# Patient Record
Sex: MALE | Race: WHITE | NOT HISPANIC OR LATINO
[De-identification: names, ages, dates, MRNs, and addresses within clinical notes are randomized per-mention and may not be internally consistent; named-entity substitution may affect disease eponyms.]

---

## 2017-01-27 DIAGNOSIS — Z81.1 FAMILY HISTORY OF ALCOHOL ABUSE AND DEPENDENCE: ICD-10-CM

## 2017-01-27 DIAGNOSIS — Z85.71 PERSONAL HISTORY OF HODGKIN LYMPHOMA: ICD-10-CM

## 2017-01-27 DIAGNOSIS — Z82.3 FAMILY HISTORY OF STROKE: ICD-10-CM

## 2017-01-27 DIAGNOSIS — Z87.19 PERSONAL HISTORY OF OTHER DISEASES OF THE DIGESTIVE SYSTEM: ICD-10-CM

## 2017-01-30 ENCOUNTER — APPOINTMENT (OUTPATIENT)
Dept: ELECTROPHYSIOLOGY | Facility: CLINIC | Age: 72
End: 2017-01-30

## 2017-01-30 VITALS
HEIGHT: 68 IN | BODY MASS INDEX: 34.25 KG/M2 | OXYGEN SATURATION: 97 % | DIASTOLIC BLOOD PRESSURE: 66 MMHG | WEIGHT: 226 LBS | SYSTOLIC BLOOD PRESSURE: 147 MMHG | HEART RATE: 55 BPM

## 2017-01-30 RX ORDER — DULOXETINE HYDROCHLORIDE 30 MG/1
30 CAPSULE, DELAYED RELEASE ORAL
Refills: 0 | Status: DISCONTINUED | COMMUNITY
End: 2017-01-30

## 2017-10-03 ENCOUNTER — APPOINTMENT (OUTPATIENT)
Dept: SPINE | Facility: CLINIC | Age: 72
End: 2017-10-03
Payer: MEDICARE

## 2017-10-03 VITALS
HEIGHT: 68 IN | BODY MASS INDEX: 33.65 KG/M2 | DIASTOLIC BLOOD PRESSURE: 80 MMHG | WEIGHT: 222 LBS | SYSTOLIC BLOOD PRESSURE: 140 MMHG

## 2017-10-03 PROCEDURE — 99204 OFFICE O/P NEW MOD 45 MIN: CPT

## 2017-10-03 RX ORDER — WARFARIN SODIUM 2 MG/1
2 TABLET ORAL
Refills: 0 | Status: DISCONTINUED | COMMUNITY
End: 2017-10-03

## 2017-10-17 ENCOUNTER — APPOINTMENT (OUTPATIENT)
Dept: SPINE | Facility: CLINIC | Age: 72
End: 2017-10-17
Payer: MEDICARE

## 2017-10-17 VITALS
BODY MASS INDEX: 33.65 KG/M2 | HEIGHT: 68 IN | DIASTOLIC BLOOD PRESSURE: 70 MMHG | WEIGHT: 222 LBS | SYSTOLIC BLOOD PRESSURE: 130 MMHG

## 2017-10-17 PROCEDURE — 99214 OFFICE O/P EST MOD 30 MIN: CPT

## 2017-10-25 ENCOUNTER — OTHER (OUTPATIENT)
Age: 72
End: 2017-10-25

## 2017-12-14 ENCOUNTER — OUTPATIENT (OUTPATIENT)
Dept: OUTPATIENT SERVICES | Facility: HOSPITAL | Age: 72
LOS: 1 days | End: 2017-12-14
Payer: COMMERCIAL

## 2017-12-14 VITALS
TEMPERATURE: 98 F | DIASTOLIC BLOOD PRESSURE: 54 MMHG | WEIGHT: 223.99 LBS | OXYGEN SATURATION: 97 % | HEIGHT: 68 IN | SYSTOLIC BLOOD PRESSURE: 139 MMHG | HEART RATE: 59 BPM | RESPIRATION RATE: 18 BRPM

## 2017-12-14 DIAGNOSIS — I48.91 UNSPECIFIED ATRIAL FIBRILLATION: ICD-10-CM

## 2017-12-14 DIAGNOSIS — Z01.818 ENCOUNTER FOR OTHER PREPROCEDURAL EXAMINATION: ICD-10-CM

## 2017-12-14 DIAGNOSIS — M48.061 SPINAL STENOSIS, LUMBAR REGION WITHOUT NEUROGENIC CLAUDICATION: ICD-10-CM

## 2017-12-14 DIAGNOSIS — M48.00 SPINAL STENOSIS, SITE UNSPECIFIED: ICD-10-CM

## 2017-12-14 DIAGNOSIS — G47.33 OBSTRUCTIVE SLEEP APNEA (ADULT) (PEDIATRIC): ICD-10-CM

## 2017-12-14 DIAGNOSIS — Z96.641 PRESENCE OF RIGHT ARTIFICIAL HIP JOINT: Chronic | ICD-10-CM

## 2017-12-14 DIAGNOSIS — Z90.81 ACQUIRED ABSENCE OF SPLEEN: Chronic | ICD-10-CM

## 2017-12-14 LAB
ANION GAP SERPL CALC-SCNC: 13 MMOL/L — SIGNIFICANT CHANGE UP (ref 5–17)
BLD GP AB SCN SERPL QL: NEGATIVE — SIGNIFICANT CHANGE UP
BUN SERPL-MCNC: 19 MG/DL — SIGNIFICANT CHANGE UP (ref 7–23)
CALCIUM SERPL-MCNC: 9.9 MG/DL — SIGNIFICANT CHANGE UP (ref 8.4–10.5)
CHLORIDE SERPL-SCNC: 97 MMOL/L — SIGNIFICANT CHANGE UP (ref 96–108)
CO2 SERPL-SCNC: 33 MMOL/L — HIGH (ref 22–31)
CREAT SERPL-MCNC: 0.82 MG/DL — SIGNIFICANT CHANGE UP (ref 0.5–1.3)
GLUCOSE SERPL-MCNC: 78 MG/DL — SIGNIFICANT CHANGE UP (ref 70–99)
HCT VFR BLD CALC: 37.5 % — LOW (ref 39–50)
HGB BLD-MCNC: 12.1 G/DL — LOW (ref 13–17)
MCHC RBC-ENTMCNC: 31.9 PG — SIGNIFICANT CHANGE UP (ref 27–34)
MCHC RBC-ENTMCNC: 32.3 GM/DL — SIGNIFICANT CHANGE UP (ref 32–36)
MCV RBC AUTO: 98.9 FL — SIGNIFICANT CHANGE UP (ref 80–100)
PLATELET # BLD AUTO: 387 K/UL — SIGNIFICANT CHANGE UP (ref 150–400)
POTASSIUM SERPL-MCNC: 4.1 MMOL/L — SIGNIFICANT CHANGE UP (ref 3.5–5.3)
POTASSIUM SERPL-SCNC: 4.1 MMOL/L — SIGNIFICANT CHANGE UP (ref 3.5–5.3)
RBC # BLD: 3.79 M/UL — LOW (ref 4.2–5.8)
RBC # FLD: 16.2 % — HIGH (ref 10.3–14.5)
RH IG SCN BLD-IMP: POSITIVE — SIGNIFICANT CHANGE UP
SODIUM SERPL-SCNC: 143 MMOL/L — SIGNIFICANT CHANGE UP (ref 135–145)
WBC # BLD: 11.49 K/UL — HIGH (ref 3.8–10.5)
WBC # FLD AUTO: 11.49 K/UL — HIGH (ref 3.8–10.5)

## 2017-12-14 PROCEDURE — 85027 COMPLETE CBC AUTOMATED: CPT

## 2017-12-14 PROCEDURE — 86850 RBC ANTIBODY SCREEN: CPT

## 2017-12-14 PROCEDURE — G0463: CPT

## 2017-12-14 PROCEDURE — 86900 BLOOD TYPING SEROLOGIC ABO: CPT

## 2017-12-14 PROCEDURE — 86901 BLOOD TYPING SEROLOGIC RH(D): CPT

## 2017-12-14 PROCEDURE — 80048 BASIC METABOLIC PNL TOTAL CA: CPT

## 2017-12-14 RX ORDER — LIDOCAINE HCL 20 MG/ML
0.2 VIAL (ML) INJECTION ONCE
Qty: 0 | Refills: 0 | Status: DISCONTINUED | OUTPATIENT
Start: 2018-01-05 | End: 2018-01-05

## 2017-12-14 RX ORDER — CEFAZOLIN SODIUM 1 G
2000 VIAL (EA) INJECTION ONCE
Qty: 0 | Refills: 0 | Status: DISCONTINUED | OUTPATIENT
Start: 2018-01-05 | End: 2018-01-06

## 2017-12-14 RX ORDER — SODIUM CHLORIDE 9 MG/ML
3 INJECTION INTRAMUSCULAR; INTRAVENOUS; SUBCUTANEOUS EVERY 8 HOURS
Qty: 0 | Refills: 0 | Status: DISCONTINUED | OUTPATIENT
Start: 2018-01-05 | End: 2018-01-05

## 2017-12-14 NOTE — H&P PST ADULT - PMH
Atrial fibrillation    BPH (benign prostatic hyperplasia)    GERD (gastroesophageal reflux disease)    Hodgkin lymphoma    HTN (hypertension)    MOE (obstructive sleep apnea)  positive sleep study many years ago, was recommended to use CPAP, patient non-compliant Atrial fibrillation    BPH (benign prostatic hyperplasia)    GERD (gastroesophageal reflux disease)    Hodgkin lymphoma    HTN (hypertension)    MOE (obstructive sleep apnea)  positive sleep study many years ago, was recommended to use CPAP, patient non-compliant  Osteoarthritis

## 2017-12-22 ENCOUNTER — OTHER (OUTPATIENT)
Age: 72
End: 2017-12-22

## 2018-01-05 ENCOUNTER — APPOINTMENT (OUTPATIENT)
Dept: SPINE | Facility: HOSPITAL | Age: 73
End: 2018-01-05

## 2018-01-05 ENCOUNTER — INPATIENT (INPATIENT)
Facility: HOSPITAL | Age: 73
LOS: 2 days | Discharge: HOME | DRG: 460 | End: 2018-01-08
Attending: NEUROLOGICAL SURGERY | Admitting: NEUROLOGICAL SURGERY
Payer: COMMERCIAL

## 2018-01-05 VITALS
TEMPERATURE: 98 F | HEIGHT: 68 IN | DIASTOLIC BLOOD PRESSURE: 71 MMHG | HEART RATE: 53 BPM | OXYGEN SATURATION: 98 % | WEIGHT: 223.99 LBS | SYSTOLIC BLOOD PRESSURE: 133 MMHG | RESPIRATION RATE: 18 BRPM

## 2018-01-05 DIAGNOSIS — M48.061 SPINAL STENOSIS, LUMBAR REGION WITHOUT NEUROGENIC CLAUDICATION: ICD-10-CM

## 2018-01-05 DIAGNOSIS — M48.06 SPINAL STENOSIS, LUMBAR REGION: ICD-10-CM

## 2018-01-05 DIAGNOSIS — Z96.641 PRESENCE OF RIGHT ARTIFICIAL HIP JOINT: Chronic | ICD-10-CM

## 2018-01-05 DIAGNOSIS — Z90.81 ACQUIRED ABSENCE OF SPLEEN: Chronic | ICD-10-CM

## 2018-01-05 LAB
BLD GP AB SCN SERPL QL: NEGATIVE — SIGNIFICANT CHANGE UP
INR BLD: 1.02 RATIO — SIGNIFICANT CHANGE UP (ref 0.88–1.16)
PROTHROM AB SERPL-ACNC: 11.1 SEC — SIGNIFICANT CHANGE UP (ref 9.8–12.7)
RH IG SCN BLD-IMP: POSITIVE — SIGNIFICANT CHANGE UP

## 2018-01-05 PROCEDURE — 22840 INSERT SPINE FIXATION DEVICE: CPT | Mod: AS

## 2018-01-05 PROCEDURE — 61783 SCAN PROC SPINAL: CPT | Mod: AS,59

## 2018-01-05 PROCEDURE — 63047 LAM FACETEC & FORAMOT LUMBAR: CPT

## 2018-01-05 PROCEDURE — 22840 INSERT SPINE FIXATION DEVICE: CPT

## 2018-01-05 PROCEDURE — 22612 ARTHRD PST TQ 1NTRSPC LUMBAR: CPT | Mod: AS

## 2018-01-05 PROCEDURE — 63047 LAM FACETEC & FORAMOT LUMBAR: CPT | Mod: AS

## 2018-01-05 PROCEDURE — 70450 CT HEAD/BRAIN W/O DYE: CPT | Mod: 26

## 2018-01-05 PROCEDURE — 22612 ARTHRD PST TQ 1NTRSPC LUMBAR: CPT

## 2018-01-05 PROCEDURE — 61783 SCAN PROC SPINAL: CPT | Mod: 59

## 2018-01-05 RX ORDER — LISINOPRIL 2.5 MG/1
40 TABLET ORAL
Qty: 0 | Refills: 0 | Status: DISCONTINUED | OUTPATIENT
Start: 2018-01-05 | End: 2018-01-08

## 2018-01-05 RX ORDER — DOXAZOSIN MESYLATE 4 MG
8 TABLET ORAL AT BEDTIME
Qty: 0 | Refills: 0 | Status: DISCONTINUED | OUTPATIENT
Start: 2018-01-05 | End: 2018-01-08

## 2018-01-05 RX ORDER — CEFAZOLIN SODIUM 1 G
2000 VIAL (EA) INJECTION EVERY 8 HOURS
Qty: 0 | Refills: 0 | Status: COMPLETED | OUTPATIENT
Start: 2018-01-05 | End: 2018-01-06

## 2018-01-05 RX ORDER — CYCLOBENZAPRINE HYDROCHLORIDE 10 MG/1
10 TABLET, FILM COATED ORAL EVERY 8 HOURS
Qty: 0 | Refills: 0 | Status: DISCONTINUED | OUTPATIENT
Start: 2018-01-05 | End: 2018-01-08

## 2018-01-05 RX ORDER — ATENOLOL 25 MG/1
25 TABLET ORAL DAILY
Qty: 0 | Refills: 0 | Status: DISCONTINUED | OUTPATIENT
Start: 2018-01-05 | End: 2018-01-08

## 2018-01-05 RX ORDER — SENNA PLUS 8.6 MG/1
2 TABLET ORAL AT BEDTIME
Qty: 0 | Refills: 0 | Status: DISCONTINUED | OUTPATIENT
Start: 2018-01-05 | End: 2018-01-08

## 2018-01-05 RX ORDER — ENOXAPARIN SODIUM 100 MG/ML
40 INJECTION SUBCUTANEOUS AT BEDTIME
Qty: 0 | Refills: 0 | Status: DISCONTINUED | OUTPATIENT
Start: 2018-01-06 | End: 2018-01-08

## 2018-01-05 RX ORDER — PANTOPRAZOLE SODIUM 20 MG/1
40 TABLET, DELAYED RELEASE ORAL
Qty: 0 | Refills: 0 | Status: DISCONTINUED | OUTPATIENT
Start: 2018-01-05 | End: 2018-01-08

## 2018-01-05 RX ORDER — FLUTICASONE PROPIONATE 50 MCG
1 SPRAY, SUSPENSION NASAL DAILY
Qty: 0 | Refills: 0 | Status: DISCONTINUED | OUTPATIENT
Start: 2018-01-05 | End: 2018-01-08

## 2018-01-05 RX ORDER — HYDROCHLOROTHIAZIDE 25 MG
25 TABLET ORAL DAILY
Qty: 0 | Refills: 0 | Status: DISCONTINUED | OUTPATIENT
Start: 2018-01-05 | End: 2018-01-08

## 2018-01-05 RX ORDER — DEXTROSE MONOHYDRATE, SODIUM CHLORIDE, AND POTASSIUM CHLORIDE 50; .745; 4.5 G/1000ML; G/1000ML; G/1000ML
1000 INJECTION, SOLUTION INTRAVENOUS
Qty: 0 | Refills: 0 | Status: DISCONTINUED | OUTPATIENT
Start: 2018-01-05 | End: 2018-01-07

## 2018-01-05 RX ORDER — SIMVASTATIN 20 MG/1
20 TABLET, FILM COATED ORAL AT BEDTIME
Qty: 0 | Refills: 0 | Status: DISCONTINUED | OUTPATIENT
Start: 2018-01-05 | End: 2018-01-08

## 2018-01-05 RX ORDER — HYDROMORPHONE HYDROCHLORIDE 2 MG/ML
0.25 INJECTION INTRAMUSCULAR; INTRAVENOUS; SUBCUTANEOUS
Qty: 0 | Refills: 0 | Status: DISCONTINUED | OUTPATIENT
Start: 2018-01-05 | End: 2018-01-05

## 2018-01-05 RX ORDER — DOCUSATE SODIUM 100 MG
100 CAPSULE ORAL THREE TIMES A DAY
Qty: 0 | Refills: 0 | Status: DISCONTINUED | OUTPATIENT
Start: 2018-01-05 | End: 2018-01-08

## 2018-01-05 RX ADMIN — HYDROMORPHONE HYDROCHLORIDE 0.25 MILLIGRAM(S): 2 INJECTION INTRAMUSCULAR; INTRAVENOUS; SUBCUTANEOUS at 16:30

## 2018-01-05 RX ADMIN — DEXTROSE MONOHYDRATE, SODIUM CHLORIDE, AND POTASSIUM CHLORIDE 75 MILLILITER(S): 50; .745; 4.5 INJECTION, SOLUTION INTRAVENOUS at 14:30

## 2018-01-05 RX ADMIN — Medication 8 MILLIGRAM(S): at 21:44

## 2018-01-05 RX ADMIN — SIMVASTATIN 20 MILLIGRAM(S): 20 TABLET, FILM COATED ORAL at 21:44

## 2018-01-05 RX ADMIN — SODIUM CHLORIDE 3 MILLILITER(S): 9 INJECTION INTRAMUSCULAR; INTRAVENOUS; SUBCUTANEOUS at 06:18

## 2018-01-05 RX ADMIN — HYDROMORPHONE HYDROCHLORIDE 0.25 MILLIGRAM(S): 2 INJECTION INTRAMUSCULAR; INTRAVENOUS; SUBCUTANEOUS at 16:10

## 2018-01-05 RX ADMIN — Medication 100 MILLIGRAM(S): at 17:45

## 2018-01-05 NOTE — PATIENT PROFILE ADULT. - PMH
Atrial fibrillation    BPH (benign prostatic hyperplasia)    GERD (gastroesophageal reflux disease)    Hodgkin lymphoma    HTN (hypertension)    MOE (obstructive sleep apnea)  positive sleep study many years ago, was recommended to use CPAP, patient non-compliant  Osteoarthritis

## 2018-01-05 NOTE — PHYSICAL THERAPY INITIAL EVALUATION ADULT - ADDITIONAL COMMENTS
Pt lives in a private house with 4 steps to enter and 4 steps to bedroom. Pt was Ind with all ADLs and amb without AD. Although, pt reports he has only been able to amb 70-80ft 2/2 pain.

## 2018-01-05 NOTE — PHYSICAL THERAPY INITIAL EVALUATION ADULT - PERTINENT HX OF CURRENT PROBLEM, REHAB EVAL
Pt is 72M admitted 1/5/17 PMHx A-fib, S/P cardioversion "many years ago," on coumadin, Hodgkins lymphoma, S/P splenectomy, chemotherapy & radiation 1975, now with c/o chronic low back pain, denies pain or numbness of the lower extremities. Presents today for L3-4 lumbar laminectomy with pedicle screws. Pt is 72M admitted 1/5/17 PMHx A-fib, S/P cardioversion "many years ago," on coumadin, Hodgkins lymphoma, S/P splenectomy, chemotherapy & radiation 1975, now with c/o chronic low back pain, denies pain or numbness of the lower extremities. Presents today for L3-4 lumbar laminectomy with pedicle screws. Post op: Pt slow to wake up from anesthesia earlier. Initially was not moving rue/rle so CT head was obtained.

## 2018-01-05 NOTE — CONSULT NOTE ADULT - SUBJECTIVE AND OBJECTIVE BOX
CHIEF COMPLAINT:Patient is a 72y old  Male who presents with a chief complaint of I'm having screws and plates put into my lower back. (05 Jan 2018 06:14)      HISTORY OF PRESENT ILLNESS:  This is a pleasant gentleman with history as below presented with low back pain s/p lumbar lami  cardilogy is called for htn and afib management  No chest pain, or sob.     PAST MEDICAL & SURGICAL HISTORY:  Osteoarthritis  BPH (benign prostatic hyperplasia)  MOE (obstructive sleep apnea): positive sleep study many years ago, was recommended to use CPAP, patient non-compliant  GERD (gastroesophageal reflux disease)  HTN (hypertension)  Atrial fibrillation  Hodgkin lymphoma  S/P hip replacement, right: 2014  S/P splenectomy: 1975          MEDICATIONS:  ATENolol  Tablet 25 milliGRAM(s) Oral daily  doxazosin 8 milliGRAM(s) Oral at bedtime  lisinopril 40 milliGRAM(s) Oral <User Schedule>    ceFAZolin   IVPB 2000 milliGRAM(s) IV Intermittent once  ceFAZolin   IVPB 2000 milliGRAM(s) IV Intermittent every 8 hours      cyclobenzaprine 10 milliGRAM(s) Oral every 8 hours PRN  HYDROmorphone  Injectable 0.25 milliGRAM(s) IV Push every 10 minutes PRN    docusate sodium 100 milliGRAM(s) Oral three times a day  pantoprazole    Tablet 40 milliGRAM(s) Oral <User Schedule>  senna 2 Tablet(s) Oral at bedtime    simvastatin 20 milliGRAM(s) Oral at bedtime    fluticasone propionate 50 MICROgram(s)/spray Nasal Spray 1 Spray(s) Both Nostrils daily  sodium chloride 0.9% with potassium chloride 20 mEq/L 1000 milliLiter(s) IV Continuous <Continuous>      FAMILY HISTORY:      Non-contributory    SOCIAL HISTORY:    No tobacco, drugs or etoh    Allergies    No Known Allergies    Intolerances    	    REVIEW OF SYSTEMS:  as above  The rest of the 14 points ROS reviewed and except above they are unremarkable.        PHYSICAL EXAM:  T(C): 36.7 (01-05-18 @ 13:40), Max: 36.7 (01-05-18 @ 06:10)  HR: 75 (01-05-18 @ 16:30) (53 - 87)  BP: 161/79 (01-05-18 @ 16:30) (133/71 - 175/79)  RR: 16 (01-05-18 @ 16:30) (16 - 18)  SpO2: 97% (01-05-18 @ 16:30) (94% - 100%)  Wt(kg): --  I&O's Summary    05 Jan 2018 07:01  -  05 Jan 2018 17:53  --------------------------------------------------------  IN: 150 mL / OUT: 525 mL / NET: -375 mL        Appearance: Normal	  HEENT:   Normal oral mucosa, PERRL, EOMI	  Cardiovascular: Normal S1 S2,    Murmur:   Neck: JVP normal  Respiratory: Lungs clear to auscultation  Gastrointestinal:  Soft, Non-tender, + BS	  Skin: normal   Neuro: No gross deficits.   Psychiatry:  Mood & affect appropriate  Ext: No edema    LABS/DATA:    TELEMETRY: 	    ECG:  	   	  CARDIAC MARKERS:                    proBNP:   Lipid Profile:   HgA1c:   TSH:

## 2018-01-05 NOTE — CONSULT NOTE ADULT - SUBJECTIVE AND OBJECTIVE BOX
Patient is a 72y old  Male who presents with a chief complaint of I'm having screws and plates put into my lower back. (05 Jan 2018 06:14)      HPI:  71 y/o M PMH A fib, S/P cardioversion "many years ago," on coumadin, Hodgkins lymphoma, S/P splenectomy, chemotherapy and radiation 1975, now with c/o chronic low back pain, denies pain or numbness of the lower extremities.  Presents today for L3-4 lumbar laminectomy with pedicle screws. (14 Dec 2017 11:58)    Patient states that #6-7-X-X-10 bridge was cemented into his mouth yesterday by his dentist.       PAST MEDICAL & SURGICAL HISTORY:  Osteoarthritis  BPH (benign prostatic hyperplasia)  MOE (obstructive sleep apnea): positive sleep study many years ago, was recommended to use CPAP, patient non-compliant  GERD (gastroesophageal reflux disease)  HTN (hypertension)  Atrial fibrillation  Hodgkin lymphoma  S/P hip replacement, right: 2014  S/P splenectomy: 1975      MEDICATIONS  (STANDING):  ATENolol  Tablet 25 milliGRAM(s) Oral daily  ceFAZolin   IVPB 2000 milliGRAM(s) IV Intermittent once  ceFAZolin   IVPB 2000 milliGRAM(s) IV Intermittent every 8 hours  docusate sodium 100 milliGRAM(s) Oral three times a day  doxazosin 8 milliGRAM(s) Oral at bedtime  fluticasone propionate 50 MICROgram(s)/spray Nasal Spray 1 Spray(s) Both Nostrils daily  lisinopril 40 milliGRAM(s) Oral <User Schedule>  pantoprazole    Tablet 40 milliGRAM(s) Oral <User Schedule>  senna 2 Tablet(s) Oral at bedtime  simvastatin 20 milliGRAM(s) Oral at bedtime  sodium chloride 0.9% with potassium chloride 20 mEq/L 1000 milliLiter(s) (75 mL/Hr) IV Continuous <Continuous>    MEDICATIONS  (PRN):  cyclobenzaprine 10 milliGRAM(s) Oral every 8 hours PRN Muscle Spasm  HYDROmorphone  Injectable 0.25 milliGRAM(s) IV Push every 10 minutes PRN Moderate Pain (4 - 6)      Allergies    No Known Allergies    Intolerances      *Last Dental Visit: Patient states he saw his dentist yesterday and had #6-10 bridge cemented in.     Vital Signs Last 24 Hrs  T(C): 36.7 (05 Jan 2018 13:40), Max: 36.7 (05 Jan 2018 06:10)  T(F): 98.1 (05 Jan 2018 13:40), Max: 98.1 (05 Jan 2018 06:10)  HR: 75 (05 Jan 2018 16:30) (53 - 87)  BP: 161/79 (05 Jan 2018 16:30) (133/71 - 175/79)  BP(mean): 110 (05 Jan 2018 16:30) (90 - 119)  RR: 16 (05 Jan 2018 16:30) (16 - 18)  SpO2: 97% (05 Jan 2018 16:30) (94% - 100%)    EOE:  TMJ ( -  ) clicks                    ( -   ) pops                    (  -  ) crepitus             Mandible FROM             Facial bones and MOM grossly intact             ( -  ) trismus             ( -  ) LAD             ( -  ) swelling             ( -  ) asymmetry             ( -  ) palpation             ( -  ) SOB             ( -  ) dysphagia                IOE:  permanent dentition: multiple missing teeth            hard/soft palate:  (  - ) palatal torus           tongue/FOM WNL           labial/buccal mucosa WNL           ( -  ) percussion           (  - ) palpation           (  - ) swelling   Tooth #6 has decay and coronal fracture of tooth structure.   RT #9 seen intraorally.     Radiographs: No radiographs taken       LABS:      INR: 1.02 ratio [0.88 - 1.16] (01-05 @ 06:13)      ASSESSMENT: 72 year old male presents with dislodged #6-7-X-X-10 bridge (posts seen on bridge from #7,10 abutments) after intubation. Patient states that bridge was cemented in yesterday by his dentist. 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 Bridge #6-10 is not retentive enough to be recemented into mouth and tooth #6 has decay and coronal fracture of tooth structure.     RECOMMENDATIONS:   1) Dental F/U with outpatient dentist for comprehensive dental care.   2) If any difficulty swallowing/breathing, fever occur, page dental.     Leticia Edwards DDS  Pager #[phone]

## 2018-01-05 NOTE — BRIEF OPERATIVE NOTE - PROCEDURE
<<-----Click on this checkbox to enter Procedure Lumbar fusion  01/05/2018    Active  [facility]  Lumbar laminectomy  01/05/2018    Active  [facility]

## 2018-01-05 NOTE — CONSULT NOTE ADULT - ASSESSMENT
HTN  elevated BP  pt on chlorthalidone at home  for now we will start hctz  monitor lytes    Afib  HR stable  cont bb  a/c once deemed safe from nsx

## 2018-01-05 NOTE — PHYSICAL THERAPY INITIAL EVALUATION ADULT - PRECAUTIONS/LIMITATIONS, REHAB EVAL
spinal precautions spinal precautions/CT head: Age-appropriate involutional and ischemic gliotic changes. No hemorrhage. 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

## 2018-01-05 NOTE — PHYSICAL THERAPY INITIAL EVALUATION ADULT - GAIT DEVIATIONS NOTED, PT EVAL
decreased khanh/decreased velocity of limb motion/decreased weight-shifting ability/decreased step length

## 2018-01-05 NOTE — PHYSICAL THERAPY INITIAL EVALUATION ADULT - PLANNED THERAPY INTERVENTIONS, PT EVAL
balance training/bed mobility training/strengthening/stair negotiation/transfer training/gait training

## 2018-01-05 NOTE — PROGRESS NOTE ADULT - ASSESSMENT
HPI:  Patient is a 72 year old male with chronic low back pain.  Now presents for surgery.    PROCEDURE: s/p L3-L4 laminectomy and left sided pedicle screws with w/ instrumentation and bony fusion on 1/5/2018  POD#0    PLAN:  -ct scan reviewed w/ attending, no acute changes  -periop ancef  -dilaudid for pain control  -flexeril for muscle spasms  -home medications  -dash/tlc diet  -home medications  -hold coumadin due to recent surgery, will follow up with attending when to resume  -senna and colace for bowel regimen  -holguin for strict I+O  -continue hemovac, monitor outputs  -out of bed with assistnace  -incentive spirometer for lung expansion  -pt eval pending  -am labs HPI:  Patient is a 72 year old male with chronic low back pain.  Now presents for surgery.    PROCEDURE: s/p L3-L4 laminectomy and left sided pedicle screws with w/ instrumentation and bony fusion on 1/5/2018  POD#0    PLAN:  -ct scan reviewed w/ attending, no acute changes  -periop ancef  -dilaudid for pain control  -flexeril for muscle spasms  -home medications  -dash/tlc diet  -home medications  -SCDs for DVT prophylaxis, start prophylactic lovenox tomorrow  -hold coumadin due to recent surgery, will follow up with attending when to resume  -senna and colace for bowel regimen  -holguin for strict I+O  -continue hemovac, monitor outputs  -out of bed with assistnace  -incentive spirometer for lung expansion  -pt eval pending  -am labs

## 2018-01-06 ENCOUNTER — TRANSCRIPTION ENCOUNTER (OUTPATIENT)
Age: 73
End: 2018-01-06

## 2018-01-06 LAB
ANION GAP SERPL CALC-SCNC: 8 MMOL/L — SIGNIFICANT CHANGE UP (ref 5–17)
BUN SERPL-MCNC: 15 MG/DL — SIGNIFICANT CHANGE UP (ref 7–23)
CALCIUM SERPL-MCNC: 8.7 MG/DL — SIGNIFICANT CHANGE UP (ref 8.4–10.5)
CHLORIDE SERPL-SCNC: 101 MMOL/L — SIGNIFICANT CHANGE UP (ref 96–108)
CO2 SERPL-SCNC: 32 MMOL/L — HIGH (ref 22–31)
CREAT SERPL-MCNC: 0.74 MG/DL — SIGNIFICANT CHANGE UP (ref 0.5–1.3)
GLUCOSE SERPL-MCNC: 141 MG/DL — HIGH (ref 70–99)
HCT VFR BLD CALC: 35.9 % — LOW (ref 39–50)
HGB BLD-MCNC: 11.5 G/DL — LOW (ref 13–17)
MCHC RBC-ENTMCNC: 32.1 GM/DL — SIGNIFICANT CHANGE UP (ref 32–36)
MCHC RBC-ENTMCNC: 33.5 PG — SIGNIFICANT CHANGE UP (ref 27–34)
MCV RBC AUTO: 104 FL — HIGH (ref 80–100)
PLATELET # BLD AUTO: 297 K/UL — SIGNIFICANT CHANGE UP (ref 150–400)
POTASSIUM SERPL-MCNC: 3.8 MMOL/L — SIGNIFICANT CHANGE UP (ref 3.5–5.3)
POTASSIUM SERPL-SCNC: 3.8 MMOL/L — SIGNIFICANT CHANGE UP (ref 3.5–5.3)
RBC # BLD: 3.45 M/UL — LOW (ref 4.2–5.8)
RBC # FLD: 13.3 % — SIGNIFICANT CHANGE UP (ref 10.3–14.5)
SODIUM SERPL-SCNC: 141 MMOL/L — SIGNIFICANT CHANGE UP (ref 135–145)
WBC # BLD: 21.1 K/UL — HIGH (ref 3.8–10.5)
WBC # FLD AUTO: 21.1 K/UL — HIGH (ref 3.8–10.5)

## 2018-01-06 RX ORDER — OXYCODONE HYDROCHLORIDE 5 MG/1
5 TABLET ORAL EVERY 4 HOURS
Qty: 0 | Refills: 0 | Status: DISCONTINUED | OUTPATIENT
Start: 2018-01-06 | End: 2018-01-08

## 2018-01-06 RX ORDER — OXYCODONE HYDROCHLORIDE 5 MG/1
10 TABLET ORAL EVERY 4 HOURS
Qty: 0 | Refills: 0 | Status: DISCONTINUED | OUTPATIENT
Start: 2018-01-06 | End: 2018-01-08

## 2018-01-06 RX ADMIN — Medication 1 SPRAY(S): at 11:21

## 2018-01-06 RX ADMIN — Medication 100 MILLIGRAM(S): at 00:51

## 2018-01-06 RX ADMIN — Medication 25 MILLIGRAM(S): at 06:48

## 2018-01-06 RX ADMIN — ENOXAPARIN SODIUM 40 MILLIGRAM(S): 100 INJECTION SUBCUTANEOUS at 21:03

## 2018-01-06 RX ADMIN — LISINOPRIL 40 MILLIGRAM(S): 2.5 TABLET ORAL at 10:17

## 2018-01-06 RX ADMIN — SIMVASTATIN 20 MILLIGRAM(S): 20 TABLET, FILM COATED ORAL at 21:03

## 2018-01-06 RX ADMIN — Medication 8 MILLIGRAM(S): at 21:03

## 2018-01-06 RX ADMIN — ATENOLOL 25 MILLIGRAM(S): 25 TABLET ORAL at 06:48

## 2018-01-06 RX ADMIN — PANTOPRAZOLE SODIUM 40 MILLIGRAM(S): 20 TABLET, DELAYED RELEASE ORAL at 06:48

## 2018-01-06 NOTE — PROGRESS NOTE ADULT - ASSESSMENT
HTN  elevated B  cont current meds  will consider addition of ccb    Afib  HR stable  cont bb  a/c once deemed safe from nsx

## 2018-01-06 NOTE — PROGRESS NOTE ADULT - ASSESSMENT
73 y/o M PMH A fib, S/P cardioversion "many years ago," on coumadin, Hodgkins lymphoma, S/P splenectomy, chemotherapy and radiation 1975, now with c/o chronic low back pain, denies pain or numbness of the lower extremities.  Presents today for L3-4 lumbar laminectomy with pedicle screws. (14 Dec 2017 11:58)    PROCEDURE: Adm 1/5 Lt L3-4 Lami and fusion   POD#1    PLAN:  Neuro: Cont HMV drain.  DC Mandeep now - Ck Void, WBC=11.49-FU CBC, BMP-P today. Resume Coumadin for A. Fib when safe.     Cardio/Dr Farrell-HTN-elevated BP. pt on chlorthalidone at home-for now we will start hctz. monitor lytes. Afib-HR stable, cont bb. a/c once deemed safe from nsx    Dental-dislodged #6-7-X-X-10 bridge (posts seen on bridge from #7,10 abutments) after intubation. Patient states that bridge was cemented in yesterday by his dentist. 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 Bridge #6-10 is not retentive enough to be recemented into mouth and tooth #6 has decay and coronal fracture of tooth structure.  Dental F/U with outpatient dentist for comprehensive dental care.  If any difficulty swallowing/breathing, fever occur, page dental. Leticia Edwards DDS Pager #[phone]    Respiratory: Patient instructed to use incentive spirometer [X ] YES [ ] NO                 DVT ppx: [X ] SQL [ ] SQH and Venodynes [ ] Left [ ] Right [X ] Bilateral    Discharge planning: PT-outpt PT, has RW at home

## 2018-01-07 ENCOUNTER — TRANSCRIPTION ENCOUNTER (OUTPATIENT)
Age: 73
End: 2018-01-07

## 2018-01-07 LAB
HCT VFR BLD CALC: 36.6 % — LOW (ref 39–50)
HGB BLD-MCNC: 11.8 G/DL — LOW (ref 13–17)
MCHC RBC-ENTMCNC: 32.3 GM/DL — SIGNIFICANT CHANGE UP (ref 32–36)
MCHC RBC-ENTMCNC: 33.8 PG — SIGNIFICANT CHANGE UP (ref 27–34)
MCV RBC AUTO: 104 FL — HIGH (ref 80–100)
PLATELET # BLD AUTO: 288 K/UL — SIGNIFICANT CHANGE UP (ref 150–400)
RBC # BLD: 3.51 M/UL — LOW (ref 4.2–5.8)
RBC # FLD: 13.4 % — SIGNIFICANT CHANGE UP (ref 10.3–14.5)
WBC # BLD: 20.6 K/UL — HIGH (ref 3.8–10.5)
WBC # FLD AUTO: 20.6 K/UL — HIGH (ref 3.8–10.5)

## 2018-01-07 RX ORDER — HYDRALAZINE HCL 50 MG
5 TABLET ORAL
Qty: 0 | Refills: 0 | Status: DISCONTINUED | OUTPATIENT
Start: 2018-01-07 | End: 2018-01-08

## 2018-01-07 RX ADMIN — Medication 25 MILLIGRAM(S): at 06:27

## 2018-01-07 RX ADMIN — Medication 5 MILLIGRAM(S): at 13:50

## 2018-01-07 RX ADMIN — ATENOLOL 25 MILLIGRAM(S): 25 TABLET ORAL at 06:27

## 2018-01-07 RX ADMIN — ENOXAPARIN SODIUM 40 MILLIGRAM(S): 100 INJECTION SUBCUTANEOUS at 21:09

## 2018-01-07 RX ADMIN — LISINOPRIL 40 MILLIGRAM(S): 2.5 TABLET ORAL at 11:26

## 2018-01-07 RX ADMIN — Medication 100 MILLIGRAM(S): at 13:15

## 2018-01-07 RX ADMIN — Medication 100 MILLIGRAM(S): at 06:27

## 2018-01-07 RX ADMIN — Medication 5 MILLIGRAM(S): at 21:09

## 2018-01-07 RX ADMIN — Medication 8 MILLIGRAM(S): at 21:09

## 2018-01-07 RX ADMIN — SIMVASTATIN 20 MILLIGRAM(S): 20 TABLET, FILM COATED ORAL at 21:09

## 2018-01-07 RX ADMIN — Medication 5 MILLIGRAM(S): at 01:09

## 2018-01-07 RX ADMIN — Medication 1 SPRAY(S): at 11:26

## 2018-01-07 NOTE — DISCHARGE NOTE ADULT - PLAN OF CARE
1/5 s/p L3-L4 laminectomy/fusion You may shower on 1/9/18. No rubbing or scrubbing of incision. Pat incision dry after shower.   Make appointment for follow up with Dr Inman for staple removal in 7-10 days.  No strenuous activity, no driving until cleared by Dr Inman. You mar restart your coumadin on 1/15/18, 10 days after surgery stable Make appointment for follow up with your Cardiologist in 1-2 weeks. You may restart your coumadin 10 days after surgery on 1/15/18.  Make appointment with your dentist for follow up for dislodged denture. Continue current medication Make appointment for follow up with your Cardiologist in 1 week. You may restart your coumadin 10 days after surgery on 1/15/18.  Make appointment with your dentist for follow up for dislodged denture. Make appointment for follow up with your Primary Care Physician this week for elevated WBC. 20.1 on discharge likely due to history of spleenectomy Make appointment for follow up with your Primary Care Physician this week for elevated WBC. 20.1 on discharge likely due to history of splenectomy. No sign of fever or infection. You have an appointment on Thursday 1/11/18 with Dr Adam to check your blood work.  You have an elevated WBC. 20.1  likely due to your history of splenectomy.   No sign of fever or infection on discharge.

## 2018-01-07 NOTE — DISCHARGE NOTE ADULT - CARE PLAN
Principal Discharge DX:	Spinal stenosis  Goal:	1/5 s/p L3-L4 laminectomy/fusion  Instructions for follow-up, activity and diet:	You may shower on 1/9/18. No rubbing or scrubbing of incision. Pat incision dry after shower.   Make appointment for follow up with Dr Inman for staple removal in 7-10 days.  No strenuous activity, no driving until cleared by Dr Inman. You mar restart your coumadin on 1/15/18, 10 days after surgery  Secondary Diagnosis:	S/P lumbar fusion  Goal:	stable  Secondary Diagnosis:	Atrial fibrillation  Goal:	stable  Instructions for follow-up, activity and diet:	Make appointment for follow up with your Cardiologist in 1-2 weeks. You may restart your coumadin 10 days after surgery on 1/15/18.  Make appointment with your dentist for follow up for dislodged denture.  Secondary Diagnosis:	HTN (hypertension)  Goal:	stable  Instructions for follow-up, activity and diet:	Continue current medication Principal Discharge DX:	Spinal stenosis  Goal:	1/5 s/p L3-L4 laminectomy/fusion  Instructions for follow-up, activity and diet:	You may shower on 1/9/18. No rubbing or scrubbing of incision. Pat incision dry after shower.   Make appointment for follow up with Dr Inman for staple removal in 7-10 days.  No strenuous activity, no driving until cleared by Dr Inman. You mar restart your coumadin on 1/15/18, 10 days after surgery  Secondary Diagnosis:	S/P lumbar fusion  Goal:	stable  Secondary Diagnosis:	Atrial fibrillation  Goal:	stable  Instructions for follow-up, activity and diet:	Make appointment for follow up with your Cardiologist in 1 week. You may restart your coumadin 10 days after surgery on 1/15/18.  Make appointment with your dentist for follow up for dislodged denture.  Secondary Diagnosis:	HTN (hypertension)  Goal:	stable  Instructions for follow-up, activity and diet:	Continue current medication Principal Discharge DX:	Spinal stenosis  Goal:	1/5 s/p L3-L4 laminectomy/fusion  Instructions for follow-up, activity and diet:	You may shower on 1/9/18. No rubbing or scrubbing of incision. Pat incision dry after shower.   Make appointment for follow up with Dr Inman for staple removal in 7-10 days.  No strenuous activity, no driving until cleared by Dr Inman. You mar restart your coumadin on 1/15/18, 10 days after surgery  Secondary Diagnosis:	S/P lumbar fusion  Goal:	stable  Secondary Diagnosis:	Atrial fibrillation  Goal:	stable  Instructions for follow-up, activity and diet:	Make appointment for follow up with your Cardiologist in 1 week. You may restart your coumadin 10 days after surgery on 1/15/18.  Make appointment with your dentist for follow up for dislodged denture.  Secondary Diagnosis:	HTN (hypertension)  Goal:	stable  Instructions for follow-up, activity and diet:	Continue current medication  Secondary Diagnosis:	Leukocytosis  Goal:	stable  Instructions for follow-up, activity and diet:	Make appointment for follow up with your Primary Care Physician this week for elevated WBC. 20.1 on discharge likely due to history of spleenectomy Principal Discharge DX:	Spinal stenosis  Goal:	1/5 s/p L3-L4 laminectomy/fusion  Instructions for follow-up, activity and diet:	You may shower on 1/9/18. No rubbing or scrubbing of incision. Pat incision dry after shower.   Make appointment for follow up with Dr Inman for staple removal in 7-10 days.  No strenuous activity, no driving until cleared by Dr Inman. You mar restart your coumadin on 1/15/18, 10 days after surgery  Secondary Diagnosis:	S/P lumbar fusion  Goal:	stable  Secondary Diagnosis:	Atrial fibrillation  Goal:	stable  Instructions for follow-up, activity and diet:	Make appointment for follow up with your Cardiologist in 1 week. You may restart your coumadin 10 days after surgery on 1/15/18.  Make appointment with your dentist for follow up for dislodged denture.  Secondary Diagnosis:	HTN (hypertension)  Goal:	stable  Instructions for follow-up, activity and diet:	Continue current medication  Secondary Diagnosis:	Leukocytosis  Goal:	stable  Instructions for follow-up, activity and diet:	Make appointment for follow up with your Primary Care Physician this week for elevated WBC. 20.1 on discharge likely due to history of splenectomy. No sign of fever or infection. Principal Discharge DX:	Spinal stenosis  Goal:	1/5 s/p L3-L4 laminectomy/fusion  Instructions for follow-up, activity and diet:	You may shower on 1/9/18. No rubbing or scrubbing of incision. Pat incision dry after shower.   Make appointment for follow up with Dr Inman for staple removal in 7-10 days.  No strenuous activity, no driving until cleared by Dr Inman. You mar restart your coumadin on 1/15/18, 10 days after surgery  Secondary Diagnosis:	S/P lumbar fusion  Goal:	stable  Secondary Diagnosis:	Atrial fibrillation  Goal:	stable  Instructions for follow-up, activity and diet:	Make appointment for follow up with your Cardiologist in 1 week. You may restart your coumadin 10 days after surgery on 1/15/18.  Make appointment with your dentist for follow up for dislodged denture.  Secondary Diagnosis:	HTN (hypertension)  Goal:	stable  Instructions for follow-up, activity and diet:	Continue current medication  Secondary Diagnosis:	Leukocytosis  Goal:	stable  Instructions for follow-up, activity and diet:	You have an appointment on Thursday 1/11/18 with Dr Adam to check your blood work.  You have an elevated WBC. 20.1  likely due to your history of splenectomy.   No sign of fever or infection on discharge.

## 2018-01-07 NOTE — PROGRESS NOTE ADULT - ASSESSMENT
71 y/o M PMH A fib, S/P cardioversion "many years ago," on coumadin, Hodgkins lymphoma, S/P splenectomy, chemotherapy and radiation 1975, now with c/o chronic low back pain, denies pain or numbness of the lower extremities.  Presents today for L3-4 lumbar laminectomy with pedicle screws.     PROCEDURE:  1/5 s/p L3-L4 laminectomy fusion  POD#2    PLAN:  Neuro:   -remove HMV today  -pain control- continue oxycodone prn, flexeril prn spasm  Respiratory:   - encouraged incentive spirometry  CV:  -HTN- continue HCTZ, lisinopril, atenolol, hydralazine prn   -BPH continue cardura  Endocrine:   -no issues  DVT ppx: venodynes while in bed, chemoprophylaxis  -bowel regimen  PT/OT: outpatient PT, has YANIQUE Mcmahan # [id number] 71 y/o M PMH A fib, S/P cardioversion "many years ago," on coumadin, Hodgkins lymphoma, S/P splenectomy, chemotherapy and radiation 1975, now with c/o chronic low back pain, denies pain or numbness of the lower extremities.  Presents today for L3-4 lumbar laminectomy with pedicle screws.     PROCEDURE:  1/5 s/p L3-L4 laminectomy fusion  POD#2    PLAN:  Neuro:   -remove HMV today  -pain control- continue oxycodone prn, flexeril prn spasm  Respiratory:   - encouraged incentive spirometry  CV:  -HTN- continue HCTZ, lisinopril, atenolol, hydralazine prn   -Afib- resume coumadin when deemed safe by neurosurgery  -BPH continue cardura  Dental  -f/u as outpt with dentist for dislodged denture, seen by dental in house  Endocrine:   -no issues  DVT ppx: venodynes while in bed, chemoprophylaxis  -bowel regimen  PT/OT: outpatient PT, has YANIQUE Mcmahan # [id number]

## 2018-01-07 NOTE — DISCHARGE NOTE ADULT - PATIENT PORTAL LINK FT
“You can access the Follow[facility] Patient Portal, offered by [facility], by registering with the following website: [email or web address]”

## 2018-01-07 NOTE — DISCHARGE NOTE ADULT - CARE PROVIDER_API CALL
Kapil Inman (MD), Neurological Surgery  [address] [place] [address] [address]  [place] [address]  Phone: [phone]  Fax: [phone]

## 2018-01-07 NOTE — DISCHARGE NOTE ADULT - MEDICATION SUMMARY - MEDICATIONS TO TAKE
I will START or STAY ON the medications listed below when I get home from the hospital:    calcium and CoQ 10  -- 1 tab(s) by mouth once a day  -- Indication: For vitamin    oxyCODONE 5 mg oral tablet  -- 1-2  tab(s) by mouth every 4 hours, As needed, Moderate Pain (4 - 6) MDD:8  -- Indication: For pain    lisinopril 40 mg oral tablet  -- 1 tab(s) by mouth once a day  -- Indication: For HTN (hypertension)    terazosin 10 mg oral tablet  -- orally once a day  -- Indication: For BPH (benign prostatic hyperplasia)    simvastatin 20 mg oral tablet  -- 1 tab(s) by mouth once a day (at bedtime)  -- Indication: For cholesterol    atenolol 25 mg oral tablet  -- 1 tab(s) by mouth once a day  -- Indication: For HTN (hypertension)    chlorthalidone 25 mg oral tablet  -- 1 tab(s) by mouth once a day  -- Indication: For HTN (hypertension)    fluticasone 50 mcg/inh nasal spray  -- 1 spray(s) into nose once a day  -- Indication: For nasal allergies    omeprazole 20 mg oral delayed release capsule  -- 1 cap(s) by mouth 3 times a week  -- Indication: For GERD (gastroesophageal reflux disease)

## 2018-01-07 NOTE — DISCHARGE NOTE ADULT - ADDITIONAL INSTRUCTIONS
Any sign of fever, chills, nausea or vomiting, severe pain not relieved with pain medication or any drainage from incision contact Dr Inman or go to ER.

## 2018-01-07 NOTE — PROGRESS NOTE ADULT - ASSESSMENT
HTN  elevated B  cont current meds  will consider addition of ccb , pt can follow up as outpt     Afib  HR stable  cont bb  a/c once deemed safe from nsx

## 2018-01-07 NOTE — DISCHARGE NOTE ADULT - MEDICATION SUMMARY - MEDICATIONS TO STOP TAKING
I will STOP taking the medications listed below when I get home from the hospital:    warfarin 2 mg oral tablet  -- 1 tab(s) by mouth once a day Sunday, Tuesday, Thursday, Saturday    warfarin 2.5 mg oral tablet  -- 1 tab(s) by mouth once a day Monday, Wednesday, Friday

## 2018-01-07 NOTE — DISCHARGE NOTE ADULT - NS AS ACTIVITY OBS
Do not make important decisions/Walking-Indoors allowed/No Heavy lifting/straining/Showering allowed/Do not drive or operate machinery/Walking-Outdoors allowed/Stairs allowed

## 2018-01-08 VITALS
OXYGEN SATURATION: 95 % | TEMPERATURE: 98 F | HEART RATE: 66 BPM | RESPIRATION RATE: 18 BRPM | SYSTOLIC BLOOD PRESSURE: 118 MMHG | DIASTOLIC BLOOD PRESSURE: 69 MMHG

## 2018-01-08 LAB
ANISOCYTOSIS BLD QL: SLIGHT — SIGNIFICANT CHANGE UP
BASOPHILS # BLD AUTO: 0.1 K/UL — SIGNIFICANT CHANGE UP (ref 0–0.2)
BASOPHILS NFR BLD AUTO: 0.4 % — SIGNIFICANT CHANGE UP (ref 0–2)
EOSINOPHIL # BLD AUTO: 0 K/UL — SIGNIFICANT CHANGE UP (ref 0–0.5)
EOSINOPHIL NFR BLD AUTO: 0.2 % — SIGNIFICANT CHANGE UP (ref 0–6)
HCT VFR BLD CALC: 34.7 % — LOW (ref 39–50)
HCT VFR BLD CALC: 35 % — LOW (ref 39–50)
HGB BLD-MCNC: 11.4 G/DL — LOW (ref 13–17)
HGB BLD-MCNC: 11.7 G/DL — LOW (ref 13–17)
LYMPHOCYTES # BLD AUTO: 11.5 % — LOW (ref 13–44)
LYMPHOCYTES # BLD AUTO: 2.5 K/UL — SIGNIFICANT CHANGE UP (ref 1–3.3)
MACROCYTES BLD QL: SLIGHT — SIGNIFICANT CHANGE UP
MCHC RBC-ENTMCNC: 32.2 PG — SIGNIFICANT CHANGE UP (ref 27–34)
MCHC RBC-ENTMCNC: 32.9 GM/DL — SIGNIFICANT CHANGE UP (ref 32–36)
MCHC RBC-ENTMCNC: 33.4 GM/DL — SIGNIFICANT CHANGE UP (ref 32–36)
MCHC RBC-ENTMCNC: 34.5 PG — HIGH (ref 27–34)
MCV RBC AUTO: 103 FL — HIGH (ref 80–100)
MCV RBC AUTO: 98 FL — SIGNIFICANT CHANGE UP (ref 80–100)
MONOCYTES # BLD AUTO: 2.4 K/UL — HIGH (ref 0–0.9)
MONOCYTES NFR BLD AUTO: 11.1 % — SIGNIFICANT CHANGE UP (ref 2–14)
NEUTROPHILS # BLD AUTO: 17 K/UL — HIGH (ref 1.8–7.4)
NEUTROPHILS NFR BLD AUTO: 76.9 % — SIGNIFICANT CHANGE UP (ref 43–77)
PLAT MORPH BLD: NORMAL — SIGNIFICANT CHANGE UP
PLATELET # BLD AUTO: 286 K/UL — SIGNIFICANT CHANGE UP (ref 150–400)
PLATELET # BLD AUTO: 318 K/UL — SIGNIFICANT CHANGE UP (ref 150–400)
POIKILOCYTOSIS BLD QL AUTO: SLIGHT — SIGNIFICANT CHANGE UP
POLYCHROMASIA BLD QL SMEAR: SLIGHT — SIGNIFICANT CHANGE UP
RBC # BLD: 3.39 M/UL — LOW (ref 4.2–5.8)
RBC # BLD: 3.54 M/UL — LOW (ref 4.2–5.8)
RBC # FLD: 13.2 % — SIGNIFICANT CHANGE UP (ref 10.3–14.5)
RBC # FLD: 16.1 % — HIGH (ref 10.3–14.5)
RBC BLD AUTO: ABNORMAL
SCHISTOCYTES BLD QL AUTO: SLIGHT — SIGNIFICANT CHANGE UP
WBC # BLD: 21.1 K/UL — HIGH (ref 3.8–10.5)
WBC # BLD: 22.1 K/UL — HIGH (ref 3.8–10.5)
WBC # FLD AUTO: 21.1 K/UL — HIGH (ref 3.8–10.5)
WBC # FLD AUTO: 22.1 K/UL — HIGH (ref 3.8–10.5)

## 2018-01-08 PROCEDURE — 70450 CT HEAD/BRAIN W/O DYE: CPT

## 2018-01-08 PROCEDURE — 86900 BLOOD TYPING SEROLOGIC ABO: CPT

## 2018-01-08 PROCEDURE — C1889: CPT

## 2018-01-08 PROCEDURE — 86850 RBC ANTIBODY SCREEN: CPT

## 2018-01-08 PROCEDURE — 76000 FLUOROSCOPY <1 HR PHYS/QHP: CPT

## 2018-01-08 PROCEDURE — 80048 BASIC METABOLIC PNL TOTAL CA: CPT

## 2018-01-08 PROCEDURE — 94640 AIRWAY INHALATION TREATMENT: CPT

## 2018-01-08 PROCEDURE — 97110 THERAPEUTIC EXERCISES: CPT

## 2018-01-08 PROCEDURE — 85610 PROTHROMBIN TIME: CPT

## 2018-01-08 PROCEDURE — 97161 PT EVAL LOW COMPLEX 20 MIN: CPT

## 2018-01-08 PROCEDURE — 85027 COMPLETE CBC AUTOMATED: CPT

## 2018-01-08 PROCEDURE — 86901 BLOOD TYPING SEROLOGIC RH(D): CPT

## 2018-01-08 PROCEDURE — 97530 THERAPEUTIC ACTIVITIES: CPT

## 2018-01-08 PROCEDURE — C1713: CPT

## 2018-01-08 PROCEDURE — 97116 GAIT TRAINING THERAPY: CPT

## 2018-01-08 RX ORDER — FLUTICASONE PROPIONATE 50 MCG
1 SPRAY, SUSPENSION NASAL
Qty: 0 | Refills: 0 | COMMUNITY
Start: 2018-01-08

## 2018-01-08 RX ORDER — WARFARIN SODIUM 2.5 MG/1
1 TABLET ORAL
Qty: 0 | Refills: 0 | COMMUNITY

## 2018-01-08 RX ORDER — ACETAMINOPHEN 500 MG
650 TABLET ORAL ONCE
Qty: 0 | Refills: 0 | Status: COMPLETED | OUTPATIENT
Start: 2018-01-08 | End: 2018-01-08

## 2018-01-08 RX ORDER — OXYCODONE HYDROCHLORIDE 5 MG/1
1 TABLET ORAL
Qty: 40 | Refills: 0 | OUTPATIENT
Start: 2018-01-08

## 2018-01-08 RX ORDER — FLUTICASONE PROPIONATE 50 MCG
1 SPRAY, SUSPENSION NASAL
Qty: 0 | Refills: 0 | COMMUNITY

## 2018-01-08 RX ORDER — HYDRALAZINE HCL 50 MG
5 TABLET ORAL ONCE
Qty: 0 | Refills: 0 | Status: COMPLETED | OUTPATIENT
Start: 2018-01-07 | End: 2018-01-08

## 2018-01-08 RX ADMIN — Medication 25 MILLIGRAM(S): at 06:04

## 2018-01-08 RX ADMIN — Medication 1 SPRAY(S): at 08:58

## 2018-01-08 RX ADMIN — OXYCODONE HYDROCHLORIDE 10 MILLIGRAM(S): 5 TABLET ORAL at 02:16

## 2018-01-08 RX ADMIN — OXYCODONE HYDROCHLORIDE 10 MILLIGRAM(S): 5 TABLET ORAL at 10:22

## 2018-01-08 RX ADMIN — LISINOPRIL 40 MILLIGRAM(S): 2.5 TABLET ORAL at 08:58

## 2018-01-08 RX ADMIN — PANTOPRAZOLE SODIUM 40 MILLIGRAM(S): 20 TABLET, DELAYED RELEASE ORAL at 06:04

## 2018-01-08 RX ADMIN — OXYCODONE HYDROCHLORIDE 10 MILLIGRAM(S): 5 TABLET ORAL at 09:52

## 2018-01-08 RX ADMIN — Medication 650 MILLIGRAM(S): at 20:00

## 2018-01-08 RX ADMIN — Medication 5 MILLIGRAM(S): at 00:05

## 2018-01-08 RX ADMIN — ATENOLOL 25 MILLIGRAM(S): 25 TABLET ORAL at 06:04

## 2018-01-08 RX ADMIN — OXYCODONE HYDROCHLORIDE 10 MILLIGRAM(S): 5 TABLET ORAL at 02:45

## 2018-01-08 NOTE — PROGRESS NOTE ADULT - SUBJECTIVE AND OBJECTIVE BOX
Subjective: Patient seen and examined. No new events except as noted.     SUBJECTIVE/ROS:  No chest pain, dyspnea, palpitation, or dizziness.       MEDICATIONS:  MEDICATIONS  (STANDING):  ATENolol  Tablet 25 milliGRAM(s) Oral daily  docusate sodium 100 milliGRAM(s) Oral three times a day  doxazosin 8 milliGRAM(s) Oral at bedtime  enoxaparin Injectable 40 milliGRAM(s) SubCutaneous at bedtime  fluticasone propionate 50 MICROgram(s)/spray Nasal Spray 1 Spray(s) Both Nostrils daily  hydrochlorothiazide 25 milliGRAM(s) Oral daily  lisinopril 40 milliGRAM(s) Oral <User Schedule>  pantoprazole    Tablet 40 milliGRAM(s) Oral <User Schedule>  senna 2 Tablet(s) Oral at bedtime  simvastatin 20 milliGRAM(s) Oral at bedtime      PHYSICAL EXAM:  T(C): 37.2 (01-08-18 @ 08:51), Max: 37.6 (01-08-18 @ 06:01)  HR: 84 (01-08-18 @ 08:51) (70 - 98)  BP: 144/70 (01-08-18 @ 08:51) (119/70 - 193/77)  RR: 18 (01-08-18 @ 08:51) (18 - 18)  SpO2: 95% (01-08-18 @ 08:51) (92% - 96%)  Wt(kg): --  I&O's Summary    07 Jan 2018 07:01  -  08 Jan 2018 07:00  --------------------------------------------------------  IN: 1260 mL / OUT: 1930 mL / NET: -670 mL    08 Jan 2018 07:01  -  08 Jan 2018 09:10  --------------------------------------------------------  IN: 240 mL / OUT: 0 mL / NET: 240 mL          Appearance: Normal	  HEENT:   Normal oral mucosa, PERRL, EOMI	  Cardiovascular: Normal S1 S2,    Murmur:   Neck: JVP normal  Respiratory: Lungs clear to auscultation  Gastrointestinal:  Soft, Non-tender, + BS	  Skin: normal   Neuro: No gross deficits.   Psychiatry:  Mood & affect appropriate  Ext: No edema      LABS/DATA:    CARDIAC MARKERS:                                11.8   20.6  )-----------( 288      ( 07 Jan 2018 10:15 )             36.6     01-06    141  |  101  |  15  ----------------------------<  141<H>  3.8   |  32<H>  |  0.74    Ca    8.7      06 Jan 2018 10:40      proBNP:   Lipid Profile:   HgA1c:   TSH:     TELE:  EKG:
HPI:  Patient is a 72 year old male with chronic low back pain.  Now presents for surgery.    POST-OP CHECK:  Patient slow to wake up from anesthesia earlier.  Initially was not moving rue/rle so ct head was obtained.  Now doing well.  He is having some incisional pain which is well controlled.  Has not been out of bed yet.  Denies chest pain, shortness of breath, nausea, vomiting, or any other new symptoms.    Vital Signs Last 24 Hrs  T(C): 36.7 (05 Jan 2018 13:40), Max: 36.7 (05 Jan 2018 06:10)  T(F): 98.1 (05 Jan 2018 13:40), Max: 98.1 (05 Jan 2018 06:10)  HR: 75 (05 Jan 2018 16:30) (53 - 87)  BP: 161/79 (05 Jan 2018 16:30) (133/71 - 175/79)  BP(mean): 110 (05 Jan 2018 16:30) (90 - 119)  RR: 16 (05 Jan 2018 16:30) (16 - 18)  SpO2: 97% (05 Jan 2018 16:30) (94% - 100%)      PHYSICAL EXAM:  Neurological: awake, alert, oriented x3, follows commands, speech clear and fluent, moves all extremities x4 w/ 5/5 strength throughout, sensation present, intact, equal throughout    Cardiovascular: +s1, s2  Respiratory: clear to auscultation b/l  Gastrointestinal: soft, non-distended, non-tender  Genitourinary: +holguin  Incision/Wound: posterior midline vertical dressing w/ mild sanguinous drainage but dry and intact, hemovac x1    TUBES/LINES:  [x] holguin  [x] left radial a-line  [x] hemovac x1    DIET:  [x] DASH/TLC    LABS:    PT/INR - ( 05 Jan 2018 06:13 )   PT: 11.1 sec;   INR: 1.02 ratio        Allergies    No Known Allergies        MEDICATIONS:  Antibiotics:  ceFAZolin   IVPB 2000 milliGRAM(s) IV Intermittent once  ceFAZolin   IVPB 2000 milliGRAM(s) IV Intermittent every 8 hours    Neuro:  cyclobenzaprine 10 milliGRAM(s) Oral every 8 hours PRN  HYDROmorphone  Injectable 0.25 milliGRAM(s) IV Push every 10 minutes PRN    Anticoagulation:    OTHER:  ATENolol  Tablet 25 milliGRAM(s) Oral daily  docusate sodium 100 milliGRAM(s) Oral three times a day  doxazosin 8 milliGRAM(s) Oral at bedtime  fluticasone propionate 50 MICROgram(s)/spray Nasal Spray 1 Spray(s) Both Nostrils daily  lisinopril 40 milliGRAM(s) Oral <User Schedule>  pantoprazole    Tablet 40 milliGRAM(s) Oral <User Schedule>  senna 2 Tablet(s) Oral at bedtime  simvastatin 20 milliGRAM(s) Oral at bedtime    IVF:  sodium chloride 0.9% with potassium chloride 20 mEq/L 1000 milliLiter(s) IV Continuous <Continuous>    CULTURES:    RADIOLOGY & ADDITIONAL TESTS:  CT head (1/5/2018): no acute infarct changes, 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
SUBJECTIVE: Doing well, incision pain only.    OVERNIGHT EVENTS:  none    Vital Signs Last 24 Hrs  T(C): 36.8 (06 Jan 2018 08:38), Max: 37.1 (05 Jan 2018 19:00)  T(F): 98.3 (06 Jan 2018 08:38), Max: 98.8 (05 Jan 2018 19:00)  HR: 71 (06 Jan 2018 08:38) (66 - 90)  BP: 161/76 (06 Jan 2018 08:38) (134/65 - 181/100)  BP(mean): 116 (05 Jan 2018 17:30) (90 - 133)  RR: 16 (06 Jan 2018 08:38) (16 - 18)  SpO2: 96% (06 Jan 2018 08:38) (93% - 100%)  IVF: [ ] IVL [X ] NS+K@ 75  DIET: [ X] Regular-DASH   PCA: [ ] YES [ X] NO   CARR: [X ] YES-DC today  BM: [ ] YES [X ] NO     DRAINS: [ ] SANGITA (cc/24h) [ X] HMV (240cc/24h)    PHYSICAL EXAM:    Constitutional: No Acute Distress     Neurological: AOx3, Following Commands, Moving all Extremities     Motor exam:          Upper extremity                         Delt     Bicep     Tricep    HG                                                 R         5/5        5/5        5/5       5/5                                               L          5/5        5/5        5/5       5/5          Lower extremity                        HF         KF        KE       DF         PF                                                  R        5/5        5/5        5/5       5/5         5/5                                               L         5/5        5/5       5/5       5/5          5/5                                                 Sensation: [X] intact to light touch  [] decreased:     Pulmonary: Clear to Auscultation, No rales, No rhonchi, No wheezes     Cardiovascular: S1, S2, Regular rate and rhythm     Gastrointestinal: Soft, Non-tender, Non-distended     Extremities: No calf tenderness     Incision: HMV active.  CDI. Flat.    LABS:     PT/INR - ( 05 Jan 2018 06:13 )   PT: 11.1 sec;   INR: 1.02 ratio      IMAGING: < from: CT Head No Cont (01.05.18 @ 13:30) >  IMPRESSION: Age-appropriate involutional and ischemic gliotic changes. No   hemorrhage. 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    MEDICATIONS  (STANDING):  ATENolol  Tablet 25 milliGRAM(s) Oral daily  ceFAZolin   IVPB 2000 milliGRAM(s) IV Intermittent once  docusate sodium 100 milliGRAM(s) Oral three times a day  doxazosin 8 milliGRAM(s) Oral at bedtime  enoxaparin Injectable 40 milliGRAM(s) SubCutaneous at bedtime  fluticasone propionate 50 MICROgram(s)/spray Nasal Spray 1 Spray(s) Both Nostrils daily  hydrochlorothiazide 25 milliGRAM(s) Oral daily  lisinopril 40 milliGRAM(s) Oral <User Schedule>  pantoprazole    Tablet 40 milliGRAM(s) Oral <User Schedule>  senna 2 Tablet(s) Oral at bedtime  simvastatin 20 milliGRAM(s) Oral at bedtime  sodium chloride 0.9% with potassium chloride 20 mEq/L 1000 milliLiter(s) (75 mL/Hr) IV Continuous <Continuous>    MEDICATIONS  (PRN):  cyclobenzaprine 10 milliGRAM(s) Oral every 8 hours PRN Muscle Spasm
SUBJECTIVE: Pt seen and examined, doing well, reports some incisional pain today    OVERNIGHT EVENTS: none    Vital Signs Last 24 Hrs  T(C): 37.2 (08 Jan 2018 08:51), Max: 37.6 (08 Jan 2018 06:01)  T(F): 99 (08 Jan 2018 08:51), Max: 99.6 (08 Jan 2018 06:01)  HR: 84 (08 Jan 2018 08:51) (70 - 98)  BP: 144/70 (08 Jan 2018 08:51) (119/70 - 193/77)  BP(mean): --  RR: 18 (08 Jan 2018 08:51) (18 - 18)  SpO2: 95% (08 Jan 2018 08:51) (92% - 96%)    PHYSICAL EXAM:    General: No Acute Distress     Neurological: Awake, alert oriented to person, place and time, Following Commands, Speech Fluent, Moving all extremities, Muscle Strength normal in all four extremities, No Drift, Sensation to Light Touch Intact    Pulmonary: Clear to Auscultation, No Rales, No Rhonchi, No Wheezes     Cardiovascular: S1, S2, Regular Rate and Rhythm     Gastrointestinal: Soft, Nontender, Nondistended     Extremities: No calf tenderness     Incision: lumbar staples c/di    LABS:                        11.4   21.10 )-----------( 318      ( 08 Jan 2018 08:59 )             34.7    01-06    141  |  101  |  15  ----------------------------<  141<H>  3.8   |  32<H>  |  0.74    Ca    8.7      06 Jan 2018 10:40          01-07 @ 07:01 - 01-08 @ 07:00  --------------------------------------------------------  IN: 1260 mL / OUT: 1930 mL / NET: -670 mL    01-08 @ 07:01  - 01-08 @ 09:50  --------------------------------------------------------  IN: 240 mL / OUT: 0 mL / NET: 240 mL      DRAINS: none    MEDICATIONS:  Antibiotics:    Neuro:  cyclobenzaprine 10 milliGRAM(s) Oral every 8 hours PRN Muscle Spasm  oxyCODONE    IR 5 milliGRAM(s) Oral every 4 hours PRN Moderate Pain (4 - 6)  oxyCODONE    IR 10 milliGRAM(s) Oral every 4 hours PRN Severe Pain (7 - 10)    Cardiac:  ATENolol  Tablet 25 milliGRAM(s) Oral daily  doxazosin 8 milliGRAM(s) Oral at bedtime  hydrALAZINE Injectable 5 milliGRAM(s) IV Push every 3 hours PRN HTN  hydrochlorothiazide 25 milliGRAM(s) Oral daily  lisinopril 40 milliGRAM(s) Oral <User Schedule>    Pulm:    GI/:  docusate sodium 100 milliGRAM(s) Oral three times a day  pantoprazole    Tablet 40 milliGRAM(s) Oral <User Schedule>  senna 2 Tablet(s) Oral at bedtime    Other:   enoxaparin Injectable 40 milliGRAM(s) SubCutaneous at bedtime  fluticasone propionate 50 MICROgram(s)/spray Nasal Spray 1 Spray(s) Both Nostrils daily  simvastatin 20 milliGRAM(s) Oral at bedtime    DIET: [x] Regular [] CCD [] Renal [] Puree [] Dysphagia [] Tube Feeds:     IMAGING:
SUBJECTIVE: Pt seen and examined, doing well, seen ambulating silva with PT/RW, HMV drain  to be removed today    OVERNIGHT EVENTS: none    Vital Signs Last 24 Hrs  T(C): 36.9 (07 Jan 2018 08:32), Max: 37.1 (07 Jan 2018 05:33)  T(F): 98.5 (07 Jan 2018 08:32), Max: 98.7 (07 Jan 2018 05:33)  HR: 69 (07 Jan 2018 08:32) (69 - 94)  BP: 154/67 (07 Jan 2018 08:32) (150/74 - 187/74)  BP(mean): --  RR: 18 (07 Jan 2018 08:32) (18 - 18)  SpO2: 93% (07 Jan 2018 08:32) (93% - 94%)    PHYSICAL EXAM:    General: No Acute Distress     Neurological: Awake, alert oriented to person, place and time, Following Commands, Speech Fluent, Moving all extremities, Muscle Strength normal in all four extremities, No Drift, Sensation to Light Touch Intact    Pulmonary: Clear to Auscultation, No Rales, No Rhonchi, No Wheezes     Cardiovascular: S1, S2, Regular Rate and Rhythm     Gastrointestinal: Soft, Nontender, Nondistended     Extremities: No calf tenderness     Incision: dressing c/d/i    LABS:                        11.5   21.1  )-----------( 297      ( 06 Jan 2018 10:40 )             35.9    01-06    141  |  101  |  15  ----------------------------<  141<H>  3.8   |  32<H>  |  0.74    Ca    8.7      06 Jan 2018 10:40          01-06 @ 07:01 - 01-07 @ 07:00  --------------------------------------------------------  IN: 2090 mL / OUT: 1390 mL / NET: 700 mL    01-07 @ 07:01 - 01-07 @ 09:42  --------------------------------------------------------  IN: 0 mL / OUT: 20 mL / NET: -20 mL      DRAINS: HMV 75 ml/24 hrs    MEDICATIONS:  Antibiotics:    Neuro:  cyclobenzaprine 10 milliGRAM(s) Oral every 8 hours PRN Muscle Spasm  oxyCODONE    IR 5 milliGRAM(s) Oral every 4 hours PRN Moderate Pain (4 - 6)  oxyCODONE    IR 10 milliGRAM(s) Oral every 4 hours PRN Severe Pain (7 - 10)    Cardiac:  ATENolol  Tablet 25 milliGRAM(s) Oral daily  doxazosin 8 milliGRAM(s) Oral at bedtime  hydrALAZINE Injectable 5 milliGRAM(s) IV Push every 3 hours PRN HTN  hydrochlorothiazide 25 milliGRAM(s) Oral daily  lisinopril 40 milliGRAM(s) Oral <User Schedule>    Pulm:    GI/:  docusate sodium 100 milliGRAM(s) Oral three times a day  pantoprazole    Tablet 40 milliGRAM(s) Oral <User Schedule>  senna 2 Tablet(s) Oral at bedtime    Other:   enoxaparin Injectable 40 milliGRAM(s) SubCutaneous at bedtime  fluticasone propionate 50 MICROgram(s)/spray Nasal Spray 1 Spray(s) Both Nostrils daily  simvastatin 20 milliGRAM(s) Oral at bedtime  sodium chloride 0.9% with potassium chloride 20 mEq/L 1000 milliLiter(s) IV Continuous <Continuous>    DIET: [x] Regular [] CCD [] Renal [] Puree [] Dysphagia [] Tube Feeds:     IMAGING:
Subjective: Patient seen and examined. No new events except as noted.     SUBJECTIVE/ROS:  No chest pain, dyspnea, palpitation, or dizziness.       MEDICATIONS:  MEDICATIONS  (STANDING):  ATENolol  Tablet 25 milliGRAM(s) Oral daily  docusate sodium 100 milliGRAM(s) Oral three times a day  doxazosin 8 milliGRAM(s) Oral at bedtime  enoxaparin Injectable 40 milliGRAM(s) SubCutaneous at bedtime  fluticasone propionate 50 MICROgram(s)/spray Nasal Spray 1 Spray(s) Both Nostrils daily  hydrochlorothiazide 25 milliGRAM(s) Oral daily  lisinopril 40 milliGRAM(s) Oral <User Schedule>  pantoprazole    Tablet 40 milliGRAM(s) Oral <User Schedule>  senna 2 Tablet(s) Oral at bedtime  simvastatin 20 milliGRAM(s) Oral at bedtime      PHYSICAL EXAM:  T(C): 36.9 (01-07-18 @ 08:32), Max: 37.1 (01-07-18 @ 05:33)  HR: 69 (01-07-18 @ 08:32) (69 - 94)  BP: 154/67 (01-07-18 @ 08:32) (150/74 - 187/74)  RR: 18 (01-07-18 @ 08:32) (18 - 18)  SpO2: 93% (01-07-18 @ 08:32) (93% - 94%)  Wt(kg): --  I&O's Summary    06 Jan 2018 07:01  -  07 Jan 2018 07:00  --------------------------------------------------------  IN: 2090 mL / OUT: 1390 mL / NET: 700 mL    07 Jan 2018 07:01  -  07 Jan 2018 12:05  --------------------------------------------------------  IN: 320 mL / OUT: 220 mL / NET: 100 mL          Appearance: Normal	  HEENT:   Normal oral mucosa, PERRL, EOMI	  Cardiovascular: Normal S1 S2,    Murmur:   Neck: JVP normal  Respiratory: Lungs clear to auscultation  Gastrointestinal:  Soft, Non-tender, + BS	  Skin: normal   Neuro: No gross deficits.   Psychiatry:  Mood & affect appropriate  Ext: No edema      LABS/DATA:    CARDIAC MARKERS:                                11.8   20.6  )-----------( 288      ( 07 Jan 2018 10:15 )             36.6     01-06    141  |  101  |  15  ----------------------------<  141<H>  3.8   |  32<H>  |  0.74    Ca    8.7      06 Jan 2018 10:40      proBNP:   Lipid Profile:   HgA1c:   TSH:     TELE:  EKG:
Subjective: Patient seen and examined. No new events except as noted.     SUBJECTIVE/ROS:  No chest pain, or sob.       MEDICATIONS:  MEDICATIONS  (STANDING):  ATENolol  Tablet 25 milliGRAM(s) Oral daily  docusate sodium 100 milliGRAM(s) Oral three times a day  doxazosin 8 milliGRAM(s) Oral at bedtime  enoxaparin Injectable 40 milliGRAM(s) SubCutaneous at bedtime  fluticasone propionate 50 MICROgram(s)/spray Nasal Spray 1 Spray(s) Both Nostrils daily  hydrochlorothiazide 25 milliGRAM(s) Oral daily  lisinopril 40 milliGRAM(s) Oral <User Schedule>  pantoprazole    Tablet 40 milliGRAM(s) Oral <User Schedule>  senna 2 Tablet(s) Oral at bedtime  simvastatin 20 milliGRAM(s) Oral at bedtime  sodium chloride 0.9% with potassium chloride 20 mEq/L 1000 milliLiter(s) (75 mL/Hr) IV Continuous <Continuous>      PHYSICAL EXAM:  T(C): 36.8 (01-06-18 @ 08:38), Max: 37.1 (01-05-18 @ 19:00)  HR: 71 (01-06-18 @ 08:38) (66 - 90)  BP: 161/76 (01-06-18 @ 08:38) (134/65 - 181/100)  RR: 16 (01-06-18 @ 08:38) (16 - 18)  SpO2: 96% (01-06-18 @ 08:38) (93% - 100%)  Wt(kg): --  I&O's Summary    05 Jan 2018 07:01  -  06 Jan 2018 07:00  --------------------------------------------------------  IN: 1495 mL / OUT: 2665 mL / NET: -1170 mL    06 Jan 2018 07:01  -  06 Jan 2018 13:16  --------------------------------------------------------  IN: 480 mL / OUT: 405 mL / NET: 75 mL          Appearance: Normal	  HEENT:   Normal oral mucosa, PERRL, EOMI	  Cardiovascular: Normal S1 S2,    Murmur:   Neck: JVP normal  Respiratory: Lungs clear to auscultation  Gastrointestinal:  Soft, Non-tender, + BS	  Skin: normal   Neuro: No gross deficits.   Psychiatry:  Mood & affect appropriate  Ext: No edema      LABS/DATA:    CARDIAC MARKERS:                                11.5   21.1  )-----------( 297      ( 06 Jan 2018 10:40 )             35.9     01-06    141  |  101  |  15  ----------------------------<  141<H>  3.8   |  32<H>  |  0.74    Ca    8.7      06 Jan 2018 10:40      proBNP:   Lipid Profile:   HgA1c:   TSH:     TELE:  EKG:

## 2018-01-08 NOTE — PROGRESS NOTE ADULT - ASSESSMENT
HTN  stable   cont current meds  outpt follow up     Afib  HR stable  cont bb  a/c once deemed safe from nsx

## 2018-01-08 NOTE — PROGRESS NOTE ADULT - ASSESSMENT
71 y/o M PMH A fib, S/P cardioversion "many years ago," on coumadin, Hodgkins lymphoma, S/P splenectomy, chemotherapy and radiation 1975, now with c/o chronic low back pain, denies pain or numbness of the lower extremities.  Presents today for L3-4 lumbar laminectomy with pedicle screws.     PROCEDURE:  1/5 s/p L3-L4 laminectomy fusion  POD#3    PLAN:  Neuro:   -leukocytosis  likely due to hx of spleenectomy, WBC 21, follow up CBC with PCP as outpatient  -pain control- continue oxycodone prn, flexeril prn spasm  Respiratory:   - encouraged incentive spirometry  CV:  -HTN- continue HCTZ, lisinopril, atenolol, hydralazine prn   -Afib- resume coumadin on post op day 10 per Dr Inman  -BPH continue cardura  Dental  -f/u as outpt with dentist for dislodged denture, seen by dental in house  Endocrine:   -no issues  DVT ppx: venodynes while in bed, chemoprophylaxis  -bowel regimen  PT/OT: outpatient PT, has YANIQUE Mcmahan # [id number] 73 y/o M PMH A fib, S/P cardioversion "many years ago," on coumadin, Hodgkins lymphoma, S/P splenectomy, chemotherapy and radiation 1975, now with c/o chronic low back pain, denies pain or numbness of the lower extremities.  Presents today for L3-4 lumbar laminectomy with pedicle screws.     PROCEDURE:  1/5 s/p L3-L4 laminectomy fusion  POD#3    PLAN:  Neuro:   -leukocytosis  likely due to hx of spleenectomy, WBC 21, follow up CBC with PCP as outpatient  -afebrile, no cough, no complaints  -pain control- continue oxycodone prn, flexeril prn spasm  Respiratory:   - encouraged incentive spirometry  CV:  -HTN- continue HCTZ, lisinopril, atenolol, hydralazine prn   -Afib- resume coumadin on post op day 10 per Dr Inman  -BPH continue cardura  Dental  -f/u as outpt with dentist for dislodged denture, seen by dental in house  Endocrine:   -no issues  DVT ppx: venodynes while in bed, chemoprophylaxis  -bowel regimen  PT/OT: outpatient PT, has YANIQUE      [facility] # [id number] 73 y/o M PMH A fib, S/P cardioversion "many years ago," on coumadin, Hodgkins lymphoma, S/P splenectomy, chemotherapy and radiation 1975, now with c/o chronic low back pain, denies pain or numbness of the lower extremities.  Presents today for L3-4 lumbar laminectomy with pedicle screws.     PROCEDURE:  1/5 s/p L3-L4 laminectomy fusion  POD#3    PLAN:  Neuro:   -leukocytosis  likely due to hx of splenectomy, WBC 21, follow up CBC with PCP as outpatient  -afebrile, no cough, no complaints  -called patients PCP Dr Esau Adam [phone]  and left message to call me back   -pain control- continue oxycodone prn, flexeril prn spasm  Respiratory:   - encouraged incentive spirometry  CV:  -HTN- continue HCTZ, lisinopril, atenolol, hydralazine prn   -Afib- resume coumadin on post op day 10 per Dr Inman  -BPH continue cardura  Dental  -f/u as outpt with dentist for dislodged denture, seen by dental in house  Endocrine:   -no issues  DVT ppx: venodynes while in bed, chemoprophylaxis  -bowel regimen  PT/OT: outpatient PT, has YANIQUE Mcmahan # [id number] 73 y/o M PMH A fib, S/P cardioversion "many years ago," on coumadin, Hodgkins lymphoma, S/P splenectomy, chemotherapy and radiation 1975, now with c/o chronic low back pain, denies pain or numbness of the lower extremities.  Presents today for L3-4 lumbar laminectomy with pedicle screws.     PROCEDURE:  1/5 s/p L3-L4 laminectomy fusion  POD#3    PLAN:  Neuro:   -leukocytosis  likely due to hx of splenectomy, WBC 21, follow up CBC with PCP as outpatient  - pt states his baseline WBC is 12-13  -afebrile, no cough, no complaints, nontoxic  -called patients PCP Dr Esau Adam [phone]  and left message to call me back   -I called Dr Adam's office and made follow up appt for Thursday 1/11/18 @ 3:15 to follow up leukocytosis.  -pain control- continue oxycodone prn, flexeril prn spasm  Respiratory:   - encouraged incentive spirometry  CV:  -HTN- continue HCTZ, lisinopril, atenolol, hydralazine prn   -Afib- resume coumadin on post op day 10 per Dr Inman  -BPH continue cardura  Dental  -f/u as outpt with dentist for dislodged denture, seen by dental in house  Endocrine:   -no issues  DVT ppx: venodynes while in bed, chemoprophylaxis  -bowel regimen  PT/OT: outpatient PT, has YANIQUE Mcmahan # [id number]

## 2018-01-09 LAB
ANISOCYTOSIS BLD QL: SIGNIFICANT CHANGE UP
BASOPHILS # BLD AUTO: 0 K/UL — SIGNIFICANT CHANGE UP (ref 0–0.2)
BASOPHILS NFR BLD AUTO: 0 % — SIGNIFICANT CHANGE UP (ref 0–2)
BURR CELLS BLD QL SMEAR: PRESENT — SIGNIFICANT CHANGE UP
EOSINOPHIL # BLD AUTO: 0 K/UL — SIGNIFICANT CHANGE UP (ref 0–0.5)
EOSINOPHIL NFR BLD AUTO: 0 — SIGNIFICANT CHANGE UP
LYMPHOCYTES # BLD AUTO: 21.4 % — SIGNIFICANT CHANGE UP (ref 13–44)
LYMPHOCYTES # BLD AUTO: 4.52 K/UL — HIGH (ref 1–3.3)
MACROCYTES BLD QL: SIGNIFICANT CHANGE UP
MANUAL SMEAR VERIFICATION: SIGNIFICANT CHANGE UP
MONOCYTES # BLD AUTO: 2.07 K/UL — HIGH (ref 0–0.9)
MONOCYTES NFR BLD AUTO: 9.8 % — SIGNIFICANT CHANGE UP (ref 2–14)
MYELOCYTES NFR BLD: 1 % — HIGH (ref 0–0)
NEUTROPHILS # BLD AUTO: 14.33 K/UL — HIGH (ref 1.8–7.4)
NEUTROPHILS NFR BLD AUTO: 67.9 % — SIGNIFICANT CHANGE UP (ref 43–77)
PLAT MORPH BLD: NORMAL — SIGNIFICANT CHANGE UP
POIKILOCYTOSIS BLD QL AUTO: SLIGHT — SIGNIFICANT CHANGE UP
RBC BLD AUTO: ABNORMAL

## 2018-01-10 ENCOUNTER — INPATIENT (INPATIENT)
Facility: HOSPITAL | Age: 73
LOS: 0 days | Discharge: HOME | DRG: 66 | End: 2018-01-11
Attending: SPECIALIST | Admitting: SPECIALIST
Payer: COMMERCIAL

## 2018-01-10 VITALS
OXYGEN SATURATION: 96 % | RESPIRATION RATE: 19 BRPM | SYSTOLIC BLOOD PRESSURE: 179 MMHG | HEART RATE: 83 BPM | DIASTOLIC BLOOD PRESSURE: 80 MMHG | TEMPERATURE: 98 F

## 2018-01-10 DIAGNOSIS — Z98.890 OTHER SPECIFIED POSTPROCEDURAL STATES: Chronic | ICD-10-CM

## 2018-01-10 DIAGNOSIS — I63.9 CEREBRAL INFARCTION, UNSPECIFIED: ICD-10-CM

## 2018-01-10 DIAGNOSIS — Z96.641 PRESENCE OF RIGHT ARTIFICIAL HIP JOINT: Chronic | ICD-10-CM

## 2018-01-10 DIAGNOSIS — Z90.81 ACQUIRED ABSENCE OF SPLEEN: Chronic | ICD-10-CM

## 2018-01-10 LAB
ALBUMIN SERPL ELPH-MCNC: 3.5 G/DL — SIGNIFICANT CHANGE UP (ref 3.3–5)
ALP SERPL-CCNC: 118 U/L — SIGNIFICANT CHANGE UP (ref 40–120)
ALT FLD-CCNC: 53 U/L RC — HIGH (ref 10–45)
ANION GAP SERPL CALC-SCNC: 11 MMOL/L — SIGNIFICANT CHANGE UP (ref 5–17)
APTT BLD: 29 SEC — SIGNIFICANT CHANGE UP (ref 27.5–37.4)
AST SERPL-CCNC: 48 U/L — HIGH (ref 10–40)
BASOPHILS # BLD AUTO: 0.1 K/UL — SIGNIFICANT CHANGE UP (ref 0–0.2)
BASOPHILS NFR BLD AUTO: 0.3 % — SIGNIFICANT CHANGE UP (ref 0–2)
BILIRUB SERPL-MCNC: 0.4 MG/DL — SIGNIFICANT CHANGE UP (ref 0.2–1.2)
BUN SERPL-MCNC: 14 MG/DL — SIGNIFICANT CHANGE UP (ref 7–23)
CALCIUM SERPL-MCNC: 9.4 MG/DL — SIGNIFICANT CHANGE UP (ref 8.4–10.5)
CHLORIDE SERPL-SCNC: 96 MMOL/L — SIGNIFICANT CHANGE UP (ref 96–108)
CO2 SERPL-SCNC: 32 MMOL/L — HIGH (ref 22–31)
CREAT SERPL-MCNC: 0.75 MG/DL — SIGNIFICANT CHANGE UP (ref 0.5–1.3)
EOSINOPHIL # BLD AUTO: 0.3 K/UL — SIGNIFICANT CHANGE UP (ref 0–0.5)
EOSINOPHIL NFR BLD AUTO: 1.8 % — SIGNIFICANT CHANGE UP (ref 0–6)
GLUCOSE SERPL-MCNC: 104 MG/DL — HIGH (ref 70–99)
HCT VFR BLD CALC: 35.4 % — LOW (ref 39–50)
HGB BLD-MCNC: 11.5 G/DL — LOW (ref 13–17)
INR BLD: 1.05 RATIO — SIGNIFICANT CHANGE UP (ref 0.88–1.16)
LYMPHOCYTES # BLD AUTO: 17 % — SIGNIFICANT CHANGE UP (ref 13–44)
LYMPHOCYTES # BLD AUTO: 2.8 K/UL — SIGNIFICANT CHANGE UP (ref 1–3.3)
MCHC RBC-ENTMCNC: 32.5 GM/DL — SIGNIFICANT CHANGE UP (ref 32–36)
MCHC RBC-ENTMCNC: 33.5 PG — SIGNIFICANT CHANGE UP (ref 27–34)
MCV RBC AUTO: 103 FL — HIGH (ref 80–100)
MONOCYTES # BLD AUTO: 1.4 K/UL — HIGH (ref 0–0.9)
MONOCYTES NFR BLD AUTO: 8 % — SIGNIFICANT CHANGE UP (ref 2–14)
NEUTROPHILS # BLD AUTO: 12.3 K/UL — HIGH (ref 1.8–7.4)
NEUTROPHILS NFR BLD AUTO: 72.9 % — SIGNIFICANT CHANGE UP (ref 43–77)
PLATELET # BLD AUTO: 336 K/UL — SIGNIFICANT CHANGE UP (ref 150–400)
POTASSIUM SERPL-MCNC: 4 MMOL/L — SIGNIFICANT CHANGE UP (ref 3.5–5.3)
POTASSIUM SERPL-SCNC: 4 MMOL/L — SIGNIFICANT CHANGE UP (ref 3.5–5.3)
PROT SERPL-MCNC: 7 G/DL — SIGNIFICANT CHANGE UP (ref 6–8.3)
PROTHROM AB SERPL-ACNC: 11.5 SEC — SIGNIFICANT CHANGE UP (ref 9.8–12.7)
RBC # BLD: 3.43 M/UL — LOW (ref 4.2–5.8)
RBC # FLD: 13.2 % — SIGNIFICANT CHANGE UP (ref 10.3–14.5)
SODIUM SERPL-SCNC: 139 MMOL/L — SIGNIFICANT CHANGE UP (ref 135–145)
TROPONIN T SERPL-MCNC: 0.02 NG/ML — SIGNIFICANT CHANGE UP (ref 0–0.06)
WBC # BLD: 16.8 K/UL — HIGH (ref 3.8–10.5)
WBC # FLD AUTO: 16.8 K/UL — HIGH (ref 3.8–10.5)

## 2018-01-10 PROCEDURE — 70450 CT HEAD/BRAIN W/O DYE: CPT | Mod: 26

## 2018-01-10 PROCEDURE — 99285 EMERGENCY DEPT VISIT HI MDM: CPT

## 2018-01-10 RX ORDER — ENOXAPARIN SODIUM 100 MG/ML
40 INJECTION SUBCUTANEOUS DAILY
Qty: 0 | Refills: 0 | Status: DISCONTINUED | OUTPATIENT
Start: 2018-01-10 | End: 2018-01-11

## 2018-01-10 RX ORDER — ASPIRIN/CALCIUM CARB/MAGNESIUM 324 MG
81 TABLET ORAL DAILY
Qty: 0 | Refills: 0 | Status: DISCONTINUED | OUTPATIENT
Start: 2018-01-10 | End: 2018-01-10

## 2018-01-10 RX ORDER — ASPIRIN/CALCIUM CARB/MAGNESIUM 324 MG
81 TABLET ORAL DAILY
Qty: 0 | Refills: 0 | Status: DISCONTINUED | OUTPATIENT
Start: 2018-01-10 | End: 2018-01-11

## 2018-01-10 RX ORDER — ASPIRIN/CALCIUM CARB/MAGNESIUM 324 MG
325 TABLET ORAL ONCE
Qty: 0 | Refills: 0 | Status: COMPLETED | OUTPATIENT
Start: 2018-01-10 | End: 2018-01-10

## 2018-01-10 RX ORDER — ACETAMINOPHEN 500 MG
650 TABLET ORAL ONCE
Qty: 0 | Refills: 0 | Status: COMPLETED | OUTPATIENT
Start: 2018-01-10 | End: 2018-01-10

## 2018-01-10 RX ORDER — ATORVASTATIN CALCIUM 80 MG/1
80 TABLET, FILM COATED ORAL AT BEDTIME
Qty: 0 | Refills: 0 | Status: DISCONTINUED | OUTPATIENT
Start: 2018-01-10 | End: 2018-01-11

## 2018-01-10 RX ADMIN — Medication 325 MILLIGRAM(S): at 21:31

## 2018-01-10 RX ADMIN — Medication 650 MILLIGRAM(S): at 22:45

## 2018-01-10 NOTE — ED PROVIDER NOTE - PROGRESS NOTE DETAILS
neuro consulted received call from radiology. pt with small left frontal acute stroke. Discussed with neuro will admit. - Linus Acevedo, Resident no tpa as pt outside 4.5 window.

## 2018-01-10 NOTE — ED PROVIDER NOTE - MEDICAL DECISION MAKING DETAILS
Pt with transient expressive aphasia concerning for TIA. pt has risk factors (afib) and has been off coumadin for 6 days. Low suspicion for seizure, metabolic encephalopathy, ICH. Pt now at baseline. NIHSS 0. Will obtain labs, ekg, cth, neuro consult. Disposition likely CDU for MRI vs inpatient workup vs outpatient neuro follow up.

## 2018-01-10 NOTE — ED PROVIDER NOTE - OBJECTIVE STATEMENT
71 yo M hx of hld, afib on coumadin, spinal stenosis sp lumbar surg 6 days ago presenting with acute onset aphasia today at 130pm  that resolved prior to 530pm. Brought to ED by family for evaluation. Pt has been off coumadin since Friday for the surgery. Scheduled to restart tomorrow. No antiplatelet meds. Pt was talking on phone, new what he wanted to stay, but could not get words out. Was able to phonate. No focal numbness or weakness. At baseline appearance and mental status per pt and family. Ambualtes with walker postop.

## 2018-01-10 NOTE — ED PROVIDER NOTE - ATTENDING CONTRIBUTION TO CARE
aphasia on telephone 130 or 1 pm (pt told resident 130, me 1). resolved now. Recent spine surg, now off ac will restart soon. on exam no aphasia. daughter states at baseline. TIA eval, neuro consult, ct brain, labs, admit/cdu at neuro request.

## 2018-01-10 NOTE — ED ADULT NURSE NOTE - OBJECTIVE STATEMENT
72y male with hx of a-fib presents to the ER c/o slurred speech at 130pm. pt is alert and oriented x 4 and speaking coherently. Pt states he was on the phone speaking to a friend today when he had a sudden onset of slurred speech. pt states that the speech cleared quickly, but his family wanted him to come in to be evaluated. pt had recent sx to back for spinal stenosis. pt normally on coumadin but taken off for sx. pt to restart coumadin monday. pt has hx of htn, hld, hodgkins lymphoma. pt neuro and sensory exam intact. MD mayen completed. will reassess.

## 2018-01-10 NOTE — ED PROVIDER NOTE - NS ED ROS FT
No fever/chills, no change in vision, no throat pain, no chest pain, no abdominal pain, no nausea/vomiting,  no dysuria, +postop back pain, decreasing. no new joint pain, no rashes, no focal numbness or weakness +aphasia, no known mental health issues

## 2018-01-11 ENCOUNTER — TRANSCRIPTION ENCOUNTER (OUTPATIENT)
Age: 73
End: 2018-01-11

## 2018-01-11 VITALS
SYSTOLIC BLOOD PRESSURE: 193 MMHG | HEART RATE: 78 BPM | DIASTOLIC BLOOD PRESSURE: 71 MMHG | RESPIRATION RATE: 18 BRPM | OXYGEN SATURATION: 95 % | TEMPERATURE: 98 F

## 2018-01-11 LAB
ANION GAP SERPL CALC-SCNC: 13 MMOL/L — SIGNIFICANT CHANGE UP (ref 5–17)
BUN SERPL-MCNC: 10 MG/DL — SIGNIFICANT CHANGE UP (ref 7–23)
CALCIUM SERPL-MCNC: 9.3 MG/DL — SIGNIFICANT CHANGE UP (ref 8.4–10.5)
CHLORIDE SERPL-SCNC: 96 MMOL/L — SIGNIFICANT CHANGE UP (ref 96–108)
CHOLEST SERPL-MCNC: 119 MG/DL — SIGNIFICANT CHANGE UP (ref 10–199)
CO2 SERPL-SCNC: 31 MMOL/L — SIGNIFICANT CHANGE UP (ref 22–31)
CREAT SERPL-MCNC: 0.68 MG/DL — SIGNIFICANT CHANGE UP (ref 0.5–1.3)
GLUCOSE SERPL-MCNC: 92 MG/DL — SIGNIFICANT CHANGE UP (ref 70–99)
HBA1C BLD-MCNC: 6.3 % — HIGH (ref 4–5.6)
HCT VFR BLD CALC: 33.9 % — LOW (ref 39–50)
HDLC SERPL-MCNC: 44 MG/DL — SIGNIFICANT CHANGE UP (ref 40–125)
HGB BLD-MCNC: 11 G/DL — LOW (ref 13–17)
LIPID PNL WITH DIRECT LDL SERPL: 62 MG/DL — SIGNIFICANT CHANGE UP
MCHC RBC-ENTMCNC: 32.4 GM/DL — SIGNIFICANT CHANGE UP (ref 32–36)
MCHC RBC-ENTMCNC: 32.6 PG — SIGNIFICANT CHANGE UP (ref 27–34)
MCV RBC AUTO: 100.6 FL — HIGH (ref 80–100)
PLATELET # BLD AUTO: 396 K/UL — SIGNIFICANT CHANGE UP (ref 150–400)
POTASSIUM SERPL-MCNC: 3.9 MMOL/L — SIGNIFICANT CHANGE UP (ref 3.5–5.3)
POTASSIUM SERPL-SCNC: 3.9 MMOL/L — SIGNIFICANT CHANGE UP (ref 3.5–5.3)
RBC # BLD: 3.37 M/UL — LOW (ref 4.2–5.8)
RBC # FLD: 15.6 % — HIGH (ref 10.3–14.5)
SODIUM SERPL-SCNC: 140 MMOL/L — SIGNIFICANT CHANGE UP (ref 135–145)
TOTAL CHOLESTEROL/HDL RATIO MEASUREMENT: 2.7 RATIO — LOW (ref 3.4–9.6)
TRIGL SERPL-MCNC: 67 MG/DL — SIGNIFICANT CHANGE UP (ref 10–149)
WBC # BLD: 14.93 K/UL — HIGH (ref 3.8–10.5)
WBC # FLD AUTO: 14.93 K/UL — HIGH (ref 3.8–10.5)

## 2018-01-11 PROCEDURE — 70450 CT HEAD/BRAIN W/O DYE: CPT

## 2018-01-11 PROCEDURE — 82962 GLUCOSE BLOOD TEST: CPT

## 2018-01-11 PROCEDURE — 80053 COMPREHEN METABOLIC PANEL: CPT

## 2018-01-11 PROCEDURE — 93005 ELECTROCARDIOGRAM TRACING: CPT

## 2018-01-11 PROCEDURE — 80061 LIPID PANEL: CPT

## 2018-01-11 PROCEDURE — 85610 PROTHROMBIN TIME: CPT

## 2018-01-11 PROCEDURE — 83036 HEMOGLOBIN GLYCOSYLATED A1C: CPT

## 2018-01-11 PROCEDURE — 85730 THROMBOPLASTIN TIME PARTIAL: CPT

## 2018-01-11 PROCEDURE — 80048 BASIC METABOLIC PNL TOTAL CA: CPT

## 2018-01-11 PROCEDURE — 85027 COMPLETE CBC AUTOMATED: CPT

## 2018-01-11 PROCEDURE — 99285 EMERGENCY DEPT VISIT HI MDM: CPT | Mod: 25

## 2018-01-11 PROCEDURE — 84484 ASSAY OF TROPONIN QUANT: CPT

## 2018-01-11 RX ORDER — WARFARIN SODIUM 2.5 MG/1
1 TABLET ORAL
Qty: 13 | Refills: 0 | OUTPATIENT
Start: 2018-01-11 | End: 2018-02-09

## 2018-01-11 RX ORDER — DOXAZOSIN MESYLATE 4 MG
8 TABLET ORAL ONCE
Qty: 0 | Refills: 0 | Status: COMPLETED | OUTPATIENT
Start: 2018-01-11 | End: 2018-01-11

## 2018-01-11 RX ORDER — ASPIRIN/CALCIUM CARB/MAGNESIUM 324 MG
81 TABLET ORAL ONCE
Qty: 0 | Refills: 0 | Status: COMPLETED | OUTPATIENT
Start: 2018-01-11 | End: 2018-01-11

## 2018-01-11 RX ORDER — ACETAMINOPHEN 500 MG
975 TABLET ORAL ONCE
Qty: 0 | Refills: 0 | Status: COMPLETED | OUTPATIENT
Start: 2018-01-11 | End: 2018-01-11

## 2018-01-11 RX ORDER — ASPIRIN/CALCIUM CARB/MAGNESIUM 324 MG
1 TABLET ORAL
Qty: 30 | Refills: 0 | OUTPATIENT
Start: 2018-01-11 | End: 2018-02-09

## 2018-01-11 RX ADMIN — Medication 650 MILLIGRAM(S): at 00:53

## 2018-01-11 RX ADMIN — Medication 975 MILLIGRAM(S): at 09:11

## 2018-01-11 RX ADMIN — Medication 8 MILLIGRAM(S): at 15:33

## 2018-01-11 RX ADMIN — Medication 81 MILLIGRAM(S): at 15:34

## 2018-01-11 NOTE — DISCHARGE NOTE ADULT - OTHER SIGNIFICANT FINDINGS
CT Head 1/11/18:  There is a new small focal region of hypodensity at the gray-white matter   junction of the left centrum semiovale region (series 2 image 20), not   present on prior study dated 1/5/2018.    Age-related involution changes of the brain evidenced by prominence of   the ventricles and sulci. There is mild patchy white matter   hypoattenuation which is nonspecific in nature and likely related to   chronic microvascular ischemic disease.    The visualized paranasal sinuses are clear. The mastoid air cells and   middle ear cavities are grossly clear.    No scalp hematoma or displaced calvarial fracure.    IMPRESSION:   Small focus of low density in the left parietal region concerning for   acute infarct. MRI correlation can be performed.

## 2018-01-11 NOTE — CONSULT NOTE ADULT - SUBJECTIVE AND OBJECTIVE BOX
CHIEF COMPLAINT:Patient is a 72y old  Male who presents with a chief complaint of episode of slurred speech (11 Jan 2018 12:04)      HISTORY OF PRESENT ILLNESS:  73 yo M hx of hld, afib on coumadin, spinal stenosis sp lumbar surg 6 days ago presenting with acute onset aphasia today at 130pm  that resolved in half an hour found to have small left parietal region acute infarct   denies any chest pain, sob, palpitation, dizziness or syncope.   PAST MEDICAL & SURGICAL HISTORY:  Osteoarthritis  BPH (benign prostatic hyperplasia)  MOE (obstructive sleep apnea): positive sleep study many years ago, was recommended to use CPAP, patient non-compliant  GERD (gastroesophageal reflux disease)  HTN (hypertension)  Atrial fibrillation  Hodgkin lymphoma  H/O Spinal surgery  S/P hip replacement, right: 2014  S/P splenectomy: 1975          MEDICATIONS:  aspirin  chewable 81 milliGRAM(s) Oral daily  enoxaparin Injectable 40 milliGRAM(s) SubCutaneous daily            atorvastatin 80 milliGRAM(s) Oral at bedtime        FAMILY HISTORY:      Non-contributory    SOCIAL HISTORY:    No tobacco, drugs or etoh    Allergies    No Known Allergies    Intolerances    	    REVIEW OF SYSTEMS:  as above  The rest of the 14 points ROS reviewed and except above they are unremarkable.        PHYSICAL EXAM:  T(C): 36.7 (01-11-18 @ 09:39), Max: 37.3 (01-11-18 @ 07:38)  HR: 78 (01-11-18 @ 09:39) (68 - 92)  BP: 193/71 (01-11-18 @ 09:39) (166/90 - 193/71)  RR: 18 (01-11-18 @ 09:39) (16 - 19)  SpO2: 95% (01-11-18 @ 09:39) (95% - 98%)  Wt(kg): --  I&O's Summary      Appearance: Normal	  HEENT:   Normal oral mucosa, PERRL, EOMI	  Cardiovascular: Normal S1 S2,    Murmur:   Neck: JVP normal  Respiratory: Lungs clear to auscultation  Gastrointestinal:  Soft, Non-tender, + BS	  Skin: normal   Neuro: No gross deficits.   Psychiatry:  Mood & affect appropriate  Ext: No edema    LABS/DATA:    TELEMETRY: 	    ECG:  	   	  CARDIAC MARKERS:  Troponin T, Serum: 0.02 ng/mL (01-10 @ 20:22)                                  11.0   14.93 )-----------( 396      ( 11 Jan 2018 07:39 )             33.9     01-11    140  |  96  |  10  ----------------------------<  92  3.9   |  31  |  0.68    Ca    9.3      11 Jan 2018 08:28    TPro  7.0  /  Alb  3.5  /  TBili  0.4  /  DBili  x   /  AST  48<H>  /  ALT  53<H>  /  AlkPhos  118  01-10    proBNP:   Lipid Profile:   HgA1c: Hemoglobin A1C, Whole Blood: 6.3 % (01-11 @ 07:39)    TSH:

## 2018-01-11 NOTE — ED ADULT NURSE REASSESSMENT NOTE - NS ED NURSE REASSESS COMMENT FT1
hospitalist paged to find out who is covering for orders  for pt htn meds no orders at this time
pt c/o back pain from lying on stretcher all night s/p back surgery Dr notified with tylenol orally ramses per md order pt pending neuro bed assignment
pt received in red area alert oriented no neuro deficit speaking clearly pt pending neuro bed assignment
pt sleeping comfortably. pt in NAD. will reassess.
pt has strong equal . pupils round and reactive.

## 2018-01-11 NOTE — DISCHARGE NOTE ADULT - CARE PROVIDER_API CALL
Boni Farrell), Cardiovascular Disease; Internal Medicine  [address] [facility]  [address]  [place] [address]  Phone: [phone]  Fax: [phone]    Taz Greene (DO), Neurology; Vascular Neurology  [address] [facility] [address]  [place] [address]  Phone: [phone]  Fax: [phone]

## 2018-01-11 NOTE — DISCHARGE NOTE ADULT - CARE PLAN
Principal Discharge DX:	CVA (cerebral vascular accident)  Goal:	prevent recurrence  Instructions for follow-up, activity and diet:	- resume regular diet   - continue medications as directed   - follow up with your PMD in 1-2 weeks  - follow up with vascular neurology

## 2018-01-11 NOTE — DISCHARGE NOTE ADULT - PLAN OF CARE
prevent recurrence - resume regular diet   - continue medications as directed   - follow up with your PMD in 1-2 weeks  - follow up with vascular neurology

## 2018-01-11 NOTE — DISCHARGE NOTE ADULT - PATIENT PORTAL LINK FT
“You can access the [facility] Patient Portal, offered by [facility], by registering with the following website: [email or web address]”

## 2018-01-11 NOTE — DISCHARGE NOTE ADULT - NS AS DC STROKE ED MATERIALS
Stroke Education Booklet/Need for Followup After Discharge/Call 911 for Stroke/Prescribed Medications/Stroke Warning Signs and Symptoms/Risk Factors for Stroke

## 2018-01-11 NOTE — H&P ADULT - ASSESSMENT
73 yo M hx of hld, afib on coumadin, spinal stenosis sp lumbar surg 6 days ago presenting with acute onset aphasia today at 130pm  that resolved in half an hour. Brought to ED by family for evaluation. Pt has been off coumadin since Friday for the surgery. Neurological examination was remarkable for left lower extremity weakness 4/5 compared to rest of 3 extremities.     Impression     small left parietal region acute infarct     Plan     Patient had surgery before 5 days, discussed with neurosurgery regarding starting anticoagulation plan in accordance with lumbar surgery, neurosurgery team recommended that AC can be started ( initially planned for starting after post op day 10, but now agreed for starting early if required), recommended avoiding bridging     Permissive HTN x 24hrs 220/120  MRI brain w/o cont  MRA brain w/o cont   MRA neck w/ cont  HgA1c  Lipid profile  Aspirin 81 mg   Atorvastatin 80 mg   TTE  Telemetry monitoring  PT/OT   DASH diet (passed dysphagia screen)

## 2018-01-11 NOTE — CONSULT NOTE ADULT - ASSESSMENT
HTN  elevated  permissive HTN as per neurology     acute cva  likely embolic  pt has afib  resume a/c if ok with neurology and neurosurgery     afib  hr controlled  a/c is advised

## 2018-01-11 NOTE — DISCHARGE NOTE ADULT - MEDICATION SUMMARY - MEDICATIONS TO TAKE
I will START or STAY ON the medications listed below when I get home from the hospital:    calcium and CoQ 10  -- 1 tab(s) by mouth once a day  -- Indication: For vitamins    aspirin 81 mg oral tablet, chewable  -- 1 tab(s) by mouth once a day . Stop when INR is therapeutic   -- Indication: For stroke    oxyCODONE 5 mg oral tablet  -- 1-2  tab(s) by mouth every 4 hours, As needed, Moderate Pain (4 - 6) MDD:8  -- Indication: For pain    lisinopril 40 mg oral tablet  -- 1 tab(s) by mouth once a day  -- Indication: For Hypertension    terazosin 10 mg oral tablet  -- orally once a day  -- Indication: For urinary retention    warfarin 2 mg oral tablet  -- 1 tab(s) by mouth Tuesday, Thursday, Saturday, Sunday    -- Do not take this drug if you are pregnant.  It is very important that you take or use this exactly as directed.  Do not skip doses or discontinue unless directed by your doctor.  Obtain medical advice before taking any non-prescription drugs as some may affect the action of this medication.    -- Indication: For stroke     warfarin 2.5 mg oral tablet  -- 1 tab(s) by mouth Monday, Wednesday, and Friday   -- Do not take this drug if you are pregnant.  It is very important that you take or use this exactly as directed.  Do not skip doses or discontinue unless directed by your doctor.  Obtain medical advice before taking any non-prescription drugs as some may affect the action of this medication.    -- Indication: For stroke    simvastatin 20 mg oral tablet  -- 1 tab(s) by mouth once a day (at bedtime)  -- Indication: For Hyperlipidemia    atenolol 25 mg oral tablet  -- 1 tab(s) by mouth once a day  -- Indication: For Hypertension    chlorthalidone 25 mg oral tablet  -- 1 tab(s) by mouth once a day  -- Indication: For Hypertension    fluticasone 50 mcg/inh nasal spray  -- 1 spray(s) into nose once a day  -- Indication: For allergies    omeprazole 20 mg oral delayed release capsule  -- 1 cap(s) by mouth 3 times a week  -- Indication: For GERD

## 2018-01-11 NOTE — H&P ADULT - HISTORY OF PRESENT ILLNESS
73 yo M hx of hld, afib on coumadin, spinal stenosis sp lumbar surg 6 days ago presenting with acute onset aphasia today at 130pm  that resolved in half an hour. Brought to ED by family for evaluation. Pt has been off coumadin since Friday for the surgery. Scheduled to restart tomorrow. No antiplatelet meds.     During episode of aphasia, Pt was talking on phone, knew what he wanted to stay, but could not get words out. Was able to phonate. No focal numbness or weakness. At baseline appearance and mental status per pt and family. Ambulates with walker postop.    His NIHSS was 0 and MRS was 1.

## 2018-01-11 NOTE — H&P ADULT - NSHPLABSRESULTS_GEN_ALL_CORE
11.5   16.8  )-----------( 336      ( 10 Arjun 2018 20:22 )             35.4   01-10    139  |  96  |  14  ----------------------------<  104<H>  4.0   |  32<H>  |  0.75    Ca    9.4      10 Arjun 2018 20:22    TPro  7.0  /  Alb  3.5  /  TBili  0.4  /  DBili  x   /  AST  48<H>  /  ALT  53<H>  /  AlkPhos  118  01-10    PT/INR - ( 10 Arjun 2018 20:22 )   PT: 11.5 sec;   INR: 1.05 ratio         PTT - ( 10 Arjun 2018 20:22 )  PTT:29.0 sec    < from: CT Head No Cont (01.10.18 @ 21:04) >    IMPRESSION:   Small focus of low density in the left parietal region concerning for   acute infarct. MRI correlation can be performed.    No acute intracranial hemorrhage.    < end of copied text >

## 2018-01-11 NOTE — H&P ADULT - ATTENDING COMMENTS
VASCULAR NEUROLOGY ATTENDING  The patient is seen and examined the history and imaging are reviewed. I agree with the resident note unless otherwise noted. Patient currently back to neurologic baseline. Known PAF recently off coumadin for lumbar laminectomy. CT with L small hypodensity. On exam this AM essentially normal. Will restart coumadin with ASA to coumadin bridge when INR therapeutic stop ASA. Discussed with patient and neurosurgery attending.

## 2018-01-11 NOTE — DISCHARGE NOTE ADULT - HOSPITAL COURSE
71 yo M hx of hld, afib on coumadin, spinal stenosis sp lumbar surg 6 days ago presenting with acute onset aphasia today at 130pm  that resolved in half an hour. Brought to ED by family for evaluation. Pt has been off coumadin since Friday for the surgery. Scheduled to restart tomorrow. No antiplatelet meds.   During episode of aphasia, Pt was talking on phone, knew what he wanted to stay, but could not get words out. Was able to phonate. No focal numbness or weakness. At baseline appearance and mental status per pt and family. Ambulates with walker postop.  His NIHSS was 0 and MRS was 1.     CT Head on presentation showed new small focal region of hypodensity at the gray-white matter junction of the left centrum semiovale region   Dysphagia screen passed and tolerated regular diet without difficulties.   Pt was admitted to the Stroke Service for further evaluation.   Pts coumadin was held for C spine laminectomy last week. Discussed with neurosurgery Dr. Inamn who agrees to restart anticoagulation for stroke prevention in Afib. Pt     MRI Brain inpatient would not , elective MRI Brain as outpatient.   Cardiology, Dr. Farrell was consulted and recommended __________    Etiology of pt’s CVA is likely cardioembolic in setting of stopping anticoagulation for 5 days. Restart AC with ASA bridge. Discontinue ASA when INR is therapeutic. Pt will need close f/u w/ Dr. Farrell and PMD for INR checks. Follow up for INR in 3 days.     Pt was back to baseline, no difficulties with ambulation. No PT/OT needed.   Pt is currently medically stable for discharge. Plan discussed in detail with pt and pts family at bedside who express understanding.     Discharge Plan:  Please continue all meds as listed above.    Follow-up:   PMD in 1-2 weeks  Vascular Neurology   Cardiology 71 yo M hx of hld, afib on coumadin, spinal stenosis sp lumbar surg 6 days ago presenting with acute onset aphasia today at 130pm  that resolved in half an hour. Brought to ED by family for evaluation. Pt has been off coumadin since Friday for the surgery. Scheduled to restart tomorrow. No antiplatelet meds.   During episode of aphasia, Pt was talking on phone, knew what he wanted to stay, but could not get words out. Was able to phonate. No focal numbness or weakness. At baseline appearance and mental status per pt and family. Ambulates with walker postop.  His NIHSS was 0 and MRS was 1.     CT Head on presentation showed new small focal region of hypodensity at the gray-white matter junction of the left centrum semiovale region   Dysphagia screen passed and tolerated regular diet without difficulties.   Pt was admitted to the Stroke Service for further evaluation.   Pts coumadin was held for C spine laminectomy last week. Discussed with neurosurgery Dr. Inman who agrees to restart anticoagulation for stroke prevention in Afib. Pt     MRI Brain inpatient would not , elective MRI Brain as outpatient.   Cardiology, Dr. Farrell was consulted and agrees with anticoagulation.   Etiology of pt’s CVA is likely cardioembolic in setting of stopping anticoagulation for 5 days. Restart AC with ASA bridge. Discontinue ASA when INR is therapeutic. Pt will need close f/u w/ Dr. Farrell and PMD for INR checks. Follow up for INR in 3 days.     Pt was back to baseline, no difficulties with ambulation. No PT/OT needed.   Pt is currently medically stable for discharge. Plan discussed in detail with pt and pts family at bedside who express understanding.     Discharge Plan:  Please continue all meds as listed above.    Follow-up:   PMD in 1-2 weeks  Vascular Neurology   Cardiology

## 2018-01-16 ENCOUNTER — APPOINTMENT (OUTPATIENT)
Dept: SPINE | Facility: CLINIC | Age: 73
End: 2018-01-16
Payer: MEDICARE

## 2018-01-16 VITALS
SYSTOLIC BLOOD PRESSURE: 124 MMHG | HEIGHT: 68.5 IN | HEART RATE: 54 BPM | DIASTOLIC BLOOD PRESSURE: 65 MMHG | WEIGHT: 222 LBS | BODY MASS INDEX: 33.26 KG/M2

## 2018-01-16 PROCEDURE — 99024 POSTOP FOLLOW-UP VISIT: CPT

## 2018-01-30 ENCOUNTER — TRANSCRIPTION ENCOUNTER (OUTPATIENT)
Age: 73
End: 2018-01-30

## 2018-02-13 ENCOUNTER — APPOINTMENT (OUTPATIENT)
Dept: SPINE | Facility: CLINIC | Age: 73
End: 2018-02-13
Payer: MEDICARE

## 2018-02-13 VITALS
HEIGHT: 68.5 IN | DIASTOLIC BLOOD PRESSURE: 68 MMHG | SYSTOLIC BLOOD PRESSURE: 130 MMHG | WEIGHT: 219 LBS | BODY MASS INDEX: 32.81 KG/M2

## 2018-02-13 PROCEDURE — 99024 POSTOP FOLLOW-UP VISIT: CPT

## 2018-02-13 RX ORDER — ASPIRIN 81 MG
81 TABLET, DELAYED RELEASE (ENTERIC COATED) ORAL
Refills: 0 | Status: DISCONTINUED | COMMUNITY
End: 2018-02-13

## 2018-02-13 RX ORDER — POTASSIUM CHLORIDE 750 MG/1
10 TABLET, EXTENDED RELEASE ORAL EVERY OTHER DAY
Refills: 0 | Status: DISCONTINUED | COMMUNITY
End: 2018-02-13

## 2018-03-12 ENCOUNTER — NON-APPOINTMENT (OUTPATIENT)
Age: 73
End: 2018-03-12

## 2018-03-12 ENCOUNTER — APPOINTMENT (OUTPATIENT)
Dept: ELECTROPHYSIOLOGY | Facility: CLINIC | Age: 73
End: 2018-03-12
Payer: MEDICARE

## 2018-03-12 VITALS
HEIGHT: 68.5 IN | OXYGEN SATURATION: 95 % | DIASTOLIC BLOOD PRESSURE: 74 MMHG | BODY MASS INDEX: 32.96 KG/M2 | WEIGHT: 220 LBS | HEART RATE: 65 BPM | SYSTOLIC BLOOD PRESSURE: 156 MMHG

## 2018-03-12 DIAGNOSIS — Z82.49 FAMILY HISTORY OF ISCHEMIC HEART DISEASE AND OTHER DISEASES OF THE CIRCULATORY SYSTEM: ICD-10-CM

## 2018-03-12 PROCEDURE — 93000 ELECTROCARDIOGRAM COMPLETE: CPT

## 2018-03-12 PROCEDURE — 99215 OFFICE O/P EST HI 40 MIN: CPT

## 2018-04-03 ENCOUNTER — APPOINTMENT (OUTPATIENT)
Dept: SPINE | Facility: CLINIC | Age: 73
End: 2018-04-03
Payer: MEDICARE

## 2018-04-03 VITALS
WEIGHT: 219 LBS | SYSTOLIC BLOOD PRESSURE: 140 MMHG | BODY MASS INDEX: 32.81 KG/M2 | DIASTOLIC BLOOD PRESSURE: 80 MMHG | HEIGHT: 68.5 IN

## 2018-04-03 PROCEDURE — 99024 POSTOP FOLLOW-UP VISIT: CPT

## 2018-04-09 ENCOUNTER — OTHER (OUTPATIENT)
Age: 73
End: 2018-04-09

## 2018-05-14 ENCOUNTER — OTHER (OUTPATIENT)
Age: 73
End: 2018-05-14

## 2018-06-05 ENCOUNTER — APPOINTMENT (OUTPATIENT)
Dept: SPINE | Facility: CLINIC | Age: 73
End: 2018-06-05
Payer: MEDICARE

## 2018-06-05 VITALS
DIASTOLIC BLOOD PRESSURE: 80 MMHG | SYSTOLIC BLOOD PRESSURE: 130 MMHG | WEIGHT: 216 LBS | BODY MASS INDEX: 32.36 KG/M2 | HEIGHT: 68.5 IN

## 2018-06-05 PROCEDURE — 99214 OFFICE O/P EST MOD 30 MIN: CPT

## 2018-08-10 ENCOUNTER — OTHER (OUTPATIENT)
Age: 73
End: 2018-08-10

## 2018-08-21 ENCOUNTER — APPOINTMENT (OUTPATIENT)
Dept: SPINE | Facility: CLINIC | Age: 73
End: 2018-08-21
Payer: MEDICARE

## 2018-08-21 VITALS
HEIGHT: 68 IN | WEIGHT: 216 LBS | SYSTOLIC BLOOD PRESSURE: 132 MMHG | BODY MASS INDEX: 32.74 KG/M2 | DIASTOLIC BLOOD PRESSURE: 76 MMHG

## 2018-08-21 PROCEDURE — 99214 OFFICE O/P EST MOD 30 MIN: CPT

## 2018-09-24 PROBLEM — N40.0 BENIGN PROSTATIC HYPERPLASIA WITHOUT LOWER URINARY TRACT SYMPTOMS: Chronic | Status: ACTIVE | Noted: 2017-12-14

## 2018-09-24 PROBLEM — G47.33 OBSTRUCTIVE SLEEP APNEA (ADULT) (PEDIATRIC): Chronic | Status: ACTIVE | Noted: 2017-12-14

## 2018-09-24 PROBLEM — I10 ESSENTIAL (PRIMARY) HYPERTENSION: Chronic | Status: ACTIVE | Noted: 2017-12-14

## 2018-09-24 PROBLEM — M19.90 UNSPECIFIED OSTEOARTHRITIS, UNSPECIFIED SITE: Chronic | Status: ACTIVE | Noted: 2017-12-14

## 2018-09-24 PROBLEM — K21.9 GASTRO-ESOPHAGEAL REFLUX DISEASE WITHOUT ESOPHAGITIS: Chronic | Status: ACTIVE | Noted: 2017-12-14

## 2018-10-09 ENCOUNTER — APPOINTMENT (OUTPATIENT)
Dept: SPINE | Facility: CLINIC | Age: 73
End: 2018-10-09
Payer: MEDICARE

## 2018-10-09 VITALS
WEIGHT: 213 LBS | SYSTOLIC BLOOD PRESSURE: 124 MMHG | BODY MASS INDEX: 32.28 KG/M2 | HEIGHT: 68 IN | DIASTOLIC BLOOD PRESSURE: 80 MMHG

## 2018-10-09 PROCEDURE — 99212 OFFICE O/P EST SF 10 MIN: CPT

## 2018-10-10 ENCOUNTER — OUTPATIENT (OUTPATIENT)
Dept: OUTPATIENT SERVICES | Facility: HOSPITAL | Age: 73
LOS: 1 days | End: 2018-10-10
Payer: COMMERCIAL

## 2018-10-10 VITALS
SYSTOLIC BLOOD PRESSURE: 154 MMHG | WEIGHT: 214.95 LBS | DIASTOLIC BLOOD PRESSURE: 78 MMHG | HEIGHT: 68 IN | OXYGEN SATURATION: 97 % | HEART RATE: 68 BPM | TEMPERATURE: 98 F | RESPIRATION RATE: 16 BRPM

## 2018-10-10 DIAGNOSIS — I48.91 UNSPECIFIED ATRIAL FIBRILLATION: ICD-10-CM

## 2018-10-10 DIAGNOSIS — M48.00 SPINAL STENOSIS, SITE UNSPECIFIED: ICD-10-CM

## 2018-10-10 DIAGNOSIS — Z01.818 ENCOUNTER FOR OTHER PREPROCEDURAL EXAMINATION: ICD-10-CM

## 2018-10-10 DIAGNOSIS — Z98.890 OTHER SPECIFIED POSTPROCEDURAL STATES: Chronic | ICD-10-CM

## 2018-10-10 DIAGNOSIS — Z90.81 ACQUIRED ABSENCE OF SPLEEN: Chronic | ICD-10-CM

## 2018-10-10 DIAGNOSIS — Z29.9 ENCOUNTER FOR PROPHYLACTIC MEASURES, UNSPECIFIED: ICD-10-CM

## 2018-10-10 DIAGNOSIS — M48.061 SPINAL STENOSIS, LUMBAR REGION WITHOUT NEUROGENIC CLAUDICATION: ICD-10-CM

## 2018-10-10 DIAGNOSIS — Z98.1 ARTHRODESIS STATUS: Chronic | ICD-10-CM

## 2018-10-10 DIAGNOSIS — Z96.641 PRESENCE OF RIGHT ARTIFICIAL HIP JOINT: Chronic | ICD-10-CM

## 2018-10-10 LAB
ANION GAP SERPL CALC-SCNC: 7 MMOL/L — SIGNIFICANT CHANGE UP (ref 5–17)
BLD GP AB SCN SERPL QL: NEGATIVE — SIGNIFICANT CHANGE UP
BUN SERPL-MCNC: 17 MG/DL — SIGNIFICANT CHANGE UP (ref 7–23)
CALCIUM SERPL-MCNC: 9.3 MG/DL — SIGNIFICANT CHANGE UP (ref 8.4–10.5)
CHLORIDE SERPL-SCNC: 100 MMOL/L — SIGNIFICANT CHANGE UP (ref 96–108)
CO2 SERPL-SCNC: 33 MMOL/L — HIGH (ref 22–31)
CREAT SERPL-MCNC: 0.81 MG/DL — SIGNIFICANT CHANGE UP (ref 0.5–1.3)
GLUCOSE SERPL-MCNC: 106 MG/DL — HIGH (ref 70–99)
HCT VFR BLD CALC: 38.6 % — LOW (ref 39–50)
HGB BLD-MCNC: 12.4 G/DL — LOW (ref 13–17)
MCHC RBC-ENTMCNC: 32.1 GM/DL — SIGNIFICANT CHANGE UP (ref 32–36)
MCHC RBC-ENTMCNC: 32.1 PG — SIGNIFICANT CHANGE UP (ref 27–34)
MCV RBC AUTO: 100 FL — SIGNIFICANT CHANGE UP (ref 80–100)
MRSA PCR RESULT.: SIGNIFICANT CHANGE UP
PLATELET # BLD AUTO: 486 K/UL — HIGH (ref 150–400)
POTASSIUM SERPL-MCNC: 3.6 MMOL/L — SIGNIFICANT CHANGE UP (ref 3.5–5.3)
POTASSIUM SERPL-SCNC: 3.6 MMOL/L — SIGNIFICANT CHANGE UP (ref 3.5–5.3)
RBC # BLD: 3.86 M/UL — LOW (ref 4.2–5.8)
RBC # FLD: 15.9 % — HIGH (ref 10.3–14.5)
RH IG SCN BLD-IMP: POSITIVE — SIGNIFICANT CHANGE UP
S AUREUS DNA NOSE QL NAA+PROBE: SIGNIFICANT CHANGE UP
SODIUM SERPL-SCNC: 140 MMOL/L — SIGNIFICANT CHANGE UP (ref 135–145)
WBC # BLD: 11.03 K/UL — HIGH (ref 3.8–10.5)
WBC # FLD AUTO: 11.03 K/UL — HIGH (ref 3.8–10.5)

## 2018-10-10 PROCEDURE — 86900 BLOOD TYPING SEROLOGIC ABO: CPT

## 2018-10-10 PROCEDURE — 86901 BLOOD TYPING SEROLOGIC RH(D): CPT

## 2018-10-10 PROCEDURE — 86850 RBC ANTIBODY SCREEN: CPT

## 2018-10-10 PROCEDURE — 80048 BASIC METABOLIC PNL TOTAL CA: CPT

## 2018-10-10 PROCEDURE — G0463: CPT

## 2018-10-10 PROCEDURE — 87640 STAPH A DNA AMP PROBE: CPT

## 2018-10-10 PROCEDURE — 87641 MR-STAPH DNA AMP PROBE: CPT

## 2018-10-10 PROCEDURE — 85027 COMPLETE CBC AUTOMATED: CPT

## 2018-10-10 RX ORDER — SODIUM CHLORIDE 9 MG/ML
3 INJECTION INTRAMUSCULAR; INTRAVENOUS; SUBCUTANEOUS EVERY 8 HOURS
Qty: 0 | Refills: 0 | Status: DISCONTINUED | OUTPATIENT
Start: 2018-10-19 | End: 2018-10-23

## 2018-10-10 RX ORDER — CEFAZOLIN SODIUM 1 G
2000 VIAL (EA) INJECTION ONCE
Qty: 0 | Refills: 0 | Status: DISCONTINUED | OUTPATIENT
Start: 2018-10-19 | End: 2018-10-20

## 2018-10-10 RX ORDER — LIDOCAINE HCL 20 MG/ML
0.2 VIAL (ML) INJECTION ONCE
Qty: 0 | Refills: 0 | Status: DISCONTINUED | OUTPATIENT
Start: 2018-10-19 | End: 2018-10-23

## 2018-10-10 NOTE — H&P PST ADULT - HISTORY OF PRESENT ILLNESS
74 yo male with h/o Hodgkin's Lymphoma (1970's), HTN, atrial fibrillation, CVA (1/10/18 after holding AC 11 days), L3-L4 spinal stenosis with spondylolisthesis s/p L3-L4 laminectomy with bilateral facetectomy and foraminotomy, lateral arthrodesis L3 to L4 with autologous bone graft and posterior segmental instrumentation L3-L4 with pedicle screws (1/5/18). Pt is scheduled for Re-exploration of L3-L4 Fusion and Revision of Hardware on 10/19/2018.

## 2018-10-10 NOTE — H&P PST ADULT - PHONE #
[phone] (Rosana), [phone] (Keesha) [phone] (H) or [phone] (C) (Trice); [phone] (Rosana), [phone] (Keesha)

## 2018-10-10 NOTE — H&P PST ADULT - ANESTHESIA, PREVIOUS REACTION, PROFILE
1/2018 after spine surgery, required reintubation as a precaution for suspected stroke - CT scan revealed no stroke, extubated several hours later/delayed awakening

## 2018-10-10 NOTE — H&P PST ADULT - PROBLEM SELECTOR PLAN 2
Pt states he was told to hold coumadin on 10/14/18 and start weight-based lovenox bridging while off coumadin on 10/15/18. Per pt, he was told to hold lovenox 10/18/18 . He will receive lovenox prescription from PCP at preop eval on 10/11/2018.  Stat PT/INR on admission.  Per RedCap analysis, in a patient with moderate risk for thromboembolic event having a surgery with a high risk for bleeding, it is recommended that warfarin be held 5-6 days preop and bridging with LMWH begin 3 days preop based on clinician judgement. LMWH should be held 24hours preop,

## 2018-10-10 NOTE — H&P PST ADULT - NS PRO REGISTERED ORGAN DONOR
No/pt expresses interest pt expresses interest in organ donation "I filled out the form once but I never saw it on my license"/No

## 2018-10-10 NOTE — H&P PST ADULT - ASSESSMENT
1.	Spinal Stenosis  2.	Atrial Fibrillation  CAPRINI SCORE    AGE RELATED RISK FACTORS                                                       MOBILITY RELATED FACTORS  [ ] Age 41-60 years                                            (1 Point)                  [ ] Bed rest                                                        (1 Point)  [x ] Age: 61-74 years                                           (2 Points)                [ ] Plaster cast                                                   (2 Points)  [ ] Age= 75 years                                              (3 Points)                 [ ] Bed bound for more than 72 hours                   (2 Points)    DISEASE RELATED RISK FACTORS                                               GENDER SPECIFIC FACTORS  [ ] Edema in the lower extremities                       (1 Point)                  [ ] Pregnancy                                                     (1 Point)  [ ] Varicose veins                                               (1 Point)                  [ ] Post-partum < 6 weeks                                   (1 Point)             [x ] BMI > 25 Kg/m2                                            (1 Point)                  [ ] Hormonal therapy  or oral contraception            (1 Point)                 [ ] Sepsis (in the previous month)                        (1 Point)                  [ ] History of pregnancy complications  [ ] Pneumonia or serious lung disease                                               [ ] Unexplained or recurrent                       (1 Point)           (in the previous month)                               (1 Point)  [ ] Abnormal pulmonary function test                     (1 Point)                 SURGERY RELATED RISK FACTORS  [ ] Acute myocardial infarction                              (1 Point)                 [ ]  Section                                            (1 Point)  [ ] Congestive heart failure (in the previous month)  (1 Point)                 [ ] Minor surgery                                                 (1 Point)   [ ] Inflammatory bowel disease                             (1 Point)                 [ ] Arthroscopic surgery                                        (2 Points)  [ ] Central venous access                                    (2 Points)                [x ] General surgery lasting more than 45 minutes   (2 Points)       [ ] Stroke (in the previous month)                          (5 Points)               [ ] Elective arthroplasty                                        (5 Points)                                                                                                                                               HEMATOLOGY RELATED FACTORS                                                 TRAUMA RELATED RISK FACTORS  [ ] Prior episodes of VTE                                     (3 Points)                 [ ] Fracture of the hip, pelvis, or leg                       (5 Points)  [ ] Positive family history for VTE                         (3 Points)                 [ ] Acute spinal cord injury (in the previous month)  (5 Points)  [ ] Prothrombin 29962 A                                      (3 Points)                 [ ] Paralysis  (less than 1 month)                          (5 Points)  [ ] Factor V Leiden                                             (3 Points)                 [ ] Multiple Trauma within 1 month                         (5 Points)  [ ] Lupus anticoagulants                                     (3 Points)                                                           [ ] Anticardiolipin antibodies                                (3 Points)                                                       [ ] High homocysteine in the blood                      (3 Points)                                             [ ] Other congenital or acquired thrombophilia       (3 Points)                                                [ ] Heparin induced thrombocytopenia                  (3 Points)                                          Total Score [     5     ]

## 2018-10-10 NOTE — H&P PST ADULT - PMH
Atrial fibrillation    BPH (benign prostatic hyperplasia)    CVA (cerebral vascular accident)  1/2018 - after holding warfarin for 11 days for spine surgery - presented with slurred speech, residual ST memory loss  GERD (gastroesophageal reflux disease)    Hodgkin lymphoma  1975  HTN (hypertension)    MOE (obstructive sleep apnea)  positive sleep study many years ago, was recommended to use CPAP, patient non-compliant  Osteoarthritis Atrial fibrillation  over 20 years  BPH (benign prostatic hyperplasia)    CVA (cerebral vascular accident)  1/2018 - attributed to no AC for 11 days preop/postop spine surgery - presented to ED with slurred speech, residual ST memory loss, per pt  Exposure to toxin  9/11   GERD (gastroesophageal reflux disease)    Hodgkin lymphoma  1975  HTN (hypertension)    MOE (obstructive sleep apnea)  positive sleep study many years ago, was recommended to use CPAP, patient non-compliant  Osteoarthritis    Spinal stenosis  L3-L4  Spondylolisthesis

## 2018-10-10 NOTE — H&P PST ADULT - PSH
H/O Spinal surgery  1/5/2018  S/P hip replacement, right  2014  S/P splenectomy  1975 History of cardioversion  for AF - "many years ago" - unsuccessful  History of lumbar spinal fusion  1/5/2018  S/P hip replacement, right  2014  S/P splenectomy  1975

## 2018-10-11 PROBLEM — C81.90 HODGKIN LYMPHOMA, UNSPECIFIED, UNSPECIFIED SITE: Chronic | Status: ACTIVE | Noted: 2017-12-14

## 2018-10-11 PROBLEM — I63.9 CEREBRAL INFARCTION, UNSPECIFIED: Chronic | Status: ACTIVE | Noted: 2018-10-10

## 2018-10-11 PROBLEM — I48.91 UNSPECIFIED ATRIAL FIBRILLATION: Chronic | Status: ACTIVE | Noted: 2017-12-14

## 2018-10-18 ENCOUNTER — TRANSCRIPTION ENCOUNTER (OUTPATIENT)
Age: 73
End: 2018-10-18

## 2018-10-19 ENCOUNTER — INPATIENT (INPATIENT)
Facility: HOSPITAL | Age: 73
LOS: 3 days | Discharge: HOME | DRG: 460 | End: 2018-10-23
Attending: NEUROLOGICAL SURGERY | Admitting: NEUROLOGICAL SURGERY
Payer: COMMERCIAL

## 2018-10-19 ENCOUNTER — APPOINTMENT (OUTPATIENT)
Dept: SPINE | Facility: HOSPITAL | Age: 73
End: 2018-10-19

## 2018-10-19 VITALS
HEIGHT: 68 IN | TEMPERATURE: 98 F | DIASTOLIC BLOOD PRESSURE: 66 MMHG | WEIGHT: 214.95 LBS | HEART RATE: 53 BPM | OXYGEN SATURATION: 95 % | SYSTOLIC BLOOD PRESSURE: 133 MMHG | RESPIRATION RATE: 18 BRPM

## 2018-10-19 DIAGNOSIS — M48.061 SPINAL STENOSIS, LUMBAR REGION WITHOUT NEUROGENIC CLAUDICATION: ICD-10-CM

## 2018-10-19 DIAGNOSIS — Z90.81 ACQUIRED ABSENCE OF SPLEEN: Chronic | ICD-10-CM

## 2018-10-19 DIAGNOSIS — Z98.890 OTHER SPECIFIED POSTPROCEDURAL STATES: Chronic | ICD-10-CM

## 2018-10-19 DIAGNOSIS — Z96.641 PRESENCE OF RIGHT ARTIFICIAL HIP JOINT: Chronic | ICD-10-CM

## 2018-10-19 DIAGNOSIS — M48.06 SPINAL STENOSIS, LUMBAR REGION: ICD-10-CM

## 2018-10-19 DIAGNOSIS — Z98.1 ARTHRODESIS STATUS: Chronic | ICD-10-CM

## 2018-10-19 LAB
INR BLD: 1.16 RATIO — SIGNIFICANT CHANGE UP (ref 0.88–1.16)
PROTHROM AB SERPL-ACNC: 12.6 SEC — SIGNIFICANT CHANGE UP (ref 9.8–12.7)

## 2018-10-19 PROCEDURE — 22849 REINSERT SPINAL FIXATION: CPT

## 2018-10-19 PROCEDURE — 22830 EXPLORATION OF SPINAL FUSION: CPT | Mod: 59

## 2018-10-19 PROCEDURE — 22612 ARTHRD PST TQ 1NTRSPC LUMBAR: CPT

## 2018-10-19 PROCEDURE — 71045 X-RAY EXAM CHEST 1 VIEW: CPT | Mod: 26

## 2018-10-19 RX ORDER — HYDROMORPHONE HYDROCHLORIDE 2 MG/ML
0.5 INJECTION INTRAMUSCULAR; INTRAVENOUS; SUBCUTANEOUS
Qty: 0 | Refills: 0 | Status: DISCONTINUED | OUTPATIENT
Start: 2018-10-19 | End: 2018-10-19

## 2018-10-19 RX ORDER — ENOXAPARIN SODIUM 100 MG/ML
40 INJECTION SUBCUTANEOUS EVERY 24 HOURS
Qty: 0 | Refills: 0 | Status: DISCONTINUED | OUTPATIENT
Start: 2018-10-20 | End: 2018-10-20

## 2018-10-19 RX ORDER — SODIUM CHLORIDE 9 MG/ML
1000 INJECTION INTRAMUSCULAR; INTRAVENOUS; SUBCUTANEOUS
Qty: 0 | Refills: 0 | Status: DISCONTINUED | OUTPATIENT
Start: 2018-10-19 | End: 2018-10-22

## 2018-10-19 RX ORDER — FLUTICASONE PROPIONATE 50 MCG
1 SPRAY, SUSPENSION NASAL DAILY
Qty: 0 | Refills: 0 | Status: DISCONTINUED | OUTPATIENT
Start: 2018-10-19 | End: 2018-10-23

## 2018-10-19 RX ORDER — SIMVASTATIN 20 MG/1
20 TABLET, FILM COATED ORAL AT BEDTIME
Qty: 0 | Refills: 0 | Status: DISCONTINUED | OUTPATIENT
Start: 2018-10-19 | End: 2018-10-23

## 2018-10-19 RX ORDER — DOCUSATE SODIUM 100 MG
100 CAPSULE ORAL THREE TIMES A DAY
Qty: 0 | Refills: 0 | Status: DISCONTINUED | OUTPATIENT
Start: 2018-10-19 | End: 2018-10-23

## 2018-10-19 RX ORDER — PANTOPRAZOLE SODIUM 20 MG/1
40 TABLET, DELAYED RELEASE ORAL
Qty: 0 | Refills: 0 | Status: DISCONTINUED | OUTPATIENT
Start: 2018-10-19 | End: 2018-10-23

## 2018-10-19 RX ORDER — LISINOPRIL 2.5 MG/1
40 TABLET ORAL DAILY
Qty: 0 | Refills: 0 | Status: DISCONTINUED | OUTPATIENT
Start: 2018-10-19 | End: 2018-10-23

## 2018-10-19 RX ORDER — OXYCODONE AND ACETAMINOPHEN 5; 325 MG/1; MG/1
2 TABLET ORAL EVERY 6 HOURS
Qty: 0 | Refills: 0 | Status: DISCONTINUED | OUTPATIENT
Start: 2018-10-19 | End: 2018-10-23

## 2018-10-19 RX ORDER — ONDANSETRON 8 MG/1
4 TABLET, FILM COATED ORAL EVERY 6 HOURS
Qty: 0 | Refills: 0 | Status: DISCONTINUED | OUTPATIENT
Start: 2018-10-19 | End: 2018-10-23

## 2018-10-19 RX ORDER — METHOCARBAMOL 500 MG/1
500 TABLET, FILM COATED ORAL EVERY 6 HOURS
Qty: 0 | Refills: 0 | Status: DISCONTINUED | OUTPATIENT
Start: 2018-10-19 | End: 2018-10-23

## 2018-10-19 RX ORDER — ACETAMINOPHEN 500 MG
650 TABLET ORAL EVERY 6 HOURS
Qty: 0 | Refills: 0 | Status: DISCONTINUED | OUTPATIENT
Start: 2018-10-19 | End: 2018-10-23

## 2018-10-19 RX ORDER — ATENOLOL 25 MG/1
25 TABLET ORAL DAILY
Qty: 0 | Refills: 0 | Status: DISCONTINUED | OUTPATIENT
Start: 2018-10-19 | End: 2018-10-23

## 2018-10-19 RX ORDER — OXYCODONE AND ACETAMINOPHEN 5; 325 MG/1; MG/1
1 TABLET ORAL EVERY 4 HOURS
Qty: 0 | Refills: 0 | Status: DISCONTINUED | OUTPATIENT
Start: 2018-10-19 | End: 2018-10-23

## 2018-10-19 RX ORDER — ONDANSETRON 8 MG/1
4 TABLET, FILM COATED ORAL ONCE
Qty: 0 | Refills: 0 | Status: DISCONTINUED | OUTPATIENT
Start: 2018-10-19 | End: 2018-10-19

## 2018-10-19 RX ORDER — HYDRALAZINE HCL 50 MG
10 TABLET ORAL ONCE
Qty: 0 | Refills: 0 | Status: COMPLETED | OUTPATIENT
Start: 2018-10-19 | End: 2018-10-19

## 2018-10-19 RX ORDER — CEFAZOLIN SODIUM 1 G
2000 VIAL (EA) INJECTION EVERY 8 HOURS
Qty: 0 | Refills: 0 | Status: COMPLETED | OUTPATIENT
Start: 2018-10-19 | End: 2018-10-20

## 2018-10-19 RX ADMIN — OXYCODONE AND ACETAMINOPHEN 1 TABLET(S): 5; 325 TABLET ORAL at 19:00

## 2018-10-19 RX ADMIN — SODIUM CHLORIDE 3 MILLILITER(S): 9 INJECTION INTRAMUSCULAR; INTRAVENOUS; SUBCUTANEOUS at 10:50

## 2018-10-19 RX ADMIN — HYDROMORPHONE HYDROCHLORIDE 0.5 MILLIGRAM(S): 2 INJECTION INTRAMUSCULAR; INTRAVENOUS; SUBCUTANEOUS at 17:30

## 2018-10-19 RX ADMIN — SODIUM CHLORIDE 3 MILLILITER(S): 9 INJECTION INTRAMUSCULAR; INTRAVENOUS; SUBCUTANEOUS at 21:00

## 2018-10-19 RX ADMIN — Medication 100 MILLIGRAM(S): at 22:05

## 2018-10-19 RX ADMIN — Medication 100 MILLIGRAM(S): at 22:04

## 2018-10-19 RX ADMIN — SIMVASTATIN 20 MILLIGRAM(S): 20 TABLET, FILM COATED ORAL at 22:05

## 2018-10-19 RX ADMIN — HYDROMORPHONE HYDROCHLORIDE 0.5 MILLIGRAM(S): 2 INJECTION INTRAMUSCULAR; INTRAVENOUS; SUBCUTANEOUS at 17:50

## 2018-10-19 RX ADMIN — OXYCODONE AND ACETAMINOPHEN 1 TABLET(S): 5; 325 TABLET ORAL at 19:53

## 2018-10-19 RX ADMIN — Medication 10 MILLIGRAM(S): at 20:25

## 2018-10-19 NOTE — PHYSICAL THERAPY INITIAL EVALUATION ADULT - PERTINENT HX OF CURRENT PROBLEM, REHAB EVAL
74 y/o male with h/o Hodgkin's Lymphoma (1970's), afib, CVA (1/10/18 after holding AC 11 days), L3-L4 spinal stenosis with spondylolisthesis s/p L3-L4 laminecomty with bilateral facetectomy and foraminotomy, lateral arthrodesis L3 to L4 with autologous bone graft and posterior segmental instrumentation L3-L4 with pedicle screws (1/5/18). Pt is now s/p revision of L3/4 hardware.

## 2018-10-19 NOTE — PHYSICAL THERAPY INITIAL EVALUATION ADULT - GENERAL OBSERVATIONS, REHAB EVAL
Pt migel 45 min eval well. Pt s/p L3/4 revision. Pt rec'd in PACU, premedicated by BRAYAN Oleary, gurmeet-felipe, +ezio, and NAD. PT reviewed spinal precautions.

## 2018-10-19 NOTE — BRIEF OPERATIVE NOTE - PROCEDURE
<<-----Click on this checkbox to enter Procedure Spine surgery  10/19/2018  L3/4 revision of hardware  Active  ELVIS

## 2018-10-19 NOTE — PHYSICAL THERAPY INITIAL EVALUATION ADULT - ADDITIONAL COMMENTS
Pt is a retired . Pt states he lives in a private home with spouse with 4 steps to enter and 4 steps to bedroom/bathroom (+handrail). Pt states prior to admission being independent with all functional mobility and ADLs. Pt states he drove prior to admission. Pt states he owns a RW and straight cane, but does not use them.

## 2018-10-19 NOTE — PHYSICAL THERAPY INITIAL EVALUATION ADULT - TRANSFER TRAINING, PT EVAL
GOAL: Pt will transfer sit to/from stand least restrictive device as appropriate independently in 2 weeks

## 2018-10-20 LAB
ANION GAP SERPL CALC-SCNC: 11 MMOL/L — SIGNIFICANT CHANGE UP (ref 5–17)
BASOPHILS # BLD AUTO: 0.01 K/UL — SIGNIFICANT CHANGE UP (ref 0–0.2)
BASOPHILS NFR BLD AUTO: 0.1 % — SIGNIFICANT CHANGE UP (ref 0–2)
BUN SERPL-MCNC: 16 MG/DL — SIGNIFICANT CHANGE UP (ref 7–23)
CALCIUM SERPL-MCNC: 9.1 MG/DL — SIGNIFICANT CHANGE UP (ref 8.4–10.5)
CHLORIDE SERPL-SCNC: 97 MMOL/L — SIGNIFICANT CHANGE UP (ref 96–108)
CO2 SERPL-SCNC: 29 MMOL/L — SIGNIFICANT CHANGE UP (ref 22–31)
CREAT SERPL-MCNC: 0.7 MG/DL — SIGNIFICANT CHANGE UP (ref 0.5–1.3)
EOSINOPHIL # BLD AUTO: 0 K/UL — SIGNIFICANT CHANGE UP (ref 0–0.5)
EOSINOPHIL NFR BLD AUTO: 0 % — SIGNIFICANT CHANGE UP (ref 0–6)
GLUCOSE SERPL-MCNC: 154 MG/DL — HIGH (ref 70–99)
HCT VFR BLD CALC: 36 % — LOW (ref 39–50)
HGB BLD-MCNC: 12.2 G/DL — LOW (ref 13–17)
IMM GRANULOCYTES NFR BLD AUTO: 0.3 % — SIGNIFICANT CHANGE UP (ref 0–1.5)
LYMPHOCYTES # BLD AUTO: 1.15 K/UL — SIGNIFICANT CHANGE UP (ref 1–3.3)
LYMPHOCYTES # BLD AUTO: 7.6 % — LOW (ref 13–44)
MCHC RBC-ENTMCNC: 32.4 PG — SIGNIFICANT CHANGE UP (ref 27–34)
MCHC RBC-ENTMCNC: 33.9 GM/DL — SIGNIFICANT CHANGE UP (ref 32–36)
MCV RBC AUTO: 95.7 FL — SIGNIFICANT CHANGE UP (ref 80–100)
MONOCYTES # BLD AUTO: 0.76 K/UL — SIGNIFICANT CHANGE UP (ref 0–0.9)
MONOCYTES NFR BLD AUTO: 5 % — SIGNIFICANT CHANGE UP (ref 2–14)
NEUTROPHILS # BLD AUTO: 13.11 K/UL — HIGH (ref 1.8–7.4)
NEUTROPHILS NFR BLD AUTO: 87 % — HIGH (ref 43–77)
PLATELET # BLD AUTO: 385 K/UL — SIGNIFICANT CHANGE UP (ref 150–400)
POTASSIUM SERPL-MCNC: 3.7 MMOL/L — SIGNIFICANT CHANGE UP (ref 3.5–5.3)
POTASSIUM SERPL-SCNC: 3.7 MMOL/L — SIGNIFICANT CHANGE UP (ref 3.5–5.3)
RBC # BLD: 3.76 M/UL — LOW (ref 4.2–5.8)
RBC # FLD: 15.8 % — HIGH (ref 10.3–14.5)
SODIUM SERPL-SCNC: 137 MMOL/L — SIGNIFICANT CHANGE UP (ref 135–145)
WBC # BLD: 15.08 K/UL — HIGH (ref 3.8–10.5)
WBC # FLD AUTO: 15.08 K/UL — HIGH (ref 3.8–10.5)

## 2018-10-20 RX ORDER — CHLORTHALIDONE 50 MG
25 TABLET ORAL DAILY
Qty: 0 | Refills: 0 | Status: DISCONTINUED | OUTPATIENT
Start: 2018-10-20 | End: 2018-10-23

## 2018-10-20 RX ORDER — TERAZOSIN HYDROCHLORIDE 10 MG/1
10 CAPSULE ORAL DAILY
Qty: 0 | Refills: 0 | Status: DISCONTINUED | OUTPATIENT
Start: 2018-10-20 | End: 2018-10-23

## 2018-10-20 RX ADMIN — Medication 100 MILLIGRAM(S): at 21:59

## 2018-10-20 RX ADMIN — Medication 100 MILLIGRAM(S): at 05:10

## 2018-10-20 RX ADMIN — LISINOPRIL 40 MILLIGRAM(S): 2.5 TABLET ORAL at 05:17

## 2018-10-20 RX ADMIN — SODIUM CHLORIDE 3 MILLILITER(S): 9 INJECTION INTRAMUSCULAR; INTRAVENOUS; SUBCUTANEOUS at 21:59

## 2018-10-20 RX ADMIN — SODIUM CHLORIDE 3 MILLILITER(S): 9 INJECTION INTRAMUSCULAR; INTRAVENOUS; SUBCUTANEOUS at 05:09

## 2018-10-20 RX ADMIN — Medication 100 MILLIGRAM(S): at 13:30

## 2018-10-20 RX ADMIN — PANTOPRAZOLE SODIUM 40 MILLIGRAM(S): 20 TABLET, DELAYED RELEASE ORAL at 05:17

## 2018-10-20 RX ADMIN — OXYCODONE AND ACETAMINOPHEN 1 TABLET(S): 5; 325 TABLET ORAL at 18:04

## 2018-10-20 RX ADMIN — ENOXAPARIN SODIUM 40 MILLIGRAM(S): 100 INJECTION SUBCUTANEOUS at 17:23

## 2018-10-20 RX ADMIN — ATENOLOL 25 MILLIGRAM(S): 25 TABLET ORAL at 05:10

## 2018-10-20 RX ADMIN — OXYCODONE AND ACETAMINOPHEN 1 TABLET(S): 5; 325 TABLET ORAL at 18:34

## 2018-10-20 RX ADMIN — SIMVASTATIN 20 MILLIGRAM(S): 20 TABLET, FILM COATED ORAL at 21:59

## 2018-10-20 RX ADMIN — Medication 25 MILLIGRAM(S): at 13:30

## 2018-10-20 RX ADMIN — SODIUM CHLORIDE 3 MILLILITER(S): 9 INJECTION INTRAMUSCULAR; INTRAVENOUS; SUBCUTANEOUS at 13:30

## 2018-10-20 NOTE — PROGRESS NOTE ADULT - ASSESSMENT
72 yo male with h/o Hodgkin's Lymphoma (1970's), HTN, atrial fibrillation, CVA (1/10/18 after holding AC 11 days), L3-L4 spinal stenosis with spondylolisthesis s/p L3-L4 laminectomy with bilateral facetectomy and foraminotomy, lateral arthrodesis L3 to L4 with autologous bone graft and posterior segmental instrumentation L3-L4 with pedicle screws (1/5/18). Pt is scheduled for Re-exploration of L3-L4 Fusion and Revision of Hardware on 10/19/2018. (10 Oct 2018 11:49)    PROCEDURE: Adm 10/19 [id number] fusion and revision of hardware     POD#1    PLAN:  Neuro: Cont HMV drain.  DC Kaufman to TOV. Resume Chlorthalidone and Terazosin today.  Inc activity/OOB. DC planning for 10/21.    Dental-IOE/EOE. Dental F/U with outpatient dentist for comprehensive dental care.  If any difficulty swallowing/breathing, fever occur, return to ER.  Nelida Badillo DDS, Pager #[phone] Oral Surgery Consult: Dr. 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      Assessment:  Please Check When Present   []  GCS  E   V  M     Heart Failure: []Acute, [] acute on chronic , []chronic  Heart Failure:  [] Diastolic (HFpEF), [] Systolic (HFrEF), []Combined (HFpEF and HFrEF), [] RHF, [] Pulm HTN, [] Other    [] MAGALI, [] ATN, [] AIN, [] other  [] CKD1, [] CKD2, [] CKD 3, [] CKD 4, [] CKD 5, []ESRD    Encephalopathy: [] Metabolic, [] Hepatic, [] toxic, [] Neurological, [] Other    Abnormal Nurtitional Status: [] malnurtition (see nutrition note), [ ]underweight: BMI < 19, [] morbid obesity: BMI >40, [] Cachexia    [] Sepsis  [] hypovolemic shock,[] cardiogenic shock, [] hemorrhagic shock, [] neuogenic shock  [] Acute Respiratory Failure  []Cerebral edema, [] Brain compression/ herniation,   [] Functional quadriplegia  [] Acute blood loss anemia 72 yo male with h/o Hodgkin's Lymphoma (1970's), HTN, atrial fibrillation, CVA (1/10/18 after holding AC 11 days), L3-L4 spinal stenosis with spondylolisthesis s/p L3-L4 laminectomy with bilateral facetectomy and foraminotomy, lateral arthrodesis L3 to L4 with autologous bone graft and posterior segmental instrumentation L3-L4 with pedicle screws (1/5/18). Pt is scheduled for Re-exploration of L3-L4 Fusion and Revision of Hardware on 10/19/2018. (10 Oct 2018 11:49)    PROCEDURE: Adm 10/19 [id number] fusion and revision of hardware     POD#1    PLAN:  Neuro: Cont HMV drain.  DC Kaufman to TOV. Resume Chlorthalidone and Terazosin today.  Inc activity/OOB. DC planning for 10/21.    Addendum at 1045:  Called by RN at this time that gown saturated with blood.  On exam dsg with minimal bloody drainage.  Dsg removed and wound CDI, Flat and no drainage could be expressed.  Clean dsg applied-cont to monitor wound.     Dental-IOE/EOE. Dental F/U with outpatient dentist for comprehensive dental care.  If any difficulty swallowing/breathing, fever occur, return to ER.  Nelida Badillo DDS, Pager #[phone] Oral Surgery Consult: Dr. 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      Assessment:  Please Check When Present   []  GCS  E   V  M     Heart Failure: []Acute, [] acute on chronic , []chronic  Heart Failure:  [] Diastolic (HFpEF), [] Systolic (HFrEF), []Combined (HFpEF and HFrEF), [] RHF, [] Pulm HTN, [] Other    [] MAGALI, [] ATN, [] AIN, [] other  [] CKD1, [] CKD2, [] CKD 3, [] CKD 4, [] CKD 5, []ESRD    Encephalopathy: [] Metabolic, [] Hepatic, [] toxic, [] Neurological, [] Other    Abnormal Nurtitional Status: [] malnurtition (see nutrition note), [ ]underweight: BMI < 19, [] morbid obesity: BMI >40, [] Cachexia    [] Sepsis  [] hypovolemic shock,[] cardiogenic shock, [] hemorrhagic shock, [] neuogenic shock  [] Acute Respiratory Failure  []Cerebral edema, [] Brain compression/ herniation,   [] Functional quadriplegia  [] Acute blood loss anemia 72 yo male with h/o Hodgkin's Lymphoma (1970's), HTN, atrial fibrillation, CVA (1/10/18 after holding AC 11 days), L3-L4 spinal stenosis with spondylolisthesis s/p L3-L4 laminectomy with bilateral facetectomy and foraminotomy, lateral arthrodesis L3 to L4 with autologous bone graft and posterior segmental instrumentation L3-L4 with pedicle screws (1/5/18). Pt is scheduled for Re-exploration of L3-L4 Fusion and Revision of Hardware on 10/19/2018. (10 Oct 2018 11:49)    PROCEDURE: Adm 10/19 It-[id number] fusion and revision of hardware     POD#1    PLAN:  Neuro: Cont HMV drain.  DC Kaufman to TOV. Resume Chlorthalidone and Terazosin today.  Inc activity/OOB. DC planning for 10/21.    Addendum at 1045:  Called by RN at this time that gown saturated with blood.  On exam dsg with minimal bloody drainage.  Dsg removed and wound CDI, Flat and no drainage could be expressed.  Clean dsg applied-cont to monitor wound. Ck CBC AM.    Dental-IOE/EOE. Dental F/U with outpatient dentist for comprehensive dental care.  If any difficulty swallowing/breathing, fever occur, return to ER.  Nelida Badillo DDS, Pager #[phone] Oral Surgery Consult: Dr. 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      Assessment:  Please Check When Present   []  GCS  E   V  M     Heart Failure: []Acute, [] acute on chronic , []chronic  Heart Failure:  [] Diastolic (HFpEF), [] Systolic (HFrEF), []Combined (HFpEF and HFrEF), [] RHF, [] Pulm HTN, [] Other    [] MAGALI, [] ATN, [] AIN, [] other  [] CKD1, [] CKD2, [] CKD 3, [] CKD 4, [] CKD 5, []ESRD    Encephalopathy: [] Metabolic, [] Hepatic, [] toxic, [] Neurological, [] Other    Abnormal Nurtitional Status: [] malnurtition (see nutrition note), [ ]underweight: BMI < 19, [] morbid obesity: BMI >40, [] Cachexia    [] Sepsis  [] hypovolemic shock,[] cardiogenic shock, [] hemorrhagic shock, [] neuogenic shock  [] Acute Respiratory Failure  []Cerebral edema, [] Brain compression/ herniation,   [] Functional quadriplegia  [] Acute blood loss anemia

## 2018-10-21 LAB
ANION GAP SERPL CALC-SCNC: 13 MMOL/L — SIGNIFICANT CHANGE UP (ref 5–17)
BUN SERPL-MCNC: 18 MG/DL — SIGNIFICANT CHANGE UP (ref 7–23)
CALCIUM SERPL-MCNC: 9.2 MG/DL — SIGNIFICANT CHANGE UP (ref 8.4–10.5)
CHLORIDE SERPL-SCNC: 95 MMOL/L — LOW (ref 96–108)
CO2 SERPL-SCNC: 31 MMOL/L — SIGNIFICANT CHANGE UP (ref 22–31)
CREAT SERPL-MCNC: 0.83 MG/DL — SIGNIFICANT CHANGE UP (ref 0.5–1.3)
GLUCOSE SERPL-MCNC: 97 MG/DL — SIGNIFICANT CHANGE UP (ref 70–99)
HCT VFR BLD CALC: 38.8 % — LOW (ref 39–50)
HGB BLD-MCNC: 12.3 G/DL — LOW (ref 13–17)
MCHC RBC-ENTMCNC: 31.7 GM/DL — LOW (ref 32–36)
MCHC RBC-ENTMCNC: 31.7 PG — SIGNIFICANT CHANGE UP (ref 27–34)
MCV RBC AUTO: 99.9 FL — SIGNIFICANT CHANGE UP (ref 80–100)
PLATELET # BLD AUTO: 391 K/UL — SIGNIFICANT CHANGE UP (ref 150–400)
POTASSIUM SERPL-MCNC: 3.6 MMOL/L — SIGNIFICANT CHANGE UP (ref 3.5–5.3)
POTASSIUM SERPL-SCNC: 3.6 MMOL/L — SIGNIFICANT CHANGE UP (ref 3.5–5.3)
RBC # BLD: 3.89 M/UL — LOW (ref 4.2–5.8)
RBC # FLD: 13.7 % — SIGNIFICANT CHANGE UP (ref 10.3–14.5)
SODIUM SERPL-SCNC: 139 MMOL/L — SIGNIFICANT CHANGE UP (ref 135–145)
WBC # BLD: 19.5 K/UL — HIGH (ref 3.8–10.5)
WBC # FLD AUTO: 19.5 K/UL — HIGH (ref 3.8–10.5)

## 2018-10-21 PROCEDURE — 72082 X-RAY EXAM ENTIRE SPI 2/3 VW: CPT | Mod: 26

## 2018-10-21 RX ADMIN — SODIUM CHLORIDE 3 MILLILITER(S): 9 INJECTION INTRAMUSCULAR; INTRAVENOUS; SUBCUTANEOUS at 13:21

## 2018-10-21 RX ADMIN — TERAZOSIN HYDROCHLORIDE 10 MILLIGRAM(S): 10 CAPSULE ORAL at 04:53

## 2018-10-21 RX ADMIN — SODIUM CHLORIDE 3 MILLILITER(S): 9 INJECTION INTRAMUSCULAR; INTRAVENOUS; SUBCUTANEOUS at 21:54

## 2018-10-21 RX ADMIN — Medication 100 MILLIGRAM(S): at 13:20

## 2018-10-21 RX ADMIN — OXYCODONE AND ACETAMINOPHEN 1 TABLET(S): 5; 325 TABLET ORAL at 23:45

## 2018-10-21 RX ADMIN — Medication 100 MILLIGRAM(S): at 04:53

## 2018-10-21 RX ADMIN — SIMVASTATIN 20 MILLIGRAM(S): 20 TABLET, FILM COATED ORAL at 21:55

## 2018-10-21 RX ADMIN — ATENOLOL 25 MILLIGRAM(S): 25 TABLET ORAL at 04:46

## 2018-10-21 RX ADMIN — LISINOPRIL 40 MILLIGRAM(S): 2.5 TABLET ORAL at 04:53

## 2018-10-21 RX ADMIN — Medication 25 MILLIGRAM(S): at 06:24

## 2018-10-21 RX ADMIN — PANTOPRAZOLE SODIUM 40 MILLIGRAM(S): 20 TABLET, DELAYED RELEASE ORAL at 06:24

## 2018-10-21 RX ADMIN — SODIUM CHLORIDE 3 MILLILITER(S): 9 INJECTION INTRAMUSCULAR; INTRAVENOUS; SUBCUTANEOUS at 06:24

## 2018-10-21 RX ADMIN — Medication 100 MILLIGRAM(S): at 21:55

## 2018-10-21 RX ADMIN — OXYCODONE AND ACETAMINOPHEN 1 TABLET(S): 5; 325 TABLET ORAL at 06:24

## 2018-10-21 RX ADMIN — OXYCODONE AND ACETAMINOPHEN 1 TABLET(S): 5; 325 TABLET ORAL at 17:12

## 2018-10-21 RX ADMIN — OXYCODONE AND ACETAMINOPHEN 1 TABLET(S): 5; 325 TABLET ORAL at 06:54

## 2018-10-21 NOTE — PROGRESS NOTE ADULT - ASSESSMENT
72 yo male with h/o Hodgkin's Lymphoma (1970's), HTN, atrial fibrillation, CVA (1/10/18 after holding AC 11 days), L3-L4 spinal stenosis with spondylolisthesis s/p L3-L4 laminectomy with bilateral facetectomy and foraminotomy, lateral arthrodesis L3 to L4 with autologous bone graft and posterior segmental instrumentation L3-L4 with pedicle screws (1/5/18). Pt is scheduled for Re-exploration of L3-L4 Fusion and Revision of Hardware on 10/19/2018. Patient wishes to stay one more day for pain control and plan for discharge on 10/22/2018    PROCEDURE: Adm 10/19 [id number] fusion and revision of hardware     POD#2    PLAN:  Neuro: HMV drain dc'ed.  DC Kaufman to TOV. Resume Chlorthalidone and Terazosin today.  Inc activity/OOB. DC planning for 10/22.    Dental-IOE/EOE. Dental F/U with outpatient dentist for comprehensive dental care.  If any difficulty swallowing/breathing, fever occur, return to ER.  Nelida Badillo DDS, Pager #[phone] Oral Surgery Consult: Dr. 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      plan discussed with attending Dr. Inman

## 2018-10-22 LAB
ANION GAP SERPL CALC-SCNC: 12 MMOL/L — SIGNIFICANT CHANGE UP (ref 5–17)
BASOPHILS # BLD AUTO: 0.15 K/UL — SIGNIFICANT CHANGE UP (ref 0–0.2)
BASOPHILS NFR BLD AUTO: 0.9 % — SIGNIFICANT CHANGE UP (ref 0–2)
BUN SERPL-MCNC: 19 MG/DL — SIGNIFICANT CHANGE UP (ref 7–23)
CALCIUM SERPL-MCNC: 8.8 MG/DL — SIGNIFICANT CHANGE UP (ref 8.4–10.5)
CHLORIDE SERPL-SCNC: 95 MMOL/L — LOW (ref 96–108)
CO2 SERPL-SCNC: 33 MMOL/L — HIGH (ref 22–31)
CREAT SERPL-MCNC: 0.83 MG/DL — SIGNIFICANT CHANGE UP (ref 0.5–1.3)
EOSINOPHIL # BLD AUTO: 0.28 K/UL — SIGNIFICANT CHANGE UP (ref 0–0.5)
EOSINOPHIL NFR BLD AUTO: 1.7 % — SIGNIFICANT CHANGE UP (ref 0–6)
GLUCOSE SERPL-MCNC: 97 MG/DL — SIGNIFICANT CHANGE UP (ref 70–99)
HCT VFR BLD CALC: 36 % — LOW (ref 39–50)
HGB BLD-MCNC: 11.7 G/DL — LOW (ref 13–17)
LYMPHOCYTES # BLD AUTO: 24.3 % — SIGNIFICANT CHANGE UP (ref 13–44)
LYMPHOCYTES # BLD AUTO: 3.95 K/UL — HIGH (ref 1–3.3)
MCHC RBC-ENTMCNC: 32.5 GM/DL — SIGNIFICANT CHANGE UP (ref 32–36)
MCHC RBC-ENTMCNC: 32.5 PG — SIGNIFICANT CHANGE UP (ref 27–34)
MCV RBC AUTO: 100 FL — SIGNIFICANT CHANGE UP (ref 80–100)
MONOCYTES # BLD AUTO: 1.27 K/UL — HIGH (ref 0–0.9)
MONOCYTES NFR BLD AUTO: 7.8 % — SIGNIFICANT CHANGE UP (ref 2–14)
NEUTROPHILS # BLD AUTO: 10.46 K/UL — HIGH (ref 1.8–7.4)
NEUTROPHILS NFR BLD AUTO: 64.4 % — SIGNIFICANT CHANGE UP (ref 43–77)
PLATELET # BLD AUTO: 386 K/UL — SIGNIFICANT CHANGE UP (ref 150–400)
POTASSIUM SERPL-MCNC: 3.4 MMOL/L — LOW (ref 3.5–5.3)
POTASSIUM SERPL-SCNC: 3.4 MMOL/L — LOW (ref 3.5–5.3)
RBC # BLD: 3.6 M/UL — LOW (ref 4.2–5.8)
RBC # FLD: 15.8 % — HIGH (ref 10.3–14.5)
SODIUM SERPL-SCNC: 140 MMOL/L — SIGNIFICANT CHANGE UP (ref 135–145)
WBC # BLD: 16.24 K/UL — HIGH (ref 3.8–10.5)
WBC # FLD AUTO: 16.24 K/UL — HIGH (ref 3.8–10.5)

## 2018-10-22 RX ORDER — DOCUSATE SODIUM 100 MG
1 CAPSULE ORAL
Qty: 0 | Refills: 0 | COMMUNITY
Start: 2018-10-22

## 2018-10-22 RX ORDER — ENOXAPARIN SODIUM 100 MG/ML
40 INJECTION SUBCUTANEOUS
Qty: 0 | Refills: 0 | Status: DISCONTINUED | OUTPATIENT
Start: 2018-10-22 | End: 2018-10-23

## 2018-10-22 RX ORDER — NIFEDIPINE 30 MG
60 TABLET, EXTENDED RELEASE 24 HR ORAL DAILY
Qty: 0 | Refills: 0 | Status: DISCONTINUED | OUTPATIENT
Start: 2018-10-23 | End: 2018-10-23

## 2018-10-22 RX ORDER — NIFEDIPINE 30 MG
60 TABLET, EXTENDED RELEASE 24 HR ORAL ONCE
Qty: 0 | Refills: 0 | Status: COMPLETED | OUTPATIENT
Start: 2018-10-22 | End: 2018-10-22

## 2018-10-22 RX ORDER — NIFEDIPINE 30 MG
1 TABLET, EXTENDED RELEASE 24 HR ORAL
Qty: 30 | Refills: 0 | OUTPATIENT
Start: 2018-10-22

## 2018-10-22 RX ORDER — POTASSIUM CHLORIDE 20 MEQ
20 PACKET (EA) ORAL
Qty: 0 | Refills: 0 | Status: COMPLETED | OUTPATIENT
Start: 2018-10-22 | End: 2018-10-22

## 2018-10-22 RX ADMIN — LISINOPRIL 40 MILLIGRAM(S): 2.5 TABLET ORAL at 05:39

## 2018-10-22 RX ADMIN — Medication 60 MILLIGRAM(S): at 09:43

## 2018-10-22 RX ADMIN — OXYCODONE AND ACETAMINOPHEN 1 TABLET(S): 5; 325 TABLET ORAL at 18:45

## 2018-10-22 RX ADMIN — SODIUM CHLORIDE 3 MILLILITER(S): 9 INJECTION INTRAMUSCULAR; INTRAVENOUS; SUBCUTANEOUS at 05:42

## 2018-10-22 RX ADMIN — SODIUM CHLORIDE 3 MILLILITER(S): 9 INJECTION INTRAMUSCULAR; INTRAVENOUS; SUBCUTANEOUS at 21:42

## 2018-10-22 RX ADMIN — Medication 20 MILLIEQUIVALENT(S): at 15:59

## 2018-10-22 RX ADMIN — SIMVASTATIN 20 MILLIGRAM(S): 20 TABLET, FILM COATED ORAL at 21:43

## 2018-10-22 RX ADMIN — ENOXAPARIN SODIUM 40 MILLIGRAM(S): 100 INJECTION SUBCUTANEOUS at 17:28

## 2018-10-22 RX ADMIN — OXYCODONE AND ACETAMINOPHEN 1 TABLET(S): 5; 325 TABLET ORAL at 10:15

## 2018-10-22 RX ADMIN — TERAZOSIN HYDROCHLORIDE 10 MILLIGRAM(S): 10 CAPSULE ORAL at 05:39

## 2018-10-22 RX ADMIN — PANTOPRAZOLE SODIUM 40 MILLIGRAM(S): 20 TABLET, DELAYED RELEASE ORAL at 05:39

## 2018-10-22 RX ADMIN — Medication 100 MILLIGRAM(S): at 05:39

## 2018-10-22 RX ADMIN — Medication 25 MILLIGRAM(S): at 05:40

## 2018-10-22 RX ADMIN — Medication 20 MILLIEQUIVALENT(S): at 17:27

## 2018-10-22 RX ADMIN — ATENOLOL 25 MILLIGRAM(S): 25 TABLET ORAL at 05:39

## 2018-10-22 RX ADMIN — OXYCODONE AND ACETAMINOPHEN 1 TABLET(S): 5; 325 TABLET ORAL at 00:15

## 2018-10-22 RX ADMIN — OXYCODONE AND ACETAMINOPHEN 1 TABLET(S): 5; 325 TABLET ORAL at 09:46

## 2018-10-22 RX ADMIN — SODIUM CHLORIDE 3 MILLILITER(S): 9 INJECTION INTRAMUSCULAR; INTRAVENOUS; SUBCUTANEOUS at 13:25

## 2018-10-22 NOTE — CONSULT NOTE ADULT - ASSESSMENT
HTN  cont current meds  add nifedipine    Hypokalemia  due to chlorthalidone  replete  will consider adding aldactone     afib  HR stable  resume a/c once deemed safe as per primary team

## 2018-10-22 NOTE — PROGRESS NOTE ADULT - ASSESSMENT
ASSESSMENT AND PLAN: 74 yo male with h/o Hodgkin's Lymphoma (1970's), HTN, atrial fibrillation, CVA (1/10/18 after holding AC 11 days), L3-L4 spinal stenosis with spondylolisthesis s/p L3-L4 laminectomy with bilateral facetectomy and foraminotomy, lateral arthrodesis L3 to L4 with autologous bone graft and posterior segmental instrumentation L3-L4 with pedicle screws (1/5/18). Pt underwent Re-exploration of L3-L4 Fusion and Revision of Hardware on 10/19/2018. Pt has been hypertensive to SBP of 200.      NEURO: Pain Management: Continue Percocet 1-2 tabs for moderate to severe pain.   Continue Robaxin for back spasms     PULM: Encouraged incentive spirometer     CV: HTN: Continue atenolol, chlorthalidone. lisinopril, terazosin. Nefidipine added by cardiology. Cardiology consulted   Continue zocor   Potassium 40mg given for hypokalemia   Will clarify with NSx when to restart coumadin     ENDO:     HEME/ONC:                      DVT ppx: SQL started.     RENAL: IVL     ID: Afebrile     GI:  Continue colace and protonix     DISCHARGE PLANNING: PT: Outpatient PT, pt has RW   Will d/w Neurosurgeon

## 2018-10-22 NOTE — CONSULT NOTE ADULT - SUBJECTIVE AND OBJECTIVE BOX
CHIEF COMPLAINT:Patient is a 73y old  Male who presents with a chief complaint of "They are revising the surgery I had on my lumbar spine in January." (10 Oct 2018 11:49)      HISTORY OF PRESENT ILLNESS:    this is a pleasant 74 yo male with h/o Hodgkin's Lymphoma (1970's), HTN, atrial fibrillation, CVA (1/10/18 after holding AC 11 days), L3-L4 spinal stenosis with spondylolisthesis s/p L3-L4 laminectomy with bilateral facetectomy and foraminotomy, lateral arthrodesis L3 to L4 with autologous bone graft and posterior segmental instrumentation L3-L4 with pedicle screws (1/5/18). Pt is now s/p Re-exploration of L3-L4 Fusion and Revision of Hardware on 10/19/2018.  cardiology is called for uncontrolled HTN   denies any chest pain, sob, palpitation, dizziness or syncope.     PAST MEDICAL & SURGICAL HISTORY:  Exposure to toxin: 9/11   Spondylolisthesis  Spinal stenosis: L3-L4  CVA (cerebral vascular accident): 1/2018 - attributed to no AC for 11 days preop/postop spine surgery - presented to ED with slurred speech, residual ST memory loss, per pt  Osteoarthritis  BPH (benign prostatic hyperplasia)  MOE (obstructive sleep apnea): positive sleep study many years ago, was recommended to use CPAP, patient non-compliant  GERD (gastroesophageal reflux disease)  HTN (hypertension)  Atrial fibrillation: over 20 years  Hodgkin lymphoma: 1975  History of lumbar spinal fusion: 1/5/2018  History of cardioversion: for AF - &quot;many years ago&quot; - unsuccessful  S/P hip replacement, right: 2014  S/P splenectomy: 1975          MEDICATIONS:  ATENolol  Tablet 25 milliGRAM(s) Oral daily  chlorthalidone 25 milliGRAM(s) Oral daily  lisinopril 40 milliGRAM(s) Oral daily  terazosin 10 milliGRAM(s) Oral daily        acetaminophen   Tablet .. 650 milliGRAM(s) Oral every 6 hours PRN  methocarbamol 500 milliGRAM(s) Oral every 6 hours PRN  ondansetron Injectable 4 milliGRAM(s) IV Push every 6 hours PRN  oxyCODONE    5 mG/acetaminophen 325 mG 1 Tablet(s) Oral every 4 hours PRN  oxyCODONE    5 mG/acetaminophen 325 mG 2 Tablet(s) Oral every 6 hours PRN    docusate sodium 100 milliGRAM(s) Oral three times a day  pantoprazole    Tablet 40 milliGRAM(s) Oral before breakfast    simvastatin 20 milliGRAM(s) Oral at bedtime    fluticasone propionate 50 MICROgram(s)/spray Nasal Spray 1 Spray(s) Both Nostrils daily  sodium chloride 0.9% lock flush 3 milliLiter(s) IV Push every 8 hours      FAMILY HISTORY:      Non-contributory    SOCIAL HISTORY:    No tobacco, drugs or etoh    Allergies    No Known Allergies    Intolerances    	    REVIEW OF SYSTEMS:  as above  The rest of the 14 points ROS reviewed and except above they are unremarkable.        PHYSICAL EXAM:  T(C): 37.1 (10-22-18 @ 04:34), Max: 37.4 (10-21-18 @ 23:18)  HR: 81 (10-22-18 @ 04:34) (52 - 81)  BP: 180/73 (10-21-18 @ 23:18) (143/79 - 184/79)  RR: 18 (10-22-18 @ 04:34) (16 - 18)  SpO2: 94% (10-22-18 @ 04:34) (92% - 96%)  Wt(kg): --  I&O's Summary    21 Oct 2018 07:01  -  22 Oct 2018 07:00  --------------------------------------------------------  IN: 1340 mL / OUT: 0 mL / NET: 1340 mL        Appearance: Normal	  HEENT:   no gross abnormality   Cardiovascular: Normal S1 S2,    Murmur:   Neck: JVP normal  Respiratory: Lungs clear   Gastrointestinal:  Soft, Non-tender  Skin: normal   Neuro: No gross deficits.   Psychiatry:  Mood & affect flat  Ext: No edema    LABS/DATA:    TELEMETRY: 	    ECG:  	< from: 12 Lead ECG (01.10.18 @ 21:31) >  Diagnosis Line ATRIAL FIBRILLATION  ABNORMAL ECG    < end of copied text >     	  CARDIAC MARKERS:                                      12.3   19.5  )-----------( 391      ( 21 Oct 2018 07:24 )             38.8     10-22    140  |  95<L>  |  19  ----------------------------<  97  3.4<L>   |  33<H>  |  0.83    Ca    8.8      22 Oct 2018 07:07      proBNP:   Lipid Profile:   HgA1c:   TSH:
Patient is a 73y old  Male who presents with a chief complaint of "They are revising the surgery I had on my lumbar spine in January." (10 Oct 2018 11:49)    Dental Consult requested due to anterior maxillary bridge falling off after surgery.      HPI:  74 yo male with h/o Hodgkin's Lymphoma (1970's), HTN, atrial fibrillation, CVA (1/10/18 after holding AC 11 days), L3-L4 spinal stenosis with spondylolisthesis s/p L3-L4 laminectomy with bilateral facetectomy and foraminotomy, lateral arthrodesis L3 to L4 with autologous bone graft and posterior segmental instrumentation L3-L4 with pedicle screws (1/5/18). Pt is scheduled for Re-exploration of L3-L4 Fusion and Revision of Hardware on 10/19/2018. (10 Oct 2018 11:49)      PAST MEDICAL & SURGICAL HISTORY:  Exposure to toxin: 9/11   Spondylolisthesis  Spinal stenosis: L3-L4  CVA (cerebral vascular accident): 1/2018 - attributed to no AC for 11 days preop/postop spine surgery - presented to ED with slurred speech, residual ST memory loss, per pt  Osteoarthritis  BPH (benign prostatic hyperplasia)  MOE (obstructive sleep apnea): positive sleep study many years ago, was recommended to use CPAP, patient non-compliant  GERD (gastroesophageal reflux disease)  HTN (hypertension)  Atrial fibrillation: over 20 years  Hodgkin lymphoma: 1975  History of lumbar spinal fusion: 1/5/2018  History of cardioversion: for AF - &quot;many years ago&quot; - unsuccessful  S/P hip replacement, right: 2014  S/P splenectomy: 1975    MEDICATIONS  (STANDING):  ATENolol  Tablet 25 milliGRAM(s) Oral daily  ceFAZolin   IVPB 2000 milliGRAM(s) IV Intermittent once  ceFAZolin   IVPB 2000 milliGRAM(s) IV Intermittent every 8 hours  docusate sodium 100 milliGRAM(s) Oral three times a day  fluticasone propionate 50 MICROgram(s)/spray Nasal Spray 1 Spray(s) Both Nostrils daily  lisinopril 40 milliGRAM(s) Oral daily  pantoprazole    Tablet 40 milliGRAM(s) Oral before breakfast  simvastatin 20 milliGRAM(s) Oral at bedtime  sodium chloride 0.9% lock flush 3 milliLiter(s) IV Push every 8 hours  sodium chloride 0.9%. 1000 milliLiter(s) (75 mL/Hr) IV Continuous <Continuous>    MEDICATIONS  (PRN):  acetaminophen   Tablet .. 650 milliGRAM(s) Oral every 6 hours PRN Temp greater or equal to 38C (100.4F), Mild Pain (1 - 3)  HYDROmorphone  Injectable 0.5 milliGRAM(s) IV Push every 10 minutes PRN Moderate Pain (4 - 6)  lidocaine 1% Injectable 0.2 milliLiter(s) Local Injection once PRN subdermally next to vein being used prior to IV insertion prn pain  methocarbamol 500 milliGRAM(s) Oral every 6 hours PRN Back Spasms  ondansetron Injectable 4 milliGRAM(s) IV Push every 6 hours PRN Nausea  ondansetron Injectable 4 milliGRAM(s) IV Push once PRN Nausea and/or Vomiting  oxyCODONE    5 mG/acetaminophen 325 mG 1 Tablet(s) Oral every 4 hours PRN Moderate Pain (4 - 6)  oxyCODONE    5 mG/acetaminophen 325 mG 2 Tablet(s) Oral every 6 hours PRN Severe Pain (7 - 10)      Allergies    No Known Allergies    *SOCIAL HISTORY: family was with the patient    *Last Dental Visit: a few months ago    Vital Signs Last 24 Hrs  T(C): 36.2 (19 Oct 2018 14:45), Max: 36.7 (19 Oct 2018 09:43)  T(F): 97.2 (19 Oct 2018 14:45), Max: 98.1 (19 Oct 2018 09:43)  HR: 64 (19 Oct 2018 17:30) (53 - 73)  BP: 185/83 (19 Oct 2018 17:30) (133/66 - 189/85)  BP(mean): 119 (19 Oct 2018 17:30) (100 - 122)  RR: 15 (19 Oct 2018 17:30) (14 - 18)  SpO2: 96% (19 Oct 2018 17:30) (92% - 99%)    EOE:              Mandible FROM             Facial bones and MOM grossly intact             ( -  ) trismus             ( -  ) LAD             (  - ) swelling             (  - ) asymmetry             (  - ) palpation             (  - ) SOB             (  - ) dysphagia             (  - ) LOC    IOE:  permanent dentition: multiple missing teeth           tongue/FOM WNL           labial/buccal mucosa WNL           ( -  ) swelling            ( -  ) abscess  Anterior bridge from tooth #6-#10 had fallen off after surgery. Only teeth #s 6,7, and 10 remained and all were root canal teeth with prefabricated posts and no clinical crown, ferrule or core buildup.  No damage to nearby teeth.    LABS:  INR: 1.16 ratio [0.88 - 1.16] (10-19 @ 10:19)        *DENTAL RADIOGRAPHS: none taken      *ASSESSMENT:  Anterior bridge from tooth #6-#10 had fallen off after surgery. Only teeth #s 6,7, and 10 remained and all were root canal teeth with prefabricated posts and no clinical crown, ferrule or core buildup.  Prefabricated posts came out attached to the bridge. There were no clinical crowns to cement the bridge onto.     Explained to pt that he will need to see his outpatient dentist for treatment, and of that I cement that bridge, it will likely dislodge quickly and become an aspiration risk.  Pt understood and agreed.  Pt said that his bridge was knocked out a few months ago after surgery and his dentist recemented it, and so he will go back to his dentist to come up with a new treatment plan.  No damage to nearby teeth noted.      PROCEDURE:   IOE/EOE    RECOMMENDATIONS:  2) Dental F/U with outpatient dentist for comprehensive dental care.   3) If any difficulty swallowing/breathing, fever occur, return to ER.     Nelida Badillo DDS, Pager #[phone]  Oral Surgery Consult: Dr. Cavazos

## 2018-10-22 NOTE — PROVIDER CONTACT NOTE (OTHER) - ACTION/TREATMENT ORDERED:
no treatment order for now
per dr fox and will come up to take a look at it
Vern Frank MD made aware. No new interventions at this time. Will cont to monitor.

## 2018-10-23 ENCOUNTER — TRANSCRIPTION ENCOUNTER (OUTPATIENT)
Age: 73
End: 2018-10-23

## 2018-10-23 VITALS
HEART RATE: 72 BPM | SYSTOLIC BLOOD PRESSURE: 132 MMHG | OXYGEN SATURATION: 96 % | DIASTOLIC BLOOD PRESSURE: 66 MMHG | RESPIRATION RATE: 18 BRPM | TEMPERATURE: 99 F

## 2018-10-23 RX ORDER — ENOXAPARIN SODIUM 100 MG/ML
0 INJECTION SUBCUTANEOUS
Qty: 0 | Refills: 0 | COMMUNITY

## 2018-10-23 RX ORDER — ASPIRIN/CALCIUM CARB/MAGNESIUM 324 MG
1 TABLET ORAL
Qty: 30 | Refills: 0 | OUTPATIENT
Start: 2018-10-23 | End: 2018-11-21

## 2018-10-23 RX ADMIN — Medication 60 MILLIGRAM(S): at 06:03

## 2018-10-23 RX ADMIN — OXYCODONE AND ACETAMINOPHEN 1 TABLET(S): 5; 325 TABLET ORAL at 12:26

## 2018-10-23 RX ADMIN — ATENOLOL 25 MILLIGRAM(S): 25 TABLET ORAL at 06:03

## 2018-10-23 RX ADMIN — LISINOPRIL 40 MILLIGRAM(S): 2.5 TABLET ORAL at 06:03

## 2018-10-23 RX ADMIN — Medication 1 SPRAY(S): at 12:26

## 2018-10-23 RX ADMIN — TERAZOSIN HYDROCHLORIDE 10 MILLIGRAM(S): 10 CAPSULE ORAL at 06:03

## 2018-10-23 RX ADMIN — Medication 25 MILLIGRAM(S): at 06:03

## 2018-10-23 RX ADMIN — PANTOPRAZOLE SODIUM 40 MILLIGRAM(S): 20 TABLET, DELAYED RELEASE ORAL at 06:03

## 2018-10-23 RX ADMIN — SODIUM CHLORIDE 3 MILLILITER(S): 9 INJECTION INTRAMUSCULAR; INTRAVENOUS; SUBCUTANEOUS at 06:00

## 2018-10-23 RX ADMIN — OXYCODONE AND ACETAMINOPHEN 1 TABLET(S): 5; 325 TABLET ORAL at 00:06

## 2018-10-23 RX ADMIN — OXYCODONE AND ACETAMINOPHEN 1 TABLET(S): 5; 325 TABLET ORAL at 00:36

## 2018-10-23 RX ADMIN — OXYCODONE AND ACETAMINOPHEN 1 TABLET(S): 5; 325 TABLET ORAL at 12:56

## 2018-10-23 NOTE — DISCHARGE NOTE ADULT - NS AS DC STROKE ED MATERIALS
Call 911 for Stroke/Need for Followup After Discharge/Risk Factors for Stroke/Stroke Warning Signs and Symptoms/Stroke Education Booklet/Prescribed Medications

## 2018-10-23 NOTE — DISCHARGE NOTE ADULT - CARE PROVIDERS DIRECT ADDRESSES
[email or web address]

## 2018-10-23 NOTE — PROGRESS NOTE ADULT - ASSESSMENT
ASSESSMENT AND PLAN: 74 yo male with h/o Hodgkin's Lymphoma (1970's), HTN, atrial fibrillation, CVA (1/10/18 after holding AC 11 days), L3-L4 spinal stenosis with spondylolisthesis s/p L3-L4 laminectomy with bilateral facetectomy and foraminotomy, lateral arthrodesis L3 to L4 with autologous bone graft and posterior segmental instrumentation L3-L4 with pedicle screws (1/5/18). Pt underwent Re-exploration of L3-L4 Fusion and Revision of Hardware on 10/19/2018. Pt was hypertensive yesterday, followed by cardiology who started on nifedipine and SBP better controlled. Pt is on coumadin for Hx of CVA and afib.  After speaking with neurosurgeon, Aspirin 81mg can be started today and coumadin can be restarted postoperative day #7. Per cardiology aspirin to continue until INR is therapeutic on coumadin.      NEURO: Pain Management: Continue Percocet 1-2 tabs for moderate to severe pain.   Robaxin not used so discontinued     PULM: Encouraged incentive spirometer     CV: HTN: Continue atenolol, chlorthalidone. lisinopril, terazosin. Nefidipine added by cardiology. Cardiology following  Continue zocor     ENDO:     HEME/ONC:                      DVT ppx: SQL started    RENAL: IVL     ID: Afebrile     GI:  Continue colace and protonix     DISCHARGE PLANNING: PT: Outpatient PT, pt has RW   D/w Neurosurgeon

## 2018-10-23 NOTE — DISCHARGE NOTE ADULT - PATIENT PORTAL LINK FT
You can access the [facility] Patient Portal, offered by [facility], by registering with the following website: [email or web address]

## 2018-10-23 NOTE — DISCHARGE NOTE ADULT - ADDITIONAL INSTRUCTIONS
Please follow up Neurosurgeon Dr. Inman in one week (10/30/18). Please call office to make appointment. Staples to be removed on postoperative day 14 (11/2) by neurosurgeon. Follow up when to restart coumadin   Please follow up with Primary Care Physician in one week. Please call office to make appointment.  Please follow up Cardiologist Dr. Farrell in one week. Please call office to make appointment.     May shower, please keep incision clean and dry, do not submerge wound in water for prolonged periods of time, pat dry after showering, and do not use any creams or ointments to incision.     Please do not engage in strenuous activity, heavy lifting, drive, or return to work or school until cleared by Neurosurgeon. Do not restart your Aspirin or take any Motrin/ NSAIDS until checking with your Neurosurgeon.     Please return immediately to the ED for any of the following: fevers, bleeding, swelling, pain not relieved by medication, increased sluggishness or irritability, increased nausea or vomiting, inability to tolerate foods or liquids, increased numbness/tingling/ or inability to move extremities, seizures. Please follow up Neurosurgeon Dr. Inman in one week (10/30/18). Please call office to make appointment. Staples to be removed on postoperative day 14 (11/2) by neurosurgeon.   Please follow up with Primary Care Physician in one week (10/29). Please call office to make appointment.  Please follow up Cardiologist Dr. Farrell in one week (10/29). Please call office to make appointment.     May shower, please keep incision clean and dry, do not submerge wound in water for prolonged periods of time, pat dry after showering, and do not use any creams or ointments to incision.     Please do not engage in strenuous activity, heavy lifting, drive, or return to work or school until cleared by Neurosurgeon. Do not take any Motrin/ NSAIDS until checking with your Neurosurgeon.     Please return immediately to the ED for any of the following: fevers, bleeding, swelling, pain not relieved by medication, increased sluggishness or irritability, increased nausea or vomiting, inability to tolerate foods or liquids, increased numbness/tingling/ or inability to move extremities, seizures.

## 2018-10-23 NOTE — DISCHARGE NOTE ADULT - HOSPITAL COURSE
72 yo male with h/o Hodgkin's Lymphoma (1970's), HTN, atrial fibrillation, CVA (1/10/18 after holding AC 11 days), L3-L4 spinal stenosis with spondylolisthesis s/p L3-L4 laminectomy with bilateral facetectomy and foraminotomy, lateral arthrodesis L3 to L4 with autologous bone graft and posterior segmental instrumentation L3-L4 with pedicle screws (1/5/18). Pt underwent Re-exploration of L3-L4 Fusion and Revision of Hardware on 10/19/2018. Postoperatively patient had lumbar spine xrays which were reviewed. Postoperatively patient was hypertensive and was followed by cardiology and started on nifedipine. 72 yo male with h/o Hodgkin's Lymphoma (1970's), HTN, atrial fibrillation, CVA (1/10/18 after holding AC 11 days), L3-L4 spinal stenosis with spondylolisthesis s/p L3-L4 laminectomy with bilateral facetectomy and foraminotomy, lateral arthrodesis L3 to L4 with autologous bone graft and posterior segmental instrumentation L3-L4 with pedicle screws (1/5/18). Pt underwent Re-exploration of L3-L4 Fusion and Revision of Hardware on 10/19/2018. Postoperatively patient had lumbar spine xrays which were reviewed. Postoperatively patient was hypertensive and was followed by cardiology and started on nifedipine. Per Neurosurgeon we will start ASA 81mg now and coumadin can be restarted postoperative day #7 (10/26/28). Per cardiology continue ASA 81 and coumadin until INR is therapeutic on coumadin, at which point ASA can be discontinued.  Pt must follow up with PCP and cardiologist to check INR and transition to coumadin.  Patient was seen by physical therapy who recommended that patient go home with outpatient physical therapy. Pt is medically and neurologically stable for discharge.

## 2018-10-23 NOTE — PROGRESS NOTE ADULT - ASSESSMENT
HTN  better today  cont current mds     Hypokalemia  due to chlorthalidone  fu labs     afib  HR stable  resume a/c once deemed safe as per primary team

## 2018-10-23 NOTE — DISCHARGE NOTE ADULT - MEDICATION SUMMARY - MEDICATIONS TO STOP TAKING
I will STOP taking the medications listed below when I get home from the hospital:    warfarin 2 mg oral tablet  -- 1 tab(s) by mouth Tuesday, Thursday, Saturday, Sunday  - last dose preop 10/13/18 then hold to OR per PCP  -- Do not take this drug if you are pregnant.  It is very important that you take or use this exactly as directed.  Do not skip doses or discontinue unless directed by your doctor.  Obtain medical advice before taking any non-prescription drugs as some may affect the action of this medication.    warfarin 2.5 mg oral tablet  -- 1 tab(s) by mouth Monday, Wednesday, and Friday   -- Do not take this drug if you are pregnant.  It is very important that you take or use this exactly as directed.  Do not skip doses or discontinue unless directed by your doctor.  Obtain medical advice before taking any non-prescription drugs as some may affect the action of this medication.    Lovenox  -- injectable 2 times a day while off Coumadin, starting 10/15/2018 - last preop dose lovenox will be 10/17/18 pm per PCP

## 2018-10-23 NOTE — DISCHARGE NOTE ADULT - PLAN OF CARE
S/p Re-exploration L3-4 fusion and revision of hardware Please follow up Neurosurgeon Dr. Inman in one week (10/30/18). Please call office to make appointment. Staples to be removed on postoperative day 14 (11/2) by neurosurgeon. Follow up when to restart coumadin Please follow up with Primary Care Physician in one week (10/30). Please call office to make appointment.  Please follow up Cardiologist Dr. Farrell in one week (10/30). Please call office to make appointment. Start Aspirin 81mg now and   Per Neurosurgeon Restart Coumadin on postoperative day #7 (10/26/18)   Per Cardiology continue ASA 81mg and coumadin until INR is therapeutic at which point ASA can be discontinued   Please follow up with PCP and Cardiologist in one week (10/29) to check INR and transition to coumadin

## 2018-10-23 NOTE — DISCHARGE NOTE ADULT - CARE PROVIDER_API CALL
Kapil Inman), Neurological Surgery  [address]  [place] [address]  Phone: [phone]  Fax: [phone]    Boni Farrell), Cardiovascular Disease; Internal Medicine  [address]  [place] [address]  Phone: [phone]  Fax: [phone]

## 2018-10-23 NOTE — DISCHARGE NOTE ADULT - MEDICATION SUMMARY - MEDICATIONS TO TAKE
I will START or STAY ON the medications listed below when I get home from the hospital:    aspirin 81 mg oral tablet  -- 1 tab(s) by mouth once a day   -- Take with food or milk.    -- Indication: For afib/ stoke (continue until INR therapuetic on coumadin)    oxyCODONE-acetaminophen 5 mg-325 mg oral tablet  -- 1-2 tab(s) by mouth every 4 hours, As needed, Moderate to Severe Pain MDD:6 tabs  -- Indication: For pain     lisinopril 40 mg oral tablet  -- 1 tab(s) by mouth once a day  -- Indication: For HTN (hypertension)    terazosin 10 mg oral tablet  -- orally once a day  -- Indication: For HTN (hypertension)    simvastatin 20 mg oral tablet  -- 1 tab(s) by mouth once a day (at bedtime)  -- Indication: For HLD    atenolol 25 mg oral tablet  -- 1 tab(s) by mouth once a day  -- Indication: For HTN (hypertension)    NIFEdipine 60 mg oral tablet, extended release  -- 1 tab(s) by mouth once a day  -- Indication: For HTN (hypertension)    chlorthalidone 25 mg oral tablet  -- 1 tab(s) by mouth once a day  -- Indication: For HTN (hypertension)    docusate sodium 100 mg oral capsule  -- 1 cap(s) by mouth 3 times a day  -- Indication: For Constipation     fluticasone 50 mcg/inh nasal spray  -- 1 spray(s) into nose once a day  -- Indication: For nasal congestion    omeprazole 20 mg oral delayed release capsule  -- 1 cap(s) by mouth 3 times a week  -- Indication: For Gerd

## 2018-10-23 NOTE — DISCHARGE NOTE ADULT - CARE PLAN
Principal Discharge DX:	Spinal stenosis  Goal:	S/p Re-exploration L3-4 fusion and revision of hardware  Assessment and plan of treatment:	Please follow up Neurosurgeon Dr. Inman in one week (10/30/18). Please call office to make appointment. Staples to be removed on postoperative day 14 (11/2) by neurosurgeon. Follow up when to restart coumadin  Secondary Diagnosis:	HTN (hypertension)  Assessment and plan of treatment:	Please follow up with Primary Care Physician in one week (10/30). Please call office to make appointment.  Please follow up Cardiologist Dr. Farrell in one week (10/30). Please call office to make appointment. Principal Discharge DX:	Spinal stenosis  Goal:	S/p Re-exploration L3-4 fusion and revision of hardware  Assessment and plan of treatment:	Please follow up Neurosurgeon Dr. Inman in one week (10/30/18). Please call office to make appointment. Staples to be removed on postoperative day 14 (11/2) by neurosurgeon. Follow up when to restart coumadin  Secondary Diagnosis:	HTN (hypertension)  Assessment and plan of treatment:	Please follow up with Primary Care Physician in one week (10/30). Please call office to make appointment.  Please follow up Cardiologist Dr. Farrell in one week (10/30). Please call office to make appointment.  Secondary Diagnosis:	Atrial fibrillation  Assessment and plan of treatment:	Start Aspirin 81mg now and   Per Neurosurgeon Restart Coumadin on postoperative day #7 (10/26/18)   Per Cardiology continue ASA 81mg and coumadin until INR is therapeutic at which point ASA can be discontinued   Please follow up with PCP and Cardiologist in one week (10/29) to check INR and transition to coumadin

## 2018-10-23 NOTE — DISCHARGE NOTE ADULT - NS AS ACTIVITY OBS
Walking-Indoors allowed/No Heavy lifting/straining/Do not drive or operate machinery/Do not make important decisions

## 2018-10-23 NOTE — PROGRESS NOTE ADULT - SUBJECTIVE AND OBJECTIVE BOX
SUBJECTIVE: Doing well, no complaints    OVERNIGHT EVENTS: None    Vital Signs Last 24 Hrs  T(C): 37 (20 Oct 2018 08:31), Max: 37 (19 Oct 2018 20:00)  T(F): 98.6 (20 Oct 2018 08:31), Max: 98.6 (19 Oct 2018 20:00)  HR: 66 (20 Oct 2018 08:31) (56 - 88)  BP: 150/69 (20 Oct 2018 08:31) (142/57 - 192/79)  BP(mean): 101 (19 Oct 2018 20:55) (100 - 122)  RR: 18 (20 Oct 2018 08:31) (14 - 18)  SpO2: 92% (20 Oct 2018 08:31) (91% - 100%)  IVF: [ ] IVL [ X] NS+K@ 75  DIET: [ X] Regular [ ] CCD [ ] Renal [ ] Puree [ ] Dysphagia [ ] Tube Feeds:   PCA: [ ] YES [ X] NO   CARR: [X ] YES-DC'd 10/20   BM: [ ] YES [ X] NO     DRAINS: [ ] SANGITA (cc/24h) [X ] HMV=72 (cc/24h)    PHYSICAL EXAM:    Constitutional: No Acute Distress     Neurological: AOx3, Following Commands, Moving all Extremities     Motor exam:          Upper extremity                         Delt     Bicep     Tricep    HG                                                 R         5/5        5/5        5/5       5/5                                               L          5/5        5/5        5/5       5/5          Lower extremity                        HF         KF        KE       DF         PF                                                  R        5/5        5/5        5/5       5/5      5/5                                               L         5/5        5/5       5/5       5/5       5/5                                                 Sensation: [X] intact to light touch  [] decreased:     Pulmonary: Clear to Auscultation, No rales, No rhonchi, No wheezes     Cardiovascular: S1, S2, Regular rate and rhythm     Gastrointestinal: Soft, Non-tender, Non-distended     Extremities: No calf tenderness     Incision: +staples. CDI. Flat    LABS:                        12.2   15.08 )-----------( 385      ( 20 Oct 2018 08:18 )             36.0    10-20    137  |  97  |  16  ----------------------------<  154<H>  3.7   |  29  |  0.70    Ca    9.1      20 Oct 2018 06:37    PT/INR - ( 19 Oct 2018 10:19 )   PT: 12.6 sec;   INR: 1.16 ratio           IMAGING:      MEDICATIONS  (STANDING):  ATENolol  Tablet 25 milliGRAM(s) Oral daily  chlorthalidone 25 milliGRAM(s) Oral daily  docusate sodium 100 milliGRAM(s) Oral three times a day  enoxaparin Injectable 40 milliGRAM(s) SubCutaneous every 24 hours  fluticasone propionate 50 MICROgram(s)/spray Nasal Spray 1 Spray(s) Both Nostrils daily  lisinopril 40 milliGRAM(s) Oral daily  pantoprazole    Tablet 40 milliGRAM(s) Oral before breakfast  simvastatin 20 milliGRAM(s) Oral at bedtime  sodium chloride 0.9% lock flush 3 milliLiter(s) IV Push every 8 hours  sodium chloride 0.9%. 1000 milliLiter(s) (75 mL/Hr) IV Continuous <Continuous>  terazosin 10 milliGRAM(s) Oral daily    MEDICATIONS  (PRN):  acetaminophen   Tablet .. 650 milliGRAM(s) Oral every 6 hours PRN Temp greater or equal to 38C (100.4F), Mild Pain (1 - 3)  lidocaine 1% Injectable 0.2 milliLiter(s) Local Injection once PRN subdermally next to vein being used prior to IV insertion prn pain  methocarbamol 500 milliGRAM(s) Oral every 6 hours PRN Back Spasms  ondansetron Injectable 4 milliGRAM(s) IV Push every 6 hours PRN Nausea  oxyCODONE    5 mG/acetaminophen 325 mG 1 Tablet(s) Oral every 4 hours PRN Moderate Pain (4 - 6)  oxyCODONE    5 mG/acetaminophen 325 mG 2 Tablet(s) Oral every 6 hours PRN Severe Pain (7 - 10)
SUBJECTIVE: Doing well, no complaints    OVERNIGHT EVENTS: None    Vital Signs Last 24 Hrs  T(C): 36.6 (21 Oct 2018 08:54), Max: 37.3 (20 Oct 2018 13:28)  T(F): 97.8 (21 Oct 2018 08:54), Max: 99.1 (20 Oct 2018 13:28)  HR: 51 (21 Oct 2018 08:54) (51 - 74)  BP: 163/77 (21 Oct 2018 08:54) (149/68 - 200/80)  BP(mean): --  RR: 19 (21 Oct 2018 08:54) (18 - 20)  SpO2: 97% (21 Oct 2018 08:54) (94% - 97%)    DRAINS: [ ] SANGITA (cc/24h) [X ] HMV=20 (cc/24h)    PHYSICAL EXAM:    Constitutional: No Acute Distress     Neurological: AOx3, Following Commands, Moving all Extremities     Motor exam:          Upper extremity                         Delt     Bicep     Tricep    HG                                                 R         5/5        5/5        5/5       5/5                                               L          5/5        5/5        5/5       5/5          Lower extremity                        HF         KF        KE       DF         PF                                                  R        5/5        5/5        5/5       5/5      5/5                                               L         5/5        5/5       5/5       5/5       5/5                                                 Sensation: [X] intact to light touch  [] decreased:     Pulmonary: Clear to Auscultation, No rales, No rhonchi, No wheezes     Cardiovascular: S1, S2, Regular rate and rhythm     Gastrointestinal: Soft, Non-tender, Non-distended     Extremities: No calf tenderness     Incision: +staples. CDI. Flat    LABS:                        12.3   19.5  )-----------( 391      ( 21 Oct 2018 07:24 )             38.8   10-21    139  |  95<L>  |  18  ----------------------------<  97  3.6   |  31  |  0.83    Ca    9.2      21 Oct 2018 07:24           IMAGING:      MEDICATIONS  (STANDING):  ATENolol  Tablet 25 milliGRAM(s) Oral daily  chlorthalidone 25 milliGRAM(s) Oral daily  docusate sodium 100 milliGRAM(s) Oral three times a day  enoxaparin Injectable 40 milliGRAM(s) SubCutaneous every 24 hours  fluticasone propionate 50 MICROgram(s)/spray Nasal Spray 1 Spray(s) Both Nostrils daily  lisinopril 40 milliGRAM(s) Oral daily  pantoprazole    Tablet 40 milliGRAM(s) Oral before breakfast  simvastatin 20 milliGRAM(s) Oral at bedtime  sodium chloride 0.9% lock flush 3 milliLiter(s) IV Push every 8 hours  sodium chloride 0.9%. 1000 milliLiter(s) (75 mL/Hr) IV Continuous <Continuous>  terazosin 10 milliGRAM(s) Oral daily    MEDICATIONS  (PRN):  acetaminophen   Tablet .. 650 milliGRAM(s) Oral every 6 hours PRN Temp greater or equal to 38C (100.4F), Mild Pain (1 - 3)  lidocaine 1% Injectable 0.2 milliLiter(s) Local Injection once PRN subdermally next to vein being used prior to IV insertion prn pain  methocarbamol 500 milliGRAM(s) Oral every 6 hours PRN Back Spasms  ondansetron Injectable 4 milliGRAM(s) IV Push every 6 hours PRN Nausea  oxyCODONE    5 mG/acetaminophen 325 mG 1 Tablet(s) Oral every 4 hours PRN Moderate Pain (4 - 6)  oxyCODONE    5 mG/acetaminophen 325 mG 2 Tablet(s) Oral every 6 hours PRN Severe Pain (7 - 10)
SUBJECTIVE: Pt seen and examined. Pt denies any complaints     PAST MEDICAL & SURGICAL HISTORY:  Exposure to toxin: 9/11   Spondylolisthesis  Spinal stenosis: L3-L4  CVA (cerebral vascular accident): 1/2018 - attributed to no AC for 11 days preop/postop spine surgery - presented to ED with slurred speech, residual ST memory loss, per pt  Osteoarthritis  BPH (benign prostatic hyperplasia)  MOE (obstructive sleep apnea): positive sleep study many years ago, was recommended to use CPAP, patient non-compliant  GERD (gastroesophageal reflux disease)  HTN (hypertension)  Atrial fibrillation: over 20 years  Hodgkin lymphoma: 1975  History of lumbar spinal fusion: 1/5/2018  History of cardioversion: for AF - &quot;many years ago&quot; - unsuccessful  S/P hip replacement, right: 2014  S/P splenectomy: 1975    Vital Signs Last 24 Hrs  T(C): 36.5 (23 Oct 2018 08:15), Max: 37.3 (22 Oct 2018 20:45)  T(F): 97.7 (23 Oct 2018 08:15), Max: 99.2 (22 Oct 2018 20:45)  HR: 77 (23 Oct 2018 08:15) (68 - 77)  BP: 123/807 (23 Oct 2018 08:15) (112/64 - 149/71)  RR: 18 (23 Oct 2018 08:15) (16 - 18)  SpO2: 95% (23 Oct 2018 08:15) (92% - 96%)                       11.7   16.24 )-----------( 386      ( 22 Oct 2018 09:04 )             36.0    10-22    140  |  95<L>  |  19  ----------------------------<  97  3.4<L>   |  33<H>  |  0.83    Ca    8.8      22 Oct 2018 07:07    PHYSICAL EXAM:    Constitutional: No Acute Distress     Neurological: AOx3, Following Commands, Moving all Extremities     Motor exam:          Upper extremity                         Delt     Bicep     Tricep    HG                                                 R         5/5        5/5        5/5       5/5                                               L          5/5        5/5        5/5       5/5          Lower extremity                        HF         KF        KE       DF         PF                                                  R        5/5        5/5        5/5       5/5         5/5                                               L         5/5        5/5       5/5       5/5          5/5                                                 Sensation: [x] intact to light touch  [] decreased:     Pulmonary: Clear to Auscultation, No rales, No rhonchi, No wheezes     Cardiovascular: S1, S2, Regular rate and rhythm     Gastrointestinal: Soft, Non-tender, Non-distended     Extremities: No calf tenderness     Incision: Staples in place, clean dry and intact    MEDICATIONS:   Antibiotics:    Neuro:  acetaminophen   Tablet .. 650 milliGRAM(s) Oral every 6 hours PRN Temp greater or equal to 38C (100.4F), Mild Pain (1 - 3)  oxyCODONE    5 mG/acetaminophen 325 mG 1 Tablet(s) Oral every 4 hours PRN Moderate Pain (4 - 6)  oxyCODONE    5 mG/acetaminophen 325 mG 2 Tablet(s) Oral every 6 hours PRN Severe Pain (7 - 10)    Anticoagulation:  enoxaparin Injectable 40 milliGRAM(s) SubCutaneous <User Schedule>    Cardiology:  ATENolol  Tablet 25 milliGRAM(s) Oral daily  chlorthalidone 25 milliGRAM(s) Oral daily  lisinopril 40 milliGRAM(s) Oral daily  NIFEdipine XL 60 milliGRAM(s) Oral daily  terazosin 10 milliGRAM(s) Oral daily  simvastatin 20 milliGRAM(s) Oral at bedtime    Endo:     Pulm:    GI/:  docusate sodium 100 milliGRAM(s) Oral three times a day  pantoprazole    Tablet 40 milliGRAM(s) Oral before breakfast    Other:  fluticasone propionate 50 MICROgram(s)/spray Nasal Spray 1 Spray(s) Both Nostrils daily  lidocaine 1% Injectable 0.2 milliLiter(s) Local Injection once PRN subdermally next to vein being used prior to IV insertion prn pain  sodium chloride 0.9% lock flush 3 milliLiter(s) IV Push every 8 hours
SUBJECTIVE: Pt seen and examined. Pt denies any complaints.     PAST MEDICAL & SURGICAL HISTORY:  Exposure to toxin: 9/11   Spondylolisthesis  Spinal stenosis: L3-L4  CVA (cerebral vascular accident): 1/2018 - attributed to no AC for 11 days preop/postop spine surgery - presented to ED with slurred speech, residual ST memory loss, per pt  Osteoarthritis  BPH (benign prostatic hyperplasia)  MOE (obstructive sleep apnea): positive sleep study many years ago, was recommended to use CPAP, patient non-compliant  GERD (gastroesophageal reflux disease)  HTN (hypertension)  Atrial fibrillation: over 20 years  Hodgkin lymphoma: 1975  History of lumbar spinal fusion: 1/5/2018  History of cardioversion: for AF - &quot;many years ago&quot; - unsuccessful  S/P hip replacement, right: 2014  S/P splenectomy: 1975    Vital Signs Last 24 Hrs  T(C): 36.9 (22 Oct 2018 12:18), Max: 37.4 (21 Oct 2018 23:18)  T(F): 98.5 (22 Oct 2018 12:18), Max: 99.3 (21 Oct 2018 23:18)  HR: 71 (22 Oct 2018 12:18) (52 - 81)  BP: 143/57 (22 Oct 2018 12:18) (143/57 - 184/79)  RR: 18 (22 Oct 2018 12:18) (16 - 18)  SpO2: 96% (22 Oct 2018 12:18) (92% - 96%)                          11.7   16.24 )-----------( 386      ( 22 Oct 2018 09:04 )             36.0    10-22    140  |  95<L>  |  19  ----------------------------<  97  3.4<L>   |  33<H>  |  0.83    Ca    8.8      22 Oct 2018 07:07     NEUROIMAGING: Xray of thoracic spine: < from: Xray Spine Entire Thoracolumbar 2 or 3 Views (10.21.18 @ 10:37) >  Posterior changes of the lumbar spine as detailed above. Degenerative   changes of the thoracic and lumbar spine    PHYSICAL EXAM:    Constitutional: No Acute Distress     Neurological: AOx3, Following Commands, Moving all Extremities     Motor exam:          Upper extremity                         Delt     Bicep     Tricep    HG                                                 R         5/5        5/5        5/5       5/5                                               L          5/5        5/5        5/5       5/5          Lower extremity                        HF         KF        KE       DF         PF                                                  R        5/5        5/5        5/5       5/5      5/5                                               L         5/5        5/5       5/5       5/5      5/5                                                 Sensation: [x] intact to light touch  [] decreased:     Pulmonary: Clear to Auscultation, No rales, No rhonchi, No wheezes     Cardiovascular: S1, S2, Regular rate and rhythm     Gastrointestinal: Soft, Non-tender, Non-distended     Extremities: No calf tenderness     Incision: Staples in place, clean dry and intact     MEDICATIONS:   Antibiotics:    Neuro:  acetaminophen   Tablet .. 650 milliGRAM(s) Oral every 6 hours PRN Temp greater or equal to 38C (100.4F), Mild Pain (1 - 3)  methocarbamol 500 milliGRAM(s) Oral every 6 hours PRN Back Spasms  oxyCODONE    5 mG/acetaminophen 325 mG 1 Tablet(s) Oral every 4 hours PRN Moderate Pain (4 - 6)  oxyCODONE    5 mG/acetaminophen 325 mG 2 Tablet(s) Oral every 6 hours PRN Severe Pain (7 - 10)    Anticoagulation:     Cardiology:  ATENolol  Tablet 25 milliGRAM(s) Oral daily  chlorthalidone 25 milliGRAM(s) Oral daily  lisinopril 40 milliGRAM(s) Oral daily  terazosin 10 milliGRAM(s) Oral daily  simvastatin 20 milliGRAM(s) Oral at bedtime    Endo:     Pulm:    GI/:  docusate sodium 100 milliGRAM(s) Oral three times a day  pantoprazole    Tablet 40 milliGRAM(s) Oral before breakfast    Other:  fluticasone propionate 50 MICROgram(s)/spray Nasal Spray 1 Spray(s) Both Nostrils daily  potassium chloride    Tablet ER 20 milliEquivalent(s) Oral every 2 hours  sodium chloride 0.9% lock flush 3 milliLiter(s) IV Push every 8 hours
Subjective: Patient seen and examined. No new events except as noted.     SUBJECTIVE/ROS:  No chest pain, dyspnea, palpitation, or dizziness.       MEDICATIONS:  MEDICATIONS  (STANDING):  ATENolol  Tablet 25 milliGRAM(s) Oral daily  chlorthalidone 25 milliGRAM(s) Oral daily  docusate sodium 100 milliGRAM(s) Oral three times a day  enoxaparin Injectable 40 milliGRAM(s) SubCutaneous <User Schedule>  fluticasone propionate 50 MICROgram(s)/spray Nasal Spray 1 Spray(s) Both Nostrils daily  lisinopril 40 milliGRAM(s) Oral daily  NIFEdipine XL 60 milliGRAM(s) Oral daily  pantoprazole    Tablet 40 milliGRAM(s) Oral before breakfast  simvastatin 20 milliGRAM(s) Oral at bedtime  sodium chloride 0.9% lock flush 3 milliLiter(s) IV Push every 8 hours  terazosin 10 milliGRAM(s) Oral daily      PHYSICAL EXAM:  T(C): 36.9 (10-23-18 @ 04:40), Max: 37.3 (10-22-18 @ 20:45)  HR: 70 (10-23-18 @ 04:40) (68 - 80)  BP: 149/71 (10-23-18 @ 04:40) (112/64 - 149/71)  RR: 16 (10-23-18 @ 04:40) (16 - 18)  SpO2: 95% (10-23-18 @ 04:40) (92% - 96%)  Wt(kg): --  I&O's Summary    22 Oct 2018 07:01  -  23 Oct 2018 07:00  --------------------------------------------------------  IN: 1480 mL / OUT: 0 mL / NET: 1480 mL          Appearance: Normal	  HEENT:   no gross abnormality   Cardiovascular: Normal S1 S2,    Murmur:   Neck: JVP normal  Respiratory: Lungs clear   Gastrointestinal:  Soft, Non-tender  Skin: normal   Neuro: No gross deficits.   Psychiatry:  Mood & affect flat  Ext: No edema      LABS/DATA:    CARDIAC MARKERS:                                11.7   16.24 )-----------( 386      ( 22 Oct 2018 09:04 )             36.0     10-22    140  |  95<L>  |  19  ----------------------------<  97  3.4<L>   |  33<H>  |  0.83    Ca    8.8      22 Oct 2018 07:07      proBNP:   Lipid Profile:   HgA1c:   TSH:     TELE:  EKG:
Vital Signs Last 24 Hrs  T(C): 36.2 (19 Oct 2018 14:45), Max: 36.7 (19 Oct 2018 09:43)  T(F): 97.2 (19 Oct 2018 14:45), Max: 98.1 (19 Oct 2018 09:43)  HR: 71 (19 Oct 2018 15:15) (53 - 73)  BP: 165/70 (19 Oct 2018 15:15) (133/66 - 181/82)  BP(mean): 101 (19 Oct 2018 15:15) (100 - 118)  RR: 14 (19 Oct 2018 15:00) (14 - 18)  SpO2: 92% (19 Oct 2018 15:15) (92% - 99%)    EXAM:  AOx3, FC, PERRL, EOMI, no facial   5/5 throughout, no drift  SILT  no clonus

## 2018-10-24 ENCOUNTER — TRANSCRIPTION ENCOUNTER (OUTPATIENT)
Age: 73
End: 2018-10-24

## 2018-10-24 PROBLEM — M48.00 SPINAL STENOSIS, SITE UNSPECIFIED: Chronic | Status: ACTIVE | Noted: 2018-10-10

## 2018-10-24 PROBLEM — M43.10 SPONDYLOLISTHESIS, SITE UNSPECIFIED: Chronic | Status: ACTIVE | Noted: 2018-10-10

## 2018-10-24 PROBLEM — Z77.29 CONTACT WITH AND (SUSPECTED) EXPOSURE TO OTHER HAZARDOUS SUBSTANCES: Chronic | Status: ACTIVE | Noted: 2018-10-10

## 2018-10-25 ENCOUNTER — OTHER (OUTPATIENT)
Age: 73
End: 2018-10-25

## 2018-10-25 ENCOUNTER — INPATIENT (INPATIENT)
Facility: HOSPITAL | Age: 73
LOS: 21 days | Discharge: HOME | DRG: 252 | End: 2018-11-16
Attending: PSYCHIATRY & NEUROLOGY | Admitting: SURGERY
Payer: COMMERCIAL

## 2018-10-25 VITALS
OXYGEN SATURATION: 94 % | RESPIRATION RATE: 16 BRPM | HEART RATE: 60 BPM | DIASTOLIC BLOOD PRESSURE: 72 MMHG | WEIGHT: 240.08 LBS | SYSTOLIC BLOOD PRESSURE: 121 MMHG | HEIGHT: 68 IN

## 2018-10-25 DIAGNOSIS — I70.90 UNSPECIFIED ATHEROSCLEROSIS: ICD-10-CM

## 2018-10-25 DIAGNOSIS — Z98.890 OTHER SPECIFIED POSTPROCEDURAL STATES: Chronic | ICD-10-CM

## 2018-10-25 DIAGNOSIS — Z90.81 ACQUIRED ABSENCE OF SPLEEN: Chronic | ICD-10-CM

## 2018-10-25 DIAGNOSIS — Z98.1 ARTHRODESIS STATUS: Chronic | ICD-10-CM

## 2018-10-25 DIAGNOSIS — Z96.641 PRESENCE OF RIGHT ARTIFICIAL HIP JOINT: Chronic | ICD-10-CM

## 2018-10-25 LAB
ALBUMIN SERPL ELPH-MCNC: 3.6 G/DL — SIGNIFICANT CHANGE UP (ref 3.3–5)
ALP SERPL-CCNC: 167 U/L — HIGH (ref 40–120)
ALT FLD-CCNC: 26 U/L — SIGNIFICANT CHANGE UP (ref 10–45)
ANION GAP SERPL CALC-SCNC: 12 MMOL/L — SIGNIFICANT CHANGE UP (ref 5–17)
ANION GAP SERPL CALC-SCNC: 13 MMOL/L — SIGNIFICANT CHANGE UP (ref 5–17)
APTT BLD: 29 SEC — SIGNIFICANT CHANGE UP (ref 27.5–37.4)
APTT BLD: 53.7 SEC — HIGH (ref 27.5–37.4)
APTT BLD: > 200 SEC (ref 27.5–37.4)
AST SERPL-CCNC: 27 U/L — SIGNIFICANT CHANGE UP (ref 10–40)
BASOPHILS # BLD AUTO: 0 K/UL — SIGNIFICANT CHANGE UP (ref 0–0.2)
BASOPHILS NFR BLD AUTO: 0.3 % — SIGNIFICANT CHANGE UP (ref 0–2)
BILIRUB SERPL-MCNC: 0.5 MG/DL — SIGNIFICANT CHANGE UP (ref 0.2–1.2)
BLD GP AB SCN SERPL QL: NEGATIVE — SIGNIFICANT CHANGE UP
BUN SERPL-MCNC: 23 MG/DL — SIGNIFICANT CHANGE UP (ref 7–23)
BUN SERPL-MCNC: 25 MG/DL — HIGH (ref 7–23)
CALCIUM SERPL-MCNC: 9 MG/DL — SIGNIFICANT CHANGE UP (ref 8.4–10.5)
CALCIUM SERPL-MCNC: 9.7 MG/DL — SIGNIFICANT CHANGE UP (ref 8.4–10.5)
CHLORIDE SERPL-SCNC: 95 MMOL/L — LOW (ref 96–108)
CHLORIDE SERPL-SCNC: 95 MMOL/L — LOW (ref 96–108)
CO2 SERPL-SCNC: 30 MMOL/L — SIGNIFICANT CHANGE UP (ref 22–31)
CO2 SERPL-SCNC: 31 MMOL/L — SIGNIFICANT CHANGE UP (ref 22–31)
CREAT SERPL-MCNC: 0.86 MG/DL — SIGNIFICANT CHANGE UP (ref 0.5–1.3)
CREAT SERPL-MCNC: 0.9 MG/DL — SIGNIFICANT CHANGE UP (ref 0.5–1.3)
EOSINOPHIL # BLD AUTO: 0.2 K/UL — SIGNIFICANT CHANGE UP (ref 0–0.5)
EOSINOPHIL NFR BLD AUTO: 1.5 % — SIGNIFICANT CHANGE UP (ref 0–6)
GAS PNL BLDV: SIGNIFICANT CHANGE UP
GLUCOSE SERPL-MCNC: 131 MG/DL — HIGH (ref 70–99)
GLUCOSE SERPL-MCNC: 142 MG/DL — HIGH (ref 70–99)
HCT VFR BLD CALC: 31.4 % — LOW (ref 39–50)
HCT VFR BLD CALC: 32.3 % — LOW (ref 39–50)
HCT VFR BLD CALC: 35.7 % — LOW (ref 39–50)
HGB BLD-MCNC: 10.4 G/DL — LOW (ref 13–17)
HGB BLD-MCNC: 10.9 G/DL — LOW (ref 13–17)
HGB BLD-MCNC: 12 G/DL — LOW (ref 13–17)
INR BLD: 1.12 RATIO — SIGNIFICANT CHANGE UP (ref 0.88–1.16)
INR BLD: 1.17 RATIO — HIGH (ref 0.88–1.16)
INR BLD: 1.21 RATIO — HIGH (ref 0.88–1.16)
LYMPHOCYTES # BLD AUTO: 16.7 % — SIGNIFICANT CHANGE UP (ref 13–44)
LYMPHOCYTES # BLD AUTO: 2.4 K/UL — SIGNIFICANT CHANGE UP (ref 1–3.3)
MCHC RBC-ENTMCNC: 32.6 PG — SIGNIFICANT CHANGE UP (ref 27–34)
MCHC RBC-ENTMCNC: 33.1 GM/DL — SIGNIFICANT CHANGE UP (ref 32–36)
MCHC RBC-ENTMCNC: 33.2 PG — SIGNIFICANT CHANGE UP (ref 27–34)
MCHC RBC-ENTMCNC: 33.3 PG — SIGNIFICANT CHANGE UP (ref 27–34)
MCHC RBC-ENTMCNC: 33.6 GM/DL — SIGNIFICANT CHANGE UP (ref 32–36)
MCHC RBC-ENTMCNC: 33.6 GM/DL — SIGNIFICANT CHANGE UP (ref 32–36)
MCV RBC AUTO: 98.4 FL — SIGNIFICANT CHANGE UP (ref 80–100)
MCV RBC AUTO: 98.7 FL — SIGNIFICANT CHANGE UP (ref 80–100)
MCV RBC AUTO: 99 FL — SIGNIFICANT CHANGE UP (ref 80–100)
MONOCYTES # BLD AUTO: 1.2 K/UL — HIGH (ref 0–0.9)
MONOCYTES NFR BLD AUTO: 8.2 % — SIGNIFICANT CHANGE UP (ref 2–14)
NEUTROPHILS # BLD AUTO: 10.4 K/UL — HIGH (ref 1.8–7.4)
NEUTROPHILS NFR BLD AUTO: 73.2 % — SIGNIFICANT CHANGE UP (ref 43–77)
PLATELET # BLD AUTO: 375 K/UL — SIGNIFICANT CHANGE UP (ref 150–400)
PLATELET # BLD AUTO: 383 K/UL — SIGNIFICANT CHANGE UP (ref 150–400)
PLATELET # BLD AUTO: 385 K/UL — SIGNIFICANT CHANGE UP (ref 150–400)
POTASSIUM SERPL-MCNC: 3.9 MMOL/L — SIGNIFICANT CHANGE UP (ref 3.5–5.3)
POTASSIUM SERPL-MCNC: 4 MMOL/L — SIGNIFICANT CHANGE UP (ref 3.5–5.3)
POTASSIUM SERPL-SCNC: 3.9 MMOL/L — SIGNIFICANT CHANGE UP (ref 3.5–5.3)
POTASSIUM SERPL-SCNC: 4 MMOL/L — SIGNIFICANT CHANGE UP (ref 3.5–5.3)
PROT SERPL-MCNC: 7.1 G/DL — SIGNIFICANT CHANGE UP (ref 6–8.3)
PROTHROM AB SERPL-ACNC: 12.1 SEC — SIGNIFICANT CHANGE UP (ref 9.8–12.7)
PROTHROM AB SERPL-ACNC: 12.7 SEC — SIGNIFICANT CHANGE UP (ref 9.8–12.7)
PROTHROM AB SERPL-ACNC: 13.1 SEC — HIGH (ref 9.8–12.7)
RBC # BLD: 3.19 M/UL — LOW (ref 4.2–5.8)
RBC # BLD: 3.27 M/UL — LOW (ref 4.2–5.8)
RBC # BLD: 3.61 M/UL — LOW (ref 4.2–5.8)
RBC # FLD: 12.9 % — SIGNIFICANT CHANGE UP (ref 10.3–14.5)
RBC # FLD: 13 % — SIGNIFICANT CHANGE UP (ref 10.3–14.5)
RBC # FLD: 13 % — SIGNIFICANT CHANGE UP (ref 10.3–14.5)
RH IG SCN BLD-IMP: POSITIVE — SIGNIFICANT CHANGE UP
SODIUM SERPL-SCNC: 137 MMOL/L — SIGNIFICANT CHANGE UP (ref 135–145)
SODIUM SERPL-SCNC: 139 MMOL/L — SIGNIFICANT CHANGE UP (ref 135–145)
WBC # BLD: 14.2 K/UL — HIGH (ref 3.8–10.5)
WBC # BLD: 16.5 K/UL — HIGH (ref 3.8–10.5)
WBC # BLD: 16.8 K/UL — HIGH (ref 3.8–10.5)
WBC # FLD AUTO: 14.2 K/UL — HIGH (ref 3.8–10.5)
WBC # FLD AUTO: 16.5 K/UL — HIGH (ref 3.8–10.5)
WBC # FLD AUTO: 16.8 K/UL — HIGH (ref 3.8–10.5)

## 2018-10-25 PROCEDURE — 34203 REMOVAL OF LEG ARTERY CLOT: CPT | Mod: RT

## 2018-10-25 PROCEDURE — 99291 CRITICAL CARE FIRST HOUR: CPT

## 2018-10-25 PROCEDURE — 93010 ELECTROCARDIOGRAM REPORT: CPT

## 2018-10-25 RX ORDER — ONDANSETRON 8 MG/1
4 TABLET, FILM COATED ORAL ONCE
Qty: 0 | Refills: 0 | Status: DISCONTINUED | OUTPATIENT
Start: 2018-10-25 | End: 2018-10-26

## 2018-10-25 RX ORDER — ATENOLOL 25 MG/1
25 TABLET ORAL DAILY
Qty: 0 | Refills: 0 | Status: DISCONTINUED | OUTPATIENT
Start: 2018-10-25 | End: 2018-10-27

## 2018-10-25 RX ORDER — HEPARIN SODIUM 5000 [USP'U]/ML
7500 INJECTION INTRAVENOUS; SUBCUTANEOUS ONCE
Qty: 0 | Refills: 0 | Status: COMPLETED | OUTPATIENT
Start: 2018-10-25 | End: 2018-10-25

## 2018-10-25 RX ORDER — LISINOPRIL 2.5 MG/1
40 TABLET ORAL DAILY
Qty: 0 | Refills: 0 | Status: DISCONTINUED | OUTPATIENT
Start: 2018-10-25 | End: 2018-10-27

## 2018-10-25 RX ORDER — PANTOPRAZOLE SODIUM 20 MG/1
40 TABLET, DELAYED RELEASE ORAL
Qty: 0 | Refills: 0 | Status: DISCONTINUED | OUTPATIENT
Start: 2018-10-25 | End: 2018-10-27

## 2018-10-25 RX ORDER — NIFEDIPINE 30 MG
60 TABLET, EXTENDED RELEASE 24 HR ORAL DAILY
Qty: 0 | Refills: 0 | Status: DISCONTINUED | OUTPATIENT
Start: 2018-10-25 | End: 2018-10-27

## 2018-10-25 RX ORDER — HEPARIN SODIUM 5000 [USP'U]/ML
3500 INJECTION INTRAVENOUS; SUBCUTANEOUS EVERY 6 HOURS
Qty: 0 | Refills: 0 | Status: DISCONTINUED | OUTPATIENT
Start: 2018-10-25 | End: 2018-10-25

## 2018-10-25 RX ORDER — SIMVASTATIN 20 MG/1
20 TABLET, FILM COATED ORAL AT BEDTIME
Qty: 0 | Refills: 0 | Status: DISCONTINUED | OUTPATIENT
Start: 2018-10-25 | End: 2018-10-27

## 2018-10-25 RX ORDER — SODIUM CHLORIDE 9 MG/ML
1000 INJECTION, SOLUTION INTRAVENOUS
Qty: 0 | Refills: 0 | Status: DISCONTINUED | OUTPATIENT
Start: 2018-10-25 | End: 2018-10-25

## 2018-10-25 RX ORDER — HYDROMORPHONE HYDROCHLORIDE 2 MG/ML
0.5 INJECTION INTRAMUSCULAR; INTRAVENOUS; SUBCUTANEOUS
Qty: 0 | Refills: 0 | Status: DISCONTINUED | OUTPATIENT
Start: 2018-10-25 | End: 2018-10-26

## 2018-10-25 RX ORDER — HYDROMORPHONE HYDROCHLORIDE 2 MG/ML
0.25 INJECTION INTRAMUSCULAR; INTRAVENOUS; SUBCUTANEOUS
Qty: 0 | Refills: 0 | Status: DISCONTINUED | OUTPATIENT
Start: 2018-10-25 | End: 2018-10-26

## 2018-10-25 RX ORDER — HEPARIN SODIUM 5000 [USP'U]/ML
7500 INJECTION INTRAVENOUS; SUBCUTANEOUS EVERY 6 HOURS
Qty: 0 | Refills: 0 | Status: DISCONTINUED | OUTPATIENT
Start: 2018-10-25 | End: 2018-10-25

## 2018-10-25 RX ORDER — FLUTICASONE PROPIONATE 50 MCG
1 SPRAY, SUSPENSION NASAL DAILY
Qty: 0 | Refills: 0 | Status: DISCONTINUED | OUTPATIENT
Start: 2018-10-25 | End: 2018-10-27

## 2018-10-25 RX ORDER — HEPARIN SODIUM 5000 [USP'U]/ML
INJECTION INTRAVENOUS; SUBCUTANEOUS
Qty: 25000 | Refills: 0 | Status: DISCONTINUED | OUTPATIENT
Start: 2018-10-25 | End: 2018-10-25

## 2018-10-25 RX ORDER — SODIUM CHLORIDE 9 MG/ML
1000 INJECTION, SOLUTION INTRAVENOUS
Qty: 0 | Refills: 0 | Status: DISCONTINUED | OUTPATIENT
Start: 2018-10-25 | End: 2018-10-27

## 2018-10-25 RX ADMIN — HEPARIN SODIUM 1700 UNIT(S)/HR: 5000 INJECTION INTRAVENOUS; SUBCUTANEOUS at 15:42

## 2018-10-25 RX ADMIN — HEPARIN SODIUM 7500 UNIT(S): 5000 INJECTION INTRAVENOUS; SUBCUTANEOUS at 15:42

## 2018-10-25 RX ADMIN — SIMVASTATIN 20 MILLIGRAM(S): 20 TABLET, FILM COATED ORAL at 22:34

## 2018-10-25 NOTE — CHART NOTE - NSCHARTNOTEFT_GEN_A_CORE
SURGICAL POST-OP CHECK NOTE:    Procedure: RLE embolectomy    Subjective:  Patient seen and examined.  Pain controlled.   Says he is thirsty, tolerating diet, no N/V.     Vital Signs Last 24 Hrs  T(C): 36.5 (26 Oct 2018 00:30), Max: 37.1 (25 Oct 2018 14:27)  T(F): 97.7 (26 Oct 2018 00:30), Max: 98.7 (25 Oct 2018 14:27)  HR: 88 (26 Oct 2018 02:00) (55 - 99)  BP: 147/65 (26 Oct 2018 02:00) (121/72 - 166/84)  BP(mean): 93 (26 Oct 2018 02:00) (86 - 111)  RR: 16 (26 Oct 2018 02:00) (16 - 18)  SpO2: 99% (26 Oct 2018 02:00) (93% - 100%)  I&O's Summary    25 Oct 2018 07:01  -  26 Oct 2018 02:42  --------------------------------------------------------  IN: 434 mL / OUT: 555 mL / NET: -121 mL                            10.4   16.5  )-----------( 375      ( 25 Oct 2018 23:55 )             31.4     10-25    137  |  95<L>  |  25<H>  ----------------------------<  142<H>  3.9   |  30  |  0.86    Ca    9.0      25 Oct 2018 20:18    TPro  7.1  /  Alb  3.6  /  TBili  0.5  /  DBili  x   /  AST  27  /  ALT  26  /  AlkPhos  167<H>  10-25   PT/INR - ( 25 Oct 2018 23:31 )   PT: 12.7 sec;   INR: 1.17 ratio         PTT - ( 25 Oct 2018 23:31 )  PTT:53.7 sec    PHYSICAL EXAM:  Gen: NAD, resting comfortably, conversive.   Cardiopulm: Non-labored breathing.   Ab: Soft, appropriately tender, nondistended.   Ext: RLE with 2+ pitting edema. DP palpable, PT dopplerable. Nontender to palpation. Full sensation to light touch in dorsum, toes, heels, lower extremity compartments. Strength intact. Able to wiggle toes. Dressing with SS drainage. LLE intact.     ASSESSMENT:   73M with recent spine surgery now s/p RLE embolectomy for acute RLE arterial occlusion.     PLAN:  -Pain control.   -Heparin gtt.   -Continue neuro/vascular checks q4 given recent spinal surgery now on A/C.   -CLD as tolerated, ADAT.   -Continue home meds.   -F/u AM labs.     Vascular Surgery Pager #[phone]

## 2018-10-25 NOTE — H&P ADULT - ATTENDING COMMENTS
Pt. was seen and examined. Agree with above. Plan d/w the team.  Pt. with h/o A.fib coumadin was stopped after recent spine surgery. Pt. p/w acute onset of R foot pain and numbness that started about 6h ago.   On exam: R foot is cool, decreased sensation in the R foot, motor is intact  palpable R fem/pop pulses, no palpable pedal pulses on the R, palpable pedal pulses on the L.     A/P: acute R limb ischemia 2/2 embolic event from underlying Afib  pt. needs emergency revascularization for limb salvage  hep was given after discussion wit NS team  Risks and benefits of the procedure were discussed with pt. and family and they agree to proceed. All questions were answered.

## 2018-10-25 NOTE — H&P ADULT - NSHPLABSRESULTS_GEN_ALL_CORE
T(C): 37.1 (10-25-18 @ 14:27), Max: 37.1 (10-25-18 @ 14:27)  HR: 55 (10-25-18 @ 14:27) (55 - 60)  BP: 129/57 (10-25-18 @ 14:27) (121/72 - 129/57)  RR: 17 (10-25-18 @ 14:27) (16 - 17)  SpO2: 95% (10-25-18 @ 14:27) (94% - 95%)    LABS:                        12.0   14.2  )-----------( 385      ( 25 Oct 2018 13:39 )             35.7     25 Oct 2018 13:39    139    |  95     |  23     ----------------------------<  131    4.0     |  31     |  0.90     Ca    9.7        25 Oct 2018 13:39    TPro  7.1    /  Alb  3.6    /  TBili  0.5    /  DBili  x      /  AST  27     /  ALT  26     /  AlkPhos  167    25 Oct 2018 13:39    PT/INR - ( 25 Oct 2018 13:39 )   PT: 12.1 sec;   INR: 1.12 ratio         PTT - ( 25 Oct 2018 13:39 )  PTT:29.0 sec

## 2018-10-25 NOTE — ED PROVIDER NOTE - CRITICAL CARE PROVIDED
additional history taking/consultation with other physicians/documentation/consult w/ pt's family directly relating to pts condition/interpretation of diagnostic studies/direct patient care (not related to procedure)

## 2018-10-25 NOTE — ED PROVIDER NOTE - MEDICAL DECISION MAKING DETAILS
74yo M pmhx HTN HLD CVA afib not currently on AC - p/w CC cold/painful leg concerning for acute arterial occlusion. Vascular has been consulted, CTA w/ runoff for RLE ordered, will draw preop labs.

## 2018-10-25 NOTE — ED PROVIDER NOTE - PROGRESS NOTE DETAILS
unable to obtain doppler DP and PT pulses on multiple attempts by myself and dr cr. currently weighing patient in anticipation of possible heparin gtt. spoke to vascular surgery who requests that heparin not be started until they see pt, they are about to see patient right now. - Vern Gregg MD surgery team at bedside evaluating patient. - Vern Gregg MD pt fully evaluated by vascular surgery who is booking pt for OR at this time. they are speaking to nsgy re whether it is ok to anticoagulate pt at this time and will call back ed once they know. - Vern Gregg MD

## 2018-10-25 NOTE — ED ADULT NURSE NOTE - OBJECTIVE STATEMENT
74 yo male A&OX3 presents to the ED with the c/o r sided leg pain and numbness to r leg from knee to foot. Pt states that he had spinal surgery in January. Pt recently spinal revision done on Friday and was discharged from the hospital on tues. Pt states that this morning he started having severe pain in the r leg followed by numbness in r leg. R leg has diminished sensation to touch, cool to touch, pt able to move toes and feet. Non palpable pedal pulses to r foot, pedal pulses also unable to be heard over doppler. L foot has + pedal pulses via palpation and doppler.  Pt received 5mg morphine via EMS, arrived to ED with  20 G IV in the L ac. + diff ambulating. MAEX4

## 2018-10-25 NOTE — H&P ADULT - ASSESSMENT
73M with atrial fibrillation off anticoagulation due to recent spine surgery (10/19/18), presenting with right lower extremity arterial occlusion below the knee.    - Admit to vascular surgery under Dr. Cerrato.  - Will start heparin drip with bolus. Risks and benefits of anticoagulation in setting of recent spine surgery discussed with patient and with neurosurgery team.  - Patient booked and consented for emergent right lower extremity thrombectomy, possible angiogram, possible fasciotomy.  - Patient seen and examined with vascular surgery fellow, Dr. Mckeon. 73M with atrial fibrillation off anticoagulation due to recent spine surgery (10/19/18), presenting with acute right lower extremity arterial occlusion.    - Admit to vascular surgery under Dr. Cerrato.  - Will start heparin drip with bolus. Risks and benefits of anticoagulation in setting of recent spine surgery discussed with patient and with neurosurgery team.  - Patient booked and consented for emergent right lower extremity thrombectomy, possible angiogram, possible fasciotomy.  - Patient seen and examined with vascular surgery fellow, Dr. Mckeon.

## 2018-10-25 NOTE — ED PROVIDER NOTE - OBJECTIVE STATEMENT
72yo M pmhx 72yo M pmhx afib (holding coumadin since s/p recent spine surgery), CVA in past, HTN p/w sudden onset RLE pain and numbness. Pt. explains pain started a couple of hours ago, he states he now feels as though his leg is going numb. Denies fever chills cp palpitations sob n/v/d.

## 2018-10-25 NOTE — ED ADULT NURSE NOTE - CHPI ED NUR SYMPTOMS NEG
no blurred vision/no numbness/no confusion/no change in level of consciousness/no fever/no dizziness/no nausea

## 2018-10-25 NOTE — BRIEF OPERATIVE NOTE - PROCEDURE
<<-----Click on this checkbox to enter Procedure Embolectomy of lower extremity  10/25/2018    Active  [id number]

## 2018-10-25 NOTE — CHART NOTE - NSCHARTNOTEFT_GEN_A_CORE
Discussed AC in the setting of recent spinal surgery with acute arterial embolus while off AC.    While there is no absolute neurosurgical contraindication to systemic anti coagulation, there does exist an increased risk of hemorrhage which can result in significant morbidity including but not limited to headache, seizure, stroke, paralysis, and in severe cases even death.    The risks and benefits of starting systemic anticoagulation must be considered carefully in the setting of the patients medical history and current clinical condition.    - Recommend obtaining follow up CT L spine after patient is therapeutic and to notify neurosurgery immediately with any changes in the patients neurologic exam

## 2018-10-25 NOTE — ED PROVIDER NOTE - PSH
History of cardioversion  for AF - "many years ago" - unsuccessful  History of lumbar spinal fusion  1/5/2018  S/P hip replacement, right  2014  S/P splenectomy  1975

## 2018-10-25 NOTE — ED ADULT NURSE NOTE - NSIMPLEMENTINTERV_GEN_ALL_ED
Implemented All Fall with Harm Risk Interventions:  [place] to call system. Call bell, personal items and telephone within reach. Instruct patient to call for assistance. Room bathroom lighting operational. Non-slip footwear when patient is off stretcher. Physically safe environment: no spills, clutter or unnecessary equipment. Stretcher in lowest position, wheels locked, appropriate side rails in place. Provide visual cue, wrist band, yellow gown, etc. Monitor gait and stability. Monitor for mental status changes and reorient to person, place, and time. Review medications for side effects contributing to fall risk. Reinforce activity limits and safety measures with patient and family. Provide visual clues: red socks.

## 2018-10-25 NOTE — ED PROVIDER NOTE - PMH
Atrial fibrillation  over 20 years  BPH (benign prostatic hyperplasia)    CVA (cerebral vascular accident)  1/2018 - attributed to no AC for 11 days preop/postop spine surgery - presented to ED with slurred speech, residual ST memory loss, per pt  Exposure to toxin  9/11   GERD (gastroesophageal reflux disease)    Hodgkin lymphoma  1975  HTN (hypertension)    MOE (obstructive sleep apnea)  positive sleep study many years ago, was recommended to use CPAP, patient non-compliant  Osteoarthritis    Spinal stenosis  L3-L4  Spondylolisthesis

## 2018-10-25 NOTE — ED PROVIDER NOTE - ATTENDING CONTRIBUTION TO CARE
73M pmh afib, s/p L3/4 surgery 10/19, has been off coumadin since prior to surgery (only on asa 81), hx cva 2/2 holding a/c when had previous spine surgery, presents with RLE pain, numbness, coolness. started this morning, noted pain to R foot, now extending proximally up leg, with progressively worsening numbness and noted coolness to skin. No trauma. States that sx may be somewhat improving currently but continues to have pain. denies f/c, n/v/d, cp, sob, abd pain, or other complaints.    PE: NAD, NCAT, MMM, Trachea midline, Normal conjunctiva, lungs CTAB, S1/S2 RRR, Normal perfusion, 2+ radial pulses bilat, Abdomen Soft, NTND, No rebound/guarding.  RLE with coolness from R knee distally, +pallor, decreased sensation relative to LLE (although is some present), 5/5 strength dorsiflexion/plantarflexion, +delayed cap refill, unable to palpate or obtain pulses to R DP or PT via doppler.    Presentation very concerning for acute arterial occlusion. No traumatic injury. Has been off a/c. Stat surgery consult called and concern for occlusion with need for urgent intervention discussed. Discuss initiating heparin gtt with vascular surgery, must weigh risks/benefits given recent procedure. CTA RLE. Pre-op labs. Will require admission. - Vern Gregg MD

## 2018-10-25 NOTE — H&P ADULT - HISTORY OF PRESENT ILLNESS
Patient is a 73 year old man with PMH of atrial fibrillation, HTN, Hodgkin's lymphoma s/p splenectomy (1975), hip replacement, and MOE presented to the ED with right lower extremity pain and decreased sensation since 10am today. He recently underwent spine surgery on 10/19 for L3/L4 hardware revision and has been off anticoagulation since 10/18. He takes Coumadin and was bridged with Lovenox. He was discharged on 10/23 on aspirin and has not yet restarted anticoagulation. Of note, he had a CVA in Jan 2018 while off anticoagulation for spine surgery.     He states at 10am he began having pain from his right mid-thigh extending to his foot, as well as decreased sensation and weakness. He has never had symptoms like this in the past. After arrival to the ED his symptoms improved slightly.     He denies chest pain, shortness of breath, difficulty speaking, upper extremity deficits, fevers, or chills. In the ED his vital signs are within normal limits and he is hemodynamically normal.

## 2018-10-25 NOTE — H&P ADULT - NSHPPHYSICALEXAM_GEN_ALL_CORE
General: Alert, NAD, A+Ox3  HEENT: NC/AT, no asymmetry, no scleral icterus  Neck: Soft, supple  Cardio: Atrial fibrillation on EKG  Resp: Airway patent, unlabored breathing  GI/Abd: Soft, NT/ND, no masses  Vascular:   RLE cool below knee, pale, PT signal present, decreased sensation below ankle, popliteal and femoral pulses palpable  LLE DP/PT pulses palpable, foot warm General: Alert, NAD, A+Ox3  HEENT: NC/AT, no asymmetry, no scleral icterus  Neck: Soft, supple  Cardio: Atrial fibrillation on EKG  Resp: Airway patent, unlabored breathing  GI/Abd: Soft, NT/ND, no masses  Vascular:   RLE cool below knee, pale, PT signal present, DP signal not present, decreased sensation below ankle, popliteal and femoral pulses palpable  LLE DP pulse palpable, PT signal present, foot warm

## 2018-10-26 ENCOUNTER — TRANSCRIPTION ENCOUNTER (OUTPATIENT)
Age: 73
End: 2018-10-26

## 2018-10-26 LAB
ANION GAP SERPL CALC-SCNC: 12 MMOL/L — SIGNIFICANT CHANGE UP (ref 5–17)
APTT BLD: 110.3 SEC — HIGH (ref 27.5–37.4)
APTT BLD: 89 SEC — HIGH (ref 27.5–37.4)
BUN SERPL-MCNC: 22 MG/DL — SIGNIFICANT CHANGE UP (ref 7–23)
CALCIUM SERPL-MCNC: 8.9 MG/DL — SIGNIFICANT CHANGE UP (ref 8.4–10.5)
CHLORIDE SERPL-SCNC: 96 MMOL/L — SIGNIFICANT CHANGE UP (ref 96–108)
CK SERPL-CCNC: 52 U/L — SIGNIFICANT CHANGE UP (ref 30–200)
CO2 SERPL-SCNC: 30 MMOL/L — SIGNIFICANT CHANGE UP (ref 22–31)
CREAT SERPL-MCNC: 0.71 MG/DL — SIGNIFICANT CHANGE UP (ref 0.5–1.3)
GLUCOSE SERPL-MCNC: 156 MG/DL — HIGH (ref 70–99)
HCT VFR BLD CALC: 31.9 % — LOW (ref 39–50)
HGB BLD-MCNC: 10.3 G/DL — LOW (ref 13–17)
INR BLD: 1.15 RATIO — SIGNIFICANT CHANGE UP (ref 0.88–1.16)
MAGNESIUM SERPL-MCNC: 2.2 MG/DL — SIGNIFICANT CHANGE UP (ref 1.6–2.6)
MCHC RBC-ENTMCNC: 31.7 PG — SIGNIFICANT CHANGE UP (ref 27–34)
MCHC RBC-ENTMCNC: 32.3 GM/DL — SIGNIFICANT CHANGE UP (ref 32–36)
MCV RBC AUTO: 98.3 FL — SIGNIFICANT CHANGE UP (ref 80–100)
PLATELET # BLD AUTO: 385 K/UL — SIGNIFICANT CHANGE UP (ref 150–400)
POTASSIUM SERPL-MCNC: 4.2 MMOL/L — SIGNIFICANT CHANGE UP (ref 3.5–5.3)
POTASSIUM SERPL-SCNC: 4.2 MMOL/L — SIGNIFICANT CHANGE UP (ref 3.5–5.3)
PROTHROM AB SERPL-ACNC: 12.5 SEC — SIGNIFICANT CHANGE UP (ref 9.8–12.7)
RBC # BLD: 3.25 M/UL — LOW (ref 4.2–5.8)
RBC # FLD: 13.5 % — SIGNIFICANT CHANGE UP (ref 10.3–14.5)
SODIUM SERPL-SCNC: 138 MMOL/L — SIGNIFICANT CHANGE UP (ref 135–145)
WBC # BLD: 17 K/UL — HIGH (ref 3.8–10.5)
WBC # FLD AUTO: 17 K/UL — HIGH (ref 3.8–10.5)

## 2018-10-26 PROCEDURE — 72131 CT LUMBAR SPINE W/O DYE: CPT | Mod: 26

## 2018-10-26 RX ORDER — HEPARIN SODIUM 5000 [USP'U]/ML
INJECTION INTRAVENOUS; SUBCUTANEOUS
Qty: 25000 | Refills: 0 | Status: DISCONTINUED | OUTPATIENT
Start: 2018-10-26 | End: 2018-10-26

## 2018-10-26 RX ORDER — ACETAMINOPHEN 500 MG
1000 TABLET ORAL ONCE
Qty: 0 | Refills: 0 | Status: DISCONTINUED | OUTPATIENT
Start: 2018-10-26 | End: 2018-10-26

## 2018-10-26 RX ORDER — HEPARIN SODIUM 5000 [USP'U]/ML
7500 INJECTION INTRAVENOUS; SUBCUTANEOUS EVERY 6 HOURS
Qty: 0 | Refills: 0 | Status: DISCONTINUED | OUTPATIENT
Start: 2018-10-26 | End: 2018-10-27

## 2018-10-26 RX ORDER — OXYCODONE HYDROCHLORIDE 5 MG/1
5 TABLET ORAL EVERY 4 HOURS
Qty: 0 | Refills: 0 | Status: DISCONTINUED | OUTPATIENT
Start: 2018-10-26 | End: 2018-10-27

## 2018-10-26 RX ORDER — ACETAMINOPHEN 500 MG
1000 TABLET ORAL ONCE
Qty: 0 | Refills: 0 | Status: COMPLETED | OUTPATIENT
Start: 2018-10-26 | End: 2018-10-26

## 2018-10-26 RX ORDER — ASPIRIN/CALCIUM CARB/MAGNESIUM 324 MG
81 TABLET ORAL DAILY
Qty: 0 | Refills: 0 | Status: DISCONTINUED | OUTPATIENT
Start: 2018-10-26 | End: 2018-10-27

## 2018-10-26 RX ORDER — HEPARIN SODIUM 5000 [USP'U]/ML
1400 INJECTION INTRAVENOUS; SUBCUTANEOUS
Qty: 25000 | Refills: 0 | Status: DISCONTINUED | OUTPATIENT
Start: 2018-10-26 | End: 2018-10-27

## 2018-10-26 RX ORDER — HEPARIN SODIUM 5000 [USP'U]/ML
3500 INJECTION INTRAVENOUS; SUBCUTANEOUS EVERY 6 HOURS
Qty: 0 | Refills: 0 | Status: DISCONTINUED | OUTPATIENT
Start: 2018-10-26 | End: 2018-10-27

## 2018-10-26 RX ORDER — WARFARIN SODIUM 2.5 MG/1
2.5 TABLET ORAL ONCE
Qty: 0 | Refills: 0 | Status: COMPLETED | OUTPATIENT
Start: 2018-10-26 | End: 2018-10-26

## 2018-10-26 RX ORDER — ACETAMINOPHEN 500 MG
650 TABLET ORAL EVERY 6 HOURS
Qty: 0 | Refills: 0 | Status: DISCONTINUED | OUTPATIENT
Start: 2018-10-26 | End: 2018-10-27

## 2018-10-26 RX ORDER — HYDROMORPHONE HYDROCHLORIDE 2 MG/ML
0.5 INJECTION INTRAMUSCULAR; INTRAVENOUS; SUBCUTANEOUS ONCE
Qty: 0 | Refills: 0 | Status: DISCONTINUED | OUTPATIENT
Start: 2018-10-26 | End: 2018-10-26

## 2018-10-26 RX ADMIN — OXYCODONE HYDROCHLORIDE 5 MILLIGRAM(S): 5 TABLET ORAL at 17:30

## 2018-10-26 RX ADMIN — ATENOLOL 25 MILLIGRAM(S): 25 TABLET ORAL at 05:48

## 2018-10-26 RX ADMIN — OXYCODONE HYDROCHLORIDE 5 MILLIGRAM(S): 5 TABLET ORAL at 12:00

## 2018-10-26 RX ADMIN — OXYCODONE HYDROCHLORIDE 5 MILLIGRAM(S): 5 TABLET ORAL at 16:59

## 2018-10-26 RX ADMIN — SIMVASTATIN 20 MILLIGRAM(S): 20 TABLET, FILM COATED ORAL at 23:01

## 2018-10-26 RX ADMIN — LISINOPRIL 40 MILLIGRAM(S): 2.5 TABLET ORAL at 10:03

## 2018-10-26 RX ADMIN — WARFARIN SODIUM 2.5 MILLIGRAM(S): 2.5 TABLET ORAL at 23:00

## 2018-10-26 RX ADMIN — Medication 1 SPRAY(S): at 12:14

## 2018-10-26 RX ADMIN — SODIUM CHLORIDE 50 MILLILITER(S): 9 INJECTION, SOLUTION INTRAVENOUS at 00:30

## 2018-10-26 RX ADMIN — Medication 400 MILLIGRAM(S): at 03:23

## 2018-10-26 RX ADMIN — HEPARIN SODIUM 1700 UNIT(S)/HR: 5000 INJECTION INTRAVENOUS; SUBCUTANEOUS at 01:31

## 2018-10-26 RX ADMIN — PANTOPRAZOLE SODIUM 40 MILLIGRAM(S): 20 TABLET, DELAYED RELEASE ORAL at 07:22

## 2018-10-26 RX ADMIN — Medication 60 MILLIGRAM(S): at 05:49

## 2018-10-26 RX ADMIN — HYDROMORPHONE HYDROCHLORIDE 0.5 MILLIGRAM(S): 2 INJECTION INTRAMUSCULAR; INTRAVENOUS; SUBCUTANEOUS at 12:45

## 2018-10-26 RX ADMIN — Medication 1000 MILLIGRAM(S): at 04:00

## 2018-10-26 RX ADMIN — HYDROMORPHONE HYDROCHLORIDE 0.5 MILLIGRAM(S): 2 INJECTION INTRAMUSCULAR; INTRAVENOUS; SUBCUTANEOUS at 12:38

## 2018-10-26 RX ADMIN — HYDROMORPHONE HYDROCHLORIDE 0.5 MILLIGRAM(S): 2 INJECTION INTRAMUSCULAR; INTRAVENOUS; SUBCUTANEOUS at 05:43

## 2018-10-26 RX ADMIN — OXYCODONE HYDROCHLORIDE 5 MILLIGRAM(S): 5 TABLET ORAL at 04:58

## 2018-10-26 RX ADMIN — HYDROMORPHONE HYDROCHLORIDE 0.5 MILLIGRAM(S): 2 INJECTION INTRAMUSCULAR; INTRAVENOUS; SUBCUTANEOUS at 06:06

## 2018-10-26 RX ADMIN — HEPARIN SODIUM 1500 UNIT(S)/HR: 5000 INJECTION INTRAVENOUS; SUBCUTANEOUS at 19:47

## 2018-10-26 RX ADMIN — OXYCODONE HYDROCHLORIDE 5 MILLIGRAM(S): 5 TABLET ORAL at 04:14

## 2018-10-26 RX ADMIN — SODIUM CHLORIDE 50 MILLILITER(S): 9 INJECTION, SOLUTION INTRAVENOUS at 16:57

## 2018-10-26 RX ADMIN — OXYCODONE HYDROCHLORIDE 5 MILLIGRAM(S): 5 TABLET ORAL at 11:11

## 2018-10-26 NOTE — PROGRESS NOTE ADULT - ASSESSMENT
73M s/p recent spine surgery no s/p RLE embolectomy for acute RLE arterial occlusion.     -Pain control.   -Continue neurovascular checks.   --Appreciate neurosurgery following.   -Will continue heparin gtt.  -CLD, possible reg diet.   -Continue home meds.   -Monitor and replete electrolytes.     Vascular Surgery Pager #[phone] 73M s/p recent spine surgery no s/p RLE embolectomy for acute RLE arterial occlusion POD1    -Pain control.   -Continue neurovascular checks.   --Appreciate neurosurgery following.   -Will continue heparin gtt.  -Trend CPK  -NPO   -Continue home meds.   -Monitor and replete electrolytes.     Vascular Surgery Pager #[phone]

## 2018-10-26 NOTE — PROGRESS NOTE ADULT - SUBJECTIVE AND OBJECTIVE BOX
Surgery Progress Note     Subjective/24hour Events:   Patient seen and examined.   Yesterday for LLE embolectomy.   No acute events overnight.   Pain controlled.   Tolerating CLD, no N/V.     Vital Signs:  Vital Signs Last 24 Hrs  T(C): 36.5 (26 Oct 2018 00:30), Max: 37.1 (25 Oct 2018 14:27)  T(F): 97.7 (26 Oct 2018 00:30), Max: 98.7 (25 Oct 2018 14:27)  HR: 79 (26 Oct 2018 03:23) (55 - 99)  BP: 147/65 (26 Oct 2018 02:00) (121/72 - 166/84)  BP(mean): 93 (26 Oct 2018 02:00) (86 - 111)  RR: 14 (26 Oct 2018 03:23) (14 - 18)  SpO2: 100% (26 Oct 2018 03:23) (93% - 100%)    CAPILLARY BLOOD GLUCOSE          I&O's Detail    25 Oct 2018 07:01  -  26 Oct 2018 03:30  --------------------------------------------------------  IN:    heparin  Infusion.: 51 mL    IV PiggyBack: 100 mL    lactated ringers.: 450 mL  Total IN: 601 mL    OUT:    Indwelling Catheter - Urethral: 605 mL  Total OUT: 605 mL    Total NET: -4 mL          MEDICATIONS  (STANDING):  acetaminophen   Tablet .. 650 milliGRAM(s) Oral every 6 hours  ATENolol  Tablet 25 milliGRAM(s) Oral daily  fluticasone propionate 50 MICROgram(s)/spray Nasal Spray 1 Spray(s) Both Nostrils daily  heparin  Infusion.  Unit(s)/Hr (17 mL/Hr) IV Continuous <Continuous>  lactated ringers. 1000 milliLiter(s) (50 mL/Hr) IV Continuous <Continuous>  lisinopril 40 milliGRAM(s) Oral daily  NIFEdipine XL 60 milliGRAM(s) Oral daily  pantoprazole    Tablet 40 milliGRAM(s) Oral before breakfast  simvastatin 20 milliGRAM(s) Oral at bedtime    MEDICATIONS  (PRN):  heparin  Injectable 7500 Unit(s) IV Push every 6 hours PRN For aPTT less than 40  heparin  Injectable 3500 Unit(s) IV Push every 6 hours PRN For aPTT between 40 - 57  HYDROmorphone  Injectable 0.5 milliGRAM(s) IV Push every 10 minutes PRN Severe Pain (7 - 10)  HYDROmorphone  Injectable 0.25 milliGRAM(s) IV Push every 10 minutes PRN Moderate Pain (4 - 6)  ondansetron Injectable 4 milliGRAM(s) IV Push once PRN Nausea and/or Vomiting  oxyCODONE    IR 5 milliGRAM(s) Oral every 4 hours PRN Moderate Pain (4 - 6)      Physical Exam:  Gen: NAD, laying comfortably in bed, converses easily.   Lungs: Non labored breathing.   Ab: Soft, nontender, nondistended.   Ext: RLE with 2+ pitting edema. DP palpable, PT dopplerable. Nontender to palpation, SILT, FROM. Dressing with SS drainage. LLE intact.     Labs:    10-25    137  |  95<L>  |  25<H>  ----------------------------<  142<H>  3.9   |  30  |  0.86    Ca    9.0      25 Oct 2018 20:18    TPro  7.1  /  Alb  3.6  /  TBili  0.5  /  DBili  x   /  AST  27  /  ALT  26  /  AlkPhos  167<H>  10-25    LIVER FUNCTIONS - ( 25 Oct 2018 13:39 )  Alb: 3.6 g/dL / Pro: 7.1 g/dL / ALK PHOS: 167 U/L / ALT: 26 U/L / AST: 27 U/L / GGT: x                                 10.4   16.5  )-----------( 375      ( 25 Oct 2018 23:55 )             31.4     PT/INR - ( 25 Oct 2018 23:31 )   PT: 12.7 sec;   INR: 1.17 ratio         PTT - ( 25 Oct 2018 23:31 )  PTT:53.7 sec    Imaging: Surgery Progress Note     Subjective/24hour Events:   Patient seen and examined in PACU  OR Yesterday for LLE embolectomy.   No acute events overnight.   Pain controlled, but increased in RLE   Tolerating CLD, no N/V.     Vital Signs:  Vital Signs Last 24 Hrs  T(C): 36.5 (26 Oct 2018 00:30), Max: 37.1 (25 Oct 2018 14:27)  T(F): 97.7 (26 Oct 2018 00:30), Max: 98.7 (25 Oct 2018 14:27)  HR: 79 (26 Oct 2018 03:23) (55 - 99)  BP: 147/65 (26 Oct 2018 02:00) (121/72 - 166/84)  BP(mean): 93 (26 Oct 2018 02:00) (86 - 111)  RR: 14 (26 Oct 2018 03:23) (14 - 18)  SpO2: 100% (26 Oct 2018 03:23) (93% - 100%)    CAPILLARY BLOOD GLUCOSE          I&O's Detail    25 Oct 2018 07:01  -  26 Oct 2018 03:30  --------------------------------------------------------  IN:    heparin  Infusion.: 51 mL    IV PiggyBack: 100 mL    lactated ringers.: 450 mL  Total IN: 601 mL    OUT:    Indwelling Catheter - Urethral: 605 mL  Total OUT: 605 mL    Total NET: -4 mL          MEDICATIONS  (STANDING):  acetaminophen   Tablet .. 650 milliGRAM(s) Oral every 6 hours  ATENolol  Tablet 25 milliGRAM(s) Oral daily  fluticasone propionate 50 MICROgram(s)/spray Nasal Spray 1 Spray(s) Both Nostrils daily  heparin  Infusion.  Unit(s)/Hr (17 mL/Hr) IV Continuous <Continuous>  lactated ringers. 1000 milliLiter(s) (50 mL/Hr) IV Continuous <Continuous>  lisinopril 40 milliGRAM(s) Oral daily  NIFEdipine XL 60 milliGRAM(s) Oral daily  pantoprazole    Tablet 40 milliGRAM(s) Oral before breakfast  simvastatin 20 milliGRAM(s) Oral at bedtime    MEDICATIONS  (PRN):  heparin  Injectable 7500 Unit(s) IV Push every 6 hours PRN For aPTT less than 40  heparin  Injectable 3500 Unit(s) IV Push every 6 hours PRN For aPTT between 40 - 57  HYDROmorphone  Injectable 0.5 milliGRAM(s) IV Push every 10 minutes PRN Severe Pain (7 - 10)  HYDROmorphone  Injectable 0.25 milliGRAM(s) IV Push every 10 minutes PRN Moderate Pain (4 - 6)  ondansetron Injectable 4 milliGRAM(s) IV Push once PRN Nausea and/or Vomiting  oxyCODONE    IR 5 milliGRAM(s) Oral every 4 hours PRN Moderate Pain (4 - 6)      Physical Exam:  Gen: NAD, laying comfortably in bed, converses easily.   Lungs: Non labored breathing.   Ab: Soft, nontender, nondistended.   Ext: RLE with 2+ pitting edema. DP palpable, PT dopplerable. Tender to palpation in medial compartment, no pain with passive flexion. SILT, FROM. Dressing with SS drainage. Incisions c/d/i. LLE intact.     Labs:    10-25    137  |  95<L>  |  25<H>  ----------------------------<  142<H>  3.9   |  30  |  0.86    Ca    9.0      25 Oct 2018 20:18    TPro  7.1  /  Alb  3.6  /  TBili  0.5  /  DBili  x   /  AST  27  /  ALT  26  /  AlkPhos  167<H>  10-25    LIVER FUNCTIONS - ( 25 Oct 2018 13:39 )  Alb: 3.6 g/dL / Pro: 7.1 g/dL / ALK PHOS: 167 U/L / ALT: 26 U/L / AST: 27 U/L / GGT: x                                 10.4   16.5  )-----------( 375      ( 25 Oct 2018 23:55 )             31.4     PT/INR - ( 25 Oct 2018 23:31 )   PT: 12.7 sec;   INR: 1.17 ratio         PTT - ( 25 Oct 2018 23:31 )  PTT:53.7 sec    Imaging:

## 2018-10-27 LAB
ANION GAP SERPL CALC-SCNC: 12 MMOL/L — SIGNIFICANT CHANGE UP (ref 5–17)
APTT BLD: 68 SEC — HIGH (ref 27.5–37.4)
APTT BLD: 98.7 SEC — HIGH (ref 27.5–37.4)
BUN SERPL-MCNC: 19 MG/DL — SIGNIFICANT CHANGE UP (ref 7–23)
CALCIUM SERPL-MCNC: 9 MG/DL — SIGNIFICANT CHANGE UP (ref 8.4–10.5)
CHLORIDE SERPL-SCNC: 92 MMOL/L — LOW (ref 96–108)
CK SERPL-CCNC: 155 U/L — SIGNIFICANT CHANGE UP (ref 30–200)
CK SERPL-CCNC: 194 U/L — SIGNIFICANT CHANGE UP (ref 30–200)
CO2 SERPL-SCNC: 30 MMOL/L — SIGNIFICANT CHANGE UP (ref 22–31)
CREAT SERPL-MCNC: 0.66 MG/DL — SIGNIFICANT CHANGE UP (ref 0.5–1.3)
GLUCOSE SERPL-MCNC: 124 MG/DL — HIGH (ref 70–99)
HCT VFR BLD CALC: 28 % — LOW (ref 39–50)
HGB BLD-MCNC: 9.2 G/DL — LOW (ref 13–17)
INR BLD: 1.22 RATIO — HIGH (ref 0.88–1.16)
MAGNESIUM SERPL-MCNC: 1.8 MG/DL — SIGNIFICANT CHANGE UP (ref 1.6–2.6)
MCHC RBC-ENTMCNC: 32.2 PG — SIGNIFICANT CHANGE UP (ref 27–34)
MCHC RBC-ENTMCNC: 32.7 GM/DL — SIGNIFICANT CHANGE UP (ref 32–36)
MCV RBC AUTO: 98.3 FL — SIGNIFICANT CHANGE UP (ref 80–100)
PHOSPHATE SERPL-MCNC: 2.5 MG/DL — SIGNIFICANT CHANGE UP (ref 2.5–4.5)
PLATELET # BLD AUTO: 351 K/UL — SIGNIFICANT CHANGE UP (ref 150–400)
POTASSIUM SERPL-MCNC: 3.7 MMOL/L — SIGNIFICANT CHANGE UP (ref 3.5–5.3)
POTASSIUM SERPL-SCNC: 3.7 MMOL/L — SIGNIFICANT CHANGE UP (ref 3.5–5.3)
PROTHROM AB SERPL-ACNC: 13.2 SEC — HIGH (ref 9.8–12.7)
RBC # BLD: 2.85 M/UL — LOW (ref 4.2–5.8)
RBC # FLD: 13.4 % — SIGNIFICANT CHANGE UP (ref 10.3–14.5)
SODIUM SERPL-SCNC: 134 MMOL/L — LOW (ref 135–145)
WBC # BLD: 18.8 K/UL — HIGH (ref 3.8–10.5)
WBC # FLD AUTO: 18.8 K/UL — HIGH (ref 3.8–10.5)

## 2018-10-27 PROCEDURE — 27602 DECOMPRESSION OF LOWER LEG: CPT | Mod: RT

## 2018-10-27 RX ORDER — ASPIRIN/CALCIUM CARB/MAGNESIUM 324 MG
81 TABLET ORAL DAILY
Qty: 0 | Refills: 0 | Status: DISCONTINUED | OUTPATIENT
Start: 2018-10-27 | End: 2018-11-05

## 2018-10-27 RX ORDER — ONDANSETRON 8 MG/1
4 TABLET, FILM COATED ORAL ONCE
Qty: 0 | Refills: 0 | Status: DISCONTINUED | OUTPATIENT
Start: 2018-10-27 | End: 2018-10-27

## 2018-10-27 RX ORDER — PANTOPRAZOLE SODIUM 20 MG/1
40 TABLET, DELAYED RELEASE ORAL
Qty: 0 | Refills: 0 | Status: DISCONTINUED | OUTPATIENT
Start: 2018-10-27 | End: 2018-11-05

## 2018-10-27 RX ORDER — FLUTICASONE PROPIONATE 50 MCG
1 SPRAY, SUSPENSION NASAL DAILY
Qty: 0 | Refills: 0 | Status: DISCONTINUED | OUTPATIENT
Start: 2018-10-27 | End: 2018-11-05

## 2018-10-27 RX ORDER — WARFARIN SODIUM 2.5 MG/1
2.5 TABLET ORAL ONCE
Qty: 0 | Refills: 0 | Status: DISCONTINUED | OUTPATIENT
Start: 2018-10-27 | End: 2018-10-27

## 2018-10-27 RX ORDER — SIMVASTATIN 20 MG/1
20 TABLET, FILM COATED ORAL AT BEDTIME
Qty: 0 | Refills: 0 | Status: DISCONTINUED | OUTPATIENT
Start: 2018-10-27 | End: 2018-11-05

## 2018-10-27 RX ORDER — OXYCODONE HYDROCHLORIDE 5 MG/1
5 TABLET ORAL EVERY 4 HOURS
Qty: 0 | Refills: 0 | Status: DISCONTINUED | OUTPATIENT
Start: 2018-10-27 | End: 2018-11-02

## 2018-10-27 RX ORDER — HYDROMORPHONE HYDROCHLORIDE 2 MG/ML
0.5 INJECTION INTRAMUSCULAR; INTRAVENOUS; SUBCUTANEOUS
Qty: 0 | Refills: 0 | Status: DISCONTINUED | OUTPATIENT
Start: 2018-10-27 | End: 2018-10-27

## 2018-10-27 RX ORDER — SODIUM CHLORIDE 9 MG/ML
1000 INJECTION INTRAMUSCULAR; INTRAVENOUS; SUBCUTANEOUS
Qty: 0 | Refills: 0 | Status: DISCONTINUED | OUTPATIENT
Start: 2018-10-27 | End: 2018-10-28

## 2018-10-27 RX ORDER — HEPARIN SODIUM 5000 [USP'U]/ML
1400 INJECTION INTRAVENOUS; SUBCUTANEOUS
Qty: 25000 | Refills: 0 | Status: DISCONTINUED | OUTPATIENT
Start: 2018-10-28 | End: 2018-10-28

## 2018-10-27 RX ORDER — NIFEDIPINE 30 MG
60 TABLET, EXTENDED RELEASE 24 HR ORAL DAILY
Qty: 0 | Refills: 0 | Status: DISCONTINUED | OUTPATIENT
Start: 2018-10-27 | End: 2018-11-05

## 2018-10-27 RX ORDER — LISINOPRIL 2.5 MG/1
40 TABLET ORAL DAILY
Qty: 0 | Refills: 0 | Status: DISCONTINUED | OUTPATIENT
Start: 2018-10-27 | End: 2018-11-03

## 2018-10-27 RX ORDER — ATENOLOL 25 MG/1
25 TABLET ORAL DAILY
Qty: 0 | Refills: 0 | Status: DISCONTINUED | OUTPATIENT
Start: 2018-10-27 | End: 2018-11-05

## 2018-10-27 RX ADMIN — OXYCODONE HYDROCHLORIDE 5 MILLIGRAM(S): 5 TABLET ORAL at 01:11

## 2018-10-27 RX ADMIN — SODIUM CHLORIDE 50 MILLILITER(S): 9 INJECTION INTRAMUSCULAR; INTRAVENOUS; SUBCUTANEOUS at 21:03

## 2018-10-27 RX ADMIN — Medication 81 MILLIGRAM(S): at 12:07

## 2018-10-27 RX ADMIN — Medication 1 SPRAY(S): at 12:06

## 2018-10-27 RX ADMIN — PANTOPRAZOLE SODIUM 40 MILLIGRAM(S): 20 TABLET, DELAYED RELEASE ORAL at 06:44

## 2018-10-27 RX ADMIN — OXYCODONE HYDROCHLORIDE 5 MILLIGRAM(S): 5 TABLET ORAL at 12:12

## 2018-10-27 RX ADMIN — OXYCODONE HYDROCHLORIDE 5 MILLIGRAM(S): 5 TABLET ORAL at 02:11

## 2018-10-27 RX ADMIN — HEPARIN SODIUM 1400 UNIT(S)/HR: 5000 INJECTION INTRAVENOUS; SUBCUTANEOUS at 02:19

## 2018-10-27 RX ADMIN — OXYCODONE HYDROCHLORIDE 5 MILLIGRAM(S): 5 TABLET ORAL at 15:39

## 2018-10-27 RX ADMIN — Medication 60 MILLIGRAM(S): at 05:43

## 2018-10-27 RX ADMIN — SIMVASTATIN 20 MILLIGRAM(S): 20 TABLET, FILM COATED ORAL at 23:14

## 2018-10-27 RX ADMIN — OXYCODONE HYDROCHLORIDE 5 MILLIGRAM(S): 5 TABLET ORAL at 06:43

## 2018-10-27 RX ADMIN — ATENOLOL 25 MILLIGRAM(S): 25 TABLET ORAL at 05:43

## 2018-10-27 RX ADMIN — LISINOPRIL 40 MILLIGRAM(S): 2.5 TABLET ORAL at 05:44

## 2018-10-27 RX ADMIN — OXYCODONE HYDROCHLORIDE 5 MILLIGRAM(S): 5 TABLET ORAL at 16:30

## 2018-10-27 RX ADMIN — HEPARIN SODIUM 1400 UNIT(S)/HR: 5000 INJECTION INTRAVENOUS; SUBCUTANEOUS at 09:11

## 2018-10-27 RX ADMIN — OXYCODONE HYDROCHLORIDE 5 MILLIGRAM(S): 5 TABLET ORAL at 07:44

## 2018-10-27 RX ADMIN — OXYCODONE HYDROCHLORIDE 5 MILLIGRAM(S): 5 TABLET ORAL at 13:00

## 2018-10-27 NOTE — PROGRESS NOTE ADULT - ASSESSMENT
ASSESSMENT: 73M with recent spine surgery now s/p RLE embolectomy for acute RLE arterial occlusion    PLAN:  -- f/u CT L spine   -- Bridge to coumadin   -- Pain control  -- discontinue holguin  -- Reg diet  -- OOB with PT  -- Elevate RLE   -- ACE bandage RLE     Vascular Surgery  [phone] ASSESSMENT: 73M with recent spine surgery now s/p RLE embolectomy for acute RLE arterial occlusion    PLAN:  -- f/u CT L spine   -- Bridge to coumadin   -- Pain control  -- f/u afternoon BMP   -- f/u CPK  -- f/u PTT  -- Reg diet  -- OOB with PT  -- Elevate RLE   -- ACE bandage RLE     Vascular Surgery  [phone]

## 2018-10-27 NOTE — PROGRESS NOTE ADULT - SUBJECTIVE AND OBJECTIVE BOX
Patient seen and examined at bedside.    T(C): 36.8 (10-27-18 @ 09:28), Max: 37.3 (10-26-18 @ 13:00)  HR: 88 (10-27-18 @ 09:28) (64 - 96)  BP: 164/74 (10-27-18 @ 09:28) (131/62 - 166/76)  RR: 18 (10-27-18 @ 09:28) (16 - 22)  SpO2: 98% (10-27-18 @ 09:28) (93% - 98%)  Wt(kg): --    PHYSICAL EXAM:    Constitutional: No Acute Distress     Neurological: AOx3, Following Commands    Motor exam:          Upper extremity                         Delt     Bicep     Tricep    HG                                                 R         5/5        5/5        5/5       5/5                                               L          5/5        5/5        5/5       5/5          Lower extremity                        HF         KF        KE       DF         PF                                                  R        5/5        5/5        5/5       5/5         5/5                                               L         5/5        5/5       5/5       5/5          5/5                                                 Sensation: [X] intact to light touch  [] decreased:     No clonus    Incision: C/D/I

## 2018-10-27 NOTE — PROGRESS NOTE ADULT - ASSESSMENT
73 yr old M ith h/o Hodgkin's Lymphoma (1970's), HTN, atrial fibrillation, CVA (1/10/18 after holding AC 11 days), L3-L4 spinal stenosis with spondylolisthesis s/p surgery developed arterial embolous s/p thrombectomy.     Plan:  - Q4 neurochecks  Please notify neurosurgery with any exam change

## 2018-10-28 LAB
ANION GAP SERPL CALC-SCNC: 8 MMOL/L — SIGNIFICANT CHANGE UP (ref 5–17)
APTT BLD: 56.2 SEC — HIGH (ref 27.5–37.4)
APTT BLD: 78.4 SEC — HIGH (ref 27.5–37.4)
BASOPHILS # BLD AUTO: 0.2 K/UL — SIGNIFICANT CHANGE UP (ref 0–0.2)
BASOPHILS NFR BLD AUTO: 1.2 % — SIGNIFICANT CHANGE UP (ref 0–2)
BUN SERPL-MCNC: 14 MG/DL — SIGNIFICANT CHANGE UP (ref 7–23)
CALCIUM SERPL-MCNC: 9 MG/DL — SIGNIFICANT CHANGE UP (ref 8.4–10.5)
CHLORIDE SERPL-SCNC: 97 MMOL/L — SIGNIFICANT CHANGE UP (ref 96–108)
CK SERPL-CCNC: 440 U/L — HIGH (ref 30–200)
CO2 SERPL-SCNC: 32 MMOL/L — HIGH (ref 22–31)
CREAT SERPL-MCNC: 0.64 MG/DL — SIGNIFICANT CHANGE UP (ref 0.5–1.3)
EOSINOPHIL # BLD AUTO: 0 K/UL — SIGNIFICANT CHANGE UP (ref 0–0.5)
EOSINOPHIL NFR BLD AUTO: 0.2 % — SIGNIFICANT CHANGE UP (ref 0–6)
GLUCOSE SERPL-MCNC: 145 MG/DL — HIGH (ref 70–99)
HCT VFR BLD CALC: 25.3 % — LOW (ref 39–50)
HCT VFR BLD CALC: 26.1 % — LOW (ref 39–50)
HGB BLD-MCNC: 8.2 G/DL — LOW (ref 13–17)
HGB BLD-MCNC: 8.6 G/DL — LOW (ref 13–17)
LYMPHOCYTES # BLD AUTO: 1.7 K/UL — SIGNIFICANT CHANGE UP (ref 1–3.3)
LYMPHOCYTES # BLD AUTO: 10 % — LOW (ref 13–44)
MAGNESIUM SERPL-MCNC: 2.1 MG/DL — SIGNIFICANT CHANGE UP (ref 1.6–2.6)
MCHC RBC-ENTMCNC: 32.2 PG — SIGNIFICANT CHANGE UP (ref 27–34)
MCHC RBC-ENTMCNC: 32.3 PG — SIGNIFICANT CHANGE UP (ref 27–34)
MCHC RBC-ENTMCNC: 32.5 GM/DL — SIGNIFICANT CHANGE UP (ref 32–36)
MCHC RBC-ENTMCNC: 33 GM/DL — SIGNIFICANT CHANGE UP (ref 32–36)
MCV RBC AUTO: 97.9 FL — SIGNIFICANT CHANGE UP (ref 80–100)
MCV RBC AUTO: 99 FL — SIGNIFICANT CHANGE UP (ref 80–100)
MONOCYTES # BLD AUTO: 1.6 K/UL — HIGH (ref 0–0.9)
MONOCYTES NFR BLD AUTO: 10 % — SIGNIFICANT CHANGE UP (ref 2–14)
NEUTROPHILS # BLD AUTO: 13 K/UL — HIGH (ref 1.8–7.4)
NEUTROPHILS NFR BLD AUTO: 78.6 % — HIGH (ref 43–77)
PHOSPHATE SERPL-MCNC: 3.6 MG/DL — SIGNIFICANT CHANGE UP (ref 2.5–4.5)
PLATELET # BLD AUTO: 309 K/UL — SIGNIFICANT CHANGE UP (ref 150–400)
PLATELET # BLD AUTO: 334 K/UL — SIGNIFICANT CHANGE UP (ref 150–400)
POTASSIUM SERPL-MCNC: 4 MMOL/L — SIGNIFICANT CHANGE UP (ref 3.5–5.3)
POTASSIUM SERPL-SCNC: 4 MMOL/L — SIGNIFICANT CHANGE UP (ref 3.5–5.3)
RBC # BLD: 2.56 M/UL — LOW (ref 4.2–5.8)
RBC # BLD: 2.66 M/UL — LOW (ref 4.2–5.8)
RBC # FLD: 12.6 % — SIGNIFICANT CHANGE UP (ref 10.3–14.5)
RBC # FLD: 13.4 % — SIGNIFICANT CHANGE UP (ref 10.3–14.5)
SODIUM SERPL-SCNC: 137 MMOL/L — SIGNIFICANT CHANGE UP (ref 135–145)
WBC # BLD: 16.6 K/UL — HIGH (ref 3.8–10.5)
WBC # BLD: 18.7 K/UL — HIGH (ref 3.8–10.5)
WBC # FLD AUTO: 16.6 K/UL — HIGH (ref 3.8–10.5)
WBC # FLD AUTO: 18.7 K/UL — HIGH (ref 3.8–10.5)

## 2018-10-28 RX ORDER — HEPARIN SODIUM 5000 [USP'U]/ML
1600 INJECTION INTRAVENOUS; SUBCUTANEOUS
Qty: 25000 | Refills: 0 | Status: DISCONTINUED | OUTPATIENT
Start: 2018-10-28 | End: 2018-10-30

## 2018-10-28 RX ORDER — HEPARIN SODIUM 5000 [USP'U]/ML
1400 INJECTION INTRAVENOUS; SUBCUTANEOUS
Qty: 25000 | Refills: 0 | Status: DISCONTINUED | OUTPATIENT
Start: 2018-10-28 | End: 2018-10-28

## 2018-10-28 RX ADMIN — Medication 60 MILLIGRAM(S): at 05:38

## 2018-10-28 RX ADMIN — LISINOPRIL 40 MILLIGRAM(S): 2.5 TABLET ORAL at 05:38

## 2018-10-28 RX ADMIN — PANTOPRAZOLE SODIUM 40 MILLIGRAM(S): 20 TABLET, DELAYED RELEASE ORAL at 05:37

## 2018-10-28 RX ADMIN — Medication 81 MILLIGRAM(S): at 12:50

## 2018-10-28 RX ADMIN — OXYCODONE HYDROCHLORIDE 5 MILLIGRAM(S): 5 TABLET ORAL at 09:43

## 2018-10-28 RX ADMIN — SIMVASTATIN 20 MILLIGRAM(S): 20 TABLET, FILM COATED ORAL at 21:29

## 2018-10-28 RX ADMIN — ATENOLOL 25 MILLIGRAM(S): 25 TABLET ORAL at 05:38

## 2018-10-28 RX ADMIN — HEPARIN SODIUM 14 UNIT(S)/HR: 5000 INJECTION INTRAVENOUS; SUBCUTANEOUS at 00:35

## 2018-10-28 RX ADMIN — OXYCODONE HYDROCHLORIDE 5 MILLIGRAM(S): 5 TABLET ORAL at 23:46

## 2018-10-28 RX ADMIN — Medication 1 SPRAY(S): at 12:50

## 2018-10-28 RX ADMIN — OXYCODONE HYDROCHLORIDE 5 MILLIGRAM(S): 5 TABLET ORAL at 10:35

## 2018-10-28 RX ADMIN — HEPARIN SODIUM 16 UNIT(S)/HR: 5000 INJECTION INTRAVENOUS; SUBCUTANEOUS at 18:08

## 2018-10-28 RX ADMIN — HEPARIN SODIUM 16 UNIT(S)/HR: 5000 INJECTION INTRAVENOUS; SUBCUTANEOUS at 10:05

## 2018-10-28 NOTE — CHART NOTE - NSCHARTNOTEFT_GEN_A_CORE
TEAM: Vascular    TIME:10-28-18 @ 00:38    PROCEDURE: RLE fasciotomy    SUBJECTIVE:  Pt seen + examined at bedside. Pt is AOx3, pain well controlled.  SOB:  [] YES [x] NO  Dyspnea: []YES [x]NO  Chest Discomfort: [] YES [x] NO    Nausea: [] YES [x] NO           Vomiting: [] YES [x] NO  Pain (0-10):   3  Pain Control Adequate: [x] YES [] NO  Void: [x]YES []No    OBJECTIVE:  T(C): 37.2 (10-27-18 @ 23:53), Max: 37.6 (10-27-18 @ 17:05)  HR: 80 (10-27-18 @ 23:53) (74 - 107)  BP: 146/75 (10-27-18 @ 23:53) (125/60 - 172/72)  RR: 18 (10-27-18 @ 23:53) (14 - 18)  SpO2: 93% (10-27-18 @ 23:53) (92% - 98%)      IN/OUT:    10-26-18 @ 07:01  -  10-27-18 @ 07:00  --------------------------------------------------------  IN: 1044 mL / OUT: 1865 mL / NET: -821 mL    10-27-18 @ 07:01  -  10-28-18 @ 00:38  --------------------------------------------------------  IN: 794 mL / OUT: 965 mL / NET: -171 mL        Mandeep: [ x] Y  [ ] ANIBAL     10-26-18 @ 07:01  -  10-27-18 @ 07:00  --------------------------------------------------------  OUT: 1865 mL    10-27-18 @ 07:01  -  10-28-18 @ 00:38  --------------------------------------------------------  OUT: 965 mL      NGT: [ ] Y  [x] N      ---------------------------------------------------------------------------------------------   PHYSICAL EXAM:   General: NAD, Lying in bed comfortably  Neuro: alert, oriented x3  Resp: Good effort  GI/Abd: Soft, NT/ND, no rebound/guarding  Vascular: RLE in ACE wrap. Palpable DP, weakly palpable PT. Able to wiggle toes, dorsiflex and plantarflex  Musculoskeletal: RLE pitting edema    ---------------------------------------------------------------------------------------------   MEDICATIONS:  aspirin enteric coated 81 milliGRAM(s) Oral daily  ATENolol  Tablet 25 milliGRAM(s) Oral daily  fluticasone propionate 50 MICROgram(s)/spray Nasal Spray 1 Spray(s) Both Nostrils daily  heparin  Infusion 1400 Unit(s)/Hr IV Continuous <Continuous>  lisinopril 40 milliGRAM(s) Oral daily  NIFEdipine XL 60 milliGRAM(s) Oral daily  oxyCODONE    IR 5 milliGRAM(s) Oral every 4 hours PRN  pantoprazole    Tablet 40 milliGRAM(s) Oral before breakfast  simvastatin 20 milliGRAM(s) Oral at bedtime  sodium chloride 0.9%. 1000 milliLiter(s) IV Continuous <Continuous>      ---------------------------------------------------------------------------------------------   LABS:                          9.2    18.8  )-----------( 351      ( 27 Oct 2018 08:45 )             28.0   10-27    134<L>  |  92<L>  |  19  ----------------------------<  124<H>  3.7   |  30  |  0.66    Ca    9.0      27 Oct 2018 08:33  Phos  2.5     10-27  Mg     1.8     10-27      ---------------------------------------------------------------------------------------------       ASSESSMENT  73y male s/p RLE fasciotomy. Recovering well. Palpable pulses.    PLAN  - Diet: regular  - Pain control with PO/IV medications.    - Continue home medications  - Neurovascular exams  - Restart heparin drip  - OOB, ambulate as tolerated  - Will discuss with attending.

## 2018-10-28 NOTE — PROGRESS NOTE ADULT - SUBJECTIVE AND OBJECTIVE BOX
GENERAL SURGERY DAILY PROGRESS NOTE:       Subjective:  Patient seen and examined at bedside. Patient now POD3 RLE embolectomy and POD0/1 RLE fasciotomies. Increased pain and sensation loss in first webbed space prompted RTOR for fasciotomies. Patient returned to floor without complication- post op check unremarkable. Pain is currently well controlled. Dressings changed at bedside.       Objective:    PE:  Gen: NAD  Resp: airway patent, respirations unlabored, no increased WoB  CVS: RRR  Abd: soft, ND, NT, no rebound or guarding  Ext: fasciotomy incisions on RLE. Continued edema of RLE. Sensation intact.   Neuro: AAOx3, no focal deficits    Vital Signs Last 24 Hrs  T(C): 36.8 (28 Oct 2018 05:17), Max: 37.6 (27 Oct 2018 17:05)  T(F): 98.3 (28 Oct 2018 05:17), Max: 99.6 (27 Oct 2018 17:05)  HR: 79 (28 Oct 2018 05:17) (74 - 107)  BP: 150/70 (28 Oct 2018 05:17) (125/60 - 172/72)  BP(mean): 101 (27 Oct 2018 23:16) (86 - 107)  RR: 18 (28 Oct 2018 05:17) (14 - 18)  SpO2: 97% (28 Oct 2018 05:17) (92% - 98%)    I&O's Detail    26 Oct 2018 07:01  -  27 Oct 2018 07:00  --------------------------------------------------------  IN:    heparin  Infusion.: 204 mL    lactated ringers.: 600 mL    Oral Fluid: 240 mL  Total IN: 1044 mL    OUT:    Indwelling Catheter - Urethral: 1865 mL  Total OUT: 1865 mL    Total NET: -821 mL      27 Oct 2018 07:01  -  28 Oct 2018 06:46  --------------------------------------------------------  IN:    heparin  Infusion.: 154 mL    Oral Fluid: 540 mL    sodium chloride 0.9%.: 100 mL  Total IN: 794 mL    OUT:    Indwelling Catheter - Urethral: 2165 mL  Total OUT: 2165 mL    Total NET: -1371 mL          Daily     Daily     MEDICATIONS  (STANDING):  aspirin enteric coated 81 milliGRAM(s) Oral daily  ATENolol  Tablet 25 milliGRAM(s) Oral daily  fluticasone propionate 50 MICROgram(s)/spray Nasal Spray 1 Spray(s) Both Nostrils daily  heparin  Infusion 1400 Unit(s)/Hr (14 mL/Hr) IV Continuous <Continuous>  lisinopril 40 milliGRAM(s) Oral daily  NIFEdipine XL 60 milliGRAM(s) Oral daily  pantoprazole    Tablet 40 milliGRAM(s) Oral before breakfast  simvastatin 20 milliGRAM(s) Oral at bedtime  sodium chloride 0.9%. 1000 milliLiter(s) (50 mL/Hr) IV Continuous <Continuous>    MEDICATIONS  (PRN):  oxyCODONE    IR 5 milliGRAM(s) Oral every 4 hours PRN Moderate Pain (4 - 6)      LABS:                        8.6    16.6  )-----------( 334      ( 28 Oct 2018 05:14 )             26.1     10-28    137  |  97  |  14  ----------------------------<  145<H>  4.0   |  32<H>  |  0.64    Ca    9.0      28 Oct 2018 05:27  Phos  3.6     10-28  Mg     2.1     10-28      PT/INR - ( 27 Oct 2018 08:45 )   PT: 13.2 sec;   INR: 1.22 ratio         PTT - ( 27 Oct 2018 08:45 )  PTT:68.0 sec      RADIOLOGY & ADDITIONAL STUDIES: GENERAL SURGERY DAILY PROGRESS NOTE:       Subjective:  Patient seen and examined at bedside. Patient now POD3 RLE embolectomy and POD0/1 RLE fasciotomies. Increased pain and sensation loss in first webbed space prompted RTOR for fasciotomies. Patient returned to floor without complication- post op check unremarkable. Pain is currently well controlled. Dressings changed at bedside. Heparin gtt restarted.       Objective:    PE:  Gen: NAD  Resp: airway patent, respirations unlabored, no increased WoB  CVS: RRR  Abd: soft, ND, NT, no rebound or guarding  Ext: fasciotomy incisions on RLE. Continued edema of RLE. Sensation intact. Palpable DP/PT  Neuro: AAOx3, no focal deficits    Vital Signs Last 24 Hrs  T(C): 36.8 (28 Oct 2018 05:17), Max: 37.6 (27 Oct 2018 17:05)  T(F): 98.3 (28 Oct 2018 05:17), Max: 99.6 (27 Oct 2018 17:05)  HR: 79 (28 Oct 2018 05:17) (74 - 107)  BP: 150/70 (28 Oct 2018 05:17) (125/60 - 172/72)  BP(mean): 101 (27 Oct 2018 23:16) (86 - 107)  RR: 18 (28 Oct 2018 05:17) (14 - 18)  SpO2: 97% (28 Oct 2018 05:17) (92% - 98%)    I&O's Detail    26 Oct 2018 07:01  -  27 Oct 2018 07:00  --------------------------------------------------------  IN:    heparin  Infusion.: 204 mL    lactated ringers.: 600 mL    Oral Fluid: 240 mL  Total IN: 1044 mL    OUT:    Indwelling Catheter - Urethral: 1865 mL  Total OUT: 1865 mL    Total NET: -821 mL      27 Oct 2018 07:01  -  28 Oct 2018 06:46  --------------------------------------------------------  IN:    heparin  Infusion.: 154 mL    Oral Fluid: 540 mL    sodium chloride 0.9%.: 100 mL  Total IN: 794 mL    OUT:    Indwelling Catheter - Urethral: 2165 mL  Total OUT: 2165 mL    Total NET: -1371 mL          Daily     Daily     MEDICATIONS  (STANDING):  aspirin enteric coated 81 milliGRAM(s) Oral daily  ATENolol  Tablet 25 milliGRAM(s) Oral daily  fluticasone propionate 50 MICROgram(s)/spray Nasal Spray 1 Spray(s) Both Nostrils daily  heparin  Infusion 1400 Unit(s)/Hr (14 mL/Hr) IV Continuous <Continuous>  lisinopril 40 milliGRAM(s) Oral daily  NIFEdipine XL 60 milliGRAM(s) Oral daily  pantoprazole    Tablet 40 milliGRAM(s) Oral before breakfast  simvastatin 20 milliGRAM(s) Oral at bedtime  sodium chloride 0.9%. 1000 milliLiter(s) (50 mL/Hr) IV Continuous <Continuous>    MEDICATIONS  (PRN):  oxyCODONE    IR 5 milliGRAM(s) Oral every 4 hours PRN Moderate Pain (4 - 6)      LABS:                        8.6    16.6  )-----------( 334      ( 28 Oct 2018 05:14 )             26.1     10-28    137  |  97  |  14  ----------------------------<  145<H>  4.0   |  32<H>  |  0.64    Ca    9.0      28 Oct 2018 05:27  Phos  3.6     10-28  Mg     2.1     10-28      PT/INR - ( 27 Oct 2018 08:45 )   PT: 13.2 sec;   INR: 1.22 ratio         PTT - ( 27 Oct 2018 08:45 )  PTT:68.0 sec      RADIOLOGY & ADDITIONAL STUDIES:

## 2018-10-28 NOTE — CHART NOTE - NSCHARTNOTEFT_GEN_A_CORE
RN called to notify that patient is oozing from RLE fasciotomy site.  Patient seen and examined at bedside.  Patient states he is feeling much better since his surgery yesterday.  He denies any chest pain, light headedness, SOB.  AVSS.    Exam: RLE- Dressing in place mild-moderately saturated with light blood along the distal posterior leg.  Foot is warm, cap refill <2secs +motor +sensation +palpable DP pulse.      I reinforced the dressing at the site that was oozing with an ACE wrap. A CBC (crit 26.1-->25.3) was sent as well as a PTT (78.9) which patient is currently therapeutic on x1.    ASSESSMENT/PLAN: 73y male s/p RLE fasciotomy. Recovering well. Palpable pulses.    - Diet: regular  - Pain control with PO/IV medications.    - Continue home medications  - routine Neurovascular exams  - continue heparin drip, will check CBC with next PTT  - Monitor dressing for persistent oozing  - Trend CK    Staci Duffy PA-C

## 2018-10-28 NOTE — PROGRESS NOTE ADULT - ASSESSMENT
ASSESSMENT: 73M with recent spine surgery now s/p RLE embolectomy for acute RLE arterial occlusion    PLAN:  -- f/u CT L spine   -- Bridge to coumadin   -- Pain control  -- f/u afternoon BMP   -- f/u CPK  -- f/u PTT  -- Reg diet  -- OOB with PT  -- Elevate RLE   -- ACE bandage RLE     Vascular Surgery  [id number] ASSESSMENT: 73M with recent spine surgery now s/p RLE embolectomy for acute RLE arterial occlusion now s/p RLE fasciotomies     PLAN:  -- f/u CT L spine   -- Bridge to coumadin   -- Pain control  -- f/u CPK  -- Reg diet  -- OOB with PT  -- Elevate RLE   -- ACE bandage RLE w/ daily dressing changes     Vascular Surgery  [id number] ASSESSMENT: 73M with recent spine surgery now s/p RLE embolectomy for acute RLE arterial occlusion now s/p RLE fasciotomies     PLAN:  -- f/u CT L spine   -- Will hold coumadin for now   -- Pain control  -- holguin to remain in  -- f/u CPK  -- Reg diet  -- OOB with PT  -- Elevate RLE   -- ACE bandage RLE w/ daily dressing changes     Vascular Surgery  [id number]

## 2018-10-29 LAB
ANION GAP SERPL CALC-SCNC: 11 MMOL/L — SIGNIFICANT CHANGE UP (ref 5–17)
APTT BLD: 66 SEC — HIGH (ref 27.5–37.4)
APTT BLD: 77.5 SEC — HIGH (ref 27.5–37.4)
BLD GP AB SCN SERPL QL: NEGATIVE — SIGNIFICANT CHANGE UP
BUN SERPL-MCNC: 17 MG/DL — SIGNIFICANT CHANGE UP (ref 7–23)
CALCIUM SERPL-MCNC: 8.4 MG/DL — SIGNIFICANT CHANGE UP (ref 8.4–10.5)
CHLORIDE SERPL-SCNC: 97 MMOL/L — SIGNIFICANT CHANGE UP (ref 96–108)
CK SERPL-CCNC: 306 U/L — HIGH (ref 30–200)
CO2 SERPL-SCNC: 30 MMOL/L — SIGNIFICANT CHANGE UP (ref 22–31)
CREAT SERPL-MCNC: 0.67 MG/DL — SIGNIFICANT CHANGE UP (ref 0.5–1.3)
GLUCOSE SERPL-MCNC: 104 MG/DL — HIGH (ref 70–99)
HCT VFR BLD CALC: 23.4 % — LOW (ref 39–50)
HCT VFR BLD CALC: 23.5 % — LOW (ref 39–50)
HCT VFR BLD CALC: 27.6 % — LOW (ref 39–50)
HGB BLD-MCNC: 7.6 G/DL — LOW (ref 13–17)
HGB BLD-MCNC: 7.6 G/DL — LOW (ref 13–17)
HGB BLD-MCNC: 9.2 G/DL — LOW (ref 13–17)
MAGNESIUM SERPL-MCNC: 2.2 MG/DL — SIGNIFICANT CHANGE UP (ref 1.6–2.6)
MCHC RBC-ENTMCNC: 32.1 PG — SIGNIFICANT CHANGE UP (ref 27–34)
MCHC RBC-ENTMCNC: 32.2 PG — SIGNIFICANT CHANGE UP (ref 27–34)
MCHC RBC-ENTMCNC: 32.3 PG — SIGNIFICANT CHANGE UP (ref 27–34)
MCHC RBC-ENTMCNC: 32.4 GM/DL — SIGNIFICANT CHANGE UP (ref 32–36)
MCHC RBC-ENTMCNC: 32.4 GM/DL — SIGNIFICANT CHANGE UP (ref 32–36)
MCHC RBC-ENTMCNC: 33.2 GM/DL — SIGNIFICANT CHANGE UP (ref 32–36)
MCV RBC AUTO: 97.5 FL — SIGNIFICANT CHANGE UP (ref 80–100)
MCV RBC AUTO: 98.8 FL — SIGNIFICANT CHANGE UP (ref 80–100)
MCV RBC AUTO: 99.3 FL — SIGNIFICANT CHANGE UP (ref 80–100)
PHOSPHATE SERPL-MCNC: 2.7 MG/DL — SIGNIFICANT CHANGE UP (ref 2.5–4.5)
PLATELET # BLD AUTO: 313 K/UL — SIGNIFICANT CHANGE UP (ref 150–400)
PLATELET # BLD AUTO: 319 K/UL — SIGNIFICANT CHANGE UP (ref 150–400)
PLATELET # BLD AUTO: 330 K/UL — SIGNIFICANT CHANGE UP (ref 150–400)
POTASSIUM SERPL-MCNC: 3.7 MMOL/L — SIGNIFICANT CHANGE UP (ref 3.5–5.3)
POTASSIUM SERPL-SCNC: 3.7 MMOL/L — SIGNIFICANT CHANGE UP (ref 3.5–5.3)
RBC # BLD: 2.36 M/UL — LOW (ref 4.2–5.8)
RBC # BLD: 2.36 M/UL — LOW (ref 4.2–5.8)
RBC # BLD: 2.83 M/UL — LOW (ref 4.2–5.8)
RBC # FLD: 13.1 % — SIGNIFICANT CHANGE UP (ref 10.3–14.5)
RBC # FLD: 13.2 % — SIGNIFICANT CHANGE UP (ref 10.3–14.5)
RBC # FLD: 14.4 % — SIGNIFICANT CHANGE UP (ref 10.3–14.5)
RH IG SCN BLD-IMP: POSITIVE — SIGNIFICANT CHANGE UP
SODIUM SERPL-SCNC: 138 MMOL/L — SIGNIFICANT CHANGE UP (ref 135–145)
WBC # BLD: 19.1 K/UL — HIGH (ref 3.8–10.5)
WBC # BLD: 19.2 K/UL — HIGH (ref 3.8–10.5)
WBC # BLD: 21.4 K/UL — HIGH (ref 3.8–10.5)
WBC # FLD AUTO: 19.1 K/UL — HIGH (ref 3.8–10.5)
WBC # FLD AUTO: 19.2 K/UL — HIGH (ref 3.8–10.5)
WBC # FLD AUTO: 21.4 K/UL — HIGH (ref 3.8–10.5)

## 2018-10-29 RX ORDER — POTASSIUM CHLORIDE 20 MEQ
40 PACKET (EA) ORAL ONCE
Qty: 0 | Refills: 0 | Status: COMPLETED | OUTPATIENT
Start: 2018-10-29 | End: 2018-10-29

## 2018-10-29 RX ORDER — HYDROMORPHONE HYDROCHLORIDE 2 MG/ML
0.5 INJECTION INTRAMUSCULAR; INTRAVENOUS; SUBCUTANEOUS ONCE
Qty: 0 | Refills: 0 | Status: DISCONTINUED | OUTPATIENT
Start: 2018-10-29 | End: 2018-10-29

## 2018-10-29 RX ADMIN — OXYCODONE HYDROCHLORIDE 5 MILLIGRAM(S): 5 TABLET ORAL at 00:15

## 2018-10-29 RX ADMIN — OXYCODONE HYDROCHLORIDE 5 MILLIGRAM(S): 5 TABLET ORAL at 22:07

## 2018-10-29 RX ADMIN — OXYCODONE HYDROCHLORIDE 5 MILLIGRAM(S): 5 TABLET ORAL at 11:50

## 2018-10-29 RX ADMIN — PANTOPRAZOLE SODIUM 40 MILLIGRAM(S): 20 TABLET, DELAYED RELEASE ORAL at 04:54

## 2018-10-29 RX ADMIN — Medication 60 MILLIGRAM(S): at 04:54

## 2018-10-29 RX ADMIN — LISINOPRIL 40 MILLIGRAM(S): 2.5 TABLET ORAL at 04:54

## 2018-10-29 RX ADMIN — OXYCODONE HYDROCHLORIDE 5 MILLIGRAM(S): 5 TABLET ORAL at 17:51

## 2018-10-29 RX ADMIN — SIMVASTATIN 20 MILLIGRAM(S): 20 TABLET, FILM COATED ORAL at 21:37

## 2018-10-29 RX ADMIN — OXYCODONE HYDROCHLORIDE 5 MILLIGRAM(S): 5 TABLET ORAL at 21:37

## 2018-10-29 RX ADMIN — Medication 81 MILLIGRAM(S): at 11:20

## 2018-10-29 RX ADMIN — Medication 1 SPRAY(S): at 11:20

## 2018-10-29 RX ADMIN — HEPARIN SODIUM 16 UNIT(S)/HR: 5000 INJECTION INTRAVENOUS; SUBCUTANEOUS at 07:44

## 2018-10-29 RX ADMIN — OXYCODONE HYDROCHLORIDE 5 MILLIGRAM(S): 5 TABLET ORAL at 11:20

## 2018-10-29 RX ADMIN — Medication 40 MILLIEQUIVALENT(S): at 11:25

## 2018-10-29 RX ADMIN — HYDROMORPHONE HYDROCHLORIDE 0.5 MILLIGRAM(S): 2 INJECTION INTRAMUSCULAR; INTRAVENOUS; SUBCUTANEOUS at 06:20

## 2018-10-29 RX ADMIN — ATENOLOL 25 MILLIGRAM(S): 25 TABLET ORAL at 04:54

## 2018-10-29 NOTE — PROGRESS NOTE ADULT - ASSESSMENT
ASSESSMENT: 73M with recent spine surgery now s/p RLE embolectomy for acute RLE arterial occlusion now s/p RLE fasciotomies     PLAN:  -- Heparin drip- holding coumadin at this time  -- f/u CBC  -- 1unit PRBCs  -- Pain control  -- c/w holguin  -- no longer trending CPK  -- Reg diet  -- OOB with PT  -- Elevate RLE   -- ACE bandage RLE w/ daily dressing changes     Vascular Surgery  [id number] ASSESSMENT: 73M with recent spine surgery now s/p RLE embolectomy for acute RLE arterial occlusion now s/p RLE fasciotomies ***    PLAN:  -- Heparin drip- holding coumadin at this time  -- f/u CBC  -- 1unit PRBCs  -- Pain control  -- c/w holguin  -- no longer trending CPK  -- Reg diet  -- OOB with PT  -- Elevate RLE   -- ACE bandage RLE w/ daily dressing changes     Vascular Surgery  [id number] ASSESSMENT: 73M with recent spine surgery now s/p RLE embolectomy for acute RLE arterial occlusion now s/p RLE fasciotomies ***    PLAN:  -- Heparin drip- holding coumadin at this time in setting of AF  -- f/u CBC  -- 2unit PRBCs  -- Pain control  -- D/C holguin this AM  -- no longer trending CPK  -- Reg diet  -- OOB with PT  -- Elevate RLE   -- ACE bandage RLE w/ daily dressing changes     Vascular Surgery  [id number]

## 2018-10-29 NOTE — CONSULT NOTE ADULT - SUBJECTIVE AND OBJECTIVE BOX
Plastic Surgery Consult Note  ([phone])    HPI:  Patient is a 73 year old man with PMH of atrial fibrillation, HTN, Hodgkin's lymphoma s/p splenectomy (1975), hip replacement, and MOE presented to the ED with right lower extremity pain and decreased sensation since 10am today. He recently underwent spine surgery on 10/19 for L3/L4 hardware revision and has been off anticoagulation since 10/18. He takes Coumadin and was bridged with Lovenox. He was discharged on 10/23 on aspirin and has not yet restarted anticoagulation. Of note, he had a CVA in Jan 2018 while off anticoagulation for spine surgery.     He states at 10am he began having pain from his right mid-thigh extending to his foot, as well as decreased sensation and weakness. He has never had symptoms like this in the past. After arrival to the ED his symptoms improved slightly.     He denies chest pain, shortness of breath, difficulty speaking, upper extremity deficits, fevers, or chills. In the ED his vital signs are within normal limits and he is hemodynamically normal. (25 Oct 2018 14:50)    Now s/p embolectomy and RLE fasciotomy asked to eval for management of wound    PAST MEDICAL & SURGICAL HISTORY:  Exposure to toxin: 9/11   Spondylolisthesis  Spinal stenosis: L3-L4  CVA (cerebral vascular accident): 1/2018 - attributed to no AC for 11 days preop/postop spine surgery - presented to ED with slurred speech, residual ST memory loss, per pt  Osteoarthritis  BPH (benign prostatic hyperplasia)  MOE (obstructive sleep apnea): positive sleep study many years ago, was recommended to use CPAP, patient non-compliant  GERD (gastroesophageal reflux disease)  HTN (hypertension)  Atrial fibrillation: over 20 years  Hodgkin lymphoma: 1975  History of lumbar spinal fusion: 1/5/2018  History of cardioversion: for AF - &quot;many years ago&quot; - unsuccessful  S/P hip replacement, right: 2014  S/P splenectomy: 1975      Allergies    No Known Allergies    Intolerances        Home Medications:  atenolol 25 mg oral tablet: 1 tab(s) orally once a day (25 Oct 2018 16:26)  chlorthalidone 25 mg oral tablet: 1 tab(s) orally once a day (25 Oct 2018 16:26)  docusate sodium 100 mg oral capsule: 1 cap(s) orally 3 times a day (25 Oct 2018 16:26)  fluticasone 50 mcg/inh nasal spray: 1 spray(s) nasal once a day (25 Oct 2018 16:26)  lisinopril 40 mg oral tablet: 1 tab(s) orally once a day (25 Oct 2018 16:26)  omeprazole 20 mg oral delayed release capsule: 1 cap(s) orally 3 times a week (25 Oct 2018 16:26)  simvastatin 20 mg oral tablet: 1 tab(s) orally once a day (at bedtime) (25 Oct 2018 16:26)  terazosin 10 mg oral tablet: orally once a day (25 Oct 2018 16:26)      MEDICATIONS  (STANDING):  aspirin enteric coated 81 milliGRAM(s) Oral daily  ATENolol  Tablet 25 milliGRAM(s) Oral daily  fluticasone propionate 50 MICROgram(s)/spray Nasal Spray 1 Spray(s) Both Nostrils daily  heparin  Infusion 1600 Unit(s)/Hr (16 mL/Hr) IV Continuous <Continuous>  lisinopril 40 milliGRAM(s) Oral daily  NIFEdipine XL 60 milliGRAM(s) Oral daily  pantoprazole    Tablet 40 milliGRAM(s) Oral before breakfast  simvastatin 20 milliGRAM(s) Oral at bedtime      SOCIAL HISTORY:  Retired  [employer]  ___________________________________________  REVIEW OF SYSTEMS:    Constitutional: No fevers, chills, no recent weight loss  ENMT: No changes in hearing, no changes in vision, no sore throat, no cough  Respiratory: No shortness of breath  Cardiovascular: No chest pain, palpitations  Gastrointestinal: No abdominal pain, no diarrhea/constipation  Extremities: Right leg pain and open wounds      ___________________________________________  OBJECTIVE:  Vital Signs Last 24 Hrs  T(C): 36.7 (29 Oct 2018 15:20), Max: 37.3 (29 Oct 2018 05:07)  T(F): 98.1 (29 Oct 2018 15:20), Max: 99.1 (29 Oct 2018 05:07)  HR: 60 (29 Oct 2018 15:20) (60 - 82)  BP: 128/60 (29 Oct 2018 15:20) (119/70 - 155/70)  BP(mean): --  RR: 17 (29 Oct 2018 15:20) (17 - 18)  SpO2: 97% (29 Oct 2018 15:20) (94% - 98%)CAPILLARY BLOOD GLUCOSE        I&O's Detail    28 Oct 2018 07:01  -  29 Oct 2018 07:00  --------------------------------------------------------  IN:    heparin Infusion: 192 mL    Oral Fluid: 840 mL    sodium chloride 0.9%: 500 mL  Total IN: 1532 mL    OUT:    Indwelling Catheter - Urethral: 1500 mL  Total OUT: 1500 mL    Total NET: 32 mL      29 Oct 2018 07:01  -  29 Oct 2018 16:58  --------------------------------------------------------  IN:    Oral Fluid: 240 mL  Total IN: 240 mL    OUT:    Indwelling Catheter - Urethral: 350 mL  Total OUT: 350 mL    Total NET: -110 mL          PHYSICAL EXAM:    General: Alert, NAD  Neuro: CN II-XII intact, motor and sensory grossly intact with no focal deficits. Weakness in dorsiflexion  HEENT: Normocephalic, atraumatic, EOMI  Respiratory: Breathing non-labored, airway patent  Extremities: Open wounds medial and lateral leg with fusiform swelling  Distal perfusion intact  ____________________________________________  LABS:  CBC Full  -  ( 29 Oct 2018 07:05 )  WBC Count : 19.1 K/uL  Hemoglobin : 7.6 g/dL  Hematocrit : 23.5 %  Platelet Count - Automated : 319 K/uL  Mean Cell Volume : 99.3 fl  Mean Cell Hemoglobin : 32.2 pg  Mean Cell Hemoglobin Concentration : 32.4 gm/dL  Auto Neutrophil # : x  Auto Lymphocyte # : x  Auto Monocyte # : x  Auto Eosinophil # : x  Auto Basophil # : x  Auto Neutrophil % : x  Auto Lymphocyte % : x  Auto Monocyte % : x  Auto Eosinophil % : x  Auto Basophil % : x    10-29    138  |  97  |  17  ----------------------------<  104<H>  3.7   |  30  |  0.67    Ca    8.4      29 Oct 2018 07:05  Phos  2.7     10-29  Mg     2.2     10-29        PTT - ( 29 Oct 2018 07:05 )  PTT:66.0 sec    CARDIAC MARKERS ( 28 Oct 2018 23:56 )  x     / x     / 306 U/L / x     / x      CARDIAC MARKERS ( 28 Oct 2018 15:30 )  x     / x     / 440 U/L / x     / x      CARDIAC MARKERS ( 28 Oct 2018 05:27 )  x     / x     / 567 U/L / x     / x          IMP Open wounds right leg  - Ace wrap from toes to above knees  - Daily Dressing changes  - Elevation on Pillows  - PT Eval if AFO needed/ Ambulation  - Dieresis if medically ok   Will plan for OR once swelling improved  Possible VAC in next few days  Will reeval Wed

## 2018-10-29 NOTE — PROGRESS NOTE ADULT - SUBJECTIVE AND OBJECTIVE BOX
GENERAL SURGERY DAILY PROGRESS NOTE:       Subjective:  Patient seen and examined at bedside this am. Yesterday patient dressings changed x2 and reinforced for oozing. Heparin drip therapeutic. H/H downtrending. CPK downtrending. Will transfuse this am. Pain controlled.         Objective:    PE:  Gen: NAD  Resp: airway patent, respirations unlabored, no increased WoB  CVS: RRR  Abd: soft, ND, NT, no rebound or guarding  Ext: RLE compartments soft, appropriately painful at incisions, fasciotomies with xeroform/gauze/abd/cling/ace, palpable DP/PT, continues to have AIN sensation loss  Neuro: AAOx3    Vital Signs Last 24 Hrs  T(C): 37.3 (29 Oct 2018 05:07), Max: 37.3 (29 Oct 2018 05:07)  T(F): 99.1 (29 Oct 2018 05:07), Max: 99.1 (29 Oct 2018 05:07)  HR: 78 (29 Oct 2018 05:07) (57 - 82)  BP: 155/70 (29 Oct 2018 05:07) (111/54 - 155/70)  BP(mean): --  RR: 18 (29 Oct 2018 05:07) (18 - 18)  SpO2: 96% (29 Oct 2018 05:07) (94% - 98%)    I&O's Detail    27 Oct 2018 07:01  -  28 Oct 2018 07:00  --------------------------------------------------------  IN:    heparin  Infusion.: 154 mL    Oral Fluid: 540 mL    sodium chloride 0.9%: 100 mL  Total IN: 794 mL    OUT:    Indwelling Catheter - Urethral: 2165 mL  Total OUT: 2165 mL    Total NET: -1371 mL      28 Oct 2018 07:01  -  29 Oct 2018 05:29  --------------------------------------------------------  IN:    heparin Infusion: 192 mL    Oral Fluid: 840 mL    sodium chloride 0.9%: 500 mL  Total IN: 1532 mL    OUT:    Indwelling Catheter - Urethral: 1500 mL  Total OUT: 1500 mL    Total NET: 32 mL          Daily     Daily     MEDICATIONS  (STANDING):  aspirin enteric coated 81 milliGRAM(s) Oral daily  ATENolol  Tablet 25 milliGRAM(s) Oral daily  fluticasone propionate 50 MICROgram(s)/spray Nasal Spray 1 Spray(s) Both Nostrils daily  heparin  Infusion 1600 Unit(s)/Hr (16 mL/Hr) IV Continuous <Continuous>  lisinopril 40 milliGRAM(s) Oral daily  NIFEdipine XL 60 milliGRAM(s) Oral daily  pantoprazole    Tablet 40 milliGRAM(s) Oral before breakfast  simvastatin 20 milliGRAM(s) Oral at bedtime    MEDICATIONS  (PRN):  oxyCODONE    IR 5 milliGRAM(s) Oral every 4 hours PRN Moderate Pain (4 - 6)      LABS:                        7.6    19.2  )-----------( 330      ( 28 Oct 2018 23:56 )             23.4     10-28    137  |  97  |  14  ----------------------------<  145<H>  4.0   |  32<H>  |  0.64    Ca    9.0      28 Oct 2018 05:27  Phos  3.6     10-28  Mg     2.1     10-28      PT/INR - ( 27 Oct 2018 08:45 )   PT: 13.2 sec;   INR: 1.22 ratio         PTT - ( 28 Oct 2018 23:56 )  PTT:77.5 sec      RADIOLOGY & ADDITIONAL STUDIES: GENERAL SURGERY DAILY PROGRESS NOTE:       Subjective:  Patient seen and examined at bedside this am. Yesterday patient dressings changed x2 and reinforced for oozing. Heparin drip therapeutic. H/H downtrending. WBC increasing. CPK downtrending. Will transfuse this am. Pain controlled. ***        Objective:    PE:  Gen: NAD  Resp: airway patent, respirations unlabored, no increased WoB  CVS: RRR  Abd: soft, ND, NT, no rebound or guarding  Ext: RLE compartments soft, appropriately painful at incisions, fasciotomies with xeroform/gauze/abd/cling/ace, palpable DP/PT, continues to have AIN sensation loss ***  Neuro: AAOx3    Vital Signs Last 24 Hrs  T(C): 37.3 (29 Oct 2018 05:07), Max: 37.3 (29 Oct 2018 05:07)  T(F): 99.1 (29 Oct 2018 05:07), Max: 99.1 (29 Oct 2018 05:07)  HR: 78 (29 Oct 2018 05:07) (57 - 82)  BP: 155/70 (29 Oct 2018 05:07) (111/54 - 155/70)  BP(mean): --  RR: 18 (29 Oct 2018 05:07) (18 - 18)  SpO2: 96% (29 Oct 2018 05:07) (94% - 98%)    I&O's Detail    27 Oct 2018 07:01  -  28 Oct 2018 07:00  --------------------------------------------------------  IN:    heparin  Infusion.: 154 mL    Oral Fluid: 540 mL    sodium chloride 0.9%: 100 mL  Total IN: 794 mL    OUT:    Indwelling Catheter - Urethral: 2165 mL  Total OUT: 2165 mL    Total NET: -1371 mL      28 Oct 2018 07:01  -  29 Oct 2018 05:29  --------------------------------------------------------  IN:    heparin Infusion: 192 mL    Oral Fluid: 840 mL    sodium chloride 0.9%: 500 mL  Total IN: 1532 mL    OUT:    Indwelling Catheter - Urethral: 1500 mL  Total OUT: 1500 mL    Total NET: 32 mL          Daily     Daily     MEDICATIONS  (STANDING):  aspirin enteric coated 81 milliGRAM(s) Oral daily  ATENolol  Tablet 25 milliGRAM(s) Oral daily  fluticasone propionate 50 MICROgram(s)/spray Nasal Spray 1 Spray(s) Both Nostrils daily  heparin  Infusion 1600 Unit(s)/Hr (16 mL/Hr) IV Continuous <Continuous>  lisinopril 40 milliGRAM(s) Oral daily  NIFEdipine XL 60 milliGRAM(s) Oral daily  pantoprazole    Tablet 40 milliGRAM(s) Oral before breakfast  simvastatin 20 milliGRAM(s) Oral at bedtime    MEDICATIONS  (PRN):  oxyCODONE    IR 5 milliGRAM(s) Oral every 4 hours PRN Moderate Pain (4 - 6)      LABS:                        7.6    19.2  )-----------( 330      ( 28 Oct 2018 23:56 )             23.4     10-28    137  |  97  |  14  ----------------------------<  145<H>  4.0   |  32<H>  |  0.64    Ca    9.0      28 Oct 2018 05:27  Phos  3.6     10-28  Mg     2.1     10-28      PT/INR - ( 27 Oct 2018 08:45 )   PT: 13.2 sec;   INR: 1.22 ratio         PTT - ( 28 Oct 2018 23:56 )  PTT:77.5 sec      RADIOLOGY & ADDITIONAL STUDIES: GENERAL SURGERY DAILY PROGRESS NOTE:       Subjective:  Patient seen and examined at bedside this am. Yesterday patient dressings changed x2 and reinforced for oozing. Heparin drip therapeutic. H/H downtrending. WBC increasing. CPK downtrending. Will transfuse this am. Pain controlled.         Objective:    PE:  Gen: NAD  Resp: airway patent, respirations unlabored, no increased WoB  CVS: RRR  Abd: soft, ND, NT, no rebound or guarding  Ext: RLE compartments soft, appropriately painful at incisions, fasciotomies with xeroform/gauze/abd/cling/ace, palpable DP/PT, continues to have AIN sensation loss. Dressing saturated with sanguinous staining. Muscle with evidence of adequate oxygenation.   Neuro: AAOx3    Vital Signs Last 24 Hrs  T(C): 37.3 (29 Oct 2018 05:07), Max: 37.3 (29 Oct 2018 05:07)  T(F): 99.1 (29 Oct 2018 05:07), Max: 99.1 (29 Oct 2018 05:07)  HR: 78 (29 Oct 2018 05:07) (57 - 82)  BP: 155/70 (29 Oct 2018 05:07) (111/54 - 155/70)  BP(mean): --  RR: 18 (29 Oct 2018 05:07) (18 - 18)  SpO2: 96% (29 Oct 2018 05:07) (94% - 98%)    I&O's Detail    27 Oct 2018 07:01  -  28 Oct 2018 07:00  --------------------------------------------------------  IN:    heparin  Infusion.: 154 mL    Oral Fluid: 540 mL    sodium chloride 0.9%: 100 mL  Total IN: 794 mL    OUT:    Indwelling Catheter - Urethral: 2165 mL  Total OUT: 2165 mL    Total NET: -1371 mL      28 Oct 2018 07:01  -  29 Oct 2018 05:29  --------------------------------------------------------  IN:    heparin Infusion: 192 mL    Oral Fluid: 840 mL    sodium chloride 0.9%: 500 mL  Total IN: 1532 mL    OUT:    Indwelling Catheter - Urethral: 1500 mL  Total OUT: 1500 mL    Total NET: 32 mL          Daily     Daily     MEDICATIONS  (STANDING):  aspirin enteric coated 81 milliGRAM(s) Oral daily  ATENolol  Tablet 25 milliGRAM(s) Oral daily  fluticasone propionate 50 MICROgram(s)/spray Nasal Spray 1 Spray(s) Both Nostrils daily  heparin  Infusion 1600 Unit(s)/Hr (16 mL/Hr) IV Continuous <Continuous>  lisinopril 40 milliGRAM(s) Oral daily  NIFEdipine XL 60 milliGRAM(s) Oral daily  pantoprazole    Tablet 40 milliGRAM(s) Oral before breakfast  simvastatin 20 milliGRAM(s) Oral at bedtime    MEDICATIONS  (PRN):  oxyCODONE    IR 5 milliGRAM(s) Oral every 4 hours PRN Moderate Pain (4 - 6)      LABS:                        7.6    19.2  )-----------( 330      ( 28 Oct 2018 23:56 )             23.4     10-28    137  |  97  |  14  ----------------------------<  145<H>  4.0   |  32<H>  |  0.64    Ca    9.0      28 Oct 2018 05:27  Phos  3.6     10-28  Mg     2.1     10-28      PT/INR - ( 27 Oct 2018 08:45 )   PT: 13.2 sec;   INR: 1.22 ratio         PTT - ( 28 Oct 2018 23:56 )  PTT:77.5 sec      RADIOLOGY & ADDITIONAL STUDIES:

## 2018-10-30 ENCOUNTER — APPOINTMENT (OUTPATIENT)
Dept: SPINE | Facility: CLINIC | Age: 73
End: 2018-10-30

## 2018-10-30 LAB
ANION GAP SERPL CALC-SCNC: 9 MMOL/L — SIGNIFICANT CHANGE UP (ref 5–17)
APPEARANCE UR: CLEAR — SIGNIFICANT CHANGE UP
APTT BLD: 67.7 SEC — HIGH (ref 27.5–37.4)
APTT BLD: 93.4 SEC — HIGH (ref 27.5–36.3)
BILIRUB UR-MCNC: NEGATIVE — SIGNIFICANT CHANGE UP
BUN SERPL-MCNC: 17 MG/DL — SIGNIFICANT CHANGE UP (ref 7–23)
CALCIUM SERPL-MCNC: 8.9 MG/DL — SIGNIFICANT CHANGE UP (ref 8.4–10.5)
CHLORIDE SERPL-SCNC: 95 MMOL/L — LOW (ref 96–108)
CO2 SERPL-SCNC: 31 MMOL/L — SIGNIFICANT CHANGE UP (ref 22–31)
COLOR SPEC: SIGNIFICANT CHANGE UP
CREAT SERPL-MCNC: 0.75 MG/DL — SIGNIFICANT CHANGE UP (ref 0.5–1.3)
DIFF PNL FLD: NEGATIVE — SIGNIFICANT CHANGE UP
GLUCOSE SERPL-MCNC: 108 MG/DL — HIGH (ref 70–99)
GLUCOSE UR QL: NEGATIVE — SIGNIFICANT CHANGE UP
HCT VFR BLD CALC: 26.9 % — LOW (ref 39–50)
HGB BLD-MCNC: 9 G/DL — LOW (ref 13–17)
KETONES UR-MCNC: NEGATIVE — SIGNIFICANT CHANGE UP
LEUKOCYTE ESTERASE UR-ACNC: NEGATIVE — SIGNIFICANT CHANGE UP
MCHC RBC-ENTMCNC: 32.6 PG — SIGNIFICANT CHANGE UP (ref 27–34)
MCHC RBC-ENTMCNC: 33.5 GM/DL — SIGNIFICANT CHANGE UP (ref 32–36)
MCV RBC AUTO: 97.3 FL — SIGNIFICANT CHANGE UP (ref 80–100)
NITRITE UR-MCNC: NEGATIVE — SIGNIFICANT CHANGE UP
PH UR: 6 — SIGNIFICANT CHANGE UP (ref 5–8)
PLATELET # BLD AUTO: 351 K/UL — SIGNIFICANT CHANGE UP (ref 150–400)
POTASSIUM SERPL-MCNC: 4.3 MMOL/L — SIGNIFICANT CHANGE UP (ref 3.5–5.3)
POTASSIUM SERPL-SCNC: 4.3 MMOL/L — SIGNIFICANT CHANGE UP (ref 3.5–5.3)
PROT UR-MCNC: NEGATIVE — SIGNIFICANT CHANGE UP
RBC # BLD: 2.77 M/UL — LOW (ref 4.2–5.8)
RBC # FLD: 13.9 % — SIGNIFICANT CHANGE UP (ref 10.3–14.5)
SODIUM SERPL-SCNC: 135 MMOL/L — SIGNIFICANT CHANGE UP (ref 135–145)
SP GR SPEC: 1.01 — SIGNIFICANT CHANGE UP (ref 1.01–1.02)
UROBILINOGEN FLD QL: NEGATIVE — SIGNIFICANT CHANGE UP
WBC # BLD: 24.6 K/UL — HIGH (ref 3.8–10.5)
WBC # FLD AUTO: 24.6 K/UL — HIGH (ref 3.8–10.5)

## 2018-10-30 RX ORDER — SENNA PLUS 8.6 MG/1
1 TABLET ORAL ONCE
Qty: 0 | Refills: 0 | Status: DISCONTINUED | OUTPATIENT
Start: 2018-10-30 | End: 2018-11-02

## 2018-10-30 RX ORDER — SENNA PLUS 8.6 MG/1
2 TABLET ORAL AT BEDTIME
Qty: 0 | Refills: 0 | Status: DISCONTINUED | OUTPATIENT
Start: 2018-10-30 | End: 2018-11-05

## 2018-10-30 RX ORDER — DOCUSATE SODIUM 100 MG
100 CAPSULE ORAL DAILY
Qty: 0 | Refills: 0 | Status: DISCONTINUED | OUTPATIENT
Start: 2018-10-30 | End: 2018-11-05

## 2018-10-30 RX ORDER — HYDROMORPHONE HYDROCHLORIDE 2 MG/ML
0.5 INJECTION INTRAMUSCULAR; INTRAVENOUS; SUBCUTANEOUS ONCE
Qty: 0 | Refills: 0 | Status: DISCONTINUED | OUTPATIENT
Start: 2018-10-30 | End: 2018-10-30

## 2018-10-30 RX ORDER — HEPARIN SODIUM 5000 [USP'U]/ML
1500 INJECTION INTRAVENOUS; SUBCUTANEOUS
Qty: 25000 | Refills: 0 | Status: DISCONTINUED | OUTPATIENT
Start: 2018-10-30 | End: 2018-10-31

## 2018-10-30 RX ORDER — TAMSULOSIN HYDROCHLORIDE 0.4 MG/1
0.4 CAPSULE ORAL AT BEDTIME
Qty: 0 | Refills: 0 | Status: DISCONTINUED | OUTPATIENT
Start: 2018-10-30 | End: 2018-11-05

## 2018-10-30 RX ADMIN — PANTOPRAZOLE SODIUM 40 MILLIGRAM(S): 20 TABLET, DELAYED RELEASE ORAL at 05:55

## 2018-10-30 RX ADMIN — Medication 60 MILLIGRAM(S): at 05:55

## 2018-10-30 RX ADMIN — HYDROMORPHONE HYDROCHLORIDE 0.5 MILLIGRAM(S): 2 INJECTION INTRAMUSCULAR; INTRAVENOUS; SUBCUTANEOUS at 06:51

## 2018-10-30 RX ADMIN — OXYCODONE HYDROCHLORIDE 5 MILLIGRAM(S): 5 TABLET ORAL at 12:00

## 2018-10-30 RX ADMIN — OXYCODONE HYDROCHLORIDE 5 MILLIGRAM(S): 5 TABLET ORAL at 05:59

## 2018-10-30 RX ADMIN — Medication 81 MILLIGRAM(S): at 11:30

## 2018-10-30 RX ADMIN — Medication 100 MILLIGRAM(S): at 21:28

## 2018-10-30 RX ADMIN — TAMSULOSIN HYDROCHLORIDE 0.4 MILLIGRAM(S): 0.4 CAPSULE ORAL at 21:28

## 2018-10-30 RX ADMIN — HEPARIN SODIUM 13 UNIT(S)/HR: 5000 INJECTION INTRAVENOUS; SUBCUTANEOUS at 21:27

## 2018-10-30 RX ADMIN — HEPARIN SODIUM 15 UNIT(S)/HR: 5000 INJECTION INTRAVENOUS; SUBCUTANEOUS at 12:19

## 2018-10-30 RX ADMIN — OXYCODONE HYDROCHLORIDE 5 MILLIGRAM(S): 5 TABLET ORAL at 21:28

## 2018-10-30 RX ADMIN — ATENOLOL 25 MILLIGRAM(S): 25 TABLET ORAL at 05:54

## 2018-10-30 RX ADMIN — OXYCODONE HYDROCHLORIDE 5 MILLIGRAM(S): 5 TABLET ORAL at 21:58

## 2018-10-30 RX ADMIN — HEPARIN SODIUM 15 UNIT(S)/HR: 5000 INJECTION INTRAVENOUS; SUBCUTANEOUS at 16:11

## 2018-10-30 RX ADMIN — HYDROMORPHONE HYDROCHLORIDE 0.5 MILLIGRAM(S): 2 INJECTION INTRAMUSCULAR; INTRAVENOUS; SUBCUTANEOUS at 06:21

## 2018-10-30 RX ADMIN — HEPARIN SODIUM 16 UNIT(S)/HR: 5000 INJECTION INTRAVENOUS; SUBCUTANEOUS at 07:03

## 2018-10-30 RX ADMIN — OXYCODONE HYDROCHLORIDE 5 MILLIGRAM(S): 5 TABLET ORAL at 11:30

## 2018-10-30 RX ADMIN — OXYCODONE HYDROCHLORIDE 5 MILLIGRAM(S): 5 TABLET ORAL at 06:29

## 2018-10-30 RX ADMIN — SENNA PLUS 2 TABLET(S): 8.6 TABLET ORAL at 21:28

## 2018-10-30 RX ADMIN — SIMVASTATIN 20 MILLIGRAM(S): 20 TABLET, FILM COATED ORAL at 21:28

## 2018-10-30 RX ADMIN — LISINOPRIL 40 MILLIGRAM(S): 2.5 TABLET ORAL at 05:55

## 2018-10-30 NOTE — PROGRESS NOTE ADULT - SUBJECTIVE AND OBJECTIVE BOX
GENERAL SURGERY DAILY PROGRESS NOTE:       Subjective:  Patient seen and examined at bedside this am. Yesterday patient was transfused 2units PRBCs- responded appropriately. Heparin drip therapeutic. H/H stable. Patient also failed TOV after holguin removal- straight cath'd. No acute events overnight. Fasciotomy dressings changed at bedside. Silver nitrate and surgicel applied to sites 2/2 oozing.       Objective:    PE:  Gen: NAD  Resp: nonlabored respirations  CVS: RRR  Abd: soft, ND, NT, no rebound or guarding  Ext: right leg fasciotomy incisions oozing, healthy muscle, compartments soft, palpable DP  Neuro: AAOx3, no focal deficits    Vital Signs Last 24 Hrs  T(C): 37.2 (30 Oct 2018 09:15), Max: 37.2 (30 Oct 2018 01:46)  T(F): 98.9 (30 Oct 2018 09:15), Max: 98.9 (30 Oct 2018 01:46)  HR: 67 (30 Oct 2018 09:15) (60 - 96)  BP: 113/61 (30 Oct 2018 09:15) (113/61 - 172/71)  BP(mean): --  RR: 17 (30 Oct 2018 09:15) (17 - 20)  SpO2: 93% (30 Oct 2018 09:15) (93% - 98%)    I&O's Detail    29 Oct 2018 07:01  -  30 Oct 2018 07:00  --------------------------------------------------------  IN:    heparin Infusion: 400 mL    Oral Fluid: 600 mL  Total IN: 1000 mL    OUT:    Indwelling Catheter - Urethral: 350 mL    Intermittent Catheterization - Urethral: 450 mL    Voided: 430 mL  Total OUT: 1230 mL    Total NET: -230 mL      30 Oct 2018 07:01  -  30 Oct 2018 12:17  --------------------------------------------------------  IN:    heparin Infusion: 64 mL    Oral Fluid: 240 mL  Total IN: 304 mL    OUT:    Voided: 200 mL  Total OUT: 200 mL    Total NET: 104 mL          Daily     Daily     MEDICATIONS  (STANDING):  aspirin enteric coated 81 milliGRAM(s) Oral daily  ATENolol  Tablet 25 milliGRAM(s) Oral daily  fluticasone propionate 50 MICROgram(s)/spray Nasal Spray 1 Spray(s) Both Nostrils daily  heparin  Infusion 1600 Unit(s)/Hr (15 mL/Hr) IV Continuous <Continuous>  lisinopril 40 milliGRAM(s) Oral daily  NIFEdipine XL 60 milliGRAM(s) Oral daily  pantoprazole    Tablet 40 milliGRAM(s) Oral before breakfast  simvastatin 20 milliGRAM(s) Oral at bedtime  tamsulosin 0.4 milliGRAM(s) Oral at bedtime    MEDICATIONS  (PRN):  oxyCODONE    IR 5 milliGRAM(s) Oral every 4 hours PRN Moderate Pain (4 - 6)      LABS:                        9.0    24.6  )-----------( 351      ( 30 Oct 2018 06:08 )             26.9     10-30    135  |  95<L>  |  17  ----------------------------<  108<H>  4.3   |  31  |  0.75    Ca    8.9      30 Oct 2018 06:08  Phos  2.7     10-29  Mg     2.2     10-29      PTT - ( 30 Oct 2018 06:08 )  PTT:67.7 sec      RADIOLOGY & ADDITIONAL STUDIES: GENERAL SURGERY DAILY PROGRESS NOTE:       Subjective:  Patient seen and examined at bedside this am. Yesterday patient was transfused 2units PRBCs- responded appropriately. Heparin drip therapeutic. H/H stable. WBC rising. Patient also failed TOV after holguin removal- straight cath'd. No acute events overnight. Fasciotomy dressings changed at bedside. Silver nitrate and surgicel applied to sites 2/2 oozing.       Objective:    PE:  Gen: NAD  Resp: nonlabored respirations  CVS: RRR  Abd: soft, ND, NT, no rebound or guarding  Ext: right leg fasciotomy incisions oozing, healthy muscle, compartments soft, palpable DP  Neuro: AAOx3, no focal deficits    Vital Signs Last 24 Hrs  T(C): 37.2 (30 Oct 2018 09:15), Max: 37.2 (30 Oct 2018 01:46)  T(F): 98.9 (30 Oct 2018 09:15), Max: 98.9 (30 Oct 2018 01:46)  HR: 67 (30 Oct 2018 09:15) (60 - 96)  BP: 113/61 (30 Oct 2018 09:15) (113/61 - 172/71)  BP(mean): --  RR: 17 (30 Oct 2018 09:15) (17 - 20)  SpO2: 93% (30 Oct 2018 09:15) (93% - 98%)    I&O's Detail    29 Oct 2018 07:01  -  30 Oct 2018 07:00  --------------------------------------------------------  IN:    heparin Infusion: 400 mL    Oral Fluid: 600 mL  Total IN: 1000 mL    OUT:    Indwelling Catheter - Urethral: 350 mL    Intermittent Catheterization - Urethral: 450 mL    Voided: 430 mL  Total OUT: 1230 mL    Total NET: -230 mL      30 Oct 2018 07:01  -  30 Oct 2018 12:17  --------------------------------------------------------  IN:    heparin Infusion: 64 mL    Oral Fluid: 240 mL  Total IN: 304 mL    OUT:    Voided: 200 mL  Total OUT: 200 mL    Total NET: 104 mL          Daily     Daily     MEDICATIONS  (STANDING):  aspirin enteric coated 81 milliGRAM(s) Oral daily  ATENolol  Tablet 25 milliGRAM(s) Oral daily  fluticasone propionate 50 MICROgram(s)/spray Nasal Spray 1 Spray(s) Both Nostrils daily  heparin  Infusion 1600 Unit(s)/Hr (15 mL/Hr) IV Continuous <Continuous>  lisinopril 40 milliGRAM(s) Oral daily  NIFEdipine XL 60 milliGRAM(s) Oral daily  pantoprazole    Tablet 40 milliGRAM(s) Oral before breakfast  simvastatin 20 milliGRAM(s) Oral at bedtime  tamsulosin 0.4 milliGRAM(s) Oral at bedtime    MEDICATIONS  (PRN):  oxyCODONE    IR 5 milliGRAM(s) Oral every 4 hours PRN Moderate Pain (4 - 6)      LABS:                        9.0    24.6  )-----------( 351      ( 30 Oct 2018 06:08 )             26.9     10-30    135  |  95<L>  |  17  ----------------------------<  108<H>  4.3   |  31  |  0.75    Ca    8.9      30 Oct 2018 06:08  Phos  2.7     10-29  Mg     2.2     10-29      PTT - ( 30 Oct 2018 06:08 )  PTT:67.7 sec      RADIOLOGY & ADDITIONAL STUDIES:

## 2018-10-30 NOTE — PROGRESS NOTE ADULT - ASSESSMENT
ASSESSMENT: 73M with recent spine surgery now s/p RLE embolectomy for acute RLE arterial occlusion now s/p RLE fasciotomies     PLAN:  -- Heparin drip decreased  -- Pain control  -- f/u [facility]OP  -- Start flomax daily at bedtime  -- no longer trending CPK  -- Reg diet  -- OOB with PT  -- Elevate RLE   -- ACE bandage RLE w/ daily dressing changes   -- Return to OR Monday with PRS for fasciotomy closures  -- PT recommending [facility]     Vascular Surgery  [phone]

## 2018-10-31 LAB
ANION GAP SERPL CALC-SCNC: 10 MMOL/L — SIGNIFICANT CHANGE UP (ref 5–17)
APTT BLD: 47.6 SEC — HIGH (ref 27.5–36.3)
APTT BLD: 50.7 SEC — HIGH (ref 27.5–36.3)
APTT BLD: 76.4 SEC — HIGH (ref 27.5–36.3)
BUN SERPL-MCNC: 15 MG/DL — SIGNIFICANT CHANGE UP (ref 7–23)
CALCIUM SERPL-MCNC: 8.9 MG/DL — SIGNIFICANT CHANGE UP (ref 8.4–10.5)
CHLORIDE SERPL-SCNC: 96 MMOL/L — SIGNIFICANT CHANGE UP (ref 96–108)
CO2 SERPL-SCNC: 31 MMOL/L — SIGNIFICANT CHANGE UP (ref 22–31)
CREAT SERPL-MCNC: 0.74 MG/DL — SIGNIFICANT CHANGE UP (ref 0.5–1.3)
GLUCOSE SERPL-MCNC: 115 MG/DL — HIGH (ref 70–99)
HCT VFR BLD CALC: 26.6 % — LOW (ref 39–50)
HGB BLD-MCNC: 8.7 G/DL — LOW (ref 13–17)
MAGNESIUM SERPL-MCNC: 1.9 MG/DL — SIGNIFICANT CHANGE UP (ref 1.6–2.6)
MCHC RBC-ENTMCNC: 31.8 PG — SIGNIFICANT CHANGE UP (ref 27–34)
MCHC RBC-ENTMCNC: 32.5 GM/DL — SIGNIFICANT CHANGE UP (ref 32–36)
MCV RBC AUTO: 97.9 FL — SIGNIFICANT CHANGE UP (ref 80–100)
PHOSPHATE SERPL-MCNC: 3.3 MG/DL — SIGNIFICANT CHANGE UP (ref 2.5–4.5)
PLATELET # BLD AUTO: 405 K/UL — HIGH (ref 150–400)
POTASSIUM SERPL-MCNC: 4.3 MMOL/L — SIGNIFICANT CHANGE UP (ref 3.5–5.3)
POTASSIUM SERPL-SCNC: 4.3 MMOL/L — SIGNIFICANT CHANGE UP (ref 3.5–5.3)
RBC # BLD: 2.72 M/UL — LOW (ref 4.2–5.8)
RBC # FLD: 14.3 % — SIGNIFICANT CHANGE UP (ref 10.3–14.5)
SODIUM SERPL-SCNC: 137 MMOL/L — SIGNIFICANT CHANGE UP (ref 135–145)
WBC # BLD: 24.7 K/UL — HIGH (ref 3.8–10.5)
WBC # FLD AUTO: 24.7 K/UL — HIGH (ref 3.8–10.5)

## 2018-10-31 PROCEDURE — 71045 X-RAY EXAM CHEST 1 VIEW: CPT | Mod: 26

## 2018-10-31 RX ORDER — HYDROMORPHONE HYDROCHLORIDE 2 MG/ML
0.5 INJECTION INTRAMUSCULAR; INTRAVENOUS; SUBCUTANEOUS DAILY
Qty: 0 | Refills: 0 | Status: DISCONTINUED | OUTPATIENT
Start: 2018-11-01 | End: 2018-11-05

## 2018-10-31 RX ORDER — HEPARIN SODIUM 5000 [USP'U]/ML
1200 INJECTION INTRAVENOUS; SUBCUTANEOUS
Qty: 25000 | Refills: 0 | Status: DISCONTINUED | OUTPATIENT
Start: 2018-10-31 | End: 2018-11-02

## 2018-10-31 RX ORDER — MAGNESIUM SULFATE 500 MG/ML
2 VIAL (ML) INJECTION ONCE
Qty: 0 | Refills: 0 | Status: COMPLETED | OUTPATIENT
Start: 2018-10-31 | End: 2018-10-31

## 2018-10-31 RX ORDER — HYDROMORPHONE HYDROCHLORIDE 2 MG/ML
0.5 INJECTION INTRAMUSCULAR; INTRAVENOUS; SUBCUTANEOUS ONCE
Qty: 0 | Refills: 0 | Status: DISCONTINUED | OUTPATIENT
Start: 2018-10-31 | End: 2018-10-31

## 2018-10-31 RX ADMIN — SIMVASTATIN 20 MILLIGRAM(S): 20 TABLET, FILM COATED ORAL at 21:22

## 2018-10-31 RX ADMIN — HYDROMORPHONE HYDROCHLORIDE 0.5 MILLIGRAM(S): 2 INJECTION INTRAMUSCULAR; INTRAVENOUS; SUBCUTANEOUS at 07:14

## 2018-10-31 RX ADMIN — OXYCODONE HYDROCHLORIDE 5 MILLIGRAM(S): 5 TABLET ORAL at 19:59

## 2018-10-31 RX ADMIN — OXYCODONE HYDROCHLORIDE 5 MILLIGRAM(S): 5 TABLET ORAL at 15:50

## 2018-10-31 RX ADMIN — OXYCODONE HYDROCHLORIDE 5 MILLIGRAM(S): 5 TABLET ORAL at 16:20

## 2018-10-31 RX ADMIN — OXYCODONE HYDROCHLORIDE 5 MILLIGRAM(S): 5 TABLET ORAL at 20:30

## 2018-10-31 RX ADMIN — HEPARIN SODIUM 11 UNIT(S)/HR: 5000 INJECTION INTRAVENOUS; SUBCUTANEOUS at 04:46

## 2018-10-31 RX ADMIN — TAMSULOSIN HYDROCHLORIDE 0.4 MILLIGRAM(S): 0.4 CAPSULE ORAL at 21:22

## 2018-10-31 RX ADMIN — PANTOPRAZOLE SODIUM 40 MILLIGRAM(S): 20 TABLET, DELAYED RELEASE ORAL at 06:24

## 2018-10-31 RX ADMIN — HEPARIN SODIUM 11 UNIT(S)/HR: 5000 INJECTION INTRAVENOUS; SUBCUTANEOUS at 12:45

## 2018-10-31 RX ADMIN — ATENOLOL 25 MILLIGRAM(S): 25 TABLET ORAL at 06:24

## 2018-10-31 RX ADMIN — SENNA PLUS 2 TABLET(S): 8.6 TABLET ORAL at 21:22

## 2018-10-31 RX ADMIN — OXYCODONE HYDROCHLORIDE 5 MILLIGRAM(S): 5 TABLET ORAL at 10:58

## 2018-10-31 RX ADMIN — LISINOPRIL 40 MILLIGRAM(S): 2.5 TABLET ORAL at 06:24

## 2018-10-31 RX ADMIN — Medication 60 MILLIGRAM(S): at 06:24

## 2018-10-31 RX ADMIN — Medication 100 MILLIGRAM(S): at 11:01

## 2018-10-31 RX ADMIN — HEPARIN SODIUM 12 UNIT(S)/HR: 5000 INJECTION INTRAVENOUS; SUBCUTANEOUS at 21:22

## 2018-10-31 RX ADMIN — HYDROMORPHONE HYDROCHLORIDE 0.5 MILLIGRAM(S): 2 INJECTION INTRAMUSCULAR; INTRAVENOUS; SUBCUTANEOUS at 06:59

## 2018-10-31 RX ADMIN — Medication 81 MILLIGRAM(S): at 11:01

## 2018-10-31 RX ADMIN — Medication 50 GRAM(S): at 10:58

## 2018-10-31 RX ADMIN — OXYCODONE HYDROCHLORIDE 5 MILLIGRAM(S): 5 TABLET ORAL at 11:28

## 2018-10-31 NOTE — PROGRESS NOTE ADULT - ASSESSMENT
ASSESSMENT: 73M with recent spine surgery now s/p RLE embolectomy for acute RLE arterial occlusion now s/p RLE fasciotomies     PLAN:  -- Heparin drip to goal 50-60.   -- Pain control  -- Reg diet  -- OOB with PT  -- Elevate RLE   -- ACE bandage RLE w/ daily dressing changes   -- Possible return to OR Monday with PRS for fasciotomy closures  -- PT recommending [facility]    Vascular Surgery  [phone]

## 2018-10-31 NOTE — PROGRESS NOTE ADULT - SUBJECTIVE AND OBJECTIVE BOX
Surgery Progress Note     Subjective/24hour Events:   Patient seen and examined.   No acute events overnight.   Pain well controlled.   Tolerating diet without N/V.   Has large bowel movement yesterday.   Heparin drip decreased to goal PTT 50-60.     Vital Signs:  Vital Signs Last 24 Hrs  T(C): 37.4 (31 Oct 2018 06:09), Max: 37.7 (31 Oct 2018 01:18)  T(F): 99.4 (31 Oct 2018 06:09), Max: 99.8 (31 Oct 2018 01:18)  HR: 82 (31 Oct 2018 06:09) (67 - 82)  BP: 131/65 (31 Oct 2018 06:09) (113/61 - 146/66)  BP(mean): --  RR: 16 (31 Oct 2018 06:09) (16 - 20)  SpO2: 94% (31 Oct 2018 06:09) (92% - 97%)    CAPILLARY BLOOD GLUCOSE          I&O's Detail    30 Oct 2018 07:01  -  31 Oct 2018 07:00  --------------------------------------------------------  IN:    heparin Infusion: 139 mL    heparin Infusion: 197 mL    Oral Fluid: 1200 mL  Total IN: 1536 mL    OUT:    Voided: 1645 mL  Total OUT: 1645 mL    Total NET: -109 mL          MEDICATIONS  (STANDING):  aspirin enteric coated 81 milliGRAM(s) Oral daily  ATENolol  Tablet 25 milliGRAM(s) Oral daily  docusate sodium 100 milliGRAM(s) Oral daily  fluticasone propionate 50 MICROgram(s)/spray Nasal Spray 1 Spray(s) Both Nostrils daily  heparin  Infusion 1500 Unit(s)/Hr (11 mL/Hr) IV Continuous <Continuous>  lisinopril 40 milliGRAM(s) Oral daily  NIFEdipine XL 60 milliGRAM(s) Oral daily  pantoprazole    Tablet 40 milliGRAM(s) Oral before breakfast  senna 1 Tablet(s) Oral once  senna 2 Tablet(s) Oral at bedtime  simvastatin 20 milliGRAM(s) Oral at bedtime  tamsulosin 0.4 milliGRAM(s) Oral at bedtime    MEDICATIONS  (PRN):  oxyCODONE    IR 5 milliGRAM(s) Oral every 4 hours PRN Moderate Pain (4 - 6)      Physical Exam:  Gen: NAD, laying comfortably in bed, converses easily.   Lungs: Non labored breathing.   Ab: Soft, nontender, nondistended.   Ext: Dressing changed, fasciotomies without active bleeding. Compartments soft. Motor and sensation intact. Moves all 4 spontaneously.     Labs:    10-31    137  |  96  |  15  ----------------------------<  115<H>  4.3   |  31  |  0.74    Ca    8.9      31 Oct 2018 04:09  Phos  3.3     10-31  Mg     1.9     10-31                              8.7    24.7  )-----------( 405      ( 31 Oct 2018 04:09 )             26.6     PTT - ( 31 Oct 2018 04:08 )  PTT:76.4 sec    Imaging:

## 2018-11-01 DIAGNOSIS — F05 DELIRIUM DUE TO KNOWN PHYSIOLOGICAL CONDITION: ICD-10-CM

## 2018-11-01 LAB
ANION GAP SERPL CALC-SCNC: 10 MMOL/L — SIGNIFICANT CHANGE UP (ref 5–17)
APTT BLD: 51.3 SEC — HIGH (ref 27.5–36.3)
APTT BLD: 55.4 SEC — HIGH (ref 27.5–36.3)
BUN SERPL-MCNC: 16 MG/DL — SIGNIFICANT CHANGE UP (ref 7–23)
CALCIUM SERPL-MCNC: 8.5 MG/DL — SIGNIFICANT CHANGE UP (ref 8.4–10.5)
CHLORIDE SERPL-SCNC: 96 MMOL/L — SIGNIFICANT CHANGE UP (ref 96–108)
CO2 SERPL-SCNC: 29 MMOL/L — SIGNIFICANT CHANGE UP (ref 22–31)
CREAT SERPL-MCNC: 0.76 MG/DL — SIGNIFICANT CHANGE UP (ref 0.5–1.3)
GLUCOSE SERPL-MCNC: 122 MG/DL — HIGH (ref 70–99)
HCT VFR BLD CALC: 23.4 % — LOW (ref 39–50)
HCT VFR BLD CALC: 23.8 % — LOW (ref 39–50)
HCT VFR BLD CALC: 24 % — LOW (ref 39–50)
HGB BLD-MCNC: 7.6 G/DL — LOW (ref 13–17)
HGB BLD-MCNC: 7.6 G/DL — LOW (ref 13–17)
HGB BLD-MCNC: 7.8 G/DL — LOW (ref 13–17)
MAGNESIUM SERPL-MCNC: 2.1 MG/DL — SIGNIFICANT CHANGE UP (ref 1.6–2.6)
MCHC RBC-ENTMCNC: 31.2 PG — SIGNIFICANT CHANGE UP (ref 27–34)
MCHC RBC-ENTMCNC: 32 GM/DL — SIGNIFICANT CHANGE UP (ref 32–36)
MCHC RBC-ENTMCNC: 32.1 PG — SIGNIFICANT CHANGE UP (ref 27–34)
MCHC RBC-ENTMCNC: 32.3 PG — SIGNIFICANT CHANGE UP (ref 27–34)
MCHC RBC-ENTMCNC: 32.7 GM/DL — SIGNIFICANT CHANGE UP (ref 32–36)
MCHC RBC-ENTMCNC: 32.8 GM/DL — SIGNIFICANT CHANGE UP (ref 32–36)
MCV RBC AUTO: 97.7 FL — SIGNIFICANT CHANGE UP (ref 80–100)
MCV RBC AUTO: 97.9 FL — SIGNIFICANT CHANGE UP (ref 80–100)
MCV RBC AUTO: 98.5 FL — SIGNIFICANT CHANGE UP (ref 80–100)
PHOSPHATE SERPL-MCNC: 3.7 MG/DL — SIGNIFICANT CHANGE UP (ref 2.5–4.5)
PLATELET # BLD AUTO: 409 K/UL — HIGH (ref 150–400)
PLATELET # BLD AUTO: 430 K/UL — HIGH (ref 150–400)
PLATELET # BLD AUTO: 454 K/UL — HIGH (ref 150–400)
POTASSIUM SERPL-MCNC: 4.5 MMOL/L — SIGNIFICANT CHANGE UP (ref 3.5–5.3)
POTASSIUM SERPL-SCNC: 4.5 MMOL/L — SIGNIFICANT CHANGE UP (ref 3.5–5.3)
RBC # BLD: 2.39 M/UL — LOW (ref 4.2–5.8)
RBC # BLD: 2.43 M/UL — LOW (ref 4.2–5.8)
RBC # BLD: 2.44 M/UL — LOW (ref 4.2–5.8)
RBC # FLD: 14 % — SIGNIFICANT CHANGE UP (ref 10.3–14.5)
RBC # FLD: 14.2 % — SIGNIFICANT CHANGE UP (ref 10.3–14.5)
RBC # FLD: 14.3 % — SIGNIFICANT CHANGE UP (ref 10.3–14.5)
SODIUM SERPL-SCNC: 135 MMOL/L — SIGNIFICANT CHANGE UP (ref 135–145)
WBC # BLD: 24.6 K/UL — HIGH (ref 3.8–10.5)
WBC # BLD: 24.6 K/UL — HIGH (ref 3.8–10.5)
WBC # BLD: 25 K/UL — HIGH (ref 3.8–10.5)
WBC # FLD AUTO: 24.6 K/UL — HIGH (ref 3.8–10.5)
WBC # FLD AUTO: 24.6 K/UL — HIGH (ref 3.8–10.5)
WBC # FLD AUTO: 25 K/UL — HIGH (ref 3.8–10.5)

## 2018-11-01 PROCEDURE — 99222 1ST HOSP IP/OBS MODERATE 55: CPT

## 2018-11-01 PROCEDURE — 70450 CT HEAD/BRAIN W/O DYE: CPT | Mod: 26

## 2018-11-01 RX ORDER — HYDROMORPHONE HYDROCHLORIDE 2 MG/ML
2 INJECTION INTRAMUSCULAR; INTRAVENOUS; SUBCUTANEOUS ONCE
Qty: 0 | Refills: 0 | Status: DISCONTINUED | OUTPATIENT
Start: 2018-11-01 | End: 2018-11-01

## 2018-11-01 RX ORDER — HYDROMORPHONE HYDROCHLORIDE 2 MG/ML
0.5 INJECTION INTRAMUSCULAR; INTRAVENOUS; SUBCUTANEOUS ONCE
Qty: 0 | Refills: 0 | Status: DISCONTINUED | OUTPATIENT
Start: 2018-11-01 | End: 2018-11-01

## 2018-11-01 RX ADMIN — SIMVASTATIN 20 MILLIGRAM(S): 20 TABLET, FILM COATED ORAL at 22:42

## 2018-11-01 RX ADMIN — HEPARIN SODIUM 12 UNIT(S)/HR: 5000 INJECTION INTRAVENOUS; SUBCUTANEOUS at 11:16

## 2018-11-01 RX ADMIN — OXYCODONE HYDROCHLORIDE 5 MILLIGRAM(S): 5 TABLET ORAL at 22:41

## 2018-11-01 RX ADMIN — Medication 81 MILLIGRAM(S): at 11:13

## 2018-11-01 RX ADMIN — HYDROMORPHONE HYDROCHLORIDE 0.5 MILLIGRAM(S): 2 INJECTION INTRAMUSCULAR; INTRAVENOUS; SUBCUTANEOUS at 00:17

## 2018-11-01 RX ADMIN — HYDROMORPHONE HYDROCHLORIDE 0.5 MILLIGRAM(S): 2 INJECTION INTRAMUSCULAR; INTRAVENOUS; SUBCUTANEOUS at 06:20

## 2018-11-01 RX ADMIN — TAMSULOSIN HYDROCHLORIDE 0.4 MILLIGRAM(S): 0.4 CAPSULE ORAL at 22:42

## 2018-11-01 RX ADMIN — SENNA PLUS 2 TABLET(S): 8.6 TABLET ORAL at 22:42

## 2018-11-01 RX ADMIN — HYDROMORPHONE HYDROCHLORIDE 2 MILLIGRAM(S): 2 INJECTION INTRAMUSCULAR; INTRAVENOUS; SUBCUTANEOUS at 14:11

## 2018-11-01 RX ADMIN — PANTOPRAZOLE SODIUM 40 MILLIGRAM(S): 20 TABLET, DELAYED RELEASE ORAL at 06:21

## 2018-11-01 RX ADMIN — HYDROMORPHONE HYDROCHLORIDE 2 MILLIGRAM(S): 2 INJECTION INTRAMUSCULAR; INTRAVENOUS; SUBCUTANEOUS at 15:01

## 2018-11-01 RX ADMIN — LISINOPRIL 40 MILLIGRAM(S): 2.5 TABLET ORAL at 06:21

## 2018-11-01 RX ADMIN — HEPARIN SODIUM 12 UNIT(S)/HR: 5000 INJECTION INTRAVENOUS; SUBCUTANEOUS at 04:23

## 2018-11-01 RX ADMIN — OXYCODONE HYDROCHLORIDE 5 MILLIGRAM(S): 5 TABLET ORAL at 11:46

## 2018-11-01 RX ADMIN — Medication 60 MILLIGRAM(S): at 06:20

## 2018-11-01 RX ADMIN — Medication 100 MILLIGRAM(S): at 11:13

## 2018-11-01 RX ADMIN — OXYCODONE HYDROCHLORIDE 5 MILLIGRAM(S): 5 TABLET ORAL at 23:30

## 2018-11-01 RX ADMIN — ATENOLOL 25 MILLIGRAM(S): 25 TABLET ORAL at 06:20

## 2018-11-01 RX ADMIN — OXYCODONE HYDROCHLORIDE 5 MILLIGRAM(S): 5 TABLET ORAL at 11:15

## 2018-11-01 NOTE — BEHAVIORAL HEALTH ASSESSMENT NOTE - DETAILS
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

## 2018-11-01 NOTE — PROGRESS NOTE ADULT - SUBJECTIVE AND OBJECTIVE BOX
S: Pt seen and examined. Denies N/V.  Report    Vital Signs Last 24 Hrs  T(C): 37.6 (2018 05:59), Max: 37.8 (2018 01:43)  T(F): 99.7 (2018 05:59), Max: 100 (2018 01:43)  HR: 83 (2018 05:59) (70 - 94)  BP: 130/56 (2018 05:59) (128/72 - 159/71)  BP(mean): --  RR: 18 (2018 05:59) (18 - 18)  SpO2: 98% (2018 05:59) (93% - 98%)      Physical Exam:  Gen: NAD, laying comfortably in bed, converses easily.   Lungs: Non labored breathing.   Ab: Soft, nontender, nondistended.   Ext: Dressing changed, fasciotomies without active bleeding silver nitrate applied on upper distal edges on medial fastiotmy sites Compartments soft. Motor and sensation intact. Moves all 4 spontaneously.     I&O's Summary    31 Oct 2018 07:  -  2018 07:00  --------------------------------------------------------  IN: 1158 mL / OUT: 900 mL / NET: 258 mL      I&O's Detail    31 Oct 2018 07:  -  2018 07:00  --------------------------------------------------------  IN:    heparin Infusion: 66 mL    heparin Infusion: 132 mL    Oral Fluid: 960 mL  Total IN: 1158 mL    OUT:    Voided: 900 mL  Total OUT: 900 mL    Total NET: 258 mL          MEDICATIONS  (STANDING):  aspirin enteric coated 81 milliGRAM(s) Oral daily  ATENolol  Tablet 25 milliGRAM(s) Oral daily  docusate sodium 100 milliGRAM(s) Oral daily  fluticasone propionate 50 MICROgram(s)/spray Nasal Spray 1 Spray(s) Both Nostrils daily  heparin  Infusion 1200 Unit(s)/Hr (12 mL/Hr) IV Continuous <Continuous>  HYDROmorphone  Injectable 0.5 milliGRAM(s) IV Push daily  lisinopril 40 milliGRAM(s) Oral daily  NIFEdipine XL 60 milliGRAM(s) Oral daily  pantoprazole    Tablet 40 milliGRAM(s) Oral before breakfast  senna 1 Tablet(s) Oral once  senna 2 Tablet(s) Oral at bedtime  silver nitrate Applicator 1 Application(s) Topical once  simvastatin 20 milliGRAM(s) Oral at bedtime  tamsulosin 0.4 milliGRAM(s) Oral at bedtime    MEDICATIONS  (PRN):  oxyCODONE    IR 5 milliGRAM(s) Oral every 4 hours PRN Moderate Pain (4 - 6)      LABS:                        7.6    25.0  )-----------( 430      ( 2018 06:29 )             23.8     11    135  |  96  |  16  ----------------------------<  122<H>  4.5   |  29  |  0.76    Ca    8.5      2018 03:31  Phos  3.7       Mg     2.1           PTT - ( 2018 03:31 )  PTT:51.3 sec  Urinalysis Basic - ( 30 Oct 2018 18:23 )    Color: Light Yellow / Appearance: Clear / S.010 / pH: x  Gluc: x / Ketone: Negative  / Bili: Negative / Urobili: Negative   Blood: x / Protein: Negative / Nitrite: Negative   Leuk Esterase: Negative / RBC: x / WBC x   Sq Epi: x / Non Sq Epi: x / Bacteria: x

## 2018-11-01 NOTE — BEHAVIORAL HEALTH ASSESSMENT NOTE - ADDITIONAL DETAILS / COMMENTS
intermittent twitching of right hand/arm w/ eyes deviating to side seen when patient becomes inattentive. w/ "starring spells"

## 2018-11-01 NOTE — DIETITIAN INITIAL EVALUATION ADULT. - PERTINENT MEDS FT
MEDICATIONS  (STANDING):  aspirin enteric coated 81 milliGRAM(s) Oral daily  ATENolol  Tablet 25 milliGRAM(s) Oral daily  docusate sodium 100 milliGRAM(s) Oral daily  fluticasone propionate 50 MICROgram(s)/spray Nasal Spray 1 Spray(s) Both Nostrils daily  heparin  Infusion 1200 Unit(s)/Hr (12 mL/Hr) IV Continuous <Continuous>  HYDROmorphone  Injectable 0.5 milliGRAM(s) IV Push daily  lisinopril 40 milliGRAM(s) Oral daily  NIFEdipine XL 60 milliGRAM(s) Oral daily  pantoprazole    Tablet 40 milliGRAM(s) Oral before breakfast  senna 1 Tablet(s) Oral once  senna 2 Tablet(s) Oral at bedtime  silver nitrate Applicator 1 Application(s) Topical once  simvastatin 20 milliGRAM(s) Oral at bedtime  tamsulosin 0.4 milliGRAM(s) Oral at bedtime    MEDICATIONS  (PRN):  oxyCODONE    IR 5 milliGRAM(s) Oral every 4 hours PRN Moderate Pain (4 - 6)

## 2018-11-01 NOTE — DIETITIAN INITIAL EVALUATION ADULT. - ENERGY NEEDS
HT 68 inches, , -215 pounds, BMI 31.8,  pounds.  Dxd withRight left extremity occulusion s/p Right leg embolectomy, s/p RLE fasciotomy   Skin right foot wound,  fluids per team.

## 2018-11-01 NOTE — PROGRESS NOTE ADULT - ASSESSMENT
ASSESSMENT: 73M with recent spine surgery now s/p RLE embolectomy for acute RLE arterial occlusion now s/p RLE fasciotomies     PLAN:  -- Heparin drip to goal 50-60,check H/H for bleeding from fascitomy sites overnight, may need to hold hep drip.  -- Pain control  -- Reg diet  -- OOB with PT  -- Elevate RLE   -- ACE bandage RLE w/ daily dressing changes   -- Possible return to OR Monday with PRS for fasciotomy closures, f/u Dr. Suarez [facility]  -- PT recommending [facility]    [phone]

## 2018-11-01 NOTE — BEHAVIORAL HEALTH ASSESSMENT NOTE - NSBHCHARTREVIEWVS_PSY_A_CORE FT
Vital Signs Last 24 Hrs  T(C): 37.2 (01 Nov 2018 13:44), Max: 37.8 (01 Nov 2018 01:43)  T(F): 98.9 (01 Nov 2018 13:44), Max: 100 (01 Nov 2018 01:43)  HR: 67 (01 Nov 2018 15:09) (67 - 94)  BP: 113/58 (01 Nov 2018 15:09) (113/58 - 159/71)  BP(mean): --  RR: 12 (01 Nov 2018 15:09) (12 - 18)  SpO2: 95% (01 Nov 2018 15:09) (93% - 98%)

## 2018-11-01 NOTE — DIETITIAN INITIAL EVALUATION ADULT. - NUTRITION INTERVENTIONS
RDN to provide vanilla health shakes at lunch and dinner.  Family aware to order daily./nutrient dense snacks

## 2018-11-01 NOTE — BEHAVIORAL HEALTH ASSESSMENT NOTE - NSBHCHARTREVIEWLAB_PSY_A_CORE FT
Labs:    11-01    135  |  96  |  16  ----------------------------<  122<H>  4.5   |  29  |  0.76    Ca    8.5      01 Nov 2018 03:31  Phos  3.7     11-01  Mg     2.1     11-01        CBC Full  -  ( 01 Nov 2018 13:41 )  WBC Count : 24.6 K/uL  Hemoglobin : 7.8 g/dL  Hematocrit : 24.0 %  Platelet Count - Automated : 454 K/uL  Mean Cell Volume : 98.5 fl  Mean Cell Hemoglobin : 32.3 pg  Mean Cell Hemoglobin Concentration : 32.7 gm/dL  Auto Neutrophil # : x  Auto Lymphocyte # : x  Auto Monocyte # : x  Auto Eosinophil # : x  Auto Basophil # : x  Auto Neutrophil % : x  Auto Lymphocyte % : x  Auto Monocyte % : x  Auto Eosinophil % : x  Auto Basophil % : x

## 2018-11-01 NOTE — BEHAVIORAL HEALTH ASSESSMENT NOTE - NSBHCONSULTRECOMMENDOTHER_PSY_A_CORE FT
Please consult neurology for further evaluation. Concern for organic etiology regarding patient's inattentiveness.

## 2018-11-01 NOTE — BEHAVIORAL HEALTH ASSESSMENT NOTE - NSBHCONSULTWRKUPYES_PSY_A_CORE
brain imaging/eeg (please consult neuro)/imaging brain imaging/eeg (please consult neuro)/imaging/labs

## 2018-11-01 NOTE — BEHAVIORAL HEALTH ASSESSMENT NOTE - DIFFERENTIAL
Acute delirium possibly 2/2 other medical condition (r/o primary neurologic i.e. seizure, ischemia, vs. metabolic/infectious)

## 2018-11-01 NOTE — DIETITIAN INITIAL EVALUATION ADULT. - OTHER INFO
Patient seen for routine LOS assessment.  Patient found resting in bed, somewhat drowsy and not verbal due to sedation s/p pain meds. As per wife and daughter at bedside, patient has been in considerable pain and has had a decreased oral intake and wife noted eating less and smaller portions at home.  -212 as per wife.   Overall appetite and intake in hospital is poor.  Denies GI issues.  Receptive to trying Health shakes for increased calories and protein. Patient had been on coumadin in past and may be bridging back soon.  Discussed with wife regarding Vit K food and coumadin.  Wife reported that in 20 years, they had never been informed of the need to be consistent with Vit K intake.  Wife very appreciative of information.  Supplemented with Calcium and CoQ10 PTA>

## 2018-11-01 NOTE — BEHAVIORAL HEALTH ASSESSMENT NOTE - NSBHREFERDETAILS_PSY_A_CORE_FT
mood changes (withdrawn, low social interaction), family requesting psych consult   s/p RLE embolectomy in setting of new arterial occlusion

## 2018-11-01 NOTE — BEHAVIORAL HEALTH ASSESSMENT NOTE - OTHER
multiple hospitalizations w/ surgeries in the past 2 weeks continuous w/ abrupt pauses when patient become inattentive

## 2018-11-01 NOTE — BEHAVIORAL HEALTH ASSESSMENT NOTE - CASE SUMMARY
72 yo  man, no pphx who presented to [facility] w/ RLE pain and sensation loss, admitted for new arterial occlusion s/p embolectomy & fasciotomies. 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 Psychiatry consulted for mood changes, withdrawn. pt minimally verbal, confused, disoriented, poor memory, collateral obtained from family see above. rec neuro workup for possible new stroke, seizures, eeg, ct head. would not start psych meds at this time. no 1:1 needed.

## 2018-11-01 NOTE — BEHAVIORAL HEALTH ASSESSMENT NOTE - NSBHVIOLPROTECT_PSY_A_CORE
Insight into violence risk and need for management/treatment/Residential stability/Relationship stability/Good treatment reponse/ compliance

## 2018-11-01 NOTE — BEHAVIORAL HEALTH ASSESSMENT NOTE - NSBHCHARTREVIEWINVESTIGATE_PSY_A_CORE FT
< from: 12 Lead ECG (10.25.18 @ 14:08) >    Ventricular Rate 60 BPM    Atrial Rate 170 BPM    QRS Duration 94 ms    Q-T Interval 482 ms    QTC Calculation(Bezet) 482 ms    R Axis 41 degrees    T Axis 29 degrees    Diagnosis Line ATRIAL FIBRILLATION  PROLONGED QT  ABNORMAL ECG    < end of copied text >

## 2018-11-01 NOTE — BEHAVIORAL HEALTH ASSESSMENT NOTE - HPI (INCLUDE ILLNESS QUALITY, SEVERITY, DURATION, TIMING, CONTEXT, MODIFYING FACTORS, ASSOCIATED SIGNS AND SYMPTOMS)
74 yo......man who presented to [facility] w/ RLE pain and sensation loss, admitted for new arterial occlusion s/p embolectomy & fasciotomies. 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 Psychiatry consulted for.......    mood change (pre/post surgery): baseline usually active/ upbeat ........ now withdrawn (not interacting) AS PER FAMILY (REQUEST FOR PSYCH MADE BY FAMILY) 74 yo right-handed  man who presented to [facility] w/ RLE pain and sensation loss, admitted for new arterial occlusion s/p embolectomy & fasciotomies. 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 Further investigation with family revealed 2 day onset of inattentiveness, abruptly stops conversing in the middle of a conversation, then stares off, lasting less than a minute. Psychiatry consulted for any further recommendations. Neurology also to be consulted for further evaluation. Collateral information obtained from daughter and wife, who were at bedside. Patient lives at home w/ wife, retired , still volunteers on occasion, high functioning ADLs at baseline w/ some cognitive impairment (problems w/ short term recall) since his stroke in January 2018. Otherwise no psychiatric history including previous medications or hospitalizations. No family history of psychiatric problems, Mother passed from end-stage Parkinson's (80s) and Father from MI (50s). No hx of substance abuse or use of tobacco or recreational drugs, occasional wine drinker. No legal history, no history of suicide ideation or attempts, as per wife only has one time during lifetime of depressed mood (when newly diagnosed w/ Hodgkin's Lymphoma in 1970s, has been in remission ever since). Has 2 adult daughters.

## 2018-11-01 NOTE — BEHAVIORAL HEALTH ASSESSMENT NOTE - NSBHSUICPROTECTFACT_PSY_A_CORE
Identifies reasons for living/Future oriented/Supportive social network or family/Engaged in work or school/Ability to cope with stress/Positive therapeutic relationships/Responsibility to family and others

## 2018-11-01 NOTE — BEHAVIORAL HEALTH ASSESSMENT NOTE - SUMMARY
72 yo right-handed  man who presented to [facility] w/ RLE pain and sensation loss, admitted for new arterial occlusion s/p embolectomy & fasciotomies. 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 Further investigation with family revealed 2 day onset of inattentiveness, abruptly stops conversing in the middle of a conversation, then stares off, lasting less than a minute. Psychiatry consulted for any further recommendations. Neurology also to be consulted for further evaluation. Collateral information obtained from daughter and wife, who were at bedside. Patient lives at home w/ wife, retired , still volunteers on occasion, high functioning ADLs at baseline w/ some cognitive impairment (problems w/ short term recall) since his stroke in January 2018. Otherwise no psychiatric history including previous medications or hospitalizations. No family history of psychiatric problems, Mother passed from end-stage Parkinson's (80s) and Father from MI (50s). No hx of substance abuse or use of tobacco or recreational drugs, occasional wine drinker. No legal history, no history of suicide ideation or attempts, as per wife only has one time during lifetime of depressed mood (when newly diagnosed w/ Hodgkin's Lymphoma in 1970s, has been in remission ever since). Has 2 adult daughters.    Exam limited due to patient's altered level of consciousness during questioning.

## 2018-11-01 NOTE — PROGRESS NOTE ADULT - SUBJECTIVE AND OBJECTIVE BOX
Afeb VSS  Leg swelling improved    Plan:  Elevation and compression  OR monday for closure/vac  PT for AFO  OOB ambulate with assist  Upper body conditioning

## 2018-11-02 LAB
ANION GAP SERPL CALC-SCNC: 7 MMOL/L — SIGNIFICANT CHANGE UP (ref 5–17)
APTT BLD: 42.7 SEC — HIGH (ref 27.5–36.3)
APTT BLD: 60.3 SEC — HIGH (ref 27.5–36.3)
APTT BLD: 60.7 SEC — HIGH (ref 27.5–36.3)
BLD GP AB SCN SERPL QL: NEGATIVE — SIGNIFICANT CHANGE UP
BUN SERPL-MCNC: 24 MG/DL — HIGH (ref 7–23)
CALCIUM SERPL-MCNC: 8.8 MG/DL — SIGNIFICANT CHANGE UP (ref 8.4–10.5)
CHLORIDE SERPL-SCNC: 93 MMOL/L — LOW (ref 96–108)
CO2 SERPL-SCNC: 32 MMOL/L — HIGH (ref 22–31)
CREAT SERPL-MCNC: 0.97 MG/DL — SIGNIFICANT CHANGE UP (ref 0.5–1.3)
GLUCOSE SERPL-MCNC: 113 MG/DL — HIGH (ref 70–99)
HCT VFR BLD CALC: 22.4 % — LOW (ref 39–50)
HCT VFR BLD CALC: 24.3 % — LOW (ref 39–50)
HGB BLD-MCNC: 7.3 G/DL — LOW (ref 13–17)
HGB BLD-MCNC: 8.3 G/DL — LOW (ref 13–17)
MAGNESIUM SERPL-MCNC: 2.3 MG/DL — SIGNIFICANT CHANGE UP (ref 1.6–2.6)
MCHC RBC-ENTMCNC: 32 PG — SIGNIFICANT CHANGE UP (ref 27–34)
MCHC RBC-ENTMCNC: 32.5 GM/DL — SIGNIFICANT CHANGE UP (ref 32–36)
MCHC RBC-ENTMCNC: 33.4 PG — SIGNIFICANT CHANGE UP (ref 27–34)
MCHC RBC-ENTMCNC: 34.4 GM/DL — SIGNIFICANT CHANGE UP (ref 32–36)
MCV RBC AUTO: 97.1 FL — SIGNIFICANT CHANGE UP (ref 80–100)
MCV RBC AUTO: 98.2 FL — SIGNIFICANT CHANGE UP (ref 80–100)
PHOSPHATE SERPL-MCNC: 3.7 MG/DL — SIGNIFICANT CHANGE UP (ref 2.5–4.5)
PLATELET # BLD AUTO: 481 K/UL — HIGH (ref 150–400)
PLATELET # BLD AUTO: 500 K/UL — HIGH (ref 150–400)
POTASSIUM SERPL-MCNC: 4.6 MMOL/L — SIGNIFICANT CHANGE UP (ref 3.5–5.3)
POTASSIUM SERPL-SCNC: 4.6 MMOL/L — SIGNIFICANT CHANGE UP (ref 3.5–5.3)
RBC # BLD: 2.28 M/UL — LOW (ref 4.2–5.8)
RBC # BLD: 2.5 M/UL — LOW (ref 4.2–5.8)
RBC # FLD: 13.2 % — SIGNIFICANT CHANGE UP (ref 10.3–14.5)
RBC # FLD: 14.3 % — SIGNIFICANT CHANGE UP (ref 10.3–14.5)
RH IG SCN BLD-IMP: POSITIVE — SIGNIFICANT CHANGE UP
SODIUM SERPL-SCNC: 132 MMOL/L — LOW (ref 135–145)
WBC # BLD: 22.8 K/UL — HIGH (ref 3.8–10.5)
WBC # BLD: 23.8 K/UL — HIGH (ref 3.8–10.5)
WBC # FLD AUTO: 22.8 K/UL — HIGH (ref 3.8–10.5)
WBC # FLD AUTO: 23.8 K/UL — HIGH (ref 3.8–10.5)

## 2018-11-02 PROCEDURE — 99223 1ST HOSP IP/OBS HIGH 75: CPT

## 2018-11-02 RX ORDER — HEPARIN SODIUM 5000 [USP'U]/ML
1300 INJECTION INTRAVENOUS; SUBCUTANEOUS
Qty: 25000 | Refills: 0 | Status: DISCONTINUED | OUTPATIENT
Start: 2018-11-02 | End: 2018-11-05

## 2018-11-02 RX ORDER — POLYETHYLENE GLYCOL 3350 17 G/17G
17 POWDER, FOR SOLUTION ORAL DAILY
Qty: 0 | Refills: 0 | Status: DISCONTINUED | OUTPATIENT
Start: 2018-11-02 | End: 2018-11-05

## 2018-11-02 RX ADMIN — LISINOPRIL 40 MILLIGRAM(S): 2.5 TABLET ORAL at 05:26

## 2018-11-02 RX ADMIN — HEPARIN SODIUM 13 UNIT(S)/HR: 5000 INJECTION INTRAVENOUS; SUBCUTANEOUS at 14:56

## 2018-11-02 RX ADMIN — POLYETHYLENE GLYCOL 3350 17 GRAM(S): 17 POWDER, FOR SOLUTION ORAL at 12:33

## 2018-11-02 RX ADMIN — SENNA PLUS 2 TABLET(S): 8.6 TABLET ORAL at 20:41

## 2018-11-02 RX ADMIN — HYDROMORPHONE HYDROCHLORIDE 0.5 MILLIGRAM(S): 2 INJECTION INTRAMUSCULAR; INTRAVENOUS; SUBCUTANEOUS at 05:53

## 2018-11-02 RX ADMIN — SIMVASTATIN 20 MILLIGRAM(S): 20 TABLET, FILM COATED ORAL at 20:41

## 2018-11-02 RX ADMIN — TAMSULOSIN HYDROCHLORIDE 0.4 MILLIGRAM(S): 0.4 CAPSULE ORAL at 20:41

## 2018-11-02 RX ADMIN — Medication 81 MILLIGRAM(S): at 12:09

## 2018-11-02 RX ADMIN — OXYCODONE HYDROCHLORIDE 5 MILLIGRAM(S): 5 TABLET ORAL at 20:41

## 2018-11-02 RX ADMIN — HYDROMORPHONE HYDROCHLORIDE 0.5 MILLIGRAM(S): 2 INJECTION INTRAMUSCULAR; INTRAVENOUS; SUBCUTANEOUS at 06:05

## 2018-11-02 RX ADMIN — Medication 100 MILLIGRAM(S): at 12:09

## 2018-11-02 RX ADMIN — PANTOPRAZOLE SODIUM 40 MILLIGRAM(S): 20 TABLET, DELAYED RELEASE ORAL at 05:26

## 2018-11-02 RX ADMIN — ATENOLOL 25 MILLIGRAM(S): 25 TABLET ORAL at 05:26

## 2018-11-02 RX ADMIN — HEPARIN SODIUM 13 UNIT(S)/HR: 5000 INJECTION INTRAVENOUS; SUBCUTANEOUS at 21:25

## 2018-11-02 RX ADMIN — HEPARIN SODIUM 13 UNIT(S)/HR: 5000 INJECTION INTRAVENOUS; SUBCUTANEOUS at 08:33

## 2018-11-02 RX ADMIN — OXYCODONE HYDROCHLORIDE 5 MILLIGRAM(S): 5 TABLET ORAL at 21:20

## 2018-11-02 RX ADMIN — Medication 60 MILLIGRAM(S): at 05:26

## 2018-11-02 NOTE — CONSULT NOTE ADULT - ASSESSMENT
Patient is a 73 year old man with PMH of atrial fibrillation (AC held for recent surgery), HTN, Hodgkin's lymphoma s/p splenectomy (1975), hip replacement, and MOE s/p recent RLE fasciotomies w/ AMS yesterday described as "fogginess" and difficulty concentrating, with some staring episodes after receiving dilaudid. Patient's mental status is now improving. Neurologic exam reveals impaired recall and attention with no focal deficits. CTH shows no acute pathology.    Impression: Likely encephalopathy 2/2 medication effect- lower suspicion for TIA given description of event, also lower suspicion for seizure     Recommendations:  - Routine EEG to r/o epileptiform abnormalities  - Pending normal EEG, no further neurologic workup. Would avoid sedating medications if possible.

## 2018-11-02 NOTE — CONSULT NOTE ADULT - ATTENDING COMMENTS
Patient seen and examined. He still has some word finding problems, could not name any presidents, or spell words backwords. Because of this, would get MRI brain w/o contrast. Patient seen and examined. He still has some word finding problems, could not name any presidents, or spell words backwords. Because of this, would get MRI brain w/o contrast and routine EEG

## 2018-11-02 NOTE — CONSULT NOTE ADULT - SUBJECTIVE AND OBJECTIVE BOX
Neurology Consult Note    Name  RACHEL ANNE    HPI:  Patient is a 73 year old man with PMH of atrial fibrillation (AC held for recent surgery), HTN, Hodgkin's lymphoma s/p splenectomy (1975), hip replacement, and MOE presented to the ED with right lower extremity pain and decreased sensation. He recently underwent spine surgery on 10/19 for L3/L4 hardware revision and has been off anticoagulation since 10/18. He takes Coumadin and was bridged with Lovenox. He was discharged on 10/23 on aspirin and has not yet restarted anticoagulation. Of note, he had a CVA in Jan 2018 while off anticoagulation for spine surgery. Patient is now s/p RLE embolectomy for acute RLE arterial occlusion now s/p RLE fasciotomies. Neurology consulted for AMS yesterday. Pt received Dilaudid and then several hours later was noted that have staring episodes. Patient recalls this, says he was having trouble concentrating when having conversations, would start paying attention to something else. Today, he feels like his concentration is improved but he is still not entirely back to baseline. Denies vision changes, focal weakness, numbness, or trouble with language. No prior history of seizure.     Review of Systems:  CONSTITUTIONAL:  No weight loss, fever, chills, weakness or fatigue.  HEENT:  Eyes:  No visual loss, blurred vision, double vision or yellow sclerae. Ears, Nose, Throat:  No hearing loss, sneezing, congestion, runny nose or sore throat.  SKIN:  No rash or itching.  CARDIOVASCULAR:  No chest pain, chest pressure or chest discomfort. No palpitations or edema.  RESPIRATORY:  No shortness of breath, cough or sputum.  GASTROINTESTINAL:  No anorexia, nausea, vomiting or diarrhea. No abdominal pain or blood.  GENITOURINARY: No Burning on urination.   NEUROLOGICAL:  see HPI  MUSCULOSKELETAL:  See HPI    MEDICATIONS  (STANDING):  aspirin enteric coated 81 milliGRAM(s) Oral daily  ATENolol  Tablet 25 milliGRAM(s) Oral daily  docusate sodium 100 milliGRAM(s) Oral daily  fluticasone propionate 50 MICROgram(s)/spray Nasal Spray 1 Spray(s) Both Nostrils daily  heparin  Infusion 1300 Unit(s)/Hr (13 mL/Hr) IV Continuous <Continuous>  HYDROmorphone  Injectable 0.5 milliGRAM(s) IV Push daily  lisinopril 40 milliGRAM(s) Oral daily  NIFEdipine XL 60 milliGRAM(s) Oral daily  pantoprazole    Tablet 40 milliGRAM(s) Oral before breakfast  polyethylene glycol 3350 17 Gram(s) Oral daily  senna 2 Tablet(s) Oral at bedtime  silver nitrate Applicator 1 Application(s) Topical once  simvastatin 20 milliGRAM(s) Oral at bedtime  tamsulosin 0.4 milliGRAM(s) Oral at bedtime    MEDICATIONS  (PRN):  oxyCODONE    IR 5 milliGRAM(s) Oral every 4 hours PRN Moderate Pain (4 - 6)      Allergies  No Known Allergies    PAST MEDICAL & SURGICAL HISTORY:  Exposure to toxin: 9/11   Spondylolisthesis  Spinal stenosis: L3-L4  CVA (cerebral vascular accident): 1/2018 - attributed to no AC for 11 days preop/postop spine surgery - presented to ED with slurred speech, residual ST memory loss, per pt  Osteoarthritis  BPH (benign prostatic hyperplasia)  MOE (obstructive sleep apnea): positive sleep study many years ago, was recommended to use CPAP, patient non-compliant  GERD (gastroesophageal reflux disease)  HTN (hypertension)  Atrial fibrillation: over 20 years  Hodgkin lymphoma: 1975  History of lumbar spinal fusion: 1/5/2018  History of cardioversion: for AF - &quot;many years ago&quot; - unsuccessful  H/O Spinal surgery: 1/5/2018  S/P hip replacement, right: 2014  S/P splenectomy: 1975    FH: NC    SH: Social EtOH drinker, denies drug use.    Objective:   Vital Signs Last 24 Hrs  T(C): 36.6 (02 Nov 2018 12:45), Max: 37.4 (02 Nov 2018 01:09)  T(F): 97.9 (02 Nov 2018 12:45), Max: 99.4 (02 Nov 2018 01:09)  HR: 75 (02 Nov 2018 12:45) (65 - 86)  BP: 101/58 (02 Nov 2018 12:45) (101/58 - 143/73)  BP(mean): --  RR: 17 (02 Nov 2018 12:45) (12 - 18)  SpO2: 96% (02 Nov 2018 12:45) (93% - 97%)    General Exam:   General appearance: No acute distress                   Neurological Exam:  Mental Status: AAOx3, speech fluent, follows commands across midline, repetition and naming intact, registration 3/3, recall 1/3 with prompting, spells WORLD backwards, difficulty with serial 7's      Cranial Nerves: PERRL, EOMI, VFF, V1-3 intact, face symmetric, tongue and uvula midline, no dysarthria    Motor:   Tone: normal.                  Strength:     [] Upper extremity                      Delt       Bicep    Tricep                                                  R         5/5 5/5 5/5 5/5                                               L          5/5 5/5 5/5 5/5  [] Lower extremity                       HF          KE          KF        DF         PF                                               R        5/5 5/5 5/5 5/5 5/5                                               L         3/5        3/5       3/5       3/5        3/5 (limited by pain and recent surgeries)  Pronator drift: none                 Tremor: No resting, postural or action tremor.  No myoclonus.    Sensation: intact to light touch x 4 extremities    Deep Tendon Reflexes: 1+ bilateral biceps, triceps, brachioradialis, patella and ankle    Coord: No ataxia on FTN b/l      Other:                        7.3    22.8  )-----------( 500      ( 02 Nov 2018 08:22 )             22.4     11-02    132<L>  |  93<L>  |  24<H>  ----------------------------<  113<H>  4.6   |  32<H>  |  0.97    Ca    8.8      02 Nov 2018 08:22  Phos  3.7     11-02  Mg     2.3     11-02    PTT - ( 02 Nov 2018 07:08 )  PTT:42.7 sec    Radiology    < from: CT Head No Cont (11.01.18 @ 18:43) >  IMPRESSION:    No significant change from the prior exam.    No mass effect, hemorrhage or evidence of acute intracranial pathology.    < end of copied text >

## 2018-11-02 NOTE — PROGRESS NOTE ADULT - ASSESSMENT
ASSESSMENT: 73M with recent spine surgery now s/p RLE embolectomy for acute RLE arterial occlusion now s/p RLE fasciotomies     PLAN:  - Heparin drip to goal 50-60  - Pain control-- Reg diet  - OOB with PT  - Elevate RLE   - ACE bandage RLE w/ daily dressing changes   - Possible return to OR Monday with PRS for fasciotomy closures, f/u Dr. Suarez [facility]  - PT recommending DANIEL  - f/u neurology recommendations  - Miralax added  - f/u C. diff    [id number]

## 2018-11-02 NOTE — CONSULT NOTE ADULT - SUBJECTIVE AND OBJECTIVE BOX
Requesting Physician : Dr. Bynum     Reason for Consultation: Afib    HISTORY OF PRESENT ILLNESS:  72 yo M with history of Afib (was temporarily off ac for recent spine surgery), history of CVA, HTN, Hodgkin's lymphoma, MOE who is being seen for management of afib.  The patient was admitted with right leg pain and was diagnosed with arterial occlusion below the knee.  He subsequently went to the OR for embolectomy of right AT and peroneal artery.  The patient has no cardiac complaints.  No chest pain, sob, palpitations, or syncope.  He has been Rx ac for afib but it was temporarily held for spine srugery that was performed on 10/19/18.        PAST MEDICAL & SURGICAL HISTORY:  Exposure to toxin: 9/11   Spondylolisthesis  Spinal stenosis: L3-L4  CVA (cerebral vascular accident): 1/2018 - attributed to no AC for 11 days preop/postop spine surgery - presented to ED with slurred speech, residual ST memory loss, per pt  Osteoarthritis  BPH (benign prostatic hyperplasia)  MOE (obstructive sleep apnea): positive sleep study many years ago, was recommended to use CPAP, patient non-compliant  GERD (gastroesophageal reflux disease)  HTN (hypertension)  Atrial fibrillation: over 20 years  Hodgkin lymphoma: 1975  History of lumbar spinal fusion: 1/5/2018  History of cardioversion: for AF - &quot;many years ago&quot; - unsuccessful  S/P hip replacement, right: 2014  S/P splenectomy: 1975          MEDICATIONS:  MEDICATIONS  (STANDING):  aspirin enteric coated 81 milliGRAM(s) Oral daily  ATENolol  Tablet 25 milliGRAM(s) Oral daily  docusate sodium 100 milliGRAM(s) Oral daily  fluticasone propionate 50 MICROgram(s)/spray Nasal Spray 1 Spray(s) Both Nostrils daily  heparin  Infusion 1300 Unit(s)/Hr (13 mL/Hr) IV Continuous <Continuous>  HYDROmorphone  Injectable 0.5 milliGRAM(s) IV Push daily  lisinopril 40 milliGRAM(s) Oral daily  NIFEdipine XL 60 milliGRAM(s) Oral daily  pantoprazole    Tablet 40 milliGRAM(s) Oral before breakfast  polyethylene glycol 3350 17 Gram(s) Oral daily  senna 2 Tablet(s) Oral at bedtime  silver nitrate Applicator 1 Application(s) Topical once  simvastatin 20 milliGRAM(s) Oral at bedtime  tamsulosin 0.4 milliGRAM(s) Oral at bedtime      Allergies    No Known Allergies    Intolerances        FAMILY HISTORY:    Non-contributary for premature coronary disease or sudden cardiac death    SOCIAL HISTORY:    [x ] Non-smoker  [ ] Smoker  [ ] Alcohol      REVIEW OF SYSTEMS:  [ ]chest pain  [  ]shortness of breath  [  ]palpitations  [  ]syncope  [ ]near syncope [ ]upper extremity weakness   [ ] lower extremity weakness  [  ]diplopia  [  ]altered mental status   [  ]fevers  [ ]chills [ ]nausea  [ ]vomitting  [  ]dysphagia    [ ]abdominal pain  [ ]melena  [ ]BRBPR    [  ]epistaxis  [  ]rash    [ ]lower extremity edema        [x ] All others negative	  [ ] Unable to obtain    PHYSICAL EXAM:  T(C): 37 (11-02-18 @ 13:10), Max: 37.4 (11-02-18 @ 01:09)  HR: 83 (11-02-18 @ 13:10) (65 - 86)  BP: 103/58 (11-02-18 @ 13:10) (101/58 - 143/73)  RR: 17 (11-02-18 @ 13:10) (16 - 18)  SpO2: 95% (11-02-18 @ 13:10) (95% - 97%)  Wt(kg): --  I&O's Summary    01 Nov 2018 07:01  -  02 Nov 2018 07:00  --------------------------------------------------------  IN: 1136 mL / OUT: 900 mL / NET: 236 mL    02 Nov 2018 07:01  -  02 Nov 2018 16:19  --------------------------------------------------------  IN: 480 mL / OUT: 450 mL / NET: 30 mL          	  Lymphatic: No lymphadenopathy , no edema  Cardiovascular: Normal S1 S2,IRRR No JVD, No murmurs ,   Respiratory: Lungs clear to auscultation, normal effort 	  Gastrointestinal:  Soft, Non-tender, + BS	  Skin: No rashes, No ecchymoses, No cyanosis, warm to touch  Psychiatry:  Mood & affect appropriate      TELEMETRY: 	    ECG: Afib 	  RADIOLOGY:  OTHER:     DIAGNOSTIC TESTING:  [ ] Echocardiogram:   [ ]  Catheterization:  [ ] Stress Test:    	  	  LABS:	 	    CARDIAC MARKERS:                              7.3    22.8  )-----------( 500      ( 02 Nov 2018 08:22 )             22.4     11-02    132<L>  |  93<L>  |  24<H>  ----------------------------<  113<H>  4.6   |  32<H>  |  0.97    Ca    8.8      02 Nov 2018 08:22  Phos  3.7     11-02  Mg     2.3     11-02      proBNP:   Lipid Profile:   HgA1c:   TSH:     ASSESSMENT/PLAN: 	73yMale with history of Afib, HTN, CVA, MOE admitted with RLE arterial occlusion now s/p embolectomy of right AT and peroneal artery.   -Recommend lifelong ac for afib  -currently on hep gtt - continue if no contraindications  -rate control strategy for afib recommended - HR well controlled; continue with atenolol  -follow up vascular    Jaz Varghese MD

## 2018-11-02 NOTE — PROGRESS NOTE ADULT - SUBJECTIVE AND OBJECTIVE BOX
GENERAL SURGERY DAILY PROGRESS NOTE:       Subjective:  Patient seen and examined at bedside this am. Yesterday patient's b/l fasciotomies extended at bedside. Heparin drip restarted after. Patient with AMS s/p bedside procedure. Neurology consulted. Stat head CT ordered. Psych was also consulted to see patient for depression. OT consulted to splint RLE for foot drop. No acute events overnight. Fasciotomy dressings changed at beside this AM. Heparin drip adjusted. H/H stable. Continues to have elevated WBC.        Objective:    PE:  Gen: NAD  Resp: nonlabored  CVS: RRR  Abd: soft, ND, NT, no rebound or guarding  Ext: RLE fasciotomy incisions with viable muscle, oozing, dressed with xeroform/gauze/cling/ACE, DP/PT palpable. Unable to dorsiflex toes.   Neuro: AAOx3, no focal deficits    Vital Signs Last 24 Hrs  T(C): 37 (2018 05:10), Max: 37.6 (2018 09:33)  T(F): 98.6 (2018 05:10), Max: 99.7 (2018 09:33)  HR: 81 (2018 05:10) (65 - 86)  BP: 131/67 (2018 05:10) (113/58 - 143/73)  BP(mean): --  RR: 18 (2018 05:10) (12 - 18)  SpO2: 95% (2018 05:10) (93% - 97%)    I&O's Detail    2018 07:01  -  2018 07:00  --------------------------------------------------------  IN:    heparin Infusion: 276 mL    Oral Fluid: 860 mL  Total IN: 1136 mL    OUT:    Voided: 900 mL  Total OUT: 900 mL    Total NET: 236 mL          Daily     Daily Weight in k.8 (2018 15:00)    MEDICATIONS  (STANDING):  aspirin enteric coated 81 milliGRAM(s) Oral daily  ATENolol  Tablet 25 milliGRAM(s) Oral daily  docusate sodium 100 milliGRAM(s) Oral daily  fluticasone propionate 50 MICROgram(s)/spray Nasal Spray 1 Spray(s) Both Nostrils daily  heparin  Infusion 1300 Unit(s)/Hr (13 mL/Hr) IV Continuous <Continuous>  HYDROmorphone  Injectable 0.5 milliGRAM(s) IV Push daily  lisinopril 40 milliGRAM(s) Oral daily  NIFEdipine XL 60 milliGRAM(s) Oral daily  pantoprazole    Tablet 40 milliGRAM(s) Oral before breakfast  senna 1 Tablet(s) Oral once  senna 2 Tablet(s) Oral at bedtime  silver nitrate Applicator 1 Application(s) Topical once  simvastatin 20 milliGRAM(s) Oral at bedtime  tamsulosin 0.4 milliGRAM(s) Oral at bedtime    MEDICATIONS  (PRN):  oxyCODONE    IR 5 milliGRAM(s) Oral every 4 hours PRN Moderate Pain (4 - 6)      LABS:                        7.8    24.6  )-----------( 454      ( 2018 13:41 )             24.0         135  |  96  |  16  ----------------------------<  122<H>  4.5   |  29  |  0.76    Ca    8.5      2018 03:31  Phos  3.7       Mg     2.1           PTT - ( 2018 07:08 )  PTT:42.7 sec      RADIOLOGY & ADDITIONAL STUDIES:

## 2018-11-03 DIAGNOSIS — E87.1 HYPO-OSMOLALITY AND HYPONATREMIA: ICD-10-CM

## 2018-11-03 DIAGNOSIS — N17.9 ACUTE KIDNEY FAILURE, UNSPECIFIED: ICD-10-CM

## 2018-11-03 LAB
ANION GAP SERPL CALC-SCNC: 13 MMOL/L — SIGNIFICANT CHANGE UP (ref 5–17)
APPEARANCE UR: CLEAR — SIGNIFICANT CHANGE UP
APTT BLD: 52.3 SEC — HIGH (ref 27.5–36.3)
BASE EXCESS BLDA CALC-SCNC: 6.8 MMOL/L — HIGH (ref -2–2)
BASE EXCESS BLDA CALC-SCNC: 6.8 MMOL/L — HIGH (ref -2–2)
BILIRUB UR-MCNC: NEGATIVE — SIGNIFICANT CHANGE UP
BUN SERPL-MCNC: 33 MG/DL — HIGH (ref 7–23)
CALCIUM SERPL-MCNC: 8.3 MG/DL — LOW (ref 8.4–10.5)
CHLORIDE SERPL-SCNC: 89 MMOL/L — LOW (ref 96–108)
CHLORIDE UR-SCNC: <35 MMOL/L — SIGNIFICANT CHANGE UP
CO2 BLDA-SCNC: 30 MMOL/L — SIGNIFICANT CHANGE UP (ref 22–30)
CO2 BLDA-SCNC: 30 MMOL/L — SIGNIFICANT CHANGE UP (ref 22–30)
CO2 SERPL-SCNC: 27 MMOL/L — SIGNIFICANT CHANGE UP (ref 22–31)
COHGB MFR BLDA: 2.2 % — HIGH (ref 0–1.5)
COLOR SPEC: YELLOW — SIGNIFICANT CHANGE UP
CREAT ?TM UR-MCNC: 115 MG/DL — SIGNIFICANT CHANGE UP
CREAT SERPL-MCNC: 1.31 MG/DL — HIGH (ref 0.5–1.3)
DIFF PNL FLD: NEGATIVE — SIGNIFICANT CHANGE UP
GAS PNL BLDA: SIGNIFICANT CHANGE UP
GLUCOSE SERPL-MCNC: 123 MG/DL — HIGH (ref 70–99)
GLUCOSE UR QL: NEGATIVE — SIGNIFICANT CHANGE UP
HCO3 BLDA-SCNC: 29 MMOL/L — SIGNIFICANT CHANGE UP (ref 21–29)
HCO3 BLDA-SCNC: 29 MMOL/L — SIGNIFICANT CHANGE UP (ref 21–29)
HCT VFR BLD CALC: 22.7 % — LOW (ref 39–50)
HCT VFR BLD CALC: 22.8 % — LOW (ref 39–50)
HGB BLD-MCNC: 7.6 G/DL — LOW (ref 13–17)
HGB BLD-MCNC: 7.8 G/DL — LOW (ref 13–17)
HGB BLDA-MCNC: 8 G/DL — LOW (ref 13.1–17.1)
KETONES UR-MCNC: NEGATIVE — SIGNIFICANT CHANGE UP
LEUKOCYTE ESTERASE UR-ACNC: NEGATIVE — SIGNIFICANT CHANGE UP
MAGNESIUM SERPL-MCNC: 2.4 MG/DL — SIGNIFICANT CHANGE UP (ref 1.6–2.6)
MCHC RBC-ENTMCNC: 32.5 PG — SIGNIFICANT CHANGE UP (ref 27–34)
MCHC RBC-ENTMCNC: 32.8 PG — SIGNIFICANT CHANGE UP (ref 27–34)
MCHC RBC-ENTMCNC: 33.5 GM/DL — SIGNIFICANT CHANGE UP (ref 32–36)
MCHC RBC-ENTMCNC: 34.2 GM/DL — SIGNIFICANT CHANGE UP (ref 32–36)
MCV RBC AUTO: 96 FL — SIGNIFICANT CHANGE UP (ref 80–100)
MCV RBC AUTO: 96.9 FL — SIGNIFICANT CHANGE UP (ref 80–100)
METHGB MFR BLDA: 1.4 % — SIGNIFICANT CHANGE UP (ref 0–15)
NITRITE UR-MCNC: NEGATIVE — SIGNIFICANT CHANGE UP
O2 CT VFR BLDA CALC: 11 ML/DL — LOW (ref 18–22)
OSMOLALITY UR: 424 MOS/KG — SIGNIFICANT CHANGE UP (ref 300–900)
OXYHGB MFR BLDA: 96 % — SIGNIFICANT CHANGE UP (ref 94–98)
PCO2 BLDA: 32 MMHG — SIGNIFICANT CHANGE UP (ref 32–46)
PCO2 BLDA: 32 MMHG — SIGNIFICANT CHANGE UP (ref 32–46)
PH BLDA: 7.56 — HIGH (ref 7.35–7.45)
PH BLDA: 7.56 — HIGH (ref 7.35–7.45)
PH UR: 5.5 — SIGNIFICANT CHANGE UP (ref 5–8)
PHOSPHATE SERPL-MCNC: 3.3 MG/DL — SIGNIFICANT CHANGE UP (ref 2.5–4.5)
PLATELET # BLD AUTO: 490 K/UL — HIGH (ref 150–400)
PLATELET # BLD AUTO: 547 K/UL — HIGH (ref 150–400)
PO2 BLDA: 147 MMHG — HIGH (ref 74–108)
PO2 BLDA: 147 MMHG — HIGH (ref 74–108)
POTASSIUM SERPL-MCNC: 4 MMOL/L — SIGNIFICANT CHANGE UP (ref 3.5–5.3)
POTASSIUM SERPL-SCNC: 4 MMOL/L — SIGNIFICANT CHANGE UP (ref 3.5–5.3)
PROT UR-MCNC: NEGATIVE — SIGNIFICANT CHANGE UP
RBC # BLD: 2.34 M/UL — LOW (ref 4.2–5.8)
RBC # BLD: 2.38 M/UL — LOW (ref 4.2–5.8)
RBC # FLD: 13.7 % — SIGNIFICANT CHANGE UP (ref 10.3–14.5)
RBC # FLD: 13.8 % — SIGNIFICANT CHANGE UP (ref 10.3–14.5)
SAO2 % BLDA: 100 % — HIGH (ref 92–96)
SAO2 % BLDA: 100 % — HIGH (ref 92–96)
SODIUM SERPL-SCNC: 129 MMOL/L — LOW (ref 135–145)
SODIUM UR-SCNC: <20 MMOL/L — SIGNIFICANT CHANGE UP
SP GR SPEC: 1.02 — SIGNIFICANT CHANGE UP (ref 1.01–1.02)
UROBILINOGEN FLD QL: NEGATIVE — SIGNIFICANT CHANGE UP
WBC # BLD: 22.4 K/UL — HIGH (ref 3.8–10.5)
WBC # BLD: 23.2 K/UL — HIGH (ref 3.8–10.5)
WBC # FLD AUTO: 22.4 K/UL — HIGH (ref 3.8–10.5)
WBC # FLD AUTO: 23.2 K/UL — HIGH (ref 3.8–10.5)

## 2018-11-03 PROCEDURE — 99222 1ST HOSP IP/OBS MODERATE 55: CPT | Mod: GC

## 2018-11-03 RX ORDER — SODIUM CHLORIDE 9 MG/ML
2 INJECTION INTRAMUSCULAR; INTRAVENOUS; SUBCUTANEOUS ONCE
Qty: 0 | Refills: 0 | Status: COMPLETED | OUTPATIENT
Start: 2018-11-03 | End: 2018-11-03

## 2018-11-03 RX ADMIN — ATENOLOL 25 MILLIGRAM(S): 25 TABLET ORAL at 14:13

## 2018-11-03 RX ADMIN — Medication 60 MILLIGRAM(S): at 14:13

## 2018-11-03 RX ADMIN — HYDROMORPHONE HYDROCHLORIDE 0.5 MILLIGRAM(S): 2 INJECTION INTRAMUSCULAR; INTRAVENOUS; SUBCUTANEOUS at 07:34

## 2018-11-03 RX ADMIN — SENNA PLUS 2 TABLET(S): 8.6 TABLET ORAL at 22:41

## 2018-11-03 RX ADMIN — TAMSULOSIN HYDROCHLORIDE 0.4 MILLIGRAM(S): 0.4 CAPSULE ORAL at 22:40

## 2018-11-03 RX ADMIN — Medication 81 MILLIGRAM(S): at 14:12

## 2018-11-03 RX ADMIN — Medication 100 MILLIGRAM(S): at 14:12

## 2018-11-03 RX ADMIN — SIMVASTATIN 20 MILLIGRAM(S): 20 TABLET, FILM COATED ORAL at 22:40

## 2018-11-03 RX ADMIN — SODIUM CHLORIDE 2 GRAM(S): 9 INJECTION INTRAMUSCULAR; INTRAVENOUS; SUBCUTANEOUS at 14:12

## 2018-11-03 RX ADMIN — POLYETHYLENE GLYCOL 3350 17 GRAM(S): 17 POWDER, FOR SOLUTION ORAL at 14:12

## 2018-11-03 RX ADMIN — PANTOPRAZOLE SODIUM 40 MILLIGRAM(S): 20 TABLET, DELAYED RELEASE ORAL at 07:04

## 2018-11-03 RX ADMIN — HYDROMORPHONE HYDROCHLORIDE 0.5 MILLIGRAM(S): 2 INJECTION INTRAMUSCULAR; INTRAVENOUS; SUBCUTANEOUS at 07:04

## 2018-11-03 RX ADMIN — HEPARIN SODIUM 13 UNIT(S)/HR: 5000 INJECTION INTRAVENOUS; SUBCUTANEOUS at 08:06

## 2018-11-03 NOTE — CONSULT NOTE ADULT - ASSESSMENT
73yoM s/p RLE embolectomy for acute RLE arterial occlusion and RLE fasciotomies, A.fib, HTN, Hodgkins lymphoma s/p splenectomy (1975), MOE, now with MAGALI and hyponatremia.

## 2018-11-03 NOTE — CONSULT NOTE ADULT - PROBLEM SELECTOR RECOMMENDATION 2
in setting of urinary obstruction. Na wnl to ~140 range initially. Has been trending down over the past 2 days to 129 today. Once obstruction relieved, Na should improve.   - Usom to 400s- discontinue salt tabs.  - Monitor Na daily.  - Can c/w 1L fluid restriction today, then discontinue once Na improves.

## 2018-11-03 NOTE — PROGRESS NOTE ADULT - ASSESSMENT
ASSESSMENT: 73M with recent spine surgery now s/p RLE embolectomy for acute RLE arterial occlusion now s/p RLE fasciotomies     PLAN:  - Heparin drip to goal 50-60  - Pain control-- Reg diet  - OOB with PT  - Elevate RLE   - ACE bandage RLE w/ daily dressing changes   - Possible return to OR Monday with PRS for fasciotomy closures, f/u Dr. Suarez [facility]  - PT recommending DANIEL  - f/u neurology recommendations- EEG pending  - f/u C. diff    [id number]

## 2018-11-03 NOTE — PROGRESS NOTE ADULT - SUBJECTIVE AND OBJECTIVE BOX
Subjective:  pt seen and examined, no complaints, ROS - .     aspirin enteric coated 81 milliGRAM(s) Oral daily  ATENolol  Tablet 25 milliGRAM(s) Oral daily  docusate sodium 100 milliGRAM(s) Oral daily  fluticasone propionate 50 MICROgram(s)/spray Nasal Spray 1 Spray(s) Both Nostrils daily  heparin  Infusion 1300 Unit(s)/Hr IV Continuous <Continuous>  HYDROmorphone  Injectable 0.5 milliGRAM(s) IV Push daily  lisinopril 40 milliGRAM(s) Oral daily  NIFEdipine XL 60 milliGRAM(s) Oral daily  oxyCODONE    IR 5 milliGRAM(s) Oral every 4 hours PRN  pantoprazole    Tablet 40 milliGRAM(s) Oral before breakfast  polyethylene glycol 3350 17 Gram(s) Oral daily  senna 2 Tablet(s) Oral at bedtime  silver nitrate Applicator 1 Application(s) Topical once  simvastatin 20 milliGRAM(s) Oral at bedtime  tamsulosin 0.4 milliGRAM(s) Oral at bedtime                            8.3    23.8  )-----------( 481      ( 02 Nov 2018 17:44 )             24.3       Hemoglobin: 8.3 g/dL (11-02 @ 17:44)  Hemoglobin: 7.3 g/dL (11-02 @ 08:22)  Hemoglobin: 7.8 g/dL (11-01 @ 13:41)  Hemoglobin: 7.6 g/dL (11-01 @ 06:29)  Hemoglobin: 7.6 g/dL (11-01 @ 03:31)      11-02    132<L>  |  93<L>  |  24<H>  ----------------------------<  113<H>  4.6   |  32<H>  |  0.97    Ca    8.8      02 Nov 2018 08:22  Phos  3.7     11-02  Mg     2.3     11-02      Creatinine Trend: 0.97<--, 0.76<--, 0.74<--, 0.75<--, 0.67<--, 0.64<--    COAGS: PTT - ( 02 Nov 2018 21:03 )  PTT:60.7 sec          T(C): 36.9 (11-03-18 @ 04:52), Max: 37.3 (11-02-18 @ 20:50)  HR: 75 (11-03-18 @ 04:52) (74 - 86)  BP: 108/62 (11-03-18 @ 04:52) (101/58 - 116/64)  RR: 18 (11-03-18 @ 04:52) (17 - 18)  SpO2: 96% (11-03-18 @ 04:52) (95% - 98%)  Wt(kg): --    I&O's Summary    01 Nov 2018 07:01  -  02 Nov 2018 07:00  --------------------------------------------------------  IN: 1136 mL / OUT: 900 mL / NET: 236 mL    02 Nov 2018 07:01  -  03 Nov 2018 05:43  --------------------------------------------------------  IN: 1351 mL / OUT: 1500 mL / NET: -149 mL        Appearance: Normal	  HEENT:   Normal oral mucosa, PERRL, EOMI	  Lymphatic: No lymphadenopathy , no edema  Cardiovascular: irregular irregular  S1 S2, No JVD, No murmurs , Peripheral pulses palpable 2+ bilaterally  Respiratory: Lungs clear to auscultation, normal effort 	  Gastrointestinal:  Soft, Non-tender, + BS	  Skin: No rashes, No ecchymoses, No cyanosis, warm to touch  Musculoskeletal: Normal range of motion, normal strength  Psychiatry:  Mood & affect appropriate    TELEMETRY: 	  none  ECG:  	< from: 12 Lead ECG (10.25.18 @ 14:08) >  Diagnosis Line ATRIAL FIBRILLATION  PROLONGED QT  ABNORMAL ECG    < end of copied text >    RADIOLOGY:   DIAGNOSTIC TESTING:  [ ] Echocardiogram:   [ ]  Catheterization:  [ ] Stress Test:    OTHER: 	        ASSESSMENT/PLAN: 	73y Male  history of Afib, HTN, CVA, MOE admitted with RLE arterial occlusion now s/p embolectomy of right AT and peroneal artery.     A/C with heparin gtt at present for Afib, will need life long tx   GI / DVT prophylaxis.   keep K>4, mag >2.0   rate control with BB   bp stable on Procardia / Ace   pain management   wound care per primary team   vascular follow up   D/W Dr Gillis

## 2018-11-03 NOTE — CONSULT NOTE ADULT - PROBLEM SELECTOR RECOMMENDATION 9
in setting of urinary obstruction. Pt w/ Hx of BPH, and was receiving opioid pain meds. Bladder scan today showing 600cc+ urinary retention.   - Would place Kaufman catheter and monitor UO.  - Hold lisinopril 40mg for now, until MAGALI resolved.  - Monitor BMP daily.  - Avoid NSAIDs, contrast agents.

## 2018-11-03 NOTE — CONSULT NOTE ADULT - ATTENDING COMMENTS
Patient with MAGALI, high post void bladder volume, electrolyte and fluid abnormalities. Multiple MAGALI and CKD risks. Prior h/o NHL; peripheral arterial disease. Current management will include :  Relieve bladder outlet obstruction, Foleys catheter. Patient is agreeable  Monitor chemistry including K,. Mg and replete to normal  Monitoring of I/O and maintaining euvolemic state  Avoiding nephrotoxic agents-NSAIDs, contrast, nephrotoxic antibiotics  Adjusting dosage of medications for abnormal creatinine clearance (<10ml/min when creatinine is rising)  Reviewed workup available. Other work up as suggested in consult.  Please call with any additional questions or on availability of any new information or reports or change in clinical condition. Will follow.  I was present during and reviewed clinical and lab data as well as assessment and plan as documented by the housestaff as noted. Please contact if any additional questions with any change in clinical condition or on availability of any additional information or reports.

## 2018-11-03 NOTE — CONSULT NOTE ADULT - SUBJECTIVE AND OBJECTIVE BOX
[facility] DIVISION OF KIDNEY DISEASES AND HYPERTENSION -- INITIAL CONSULT NOTE  --------------------------------------------------------------------------------  HPI:  Pt is a 73yoM with recent spine surgery now s/p RLE embolectomy for acute RLE arterial occlusion and RLE fasciotomies, now with MAGALI and hyponatremia.    Pt says since yesterday he felt it has been difficult for him to urinate, thought he is making urine.  Denies any dysuria or hematuria.   Has issues with his prostate (BPH) for which he takes Flomax.  Pt denies drinking a lof of fluid.  Says his appetite has been low since the surgery.       PAST HISTORY  --------------------------------------------------------------------------------  PAST MEDICAL & SURGICAL HISTORY:  Exposure to toxin: 9/11   Spondylolisthesis  Spinal stenosis: L3-L4  CVA (cerebral vascular accident): 1/2018 - attributed to no AC for 11 days preop/postop spine surgery - presented to ED with slurred speech, residual ST memory loss, per pt  Osteoarthritis  BPH (benign prostatic hyperplasia)  MOE (obstructive sleep apnea): positive sleep study many years ago, was recommended to use CPAP, patient non-compliant  GERD (gastroesophageal reflux disease)  HTN (hypertension)  Atrial fibrillation: over 20 years  Hodgkin lymphoma: 1975  History of lumbar spinal fusion: 1/5/2018  History of cardioversion: for AF - &quot;many years ago&quot; - unsuccessful  S/P hip replacement, right: 2014  S/P splenectomy: 1975    FAMILY HISTORY:    PAST SOCIAL HISTORY:    ALLERGIES & MEDICATIONS  --------------------------------------------------------------------------------  Allergies    No Known Allergies    Intolerances      Standing Inpatient Medications  aspirin enteric coated 81 milliGRAM(s) Oral daily  ATENolol  Tablet 25 milliGRAM(s) Oral daily  docusate sodium 100 milliGRAM(s) Oral daily  fluticasone propionate 50 MICROgram(s)/spray Nasal Spray 1 Spray(s) Both Nostrils daily  heparin  Infusion 1300 Unit(s)/Hr IV Continuous <Continuous>  HYDROmorphone  Injectable 0.5 milliGRAM(s) IV Push daily  NIFEdipine XL 60 milliGRAM(s) Oral daily  pantoprazole    Tablet 40 milliGRAM(s) Oral before breakfast  polyethylene glycol 3350 17 Gram(s) Oral daily  senna 2 Tablet(s) Oral at bedtime  silver nitrate Applicator 1 Application(s) Topical once  simvastatin 20 milliGRAM(s) Oral at bedtime  tamsulosin 0.4 milliGRAM(s) Oral at bedtime    PRN Inpatient Medications  oxyCODONE    IR 5 milliGRAM(s) Oral every 4 hours PRN      REVIEW OF SYSTEMS  --------------------------------------------------------------------------------  Gen: No weight changes  Skin: No rashes  Head/Eyes/Ears/Mouth: No headache  Respiratory: No dyspnea, cough  CV: No chest pain  GI: No abdominal pain, diarrhea, constipation, nausea, vomiting  : +decreased frequency  MSK: +right leg pain  Neuro: No dizziness/lightheadedness  Heme: No easy bruising or bleeding  Endo: No heat/cold intolerance  Psych: No significant nervousness    All other systems were reviewed and are negative, except as noted.    VITALS/PHYSICAL EXAM  --------------------------------------------------------------------------------  T(C): 36.8 (11-03-18 @ 14:04), Max: 37.3 (11-02-18 @ 20:50)  HR: 77 (11-03-18 @ 14:04) (74 - 80)  BP: 114/59 (11-03-18 @ 14:04) (105/64 - 114/66)  RR: 16 (11-03-18 @ 14:04) (16 - 18)  SpO2: 96% (11-03-18 @ 14:04) (96% - 98%)  Wt(kg): --        11-02-18 @ 07:01  -  11-03-18 @ 07:00  --------------------------------------------------------  IN: 1351 mL / OUT: 1775 mL / NET: -424 mL    11-03-18 @ 08:01  -  11-03-18 @ 16:03  --------------------------------------------------------  IN: 571 mL / OUT: 550 mL / NET: 21 mL      Physical Exam:  	Gen: NAD, well-appearing  	HEENT: anicteric  	Pulm: CTA B/L  	CV: RRR, S1S2; no rub  	Back: No spinal or CVA tenderness  	Abd: +BS, soft, nontender/nondistended  	: No suprapubic tenderness  	UE: Warm, no edema  	LE: Warm, RLE in ACE bandage  	Neuro: follows commands  	Psych: Normal affect and mood  	Skin: Warm    LABS/STUDIES  --------------------------------------------------------------------------------              7.8    22.4  >-----------<  547      [11-03-18 @ 14:34]              22.8     129  |  89  |  33  ----------------------------<  123      [11-03-18 @ 07:15]  4.0   |  27  |  1.31        Ca     8.3     [11-03-18 @ 07:15]      Mg     2.4     [11-03-18 @ 07:15]      Phos  3.3     [11-03-18 @ 07:15]        PTT: 52.3       [11-03-18 @ 07:15]      Creatinine Trend:  SCr 1.31 [11-03 @ 07:15]  SCr 0.97 [11-02 @ 08:22]  SCr 0.76 [11-01 @ 03:31]  SCr 0.74 [10-31 @ 04:09]  SCr 0.75 [10-30 @ 06:08]    Urinalysis - [10-30-18 @ 18:23]      Color Light Yellow / Appearance Clear / SG 1.010 / pH 6.0      Gluc Negative / Ketone Negative  / Bili Negative / Urobili Negative       Blood Negative / Protein Negative / Leuk Est Negative / Nitrite Negative      RBC  / WBC  / Hyaline  / Gran  / Sq Epi  / Non Sq Epi  / Bacteria     Urine Creatinine 115      [11-03-18 @ 15:06]  Urine Sodium <20      [11-03-18 @ 15:06]  Urine Chloride <35      [11-03-18 @ 15:06]  Urine Osmolality 424      [11-03-18 @ 15:06]    HbA1c 6.3      [01-11-18 @ 07:39]  Lipid: chol 119, TG 67, HDL 44, LDL 62      [01-11-18 @ 08:28]

## 2018-11-03 NOTE — PROGRESS NOTE ADULT - SUBJECTIVE AND OBJECTIVE BOX
GENERAL SURGERY DAILY PROGRESS NOTE:       Subjective:  Patient seen and examined at bedside this am. Spoke extensively with patient and family yesterday about OR plan for Monday. 1 unite PRBCs given with appropriately H/H response. No acute events overnight. RLE fasciotomy dressings changed at bedside this AM. Pain well controlled. Patient ambulating, tolerating diet.         Objective:    Gen: NAD  Resp: nonlabored  CVS: RRR  Abd: soft, ND, NT, no rebound or guarding  Ext: RLE fasciotomy incisions with viable muscle, oozing, dressed with xeroform/gauze/cling/ACE, DP/PT palpable. Unable to dorsiflex toes.   Neuro: AAOx3, no focal deficits    Vital Signs Last 24 Hrs  T(C): 36.9 (03 Nov 2018 04:52), Max: 37.3 (02 Nov 2018 20:50)  T(F): 98.5 (03 Nov 2018 04:52), Max: 99.2 (02 Nov 2018 20:50)  HR: 75 (03 Nov 2018 04:52) (74 - 86)  BP: 108/62 (03 Nov 2018 04:52) (101/58 - 116/64)  BP(mean): --  RR: 18 (03 Nov 2018 04:52) (17 - 18)  SpO2: 96% (03 Nov 2018 04:52) (95% - 98%)    I&O's Detail    01 Nov 2018 07:01  -  02 Nov 2018 07:00  --------------------------------------------------------  IN:    heparin Infusion: 276 mL    Oral Fluid: 860 mL  Total IN: 1136 mL    OUT:    Voided: 900 mL  Total OUT: 900 mL    Total NET: 236 mL      02 Nov 2018 07:01  -  03 Nov 2018 06:36  --------------------------------------------------------  IN:    heparin Infusion: 12 mL    heparin Infusion: 299 mL    Oral Fluid: 1040 mL  Total IN: 1351 mL    OUT:    Voided: 1775 mL  Total OUT: 1775 mL    Total NET: -424 mL          Daily     Daily     MEDICATIONS  (STANDING):  aspirin enteric coated 81 milliGRAM(s) Oral daily  ATENolol  Tablet 25 milliGRAM(s) Oral daily  docusate sodium 100 milliGRAM(s) Oral daily  fluticasone propionate 50 MICROgram(s)/spray Nasal Spray 1 Spray(s) Both Nostrils daily  heparin  Infusion 1300 Unit(s)/Hr (13 mL/Hr) IV Continuous <Continuous>  HYDROmorphone  Injectable 0.5 milliGRAM(s) IV Push daily  lisinopril 40 milliGRAM(s) Oral daily  NIFEdipine XL 60 milliGRAM(s) Oral daily  pantoprazole    Tablet 40 milliGRAM(s) Oral before breakfast  polyethylene glycol 3350 17 Gram(s) Oral daily  senna 2 Tablet(s) Oral at bedtime  silver nitrate Applicator 1 Application(s) Topical once  simvastatin 20 milliGRAM(s) Oral at bedtime  tamsulosin 0.4 milliGRAM(s) Oral at bedtime    MEDICATIONS  (PRN):  oxyCODONE    IR 5 milliGRAM(s) Oral every 4 hours PRN Moderate Pain (4 - 6)      LABS:                        8.3    23.8  )-----------( 481      ( 02 Nov 2018 17:44 )             24.3     11-02    132<L>  |  93<L>  |  24<H>  ----------------------------<  113<H>  4.6   |  32<H>  |  0.97    Ca    8.8      02 Nov 2018 08:22  Phos  3.7     11-02  Mg     2.3     11-02      PTT - ( 02 Nov 2018 21:03 )  PTT:60.7 sec      RADIOLOGY & ADDITIONAL STUDIES:

## 2018-11-04 ENCOUNTER — TRANSCRIPTION ENCOUNTER (OUTPATIENT)
Age: 73
End: 2018-11-04

## 2018-11-04 LAB
ANION GAP SERPL CALC-SCNC: 10 MMOL/L — SIGNIFICANT CHANGE UP (ref 5–17)
APTT BLD: 31.3 SEC — SIGNIFICANT CHANGE UP (ref 27.5–36.3)
BUN SERPL-MCNC: 24 MG/DL — HIGH (ref 7–23)
CALCIUM SERPL-MCNC: 8.5 MG/DL — SIGNIFICANT CHANGE UP (ref 8.4–10.5)
CHLORIDE SERPL-SCNC: 93 MMOL/L — LOW (ref 96–108)
CO2 SERPL-SCNC: 30 MMOL/L — SIGNIFICANT CHANGE UP (ref 22–31)
CREAT SERPL-MCNC: 0.95 MG/DL — SIGNIFICANT CHANGE UP (ref 0.5–1.3)
GLUCOSE SERPL-MCNC: 105 MG/DL — HIGH (ref 70–99)
HCT VFR BLD CALC: 24.1 % — LOW (ref 39–50)
HGB BLD-MCNC: 7.8 G/DL — LOW (ref 13–17)
MAGNESIUM SERPL-MCNC: 2.3 MG/DL — SIGNIFICANT CHANGE UP (ref 1.6–2.6)
MCHC RBC-ENTMCNC: 31.2 PG — SIGNIFICANT CHANGE UP (ref 27–34)
MCHC RBC-ENTMCNC: 32.1 GM/DL — SIGNIFICANT CHANGE UP (ref 32–36)
MCV RBC AUTO: 97.3 FL — SIGNIFICANT CHANGE UP (ref 80–100)
PHOSPHATE SERPL-MCNC: 3.1 MG/DL — SIGNIFICANT CHANGE UP (ref 2.5–4.5)
PLATELET # BLD AUTO: 568 K/UL — HIGH (ref 150–400)
POTASSIUM SERPL-MCNC: 4.3 MMOL/L — SIGNIFICANT CHANGE UP (ref 3.5–5.3)
POTASSIUM SERPL-SCNC: 4.3 MMOL/L — SIGNIFICANT CHANGE UP (ref 3.5–5.3)
RBC # BLD: 2.48 M/UL — LOW (ref 4.2–5.8)
RBC # FLD: 14 % — SIGNIFICANT CHANGE UP (ref 10.3–14.5)
SODIUM SERPL-SCNC: 133 MMOL/L — LOW (ref 135–145)
WBC # BLD: 20.1 K/UL — HIGH (ref 3.8–10.5)
WBC # FLD AUTO: 20.1 K/UL — HIGH (ref 3.8–10.5)

## 2018-11-04 PROCEDURE — 71045 X-RAY EXAM CHEST 1 VIEW: CPT | Mod: 26

## 2018-11-04 RX ADMIN — POLYETHYLENE GLYCOL 3350 17 GRAM(S): 17 POWDER, FOR SOLUTION ORAL at 11:15

## 2018-11-04 RX ADMIN — TAMSULOSIN HYDROCHLORIDE 0.4 MILLIGRAM(S): 0.4 CAPSULE ORAL at 21:05

## 2018-11-04 RX ADMIN — Medication 81 MILLIGRAM(S): at 11:15

## 2018-11-04 RX ADMIN — PANTOPRAZOLE SODIUM 40 MILLIGRAM(S): 20 TABLET, DELAYED RELEASE ORAL at 05:31

## 2018-11-04 RX ADMIN — Medication 100 MILLIGRAM(S): at 11:15

## 2018-11-04 RX ADMIN — HYDROMORPHONE HYDROCHLORIDE 0.5 MILLIGRAM(S): 2 INJECTION INTRAMUSCULAR; INTRAVENOUS; SUBCUTANEOUS at 07:02

## 2018-11-04 RX ADMIN — SIMVASTATIN 20 MILLIGRAM(S): 20 TABLET, FILM COATED ORAL at 21:05

## 2018-11-04 RX ADMIN — SENNA PLUS 2 TABLET(S): 8.6 TABLET ORAL at 21:05

## 2018-11-04 RX ADMIN — ATENOLOL 25 MILLIGRAM(S): 25 TABLET ORAL at 05:31

## 2018-11-04 RX ADMIN — Medication 60 MILLIGRAM(S): at 11:15

## 2018-11-04 RX ADMIN — Medication 1 SPRAY(S): at 11:15

## 2018-11-04 RX ADMIN — HEPARIN SODIUM 13 UNIT(S)/HR: 5000 INJECTION INTRAVENOUS; SUBCUTANEOUS at 07:03

## 2018-11-04 NOTE — CHART NOTE - NSCHARTNOTEFT_GEN_A_CORE
Pre-operative Note    - Pre-operative Diagnosis: RLE fasciotomies s/p RLE embolectomy    - Procedure: Closure of RLE fasciotomies possible wound vac    - Labs:                        7.8    22.4  )-----------( 547      ( 03 Nov 2018 14:34 )             22.8     11-03    129<L>  |  89<L>  |  33<H>  ----------------------------<  123<H>  4.0   |  27  |  1.31<H>    Ca    8.3<L>      03 Nov 2018 07:15  Phos  3.3     11-03  Mg     2.4     11-03      PTT - ( 03 Nov 2018 07:15 )  PTT:52.3 sec  Type & Screen #1: Type + Screen (11.02.18 @ 10:36)    ABO Interpretation: O    Rh Interpretation: Positive    Antibody Screen: Negative    Type & Screen #2: Type + Screen (10.29.18 @ 06:39)    ABO Interpretation: O    Rh Interpretation: Positive    Antibody Screen: Negative      - CXR: < from: Xray Chest 1 View- PORTABLE-Urgent (10.31.18 @ 08:32) >      INTERPRETATION:  CLINICAL INDICATION: leukocytosis: Right lower extremity   pain    TECHNIQUE: Single portable view of the chest was obtained.    COMPARISON: Chest radiograph 10/19/2018    FINDINGS:     Clear lungs. No pleural effusion or pneumothorax.   Cardiac size cannot be accurately assessed in this projection, but   appears enlarged. Visualized osseous structures are unremarkable.    IMPRESSION:   Clear lungs. No focal consolidation.    < end of copied text >        - EKG: < from: 12 Lead ECG (10.25.18 @ 14:08) >    Ventricular Rate 60 BPM    Atrial Rate 170 BPM    QRS Duration 94 ms    Q-T Interval 482 ms    QTC Calculation(Bezet) 482 ms    R Axis 41 degrees    T Axis 29 degrees    Diagnosis Line ATRIAL FIBRILLATION  PROLONGED QT  ABNORMAL ECG    Confirmed by ATTENDING, ED ([id number]),  KASIA SIGALA ([id number]) on 10/26/2018 7:17:43 AM    < end of copied text >    - Blood: Not needed.     - Orders:  > NPO at midnight  > IVF at midnight  > Morning Labs: CBC, BMP, coags, type & screen.  > Diabetic orders adjusted for NPO period.    - Permits:  > Consent to be placed in chart.  > Case scheduled with OR.

## 2018-11-04 NOTE — PROGRESS NOTE ADULT - SUBJECTIVE AND OBJECTIVE BOX
Surgery Progress Note     Subjective/24hour Events:   Patient seen and examined.   Yesterday with hyponatremia, new MAGALI. Found to have urinary retention for which holguin was placed.   No acute events overnight.   Pain well controlled.   OOB ambulating minimally.   Tolerating diet without N/V.   Passing flatus, having bowel movements.     Vital Signs:  Vital Signs Last 24 Hrs  T(C): 37.3 (04 Nov 2018 01:06), Max: 37.3 (03 Nov 2018 17:49)  T(F): 99.1 (04 Nov 2018 01:06), Max: 99.2 (03 Nov 2018 17:49)  HR: 85 (04 Nov 2018 01:06) (66 - 85)  BP: 127/56 (04 Nov 2018 01:06) (105/50 - 127/56)  BP(mean): --  RR: 16 (04 Nov 2018 01:06) (16 - 18)  SpO2: 97% (04 Nov 2018 01:06) (96% - 97%)    CAPILLARY BLOOD GLUCOSE          I&O's Detail    02 Nov 2018 07:01  -  03 Nov 2018 07:00  --------------------------------------------------------  IN:    heparin Infusion: 12 mL    heparin Infusion: 299 mL    Oral Fluid: 1040 mL  Total IN: 1351 mL    OUT:    Voided: 1775 mL  Total OUT: 1775 mL    Total NET: -424 mL      03 Nov 2018 08:01  -  04 Nov 2018 01:33  --------------------------------------------------------  IN:    heparin Infusion: 221 mL    Oral Fluid: 840 mL  Total IN: 1061 mL    OUT:    Indwelling Catheter - Urethral: 300 mL    Voided: 900 mL  Total OUT: 1200 mL    Total NET: -139 mL          MEDICATIONS  (STANDING):  aspirin enteric coated 81 milliGRAM(s) Oral daily  ATENolol  Tablet 25 milliGRAM(s) Oral daily  docusate sodium 100 milliGRAM(s) Oral daily  fluticasone propionate 50 MICROgram(s)/spray Nasal Spray 1 Spray(s) Both Nostrils daily  heparin  Infusion 1300 Unit(s)/Hr (13 mL/Hr) IV Continuous <Continuous>  HYDROmorphone  Injectable 0.5 milliGRAM(s) IV Push daily  NIFEdipine XL 60 milliGRAM(s) Oral daily  pantoprazole    Tablet 40 milliGRAM(s) Oral before breakfast  polyethylene glycol 3350 17 Gram(s) Oral daily  senna 2 Tablet(s) Oral at bedtime  silver nitrate Applicator 1 Application(s) Topical once  simvastatin 20 milliGRAM(s) Oral at bedtime  tamsulosin 0.4 milliGRAM(s) Oral at bedtime    MEDICATIONS  (PRN):  oxyCODONE    IR 5 milliGRAM(s) Oral every 4 hours PRN Moderate Pain (4 - 6)      Physical Exam:  Gen: NAD  Resp: nonlabored  Abd: soft, ND, NT, no rebound or guarding  Ext: RLE fasciotomy incisions with viable muscle, oozing, dressed with xeroform/gauze/cling/ACE, DP/PT palpable. Unable to dorsiflex toes.   Neuro: AAOx3, no focal deficits  .     Labs:    11-03    129<L>  |  89<L>  |  33<H>  ----------------------------<  123<H>  4.0   |  27  |  1.31<H>    Ca    8.3<L>      03 Nov 2018 07:15  Phos  3.3     11-03  Mg     2.4     11-03                              7.8    22.4  )-----------( 547      ( 03 Nov 2018 14:34 )             22.8     PTT - ( 03 Nov 2018 07:15 )  PTT:52.3 sec    Imaging: Surgery Progress Note     Subjective/24hour Events:   Patient seen and examined.   Yesterday with hyponatremia, new MAGALI. Found to have urinary retention for which holguin was placed. Nephrology consulted.  No acute events overnight.   Pain well controlled.   OOB ambulating minimally.   Tolerating diet without N/V.   Passing flatus, having bowel movements.   Dressings changed at bedside this AM      Vital Signs:  Vital Signs Last 24 Hrs  T(C): 37.3 (04 Nov 2018 01:06), Max: 37.3 (03 Nov 2018 17:49)  T(F): 99.1 (04 Nov 2018 01:06), Max: 99.2 (03 Nov 2018 17:49)  HR: 85 (04 Nov 2018 01:06) (66 - 85)  BP: 127/56 (04 Nov 2018 01:06) (105/50 - 127/56)  BP(mean): --  RR: 16 (04 Nov 2018 01:06) (16 - 18)  SpO2: 97% (04 Nov 2018 01:06) (96% - 97%)    CAPILLARY BLOOD GLUCOSE          I&O's Detail    02 Nov 2018 07:01  -  03 Nov 2018 07:00  --------------------------------------------------------  IN:    heparin Infusion: 12 mL    heparin Infusion: 299 mL    Oral Fluid: 1040 mL  Total IN: 1351 mL    OUT:    Voided: 1775 mL  Total OUT: 1775 mL    Total NET: -424 mL      03 Nov 2018 08:01  -  04 Nov 2018 01:33  --------------------------------------------------------  IN:    heparin Infusion: 221 mL    Oral Fluid: 840 mL  Total IN: 1061 mL    OUT:    Indwelling Catheter - Urethral: 300 mL    Voided: 900 mL  Total OUT: 1200 mL    Total NET: -139 mL          MEDICATIONS  (STANDING):  aspirin enteric coated 81 milliGRAM(s) Oral daily  ATENolol  Tablet 25 milliGRAM(s) Oral daily  docusate sodium 100 milliGRAM(s) Oral daily  fluticasone propionate 50 MICROgram(s)/spray Nasal Spray 1 Spray(s) Both Nostrils daily  heparin  Infusion 1300 Unit(s)/Hr (13 mL/Hr) IV Continuous <Continuous>  HYDROmorphone  Injectable 0.5 milliGRAM(s) IV Push daily  NIFEdipine XL 60 milliGRAM(s) Oral daily  pantoprazole    Tablet 40 milliGRAM(s) Oral before breakfast  polyethylene glycol 3350 17 Gram(s) Oral daily  senna 2 Tablet(s) Oral at bedtime  silver nitrate Applicator 1 Application(s) Topical once  simvastatin 20 milliGRAM(s) Oral at bedtime  tamsulosin 0.4 milliGRAM(s) Oral at bedtime    MEDICATIONS  (PRN):  oxyCODONE    IR 5 milliGRAM(s) Oral every 4 hours PRN Moderate Pain (4 - 6)      Physical Exam:  Gen: NAD  Resp: nonlabored  Abd: soft, ND, NT, no rebound or guarding  Ext: RLE fasciotomy incisions with viable muscle, oozing, dressed with xeroform/gauze/cling/ACE, DP/PT palpable. Unable to dorsiflex toes.   Neuro: AAOx3, no focal deficits  .     Labs:    11-03    129<L>  |  89<L>  |  33<H>  ----------------------------<  123<H>  4.0   |  27  |  1.31<H>    Ca    8.3<L>      03 Nov 2018 07:15  Phos  3.3     11-03  Mg     2.4     11-03                              7.8    22.4  )-----------( 547      ( 03 Nov 2018 14:34 )             22.8     PTT - ( 03 Nov 2018 07:15 )  PTT:52.3 sec    Imaging:

## 2018-11-04 NOTE — PROGRESS NOTE ADULT - SUBJECTIVE AND OBJECTIVE BOX
Subjective:  pt seen and examined, no complaints, ROS - .     aspirin enteric coated 81 milliGRAM(s) Oral daily  ATENolol  Tablet 25 milliGRAM(s) Oral daily  docusate sodium 100 milliGRAM(s) Oral daily  fluticasone propionate 50 MICROgram(s)/spray Nasal Spray 1 Spray(s) Both Nostrils daily  heparin  Infusion 1300 Unit(s)/Hr IV Continuous <Continuous>  HYDROmorphone  Injectable 0.5 milliGRAM(s) IV Push daily  NIFEdipine XL 60 milliGRAM(s) Oral daily  pantoprazole    Tablet 40 milliGRAM(s) Oral before breakfast  polyethylene glycol 3350 17 Gram(s) Oral daily  senna 2 Tablet(s) Oral at bedtime  silver nitrate Applicator 1 Application(s) Topical once  simvastatin 20 milliGRAM(s) Oral at bedtime  tamsulosin 0.4 milliGRAM(s) Oral at bedtime                            7.8    22.4  )-----------( 547      ( 03 Nov 2018 14:34 )             22.8       Hemoglobin: 7.8 g/dL (11-03 @ 14:34)  Hemoglobin: 7.6 g/dL (11-03 @ 07:15)  Hemoglobin: 8.3 g/dL (11-02 @ 17:44)  Hemoglobin: 7.3 g/dL (11-02 @ 08:22)  Hemoglobin: 7.8 g/dL (11-01 @ 13:41)      11-03    129<L>  |  89<L>  |  33<H>  ----------------------------<  123<H>  4.0   |  27  |  1.31<H>    Ca    8.3<L>      03 Nov 2018 07:15  Phos  3.3     11-03  Mg     2.4     11-03      Creatinine Trend: 1.31<--, 0.97<--, 0.76<--, 0.74<--, 0.75<--, 0.67<--    COAGS:           T(C): 37.3 (11-04-18 @ 01:06), Max: 37.3 (11-03-18 @ 17:49)  HR: 85 (11-04-18 @ 01:06) (66 - 85)  BP: 127/56 (11-04-18 @ 01:06) (105/50 - 127/56)  RR: 16 (11-04-18 @ 01:06) (16 - 16)  SpO2: 97% (11-04-18 @ 01:06) (96% - 97%)  Wt(kg): --    I&O's Summary    02 Nov 2018 07:01  -  03 Nov 2018 07:00  --------------------------------------------------------  IN: 1351 mL / OUT: 1775 mL / NET: -424 mL    03 Nov 2018 08:01  -  04 Nov 2018 04:54  --------------------------------------------------------  IN: 1139 mL / OUT: 1200 mL / NET: -61 mL        Appearance: Normal	  HEENT:   Normal oral mucosa, PERRL, EOMI	  Lymphatic: No lymphadenopathy , no edema  Cardiovascular: irregular irregular  S1 S2, No JVD, No murmurs , Peripheral pulses palpable 2+ bilaterally  Respiratory: Lungs clear to auscultation, normal effort 	  Gastrointestinal:  Soft, Non-tender, + BS	  Skin: No rashes, No ecchymoses, No cyanosis, warm to touch  Musculoskeletal: Normal range of motion, normal strength  Psychiatry:  Mood & affect appropriate    TELEMETRY: 	  none  ECG:  	< from: 12 Lead ECG (10.25.18 @ 14:08) >  Diagnosis Line ATRIAL FIBRILLATION  PROLONGED QT  ABNORMAL ECG    < end of copied text >    RADIOLOGY:   DIAGNOSTIC TESTING:  [ ] Echocardiogram:   [ ]  Catheterization:  [ ] Stress Test:    OTHER: 	        ASSESSMENT/PLAN: 	73y Male  history of Afib, HTN, CVA, MOE admitted with RLE arterial occlusion now s/p embolectomy of right AT and peroneal artery. RLE fasciotomies     A/C with heparin gtt at present for Afib, will need life long tx   GI / DVT prophylaxis,  keep K>4, mag >2.0   rate control with BB   bp stable on Procardia / Ace   pain management   wound care per primary team   vascular follow up   D/W Dr Gillis

## 2018-11-04 NOTE — PROGRESS NOTE ADULT - SUBJECTIVE AND OBJECTIVE BOX
VSS  OR tomorrow for closure of right leg fasciotomy wounds  NPO after MN  Hold heparin on call to OR as long as PTT within therapeutic range

## 2018-11-04 NOTE — PROGRESS NOTE ADULT - ASSESSMENT
ASSESSMENT: 73M with recent spine surgery now s/p RLE embolectomy for acute RLE arterial occlusion now s/p RLE fasciotomies.     PLAN:  - OR planning tomorrow with Dr. Suarez of Plastics for fasciotomy closure.  -- Pre-op, NPO at midnight.   - Heparin drip to goal 50-60  - Pain control-- Reg diet  - OOB with PT  - Elevate RLE, OT consult for heel splint.   - ACE bandage RLE w/ daily dressing changes   - PT recommending DANIEL  - f/u neurology recommendations- EEG pending  - f/u C. diff for persistent leukocytosis, other workup to date has been negative.     [id number] ASSESSMENT: 73M with recent spine surgery now s/p RLE embolectomy for acute RLE arterial occlusion now s/p RLE fasciotomies.     PLAN:  - OR planning tomorrow with Dr. Suarez of Plastics for fasciotomy closure.  -- Pre-op, NPO at midnight. Will discuss need to hold heparin gtt.   - Heparin drip to goal 50-60  - Pain control-- Reg diet  - OOB with PT  - Elevate RLE, OT consult for heel splint.   - ACE bandage RLE w/ daily dressing changes   - PT recommending DANIEL  - f/u neurology recommendations- EEG pending  - f/u C. diff for persistent leukocytosis, other workup to date has been negative.     [id number] ASSESSMENT: 73M with recent spine surgery now s/p RLE embolectomy for acute RLE arterial occlusion now s/p RLE fasciotomies.     PLAN:  - OR planning tomorrow with Dr. Suarez of Plastics for fasciotomy closure.  -- Pre-op, NPO at midnight. Will discuss need to hold heparin gtt.   - Heparin drip to goal 50-60  - Pain control-- Reg diet  - OOB with PT  - Elevate RLE, OT consult for heel splint.   - ACE bandage RLE w/ daily dressing changes   - PT recommending DANIEL  - f/u neurology recommendations- EEG pending  - f/u C. diff for persistent leukocytosis, other workup to date has been negative.   - Fluid restriction and nephrology recs for hyponatremia/MAGALI  - c/w ezio, monitor UOP    [id number]

## 2018-11-05 LAB
ANION GAP SERPL CALC-SCNC: 11 MMOL/L — SIGNIFICANT CHANGE UP (ref 5–17)
APTT BLD: 40.5 SEC — HIGH (ref 27.5–36.3)
APTT BLD: 40.6 SEC — HIGH (ref 27.5–36.3)
APTT BLD: 43.7 SEC — HIGH (ref 27.5–36.3)
BLD GP AB SCN SERPL QL: NEGATIVE — SIGNIFICANT CHANGE UP
BUN SERPL-MCNC: 15 MG/DL — SIGNIFICANT CHANGE UP (ref 7–23)
CALCIUM SERPL-MCNC: 8.9 MG/DL — SIGNIFICANT CHANGE UP (ref 8.4–10.5)
CHLORIDE SERPL-SCNC: 94 MMOL/L — LOW (ref 96–108)
CO2 SERPL-SCNC: 28 MMOL/L — SIGNIFICANT CHANGE UP (ref 22–31)
CREAT SERPL-MCNC: 0.81 MG/DL — SIGNIFICANT CHANGE UP (ref 0.5–1.3)
GLUCOSE SERPL-MCNC: 102 MG/DL — HIGH (ref 70–99)
HCT VFR BLD CALC: 23.6 % — LOW (ref 39–50)
HCT VFR BLD CALC: 24.6 % — LOW (ref 39–50)
HGB BLD-MCNC: 7.9 G/DL — LOW (ref 13–17)
HGB BLD-MCNC: 8.3 G/DL — LOW (ref 13–17)
INR BLD: 1.13 RATIO — SIGNIFICANT CHANGE UP (ref 0.88–1.16)
MAGNESIUM SERPL-MCNC: 2.3 MG/DL — SIGNIFICANT CHANGE UP (ref 1.6–2.6)
MCHC RBC-ENTMCNC: 31.6 PG — SIGNIFICANT CHANGE UP (ref 27–34)
MCHC RBC-ENTMCNC: 32.2 GM/DL — SIGNIFICANT CHANGE UP (ref 32–36)
MCHC RBC-ENTMCNC: 34 PG — SIGNIFICANT CHANGE UP (ref 27–34)
MCHC RBC-ENTMCNC: 34.9 GM/DL — SIGNIFICANT CHANGE UP (ref 32–36)
MCV RBC AUTO: 97.3 FL — SIGNIFICANT CHANGE UP (ref 80–100)
MCV RBC AUTO: 98.2 FL — SIGNIFICANT CHANGE UP (ref 80–100)
PHOSPHATE SERPL-MCNC: 3.3 MG/DL — SIGNIFICANT CHANGE UP (ref 2.5–4.5)
PLATELET # BLD AUTO: 600 K/UL — HIGH (ref 150–400)
PLATELET # BLD AUTO: 648 K/UL — HIGH (ref 150–400)
POTASSIUM SERPL-MCNC: 3.9 MMOL/L — SIGNIFICANT CHANGE UP (ref 3.5–5.3)
POTASSIUM SERPL-SCNC: 3.9 MMOL/L — SIGNIFICANT CHANGE UP (ref 3.5–5.3)
PROTHROM AB SERPL-ACNC: 13 SEC — HIGH (ref 10–12.9)
RBC # BLD: 2.43 M/UL — LOW (ref 4.2–5.8)
RBC # BLD: 2.5 M/UL — LOW (ref 4.2–5.8)
RBC # FLD: 13.5 % — SIGNIFICANT CHANGE UP (ref 10.3–14.5)
RBC # FLD: 14.2 % — SIGNIFICANT CHANGE UP (ref 10.3–14.5)
RH IG SCN BLD-IMP: POSITIVE — SIGNIFICANT CHANGE UP
SODIUM SERPL-SCNC: 133 MMOL/L — LOW (ref 135–145)
WBC # BLD: 16.9 K/UL — HIGH (ref 3.8–10.5)
WBC # BLD: 18.7 K/UL — HIGH (ref 3.8–10.5)
WBC # FLD AUTO: 16.9 K/UL — HIGH (ref 3.8–10.5)
WBC # FLD AUTO: 18.7 K/UL — HIGH (ref 3.8–10.5)

## 2018-11-05 PROCEDURE — 71275 CT ANGIOGRAPHY CHEST: CPT | Mod: 26

## 2018-11-05 PROCEDURE — 99223 1ST HOSP IP/OBS HIGH 75: CPT | Mod: GC

## 2018-11-05 RX ORDER — ASPIRIN/CALCIUM CARB/MAGNESIUM 324 MG
81 TABLET ORAL DAILY
Qty: 0 | Refills: 0 | Status: DISCONTINUED | OUTPATIENT
Start: 2018-11-05 | End: 2018-11-09

## 2018-11-05 RX ORDER — SIMVASTATIN 20 MG/1
20 TABLET, FILM COATED ORAL AT BEDTIME
Qty: 0 | Refills: 0 | Status: DISCONTINUED | OUTPATIENT
Start: 2018-11-05 | End: 2018-11-09

## 2018-11-05 RX ORDER — ONDANSETRON 8 MG/1
4 TABLET, FILM COATED ORAL ONCE
Qty: 0 | Refills: 0 | Status: DISCONTINUED | OUTPATIENT
Start: 2018-11-05 | End: 2018-11-05

## 2018-11-05 RX ORDER — TAMSULOSIN HYDROCHLORIDE 0.4 MG/1
0.4 CAPSULE ORAL AT BEDTIME
Qty: 0 | Refills: 0 | Status: DISCONTINUED | OUTPATIENT
Start: 2018-11-05 | End: 2018-11-09

## 2018-11-05 RX ORDER — METOCLOPRAMIDE HCL 10 MG
10 TABLET ORAL ONCE
Qty: 0 | Refills: 0 | Status: DISCONTINUED | OUTPATIENT
Start: 2018-11-05 | End: 2018-11-05

## 2018-11-05 RX ORDER — SENNA PLUS 8.6 MG/1
2 TABLET ORAL AT BEDTIME
Qty: 0 | Refills: 0 | Status: DISCONTINUED | OUTPATIENT
Start: 2018-11-05 | End: 2018-11-09

## 2018-11-05 RX ORDER — DOCUSATE SODIUM 100 MG
100 CAPSULE ORAL DAILY
Qty: 0 | Refills: 0 | Status: DISCONTINUED | OUTPATIENT
Start: 2018-11-05 | End: 2018-11-09

## 2018-11-05 RX ORDER — ATENOLOL 25 MG/1
25 TABLET ORAL DAILY
Qty: 0 | Refills: 0 | Status: DISCONTINUED | OUTPATIENT
Start: 2018-11-05 | End: 2018-11-09

## 2018-11-05 RX ORDER — PANTOPRAZOLE SODIUM 20 MG/1
40 TABLET, DELAYED RELEASE ORAL
Qty: 0 | Refills: 0 | Status: DISCONTINUED | OUTPATIENT
Start: 2018-11-05 | End: 2018-11-05

## 2018-11-05 RX ORDER — PANTOPRAZOLE SODIUM 20 MG/1
40 TABLET, DELAYED RELEASE ORAL
Qty: 0 | Refills: 0 | Status: DISCONTINUED | OUTPATIENT
Start: 2018-11-05 | End: 2018-11-09

## 2018-11-05 RX ORDER — ATENOLOL 25 MG/1
25 TABLET ORAL DAILY
Qty: 0 | Refills: 0 | Status: DISCONTINUED | OUTPATIENT
Start: 2018-11-05 | End: 2018-11-05

## 2018-11-05 RX ORDER — TAMSULOSIN HYDROCHLORIDE 0.4 MG/1
0.4 CAPSULE ORAL AT BEDTIME
Qty: 0 | Refills: 0 | Status: DISCONTINUED | OUTPATIENT
Start: 2018-11-05 | End: 2018-11-05

## 2018-11-05 RX ORDER — SIMVASTATIN 20 MG/1
20 TABLET, FILM COATED ORAL AT BEDTIME
Qty: 0 | Refills: 0 | Status: DISCONTINUED | OUTPATIENT
Start: 2018-11-05 | End: 2018-11-05

## 2018-11-05 RX ORDER — FLUTICASONE PROPIONATE 50 MCG
1 SPRAY, SUSPENSION NASAL DAILY
Qty: 0 | Refills: 0 | Status: DISCONTINUED | OUTPATIENT
Start: 2018-11-05 | End: 2018-11-05

## 2018-11-05 RX ORDER — HYDROMORPHONE HYDROCHLORIDE 2 MG/ML
0.5 INJECTION INTRAMUSCULAR; INTRAVENOUS; SUBCUTANEOUS DAILY
Qty: 0 | Refills: 0 | Status: DISCONTINUED | OUTPATIENT
Start: 2018-11-05 | End: 2018-11-09

## 2018-11-05 RX ORDER — HYDROMORPHONE HYDROCHLORIDE 2 MG/ML
0.5 INJECTION INTRAMUSCULAR; INTRAVENOUS; SUBCUTANEOUS
Qty: 0 | Refills: 0 | Status: DISCONTINUED | OUTPATIENT
Start: 2018-11-05 | End: 2018-11-05

## 2018-11-05 RX ORDER — HEPARIN SODIUM 5000 [USP'U]/ML
1400 INJECTION INTRAVENOUS; SUBCUTANEOUS
Qty: 25000 | Refills: 0 | Status: DISCONTINUED | OUTPATIENT
Start: 2018-11-05 | End: 2018-11-07

## 2018-11-05 RX ORDER — NIFEDIPINE 30 MG
60 TABLET, EXTENDED RELEASE 24 HR ORAL DAILY
Qty: 0 | Refills: 0 | Status: DISCONTINUED | OUTPATIENT
Start: 2018-11-05 | End: 2018-11-05

## 2018-11-05 RX ORDER — CHLORHEXIDINE GLUCONATE 213 G/1000ML
1 SOLUTION TOPICAL ONCE
Qty: 0 | Refills: 0 | Status: COMPLETED | OUTPATIENT
Start: 2018-11-05 | End: 2018-11-05

## 2018-11-05 RX ORDER — FLUTICASONE PROPIONATE 50 MCG
1 SPRAY, SUSPENSION NASAL DAILY
Qty: 0 | Refills: 0 | Status: DISCONTINUED | OUTPATIENT
Start: 2018-11-05 | End: 2018-11-09

## 2018-11-05 RX ORDER — NIFEDIPINE 30 MG
60 TABLET, EXTENDED RELEASE 24 HR ORAL DAILY
Qty: 0 | Refills: 0 | Status: DISCONTINUED | OUTPATIENT
Start: 2018-11-05 | End: 2018-11-09

## 2018-11-05 RX ORDER — HYDROMORPHONE HYDROCHLORIDE 2 MG/ML
0.5 INJECTION INTRAMUSCULAR; INTRAVENOUS; SUBCUTANEOUS DAILY
Qty: 0 | Refills: 0 | Status: DISCONTINUED | OUTPATIENT
Start: 2018-11-05 | End: 2018-11-05

## 2018-11-05 RX ORDER — POLYETHYLENE GLYCOL 3350 17 G/17G
17 POWDER, FOR SOLUTION ORAL DAILY
Qty: 0 | Refills: 0 | Status: DISCONTINUED | OUTPATIENT
Start: 2018-11-05 | End: 2018-11-09

## 2018-11-05 RX ORDER — FUROSEMIDE 40 MG
20 TABLET ORAL ONCE
Qty: 0 | Refills: 0 | Status: COMPLETED | OUTPATIENT
Start: 2018-11-05 | End: 2018-11-05

## 2018-11-05 RX ADMIN — SENNA PLUS 2 TABLET(S): 8.6 TABLET ORAL at 21:56

## 2018-11-05 RX ADMIN — HYDROMORPHONE HYDROCHLORIDE 0.5 MILLIGRAM(S): 2 INJECTION INTRAMUSCULAR; INTRAVENOUS; SUBCUTANEOUS at 05:16

## 2018-11-05 RX ADMIN — HYDROMORPHONE HYDROCHLORIDE 0.5 MILLIGRAM(S): 2 INJECTION INTRAMUSCULAR; INTRAVENOUS; SUBCUTANEOUS at 05:32

## 2018-11-05 RX ADMIN — CHLORHEXIDINE GLUCONATE 1 APPLICATION(S): 213 SOLUTION TOPICAL at 09:39

## 2018-11-05 RX ADMIN — TAMSULOSIN HYDROCHLORIDE 0.4 MILLIGRAM(S): 0.4 CAPSULE ORAL at 21:57

## 2018-11-05 RX ADMIN — SIMVASTATIN 20 MILLIGRAM(S): 20 TABLET, FILM COATED ORAL at 21:57

## 2018-11-05 RX ADMIN — HEPARIN SODIUM 14 UNIT(S)/HR: 5000 INJECTION INTRAVENOUS; SUBCUTANEOUS at 13:20

## 2018-11-05 RX ADMIN — HEPARIN SODIUM 16 UNIT(S)/HR: 5000 INJECTION INTRAVENOUS; SUBCUTANEOUS at 20:55

## 2018-11-05 RX ADMIN — Medication 60 MILLIGRAM(S): at 05:17

## 2018-11-05 RX ADMIN — ATENOLOL 25 MILLIGRAM(S): 25 TABLET ORAL at 05:17

## 2018-11-05 RX ADMIN — PANTOPRAZOLE SODIUM 40 MILLIGRAM(S): 20 TABLET, DELAYED RELEASE ORAL at 05:17

## 2018-11-05 RX ADMIN — Medication 20 MILLIGRAM(S): at 20:11

## 2018-11-05 NOTE — PROGRESS NOTE ADULT - SUBJECTIVE AND OBJECTIVE BOX
Subjective:  pt seen and examined, no complaints, ROS - .     aspirin enteric coated 81 milliGRAM(s) Oral daily  ATENolol  Tablet 25 milliGRAM(s) Oral daily  docusate sodium 100 milliGRAM(s) Oral daily  fluticasone propionate 50 MICROgram(s)/spray Nasal Spray 1 Spray(s) Both Nostrils daily  heparin  Infusion 1300 Unit(s)/Hr IV Continuous <Continuous>  HYDROmorphone  Injectable 0.5 milliGRAM(s) IV Push daily  NIFEdipine XL 60 milliGRAM(s) Oral daily  pantoprazole    Tablet 40 milliGRAM(s) Oral before breakfast  polyethylene glycol 3350 17 Gram(s) Oral daily  senna 2 Tablet(s) Oral at bedtime  silver nitrate Applicator 1 Application(s) Topical once  simvastatin 20 milliGRAM(s) Oral at bedtime  tamsulosin 0.4 milliGRAM(s) Oral at bedtime                            7.8    20.1  )-----------( 568      ( 04 Nov 2018 06:35 )             24.1       Hemoglobin: 7.8 g/dL (11-04 @ 06:35)  Hemoglobin: 7.8 g/dL (11-03 @ 14:34)  Hemoglobin: 7.6 g/dL (11-03 @ 07:15)  Hemoglobin: 8.3 g/dL (11-02 @ 17:44)  Hemoglobin: 7.3 g/dL (11-02 @ 08:22)      11-04    133<L>  |  93<L>  |  24<H>  ----------------------------<  105<H>  4.3   |  30  |  0.95    Ca    8.5      04 Nov 2018 06:27  Phos  3.1     11-04  Mg     2.3     11-04      Creatinine Trend: 0.95<--, 1.31<--, 0.97<--, 0.76<--, 0.74<--, 0.75<--    COAGS:           T(C): 37.1 (11-05-18 @ 01:44), Max: 37.1 (11-04-18 @ 20:45)  HR: 84 (11-04-18 @ 20:45) (71 - 87)  BP: 153/64 (11-05-18 @ 01:44) (104/52 - 153/64)  RR: 18 (11-05-18 @ 01:44) (16 - 18)  SpO2: 91% (11-05-18 @ 01:44) (91% - 97%)  Wt(kg): --    I&O's Summary    03 Nov 2018 08:01  -  04 Nov 2018 07:00  --------------------------------------------------------  IN: 1392 mL / OUT: 2125 mL / NET: -733 mL    04 Nov 2018 07:01  -  05 Nov 2018 04:52  --------------------------------------------------------  IN: 1280 mL / OUT: 1650 mL / NET: -370 mL        Appearance: Normal	  HEENT:   Normal oral mucosa, PERRL, EOMI	  Lymphatic: No lymphadenopathy , no edema  Cardiovascular: irregular irregular  S1 S2, No JVD, No murmurs , Peripheral pulses palpable 2+ bilaterally  Respiratory: Lungs clear to auscultation, normal effort 	  Gastrointestinal:  Soft, Non-tender, + BS	  Skin: No rashes, No ecchymoses, No cyanosis, warm to touch  Musculoskeletal: Normal range of motion, normal strength  Psychiatry:  Mood & affect appropriate    TELEMETRY: 	  none  ECG:  	< from: 12 Lead ECG (10.25.18 @ 14:08) >  Diagnosis Line ATRIAL FIBRILLATION  PROLONGED QT  ABNORMAL ECG    < end of copied text >    RADIOLOGY:   DIAGNOSTIC TESTING:  [ ] Echocardiogram:   [ ]  Catheterization:  [ ] Stress Test:    OTHER: 	        ASSESSMENT/PLAN: 	73y Male  history of Afib, HTN, CVA, MOE admitted with RLE arterial occlusion now s/p embolectomy of right AT and peroneal artery. RLE fasciotomies     A/C with heparin gtt at present for Afib, will need life long tx   GI / DVT prophylaxis,  keep K>4, mag >2.0   rate control with BB   OR today   bp stable on Procardia / Ace   pain management   wound care per primary team   vascular follow up   D/W Dr Gillis

## 2018-11-05 NOTE — CONSULT NOTE ADULT - ATTENDING COMMENTS
Pt seen and examined. Presented with RLE arterial thrombosis, s/p embolectomy, now with post op hypoxic resp failure. Multifactorial 2/2 combination of fluid overload, atelectasis, ? possible reactive airway disease.  Would suggest diuresis, aggressive pain control, mobilization and incentive spirometry/ acapella. Suggest a CTPA to r/o PE and evaluate the lung parenchyma as his PTT has intermittently remained sub therapeutic.

## 2018-11-05 NOTE — PROGRESS NOTE ADULT - ASSESSMENT
ASSESSMENT: 73M with recent spine surgery now s/p RLE embolectomy for acute RLE arterial occlusion now s/p RLE fasciotomies.     PLAN:  - OR today with PRS   -- Hold heparin gtt as patient leaves floor to OR  - Heparin drip to goal 50-60  - Pain control-- Reg diet  - OOB with PT  - Elevate RLE,  heel splint.   - ACE bandage RLE w/ daily dressing changes   - PT recommending DANIEL  - f/u neurology recommendations- EEG pending  - f/u C. diff for persistent leukocytosis, other workup to date has been negative.   - Fluid restriction and nephrology recs for hyponatremia/MAGALI  - c/w ezoi, monitor UOP    [id number]

## 2018-11-05 NOTE — PROGRESS NOTE ADULT - SUBJECTIVE AND OBJECTIVE BOX
GENERAL SURGERY DAILY PROGRESS NOTE:       Subjective:  Patient seen and examined at bedside this AM. Patient with interval forgetfulness over the weekend. No acute events overnight. Plan for OR with PRS for fasciotomy closure.         Objective:    Physical Exam:  Gen: NAD  Resp: nonlabored  Abd: soft, ND, NT, no rebound or guarding  Ext: RLE fasciotomy incisions with viable muscle, oozing, dressed with xeroform/gauze/cling/ACE, DP/PT palpable. Unable to dorsiflex toes. Brace.       Vital Signs Last 24 Hrs  T(C): 36.8 (2018 05:11), Max: 37.1 (2018 20:45)  T(F): 98.2 (2018 05:11), Max: 98.8 (2018 20:45)  HR: 93 (2018 05:11) (71 - 93)  BP: 152/63 (2018 05:11) (104/52 - 153/64)  BP(mean): --  RR: 16 (2018 05:11) (16 - 18)  SpO2: 95% (2018 05:11) (91% - 97%)    I&O's Detail    2018 08:01  -  2018 07:00  --------------------------------------------------------  IN:    heparin Infusion: 312 mL    Oral Fluid: 1080 mL  Total IN: 1392 mL    OUT:    Indwelling Catheter - Urethral: 1225 mL    Voided: 900 mL  Total OUT: 2125 mL    Total NET: -733 mL      2018 07:01  -  2018 05:44  --------------------------------------------------------  IN:    heparin Infusion: 260 mL    Oral Fluid: 1020 mL  Total IN: 1280 mL    OUT:    Indwelling Catheter - Urethral: 2100 mL  Total OUT: 2100 mL    Total NET: -820 mL          Daily     Daily     MEDICATIONS  (STANDING):  aspirin enteric coated 81 milliGRAM(s) Oral daily  ATENolol  Tablet 25 milliGRAM(s) Oral daily  docusate sodium 100 milliGRAM(s) Oral daily  fluticasone propionate 50 MICROgram(s)/spray Nasal Spray 1 Spray(s) Both Nostrils daily  heparin  Infusion 1300 Unit(s)/Hr (13 mL/Hr) IV Continuous <Continuous>  HYDROmorphone  Injectable 0.5 milliGRAM(s) IV Push daily  NIFEdipine XL 60 milliGRAM(s) Oral daily  pantoprazole    Tablet 40 milliGRAM(s) Oral before breakfast  polyethylene glycol 3350 17 Gram(s) Oral daily  senna 2 Tablet(s) Oral at bedtime  silver nitrate Applicator 1 Application(s) Topical once  simvastatin 20 milliGRAM(s) Oral at bedtime  tamsulosin 0.4 milliGRAM(s) Oral at bedtime    MEDICATIONS  (PRN):      LABS:                        7.8    20.1  )-----------( 568      ( 2018 06:35 )             24.1     11-04    133<L>  |  93<L>  |  24<H>  ----------------------------<  105<H>  4.3   |  30  |  0.95    Ca    8.5      2018 06:27  Phos  3.1     11-04  Mg     2.3     11-04      PTT - ( 2018 06:35 )  PTT:31.3 sec  Urinalysis Basic - ( 2018 20:05 )    Color: Yellow / Appearance: Clear / S.016 / pH: x  Gluc: x / Ketone: Negative  / Bili: Negative / Urobili: Negative   Blood: x / Protein: Negative / Nitrite: Negative   Leuk Esterase: Negative / RBC: x / WBC x   Sq Epi: x / Non Sq Epi: x / Bacteria: x        RADIOLOGY & ADDITIONAL STUDIES:

## 2018-11-05 NOTE — CONSULT NOTE ADULT - SUBJECTIVE AND OBJECTIVE BOX
CHIEF COMPLAINT: admitted for right leg pain and numbness     HPI: 73/M with PMH of atrial fibrillation(on coumadin), HTN, Hodgkin's lymphoma s/p splenectomy (), hip replacement, CVA (2018 while off anticoagulation / spine surgery).  and MOE presented to the ED with right lower extremity pain and decreased sensation since 10am today. S/p recent spine surgery on 10/19 for L3/L4 hardware revision for which coumadin was held form 10/18- was discharged on aspirin.     Patient started having pain from his right mid-thigh extending to his foot, as well as decreased sensation and weakness on day of admission. Noted to have acute RLE arterial occlusion - s/p RLE embolectomy. He was scheduled to have revision surgery for closure where he noted to hypoxic before surgery.       PAST MEDICAL & SURGICAL HISTORY:  Exposure to toxin:    Spondylolisthesis  Spinal stenosis: L3-L4  CVA (cerebral vascular accident): 2018 - attributed to no AC for 11 days preop/postop spine surgery - presented to ED with slurred speech, residual ST memory loss, per pt  Osteoarthritis  BPH (benign prostatic hyperplasia)  MOE (obstructive sleep apnea): positive sleep study many years ago, was recommended to use CPAP, patient non-compliant  GERD (gastroesophageal reflux disease)  HTN (hypertension)  Atrial fibrillation: over 20 years  Hodgkin lymphoma:   History of lumbar spinal fusion: 2018  History of cardioversion: for AF - &quot;many years ago&quot; - unsuccessful  S/P hip replacement, right:   S/P splenectomy:       FAMILY HISTORY:      SOCIAL HISTORY:  Smoking: [x ] Never Smoked [ ] Former Smoker (__ packs x ___ years) [ ] Current Smoker  (__ packs x ___ years)  Substance Use: [x ] Never Used [ ] Used ____  EtOH Use: denies   Marital Status: [ ] Single [x ]  [ ]  [ ]   Sexual History:   Occupation: retired  - was in   Recent Travel:  Country of Birth:  Advance Directives:    Allergies    No Known Allergies    Intolerances        HOME MEDICATIONS:    REVIEW OF SYSTEMS:  Constitutional: [ ] negative [-] fevers [-] chills [- ] weight loss [- ] weight gain  HEENT: [ ] negative [ ] dry eyes [ ] eye irritation [ ] postnasal drip [ ] nasal congestion  CV: [ ] negative  [-] chest pain [-] orthopnea [-] palpitations [- ] murmur  Resp: [ ] negative [-] cough [-] shortness of breath [-] wheezing [-] sputum [-] hemoptysis  GI: [ ] negative [-] nausea [-] vomiting [-] diarrhea [-] constipation [-] abd pain [ ] dysphagia   : [ ] negative [-] dysuria [ ] nocturia [ ] hematuria [ ] increased urinary frequency  Musculoskeletal: [ ] negative [ ] back pain [-] myalgias [-] arthralgias [ ] fracture  Skin: [ ] negative [-] rash [ ] itch  Neurological: [ ] negative [-] headache [-] dizziness [ ] syncope [ ] weakness [ ] numbness  Psychiatric: [- ] negative [ ] anxiety [ ] depression  Endocrine: [- ] negative [ ] diabetes [ ] thyroid problem  Hematologic/Lymphatic: [- ] negative [ ] anemia [ ] bleeding problem  Allergic/Immunologic: [- ] negative [ ] itchy eyes [ ] nasal discharge [ ] hives [ ] angioedema  [ ] All other systems negative  [ ] Unable to assess ROS because ________    OBJECTIVE:  ICU Vital Signs Last 24 Hrs  T(C): 36.8 (2018 15:00), Max: 37.1 (2018 20:45)  T(F): 98.3 (2018 15:00), Max: 98.8 (2018 20:45)  HR: 73 (2018 15:00) (63 - 93)  BP: 131/64 (2018 15:00) (120/77 - 165/72)  BP(mean): 98 (2018 14:00) (82 - 107)  RR: 16 (2018 15:00) (15 - 19)  SpO2: 93% (2018 15:00) (91% - 100%)         @ 07:01  -   @ 07:00  --------------------------------------------------------  IN: 1319 mL / OUT: 2100 mL / NET: -781 mL     @ 07:01  -   @ 16:05  --------------------------------------------------------  IN: 134 mL / OUT: 50 mL / NET: 84 mL      CAPILLARY BLOOD GLUCOSE          PHYSICAL EXAM:  General: No apparent distress  HEENT: NC/AT  Lymph Nodes: No supraclavicular or cervical lymphadenopathy  Neck: Supple  Respiratory: CTAB, no wheezing or crackles, good air entry  Cardiovascular: RRR, no murmur, no LE edema  Abdomen: Soft, nontender, nondistended  Extremities: right lower extremity wrapped in dressing - distal skin warm - distal pulse 2+,  Skin: Intact  Neurological: A&Ox3, minimal movement on right leg 2/2 recent procedure and pain, sensation intact.   Psychiatry: Normal mood and affect    HOSPITAL MEDICATIONS:  aspirin enteric coated 81 milliGRAM(s) Oral daily  heparin  Infusion 1400 Unit(s)/Hr IV Continuous <Continuous>      ATENolol  Tablet 25 milliGRAM(s) Oral daily  ATENolol  Tablet 25 milliGRAM(s) Oral daily  NIFEdipine XL 60 milliGRAM(s) Oral daily  NIFEdipine XL 60 milliGRAM(s) Oral daily  tamsulosin 0.4 milliGRAM(s) Oral at bedtime  tamsulosin 0.4 milliGRAM(s) Oral at bedtime    simvastatin 20 milliGRAM(s) Oral at bedtime  simvastatin 20 milliGRAM(s) Oral at bedtime      HYDROmorphone  Injectable 0.5 milliGRAM(s) IV Push daily  HYDROmorphone  Injectable 0.5 milliGRAM(s) IV Push daily    docusate sodium 100 milliGRAM(s) Oral daily  pantoprazole    Tablet 40 milliGRAM(s) Oral before breakfast  pantoprazole    Tablet 40 milliGRAM(s) Oral before breakfast  polyethylene glycol 3350 17 Gram(s) Oral daily  senna 2 Tablet(s) Oral at bedtime            fluticasone propionate 50 MICROgram(s)/spray Nasal Spray 1 Spray(s) Both Nostrils daily  fluticasone propionate 50 MICROgram(s)/spray Nasal Spray 1 Spray(s) Both Nostrils daily  silver nitrate Applicator 1 Application(s) Topical once        LABS:                        8.3    18.7  )-----------( 600      ( 2018 07:07 )             23.6     Hgb Trend: 8.3<--, 7.8<--, 7.8<--, 7.6<--, 8.3<--  11-05    133<L>  |  94<L>  |  15  ----------------------------<  102<H>  3.9   |  28  |  0.81    Ca    8.9      2018 07:08  Phos  3.3     11-  Mg     2.3     1105      Creatinine Trend: 0.81<--, 0.95<--, 1.31<--, 0.97<--, 0.76<--, 0.74<--  PT/INR - ( 2018 07:08 )   PT: 13.0 sec;   INR: 1.13 ratio         PTT - ( 2018 07:08 )  PTT:40.5 sec  Urinalysis Basic - ( 2018 20:05 )    Color: Yellow / Appearance: Clear / S.016 / pH: x  Gluc: x / Ketone: Negative  / Bili: Negative / Urobili: Negative   Blood: x / Protein: Negative / Nitrite: Negative   Leuk Esterase: Negative / RBC: x / WBC x   Sq Epi: x / Non Sq Epi: x / Bacteria: x            MICROBIOLOGY:     RADIOLOGY:  [ ] Reviewed and interpreted by me    ASSESSMENT AND RECOMMENDATION: CHIEF COMPLAINT: admitted for right leg pain and numbness     HPI: 73/M retired  (was in ) with PMH of atrial fibrillation(on coumadin), HTN, Hodgkin's lymphoma s/p splenectomy (), hip replacement, CVA (2018 while off anticoagulation 2/2 spine surgery).  and MOE presented to the ED with right lower extremity pain and decreased sensation. S/p recent spine surgery on 10/19 for L3/L4 hardware revision for which coumadin was held form 10/18- was discharged on aspirin.     Patient started having pain from his right mid-thigh extending to his foot, as well as decreased sensation and weakness on day of admission. Noted to have acute RLE arterial occlusion - s/p RLE embolectomy. He was scheduled to have revision surgery for closure where he noted to hypoxic before surgery.       PAST MEDICAL & SURGICAL HISTORY:  Exposure to toxin:    Spondylolisthesis  Spinal stenosis: L3-L4  CVA (cerebral vascular accident): 2018 - attributed to no AC for 11 days preop/postop spine surgery - presented to ED with slurred speech, residual ST memory loss, per pt  Osteoarthritis  BPH (benign prostatic hyperplasia)  MOE (obstructive sleep apnea): positive sleep study many years ago, was recommended to use CPAP, patient non-compliant  GERD (gastroesophageal reflux disease)  HTN (hypertension)  Atrial fibrillation: over 20 years  Hodgkin lymphoma:   History of lumbar spinal fusion: 2018  History of cardioversion: for AF - &quot;many years ago&quot; - unsuccessful  S/P hip replacement, right:   S/P splenectomy:       FAMILY HISTORY:      SOCIAL HISTORY:  Smoking: [x ] Never Smoked [ ] Former Smoker (__ packs x ___ years) [ ] Current Smoker  (__ packs x ___ years)  Substance Use: [x ] Never Used [ ] Used ____  EtOH Use: denies   Marital Status: [ ] Single [x ]  [ ]  [ ]   Sexual History:   Occupation: retired  - was in   Recent Travel:  Country of Birth:  Advance Directives:    Allergies    No Known Allergies    Intolerances        HOME MEDICATIONS:    REVIEW OF SYSTEMS:  Constitutional: [ ] negative [-] fevers [-] chills [- ] weight loss [- ] weight gain  HEENT: [ ] negative [ ] dry eyes [ ] eye irritation [ ] postnasal drip [ ] nasal congestion  CV: [ ] negative  [-] chest pain [-] orthopnea [-] palpitations [- ] murmur  Resp: [ ] negative [-] cough [-] shortness of breath [-] wheezing [-] sputum [-] hemoptysis  GI: [ ] negative [-] nausea [-] vomiting [-] diarrhea [-] constipation [-] abd pain [ ] dysphagia   : [ ] negative [-] dysuria [ ] nocturia [ ] hematuria [ ] increased urinary frequency  Musculoskeletal: [ ] negative [ ] back pain [-] myalgias [-] arthralgias [ ] fracture  Skin: [ ] negative [-] rash [ ] itch  Neurological: [ ] negative [-] headache [-] dizziness [ ] syncope [ ] weakness [ ] numbness  Psychiatric: [- ] negative [ ] anxiety [ ] depression  Endocrine: [- ] negative [ ] diabetes [ ] thyroid problem  Hematologic/Lymphatic: [- ] negative [ ] anemia [ ] bleeding problem  Allergic/Immunologic: [- ] negative [ ] itchy eyes [ ] nasal discharge [ ] hives [ ] angioedema  [ ] All other systems negative  [ ] Unable to assess ROS because ________    OBJECTIVE:  ICU Vital Signs Last 24 Hrs  T(C): 36.8 (2018 15:00), Max: 37.1 (2018 20:45)  T(F): 98.3 (2018 15:00), Max: 98.8 (2018 20:45)  HR: 73 (2018 15:00) (63 - 93)  BP: 131/64 (2018 15:00) (120/77 - 165/72)  BP(mean): 98 (2018 14:00) (82 - 107)  RR: 16 (2018 15:00) (15 - 19)  SpO2: 93% (2018 15:00) (91% - 100%)         @ 07:01  -   @ 07:00  --------------------------------------------------------  IN: 1319 mL / OUT: 2100 mL / NET: -781 mL     @ 07:01  -   @ 16:05  --------------------------------------------------------  IN: 134 mL / OUT: 50 mL / NET: 84 mL      CAPILLARY BLOOD GLUCOSE          PHYSICAL EXAM:  General: No apparent distress  HEENT: NC/AT  Lymph Nodes: No supraclavicular or cervical lymphadenopathy  Neck: Supple  Respiratory: CTAB, no wheezing or crackles, good air entry  Cardiovascular: RRR, no murmur, no LE edema  Abdomen: Soft, nontender, nondistended  Extremities: right lower extremity wrapped in dressing - distal skin warm - distal pulse 2+,  Skin: Intact  Neurological: A&Ox3, minimal movement on right leg 2/2 recent procedure and pain, sensation intact.   Psychiatry: Normal mood and affect    HOSPITAL MEDICATIONS:  aspirin enteric coated 81 milliGRAM(s) Oral daily  heparin  Infusion 1400 Unit(s)/Hr IV Continuous <Continuous>      ATENolol  Tablet 25 milliGRAM(s) Oral daily  ATENolol  Tablet 25 milliGRAM(s) Oral daily  NIFEdipine XL 60 milliGRAM(s) Oral daily  NIFEdipine XL 60 milliGRAM(s) Oral daily  tamsulosin 0.4 milliGRAM(s) Oral at bedtime  tamsulosin 0.4 milliGRAM(s) Oral at bedtime    simvastatin 20 milliGRAM(s) Oral at bedtime  simvastatin 20 milliGRAM(s) Oral at bedtime      HYDROmorphone  Injectable 0.5 milliGRAM(s) IV Push daily  HYDROmorphone  Injectable 0.5 milliGRAM(s) IV Push daily    docusate sodium 100 milliGRAM(s) Oral daily  pantoprazole    Tablet 40 milliGRAM(s) Oral before breakfast  pantoprazole    Tablet 40 milliGRAM(s) Oral before breakfast  polyethylene glycol 3350 17 Gram(s) Oral daily  senna 2 Tablet(s) Oral at bedtime            fluticasone propionate 50 MICROgram(s)/spray Nasal Spray 1 Spray(s) Both Nostrils daily  fluticasone propionate 50 MICROgram(s)/spray Nasal Spray 1 Spray(s) Both Nostrils daily  silver nitrate Applicator 1 Application(s) Topical once        LABS:                        8.3    18.7  )-----------( 600      ( 2018 07:07 )             23.6     Hgb Trend: 8.3<--, 7.8<--, 7.8<--, 7.6<--, 8.3<--  11-05    133<L>  |  94<L>  |  15  ----------------------------<  102<H>  3.9   |  28  |  0.81    Ca    8.9      2018 07:08  Phos  3.3     11  Mg     2.3     1105      Creatinine Trend: 0.81<--, 0.95<--, 1.31<--, 0.97<--, 0.76<--, 0.74<--  PT/INR - ( 2018 07:08 )   PT: 13.0 sec;   INR: 1.13 ratio         PTT - ( 2018 07:08 )  PTT:40.5 sec  Urinalysis Basic - ( 2018 20:05 )    Color: Yellow / Appearance: Clear / S.016 / pH: x  Gluc: x / Ketone: Negative  / Bili: Negative / Urobili: Negative   Blood: x / Protein: Negative / Nitrite: Negative   Leuk Esterase: Negative / RBC: x / WBC x   Sq Epi: x / Non Sq Epi: x / Bacteria: x            MICROBIOLOGY:     RADIOLOGY:  [ ] Reviewed and interpreted by me    ASSESSMENT AND RECOMMENDATION:  73/M retired  (was in ) with PMH of atrial fibrillation(on coumadin), HTN, Hodgkin's lymphoma s/p splenectomy (), hip replacement, CVA (2018 while off anticoagulation /2 spine surgery).  and MOE presented to the ED with right lower extremity pain and decreased sensation- acute RLE arterial occlusion - s/p RLE embolectomy. Noted hypoxic before revision surgery today.  Bedside ECHO was done - suggestive of B line over bases and mild A line over upper lobe     - given history of CVA in 2018 and recent arterial thrombus and immobility - its advisable to get CTA chest to rule out Pulmonary embolism and to look for underlying lung parenchymal pathology given history of involvement in   - continue incentive spirometry CHIEF COMPLAINT: admitted for right leg pain and numbness     HPI: 73/M retired  (was in ) with PMH of atrial fibrillation(on coumadin), HTN, Hodgkin's lymphoma s/p splenectomy (), hip replacement, CVA (2018 while off anticoagulation 2/2 spine surgery).  and MOE presented to the ED with right lower extremity pain and decreased sensation. S/p recent spine surgery on 10/19 for L3/L4 hardware revision for which coumadin was held form 10/18- was discharged on aspirin.     Patient started having pain from his right mid-thigh extending to his foot, as well as decreased sensation and weakness on day of admission. Noted to have acute RLE arterial occlusion - s/p RLE embolectomy. He was scheduled to have revision surgery for closure where he noted to hypoxic before surgery.       PAST MEDICAL & SURGICAL HISTORY:  Exposure to toxin:    Spondylolisthesis  Spinal stenosis: L3-L4  CVA (cerebral vascular accident): 2018 - attributed to no AC for 11 days preop/postop spine surgery - presented to ED with slurred speech, residual ST memory loss, per pt  Osteoarthritis  BPH (benign prostatic hyperplasia)  MOE (obstructive sleep apnea): positive sleep study many years ago, was recommended to use CPAP, patient non-compliant  GERD (gastroesophageal reflux disease)  HTN (hypertension)  Atrial fibrillation: over 20 years  Hodgkin lymphoma:   History of lumbar spinal fusion: 2018  History of cardioversion: for AF - &quot;many years ago&quot; - unsuccessful  S/P hip replacement, right:   S/P splenectomy:       FAMILY HISTORY:      SOCIAL HISTORY:  Smoking: [x ] Never Smoked [ ] Former Smoker (__ packs x ___ years) [ ] Current Smoker  (__ packs x ___ years)  Substance Use: [x ] Never Used [ ] Used ____  EtOH Use: denies   Marital Status: [ ] Single [x ]  [ ]  [ ]   Sexual History:   Occupation: retired  - was in   Recent Travel:  Country of Birth:  Advance Directives:    Allergies    No Known Allergies    Intolerances        HOME MEDICATIONS:    REVIEW OF SYSTEMS:  Constitutional: [ ] negative [-] fevers [-] chills [- ] weight loss [- ] weight gain  HEENT: [ ] negative [ ] dry eyes [ ] eye irritation [ ] postnasal drip [ ] nasal congestion  CV: [ ] negative  [-] chest pain [-] orthopnea [-] palpitations [- ] murmur  Resp: [ ] negative [-] cough [-] shortness of breath [-] wheezing [-] sputum [-] hemoptysis  GI: [ ] negative [-] nausea [-] vomiting [-] diarrhea [-] constipation [-] abd pain [ ] dysphagia   : [ ] negative [-] dysuria [ ] nocturia [ ] hematuria [ ] increased urinary frequency  Musculoskeletal: [ ] negative [ ] back pain [-] myalgias [-] arthralgias [ ] fracture  Skin: [ ] negative [-] rash [ ] itch  Neurological: [ ] negative [-] headache [-] dizziness [ ] syncope [ ] weakness [ ] numbness  Psychiatric: [- ] negative [ ] anxiety [ ] depression  Endocrine: [- ] negative [ ] diabetes [ ] thyroid problem  Hematologic/Lymphatic: [- ] negative [ ] anemia [ ] bleeding problem  Allergic/Immunologic: [- ] negative [ ] itchy eyes [ ] nasal discharge [ ] hives [ ] angioedema  [ ] All other systems negative  [ ] Unable to assess ROS because ________    OBJECTIVE:  ICU Vital Signs Last 24 Hrs  T(C): 36.8 (2018 15:00), Max: 37.1 (2018 20:45)  T(F): 98.3 (2018 15:00), Max: 98.8 (2018 20:45)  HR: 73 (2018 15:00) (63 - 93)  BP: 131/64 (2018 15:00) (120/77 - 165/72)  BP(mean): 98 (2018 14:00) (82 - 107)  RR: 16 (2018 15:00) (15 - 19)  SpO2: 93% (2018 15:00) (91% - 100%)         @ 07:01  -   @ 07:00  --------------------------------------------------------  IN: 1319 mL / OUT: 2100 mL / NET: -781 mL     @ 07:01  -   @ 16:05  --------------------------------------------------------  IN: 134 mL / OUT: 50 mL / NET: 84 mL      CAPILLARY BLOOD GLUCOSE          PHYSICAL EXAM:  General: No apparent distress  HEENT: NC/AT  Lymph Nodes: No supraclavicular or cervical lymphadenopathy  Neck: Supple  Respiratory: CTAB, no wheezing or crackles, good air entry  Cardiovascular: RRR, no murmur, no LE edema  Abdomen: Soft, nontender, nondistended  Extremities: right lower extremity wrapped in dressing - distal skin warm - distal pulse 2+,  Skin: Intact  Neurological: A&Ox3, minimal movement on right leg 2/2 recent procedure and pain, sensation intact.   Psychiatry: Normal mood and affect    HOSPITAL MEDICATIONS:  aspirin enteric coated 81 milliGRAM(s) Oral daily  heparin  Infusion 1400 Unit(s)/Hr IV Continuous <Continuous>      ATENolol  Tablet 25 milliGRAM(s) Oral daily  ATENolol  Tablet 25 milliGRAM(s) Oral daily  NIFEdipine XL 60 milliGRAM(s) Oral daily  NIFEdipine XL 60 milliGRAM(s) Oral daily  tamsulosin 0.4 milliGRAM(s) Oral at bedtime  tamsulosin 0.4 milliGRAM(s) Oral at bedtime    simvastatin 20 milliGRAM(s) Oral at bedtime  simvastatin 20 milliGRAM(s) Oral at bedtime      HYDROmorphone  Injectable 0.5 milliGRAM(s) IV Push daily  HYDROmorphone  Injectable 0.5 milliGRAM(s) IV Push daily    docusate sodium 100 milliGRAM(s) Oral daily  pantoprazole    Tablet 40 milliGRAM(s) Oral before breakfast  pantoprazole    Tablet 40 milliGRAM(s) Oral before breakfast  polyethylene glycol 3350 17 Gram(s) Oral daily  senna 2 Tablet(s) Oral at bedtime            fluticasone propionate 50 MICROgram(s)/spray Nasal Spray 1 Spray(s) Both Nostrils daily  fluticasone propionate 50 MICROgram(s)/spray Nasal Spray 1 Spray(s) Both Nostrils daily  silver nitrate Applicator 1 Application(s) Topical once        LABS:                        8.3    18.7  )-----------( 600      ( 2018 07:07 )             23.6     Hgb Trend: 8.3<--, 7.8<--, 7.8<--, 7.6<--, 8.3<--  11-05    133<L>  |  94<L>  |  15  ----------------------------<  102<H>  3.9   |  28  |  0.81    Ca    8.9      2018 07:08  Phos  3.3     11  Mg     2.3     1105      Creatinine Trend: 0.81<--, 0.95<--, 1.31<--, 0.97<--, 0.76<--, 0.74<--  PT/INR - ( 2018 07:08 )   PT: 13.0 sec;   INR: 1.13 ratio         PTT - ( 2018 07:08 )  PTT:40.5 sec  Urinalysis Basic - ( 2018 20:05 )    Color: Yellow / Appearance: Clear / S.016 / pH: x  Gluc: x / Ketone: Negative  / Bili: Negative / Urobili: Negative   Blood: x / Protein: Negative / Nitrite: Negative   Leuk Esterase: Negative / RBC: x / WBC x   Sq Epi: x / Non Sq Epi: x / Bacteria: x            MICROBIOLOGY:     RADIOLOGY:  [ ] Reviewed and interpreted by me    ASSESSMENT AND RECOMMENDATION:  73/M retired  (was in ) with PMH of atrial fibrillation(on coumadin), HTN, Hodgkin's lymphoma s/p splenectomy (), hip replacement, CVA (2018 while off anticoagulation /2 spine surgery).  and MOE presented to the ED with right lower extremity pain and decreased sensation- acute RLE arterial occlusion - s/p RLE embolectomy. Noted hypoxic before revision surgery today.  Bedside ECHO was done - suggestive of B line over bases and mild A line over upper lobe     - given history of CVA in 2018 and recent arterial thrombus and immobility - its advisable to get CTA chest to rule out Pulmonary embolism and to look for underlying lung parenchymal pathology given history of involvement in   - will be benefited form small dose of lasix IV push (20 mg) given B lines on USG + patient is off home meds- chlorthalidone   - continue incentive spirometry

## 2018-11-05 NOTE — PACU DISCHARGE NOTE - PAIN:
Controlled with current regime

## 2018-11-05 NOTE — BRIEF OPERATIVE NOTE - PROCEDURE
<<-----Click on this checkbox to enter Procedure Negative pressure wound therapy, large area  11/05/2018    Active  PGOOTE

## 2018-11-05 NOTE — CHART NOTE - NSCHARTNOTEFT_GEN_A_CORE
TEAM:    TIME:11-05-18 @ 17:03    PROCEDURE: Arterial occlusion    Vac placement over fasciotomy sites    SUBJECTIVE:  Patient seen in PACU- transported patient to floor where patient was examined and family was spoken with. Pain well controlled.   SOB:  [] YES [x] NO  Dyspnea: []YES [x]NO  Chest Discomfort: [] YES [x] NO    Patient endorses feeling some 'congestion'       OBJECTIVE:  T(C): 36.8 (11-05-18 @ 16:00), Max: 37.1 (11-04-18 @ 20:45)  HR: 84 (11-05-18 @ 16:00) (63 - 93)  BP: 135/67 (11-05-18 @ 16:00) (120/77 - 165/72)  RR: 16 (11-05-18 @ 16:00) (15 - 19)  SpO2: 93% (11-05-18 @ 16:00) (91% - 100%)  ABP: --  CVP(mm Hg): --  CO: --  CI: --  PA: --  PCWP: --        IN/OUT:    11-04-18 @ 07:01  -  11-05-18 @ 07:00  --------------------------------------------------------  IN: 1319 mL / OUT: 2100 mL / NET: -781 mL    11-05-18 @ 07:01  -  11-05-18 @ 17:03  --------------------------------------------------------  IN: 134 mL / OUT: 50 mL / NET: 84 mL        Mandeep: [ ] ANG  [ ] ANIBAL   UOP:   11-04-18 @ 07:01  -  11-05-18 @ 07:00  --------------------------------------------------------  OUT: 2100 mL    11-05-18 @ 07:01  -  11-05-18 @ 17:03  --------------------------------------------------------  OUT: 50 mL      NGT: [ ] Y  [ ] N     NG output:       ---------------------------------------------------------------------------------------------   PHYSICAL EXAM: ***  General: NAD, Lying in bed comfortably  Neuro: alert, oriented x3  Resp: Good effort, nonlabored  GI/Abd: Soft, NT/ND, no rebound/guarding, no masses palpated  Vascular: All 4 extremities warm. DP 2+ in RLE. Vac in place with good seal. ACE wrap on RLE. Elevated.     ---------------------------------------------------------------------------------------------   MEDICATIONS:  aspirin enteric coated 81 milliGRAM(s) Oral daily  ATENolol  Tablet 25 milliGRAM(s) Oral daily  ATENolol  Tablet 25 milliGRAM(s) Oral daily  docusate sodium 100 milliGRAM(s) Oral daily  fluticasone propionate 50 MICROgram(s)/spray Nasal Spray 1 Spray(s) Both Nostrils daily  fluticasone propionate 50 MICROgram(s)/spray Nasal Spray 1 Spray(s) Both Nostrils daily  heparin  Infusion 1400 Unit(s)/Hr IV Continuous <Continuous>  HYDROmorphone  Injectable 0.5 milliGRAM(s) IV Push daily  HYDROmorphone  Injectable 0.5 milliGRAM(s) IV Push daily  NIFEdipine XL 60 milliGRAM(s) Oral daily  NIFEdipine XL 60 milliGRAM(s) Oral daily  pantoprazole    Tablet 40 milliGRAM(s) Oral before breakfast  pantoprazole    Tablet 40 milliGRAM(s) Oral before breakfast  polyethylene glycol 3350 17 Gram(s) Oral daily  senna 2 Tablet(s) Oral at bedtime  silver nitrate Applicator 1 Application(s) Topical once  simvastatin 20 milliGRAM(s) Oral at bedtime  simvastatin 20 milliGRAM(s) Oral at bedtime  tamsulosin 0.4 milliGRAM(s) Oral at bedtime  tamsulosin 0.4 milliGRAM(s) Oral at bedtime      ---------------------------------------------------------------------------------------------   LABS:    BMP / CBC / COAG /       IMAGING STUDIES      ---------------------------------------------------------------------------------------------       ASSESSMENT  73y male s/p vac placement to fasciotomies    PLAN  -   - Diet:   - Pain control with PO/IV medications.    - Continue home medications  - OOB, ambulate as tolerated  - restart heparin drip  - pulmonary consult

## 2018-11-05 NOTE — BRIEF OPERATIVE NOTE - PRE-OP DX
Lower extremity embolism  10/25/2018    Active  Philip Lala  Open wound of right lower extremity, initial encounter  11/05/2018    Active  Kelly Bai

## 2018-11-05 NOTE — BRIEF OPERATIVE NOTE - POST-OP DX
Leg wound, right  11/05/2018    Active  Kelly Bai  Lower extremity embolism  10/25/2018    Active  Philip Lala

## 2018-11-06 LAB
ANION GAP SERPL CALC-SCNC: 12 MMOL/L — SIGNIFICANT CHANGE UP (ref 5–17)
APTT BLD: 58.7 SEC — HIGH (ref 27.5–36.3)
APTT BLD: 70.4 SEC — HIGH (ref 27.5–36.3)
BUN SERPL-MCNC: 15 MG/DL — SIGNIFICANT CHANGE UP (ref 7–23)
CALCIUM SERPL-MCNC: 8.7 MG/DL — SIGNIFICANT CHANGE UP (ref 8.4–10.5)
CHLORIDE SERPL-SCNC: 95 MMOL/L — LOW (ref 96–108)
CO2 SERPL-SCNC: 27 MMOL/L — SIGNIFICANT CHANGE UP (ref 22–31)
CREAT SERPL-MCNC: 0.82 MG/DL — SIGNIFICANT CHANGE UP (ref 0.5–1.3)
GLUCOSE SERPL-MCNC: 140 MG/DL — HIGH (ref 70–99)
HCT VFR BLD CALC: 26.6 % — LOW (ref 39–50)
HGB BLD-MCNC: 8.5 G/DL — LOW (ref 13–17)
MAGNESIUM SERPL-MCNC: 2.1 MG/DL — SIGNIFICANT CHANGE UP (ref 1.6–2.6)
MCHC RBC-ENTMCNC: 31 PG — SIGNIFICANT CHANGE UP (ref 27–34)
MCHC RBC-ENTMCNC: 31.9 GM/DL — LOW (ref 32–36)
MCV RBC AUTO: 97.1 FL — SIGNIFICANT CHANGE UP (ref 80–100)
PHOSPHATE SERPL-MCNC: 3.3 MG/DL — SIGNIFICANT CHANGE UP (ref 2.5–4.5)
PLATELET # BLD AUTO: 673 K/UL — HIGH (ref 150–400)
POTASSIUM SERPL-MCNC: 4.2 MMOL/L — SIGNIFICANT CHANGE UP (ref 3.5–5.3)
POTASSIUM SERPL-SCNC: 4.2 MMOL/L — SIGNIFICANT CHANGE UP (ref 3.5–5.3)
RBC # BLD: 2.74 M/UL — LOW (ref 4.2–5.8)
RBC # FLD: 13.7 % — SIGNIFICANT CHANGE UP (ref 10.3–14.5)
SODIUM SERPL-SCNC: 134 MMOL/L — LOW (ref 135–145)
WBC # BLD: 18.1 K/UL — HIGH (ref 3.8–10.5)
WBC # FLD AUTO: 18.1 K/UL — HIGH (ref 3.8–10.5)

## 2018-11-06 PROCEDURE — 95819 EEG AWAKE AND ASLEEP: CPT | Mod: 26,59

## 2018-11-06 PROCEDURE — 95951: CPT | Mod: 26

## 2018-11-06 PROCEDURE — 99221 1ST HOSP IP/OBS SF/LOW 40: CPT

## 2018-11-06 PROCEDURE — 99233 SBSQ HOSP IP/OBS HIGH 50: CPT | Mod: GC

## 2018-11-06 RX ORDER — CHLORTHALIDONE 50 MG
25 TABLET ORAL DAILY
Qty: 0 | Refills: 0 | Status: DISCONTINUED | OUTPATIENT
Start: 2018-11-06 | End: 2018-11-09

## 2018-11-06 RX ORDER — ACETAMINOPHEN 500 MG
650 TABLET ORAL ONCE
Qty: 0 | Refills: 0 | Status: COMPLETED | OUTPATIENT
Start: 2018-11-06 | End: 2018-11-06

## 2018-11-06 RX ORDER — CIPROFLOXACIN LACTATE 400MG/40ML
500 VIAL (ML) INTRAVENOUS EVERY 12 HOURS
Qty: 0 | Refills: 0 | Status: DISCONTINUED | OUTPATIENT
Start: 2018-11-06 | End: 2018-11-06

## 2018-11-06 RX ADMIN — Medication 60 MILLIGRAM(S): at 05:16

## 2018-11-06 RX ADMIN — Medication 500 MILLIGRAM(S): at 11:09

## 2018-11-06 RX ADMIN — TAMSULOSIN HYDROCHLORIDE 0.4 MILLIGRAM(S): 0.4 CAPSULE ORAL at 21:14

## 2018-11-06 RX ADMIN — POLYETHYLENE GLYCOL 3350 17 GRAM(S): 17 POWDER, FOR SOLUTION ORAL at 11:09

## 2018-11-06 RX ADMIN — Medication 100 MILLIGRAM(S): at 11:09

## 2018-11-06 RX ADMIN — ATENOLOL 25 MILLIGRAM(S): 25 TABLET ORAL at 05:16

## 2018-11-06 RX ADMIN — Medication 25 MILLIGRAM(S): at 11:09

## 2018-11-06 RX ADMIN — PANTOPRAZOLE SODIUM 40 MILLIGRAM(S): 20 TABLET, DELAYED RELEASE ORAL at 05:16

## 2018-11-06 RX ADMIN — Medication 81 MILLIGRAM(S): at 11:09

## 2018-11-06 RX ADMIN — HEPARIN SODIUM 16 UNIT(S)/HR: 5000 INJECTION INTRAVENOUS; SUBCUTANEOUS at 03:44

## 2018-11-06 RX ADMIN — SENNA PLUS 2 TABLET(S): 8.6 TABLET ORAL at 21:14

## 2018-11-06 RX ADMIN — SIMVASTATIN 20 MILLIGRAM(S): 20 TABLET, FILM COATED ORAL at 21:14

## 2018-11-06 RX ADMIN — HEPARIN SODIUM 16 UNIT(S)/HR: 5000 INJECTION INTRAVENOUS; SUBCUTANEOUS at 09:41

## 2018-11-06 NOTE — PROGRESS NOTE ADULT - SUBJECTIVE AND OBJECTIVE BOX
Interval Events: no acute events noted overnight.  patient denies any SOB or chest pain.  lasix 20 IV push given last night - net 1L negative for last 24 hours   saturating well on 2L nasal canula   CTA chest - prelim report noted for no PE     REVIEW OF SYSTEMS:  [x] All other systems negative  [ ] Unable to assess ROS because ________    OBJECTIVE:  ICU Vital Signs Last 24 Hrs  T(C): 37.4 (06 Nov 2018 05:15), Max: 37.4 (06 Nov 2018 05:15)  T(F): 99.3 (06 Nov 2018 05:15), Max: 99.3 (06 Nov 2018 05:15)  HR: 97 (06 Nov 2018 05:15) (63 - 97)  BP: 161/72 (06 Nov 2018 05:15) (120/76 - 165/72)  BP(mean): 98 (05 Nov 2018 14:00) (82 - 107)  RR: 20 (06 Nov 2018 05:15) (15 - 20)  SpO2: 94% (06 Nov 2018 05:15) (91% - 100%)        11-05 @ 07:01  -  11-06 @ 07:00  --------------------------------------------------------  IN: 980 mL / OUT: 2000 mL / NET: -1020 mL      CAPILLARY BLOOD GLUCOSE          PHYSICAL EXAM:  General: No apparent distress  HEENT: NC/AT  Lymph Nodes: No supraclavicular or cervical lymphadenopathy  Neck: Supple  Respiratory: CTAB, no wheezing or crackles, good air entry  Cardiovascular: RRR, no murmur, no LE edema  Abdomen: Soft, nontender, nondistended  Extremities: right lower extremity wrapped in dressing - distal skin warm - distal pulse 2+,  Skin: Intact  Neurological: A&Ox2-3, minimal movement on right leg 2/2 recent procedure and pain, sensation intact. able to follow commands   Psychiatry: Normal mood and affect    HOSPITAL MEDICATIONS:  aspirin enteric coated 81 milliGRAM(s) Oral daily  heparin  Infusion 1400 Unit(s)/Hr IV Continuous <Continuous>      ATENolol  Tablet 25 milliGRAM(s) Oral daily  NIFEdipine XL 60 milliGRAM(s) Oral daily  tamsulosin 0.4 milliGRAM(s) Oral at bedtime    simvastatin 20 milliGRAM(s) Oral at bedtime      HYDROmorphone  Injectable 0.5 milliGRAM(s) IV Push daily    docusate sodium 100 milliGRAM(s) Oral daily  pantoprazole    Tablet 40 milliGRAM(s) Oral before breakfast  polyethylene glycol 3350 17 Gram(s) Oral daily  senna 2 Tablet(s) Oral at bedtime      fluticasone propionate 50 MICROgram(s)/spray Nasal Spray 1 Spray(s) Both Nostrils daily  silver nitrate Applicator 1 Application(s) Topical once        LABS:                        8.5    18.1  )-----------( 673      ( 06 Nov 2018 03:14 )             26.6     Hgb Trend: 8.5<--, 7.9<--, 8.3<--, 7.8<--, 7.8<--  11-06    134<L>  |  95<L>  |  15  ----------------------------<  140<H>  4.2   |  27  |  0.82    Ca    8.7      06 Nov 2018 03:14  Phos  3.3     11-06  Mg     2.1     11-06      Creatinine Trend: 0.82<--, 0.81<--, 0.95<--, 1.31<--, 0.97<--, 0.76<--  PT/INR - ( 05 Nov 2018 07:08 )   PT: 13.0 sec;   INR: 1.13 ratio         PTT - ( 06 Nov 2018 03:14 )  PTT:70.4 sec          MICROBIOLOGY:     RADIOLOGY:  [ ] Reviewed and interpreted by me    ASSESSMENT AND RECOMMENDATION: Interval Events: no acute events noted overnight.  patient denies any SOB or chest pain.  lasix 20 IV push given last night - net 1L negative for last 24 hours   saturating well on 2L nasal canula     REVIEW OF SYSTEMS:  [x] All other systems negative  [ ] Unable to assess ROS because ________    OBJECTIVE:  ICU Vital Signs Last 24 Hrs  T(C): 37.4 (06 Nov 2018 05:15), Max: 37.4 (06 Nov 2018 05:15)  T(F): 99.3 (06 Nov 2018 05:15), Max: 99.3 (06 Nov 2018 05:15)  HR: 97 (06 Nov 2018 05:15) (63 - 97)  BP: 161/72 (06 Nov 2018 05:15) (120/76 - 165/72)  BP(mean): 98 (05 Nov 2018 14:00) (82 - 107)  RR: 20 (06 Nov 2018 05:15) (15 - 20)  SpO2: 94% (06 Nov 2018 05:15) (91% - 100%)        11-05 @ 07:01  -  11-06 @ 07:00  --------------------------------------------------------  IN: 980 mL / OUT: 2000 mL / NET: -1020 mL      CAPILLARY BLOOD GLUCOSE          PHYSICAL EXAM:  General: No apparent distress  HEENT: NC/AT  Lymph Nodes: No supraclavicular or cervical lymphadenopathy  Neck: Supple  Respiratory: CTAB, no wheezing or crackles, good air entry  Cardiovascular: RRR, no murmur, no LE edema  Abdomen: Soft, nontender, nondistended  Extremities: right lower extremity wrapped in dressing - distal skin warm - distal pulse 2+,  Skin: Intact  Neurological: A&Ox2-3, minimal movement on right leg 2/2 recent procedure and pain, sensation intact. able to follow commands   Psychiatry: Normal mood and affect    HOSPITAL MEDICATIONS:  aspirin enteric coated 81 milliGRAM(s) Oral daily  heparin  Infusion 1400 Unit(s)/Hr IV Continuous <Continuous>      ATENolol  Tablet 25 milliGRAM(s) Oral daily  NIFEdipine XL 60 milliGRAM(s) Oral daily  tamsulosin 0.4 milliGRAM(s) Oral at bedtime    simvastatin 20 milliGRAM(s) Oral at bedtime      HYDROmorphone  Injectable 0.5 milliGRAM(s) IV Push daily    docusate sodium 100 milliGRAM(s) Oral daily  pantoprazole    Tablet 40 milliGRAM(s) Oral before breakfast  polyethylene glycol 3350 17 Gram(s) Oral daily  senna 2 Tablet(s) Oral at bedtime      fluticasone propionate 50 MICROgram(s)/spray Nasal Spray 1 Spray(s) Both Nostrils daily  silver nitrate Applicator 1 Application(s) Topical once        LABS:                        8.5    18.1  )-----------( 673      ( 06 Nov 2018 03:14 )             26.6     Hgb Trend: 8.5<--, 7.9<--, 8.3<--, 7.8<--, 7.8<--  11-06    134<L>  |  95<L>  |  15  ----------------------------<  140<H>  4.2   |  27  |  0.82    Ca    8.7      06 Nov 2018 03:14  Phos  3.3     11-06  Mg     2.1     11-06      Creatinine Trend: 0.82<--, 0.81<--, 0.95<--, 1.31<--, 0.97<--, 0.76<--  PT/INR - ( 05 Nov 2018 07:08 )   PT: 13.0 sec;   INR: 1.13 ratio         PTT - ( 06 Nov 2018 03:14 )  PTT:70.4 sec    RADIOLOGY:  [x] Reviewed and interpreted by me    ASSESSMENT AND RECOMMENDATION:  73/M retired  (was in 9/11) with PMH of atrial fibrillation(on coumadin), HTN, Hodgkin's lymphoma s/p splenectomy (1975), hip replacement, CVA (Jan 2018 while off anticoagulation 2/2 spine surgery).  and MOE presented to the ED with right lower extremity pain and decreased sensation- acute RLE arterial occlusion - s/p RLE embolectomy. Noted hypoxic before revision surgery on 11/5.     - lasix 20 IV push given last night - net 1L negative for last 24 hours   - saturating well on 2 L nasal canula   - CTA chest - prelim report noted negative for PE - will follow final report Interval Events: no acute events noted overnight.  patient denies any SOB or chest pain.  lasix 20 IV push given last night - net 1L negative for last 24 hours   saturating well on 2L nasal canula     REVIEW OF SYSTEMS:  [x] All other systems negative  [ ] Unable to assess ROS because ________    OBJECTIVE:  ICU Vital Signs Last 24 Hrs  T(C): 37.4 (06 Nov 2018 05:15), Max: 37.4 (06 Nov 2018 05:15)  T(F): 99.3 (06 Nov 2018 05:15), Max: 99.3 (06 Nov 2018 05:15)  HR: 97 (06 Nov 2018 05:15) (63 - 97)  BP: 161/72 (06 Nov 2018 05:15) (120/76 - 165/72)  BP(mean): 98 (05 Nov 2018 14:00) (82 - 107)  RR: 20 (06 Nov 2018 05:15) (15 - 20)  SpO2: 94% (06 Nov 2018 05:15) (91% - 100%)        11-05 @ 07:01  -  11-06 @ 07:00  --------------------------------------------------------  IN: 980 mL / OUT: 2000 mL / NET: -1020 mL      CAPILLARY BLOOD GLUCOSE          PHYSICAL EXAM:  General: No apparent distress  HEENT: NC/AT  Lymph Nodes: No supraclavicular or cervical lymphadenopathy  Neck: Supple  Respiratory: CTAB, no wheezing or crackles, good air entry  Cardiovascular: RRR, no murmur, no LE edema  Abdomen: Soft, nontender, nondistended  Extremities: right lower extremity wrapped in dressing - distal skin warm - distal pulse 2+,  Skin: Intact  Neurological: A&Ox2-3, minimal movement on right leg 2/2 recent procedure and pain, sensation intact. able to follow commands   Psychiatry: Normal mood and affect    HOSPITAL MEDICATIONS:  aspirin enteric coated 81 milliGRAM(s) Oral daily  heparin  Infusion 1400 Unit(s)/Hr IV Continuous <Continuous>      ATENolol  Tablet 25 milliGRAM(s) Oral daily  NIFEdipine XL 60 milliGRAM(s) Oral daily  tamsulosin 0.4 milliGRAM(s) Oral at bedtime    simvastatin 20 milliGRAM(s) Oral at bedtime      HYDROmorphone  Injectable 0.5 milliGRAM(s) IV Push daily    docusate sodium 100 milliGRAM(s) Oral daily  pantoprazole    Tablet 40 milliGRAM(s) Oral before breakfast  polyethylene glycol 3350 17 Gram(s) Oral daily  senna 2 Tablet(s) Oral at bedtime      fluticasone propionate 50 MICROgram(s)/spray Nasal Spray 1 Spray(s) Both Nostrils daily  silver nitrate Applicator 1 Application(s) Topical once        LABS:                        8.5    18.1  )-----------( 673      ( 06 Nov 2018 03:14 )             26.6     Hgb Trend: 8.5<--, 7.9<--, 8.3<--, 7.8<--, 7.8<--  11-06    134<L>  |  95<L>  |  15  ----------------------------<  140<H>  4.2   |  27  |  0.82    Ca    8.7      06 Nov 2018 03:14  Phos  3.3     11-06  Mg     2.1     11-06      Creatinine Trend: 0.82<--, 0.81<--, 0.95<--, 1.31<--, 0.97<--, 0.76<--  PT/INR - ( 05 Nov 2018 07:08 )   PT: 13.0 sec;   INR: 1.13 ratio         PTT - ( 06 Nov 2018 03:14 )  PTT:70.4 sec    RADIOLOGY:    < from: CT Angio Chest w/ IV Cont (11.05.18 @ 23:08) >  FINDINGS:     PULMONARY VESSELS:  No pulmonary emboli. The main pulmonary artery is   normal in diameter.    HEART AND VASCULATURE: The heart is enlarged without pericardial   effusion. No CT evidence of right heart strain. No aortic aneurysm or   dissection. Mild multivessel calcific coronary atherosclerosis. The   patency of the vessel cannot be determined on this exam.    LUNGS, AIRWAYS, PLEURA: Patent central airways. Mild bronchial wall   thickening. No bronchiectasis.    Mild bibasilar dependent changes. No consolidation or pulmonary edema. No   pleural effusion.    MEDIASTINUM, LYMPH NODES: No lymphadenopathy.    UPPER ABDOMEN: Unremarkable.    BONES AND SOFT TISSUES: Bilateral gynecomastia. No aggressive osseous   lesion.    LOWER NECK: Unremarkable.    IMPRESSION:     1.  No pulmonary embolus. No aortic aneurysm or dissection.    2.  Aortic and coronary atherosclerosis.    < end of copied text >    [x] Reviewed and interpreted by me    ASSESSMENT AND RECOMMENDATION:  73/M retired  (was in 9/11) with PMH of atrial fibrillation(on coumadin), HTN, Hodgkin's lymphoma s/p splenectomy (1975), hip replacement, CVA (Jan 2018 while off anticoagulation 2/2 spine surgery).  and MOE presented to the ED with right lower extremity pain and decreased sensation- acute RLE arterial occlusion - s/p RLE embolectomy. Noted hypoxic before revision surgery on 11/5.     - lasix 20 IV push given last night - net 1L negative for last 24 hours   - CTA chest - no PE and mild bronchial wall thickening noted.   - saturating well on 2 L nasal canula   - improvement in saturation noted after deep breathing and incentive spirometry - indicative of atelectasis   - continue incentive spirometry.

## 2018-11-06 NOTE — PROGRESS NOTE ADULT - SUBJECTIVE AND OBJECTIVE BOX
Subjective: Interval History - No events overnight. Patient states he feels fine, has some problems with memory, worse at the end of the day.    Objective:   Vital Signs Last 24 Hrs  T(C): 36.8 (06 Nov 2018 09:25), Max: 37.4 (06 Nov 2018 05:15)  T(F): 98.2 (06 Nov 2018 09:25), Max: 99.3 (06 Nov 2018 05:15)  HR: 97 (06 Nov 2018 05:15) (67 - 97)  BP: 161/72 (06 Nov 2018 05:15) (120/76 - 165/72)  BP(mean): 98 (05 Nov 2018 14:00) (82 - 107)  RR: 20 (06 Nov 2018 09:25) (15 - 20)  SpO2: 95% (06 Nov 2018 09:25) (91% - 100%)    General Exam:   General appearance: No acute distress                   Neurological Exam:  Mental Status: Orientated to self, date and place.  Attention intact. Knows the president with prompting. Can spell WORLD, backwards "DLOWR." Knows that there are 11 quarters in $2.75. No dysarthria, aphasia or neglect.  Knowledge intact.  Registration intact.  Short and long term memory grossly intact.      Cranial Nerves: PERRL, EOMI, VFF, no nystagmus or diplopia.  CN V1-3 intact to light touch.  No facial asymmetry.  Hearing intact.  Tongue midline.  Sternocleidomastoid and Trapezius intact bilaterally.    Motor:   Tone: normal.                  Strength: intact throughout, RLE not tested as it was wrapped  Pronator drift: none                 Dysmetria: None to finger-nose-finger  No truncal ataxia.    Tremor: No resting, postural or action tremor.  No myoclonus.    Sensation: intact to light touch    Other:    11-06    134<L>  |  95<L>  |  15  ----------------------------<  140<H>  4.2   |  27  |  0.82    Ca    8.7      06 Nov 2018 03:14  Phos  3.3     11-06  Mg     2.1     11-06                        8.5    18.1  )-----------( 673      ( 06 Nov 2018 03:14 )             26.6     MEDICATIONS  (STANDING):  aspirin enteric coated 81 milliGRAM(s) Oral daily  ATENolol  Tablet 25 milliGRAM(s) Oral daily  chlorthalidone 25 milliGRAM(s) Oral daily  ciprofloxacin     Tablet 500 milliGRAM(s) Oral every 12 hours  docusate sodium 100 milliGRAM(s) Oral daily  fluticasone propionate 50 MICROgram(s)/spray Nasal Spray 1 Spray(s) Both Nostrils daily  heparin  Infusion 1400 Unit(s)/Hr (16 mL/Hr) IV Continuous <Continuous>  HYDROmorphone  Injectable 0.5 milliGRAM(s) IV Push daily  NIFEdipine XL 60 milliGRAM(s) Oral daily  pantoprazole    Tablet 40 milliGRAM(s) Oral before breakfast  polyethylene glycol 3350 17 Gram(s) Oral daily  senna 2 Tablet(s) Oral at bedtime  silver nitrate Applicator 1 Application(s) Topical once  simvastatin 20 milliGRAM(s) Oral at bedtime  tamsulosin 0.4 milliGRAM(s) Oral at bedtime

## 2018-11-06 NOTE — PROGRESS NOTE ADULT - ASSESSMENT
ASSESSMENT: 73M with recent spine surgery now s/p RLE embolectomy for acute RLE arterial occlusion now s/p RLE fasciotomies.     PLAN:    - Heparin gtt  - Pain control-- Reg diet  - OOB with PT  - Elevate RLE,  heel splint.   - ACE bandage RLE   - PT recommending DANIEL  - f/u neurology recommendations- EEG pending  - f/u C. diff for persistent leukocytosis, other workup to date has been negative.   - Fluid restriction and nephrology recs for hyponatremia/MAGALI  - c/w ezio, monitor UOP    [id number]

## 2018-11-06 NOTE — PROGRESS NOTE ADULT - ASSESSMENT
73 year old man with PMH of atrial fibrillation (AC held for recent surgery), HTN, Hodgkin's lymphoma s/p splenectomy (1975), hip replacement, and MOE s/p recent RLE fasciotomies w/ AMS yesterday described as "fogginess" and difficulty concentrating, with some staring episodes after receiving Dilaudid Patient's mental status is now improving. Neurologic exam reveals impaired recall and attention with no focal deficits. CTH shows no acute pathology.    Impression: Likely encephalopathy 2/2 medication effect vs sundowning    Recommendations:  - Routine EEG and MRI not indicated at this time  - No further neurologic workup  - Would avoid sedating medications if possible  - Frequent reorienting

## 2018-11-06 NOTE — PROGRESS NOTE ADULT - SUBJECTIVE AND OBJECTIVE BOX
GENERAL SURGERY DAILY PROGRESS NOTE:       Subjective:  Patient seen and examined at bedside this AM. Patient went to OR yesterday for attempt at fasciotomy closure. Pulmonary consulted for desaturation in OR. CTA obtained, lasix given. Patient sating well on floors. No acute events overnight. Subjectively not short of breath, no chest pain, no increased work of breathing. Tolerating regular diet.         Objective:    PE:  Gen: NAD  Resp: nonlabored  CVS: RRR  Abd: soft, ND, NT, no rebound or guarding  Ext: RLE with DP palpable, woundv vac in place with good seal, leg wrapped with ACE and elevated       Vital Signs Last 24 Hrs  T(C): 37.4 (06 Nov 2018 05:15), Max: 37.4 (06 Nov 2018 05:15)  T(F): 99.3 (06 Nov 2018 05:15), Max: 99.3 (06 Nov 2018 05:15)  HR: 97 (06 Nov 2018 05:15) (63 - 97)  BP: 161/72 (06 Nov 2018 05:15) (120/76 - 165/72)  BP(mean): 98 (05 Nov 2018 14:00) (82 - 107)  RR: 20 (06 Nov 2018 05:15) (15 - 20)  SpO2: 94% (06 Nov 2018 05:15) (91% - 100%)    I&O's Detail    04 Nov 2018 07:01  -  05 Nov 2018 07:00  --------------------------------------------------------  IN:    heparin Infusion: 299 mL    Oral Fluid: 1020 mL  Total IN: 1319 mL    OUT:    Indwelling Catheter - Urethral: 2100 mL  Total OUT: 2100 mL    Total NET: -781 mL      05 Nov 2018 07:01  -  06 Nov 2018 05:24  --------------------------------------------------------  IN:    heparin Infusion: 14 mL    Oral Fluid: 400 mL  Total IN: 414 mL    OUT:    Indwelling Catheter - Urethral: 2000 mL  Total OUT: 2000 mL    Total NET: -1586 mL          Daily     Daily     MEDICATIONS  (STANDING):  aspirin enteric coated 81 milliGRAM(s) Oral daily  ATENolol  Tablet 25 milliGRAM(s) Oral daily  docusate sodium 100 milliGRAM(s) Oral daily  fluticasone propionate 50 MICROgram(s)/spray Nasal Spray 1 Spray(s) Both Nostrils daily  heparin  Infusion 1400 Unit(s)/Hr (16 mL/Hr) IV Continuous <Continuous>  HYDROmorphone  Injectable 0.5 milliGRAM(s) IV Push daily  NIFEdipine XL 60 milliGRAM(s) Oral daily  pantoprazole    Tablet 40 milliGRAM(s) Oral before breakfast  polyethylene glycol 3350 17 Gram(s) Oral daily  senna 2 Tablet(s) Oral at bedtime  silver nitrate Applicator 1 Application(s) Topical once  simvastatin 20 milliGRAM(s) Oral at bedtime  tamsulosin 0.4 milliGRAM(s) Oral at bedtime    MEDICATIONS  (PRN):      LABS:                        8.5    18.1  )-----------( 673      ( 06 Nov 2018 03:14 )             26.6     11-06    134<L>  |  95<L>  |  15  ----------------------------<  140<H>  4.2   |  27  |  0.82    Ca    8.7      06 Nov 2018 03:14  Phos  3.3     11-06  Mg     2.1     11-06      PT/INR - ( 05 Nov 2018 07:08 )   PT: 13.0 sec;   INR: 1.13 ratio         PTT - ( 06 Nov 2018 03:14 )  PTT:70.4 sec      RADIOLOGY & ADDITIONAL STUDIES:

## 2018-11-06 NOTE — PROGRESS NOTE ADULT - SUBJECTIVE AND OBJECTIVE BOX
Subjective:  pt seen and examined, no complaints, ROS - .     aspirin enteric coated 81 milliGRAM(s) Oral daily  ATENolol  Tablet 25 milliGRAM(s) Oral daily  docusate sodium 100 milliGRAM(s) Oral daily  fluticasone propionate 50 MICROgram(s)/spray Nasal Spray 1 Spray(s) Both Nostrils daily  heparin  Infusion 1400 Unit(s)/Hr IV Continuous <Continuous>  HYDROmorphone  Injectable 0.5 milliGRAM(s) IV Push daily  NIFEdipine XL 60 milliGRAM(s) Oral daily  pantoprazole    Tablet 40 milliGRAM(s) Oral before breakfast  polyethylene glycol 3350 17 Gram(s) Oral daily  senna 2 Tablet(s) Oral at bedtime  silver nitrate Applicator 1 Application(s) Topical once  simvastatin 20 milliGRAM(s) Oral at bedtime  tamsulosin 0.4 milliGRAM(s) Oral at bedtime                            8.5    18.1  )-----------( 673      ( 06 Nov 2018 03:14 )             26.6       Hemoglobin: 8.5 g/dL (11-06 @ 03:14)  Hemoglobin: 7.9 g/dL (11-05 @ 20:09)  Hemoglobin: 8.3 g/dL (11-05 @ 07:07)  Hemoglobin: 7.8 g/dL (11-04 @ 06:35)  Hemoglobin: 7.8 g/dL (11-03 @ 14:34)      11-06    134<L>  |  95<L>  |  15  ----------------------------<  140<H>  4.2   |  27  |  0.82    Ca    8.7      06 Nov 2018 03:14  Phos  3.3     11-06  Mg     2.1     11-06      Creatinine Trend: 0.82<--, 0.81<--, 0.95<--, 1.31<--, 0.97<--, 0.76<--    COAGS: PTT - ( 06 Nov 2018 03:14 )  PTT:70.4 sec          T(C): 37.4 (11-06-18 @ 05:15), Max: 37.4 (11-06-18 @ 05:15)  HR: 97 (11-06-18 @ 05:15) (63 - 97)  BP: 161/72 (11-06-18 @ 05:15) (120/76 - 165/72)  RR: 20 (11-06-18 @ 05:15) (15 - 20)  SpO2: 94% (11-06-18 @ 05:15) (91% - 100%)  Wt(kg): --    I&O's Summary    04 Nov 2018 07:01  -  05 Nov 2018 07:00  --------------------------------------------------------  IN: 1319 mL / OUT: 2100 mL / NET: -781 mL    05 Nov 2018 07:01  -  06 Nov 2018 05:40  --------------------------------------------------------  IN: 414 mL / OUT: 2000 mL / NET: -1586 mL      Appearance: Normal	  HEENT:   Normal oral mucosa, PERRL, EOMI	  Lymphatic: No lymphadenopathy , no edema  Cardiovascular: irregular irregular  S1 S2, No JVD, No murmurs , Peripheral pulses palpable 2+ bilaterally  Respiratory: Lungs clear to auscultation, normal effort 	  Gastrointestinal:  Soft, Non-tender, + BS	  Skin: No rashes, No ecchymoses, No cyanosis, warm to touch  Musculoskeletal: Normal range of motion, normal strength  Psychiatry:  Mood & affect appropriate    TELEMETRY: 	  none  ECG:  	< from: 12 Lead ECG (10.25.18 @ 14:08) >  Diagnosis Line ATRIAL FIBRILLATION  PROLONGED QT  ABNORMAL ECG    < end of copied text >    RADIOLOGY:   DIAGNOSTIC TESTING:  [ ] Echocardiogram:   [ ]  Catheterization:  [ ] Stress Test:    OTHER: 	        ASSESSMENT/PLAN: 	73y Male  history of Afib, HTN, CVA, MOE admitted with RLE arterial occlusion now s/p embolectomy of right AT and peroneal artery. RLE fasciotomies , unable to close , VAC placement     A/C with heparin gtt at present for Afib, will need life long tx   GI / DVT prophylaxis,  keep K>4, mag >2.0   rate control with BB   VAC and  wound care per sx.   bp stable on Procardia / Ace   pain management    vascular follow up   D/W Dr Varghese

## 2018-11-07 LAB
ANION GAP SERPL CALC-SCNC: 9 MMOL/L — SIGNIFICANT CHANGE UP (ref 5–17)
APTT BLD: 106.2 SEC — HIGH (ref 27.5–36.3)
APTT BLD: 125.5 SEC — CRITICAL HIGH (ref 27.5–36.3)
APTT BLD: 43.8 SEC — HIGH (ref 27.5–36.3)
BUN SERPL-MCNC: 9 MG/DL — SIGNIFICANT CHANGE UP (ref 7–23)
CALCIUM SERPL-MCNC: 9 MG/DL — SIGNIFICANT CHANGE UP (ref 8.4–10.5)
CHLORIDE SERPL-SCNC: 100 MMOL/L — SIGNIFICANT CHANGE UP (ref 96–108)
CO2 SERPL-SCNC: 31 MMOL/L — SIGNIFICANT CHANGE UP (ref 22–31)
CREAT SERPL-MCNC: 0.75 MG/DL — SIGNIFICANT CHANGE UP (ref 0.5–1.3)
GLUCOSE SERPL-MCNC: 107 MG/DL — HIGH (ref 70–99)
HCT VFR BLD CALC: 26.9 % — LOW (ref 39–50)
HCT VFR BLD CALC: 26.9 % — LOW (ref 39–50)
HGB BLD-MCNC: 8.6 G/DL — LOW (ref 13–17)
HGB BLD-MCNC: 8.7 G/DL — LOW (ref 13–17)
MAGNESIUM SERPL-MCNC: 2.1 MG/DL — SIGNIFICANT CHANGE UP (ref 1.6–2.6)
MCHC RBC-ENTMCNC: 31.8 PG — SIGNIFICANT CHANGE UP (ref 27–34)
MCHC RBC-ENTMCNC: 32.1 GM/DL — SIGNIFICANT CHANGE UP (ref 32–36)
MCHC RBC-ENTMCNC: 32.1 PG — SIGNIFICANT CHANGE UP (ref 27–34)
MCHC RBC-ENTMCNC: 32.4 GM/DL — SIGNIFICANT CHANGE UP (ref 32–36)
MCV RBC AUTO: 99.1 FL — SIGNIFICANT CHANGE UP (ref 80–100)
MCV RBC AUTO: 99.2 FL — SIGNIFICANT CHANGE UP (ref 80–100)
PHOSPHATE SERPL-MCNC: 3.7 MG/DL — SIGNIFICANT CHANGE UP (ref 2.5–4.5)
PLATELET # BLD AUTO: 689 K/UL — HIGH (ref 150–400)
PLATELET # BLD AUTO: 698 K/UL — HIGH (ref 150–400)
POTASSIUM SERPL-MCNC: 4.1 MMOL/L — SIGNIFICANT CHANGE UP (ref 3.5–5.3)
POTASSIUM SERPL-SCNC: 4.1 MMOL/L — SIGNIFICANT CHANGE UP (ref 3.5–5.3)
RBC # BLD: 2.71 M/UL — LOW (ref 4.2–5.8)
RBC # BLD: 2.72 M/UL — LOW (ref 4.2–5.8)
RBC # FLD: 14.2 % — SIGNIFICANT CHANGE UP (ref 10.3–14.5)
RBC # FLD: 14.4 % — SIGNIFICANT CHANGE UP (ref 10.3–14.5)
SODIUM SERPL-SCNC: 140 MMOL/L — SIGNIFICANT CHANGE UP (ref 135–145)
WBC # BLD: 15.6 K/UL — HIGH (ref 3.8–10.5)
WBC # BLD: 15.7 K/UL — HIGH (ref 3.8–10.5)
WBC # FLD AUTO: 15.6 K/UL — HIGH (ref 3.8–10.5)
WBC # FLD AUTO: 15.7 K/UL — HIGH (ref 3.8–10.5)

## 2018-11-07 RX ORDER — ACETAMINOPHEN 500 MG
650 TABLET ORAL EVERY 6 HOURS
Qty: 0 | Refills: 0 | Status: DISCONTINUED | OUTPATIENT
Start: 2018-11-07 | End: 2018-11-09

## 2018-11-07 RX ORDER — OXYCODONE HYDROCHLORIDE 5 MG/1
10 TABLET ORAL ONCE
Qty: 0 | Refills: 0 | Status: DISCONTINUED | OUTPATIENT
Start: 2018-11-07 | End: 2018-11-09

## 2018-11-07 RX ORDER — HEPARIN SODIUM 5000 [USP'U]/ML
1700 INJECTION INTRAVENOUS; SUBCUTANEOUS
Qty: 25000 | Refills: 0 | Status: DISCONTINUED | OUTPATIENT
Start: 2018-11-07 | End: 2018-11-08

## 2018-11-07 RX ORDER — HEPARIN SODIUM 5000 [USP'U]/ML
18 INJECTION INTRAVENOUS; SUBCUTANEOUS
Qty: 25000 | Refills: 0 | Status: DISCONTINUED | OUTPATIENT
Start: 2018-11-07 | End: 2018-11-07

## 2018-11-07 RX ORDER — HEPARIN SODIUM 5000 [USP'U]/ML
1800 INJECTION INTRAVENOUS; SUBCUTANEOUS
Qty: 25000 | Refills: 0 | Status: DISCONTINUED | OUTPATIENT
Start: 2018-11-07 | End: 2018-11-07

## 2018-11-07 RX ADMIN — Medication 650 MILLIGRAM(S): at 22:05

## 2018-11-07 RX ADMIN — Medication 81 MILLIGRAM(S): at 12:12

## 2018-11-07 RX ADMIN — HEPARIN SODIUM 18 UNIT(S)/HR: 5000 INJECTION INTRAVENOUS; SUBCUTANEOUS at 09:02

## 2018-11-07 RX ADMIN — Medication 650 MILLIGRAM(S): at 21:35

## 2018-11-07 RX ADMIN — SIMVASTATIN 20 MILLIGRAM(S): 20 TABLET, FILM COATED ORAL at 21:35

## 2018-11-07 RX ADMIN — HEPARIN SODIUM 16 UNIT(S)/HR: 5000 INJECTION INTRAVENOUS; SUBCUTANEOUS at 23:33

## 2018-11-07 RX ADMIN — Medication 25 MILLIGRAM(S): at 09:57

## 2018-11-07 RX ADMIN — TAMSULOSIN HYDROCHLORIDE 0.4 MILLIGRAM(S): 0.4 CAPSULE ORAL at 21:34

## 2018-11-07 RX ADMIN — ATENOLOL 25 MILLIGRAM(S): 25 TABLET ORAL at 05:36

## 2018-11-07 RX ADMIN — PANTOPRAZOLE SODIUM 40 MILLIGRAM(S): 20 TABLET, DELAYED RELEASE ORAL at 05:36

## 2018-11-07 RX ADMIN — Medication 60 MILLIGRAM(S): at 05:36

## 2018-11-07 RX ADMIN — HEPARIN SODIUM 17 UNIT(S)/HR: 5000 INJECTION INTRAVENOUS; SUBCUTANEOUS at 16:11

## 2018-11-07 NOTE — EEG REPORT - NS EEG TEXT BOX
[facility]  Report of Routine EEG with Video      [facility]: [address], [address] [place] [address], Phone: [phone]  [facility]: [address] [place] [address], Phone: [phone]  Office: [address] [place] [address], [place] [address], [place] [address], Phone: [phone]    Patient Name: Jean-Pierre Gillis    Age: 73 year  : 1945  Patient ID: -, MRN #: MR# [id number], Location:    Referring Physician: -  EEG #: [id number]    Study Date: 2018		    Technical Information:					  On Instrument: -  Placement and Labeling of Electrodes:  The EEG was performed utilizing 20 channels referential EEG connections (coronal over temporal over parasagittal montage) using all standard 10-20 electrode placements with EKG.  Recording was at a sampling rate of 256 samples per second per channel.  Time synchronized digital video recording was done simultaneously with EEG recording.  A low light infrared camera was used for low light recording.  Ramesh and seizure detection algorithms were utilized.    History:  h/o AFib, CVA, HTN    p/w right lower extremity arterial occlusion, PT w/h/o stroke    Medication	   aspirin enteric coated 81 milliGRAM(s) Oral daily ATENolol  Tablet 25 milliGRAM(s) Oral daily chlorthalidone 25 milliGRAM(s) Oral daily docusate sodium 100 milliGRAM(s) Oral daily fluticasone propionate 50 MICROgram(s)/spray Nasal Spray 1 Spray(s) Both Nostrils daily heparin  Infusion 1800 Unit(s)/Hr (18 mL/Hr) IV Continuous <Continuous> HYDROmorphone  Injectable 0.5 milliGRAM(s) IV Push daily NIFEdipine XL 60 milliGRAM(s) Oral daily oxyCODONE    IR 10 milliGRAM(s) Oral once pantoprazole    Tablet 40 milliGRAM(s) Oral before breakfast polyethylene glycol 3350 17 Gram(s) Oral daily senna 2 Tablet(s) Oral at bedtime silver nitrate Applicator 1 Application(s) Topical once simvastatin 20 milliGRAM(s) Oral at bedtime tamsulosin 0.4 milliGRAM(s) Oral at bedtime 	    Study Interpretation:    FINDINGS:  The background was continuous, spontaneously variable and reactive. During wakefulness, the posterior dominant rhythm consisted of symmetric, well-modulated 8 Hz activity, with amplitude to 30 uV, that attenuated to eye opening    Background Slowing:  Background predominantly consisted of theta and faster activities.    Focal Slowing:   None were present.    Sleep Background:  Drowsiness was characterized by fragmentation, attenuation, and slowing of the background activity.    Stage II sleep transients were not recorded.    Other Non-Epileptiform Findings:  None were present.    Interictal Epileptiform Activity:   None were present.    Events:  Clinical events: None recorded.  Seizures: None recorded.    Activation Procedures:   Hyperventilation was not performed.    Photic stimulation was not performed.    Artifacts:  Intermittent myogenic and movement artifacts were noted.    ECG:  The heart rate on single channel ECG was predominantly between 60-70 BPM.    EEG Summary/Classification:  Abnormal EEG in the awake / drowsy state(s).  -Mild geenralized slowing  EEG Impression/Clinical Correlate:  Abnormal EEG study.  Mild nonspecific diffuse or multifocal cerebral dysfunction.   No epileptiform pattern or seizure seen.      Carol Warren MD  Epilepsy Fellow, [facility] [place]      	      Cameron Hector M.D.  Attending Physician, [facility] [place] [facility]  Report of Routine EEG with Video      [facility]: [address], [address] [place] [address], Phone: [phone]  [facility]: [phone] [place] [address], Phone: [phone]  Office: [address] [place] [address], [place] [address], [place] [address], Phone: [phone]    Patient Name: Jean-Pierre Gillis    Age: 73 year  : 1945  Patient ID: -, MRN #: MR# [id number], Location:    Referring Physician: -  EEG #: [id number]    Study Date: 2018		    Technical Information:					  On Instrument: -  Placement and Labeling of Electrodes:  The EEG was performed utilizing 20 channels referential EEG connections (coronal over temporal over parasagittal montage) using all standard 10-20 electrode placements with EKG.  Recording was at a sampling rate of 256 samples per second per channel.  Time synchronized digital video recording was done simultaneously with EEG recording.  A low light infrared camera was used for low light recording.  Ramesh and seizure detection algorithms were utilized.    History:  h/o AFib, CVA, HTN    p/w right lower extremity arterial occlusion, PT w/h/o stroke    Medication	   aspirin enteric coated 81 milliGRAM(s) Oral daily ATENolol  Tablet 25 milliGRAM(s) Oral daily chlorthalidone 25 milliGRAM(s) Oral daily docusate sodium 100 milliGRAM(s) Oral daily fluticasone propionate 50 MICROgram(s)/spray Nasal Spray 1 Spray(s) Both Nostrils daily heparin  Infusion 1800 Unit(s)/Hr (18 mL/Hr) IV Continuous <Continuous> HYDROmorphone  Injectable 0.5 milliGRAM(s) IV Push daily NIFEdipine XL 60 milliGRAM(s) Oral daily oxyCODONE    IR 10 milliGRAM(s) Oral once pantoprazole    Tablet 40 milliGRAM(s) Oral before breakfast polyethylene glycol 3350 17 Gram(s) Oral daily senna 2 Tablet(s) Oral at bedtime silver nitrate Applicator 1 Application(s) Topical once simvastatin 20 milliGRAM(s) Oral at bedtime tamsulosin 0.4 milliGRAM(s) Oral at bedtime 	    Study Interpretation:    FINDINGS:  The background was continuous, spontaneously variable and reactive. During wakefulness, the posterior dominant rhythm consisted of symmetric, well-modulated 8 Hz activity, with amplitude to 30 uV, that attenuated to eye opening    Background Slowing:  Background predominantly consisted of theta and faster activities.    Focal Slowing:   None were present.    Sleep Background:  Drowsiness was characterized by fragmentation, attenuation, and slowing of the background activity.    Stage II sleep transients were not recorded.    Other Non-Epileptiform Findings:  None were present.    Interictal Epileptiform Activity:   None were present.    Events:  Clinical events: None recorded.  Seizures: None recorded.    Activation Procedures:   Hyperventilation was not performed.    Photic stimulation was not performed.    Artifacts:  Intermittent myogenic and movement artifacts were noted.    ECG:  The heart rate on single channel ECG was predominantly between 60-70 BPM.    EEG Summary/Classification:  Abnormal EEG in the awake / drowsy state(s).  -Mild geenralized slowing  EEG Impression/Clinical Correlate:  Abnormal EEG study.  Mild nonspecific diffuse or multifocal cerebral dysfunction.   No epileptiform pattern or seizure seen.      Carol Warren MD  Epilepsy Fellow, [facility] [place]      		Cameron Hector M.D.  Attending Physician, [facility] [place]

## 2018-11-07 NOTE — PROGRESS NOTE ADULT - SUBJECTIVE AND OBJECTIVE BOX
Started on hep gtt per vascular   no bleeding  Patient with no anginal chest pain or shortness of breath  ROS otherwise negative         MEDICATIONS  (STANDING):  aspirin enteric coated 81 milliGRAM(s) Oral daily  ATENolol  Tablet 25 milliGRAM(s) Oral daily  chlorthalidone 25 milliGRAM(s) Oral daily  docusate sodium 100 milliGRAM(s) Oral daily  fluticasone propionate 50 MICROgram(s)/spray Nasal Spray 1 Spray(s) Both Nostrils daily  heparin  Infusion 1800 Unit(s)/Hr (18 mL/Hr) IV Continuous <Continuous>  HYDROmorphone  Injectable 0.5 milliGRAM(s) IV Push daily  NIFEdipine XL 60 milliGRAM(s) Oral daily  oxyCODONE    IR 10 milliGRAM(s) Oral once  pantoprazole    Tablet 40 milliGRAM(s) Oral before breakfast  polyethylene glycol 3350 17 Gram(s) Oral daily  senna 2 Tablet(s) Oral at bedtime  silver nitrate Applicator 1 Application(s) Topical once  simvastatin 20 milliGRAM(s) Oral at bedtime  tamsulosin 0.4 milliGRAM(s) Oral at bedtime    MEDICATIONS  (PRN):      LABS:                        8.6    15.7  )-----------( 698      ( 07 Nov 2018 07:09 )             26.9     Hemoglobin: 8.6 g/dL (11-07 @ 07:09)  Hemoglobin: 8.7 g/dL (11-07 @ 07:09)  Hemoglobin: 8.5 g/dL (11-06 @ 03:14)  Hemoglobin: 7.9 g/dL (11-05 @ 20:09)  Hemoglobin: 8.3 g/dL (11-05 @ 07:07)    11-07    140  |  100  |  9   ----------------------------<  107<H>  4.1   |  31  |  0.75    Ca    9.0      07 Nov 2018 07:09  Phos  3.7     11-07  Mg     2.1     11-07      Creatinine Trend: 0.75<--, 0.82<--, 0.81<--, 0.95<--, 1.31<--, 0.97<--   PTT - ( 07 Nov 2018 07:09 )  PTT:43.8 sec        PHYSICAL EXAM  Vital Signs Last 24 Hrs  T(C): 37 (07 Nov 2018 05:34), Max: 37 (06 Nov 2018 14:05)  T(F): 98.6 (07 Nov 2018 05:34), Max: 98.6 (06 Nov 2018 14:05)  HR: 90 (07 Nov 2018 05:34) (68 - 90)  BP: 163/72 (07 Nov 2018 05:34) (137/80 - 163/72)  BP(mean): --  RR: 20 (07 Nov 2018 05:34) (20 - 20)  SpO2: 97% (07 Nov 2018 05:34) (95% - 97%)      Appearance: Normal	  HEENT:   Normal oral mucosa, PERRL, EOMI	  Lymphatic: No lymphadenopathy , no edema  Cardiovascular: irregular irregular  S1 S2, No JVD, No murmurs , Peripheral pulses palpable 2+ bilaterally  Respiratory: Lungs clear to auscultation, normal effort 	  Gastrointestinal:  Soft, Non-tender, + BS	  Skin: No rashes, No ecchymoses, No cyanosis, warm to touch  Musculoskeletal: Normal range of motion, normal strength, RLE with vac in place  Psychiatry:  Mood & affect appropriate    TELEMETRY: 	  none  ECG:  	< from: 12 Lead ECG (10.25.18 @ 14:08) >  Diagnosis Line ATRIAL FIBRILLATION  PROLONGED QT  ABNORMAL ECG    < end of copied text >    RADIOLOGY:   DIAGNOSTIC TESTING:  [ ] Echocardiogram:   [ ]  Catheterization:  [ ] Stress Test:    OTHER: 	        ASSESSMENT/PLAN: 	73y Male  history of Afib previously on Coumadin secondary to spine surgery 10/19/18, HTN, CVA, MOE admitted with RLE arterial occlusion now s/p embolectomy of right AT and peroneal artery. RLE fasciotomies , unable to close ,  s/p VAC placement pending OR on Friday for RLE fasciotomy closure:       - given his RCRI score, he is low risk for intermediate risk procedure for RLE fasciotomy closure on Friday 11/9  - c/w BB periop  - HTN - controlled with BB/procardia/chlorthalidone  - A/C with heparin gtt at present for h/o Afib with h/o CVA, will need life long tx - was on Coumadin previously  - GI / DVT prophylaxis,  keep K>4, mag >2.0   - VAC and  wound care per sx  - HD stable no CHF  - f/u with Dr Bynum upon dc for cardiology Started on hep gtt per vascular   no bleeding  Patient with no anginal chest pain or shortness of breath  ROS otherwise negative         MEDICATIONS  (STANDING):  aspirin enteric coated 81 milliGRAM(s) Oral daily  ATENolol  Tablet 25 milliGRAM(s) Oral daily  chlorthalidone 25 milliGRAM(s) Oral daily  docusate sodium 100 milliGRAM(s) Oral daily  fluticasone propionate 50 MICROgram(s)/spray Nasal Spray 1 Spray(s) Both Nostrils daily  heparin  Infusion 1800 Unit(s)/Hr (18 mL/Hr) IV Continuous <Continuous>  HYDROmorphone  Injectable 0.5 milliGRAM(s) IV Push daily  NIFEdipine XL 60 milliGRAM(s) Oral daily  oxyCODONE    IR 10 milliGRAM(s) Oral once  pantoprazole    Tablet 40 milliGRAM(s) Oral before breakfast  polyethylene glycol 3350 17 Gram(s) Oral daily  senna 2 Tablet(s) Oral at bedtime  silver nitrate Applicator 1 Application(s) Topical once  simvastatin 20 milliGRAM(s) Oral at bedtime  tamsulosin 0.4 milliGRAM(s) Oral at bedtime    MEDICATIONS  (PRN):      LABS:                        8.6    15.7  )-----------( 698      ( 07 Nov 2018 07:09 )             26.9     Hemoglobin: 8.6 g/dL (11-07 @ 07:09)  Hemoglobin: 8.7 g/dL (11-07 @ 07:09)  Hemoglobin: 8.5 g/dL (11-06 @ 03:14)  Hemoglobin: 7.9 g/dL (11-05 @ 20:09)  Hemoglobin: 8.3 g/dL (11-05 @ 07:07)    11-07    140  |  100  |  9   ----------------------------<  107<H>  4.1   |  31  |  0.75    Ca    9.0      07 Nov 2018 07:09  Phos  3.7     11-07  Mg     2.1     11-07      Creatinine Trend: 0.75<--, 0.82<--, 0.81<--, 0.95<--, 1.31<--, 0.97<--   PTT - ( 07 Nov 2018 07:09 )  PTT:43.8 sec        PHYSICAL EXAM  Vital Signs Last 24 Hrs  T(C): 37 (07 Nov 2018 05:34), Max: 37 (06 Nov 2018 14:05)  T(F): 98.6 (07 Nov 2018 05:34), Max: 98.6 (06 Nov 2018 14:05)  HR: 90 (07 Nov 2018 05:34) (68 - 90)  BP: 163/72 (07 Nov 2018 05:34) (137/80 - 163/72)  BP(mean): --  RR: 20 (07 Nov 2018 05:34) (20 - 20)  SpO2: 97% (07 Nov 2018 05:34) (95% - 97%)      Appearance: Normal	  HEENT:   Normal oral mucosa, PERRL, EOMI	  Lymphatic: No lymphadenopathy , no edema  Cardiovascular: irregular irregular  S1 S2, No JVD, No murmurs , Peripheral pulses palpable 2+ bilaterally  Respiratory: Lungs clear to auscultation, normal effort 	  Gastrointestinal:  Soft, Non-tender, + BS	  Skin: No rashes, No ecchymoses, No cyanosis, warm to touch  Musculoskeletal: Normal range of motion, normal strength, RLE with vac in place  Psychiatry:  Mood & affect appropriate    TELEMETRY: 	  none  ECG:  	< from: 12 Lead ECG (10.25.18 @ 14:08) >  Diagnosis Line ATRIAL FIBRILLATION  PROLONGED QT  ABNORMAL ECG    < end of copied text >    RADIOLOGY:   DIAGNOSTIC TESTING:  [ ] Echocardiogram:   [ ]  Catheterization:  [ ] Stress Test:    OTHER: 	        ASSESSMENT/PLAN: 	73y Male  history of Afib previously on Coumadin secondary to spine surgery 10/19/18, HTN, CVA, MOE admitted with RLE arterial occlusion now s/p embolectomy of right AT and peroneal artery. RLE fasciotomies , unable to close ,  s/p VAC placement pending OR on Friday for RLE fasciotomy closure:       - given his RCRI score, he is low risk for intermediate risk procedure for RLE fasciotomy closure on Friday 11/9  - c/w BB periop  - HTN - controlled with BB/procardia/chlorthalidone  - A/C with heparin gtt at present for h/o Afib with h/o CVA, will need life long tx - was on Coumadin previously  - GI / DVT prophylaxis,  keep K>4, mag >2.0   - VAC and  wound care per sx  - HD stable no CHF  -neuro noted - no plans for EEG or MRI at this time - "Likely encephalopathy 2/2 medication effect vs sundowning"  - f/u with Dr Bynum upon dc for cardiology

## 2018-11-07 NOTE — PROGRESS NOTE ADULT - SUBJECTIVE AND OBJECTIVE BOX
VAC changed with PT  Tolerated well  Wound bed less edematous  Leg swelling and SOB improved  remains on NC O2    OOB ambulate  Optimization  OR friday for STSG

## 2018-11-07 NOTE — PROGRESS NOTE ADULT - SUBJECTIVE AND OBJECTIVE BOX
S: Pt seen and examined. On the EEG monitor. Has no c/o/ No O/N events    Vital Signs Last 24 Hrs  T(C): 37 (07 Nov 2018 05:34), Max: 37 (06 Nov 2018 14:05)  T(F): 98.6 (07 Nov 2018 05:34), Max: 98.6 (06 Nov 2018 14:05)  HR: 90 (07 Nov 2018 05:34) (68 - 90)  BP: 163/72 (07 Nov 2018 05:34) (137/80 - 163/72)  BP(mean): --  RR: 20 (07 Nov 2018 05:34) (20 - 20)  SpO2: 97% (07 Nov 2018 05:34) (95% - 97%)      PE:  Gen: NAD WD WN NAD AOx3  Resp: nonlabored  CVS: RRR  Abd: soft, ND, NT, no rebound or guarding  Ext: RLE with DP palpable, wound vac in place with good seal, leg wrapped with ACE and elevated       I&O's Summary    06 Nov 2018 07:01  -  07 Nov 2018 07:00  --------------------------------------------------------  IN: 1376 mL / OUT: 3250 mL / NET: -1874 mL      I&O's Detail    06 Nov 2018 07:01  -  07 Nov 2018 07:00  --------------------------------------------------------  IN:    heparin Infusion: 176 mL    Oral Fluid: 1200 mL  Total IN: 1376 mL    OUT:    Indwelling Catheter - Urethral: 3250 mL  Total OUT: 3250 mL    Total NET: -1874 mL          MEDICATIONS  (STANDING):  aspirin enteric coated 81 milliGRAM(s) Oral daily  ATENolol  Tablet 25 milliGRAM(s) Oral daily  chlorthalidone 25 milliGRAM(s) Oral daily  docusate sodium 100 milliGRAM(s) Oral daily  fluticasone propionate 50 MICROgram(s)/spray Nasal Spray 1 Spray(s) Both Nostrils daily  heparin  Infusion 1400 Unit(s)/Hr (16 mL/Hr) IV Continuous <Continuous>  HYDROmorphone  Injectable 0.5 milliGRAM(s) IV Push daily  NIFEdipine XL 60 milliGRAM(s) Oral daily  pantoprazole    Tablet 40 milliGRAM(s) Oral before breakfast  polyethylene glycol 3350 17 Gram(s) Oral daily  senna 2 Tablet(s) Oral at bedtime  silver nitrate Applicator 1 Application(s) Topical once  simvastatin 20 milliGRAM(s) Oral at bedtime  tamsulosin 0.4 milliGRAM(s) Oral at bedtime    MEDICATIONS  (PRN):      LABS:                        8.6    15.7  )-----------( 698      ( 07 Nov 2018 07:09 )             26.9     11-07    140  |  100  |  9   ----------------------------<  107<H>  4.1   |  31  |  0.75    Ca    9.0      07 Nov 2018 07:09  Phos  3.7     11-07  Mg     2.1     11-07      PTT - ( 06 Nov 2018 09:12 )  PTT:58.7 sec

## 2018-11-07 NOTE — PHYSICAL THERAPY INITIAL EVALUATION ADULT - DID THE PATIENT HAVE SURGERY?
yes/RLE embolectomy to right AT and peroneal artery 10/25, c/b post-op compartment syndrome requiring RTOR 10/27 for RLE fasciotomies and RTOR 11/5 for closure of fasciotomy, however unable to close R medial/lateral fasciotomy sites primarily, and proceeded with vac placement.

## 2018-11-07 NOTE — PHYSICAL THERAPY INITIAL EVALUATION ADULT - MODALITIES TREATMENT COMMENTS
RLE medial and lateral fasciotomy sites, full thickness with exposed muscle. No purulence, no erythema, no malodor. Lateral wound measuring 21.5 cm x 4.5 cm x .5 cm,  medial wound measuring 23 cm x 4.5 cm x .5 cm.

## 2018-11-07 NOTE — CHART NOTE - NSCHARTNOTEFT_GEN_A_CORE
Nutrition Follow Up Note  Patient seen for: Follow Up    Reason for admission: Right lower extremity arterial occlusion  now s/p embolectomy of right AT and peroneal artery. s/p VAC placement pending OR on Friday for RLE fasciotomy closure.  Fluid restriction and nephrology recs for hyponatremia/MAGALI      Source: Pt and Chart    Diet : Regular 1000cc fluid restriction, Ensure TID    Patient reports: Still with no appetite, states he has been eating more at dinner meal. Pt hasn't been getting the Ensure at breakfast, informed pt he needs to order it when he orders his meals.      PO intake : 25-50%     Source for PO intake: Patient        Daily Weight in k.8 ()  No current weight     Pertinent Medications: MEDICATIONS  (STANDING):  aspirin enteric coated 81 milliGRAM(s) Oral daily  ATENolol  Tablet 25 milliGRAM(s) Oral daily  chlorthalidone 25 milliGRAM(s) Oral daily  docusate sodium 100 milliGRAM(s) Oral daily  fluticasone propionate 50 MICROgram(s)/spray Nasal Spray 1 Spray(s) Both Nostrils daily  heparin  Infusion 1800 Unit(s)/Hr (18 mL/Hr) IV Continuous <Continuous>  HYDROmorphone  Injectable 0.5 milliGRAM(s) IV Push daily  NIFEdipine XL 60 milliGRAM(s) Oral daily  oxyCODONE    IR 10 milliGRAM(s) Oral once  pantoprazole    Tablet 40 milliGRAM(s) Oral before breakfast  polyethylene glycol 3350 17 Gram(s) Oral daily  senna 2 Tablet(s) Oral at bedtime  silver nitrate Applicator 1 Application(s) Topical once  simvastatin 20 milliGRAM(s) Oral at bedtime  tamsulosin 0.4 milliGRAM(s) Oral at bedtime    MEDICATIONS  (PRN):    Pertinent Labs:  @ 07:09: Na 140, BUN 9, Cr 0.75, <H>, K+ 4.1, Phos 3.7, Mg 2.1, Alk Phos --, ALT/SGPT --, AST/SGOT --, HbA1c --      Skin per nursing documentation:   Edema: +2 edema in right leg and no pressure ulcers    Estimated Needs:   [X ] no change since previous assessment  [ ] recalculated:     Previous Nutrition Diagnosis:  Increased nutrient needs (specify); Protein, calories  Nutrition Diagnosis is: ongoing      Recommend  1) Continue with current regular diet with 1000cc fluid restriction for hyponatremia   2) Obtain current weight and monitor weekly  3) Continue with ensure TID for increased protein and calorie intake    Monitoring and Evaluation:     Continue to monitor Nutritional intake, Tolerance to diet prescription, weights, labs, skin integrity    RD remains available upon request and will follow up per protocol Nutrition Follow Up Note  Patient seen for: Follow Up    Reason for admission: Right lower extremity arterial occlusion  h/o CVA 2018, recent spine surgery  now s/p embolectomy of right AT and peroneal artery. s/p VAC placement pending OR on Friday for RLE fasciotomy closure.  Fluid restriction and nephrology recs for hyponatremia/MAGALI      Source: Pt and Chart    Diet : Regular 1000cc fluid restriction, Ensure TID    Patient reports: Still with no appetite, states he has been eating more at dinner meal, however he has been trying to eat what he can. Pt seen finishing breakfast, ~25% of tray consumed. Pt hasn't been getting the Ensure at breakfast, informed pt he needs to order it when he orders his meals.      PO intake : 25-50%     Source for PO intake: Patient        Daily Weight in k.8 ()  No current weight     Pertinent Medications: MEDICATIONS  (STANDING):  aspirin enteric coated 81 milliGRAM(s) Oral daily  ATENolol  Tablet 25 milliGRAM(s) Oral daily  chlorthalidone 25 milliGRAM(s) Oral daily  docusate sodium 100 milliGRAM(s) Oral daily  fluticasone propionate 50 MICROgram(s)/spray Nasal Spray 1 Spray(s) Both Nostrils daily  heparin  Infusion 1800 Unit(s)/Hr (18 mL/Hr) IV Continuous <Continuous>  HYDROmorphone  Injectable 0.5 milliGRAM(s) IV Push daily  NIFEdipine XL 60 milliGRAM(s) Oral daily  oxyCODONE    IR 10 milliGRAM(s) Oral once  pantoprazole    Tablet 40 milliGRAM(s) Oral before breakfast  polyethylene glycol 3350 17 Gram(s) Oral daily  senna 2 Tablet(s) Oral at bedtime  silver nitrate Applicator 1 Application(s) Topical once  simvastatin 20 milliGRAM(s) Oral at bedtime  tamsulosin 0.4 milliGRAM(s) Oral at bedtime    MEDICATIONS  (PRN):    Pertinent Labs:  @ 07:09: Na 140, BUN 9, Cr 0.75, <H>, K+ 4.1, Phos 3.7, Mg 2.1, Alk Phos --, ALT/SGPT --, AST/SGOT --, HbA1c --      Skin per nursing documentation:   Edema: +2 edema in right leg and no pressure ulcers    Estimated Needs:   [X ] no change since previous assessment  [ ] recalculated:     Previous Nutrition Diagnosis:  Increased nutrient needs (specify); Protein, calories  Nutrition Diagnosis is: ongoing      Recommend  1) Continue with current regular diet with 1000cc fluid restriction for hyponatremia   2) Obtain current weight and monitor weekly  3) Continue with ensure TID for increased protein and calorie intake    Monitoring and Evaluation:     Continue to monitor Nutritional intake, Tolerance to diet prescription, weights, labs, skin integrity    RD remains available upon request and will follow up per protocol Nutrition Follow Up Note  Patient seen for: Follow Up    Reason for admission: Right lower extremity arterial occlusion  h/o CVA 2018, recent spine surgery  now s/p embolectomy of right AT (anterior tibia) and peroneal artery. s/p VAC placement pending OR on Friday for RLE fasciotomy closure.  Fluid restriction and nephrology recs for hyponatremia/MAGALI improving, serum sodium now with in normal limits.      Source: Pt and Chart    Diet : Regular 1000cc fluid restriction, Ensure TID    Patient reports: Still with no appetite, states he has been eating more at dinner meal, however he has been trying to eat what he can. Most trays he's consuming 25-50%. Pt seen finishing breakfast, ~25% of tray consumed. Pt hasn't been getting the Ensure at breakfast, informed pt he needs to order it when he orders his meals.      PO intake : 25-50%     Source for PO intake: Patient        Daily Weight in k.8 ()  No current weight     Pertinent Medications: MEDICATIONS  (STANDING):  aspirin enteric coated 81 milliGRAM(s) Oral daily  ATENolol  Tablet 25 milliGRAM(s) Oral daily  chlorthalidone 25 milliGRAM(s) Oral daily  docusate sodium 100 milliGRAM(s) Oral daily  fluticasone propionate 50 MICROgram(s)/spray Nasal Spray 1 Spray(s) Both Nostrils daily  heparin  Infusion 1800 Unit(s)/Hr (18 mL/Hr) IV Continuous <Continuous>  HYDROmorphone  Injectable 0.5 milliGRAM(s) IV Push daily  NIFEdipine XL 60 milliGRAM(s) Oral daily  oxyCODONE    IR 10 milliGRAM(s) Oral once  pantoprazole    Tablet 40 milliGRAM(s) Oral before breakfast  polyethylene glycol 3350 17 Gram(s) Oral daily  senna 2 Tablet(s) Oral at bedtime  silver nitrate Applicator 1 Application(s) Topical once  simvastatin 20 milliGRAM(s) Oral at bedtime  tamsulosin 0.4 milliGRAM(s) Oral at bedtime    MEDICATIONS  (PRN):    Pertinent Labs:  @ 07:09: Na 140, BUN 9, Cr 0.75, <H>, K+ 4.1, Phos 3.7, Mg 2.1,      Skin per nursing documentation:   Edema: +2 edema in right leg and no pressure ulcers    Estimated Needs:   [X ] no change since previous assessment  [ ] recalculated:     Previous Nutrition Diagnosis:  Increased nutrient needs (specify); Protein, calories  Nutrition Diagnosis is: ongoing      Recommend  1) Change diet to low sodium due to high BPs; consider liberalizing fluid restriction due to serum sodium levels now with in normal limits   2) Obtain current weight and monitor weekly  3) Continue with ensure TID for increased protein and calorie intake  4) Recommend HgA1C, monitor result, consider changing ensure to glucerna    Monitoring and Evaluation:     Continue to monitor Nutritional intake, Tolerance to diet prescription, weights, labs, skin integrity    RD remains available upon request and will follow up per protocol

## 2018-11-07 NOTE — PROGRESS NOTE ADULT - ASSESSMENT
ASSESSMENT: 73M with recent spine surgery now s/p RLE embolectomy for acute RLE arterial occlusion now s/p RLE fasciotomies.     PLAN:    - Heparin gtt, Will hold on call to OR on Friday as per Dr. Suarez, Plastics.  - Pain control-- Reg diet  - OOB with PT  - Elevate RLE,  heel splint.   - ACE bandage RLE   - PT recommending DANIEL  - f/u neurology recommendations- EEG pending  - f/u C. diff for persistent leukocytosis, other workup to date has been negative.   - Fluid restriction and nephrology recs for hyponatremia/MAGALI  - BRIELLE holguin, f/u DTV  [id number] ASSESSMENT: 73M with recent spine surgery now s/p RLE embolectomy for acute RLE arterial occlusion now s/p RLE fasciotomies.     PLAN:    - Heparin gtt, Will hold Hep gtt, on call to OR on Friday as per Dr. Suarez, Plastics.  - Pain control-- Reg diet  - OOB with PT  - Elevate RLE, heel splint.   - ACE bandage RLE   - PT recommending DANIEL  - f/u neurology recommendations- EEG pending  - f/u C. diff for persistent leukocytosis, other workup to date has been negative.   - Fluid restriction and nephrology recs for hyponatremia/MAGALI  - DC ezio, f/u DTV  - OR Friday for RLE fasciotomy site closure    [id number]

## 2018-11-08 ENCOUNTER — TRANSCRIPTION ENCOUNTER (OUTPATIENT)
Age: 73
End: 2018-11-08

## 2018-11-08 LAB
ANION GAP SERPL CALC-SCNC: 8 MMOL/L — SIGNIFICANT CHANGE UP (ref 5–17)
APTT BLD: 87.5 SEC — HIGH (ref 27.5–36.3)
APTT BLD: 91.7 SEC — HIGH (ref 27.5–36.3)
BUN SERPL-MCNC: 8 MG/DL — SIGNIFICANT CHANGE UP (ref 7–23)
CALCIUM SERPL-MCNC: 9.4 MG/DL — SIGNIFICANT CHANGE UP (ref 8.4–10.5)
CHLORIDE SERPL-SCNC: 96 MMOL/L — SIGNIFICANT CHANGE UP (ref 96–108)
CO2 SERPL-SCNC: 29 MMOL/L — SIGNIFICANT CHANGE UP (ref 22–31)
CREAT SERPL-MCNC: 0.78 MG/DL — SIGNIFICANT CHANGE UP (ref 0.5–1.3)
GLUCOSE SERPL-MCNC: 113 MG/DL — HIGH (ref 70–99)
HCT VFR BLD CALC: 29.3 % — LOW (ref 39–50)
HGB BLD-MCNC: 9.7 G/DL — LOW (ref 13–17)
MAGNESIUM SERPL-MCNC: 2.1 MG/DL — SIGNIFICANT CHANGE UP (ref 1.6–2.6)
MCHC RBC-ENTMCNC: 32.8 PG — SIGNIFICANT CHANGE UP (ref 27–34)
MCHC RBC-ENTMCNC: 33.3 GM/DL — SIGNIFICANT CHANGE UP (ref 32–36)
MCV RBC AUTO: 98.6 FL — SIGNIFICANT CHANGE UP (ref 80–100)
PHOSPHATE SERPL-MCNC: 3.8 MG/DL — SIGNIFICANT CHANGE UP (ref 2.5–4.5)
PLATELET # BLD AUTO: 799 K/UL — HIGH (ref 150–400)
POTASSIUM SERPL-MCNC: 4 MMOL/L — SIGNIFICANT CHANGE UP (ref 3.5–5.3)
POTASSIUM SERPL-SCNC: 4 MMOL/L — SIGNIFICANT CHANGE UP (ref 3.5–5.3)
RBC # BLD: 2.97 M/UL — LOW (ref 4.2–5.8)
RBC # FLD: 14.3 % — SIGNIFICANT CHANGE UP (ref 10.3–14.5)
SODIUM SERPL-SCNC: 133 MMOL/L — LOW (ref 135–145)
WBC # BLD: 16.4 K/UL — HIGH (ref 3.8–10.5)
WBC # FLD AUTO: 16.4 K/UL — HIGH (ref 3.8–10.5)

## 2018-11-08 RX ORDER — OXYCODONE HYDROCHLORIDE 5 MG/1
5 TABLET ORAL ONCE
Qty: 0 | Refills: 0 | Status: DISCONTINUED | OUTPATIENT
Start: 2018-11-08 | End: 2018-11-08

## 2018-11-08 RX ORDER — HEPARIN SODIUM 5000 [USP'U]/ML
1400 INJECTION INTRAVENOUS; SUBCUTANEOUS
Qty: 25000 | Refills: 0 | Status: DISCONTINUED | OUTPATIENT
Start: 2018-11-08 | End: 2018-11-09

## 2018-11-08 RX ORDER — ACETAMINOPHEN 500 MG
1000 TABLET ORAL ONCE
Qty: 0 | Refills: 0 | Status: DISCONTINUED | OUTPATIENT
Start: 2018-11-08 | End: 2018-11-09

## 2018-11-08 RX ADMIN — OXYCODONE HYDROCHLORIDE 5 MILLIGRAM(S): 5 TABLET ORAL at 19:27

## 2018-11-08 RX ADMIN — SIMVASTATIN 20 MILLIGRAM(S): 20 TABLET, FILM COATED ORAL at 21:34

## 2018-11-08 RX ADMIN — HEPARIN SODIUM 14 UNIT(S)/HR: 5000 INJECTION INTRAVENOUS; SUBCUTANEOUS at 17:43

## 2018-11-08 RX ADMIN — Medication 100 MILLIGRAM(S): at 13:10

## 2018-11-08 RX ADMIN — Medication 650 MILLIGRAM(S): at 16:26

## 2018-11-08 RX ADMIN — Medication 25 MILLIGRAM(S): at 09:33

## 2018-11-08 RX ADMIN — OXYCODONE HYDROCHLORIDE 5 MILLIGRAM(S): 5 TABLET ORAL at 19:57

## 2018-11-08 RX ADMIN — Medication 650 MILLIGRAM(S): at 16:56

## 2018-11-08 RX ADMIN — PANTOPRAZOLE SODIUM 40 MILLIGRAM(S): 20 TABLET, DELAYED RELEASE ORAL at 06:07

## 2018-11-08 RX ADMIN — TAMSULOSIN HYDROCHLORIDE 0.4 MILLIGRAM(S): 0.4 CAPSULE ORAL at 21:34

## 2018-11-08 RX ADMIN — Medication 650 MILLIGRAM(S): at 06:37

## 2018-11-08 RX ADMIN — HEPARIN SODIUM 15 UNIT(S)/HR: 5000 INJECTION INTRAVENOUS; SUBCUTANEOUS at 06:51

## 2018-11-08 RX ADMIN — ATENOLOL 25 MILLIGRAM(S): 25 TABLET ORAL at 06:07

## 2018-11-08 RX ADMIN — Medication 650 MILLIGRAM(S): at 06:07

## 2018-11-08 RX ADMIN — Medication 81 MILLIGRAM(S): at 13:10

## 2018-11-08 RX ADMIN — Medication 60 MILLIGRAM(S): at 06:07

## 2018-11-08 NOTE — PROGRESS NOTE ADULT - SUBJECTIVE AND OBJECTIVE BOX
Surgery Progress Note     Subjective/24hour Events:   Patient seen and examined.  Vac changed yesterday.   Kaufman removed, passed TOV.   Overnight heparin gtt adjusted.    Otherwise no acute events overnight.   Pain well controlled.   Tolerating diet without N/V.     Vital Signs:  Vital Signs Last 24 Hrs  T(C): 37.1 (07 Nov 2018 21:09), Max: 37.2 (07 Nov 2018 13:28)  T(F): 98.8 (07 Nov 2018 21:09), Max: 99 (07 Nov 2018 13:28)  HR: 87 (07 Nov 2018 21:09) (73 - 90)  BP: 165/70 (07 Nov 2018 21:09) (139/62 - 165/70)  BP(mean): --  RR: 17 (07 Nov 2018 21:09) (17 - 20)  SpO2: 96% (07 Nov 2018 21:32) (92% - 97%)    CAPILLARY BLOOD GLUCOSE          I&O's Detail    06 Nov 2018 07:01  -  07 Nov 2018 07:00  --------------------------------------------------------  IN:    heparin Infusion: 176 mL    Oral Fluid: 1200 mL  Total IN: 1376 mL    OUT:    Indwelling Catheter - Urethral: 3250 mL  Total OUT: 3250 mL    Total NET: -1874 mL      07 Nov 2018 07:01  -  08 Nov 2018 01:22  --------------------------------------------------------  IN:    heparin Infusion: 68 mL    Oral Fluid: 1020 mL  Total IN: 1088 mL    OUT:    Voided: 1250 mL  Total OUT: 1250 mL    Total NET: -162 mL          MEDICATIONS  (STANDING):  aspirin enteric coated 81 milliGRAM(s) Oral daily  ATENolol  Tablet 25 milliGRAM(s) Oral daily  chlorthalidone 25 milliGRAM(s) Oral daily  docusate sodium 100 milliGRAM(s) Oral daily  fluticasone propionate 50 MICROgram(s)/spray Nasal Spray 1 Spray(s) Both Nostrils daily  heparin  Infusion 1700 Unit(s)/Hr (16 mL/Hr) IV Continuous <Continuous>  HYDROmorphone  Injectable 0.5 milliGRAM(s) IV Push daily  NIFEdipine XL 60 milliGRAM(s) Oral daily  oxyCODONE    IR 10 milliGRAM(s) Oral once  pantoprazole    Tablet 40 milliGRAM(s) Oral before breakfast  polyethylene glycol 3350 17 Gram(s) Oral daily  senna 2 Tablet(s) Oral at bedtime  silver nitrate Applicator 1 Application(s) Topical once  simvastatin 20 milliGRAM(s) Oral at bedtime  tamsulosin 0.4 milliGRAM(s) Oral at bedtime    MEDICATIONS  (PRN):  acetaminophen   Tablet .. 650 milliGRAM(s) Oral every 6 hours PRN Mild Pain (1 - 3)      Physical Exam:  Gen: NAD, laying comfortably in bed, converses easily.   Lungs: Non labored breathing.   Ab: Soft, nontender, nondistended.   Ext: RLE with DP palpable, wound vac in place holding suction.     Labs:    11-07    140  |  100  |  9   ----------------------------<  107<H>  4.1   |  31  |  0.75    Ca    9.0      07 Nov 2018 07:09  Phos  3.7     11-07  Mg     2.1     11-07                              8.6    15.7  )-----------( 698      ( 07 Nov 2018 07:09 )             26.9     PTT - ( 07 Nov 2018 22:35 )  PTT:125.5 sec    Imaging:

## 2018-11-08 NOTE — PROGRESS NOTE ADULT - SUBJECTIVE AND OBJECTIVE BOX
pt seen and examined, no complaints, ROS - .     acetaminophen   Tablet .. 650 milliGRAM(s) Oral every 6 hours PRN  aspirin enteric coated 81 milliGRAM(s) Oral daily  ATENolol  Tablet 25 milliGRAM(s) Oral daily  chlorthalidone 25 milliGRAM(s) Oral daily  docusate sodium 100 milliGRAM(s) Oral daily  fluticasone propionate 50 MICROgram(s)/spray Nasal Spray 1 Spray(s) Both Nostrils daily  heparin  Infusion 1700 Unit(s)/Hr IV Continuous <Continuous>  HYDROmorphone  Injectable 0.5 milliGRAM(s) IV Push daily  NIFEdipine XL 60 milliGRAM(s) Oral daily  oxyCODONE    IR 10 milliGRAM(s) Oral once  pantoprazole    Tablet 40 milliGRAM(s) Oral before breakfast  polyethylene glycol 3350 17 Gram(s) Oral daily  senna 2 Tablet(s) Oral at bedtime  silver nitrate Applicator 1 Application(s) Topical once  simvastatin 20 milliGRAM(s) Oral at bedtime  tamsulosin 0.4 milliGRAM(s) Oral at bedtime                            9.7    16.4  )-----------( 799      ( 08 Nov 2018 06:14 )             29.3       Hemoglobin: 9.7 g/dL (11-08 @ 06:14)  Hemoglobin: 8.6 g/dL (11-07 @ 07:09)  Hemoglobin: 8.7 g/dL (11-07 @ 07:09)  Hemoglobin: 8.5 g/dL (11-06 @ 03:14)  Hemoglobin: 7.9 g/dL (11-05 @ 20:09)      11-08    133<L>  |  96  |  8   ----------------------------<  113<H>  4.0   |  29  |  0.78    Ca    9.4      08 Nov 2018 06:14  Phos  3.8     11-08  Mg     2.1     11-08      Creatinine Trend: 0.78<--, 0.75<--, 0.82<--, 0.81<--, 0.95<--, 1.31<--    COAGS: PTT - ( 08 Nov 2018 06:14 )  PTT:91.7 sec          T(C): 36.8 (11-08-18 @ 05:39), Max: 37.2 (11-07-18 @ 13:28)  HR: 80 (11-08-18 @ 05:39) (73 - 87)  BP: 173/70 (11-08-18 @ 05:39) (139/62 - 173/70)  RR: 18 (11-08-18 @ 05:39) (16 - 18)  SpO2: 98% (11-08-18 @ 05:39) (92% - 98%)  Wt(kg): --    I&O's Summary    07 Nov 2018 07:01  -  08 Nov 2018 07:00  --------------------------------------------------------  IN: 1356 mL / OUT: 2650 mL / NET: -1294 mL      Appearance: Normal	  HEENT:   Normal oral mucosa, PERRL, EOMI	  Lymphatic: No lymphadenopathy , no edema  Cardiovascular: irregular irregular  S1 S2, No JVD, No murmurs , Peripheral pulses palpable 2+ bilaterally  Respiratory: Lungs clear to auscultation, normal effort 	  Gastrointestinal:  Soft, Non-tender, + BS	  Skin: No rashes, No ecchymoses, No cyanosis, warm to touch  Musculoskeletal: Normal range of motion, normal strength, RLE with vac in place  Psychiatry:  Mood & affect appropriate    TELEMETRY: 	  none  ECG:  	< from: 12 Lead ECG (10.25.18 @ 14:08) >  Diagnosis Line ATRIAL FIBRILLATION  PROLONGED QT  ABNORMAL ECG    < end of copied text >    RADIOLOGY:   DIAGNOSTIC TESTING:  [ ] Echocardiogram:   [ ]  Catheterization:  [ ] Stress Test:    OTHER: 	        ASSESSMENT/PLAN: 	73y Male  history of Afib previously on Coumadin secondary to spine surgery 10/19/18, HTN, CVA, MOE admitted with RLE arterial occlusion now s/p embolectomy of right AT and peroneal artery. RLE fasciotomies , unable to close ,  s/p VAC placement pending OR on Friday for RLE fasciotomy closure:       - given his RCRI score, he is low risk for intermediate risk procedure for RLE fasciotomy closure on Friday 11/9  - c/w BB periop  - HTN - controlled with BB/procardia/chlorthalidone  - A/C with heparin gtt at present   - GI / DVT prophylaxis,  keep K>4, mag >2.0   - VAC and  wound care per sx  - HD stable no CHF  - f/u with Dr Bynum upon dc for cardiology

## 2018-11-08 NOTE — PROGRESS NOTE ADULT - ASSESSMENT
ASSESSMENT: 73M with recent spine surgery now s/p RLE embolectomy for acute RLE arterial occlusion now s/p RLE fasciotomies.     PLAN:    - Heparin gtt, Will hold Hep gtt, on call to OR for fasciotomy closure on Friday as per Dr. Suarez, Plastics.  - Pain control-- Reg diet  - OOB with PT  - Elevate RLE, heel splint.   - ACE bandage RLE   - PT recommending DANIEL  - f/u neurology recommendations- EEG pending  - f/u C. diff for persistent leukocytosis, other workup to date has been negative.     Vascular Surgery Pager #[phone]

## 2018-11-09 ENCOUNTER — RESULT REVIEW (OUTPATIENT)
Age: 73
End: 2018-11-09

## 2018-11-09 LAB
ANION GAP SERPL CALC-SCNC: 8 MMOL/L — SIGNIFICANT CHANGE UP (ref 5–17)
APTT BLD: 69.5 SEC — HIGH (ref 27.5–36.3)
BASOPHILS # BLD AUTO: 0.1 K/UL — SIGNIFICANT CHANGE UP (ref 0–0.2)
BASOPHILS NFR BLD AUTO: 0.8 % — SIGNIFICANT CHANGE UP (ref 0–2)
BUN SERPL-MCNC: 8 MG/DL — SIGNIFICANT CHANGE UP (ref 7–23)
CALCIUM SERPL-MCNC: 9.6 MG/DL — SIGNIFICANT CHANGE UP (ref 8.4–10.5)
CHLORIDE SERPL-SCNC: 97 MMOL/L — SIGNIFICANT CHANGE UP (ref 96–108)
CO2 SERPL-SCNC: 31 MMOL/L — SIGNIFICANT CHANGE UP (ref 22–31)
CREAT SERPL-MCNC: 0.73 MG/DL — SIGNIFICANT CHANGE UP (ref 0.5–1.3)
EOSINOPHIL # BLD AUTO: 0.7 K/UL — HIGH (ref 0–0.5)
EOSINOPHIL NFR BLD AUTO: 4.4 % — SIGNIFICANT CHANGE UP (ref 0–6)
GLUCOSE SERPL-MCNC: 119 MG/DL — HIGH (ref 70–99)
HCT VFR BLD CALC: 32.4 % — LOW (ref 39–50)
HGB BLD-MCNC: 10.4 G/DL — LOW (ref 13–17)
INR BLD: 1.19 RATIO — HIGH (ref 0.88–1.16)
INR BLD: 1.25 RATIO — HIGH (ref 0.88–1.16)
LYMPHOCYTES # BLD AUTO: 27.9 % — SIGNIFICANT CHANGE UP (ref 13–44)
LYMPHOCYTES # BLD AUTO: 4.1 K/UL — HIGH (ref 1–3.3)
MAGNESIUM SERPL-MCNC: 1.8 MG/DL — SIGNIFICANT CHANGE UP (ref 1.6–2.6)
MCHC RBC-ENTMCNC: 31.3 PG — SIGNIFICANT CHANGE UP (ref 27–34)
MCHC RBC-ENTMCNC: 32.1 GM/DL — SIGNIFICANT CHANGE UP (ref 32–36)
MCV RBC AUTO: 97.6 FL — SIGNIFICANT CHANGE UP (ref 80–100)
MONOCYTES # BLD AUTO: 1.3 K/UL — HIGH (ref 0–0.9)
MONOCYTES NFR BLD AUTO: 8.5 % — SIGNIFICANT CHANGE UP (ref 2–14)
NEUTROPHILS # BLD AUTO: 8.7 K/UL — HIGH (ref 1.8–7.4)
NEUTROPHILS NFR BLD AUTO: 58.5 % — SIGNIFICANT CHANGE UP (ref 43–77)
PHOSPHATE SERPL-MCNC: 4.4 MG/DL — SIGNIFICANT CHANGE UP (ref 2.5–4.5)
PLATELET # BLD AUTO: 854 K/UL — HIGH (ref 150–400)
POTASSIUM SERPL-MCNC: 3.9 MMOL/L — SIGNIFICANT CHANGE UP (ref 3.5–5.3)
POTASSIUM SERPL-SCNC: 3.9 MMOL/L — SIGNIFICANT CHANGE UP (ref 3.5–5.3)
PROTHROM AB SERPL-ACNC: 13.8 SEC — HIGH (ref 10–12.9)
PROTHROM AB SERPL-ACNC: 14.3 SEC — HIGH (ref 10–12.9)
RBC # BLD: 3.32 M/UL — LOW (ref 4.2–5.8)
RBC # FLD: 13.8 % — SIGNIFICANT CHANGE UP (ref 10.3–14.5)
SODIUM SERPL-SCNC: 136 MMOL/L — SIGNIFICANT CHANGE UP (ref 135–145)
WBC # BLD: 14.9 K/UL — HIGH (ref 3.8–10.5)
WBC # FLD AUTO: 14.9 K/UL — HIGH (ref 3.8–10.5)

## 2018-11-09 PROCEDURE — 88304 TISSUE EXAM BY PATHOLOGIST: CPT | Mod: 26

## 2018-11-09 RX ORDER — ACETAMINOPHEN 500 MG
1000 TABLET ORAL ONCE
Qty: 0 | Refills: 0 | Status: DISCONTINUED | OUTPATIENT
Start: 2018-11-09 | End: 2018-11-13

## 2018-11-09 RX ORDER — OXYCODONE HYDROCHLORIDE 5 MG/1
10 TABLET ORAL EVERY 4 HOURS
Qty: 0 | Refills: 0 | Status: DISCONTINUED | OUTPATIENT
Start: 2018-11-09 | End: 2018-11-15

## 2018-11-09 RX ORDER — CEFAZOLIN SODIUM 1 G
500 VIAL (EA) INJECTION EVERY 8 HOURS
Qty: 0 | Refills: 0 | Status: COMPLETED | OUTPATIENT
Start: 2018-11-09 | End: 2018-11-11

## 2018-11-09 RX ORDER — ATENOLOL 25 MG/1
25 TABLET ORAL DAILY
Qty: 0 | Refills: 0 | Status: DISCONTINUED | OUTPATIENT
Start: 2018-11-09 | End: 2018-11-16

## 2018-11-09 RX ORDER — DOCUSATE SODIUM 100 MG
100 CAPSULE ORAL DAILY
Qty: 0 | Refills: 0 | Status: DISCONTINUED | OUTPATIENT
Start: 2018-11-09 | End: 2018-11-16

## 2018-11-09 RX ORDER — SENNA PLUS 8.6 MG/1
2 TABLET ORAL AT BEDTIME
Qty: 0 | Refills: 0 | Status: DISCONTINUED | OUTPATIENT
Start: 2018-11-09 | End: 2018-11-16

## 2018-11-09 RX ORDER — TAMSULOSIN HYDROCHLORIDE 0.4 MG/1
0.4 CAPSULE ORAL AT BEDTIME
Qty: 0 | Refills: 0 | Status: DISCONTINUED | OUTPATIENT
Start: 2018-11-09 | End: 2018-11-16

## 2018-11-09 RX ORDER — FLUTICASONE PROPIONATE 50 MCG
1 SPRAY, SUSPENSION NASAL DAILY
Qty: 0 | Refills: 0 | Status: DISCONTINUED | OUTPATIENT
Start: 2018-11-09 | End: 2018-11-16

## 2018-11-09 RX ORDER — HEPARIN SODIUM 5000 [USP'U]/ML
1500 INJECTION INTRAVENOUS; SUBCUTANEOUS
Qty: 25000 | Refills: 0 | Status: DISCONTINUED | OUTPATIENT
Start: 2018-11-09 | End: 2018-11-09

## 2018-11-09 RX ORDER — HYDROMORPHONE HYDROCHLORIDE 2 MG/ML
0.25 INJECTION INTRAMUSCULAR; INTRAVENOUS; SUBCUTANEOUS
Qty: 0 | Refills: 0 | Status: DISCONTINUED | OUTPATIENT
Start: 2018-11-09 | End: 2018-11-09

## 2018-11-09 RX ORDER — ONDANSETRON 8 MG/1
4 TABLET, FILM COATED ORAL ONCE
Qty: 0 | Refills: 0 | Status: DISCONTINUED | OUTPATIENT
Start: 2018-11-09 | End: 2018-11-09

## 2018-11-09 RX ORDER — ASPIRIN/CALCIUM CARB/MAGNESIUM 324 MG
81 TABLET ORAL DAILY
Qty: 0 | Refills: 0 | Status: DISCONTINUED | OUTPATIENT
Start: 2018-11-09 | End: 2018-11-13

## 2018-11-09 RX ORDER — ACETAMINOPHEN 500 MG
650 TABLET ORAL EVERY 6 HOURS
Qty: 0 | Refills: 0 | Status: DISCONTINUED | OUTPATIENT
Start: 2018-11-09 | End: 2018-11-16

## 2018-11-09 RX ORDER — NIFEDIPINE 30 MG
60 TABLET, EXTENDED RELEASE 24 HR ORAL DAILY
Qty: 0 | Refills: 0 | Status: DISCONTINUED | OUTPATIENT
Start: 2018-11-09 | End: 2018-11-13

## 2018-11-09 RX ORDER — POLYETHYLENE GLYCOL 3350 17 G/17G
17 POWDER, FOR SOLUTION ORAL DAILY
Qty: 0 | Refills: 0 | Status: DISCONTINUED | OUTPATIENT
Start: 2018-11-09 | End: 2018-11-16

## 2018-11-09 RX ORDER — HEPARIN SODIUM 5000 [USP'U]/ML
1500 INJECTION INTRAVENOUS; SUBCUTANEOUS
Qty: 25000 | Refills: 0 | Status: DISCONTINUED | OUTPATIENT
Start: 2018-11-09 | End: 2018-11-12

## 2018-11-09 RX ORDER — SIMVASTATIN 20 MG/1
20 TABLET, FILM COATED ORAL AT BEDTIME
Qty: 0 | Refills: 0 | Status: DISCONTINUED | OUTPATIENT
Start: 2018-11-09 | End: 2018-11-16

## 2018-11-09 RX ORDER — PANTOPRAZOLE SODIUM 20 MG/1
40 TABLET, DELAYED RELEASE ORAL
Qty: 0 | Refills: 0 | Status: DISCONTINUED | OUTPATIENT
Start: 2018-11-09 | End: 2018-11-16

## 2018-11-09 RX ORDER — OXYCODONE HYDROCHLORIDE 5 MG/1
5 TABLET ORAL EVERY 4 HOURS
Qty: 0 | Refills: 0 | Status: DISCONTINUED | OUTPATIENT
Start: 2018-11-09 | End: 2018-11-16

## 2018-11-09 RX ADMIN — ATENOLOL 25 MILLIGRAM(S): 25 TABLET ORAL at 06:12

## 2018-11-09 RX ADMIN — PANTOPRAZOLE SODIUM 40 MILLIGRAM(S): 20 TABLET, DELAYED RELEASE ORAL at 06:12

## 2018-11-09 RX ADMIN — Medication 60 MILLIGRAM(S): at 06:12

## 2018-11-09 RX ADMIN — Medication 60 MILLIGRAM(S): at 17:15

## 2018-11-09 RX ADMIN — OXYCODONE HYDROCHLORIDE 5 MILLIGRAM(S): 5 TABLET ORAL at 17:45

## 2018-11-09 RX ADMIN — TAMSULOSIN HYDROCHLORIDE 0.4 MILLIGRAM(S): 0.4 CAPSULE ORAL at 21:48

## 2018-11-09 RX ADMIN — HEPARIN SODIUM 15 UNIT(S)/HR: 5000 INJECTION INTRAVENOUS; SUBCUTANEOUS at 18:01

## 2018-11-09 RX ADMIN — PANTOPRAZOLE SODIUM 40 MILLIGRAM(S): 20 TABLET, DELAYED RELEASE ORAL at 17:15

## 2018-11-09 RX ADMIN — Medication 25 MILLIGRAM(S): at 09:38

## 2018-11-09 RX ADMIN — HYDROMORPHONE HYDROCHLORIDE 0.25 MILLIGRAM(S): 2 INJECTION INTRAMUSCULAR; INTRAVENOUS; SUBCUTANEOUS at 14:30

## 2018-11-09 RX ADMIN — SENNA PLUS 2 TABLET(S): 8.6 TABLET ORAL at 21:48

## 2018-11-09 RX ADMIN — OXYCODONE HYDROCHLORIDE 5 MILLIGRAM(S): 5 TABLET ORAL at 17:15

## 2018-11-09 RX ADMIN — HYDROMORPHONE HYDROCHLORIDE 0.25 MILLIGRAM(S): 2 INJECTION INTRAMUSCULAR; INTRAVENOUS; SUBCUTANEOUS at 14:15

## 2018-11-09 RX ADMIN — Medication 100 MILLIGRAM(S): at 21:48

## 2018-11-09 RX ADMIN — HEPARIN SODIUM 15 UNIT(S)/HR: 5000 INJECTION INTRAVENOUS; SUBCUTANEOUS at 03:02

## 2018-11-09 RX ADMIN — SIMVASTATIN 20 MILLIGRAM(S): 20 TABLET, FILM COATED ORAL at 21:48

## 2018-11-09 NOTE — PRE-OP CHECKLIST - 1.
emotional support preop teaching and unit orientation given to pt and family
emotional support provided to patient and family

## 2018-11-09 NOTE — BRIEF OPERATIVE NOTE - OPERATION/FINDINGS
Unable to close b/l fasciotomy sites primarily, proceeded with vac placement on b/l wounds
Arteriotomy made immediately proximal to right TP trunk - AT bifurcation. Clot removed from peroneal and anterior tibial with #3 Sam. DP and PT pulses palpable after embolectomy.
Bilateral fasciotomy sites debrided, nonviable skin on R lateral wound removed. Two 3x18 cm skin grafts taken from R thigh, meshed with 1:1 mesher. Graft placed over fasciotomy sites with staples, wound vac applied.

## 2018-11-09 NOTE — PROGRESS NOTE ADULT - ASSESSMENT
A/P: 73y Male Atherosclerosis s/p Bilateral fasciotomy sites debrided, nonviable skin on R lateral wound removed. Two 3x18 cm skin grafts taken from R thigh, meshed with 1:1 mesher. Graft placed over fasciotomy sites with staples, wound vac applied.  - Diet: REgular  - Activity: OOB as tolerated  - Labs: follow up in AM  - Pain medication  - DVT ppx  -IS    [id number]

## 2018-11-09 NOTE — BRIEF OPERATIVE NOTE - PROCEDURE
<<-----Click on this checkbox to enter Procedure Split thickness skingraft  11/09/2018  of R thigh  Active  [id number]  Negative pressure wound therapy, large area  11/05/2018    Active  Kelly Bai

## 2018-11-09 NOTE — PROGRESS NOTE ADULT - SUBJECTIVE AND OBJECTIVE BOX
GENERAL SURGERY DAILY PROGRESS NOTE:       Subjective:  Pain controlled. Denies N/V. Tolerating diet. Passing flatus and BM.        Objective:    PE:  Gen: NAD  Resp: airway patent, respirations unlabored, no increased WoB  CVS: RRR  Abd: soft, ND, NT, no rebound or guarding  Ext: no edema, WWP  Neuro: AAOx3, no focal deficits    Vital Signs Last 24 Hrs  T(C): 36.9 (08 Nov 2018 22:28), Max: 37.2 (08 Nov 2018 17:45)  T(F): 98.4 (08 Nov 2018 22:28), Max: 98.9 (08 Nov 2018 17:45)  HR: 72 (08 Nov 2018 22:28) (66 - 98)  BP: 160/73 (08 Nov 2018 22:28) (124/70 - 173/70)  BP(mean): --  RR: 18 (08 Nov 2018 22:28) (18 - 18)  SpO2: 92% (08 Nov 2018 22:28) (92% - 98%)    I&O's Detail    07 Nov 2018 07:01  -  08 Nov 2018 07:00  --------------------------------------------------------  IN:    heparin Infusion: 196 mL    Oral Fluid: 1160 mL  Total IN: 1356 mL    OUT:    Voided: 2650 mL  Total OUT: 2650 mL    Total NET: -1294 mL      08 Nov 2018 07:01  -  09 Nov 2018 05:06  --------------------------------------------------------  IN:    heparin Infusion: 112 mL    heparin Infusion: 30 mL    Oral Fluid: 1000 mL  Total IN: 1142 mL    OUT:    Voided: 2225 mL  Total OUT: 2225 mL    Total NET: -1083 mL          Daily Height in cm: 172.72 (09 Nov 2018 03:07)    Daily     MEDICATIONS  (STANDING):  acetaminophen  IVPB .. 1000 milliGRAM(s) IV Intermittent once  aspirin enteric coated 81 milliGRAM(s) Oral daily  ATENolol  Tablet 25 milliGRAM(s) Oral daily  chlorthalidone 25 milliGRAM(s) Oral daily  docusate sodium 100 milliGRAM(s) Oral daily  fluticasone propionate 50 MICROgram(s)/spray Nasal Spray 1 Spray(s) Both Nostrils daily  heparin  Infusion 1500 Unit(s)/Hr (15 mL/Hr) IV Continuous <Continuous>  HYDROmorphone  Injectable 0.5 milliGRAM(s) IV Push daily  NIFEdipine XL 60 milliGRAM(s) Oral daily  oxyCODONE    IR 10 milliGRAM(s) Oral once  pantoprazole    Tablet 40 milliGRAM(s) Oral before breakfast  polyethylene glycol 3350 17 Gram(s) Oral daily  senna 2 Tablet(s) Oral at bedtime  simvastatin 20 milliGRAM(s) Oral at bedtime  tamsulosin 0.4 milliGRAM(s) Oral at bedtime    MEDICATIONS  (PRN):  acetaminophen   Tablet .. 650 milliGRAM(s) Oral every 6 hours PRN Mild Pain (1 - 3)      LABS:                        9.7    16.4  )-----------( 799      ( 08 Nov 2018 06:14 )             29.3     11-08    133<L>  |  96  |  8   ----------------------------<  113<H>  4.0   |  29  |  0.78    Ca    9.4      08 Nov 2018 06:14  Phos  3.8     11-08  Mg     2.1     11-08      PTT - ( 09 Nov 2018 00:32 )  PTT:40.4 sec      RADIOLOGY & ADDITIONAL STUDIES: GENERAL SURGERY DAILY PROGRESS NOTE:       Subjective:  Patient seen and examined at bedside this AM. No acute events overnight. Patient to go to OR today at 1230, heparin drip to be held before leaving floor.         Objective:    PE:  Gen: NAD  Resp: nonlabored  Abd: soft, ND, NT, no rebound or guarding  Ext: wound vac in place on RLE  Neuro: AAOx3, no focal deficits    Vital Signs Last 24 Hrs  T(C): 36.9 (08 Nov 2018 22:28), Max: 37.2 (08 Nov 2018 17:45)  T(F): 98.4 (08 Nov 2018 22:28), Max: 98.9 (08 Nov 2018 17:45)  HR: 72 (08 Nov 2018 22:28) (66 - 98)  BP: 160/73 (08 Nov 2018 22:28) (124/70 - 173/70)  BP(mean): --  RR: 18 (08 Nov 2018 22:28) (18 - 18)  SpO2: 92% (08 Nov 2018 22:28) (92% - 98%)    I&O's Detail    07 Nov 2018 07:01  -  08 Nov 2018 07:00  --------------------------------------------------------  IN:    heparin Infusion: 196 mL    Oral Fluid: 1160 mL  Total IN: 1356 mL    OUT:    Voided: 2650 mL  Total OUT: 2650 mL    Total NET: -1294 mL      08 Nov 2018 07:01  -  09 Nov 2018 05:06  --------------------------------------------------------  IN:    heparin Infusion: 112 mL    heparin Infusion: 30 mL    Oral Fluid: 1000 mL  Total IN: 1142 mL    OUT:    Voided: 2225 mL  Total OUT: 2225 mL    Total NET: -1083 mL          Daily Height in cm: 172.72 (09 Nov 2018 03:07)    Daily     MEDICATIONS  (STANDING):  acetaminophen  IVPB .. 1000 milliGRAM(s) IV Intermittent once  aspirin enteric coated 81 milliGRAM(s) Oral daily  ATENolol  Tablet 25 milliGRAM(s) Oral daily  chlorthalidone 25 milliGRAM(s) Oral daily  docusate sodium 100 milliGRAM(s) Oral daily  fluticasone propionate 50 MICROgram(s)/spray Nasal Spray 1 Spray(s) Both Nostrils daily  heparin  Infusion 1500 Unit(s)/Hr (15 mL/Hr) IV Continuous <Continuous>  HYDROmorphone  Injectable 0.5 milliGRAM(s) IV Push daily  NIFEdipine XL 60 milliGRAM(s) Oral daily  oxyCODONE    IR 10 milliGRAM(s) Oral once  pantoprazole    Tablet 40 milliGRAM(s) Oral before breakfast  polyethylene glycol 3350 17 Gram(s) Oral daily  senna 2 Tablet(s) Oral at bedtime  simvastatin 20 milliGRAM(s) Oral at bedtime  tamsulosin 0.4 milliGRAM(s) Oral at bedtime    MEDICATIONS  (PRN):  acetaminophen   Tablet .. 650 milliGRAM(s) Oral every 6 hours PRN Mild Pain (1 - 3)      LABS:                        9.7    16.4  )-----------( 799      ( 08 Nov 2018 06:14 )             29.3     11-08    133<L>  |  96  |  8   ----------------------------<  113<H>  4.0   |  29  |  0.78    Ca    9.4      08 Nov 2018 06:14  Phos  3.8     11-08  Mg     2.1     11-08      PTT - ( 09 Nov 2018 00:32 )  PTT:40.4 sec      RADIOLOGY & ADDITIONAL STUDIES:

## 2018-11-09 NOTE — PROGRESS NOTE ADULT - SUBJECTIVE AND OBJECTIVE BOX
STATUS POST:  Bilateral fasciotomy sites     POST OPERATIVE DAY #: 0    SUBJECTIVE: Pt seen and examined. No c/os      Vital Signs Last 24 Hrs  T(C): 36.6 (09 Nov 2018 15:30), Max: 37.2 (08 Nov 2018 17:45)  T(F): 97.9 (09 Nov 2018 15:30), Max: 98.9 (08 Nov 2018 17:45)  HR: 68 (09 Nov 2018 15:30) (61 - 98)  BP: 141/68 (09 Nov 2018 15:30) (122/62 - 162/74)  BP(mean): 102 (09 Nov 2018 14:45) (98 - 116)  RR: 16 (09 Nov 2018 15:30) (16 - 18)  SpO2: 100% (09 Nov 2018 15:30) (92% - 100%)        PHYSICAL EXAM:  Gen: WD WN NAD male A)x3  HEENT: NCAT PERRLA  chest: non labored breathing  abd: soft ND NT  Extrem: RLE right thigh dressng CDI  fascitiomy sites of R calf, VAC in place, wrapped in ACE, CDI, palp dp WWP    I&O's Summary    08 Nov 2018 07:01  -  09 Nov 2018 07:00  --------------------------------------------------------  IN: 1172 mL / OUT: 2725 mL / NET: -1553 mL    09 Nov 2018 07:01  -  09 Nov 2018 17:09  --------------------------------------------------------  IN: 0 mL / OUT: 350 mL / NET: -350 mL      I&O's Detail    08 Nov 2018 07:01  -  09 Nov 2018 07:00  --------------------------------------------------------  IN:    heparin Infusion: 112 mL    heparin Infusion: 60 mL    Oral Fluid: 1000 mL  Total IN: 1172 mL    OUT:    Voided: 2725 mL  Total OUT: 2725 mL    Total NET: -1553 mL      09 Nov 2018 07:01  -  09 Nov 2018 17:09  --------------------------------------------------------  IN:  Total IN: 0 mL    OUT:    Voided: 350 mL  Total OUT: 350 mL    Total NET: -350 mL          MEDICATIONS  (STANDING):  acetaminophen  IVPB .. 1000 milliGRAM(s) IV Intermittent once  aspirin enteric coated 81 milliGRAM(s) Oral daily  ATENolol  Tablet 25 milliGRAM(s) Oral daily  ceFAZolin   IVPB 500 milliGRAM(s) IV Intermittent every 8 hours  docusate sodium 100 milliGRAM(s) Oral daily  fluticasone propionate 50 MICROgram(s)/spray Nasal Spray 1 Spray(s) Both Nostrils daily  heparin  Infusion 1500 Unit(s)/Hr (15 mL/Hr) IV Continuous <Continuous>  NIFEdipine XL 60 milliGRAM(s) Oral daily  pantoprazole    Tablet 40 milliGRAM(s) Oral before breakfast  polyethylene glycol 3350 17 Gram(s) Oral daily  senna 2 Tablet(s) Oral at bedtime  simvastatin 20 milliGRAM(s) Oral at bedtime  tamsulosin 0.4 milliGRAM(s) Oral at bedtime    MEDICATIONS  (PRN):  acetaminophen   Tablet .. 650 milliGRAM(s) Oral every 6 hours PRN Mild Pain (1 - 3)  oxyCODONE    IR 5 milliGRAM(s) Oral every 4 hours PRN Moderate Pain (4 - 6)  oxyCODONE    IR 10 milliGRAM(s) Oral every 4 hours PRN Severe Pain (7 - 10)      LABS:                        10.4   14.9  )-----------( 854      ( 09 Nov 2018 06:28 )             32.4     11-09    136  |  97  |  8   ----------------------------<  119<H>  3.9   |  31  |  0.73    Ca    9.6      09 Nov 2018 06:28  Phos  4.4     11-09  Mg     1.8     11-09      PT/INR - ( 09 Nov 2018 06:28 )   PT: 13.8 sec;   INR: 1.19 ratio         PTT - ( 09 Nov 2018 06:28 )  PTT:69.5 sec

## 2018-11-09 NOTE — PROGRESS NOTE ADULT - ASSESSMENT
ASSESSMENT: 73M with recent spine surgery now s/p RLE embolectomy for acute RLE arterial occlusion now s/p RLE fasciotomies.     PLAN:    - Heparin gtt, Will hold Hep gtt, on call to OR for fasciotomy closure on Friday as per Dr. Suarez, Plastics.  - Pain control-- Reg diet  - OOB with PT  - Elevate RLE, heel splint.   - ACE bandage RLE   - PT recommending [facility]  - f/u neurology recommendations- EEG pending  - f/u C. diff for persistent leukocytosis, other workup to date has been negative.     Vascular Surgery Pager #[phone] ASSESSMENT: 73M with recent spine surgery now s/p RLE embolectomy for acute RLE arterial occlusion now s/p RLE fasciotomies.     PLAN:    - Heparin gtt, Will hold Hep gtt, on call to OR for fasciotomy closure   - Pain control-- Reg diet  - OOB with PT  - Elevate RLE, heel splint.   - ACE bandage RLE   - PT recommending [facility]    Vascular Surgery Pager #[phone]

## 2018-11-09 NOTE — PROGRESS NOTE ADULT - SUBJECTIVE AND OBJECTIVE BOX
pt seen and examined, no complaints, ROS - .     acetaminophen   Tablet .. 650 milliGRAM(s) Oral every 6 hours PRN  acetaminophen  IVPB .. 1000 milliGRAM(s) IV Intermittent once  aspirin enteric coated 81 milliGRAM(s) Oral daily  ATENolol  Tablet 25 milliGRAM(s) Oral daily  chlorthalidone 25 milliGRAM(s) Oral daily  docusate sodium 100 milliGRAM(s) Oral daily  fluticasone propionate 50 MICROgram(s)/spray Nasal Spray 1 Spray(s) Both Nostrils daily  heparin  Infusion 1500 Unit(s)/Hr IV Continuous <Continuous>  HYDROmorphone  Injectable 0.5 milliGRAM(s) IV Push daily  NIFEdipine XL 60 milliGRAM(s) Oral daily  oxyCODONE    IR 10 milliGRAM(s) Oral once  pantoprazole    Tablet 40 milliGRAM(s) Oral before breakfast  polyethylene glycol 3350 17 Gram(s) Oral daily  senna 2 Tablet(s) Oral at bedtime  simvastatin 20 milliGRAM(s) Oral at bedtime  tamsulosin 0.4 milliGRAM(s) Oral at bedtime                            9.7    16.4  )-----------( 799      ( 08 Nov 2018 06:14 )             29.3       Hemoglobin: 9.7 g/dL (11-08 @ 06:14)  Hemoglobin: 8.6 g/dL (11-07 @ 07:09)  Hemoglobin: 8.7 g/dL (11-07 @ 07:09)  Hemoglobin: 8.5 g/dL (11-06 @ 03:14)  Hemoglobin: 7.9 g/dL (11-05 @ 20:09)      11-08    133<L>  |  96  |  8   ----------------------------<  113<H>  4.0   |  29  |  0.78    Ca    9.4      08 Nov 2018 06:14  Phos  3.8     11-08  Mg     2.1     11-08      Creatinine Trend: 0.78<--, 0.75<--, 0.82<--, 0.81<--, 0.95<--, 1.31<--    COAGS: PTT - ( 09 Nov 2018 00:32 )  PTT:40.4 sec          T(C): 36.9 (11-08-18 @ 22:28), Max: 37.2 (11-08-18 @ 17:45)  HR: 72 (11-08-18 @ 22:28) (66 - 98)  BP: 160/73 (11-08-18 @ 22:28) (124/70 - 173/70)  RR: 18 (11-08-18 @ 22:28) (18 - 18)  SpO2: 92% (11-08-18 @ 22:28) (92% - 98%)  Wt(kg): --    I&O's Summary    07 Nov 2018 07:01  -  08 Nov 2018 07:00  --------------------------------------------------------  IN: 1356 mL / OUT: 2650 mL / NET: -1294 mL    08 Nov 2018 07:01  -  09 Nov 2018 05:08  --------------------------------------------------------  IN: 1142 mL / OUT: 2225 mL / NET: -1083 mL      Appearance: Normal	  HEENT:   Normal oral mucosa, PERRL, EOMI	  Lymphatic: No lymphadenopathy , no edema  Cardiovascular: irregular irregular  S1 S2, No JVD, No murmurs , Peripheral pulses palpable 2+ bilaterally  Respiratory: Lungs clear to auscultation, normal effort 	  Gastrointestinal:  Soft, Non-tender, + BS	  Skin: No rashes, No ecchymoses, No cyanosis, warm to touch  Musculoskeletal: Normal range of motion, normal strength, RLE with vac in place  Psychiatry:  Mood & affect appropriate    TELEMETRY: 	  none  ECG:  	< from: 12 Lead ECG (10.25.18 @ 14:08) >  Diagnosis Line ATRIAL FIBRILLATION  PROLONGED QT  ABNORMAL ECG    < end of copied text >      ASSESSMENT/PLAN: 	73y Male  history of Afib previously on Coumadin secondary to spine surgery 10/19/18, HTN, CVA, MOE admitted with RLE arterial occlusion now s/p embolectomy of right AT and peroneal artery. RLE fasciotomies , unable to close ,  s/p VAC placement pending OR on Friday for RLE fasciotomy closure:       - given his RCRI score, he is low risk for intermediate risk procedure for RLE fasciotomy closure on Friday 11/9  - c/w BB periop  - HTN - controlled with BB/procardia/chlorthalidone  - A/C with heparin gtt at present   - GI / DVT prophylaxis,  keep K>4, mag >2.0   - VAC and  wound care per sx  - HD stable no CHF  - f/u with Dr Bynum upon dc for cardiology

## 2018-11-10 LAB
ANION GAP SERPL CALC-SCNC: 12 MMOL/L — SIGNIFICANT CHANGE UP (ref 5–17)
APTT BLD: 52.8 SEC — HIGH (ref 27.5–36.3)
APTT BLD: 67.8 SEC — HIGH (ref 27.5–36.3)
BUN SERPL-MCNC: 10 MG/DL — SIGNIFICANT CHANGE UP (ref 7–23)
CALCIUM SERPL-MCNC: 9.2 MG/DL — SIGNIFICANT CHANGE UP (ref 8.4–10.5)
CHLORIDE SERPL-SCNC: 95 MMOL/L — LOW (ref 96–108)
CO2 SERPL-SCNC: 28 MMOL/L — SIGNIFICANT CHANGE UP (ref 22–31)
CREAT SERPL-MCNC: 0.77 MG/DL — SIGNIFICANT CHANGE UP (ref 0.5–1.3)
GLUCOSE SERPL-MCNC: 113 MG/DL — HIGH (ref 70–99)
HCT VFR BLD CALC: 31.6 % — LOW (ref 39–50)
HCT VFR BLD CALC: 32.3 % — LOW (ref 39–50)
HGB BLD-MCNC: 10.1 G/DL — LOW (ref 13–17)
HGB BLD-MCNC: 11 G/DL — LOW (ref 13–17)
MAGNESIUM SERPL-MCNC: 1.9 MG/DL — SIGNIFICANT CHANGE UP (ref 1.6–2.6)
MCHC RBC-ENTMCNC: 31.4 PG — SIGNIFICANT CHANGE UP (ref 27–34)
MCHC RBC-ENTMCNC: 32.1 GM/DL — SIGNIFICANT CHANGE UP (ref 32–36)
MCHC RBC-ENTMCNC: 33.5 PG — SIGNIFICANT CHANGE UP (ref 27–34)
MCHC RBC-ENTMCNC: 34.1 GM/DL — SIGNIFICANT CHANGE UP (ref 32–36)
MCV RBC AUTO: 98 FL — SIGNIFICANT CHANGE UP (ref 80–100)
MCV RBC AUTO: 98.2 FL — SIGNIFICANT CHANGE UP (ref 80–100)
PHOSPHATE SERPL-MCNC: 3.2 MG/DL — SIGNIFICANT CHANGE UP (ref 2.5–4.5)
PLATELET # BLD AUTO: 817 K/UL — HIGH (ref 150–400)
PLATELET # BLD AUTO: 840 K/UL — HIGH (ref 150–400)
POTASSIUM SERPL-MCNC: 3.9 MMOL/L — SIGNIFICANT CHANGE UP (ref 3.5–5.3)
POTASSIUM SERPL-SCNC: 3.9 MMOL/L — SIGNIFICANT CHANGE UP (ref 3.5–5.3)
RBC # BLD: 3.22 M/UL — LOW (ref 4.2–5.8)
RBC # BLD: 3.29 M/UL — LOW (ref 4.2–5.8)
RBC # FLD: 14.6 % — HIGH (ref 10.3–14.5)
RBC # FLD: 14.7 % — HIGH (ref 10.3–14.5)
SODIUM SERPL-SCNC: 135 MMOL/L — SIGNIFICANT CHANGE UP (ref 135–145)
WBC # BLD: 13.2 K/UL — HIGH (ref 3.8–10.5)
WBC # BLD: 14.1 K/UL — HIGH (ref 3.8–10.5)
WBC # FLD AUTO: 13.2 K/UL — HIGH (ref 3.8–10.5)
WBC # FLD AUTO: 14.1 K/UL — HIGH (ref 3.8–10.5)

## 2018-11-10 RX ORDER — WARFARIN SODIUM 2.5 MG/1
2.5 TABLET ORAL ONCE
Qty: 0 | Refills: 0 | Status: DISCONTINUED | OUTPATIENT
Start: 2018-11-10 | End: 2018-11-10

## 2018-11-10 RX ORDER — WARFARIN SODIUM 2.5 MG/1
5 TABLET ORAL ONCE
Qty: 0 | Refills: 0 | Status: COMPLETED | OUTPATIENT
Start: 2018-11-10 | End: 2018-11-10

## 2018-11-10 RX ADMIN — Medication 60 MILLIGRAM(S): at 05:15

## 2018-11-10 RX ADMIN — WARFARIN SODIUM 5 MILLIGRAM(S): 2.5 TABLET ORAL at 22:05

## 2018-11-10 RX ADMIN — Medication 81 MILLIGRAM(S): at 11:18

## 2018-11-10 RX ADMIN — OXYCODONE HYDROCHLORIDE 5 MILLIGRAM(S): 5 TABLET ORAL at 11:28

## 2018-11-10 RX ADMIN — TAMSULOSIN HYDROCHLORIDE 0.4 MILLIGRAM(S): 0.4 CAPSULE ORAL at 22:04

## 2018-11-10 RX ADMIN — Medication 100 MILLIGRAM(S): at 14:09

## 2018-11-10 RX ADMIN — Medication 10 MILLIGRAM(S): at 11:29

## 2018-11-10 RX ADMIN — HEPARIN SODIUM 15 UNIT(S)/HR: 5000 INJECTION INTRAVENOUS; SUBCUTANEOUS at 01:06

## 2018-11-10 RX ADMIN — HEPARIN SODIUM 15 UNIT(S)/HR: 5000 INJECTION INTRAVENOUS; SUBCUTANEOUS at 07:54

## 2018-11-10 RX ADMIN — Medication 100 MILLIGRAM(S): at 11:18

## 2018-11-10 RX ADMIN — Medication 650 MILLIGRAM(S): at 08:02

## 2018-11-10 RX ADMIN — SIMVASTATIN 20 MILLIGRAM(S): 20 TABLET, FILM COATED ORAL at 22:05

## 2018-11-10 RX ADMIN — ATENOLOL 25 MILLIGRAM(S): 25 TABLET ORAL at 05:15

## 2018-11-10 RX ADMIN — POLYETHYLENE GLYCOL 3350 17 GRAM(S): 17 POWDER, FOR SOLUTION ORAL at 11:18

## 2018-11-10 RX ADMIN — Medication 1 SPRAY(S): at 11:17

## 2018-11-10 RX ADMIN — Medication 100 MILLIGRAM(S): at 05:14

## 2018-11-10 RX ADMIN — OXYCODONE HYDROCHLORIDE 5 MILLIGRAM(S): 5 TABLET ORAL at 12:20

## 2018-11-10 RX ADMIN — OXYCODONE HYDROCHLORIDE 5 MILLIGRAM(S): 5 TABLET ORAL at 20:01

## 2018-11-10 RX ADMIN — PANTOPRAZOLE SODIUM 40 MILLIGRAM(S): 20 TABLET, DELAYED RELEASE ORAL at 05:17

## 2018-11-10 RX ADMIN — Medication 650 MILLIGRAM(S): at 09:00

## 2018-11-10 RX ADMIN — Medication 100 MILLIGRAM(S): at 22:04

## 2018-11-10 NOTE — PROGRESS NOTE ADULT - SUBJECTIVE AND OBJECTIVE BOX
PLASTIC SURGERY DAILY PROGRESS NOTE:     Subjective:  Pt seen and examined at bedside. No acute events, s/p skin graft and vac placemetn yesterday.     Objective:  NAD  RLE wrapped, vac in place to suction, donor site dressing c/d/i    MEDICATIONS  (STANDING):  acetaminophen  IVPB .. 1000 milliGRAM(s) IV Intermittent once  aspirin enteric coated 81 milliGRAM(s) Oral daily  ATENolol  Tablet 25 milliGRAM(s) Oral daily  ceFAZolin   IVPB 500 milliGRAM(s) IV Intermittent every 8 hours  docusate sodium 100 milliGRAM(s) Oral daily  fluticasone propionate 50 MICROgram(s)/spray Nasal Spray 1 Spray(s) Both Nostrils daily  heparin  Infusion 1500 Unit(s)/Hr (15 mL/Hr) IV Continuous <Continuous>  NIFEdipine XL 60 milliGRAM(s) Oral daily  pantoprazole    Tablet 40 milliGRAM(s) Oral before breakfast  polyethylene glycol 3350 17 Gram(s) Oral daily  senna 2 Tablet(s) Oral at bedtime  simvastatin 20 milliGRAM(s) Oral at bedtime  tamsulosin 0.4 milliGRAM(s) Oral at bedtime    MEDICATIONS  (PRN):  acetaminophen   Tablet .. 650 milliGRAM(s) Oral every 6 hours PRN Mild Pain (1 - 3)  oxyCODONE    IR 5 milliGRAM(s) Oral every 4 hours PRN Moderate Pain (4 - 6)  oxyCODONE    IR 10 milliGRAM(s) Oral every 4 hours PRN Severe Pain (7 - 10)      Vital Signs Last 24 Hrs  T(C): 37.4 (10 Nov 2018 05:12), Max: 37.4 (10 Nov 2018 05:12)  T(F): 99.4 (10 Nov 2018 05:12), Max: 99.4 (10 Nov 2018 05:12)  HR: 97 (10 Nov 2018 05:12) (61 - 97)  BP: 124/73 (10 Nov 2018 05:12) (122/62 - 162/74)  BP(mean): 102 (09 Nov 2018 14:45) (98 - 116)  RR: 16 (10 Nov 2018 05:12) (16 - 18)  SpO2: 94% (10 Nov 2018 05:12) (92% - 100%)    I&O's Detail    09 Nov 2018 07:01  -  10 Nov 2018 07:00  --------------------------------------------------------  IN:    heparin Infusion: 180 mL    Oral Fluid: 420 mL  Total IN: 600 mL    OUT:    Voided: 1285 mL  Total OUT: 1285 mL    Total NET: -685 mL          Daily     Daily     LABS:                        11.0   14.1  )-----------( 817      ( 10 Nov 2018 07:09 )             32.3     11-10    135  |  95<L>  |  10  ----------------------------<  113<H>  3.9   |  28  |  0.77    Ca    9.2      10 Nov 2018 07:04  Phos  3.2     11-10  Mg     1.9     11-10      PT/INR - ( 09 Nov 2018 19:00 )   PT: 14.3 sec;   INR: 1.25 ratio         PTT - ( 10 Nov 2018 07:09 )  PTT:52.8 sec      RADIOLOGY & ADDITIONAL STUDIES:

## 2018-11-10 NOTE — PROGRESS NOTE ADULT - SUBJECTIVE AND OBJECTIVE BOX
pt seen and examined, no complaints, ROS - .     acetaminophen   Tablet .. 650 milliGRAM(s) Oral every 6 hours PRN  acetaminophen  IVPB .. 1000 milliGRAM(s) IV Intermittent once  aspirin enteric coated 81 milliGRAM(s) Oral daily  ATENolol  Tablet 25 milliGRAM(s) Oral daily  ceFAZolin   IVPB 500 milliGRAM(s) IV Intermittent every 8 hours  docusate sodium 100 milliGRAM(s) Oral daily  fluticasone propionate 50 MICROgram(s)/spray Nasal Spray 1 Spray(s) Both Nostrils daily  heparin  Infusion 1500 Unit(s)/Hr IV Continuous <Continuous>  NIFEdipine XL 60 milliGRAM(s) Oral daily  oxyCODONE    IR 5 milliGRAM(s) Oral every 4 hours PRN  oxyCODONE    IR 10 milliGRAM(s) Oral every 4 hours PRN  pantoprazole    Tablet 40 milliGRAM(s) Oral before breakfast  polyethylene glycol 3350 17 Gram(s) Oral daily  senna 2 Tablet(s) Oral at bedtime  simvastatin 20 milliGRAM(s) Oral at bedtime  tamsulosin 0.4 milliGRAM(s) Oral at bedtime                            10.1   13.2  )-----------( 840      ( 10 Nov 2018 00:34 )             31.6       Hemoglobin: 10.1 g/dL (11-10 @ 00:34)  Hemoglobin: 10.4 g/dL (11-09 @ 06:28)  Hemoglobin: 9.7 g/dL (11-08 @ 06:14)  Hemoglobin: 8.6 g/dL (11-07 @ 07:09)  Hemoglobin: 8.7 g/dL (11-07 @ 07:09)      11-09    136  |  97  |  8   ----------------------------<  119<H>  3.9   |  31  |  0.73    Ca    9.6      09 Nov 2018 06:28  Phos  4.4     11-09  Mg     1.8     11-09      Creatinine Trend: 0.73<--, 0.78<--, 0.75<--, 0.82<--, 0.81<--, 0.95<--    COAGS: PT/INR - ( 09 Nov 2018 19:00 )   PT: 14.3 sec;   INR: 1.25 ratio         PTT - ( 10 Nov 2018 00:34 )  PTT:67.8 sec          T(C): 37.4 (11-10-18 @ 05:12), Max: 37.4 (11-10-18 @ 05:12)  HR: 97 (11-10-18 @ 05:12) (61 - 97)  BP: 124/73 (11-10-18 @ 05:12) (122/62 - 162/74)  RR: 16 (11-10-18 @ 05:12) (16 - 18)  SpO2: 94% (11-10-18 @ 05:12) (92% - 100%)  Wt(kg): --    I&O's Summary    08 Nov 2018 07:01  -  09 Nov 2018 07:00  --------------------------------------------------------  IN: 1172 mL / OUT: 2725 mL / NET: -1553 mL    09 Nov 2018 07:01  -  10 Nov 2018 06:37  --------------------------------------------------------  IN: 585 mL / OUT: 1285 mL / NET: -700 mL        Appearance: Normal	  HEENT:   Normal oral mucosa, PERRL, EOMI	  Lymphatic: No lymphadenopathy , no edema  Cardiovascular: irregular irregular  S1 S2, No JVD, No murmurs , Peripheral pulses palpable 2+ bilaterally  Respiratory: Lungs clear to auscultation, normal effort 	  Gastrointestinal:  Soft, Non-tender, + BS	  Skin: No rashes, No ecchymoses, No cyanosis, warm to touch  Musculoskeletal: Normal range of motion, normal strength, RLE with vac in place  Psychiatry:  Mood & affect appropriate    TELEMETRY: 	  none  ECG:  	< from: 12 Lead ECG (10.25.18 @ 14:08) >  Diagnosis Line ATRIAL FIBRILLATION  PROLONGED QT  ABNORMAL ECG    < end of copied text >      ASSESSMENT/PLAN: 	73y Male  history of Afib previously on Coumadin secondary to spine surgery 10/19/18, HTN, CVA, MOE admitted with RLE arterial occlusion now s/p embolectomy of right AT and peroneal artery. RLE fasciotomies , unable to close ,  s/p VAC placement pending OR on Friday for RLE fasciotomy closure:   s/p pee fasciotomy debrided and wound cleaned, s/p skin grafts , vac applies     - cont BB  - ASA, statin   - plastics follow up   - HTN - controlled with BB/procardia/chlorthalidone  - A/C with heparin gtt at present   - GI / DVT prophylaxis,  keep K>4, mag >2.0   - VAC and  wound care per sx  - HD stable no CHF  - f/u with Dr Bynum upon dc for cardiology

## 2018-11-10 NOTE — PROGRESS NOTE ADULT - ASSESSMENT
73M with recent spine surgery s/p RLE embolectomy for acute RLE arterial occlusion and RLE fasciotomies now s/p split thickness skin graft and placement of wound vac over closure to RLE.     -continue vac - will take down 11/15 if in hospital  -bed rest for 48 hours after surgery  -AFO boot on at all times  -continue donor site dressing  -care per primary team    Plastic surgery  [phone]

## 2018-11-10 NOTE — PROGRESS NOTE ADULT - SUBJECTIVE AND OBJECTIVE BOX
Surgery Progress Note     Subjective/24hour Events:   Patient seen and examined.   Yesterday to OR with plastics for fasciotomy closure with skin graft wound vac.   No acute events overnight.   Pain well controlled.     Vital Signs:  Vital Signs Last 24 Hrs  T(C): 37.4 (10 Nov 2018 05:12), Max: 37.4 (10 Nov 2018 05:12)  T(F): 99.4 (10 Nov 2018 05:12), Max: 99.4 (10 Nov 2018 05:12)  HR: 97 (10 Nov 2018 05:12) (61 - 97)  BP: 124/73 (10 Nov 2018 05:12) (122/62 - 162/74)  BP(mean): 102 (09 Nov 2018 14:45) (98 - 116)  RR: 16 (10 Nov 2018 05:12) (16 - 18)  SpO2: 94% (10 Nov 2018 05:12) (92% - 100%)    CAPILLARY BLOOD GLUCOSE          I&O's Detail    09 Nov 2018 07:01  -  10 Nov 2018 07:00  --------------------------------------------------------  IN:    heparin Infusion: 180 mL    Oral Fluid: 420 mL  Total IN: 600 mL    OUT:    Voided: 1285 mL  Total OUT: 1285 mL    Total NET: -685 mL          MEDICATIONS  (STANDING):  acetaminophen  IVPB .. 1000 milliGRAM(s) IV Intermittent once  aspirin enteric coated 81 milliGRAM(s) Oral daily  ATENolol  Tablet 25 milliGRAM(s) Oral daily  ceFAZolin   IVPB 500 milliGRAM(s) IV Intermittent every 8 hours  docusate sodium 100 milliGRAM(s) Oral daily  fluticasone propionate 50 MICROgram(s)/spray Nasal Spray 1 Spray(s) Both Nostrils daily  heparin  Infusion 1500 Unit(s)/Hr (15 mL/Hr) IV Continuous <Continuous>  NIFEdipine XL 60 milliGRAM(s) Oral daily  pantoprazole    Tablet 40 milliGRAM(s) Oral before breakfast  polyethylene glycol 3350 17 Gram(s) Oral daily  senna 2 Tablet(s) Oral at bedtime  simvastatin 20 milliGRAM(s) Oral at bedtime  tamsulosin 0.4 milliGRAM(s) Oral at bedtime    MEDICATIONS  (PRN):  acetaminophen   Tablet .. 650 milliGRAM(s) Oral every 6 hours PRN Mild Pain (1 - 3)  oxyCODONE    IR 5 milliGRAM(s) Oral every 4 hours PRN Moderate Pain (4 - 6)  oxyCODONE    IR 10 milliGRAM(s) Oral every 4 hours PRN Severe Pain (7 - 10)      Physical Exam:  Gen: NAD, laying comfortably in bed, converses easily.   Lungs: Non labored breathing.   Ext: RLE with wound vac to suction, donor site dressing intact with some sanguinous saturation.    Labs:    11-10    135  |  95<L>  |  10  ----------------------------<  113<H>  3.9   |  28  |  0.77    Ca    9.2      10 Nov 2018 07:04  Phos  3.2     11-10  Mg     1.9     11-10                              11.0   14.1  )-----------( 817      ( 10 Nov 2018 07:09 )             32.3     PT/INR - ( 09 Nov 2018 19:00 )   PT: 14.3 sec;   INR: 1.25 ratio         PTT - ( 10 Nov 2018 07:09 )  PTT:52.8 sec    Imaging:

## 2018-11-10 NOTE — PROGRESS NOTE ADULT - SUBJECTIVE AND OBJECTIVE BOX
S/P STSG RLE  POD1  Doing well  Donor site as expected  VAC in place no leak      Ok to resume anticoagulation  Leg elevation  OK to be OOB to chair and stand to pivot  NO ambulation 2 days  Abx 48 hrs  VAC off on Thursday/Friday

## 2018-11-10 NOTE — PROGRESS NOTE ADULT - ASSESSMENT
ASSESSMENT: 73M with recent spine surgery now s/p RLE embolectomy for acute RLE arterial occlusion now s/p RLE fasciotomies, split thickness skin graft and placement of wound vac over closure to RLE.     PLAN:  -Appreciate plastics input: continue bed rest, abx, vac, donor dressing.   -Heparin gtt, will start bridge to coumadin.   -Pain control-- Reg diet  -OOB with PT  -Elevate RLE, heel splint on at all times.   -PT recommending [facility]    Vascular Surgery Pager #[phone]

## 2018-11-11 LAB
ANION GAP SERPL CALC-SCNC: 12 MMOL/L — SIGNIFICANT CHANGE UP (ref 5–17)
APTT BLD: 76.2 SEC — HIGH (ref 27.5–36.3)
BUN SERPL-MCNC: 13 MG/DL — SIGNIFICANT CHANGE UP (ref 7–23)
CALCIUM SERPL-MCNC: 9.1 MG/DL — SIGNIFICANT CHANGE UP (ref 8.4–10.5)
CHLORIDE SERPL-SCNC: 96 MMOL/L — SIGNIFICANT CHANGE UP (ref 96–108)
CO2 SERPL-SCNC: 27 MMOL/L — SIGNIFICANT CHANGE UP (ref 22–31)
CREAT SERPL-MCNC: 0.77 MG/DL — SIGNIFICANT CHANGE UP (ref 0.5–1.3)
GLUCOSE SERPL-MCNC: 97 MG/DL — SIGNIFICANT CHANGE UP (ref 70–99)
HCT VFR BLD CALC: 32.6 % — LOW (ref 39–50)
HGB BLD-MCNC: 10.4 G/DL — LOW (ref 13–17)
INR BLD: 1.24 RATIO — HIGH (ref 0.88–1.16)
MAGNESIUM SERPL-MCNC: 1.9 MG/DL — SIGNIFICANT CHANGE UP (ref 1.6–2.6)
MCHC RBC-ENTMCNC: 31.4 PG — SIGNIFICANT CHANGE UP (ref 27–34)
MCHC RBC-ENTMCNC: 31.9 GM/DL — LOW (ref 32–36)
MCV RBC AUTO: 98.4 FL — SIGNIFICANT CHANGE UP (ref 80–100)
PHOSPHATE SERPL-MCNC: 3.2 MG/DL — SIGNIFICANT CHANGE UP (ref 2.5–4.5)
PLATELET # BLD AUTO: 832 K/UL — HIGH (ref 150–400)
POTASSIUM SERPL-MCNC: 4 MMOL/L — SIGNIFICANT CHANGE UP (ref 3.5–5.3)
POTASSIUM SERPL-SCNC: 4 MMOL/L — SIGNIFICANT CHANGE UP (ref 3.5–5.3)
PROTHROM AB SERPL-ACNC: 14.3 SEC — HIGH (ref 10–12.9)
RBC # BLD: 3.31 M/UL — LOW (ref 4.2–5.8)
RBC # FLD: 14.5 % — SIGNIFICANT CHANGE UP (ref 10.3–14.5)
SODIUM SERPL-SCNC: 135 MMOL/L — SIGNIFICANT CHANGE UP (ref 135–145)
WBC # BLD: 13.3 K/UL — HIGH (ref 3.8–10.5)
WBC # FLD AUTO: 13.3 K/UL — HIGH (ref 3.8–10.5)

## 2018-11-11 PROCEDURE — C1713: CPT

## 2018-11-11 PROCEDURE — C1769: CPT

## 2018-11-11 PROCEDURE — C1889: CPT

## 2018-11-11 PROCEDURE — 72082 X-RAY EXAM ENTIRE SPI 2/3 VW: CPT

## 2018-11-11 PROCEDURE — 76000 FLUOROSCOPY <1 HR PHYS/QHP: CPT

## 2018-11-11 PROCEDURE — 85027 COMPLETE CBC AUTOMATED: CPT

## 2018-11-11 PROCEDURE — 97116 GAIT TRAINING THERAPY: CPT

## 2018-11-11 PROCEDURE — 80048 BASIC METABOLIC PNL TOTAL CA: CPT

## 2018-11-11 PROCEDURE — 97161 PT EVAL LOW COMPLEX 20 MIN: CPT

## 2018-11-11 PROCEDURE — 71045 X-RAY EXAM CHEST 1 VIEW: CPT

## 2018-11-11 PROCEDURE — 85610 PROTHROMBIN TIME: CPT

## 2018-11-11 PROCEDURE — 94640 AIRWAY INHALATION TREATMENT: CPT

## 2018-11-11 RX ORDER — WARFARIN SODIUM 2.5 MG/1
2.5 TABLET ORAL AT BEDTIME
Qty: 0 | Refills: 0 | Status: DISCONTINUED | OUTPATIENT
Start: 2018-11-11 | End: 2018-11-12

## 2018-11-11 RX ADMIN — Medication 60 MILLIGRAM(S): at 05:36

## 2018-11-11 RX ADMIN — TAMSULOSIN HYDROCHLORIDE 0.4 MILLIGRAM(S): 0.4 CAPSULE ORAL at 21:28

## 2018-11-11 RX ADMIN — Medication 100 MILLIGRAM(S): at 12:34

## 2018-11-11 RX ADMIN — SIMVASTATIN 20 MILLIGRAM(S): 20 TABLET, FILM COATED ORAL at 21:28

## 2018-11-11 RX ADMIN — PANTOPRAZOLE SODIUM 40 MILLIGRAM(S): 20 TABLET, DELAYED RELEASE ORAL at 05:36

## 2018-11-11 RX ADMIN — OXYCODONE HYDROCHLORIDE 5 MILLIGRAM(S): 5 TABLET ORAL at 18:46

## 2018-11-11 RX ADMIN — SENNA PLUS 2 TABLET(S): 8.6 TABLET ORAL at 21:28

## 2018-11-11 RX ADMIN — POLYETHYLENE GLYCOL 3350 17 GRAM(S): 17 POWDER, FOR SOLUTION ORAL at 12:25

## 2018-11-11 RX ADMIN — Medication 100 MILLIGRAM(S): at 05:36

## 2018-11-11 RX ADMIN — ATENOLOL 25 MILLIGRAM(S): 25 TABLET ORAL at 05:36

## 2018-11-11 RX ADMIN — OXYCODONE HYDROCHLORIDE 5 MILLIGRAM(S): 5 TABLET ORAL at 05:39

## 2018-11-11 RX ADMIN — WARFARIN SODIUM 2.5 MILLIGRAM(S): 2.5 TABLET ORAL at 21:28

## 2018-11-11 RX ADMIN — Medication 100 MILLIGRAM(S): at 12:25

## 2018-11-11 RX ADMIN — HEPARIN SODIUM 15 UNIT(S)/HR: 5000 INJECTION INTRAVENOUS; SUBCUTANEOUS at 08:06

## 2018-11-11 RX ADMIN — OXYCODONE HYDROCHLORIDE 5 MILLIGRAM(S): 5 TABLET ORAL at 14:24

## 2018-11-11 RX ADMIN — Medication 81 MILLIGRAM(S): at 12:25

## 2018-11-11 RX ADMIN — OXYCODONE HYDROCHLORIDE 5 MILLIGRAM(S): 5 TABLET ORAL at 15:15

## 2018-11-11 NOTE — PROGRESS NOTE ADULT - SUBJECTIVE AND OBJECTIVE BOX
PLASTIC SURGERY DAILY PROGRESS NOTE:     Subjective:  Pt seen and examined at bedside.     Objective:  NAD  RLE wrapped, vac in place to suction, donor site dressing c/d/i    MEDICATIONS  (STANDING):  acetaminophen  IVPB .. 1000 milliGRAM(s) IV Intermittent once  aspirin enteric coated 81 milliGRAM(s) Oral daily  ATENolol  Tablet 25 milliGRAM(s) Oral daily  ceFAZolin   IVPB 500 milliGRAM(s) IV Intermittent every 8 hours  docusate sodium 100 milliGRAM(s) Oral daily  fluticasone propionate 50 MICROgram(s)/spray Nasal Spray 1 Spray(s) Both Nostrils daily  heparin  Infusion 1500 Unit(s)/Hr (15 mL/Hr) IV Continuous <Continuous>  NIFEdipine XL 60 milliGRAM(s) Oral daily  pantoprazole    Tablet 40 milliGRAM(s) Oral before breakfast  polyethylene glycol 3350 17 Gram(s) Oral daily  senna 2 Tablet(s) Oral at bedtime  simvastatin 20 milliGRAM(s) Oral at bedtime  tamsulosin 0.4 milliGRAM(s) Oral at bedtime    MEDICATIONS  (PRN):  acetaminophen   Tablet .. 650 milliGRAM(s) Oral every 6 hours PRN Mild Pain (1 - 3)  oxyCODONE    IR 5 milliGRAM(s) Oral every 4 hours PRN Moderate Pain (4 - 6)  oxyCODONE    IR 10 milliGRAM(s) Oral every 4 hours PRN Severe Pain (7 - 10)      T(C): 37.1 (11-11-18 @ 05:31), Max: 37.3 (11-11-18 @ 01:18)  HR: 69 (11-11-18 @ 05:31) (69 - 93)  BP: 151/61 (11-11-18 @ 05:31) (120/79 - 151/61)  RR: 19 (11-11-18 @ 05:31) (18 - 19)  SpO2: 97% (11-11-18 @ 05:31) (93% - 98%)  Wt(kg): --  11-10 @ 07:01  -  11-11 @ 07:00  --------------------------------------------------------  IN:    Oral Fluid: 595 mL  Total IN: 595 mL    OUT:    Voided: 1500 mL  Total OUT: 1500 mL    Total NET: -905 mL          .  LABS:                         10.4   13.3  )-----------( 832      ( 11 Nov 2018 07:05 )             32.6     11-11    135  |  96  |  13  ----------------------------<  97  4.0   |  27  |  0.77    Ca    9.1      11 Nov 2018 07:05  Phos  3.2     11-11  Mg     1.9     11-11      PT/INR - ( 11 Nov 2018 07:15 )   PT: 14.3 sec;   INR: 1.24 ratio         PTT - ( 11 Nov 2018 07:15 )  PTT:76.2 sec          RADIOLOGY, EKG & ADDITIONAL TESTS: Reviewed.

## 2018-11-11 NOTE — PROGRESS NOTE ADULT - SUBJECTIVE AND OBJECTIVE BOX
Surgery Progress Note     Subjective/24hour Events:   Patient seen and examined.   No acute events overnight.   Pain well controlled.     Vital Signs:  Vital Signs Last 24 Hrs  T(C): 37.1 (11 Nov 2018 05:31), Max: 37.3 (11 Nov 2018 01:18)  T(F): 98.8 (11 Nov 2018 05:31), Max: 99.1 (11 Nov 2018 01:18)  HR: 69 (11 Nov 2018 05:31) (69 - 93)  BP: 151/61 (11 Nov 2018 05:31) (120/79 - 151/61)  BP(mean): --  RR: 19 (11 Nov 2018 05:31) (18 - 19)  SpO2: 97% (11 Nov 2018 05:31) (93% - 98%)    CAPILLARY BLOOD GLUCOSE          I&O's Detail    10 Nov 2018 07:01  -  11 Nov 2018 07:00  --------------------------------------------------------  IN:    Oral Fluid: 595 mL  Total IN: 595 mL    OUT:    Voided: 1500 mL  Total OUT: 1500 mL    Total NET: -905 mL          MEDICATIONS  (STANDING):  acetaminophen  IVPB .. 1000 milliGRAM(s) IV Intermittent once  aspirin enteric coated 81 milliGRAM(s) Oral daily  ATENolol  Tablet 25 milliGRAM(s) Oral daily  ceFAZolin   IVPB 500 milliGRAM(s) IV Intermittent every 8 hours  docusate sodium 100 milliGRAM(s) Oral daily  fluticasone propionate 50 MICROgram(s)/spray Nasal Spray 1 Spray(s) Both Nostrils daily  heparin  Infusion 1500 Unit(s)/Hr (15 mL/Hr) IV Continuous <Continuous>  NIFEdipine XL 60 milliGRAM(s) Oral daily  pantoprazole    Tablet 40 milliGRAM(s) Oral before breakfast  polyethylene glycol 3350 17 Gram(s) Oral daily  senna 2 Tablet(s) Oral at bedtime  simvastatin 20 milliGRAM(s) Oral at bedtime  tamsulosin 0.4 milliGRAM(s) Oral at bedtime  warfarin 2.5 milliGRAM(s) Oral at bedtime    MEDICATIONS  (PRN):  acetaminophen   Tablet .. 650 milliGRAM(s) Oral every 6 hours PRN Mild Pain (1 - 3)  oxyCODONE    IR 5 milliGRAM(s) Oral every 4 hours PRN Moderate Pain (4 - 6)  oxyCODONE    IR 10 milliGRAM(s) Oral every 4 hours PRN Severe Pain (7 - 10)  sodium biphosphate Rectal Enema 1 Enema Rectal once PRN if unable to go after suppository, thanks      Physical Exam:  Gen: NAD, laying comfortably in bed, converses easily.   Lungs: Non labored breathing.   Ext: RLE with wound vac to suction, donor site dressing intact.    Labs:    11-11    135  |  96  |  13  ----------------------------<  97  4.0   |  27  |  0.77    Ca    9.1      11 Nov 2018 07:05  Phos  3.2     11-11  Mg     1.9     11-11                              10.4   13.3  )-----------( 832      ( 11 Nov 2018 07:05 )             32.6     PT/INR - ( 11 Nov 2018 07:15 )   PT: 14.3 sec;   INR: 1.24 ratio         PTT - ( 11 Nov 2018 07:15 )  PTT:76.2 sec    Imaging:

## 2018-11-11 NOTE — PROGRESS NOTE ADULT - ASSESSMENT
73M with recent spine surgery s/p RLE embolectomy for acute RLE arterial occlusion and RLE fasciotomies now s/p split thickness skin graft and placement of wound vac over closure to RLE.     -continue vac - will take down 11/15 if in hospital  -bed rest for 48 hours after surgery  -AFO boot on at all times  -continue donor site dressing  -care per primary team    Plastic surgery  [phone]

## 2018-11-11 NOTE — PROGRESS NOTE ADULT - ASSESSMENT
ASSESSMENT: 73M with recent spine surgery now s/p RLE embolectomy for acute RLE arterial occlusion now s/p RLE fasciotomies, split thickness skin graft and placement of wound vac over closure to RLE.     PLAN:  -Appreciate plastics input: may get OOB to chair, off abx, continue donor dressing.   -Heparin gtt, on coumadin bridge: f/u INR.   -Pain control  -Reg diet  -OOB with PT  -Elevate RLE, heel splint on at all times.   -PT recommending [facility]    Vascular Surgery Pager #[phone]

## 2018-11-11 NOTE — PROGRESS NOTE ADULT - SUBJECTIVE AND OBJECTIVE BOX
pt seen and examined, no complaints, ROS - .     acetaminophen   Tablet .. 650 milliGRAM(s) Oral every 6 hours PRN  acetaminophen  IVPB .. 1000 milliGRAM(s) IV Intermittent once  aspirin enteric coated 81 milliGRAM(s) Oral daily  ATENolol  Tablet 25 milliGRAM(s) Oral daily  ceFAZolin   IVPB 500 milliGRAM(s) IV Intermittent every 8 hours  docusate sodium 100 milliGRAM(s) Oral daily  fluticasone propionate 50 MICROgram(s)/spray Nasal Spray 1 Spray(s) Both Nostrils daily  heparin  Infusion 1500 Unit(s)/Hr IV Continuous <Continuous>  NIFEdipine XL 60 milliGRAM(s) Oral daily  oxyCODONE    IR 5 milliGRAM(s) Oral every 4 hours PRN  oxyCODONE    IR 10 milliGRAM(s) Oral every 4 hours PRN  pantoprazole    Tablet 40 milliGRAM(s) Oral before breakfast  polyethylene glycol 3350 17 Gram(s) Oral daily  senna 2 Tablet(s) Oral at bedtime  simvastatin 20 milliGRAM(s) Oral at bedtime  sodium biphosphate Rectal Enema 1 Enema Rectal once PRN  tamsulosin 0.4 milliGRAM(s) Oral at bedtime                            11.0   14.1  )-----------( 817      ( 10 Nov 2018 07:09 )             32.3       Hemoglobin: 11.0 g/dL (11-10 @ 07:09)  Hemoglobin: 10.1 g/dL (11-10 @ 00:34)  Hemoglobin: 10.4 g/dL (11-09 @ 06:28)  Hemoglobin: 9.7 g/dL (11-08 @ 06:14)  Hemoglobin: 8.6 g/dL (11-07 @ 07:09)      11-10    135  |  95<L>  |  10  ----------------------------<  113<H>  3.9   |  28  |  0.77    Ca    9.2      10 Nov 2018 07:04  Phos  3.2     11-10  Mg     1.9     11-10      Creatinine Trend: 0.77<--, 0.73<--, 0.78<--, 0.75<--, 0.82<--, 0.81<--    COAGS:           T(C): 37.3 (11-11-18 @ 01:18), Max: 37.4 (11-10-18 @ 05:12)  HR: 91 (11-11-18 @ 01:18) (73 - 97)  BP: 149/76 (11-11-18 @ 01:18) (120/79 - 149/76)  RR: 19 (11-11-18 @ 01:18) (16 - 19)  SpO2: 98% (11-11-18 @ 01:18) (93% - 98%)  Wt(kg): --    I&O's Summary    09 Nov 2018 07:01  -  10 Nov 2018 07:00  --------------------------------------------------------  IN: 600 mL / OUT: 1285 mL / NET: -685 mL    10 Nov 2018 07:01  -  11 Nov 2018 04:56  --------------------------------------------------------  IN: 595 mL / OUT: 900 mL / NET: -305 mL      Appearance: Normal	  HEENT:   Normal oral mucosa, PERRL, EOMI	  Lymphatic: No lymphadenopathy , no edema  Cardiovascular: irregular irregular  S1 S2, No JVD, No murmurs , Peripheral pulses palpable 2+ bilaterally  Respiratory: Lungs clear to auscultation, normal effort 	  Gastrointestinal:  Soft, Non-tender, + BS	  Skin: No rashes, No ecchymoses, No cyanosis, warm to touch  Musculoskeletal: Normal range of motion, normal strength, RLE with vac in place  Psychiatry:  Mood & affect appropriate    TELEMETRY: 	  none  ECG:  	< from: 12 Lead ECG (10.25.18 @ 14:08) >  Diagnosis Line ATRIAL FIBRILLATION  PROLONGED QT  ABNORMAL ECG    < end of copied text >      ASSESSMENT/PLAN: 	73y Male  history of Afib previously on Coumadin secondary to spine surgery 10/19/18, HTN, CVA, MOE admitted with RLE arterial occlusion now s/p embolectomy of right AT and peroneal artery. RLE fasciotomies , unable to close ,  s/p VAC placement pending OR on Friday for RLE fasciotomy closure:   s/p pee fasciotomy debrided and wound cleaned, s/p skin grafts , vac applies     - cont BB, ASA, statin   - plastics follow up   - HTN - controlled with BB/procardia/chlorthalidone  - A/C with heparin gtt at present   - GI / DVT prophylaxis,  keep K>4, mag >2.0   - VAC and  wound care per sx  - HD stable no CHF  - f/u with Dr Bynum upon dc for cardiology   D/W Dr George

## 2018-11-12 ENCOUNTER — TRANSCRIPTION ENCOUNTER (OUTPATIENT)
Age: 73
End: 2018-11-12

## 2018-11-12 LAB
ANION GAP SERPL CALC-SCNC: 9 MMOL/L — SIGNIFICANT CHANGE UP (ref 5–17)
APTT BLD: 164.2 SEC — CRITICAL HIGH (ref 27.5–36.3)
APTT BLD: 37.4 SEC — HIGH (ref 27.5–36.3)
BUN SERPL-MCNC: 12 MG/DL — SIGNIFICANT CHANGE UP (ref 7–23)
CALCIUM SERPL-MCNC: 9.4 MG/DL — SIGNIFICANT CHANGE UP (ref 8.4–10.5)
CHLORIDE SERPL-SCNC: 98 MMOL/L — SIGNIFICANT CHANGE UP (ref 96–108)
CO2 SERPL-SCNC: 29 MMOL/L — SIGNIFICANT CHANGE UP (ref 22–31)
CREAT SERPL-MCNC: 0.73 MG/DL — SIGNIFICANT CHANGE UP (ref 0.5–1.3)
GLUCOSE SERPL-MCNC: 114 MG/DL — HIGH (ref 70–99)
HCT VFR BLD CALC: 33.1 % — LOW (ref 39–50)
HGB BLD-MCNC: 10.6 G/DL — LOW (ref 13–17)
INR BLD: 2.29 RATIO — HIGH (ref 0.88–1.16)
INR BLD: 2.62 RATIO — HIGH (ref 0.88–1.16)
MAGNESIUM SERPL-MCNC: 2 MG/DL — SIGNIFICANT CHANGE UP (ref 1.6–2.6)
MCHC RBC-ENTMCNC: 31.4 PG — SIGNIFICANT CHANGE UP (ref 27–34)
MCHC RBC-ENTMCNC: 31.9 GM/DL — LOW (ref 32–36)
MCV RBC AUTO: 98.5 FL — SIGNIFICANT CHANGE UP (ref 80–100)
PHOSPHATE SERPL-MCNC: 3.4 MG/DL — SIGNIFICANT CHANGE UP (ref 2.5–4.5)
PLATELET # BLD AUTO: 811 K/UL — HIGH (ref 150–400)
POTASSIUM SERPL-MCNC: 4.2 MMOL/L — SIGNIFICANT CHANGE UP (ref 3.5–5.3)
POTASSIUM SERPL-SCNC: 4.2 MMOL/L — SIGNIFICANT CHANGE UP (ref 3.5–5.3)
PROTHROM AB SERPL-ACNC: 26.8 SEC — HIGH (ref 10–12.9)
PROTHROM AB SERPL-ACNC: 31.1 SEC — HIGH (ref 10–12.9)
RBC # BLD: 3.36 M/UL — LOW (ref 4.2–5.8)
RBC # FLD: 14.8 % — HIGH (ref 10.3–14.5)
SODIUM SERPL-SCNC: 136 MMOL/L — SIGNIFICANT CHANGE UP (ref 135–145)
WBC # BLD: 12 K/UL — HIGH (ref 3.8–10.5)
WBC # FLD AUTO: 12 K/UL — HIGH (ref 3.8–10.5)

## 2018-11-12 RX ORDER — WARFARIN SODIUM 2.5 MG/1
2 TABLET ORAL ONCE
Qty: 0 | Refills: 0 | Status: DISCONTINUED | OUTPATIENT
Start: 2018-11-12 | End: 2018-11-12

## 2018-11-12 RX ORDER — WARFARIN SODIUM 2.5 MG/1
1 TABLET ORAL ONCE
Qty: 0 | Refills: 0 | Status: DISCONTINUED | OUTPATIENT
Start: 2018-11-12 | End: 2018-11-12

## 2018-11-12 RX ORDER — WARFARIN SODIUM 2.5 MG/1
1 TABLET ORAL ONCE
Qty: 0 | Refills: 0 | Status: COMPLETED | OUTPATIENT
Start: 2018-11-12 | End: 2018-11-12

## 2018-11-12 RX ADMIN — POLYETHYLENE GLYCOL 3350 17 GRAM(S): 17 POWDER, FOR SOLUTION ORAL at 11:58

## 2018-11-12 RX ADMIN — OXYCODONE HYDROCHLORIDE 5 MILLIGRAM(S): 5 TABLET ORAL at 11:30

## 2018-11-12 RX ADMIN — Medication 81 MILLIGRAM(S): at 11:58

## 2018-11-12 RX ADMIN — Medication 1 SPRAY(S): at 11:58

## 2018-11-12 RX ADMIN — ATENOLOL 25 MILLIGRAM(S): 25 TABLET ORAL at 05:28

## 2018-11-12 RX ADMIN — OXYCODONE HYDROCHLORIDE 5 MILLIGRAM(S): 5 TABLET ORAL at 16:45

## 2018-11-12 RX ADMIN — WARFARIN SODIUM 1 MILLIGRAM(S): 2.5 TABLET ORAL at 23:06

## 2018-11-12 RX ADMIN — OXYCODONE HYDROCHLORIDE 5 MILLIGRAM(S): 5 TABLET ORAL at 21:26

## 2018-11-12 RX ADMIN — PANTOPRAZOLE SODIUM 40 MILLIGRAM(S): 20 TABLET, DELAYED RELEASE ORAL at 05:30

## 2018-11-12 RX ADMIN — SIMVASTATIN 20 MILLIGRAM(S): 20 TABLET, FILM COATED ORAL at 20:57

## 2018-11-12 RX ADMIN — TAMSULOSIN HYDROCHLORIDE 0.4 MILLIGRAM(S): 0.4 CAPSULE ORAL at 20:55

## 2018-11-12 RX ADMIN — Medication 100 MILLIGRAM(S): at 11:58

## 2018-11-12 RX ADMIN — Medication 60 MILLIGRAM(S): at 05:28

## 2018-11-12 RX ADMIN — OXYCODONE HYDROCHLORIDE 5 MILLIGRAM(S): 5 TABLET ORAL at 20:56

## 2018-11-12 RX ADMIN — SENNA PLUS 2 TABLET(S): 8.6 TABLET ORAL at 20:55

## 2018-11-12 RX ADMIN — OXYCODONE HYDROCHLORIDE 5 MILLIGRAM(S): 5 TABLET ORAL at 15:32

## 2018-11-12 RX ADMIN — OXYCODONE HYDROCHLORIDE 5 MILLIGRAM(S): 5 TABLET ORAL at 10:22

## 2018-11-12 NOTE — DISCHARGE NOTE ADULT - CARE PROVIDER_API CALL
Denise Cerrato), Surgery; Vascular Surgery  [address] [facility]  [address]  [place] [address]  Phone: [phone]  Fax: [phone]    Lonny Suarez), Plastic Surgery; Surgery; Surgical Critical Care  [address] [place] [address]  Phone: [phone]  Fax: [phone] Denise Cerrato), Surgery; Vascular Surgery  [address] [facility]  [address]  [place] [address]  Phone: [phone]  Fax: [phone]    Lonny Suarez), Plastic Surgery; Surgery; Surgical Critical Care  [address] [place] [address]  Phone: [phone]  Fax: [phone]    Mando Bynum  [address] [place], [place]  [address]  Phone: [phone]  Fax: (   )    - Denise Cerrato), Surgery; Vascular Surgery  [address] [facility]  [address]  [place] [address]  Phone: [phone]  Fax: [phone]    Lonny Suarez), Plastic Surgery; Surgery; Surgical Critical Care  [address] [place] [address]  Phone: [phone]  Fax: [phone]    Mando Bynum  [address], [place]  [address]  Phone: [phone]  Fax: (   )    -    Taz Greene (DO), Neurology; Vascular Neurology  [address] [facility] [address]  [place] [address]  Phone: [phone]  Fax: [phone]

## 2018-11-12 NOTE — PROGRESS NOTE ADULT - ASSESSMENT
73M with recent spine surgery s/p RLE embolectomy for acute RLE arterial occlusion and RLE fasciotomies now s/p split thickness skin graft and placement of wound vac over closure to RLE.     -continue vac - will take down 11/15-11/16 if in hospital  -OK to be OOB to chair and stand to pivot  -AFO boot on at all times  -continue donor site dressing  -care per primary team    Plastic surgery  [phone]

## 2018-11-12 NOTE — DISCHARGE NOTE ADULT - ADDITIONAL INSTRUCTIONS
Please call [phone] to schedule a follow up appointment with your Vascular Surgeon, Dr. Cerrato, in 2 weeks.   Please call [phone] to schedule a follow up with your Plastic Surgeon, Dr. Suarez, on Thursday, November 15th, as discussed.   Please follow up with your cardiologist in 1-2 days, Dr. Bynum, in order to continue managing your levels of your blood thinner, coumadin. Please call [phone] to schedule a follow up appointment with your Vascular Neurologist, Dr. Greene, in 1-2 weeks.   Please call [phone] to schedule a follow up appointment with your Vascular Surgeon, Dr. Cerrato, in 2 weeks.   Please call [phone] to schedule a follow up with your Plastic Surgeon, Dr. Suarez, on Thursday, November 15th, as discussed.   Please follow up with your cardiologist in 1-2 days, Dr. Bynum, in order to continue managing your levels of your blood thinner, coumadin. Please call [phone] to schedule a follow up appointment with your Vascular Neurologist, Dr. Greene, in 1-2 weeks.   Please call [phone] to schedule a follow up appointment with your Vascular Surgeon, Dr. Cerrato, in 2 weeks.   Please call [phone] to schedule a follow up with your Plastic Surgeon, Dr. Suarez, in 7-10 days.   Please follow up with your cardiologist in 1-2 days, Dr. Bynum, in order to continue managing your levels of your blood thinner, coumadin.

## 2018-11-12 NOTE — DISCHARGE NOTE ADULT - MEDICATION SUMMARY - MEDICATIONS TO TAKE
I will START or STAY ON the medications listed below when I get home from the hospital:    oxyCODONE-acetaminophen 5 mg-325 mg oral tablet  -- 1-2 tab(s) by mouth every 4 hours, As needed, Moderate to Severe Pain MDD:6 tabs  -- Indication: For Pain    aspirin 81 mg oral delayed release tablet  -- 1 tab(s) by mouth once a day  -- Indication: For CAD    tamsulosin 0.4 mg oral capsule  -- 1 cap(s) by mouth once a day (at bedtime)  -- Indication: For BPH    warfarin 2 mg oral tablet  -- 1 tab(s) by mouth once a day.  INR Goal 2.5-3.5.  -- Indication: For Anticoagulant    simvastatin 20 mg oral tablet  -- 1 tab(s) by mouth once a day (at bedtime)  -- Indication: For Stroke Prevention    atenolol 25 mg oral tablet  -- 1 tab(s) by mouth once a day  -- Indication: For HTN    NIFEdipine 60 mg oral tablet, extended release  -- 1 tab(s) by mouth once a day  -- Indication: For HTN    docusate sodium 100 mg oral capsule  -- 1 cap(s) by mouth once a day  -- Indication: For Constipation    polyethylene glycol 3350 oral powder for reconstitution  -- 17 gram(s) by mouth once a day  -- Indication: For Constipation    senna oral tablet  -- 2 tab(s) by mouth once a day (at bedtime)  -- Indication: For Constipation    fluticasone 50 mcg/inh nasal spray  -- 1 spray(s) into nose once a day  -- Indication: For Allergies    pantoprazole 40 mg oral delayed release tablet  -- 1 tab(s) by mouth once a day (before a meal)  -- Indication: For GERD

## 2018-11-12 NOTE — PROGRESS NOTE ADULT - SUBJECTIVE AND OBJECTIVE BOX
pt seen and examined, no complaints, ROS - .     acetaminophen   Tablet .. 650 milliGRAM(s) Oral every 6 hours PRN  acetaminophen  IVPB .. 1000 milliGRAM(s) IV Intermittent once  aspirin enteric coated 81 milliGRAM(s) Oral daily  ATENolol  Tablet 25 milliGRAM(s) Oral daily  docusate sodium 100 milliGRAM(s) Oral daily  fluticasone propionate 50 MICROgram(s)/spray Nasal Spray 1 Spray(s) Both Nostrils daily  heparin  Infusion 1500 Unit(s)/Hr IV Continuous <Continuous>  NIFEdipine XL 60 milliGRAM(s) Oral daily  oxyCODONE    IR 5 milliGRAM(s) Oral every 4 hours PRN  oxyCODONE    IR 10 milliGRAM(s) Oral every 4 hours PRN  pantoprazole    Tablet 40 milliGRAM(s) Oral before breakfast  polyethylene glycol 3350 17 Gram(s) Oral daily  senna 2 Tablet(s) Oral at bedtime  simvastatin 20 milliGRAM(s) Oral at bedtime  sodium biphosphate Rectal Enema 1 Enema Rectal once PRN  tamsulosin 0.4 milliGRAM(s) Oral at bedtime  warfarin 2.5 milliGRAM(s) Oral at bedtime                            10.4   13.3  )-----------( 832      ( 11 Nov 2018 07:05 )             32.6       Hemoglobin: 10.4 g/dL (11-11 @ 07:05)  Hemoglobin: 11.0 g/dL (11-10 @ 07:09)  Hemoglobin: 10.1 g/dL (11-10 @ 00:34)  Hemoglobin: 10.4 g/dL (11-09 @ 06:28)  Hemoglobin: 9.7 g/dL (11-08 @ 06:14)      11-11    135  |  96  |  13  ----------------------------<  97  4.0   |  27  |  0.77    Ca    9.1      11 Nov 2018 07:05  Phos  3.2     11-11  Mg     1.9     11-11      Creatinine Trend: 0.77<--, 0.77<--, 0.73<--, 0.78<--, 0.75<--, 0.82<--    COAGS:           T(C): 37.2 (11-12-18 @ 01:10), Max: 37.2 (11-12-18 @ 01:10)  HR: 75 (11-12-18 @ 01:10) (65 - 80)  BP: 153/73 (11-12-18 @ 01:10) (129/65 - 154/68)  RR: 16 (11-12-18 @ 01:10) (16 - 18)  SpO2: 99% (11-12-18 @ 01:10) (94% - 99%)  Wt(kg): --    I&O's Summary    10 Nov 2018 07:01  -  11 Nov 2018 07:00  --------------------------------------------------------  IN: 608 mL / OUT: 1500 mL / NET: -892 mL    11 Nov 2018 07:01  -  12 Nov 2018 05:47  --------------------------------------------------------  IN: 1208 mL / OUT: 1350 mL / NET: -142 mL      Appearance: Normal	  HEENT:   Normal oral mucosa, PERRL, EOMI	  Lymphatic: No lymphadenopathy , no edema  Cardiovascular: irregular irregular  S1 S2, No JVD, No murmurs , Peripheral pulses palpable 2+ bilaterally  Respiratory: Lungs clear to auscultation, normal effort 	  Gastrointestinal:  Soft, Non-tender, + BS	  Skin: No rashes, No ecchymoses, No cyanosis, warm to touch  Musculoskeletal: Normal range of motion, normal strength, RLE with vac in place  Psychiatry:  Mood & affect appropriate    TELEMETRY: 	  none  ECG:  	< from: 12 Lead ECG (10.25.18 @ 14:08) >  Diagnosis Line ATRIAL FIBRILLATION  PROLONGED QT  ABNORMAL ECG    < end of copied text >      ASSESSMENT/PLAN: 	73y Male  history of Afib previously on Coumadin secondary to spine surgery 10/19/18, HTN, CVA, MOE admitted with RLE arterial occlusion now s/p embolectomy of right AT and peroneal artery. RLE fasciotomies , unable to close ,  s/p VAC placement pending OR on Friday for RLE fasciotomy closure:   s/p pee fasciotomy debrided and wound cleaned, s/p skin grafts , vac applies     - cont BB, ASA, statin   - plastics follow up   - HTN - controlled with BB/procardia/chlorthalidone  - A/C with heparin gtt, bridge to coumadin   - GI / DVT prophylaxis,  keep K>4, mag >2.0   - VAC and  wound care per sx  - HD stable no CHF  - f/u with Dr Bynum upon dc for cardiology   D/W Dr George

## 2018-11-12 NOTE — DISCHARGE NOTE ADULT - PATIENT PORTAL LINK FT
You can access the [facility] Patient Portal, offered by [facility], by registering with the following website: [email or web address]

## 2018-11-12 NOTE — PROGRESS NOTE ADULT - SUBJECTIVE AND OBJECTIVE BOX
Surgery Progress Note     Subjective/24hour Events:   Patient seen and examined.   No acute events overnight.   Pain well controlled.   Has been OOB to chair.     Vital Signs:  Vital Signs Last 24 Hrs  T(C): 37.2 (12 Nov 2018 06:39), Max: 37.2 (12 Nov 2018 01:10)  T(F): 98.9 (12 Nov 2018 06:39), Max: 99 (12 Nov 2018 01:10)  HR: 66 (12 Nov 2018 06:39) (65 - 80)  BP: 132/74 (12 Nov 2018 06:39) (129/65 - 154/68)  BP(mean): --  RR: 20 (12 Nov 2018 06:39) (16 - 20)  SpO2: 95% (12 Nov 2018 06:39) (94% - 99%)    CAPILLARY BLOOD GLUCOSE          I&O's Detail    11 Nov 2018 07:01  -  12 Nov 2018 07:00  --------------------------------------------------------  IN:    heparin Infusion: 78 mL    IV PiggyBack: 50 mL    Oral Fluid: 1320 mL  Total IN: 1448 mL    OUT:    Voided: 2125 mL  Total OUT: 2125 mL    Total NET: -677 mL          MEDICATIONS  (STANDING):  acetaminophen  IVPB .. 1000 milliGRAM(s) IV Intermittent once  aspirin enteric coated 81 milliGRAM(s) Oral daily  ATENolol  Tablet 25 milliGRAM(s) Oral daily  docusate sodium 100 milliGRAM(s) Oral daily  fluticasone propionate 50 MICROgram(s)/spray Nasal Spray 1 Spray(s) Both Nostrils daily  NIFEdipine XL 60 milliGRAM(s) Oral daily  pantoprazole    Tablet 40 milliGRAM(s) Oral before breakfast  polyethylene glycol 3350 17 Gram(s) Oral daily  senna 2 Tablet(s) Oral at bedtime  simvastatin 20 milliGRAM(s) Oral at bedtime  tamsulosin 0.4 milliGRAM(s) Oral at bedtime  warfarin 2.5 milliGRAM(s) Oral at bedtime    MEDICATIONS  (PRN):  acetaminophen   Tablet .. 650 milliGRAM(s) Oral every 6 hours PRN Mild Pain (1 - 3)  oxyCODONE    IR 5 milliGRAM(s) Oral every 4 hours PRN Moderate Pain (4 - 6)  oxyCODONE    IR 10 milliGRAM(s) Oral every 4 hours PRN Severe Pain (7 - 10)  sodium biphosphate Rectal Enema 1 Enema Rectal once PRN if unable to go after suppository, thanks      Physical Exam:  Gen: NAD, laying comfortably in bed, converses easily.   Lungs: Non labored breathing.   Ext: RLE with wound vac to suction, donor site dressing intact.      Labs:    11-12    136  |  98  |  12  ----------------------------<  114<H>  4.2   |  29  |  0.73    Ca    9.4      12 Nov 2018 07:13  Phos  3.4     11-12  Mg     2.0     11-12                              10.6   12.0  )-----------( 811      ( 12 Nov 2018 07:11 )             33.1     PT/INR - ( 12 Nov 2018 07:13 )   PT: 26.8 sec;   INR: 2.29 ratio         PTT - ( 12 Nov 2018 07:13 )  PTT:164.2 sec    Imaging:

## 2018-11-12 NOTE — DISCHARGE NOTE ADULT - PLAN OF CARE
DIET: You may resume your regular diet.  BATHING: Please do not submerge wound underwater or bathe until cleared by both your Vascular Surgeon Dr. Cerrato and your Plastic Surgeon Dr. Suarez.  You may shower and/or sponge bathe.  ACTIVITY: No walking on Right Leg until cleared by your Plastic Surgeon, Dr. Suarez. Otherwise, you may return to your usual level of physical activity. If you are taking narcotic pain medication (such as oxycodone) DO NOT make important decisions.  NOTIFY YOUR SURGEON IF: You have any bleeding that does not stop, any pus draining from your wound(s), any fever (over 100.4 F) or chills, persistent nausea/vomiting, persistent diarrhea, or if your pain is not controlled on your discharge pain medications.  WOUND CARE:  Please keep ACE wrap and dressings on as instructed by your surgeons. Please ask your plastic surgeon Dr. Suarez during your follow up visit on how to continue to dress wounds. Please keep incisions clean and dry. Please do not Scrub or rub incisions. Do not use lotion or powder on incisions. Postoperative recovery Recovery from skin graft surgery WOUND CARE:  Please keep incisions clean and dry. Please do not Scrub or rub incisions. Please do not change the dressing for the skin graft donor site - if leaking you can reinforce with abdominal pads/gauze and tape. This will be taken down at his follow up appointment. Please change his right lower extremity dressing every other day (last changed 11/15). Dress with xeroform, abdominal pads, and an ace bandage to the knee. Please continue wearing your boot.   BATHING: Please do not submerge wound underwater. Please sponge bath, do not get your bandages wet.   ACTIVITY: No heavy lifting or straining. Otherwise, you may return to your usual level of physical activity. If you are taking narcotic pain medication (such as Percocet) DO NOT drive a car, operate machinery or make important decisions.  DIET: Return to your usual diet.  NOTIFY YOUR SURGEON IF: You have any bleeding that does not stop, any pus draining from your wound(s), any fever (over 100.4 F) or chills, tingling, inability to move foot, persistent nausea/vomiting, persistent diarrhea, or if your pain is not controlled on your discharge pain medications.  FOLLOW-UP: Please follow up with your primary care physician in one week regarding your hospitalization. Please follow-up with Dr. Suarez within 7-10 days of discharge. Call the office number as listed below to schedule your appointment. Treatment, Prevention, Rehab. - Continue comadin   - Daily INR to goal of 2.5-3.5  - Rehab  - f/u outpatient neurology in 1-2 wks. Recovery from skin graft surgery, Postoperative recovery WOUND CARE:  Please keep incisions clean and dry. Please do not Scrub or rub incisions. Please do not change the dressing for the skin graft donor site - if leaking you can reinforce with abdominal pads/gauze and tape. This will be taken down at his follow up appointment. Please change his right lower extremity dressing every other day (last changed 11/15). Dress with xeroform, abdominal pads, and an ace bandage to the knee. Please continue wearing your boot.   BATHING: Please do not submerge wound underwater. Please sponge bath, do not get your bandages wet.   ACTIVITY: No heavy lifting or straining. Otherwise, you may return to your usual level of physical activity. If you are taking narcotic pain medication (such as Percocet) DO NOT drive a car, operate machinery or make important decisions.  DIET: Return to your usual diet.  NOTIFY YOUR SURGEON IF: You have any bleeding that does not stop, any pus draining from your wound(s), any fever (over 100.4 F) or chills, tingling, inability to move foot, persistent nausea/vomiting, persistent diarrhea, or if your pain is not controlled on your discharge pain medications.

## 2018-11-12 NOTE — DISCHARGE NOTE ADULT - NS AS DC FOLLOWUP STROKE INST
Stroke (includes: TIA/SAH/ICH/Ischemic Stroke)/Coumadin/Warfarin Smoking Cessation/Stroke (includes: TIA/SAH/ICH/Ischemic Stroke)/Coumadin/Warfarin

## 2018-11-12 NOTE — DISCHARGE NOTE ADULT - HOSPITAL COURSE
Patient is a 73 year old man with PMH of atrial fibrillation, HTN, Hodgkin's lymphoma s/p splenectomy (1975), hip replacement, and MOE presented to the ED on 10/25 with right lower extremity pain and decreased sensation. He recently underwent spine surgery on 10/19 for L3/L4 hardware revision and has been off anticoagulation since 10/18. He takes Coumadin and was bridged with Lovenox. He was discharged on 10/23 on aspirin and has not yet restarted anticoagulation. Of note, he had a CVA in Jan 2018 while off anticoagulation for spine surgery. He was found to have an acute right lower extremity arterial occlusion, for which he was emergently taken to the operating room for embolectomy of his RLE. On POD#2 his course was complicated by RLE compartment syndrome for which he was taken back to the OR for two-incision fasciotomies of his RLE. On POD#4 he was transfused 2u pRBC for downtrending H/h, with good response. His fasciotomies were extended at bedside, post-procedurally was noted to have episodic staring episodes for which an urgent head CT and Neurology consult was called for concern for stroke vs seizure. However, his workup was unremarkable, and his mental status improved. He continued to recover appropriately, and was planned for takeback with Plastic Surgery for closure of fasciotomies on POD#11, however while undergoing anesthesia he became hypoxic to the 70s and thus the procedure could not be completed. His subsequent pulmonary workup was unremarkable, and he was stable for take back on POD#15 with Plastics for split thickness skin graft from R thigh to R fasciotomy sites with application of wound vac. His was bridged from his heparin to coumadin. Physical Therapy evaluated him and deemed his appropriate for a rehab facility. On day of discharge, the patient was achieved therapeutic anticoagulation on warfarin, he was tolerating a normal diet, had pain controlled on PO pain meds, and voiding spontaneously, and with instructions to follow up with his Plastic Surgeon Dr. Suarez on November 15th, with Dr. Cerrato in 2 weeks, and his cardiologist Dr. Bynum for coumadin monitoring. Patient is a 73 year old man with PMH of atrial fibrillation, HTN, Hodgkin's lymphoma s/p splenectomy (1975), hip replacement, and MOE presented to the ED on 10/25 with right lower extremity pain and decreased sensation. He recently underwent spine surgery on 10/19 for L3/L4 hardware revision and has been off anticoagulation since 10/18. He takes Coumadin and was bridged with Lovenox. He was discharged on 10/23 on aspirin and has not yet restarted anticoagulation. Of note, he had a CVA in Jan 2018 while off anticoagulation for spine surgery. He was found to have an acute right lower extremity arterial occlusion, for which he was emergently taken to the operating room for embolectomy of his RLE. On POD#2 his course was complicated by RLE compartment syndrome for which he was taken back to the OR for two-incision fasciotomies of his RLE. On POD#4 he was transfused 2u pRBC for downtrending H/h, with good response. His fasciotomies were extended at bedside, post-procedurally was noted to have episodic staring episodes for which an urgent head CT and Neurology consult was called for concern for stroke vs seizure. However, his workup was unremarkable, and his mental status improved. He continued to recover appropriately, and was planned for takeback with Plastic Surgery for closure of fasciotomies on POD#11, however while undergoing anesthesia he became hypoxic to the 70s and thus the procedure could not be completed. His subsequent pulmonary workup was unremarkable, and he was stable for take back on POD#15 with Plastics for split thickness skin graft from R thigh to R fasciotomy sites with application of wound vac. His was bridged from his heparin to coumadin.     Patient then experienced  L MCA M2 proximal superior division. Patient was not a candidate for tPA as he is on coumadin. Patient taken to IR where TICI 3 recanalization was achieved.     Initial exam  General Exam:   General appearance: No acute distress                 Neurological Exam:  Mental Status: not following commands, not speaking  Cranial Nerves: PERRL, EOMI, R field cut,   CN V1-3 intact to light touch.  R facial droop.    Motor:   Tone: normal.                  Strength: R hemiplegia, LUE/LLE full motor strength  Sensation: intact to light touch    CT Head on presentation: NAD   CTA: CTA HEAD/NECK (11/13/18): Lack of contrast enhancement of the right proximal vertebral artery suspicious for possible occlusion. The mid and distal portions of the   right cervical vertebral arteries enhance, which may be retrograde from   the intracranial vertebrobasilar confluence.  Atherosclerosis of the bilateral carotid bifurcations with approximately   20-30% stenosis of the right internal carotid artery origin by NASCET   criteria. No flow-limiting stenosis on the left.    NIHSS 15  MRS 0   Dysphagia Passed     Pt was admitted to the Stroke Service for further evaluation.     Hospital Course:     Pt continuing Comadin to INR 2.5-3.5 and Atorvastatin 80mg.   A1c was 5.6; LDL 73  TTE: Conclusions:  1. Normal left ventricular internal dimensions and wall  thicknesses.  2. Normal left ventricular systolic function. No segmental  wall motion abnormalities.  3. EF 60%    Etiology of pt’s CVA is likely Cardioembolic    Pt was seen and evaluated by OT/PT/Speech and Swallow, who recommended Acute Rehab	  Speech and Swallow recommended Regular Diet     Pt is currently medically stable for discharge    Discharge Plan:    Please continue all meds as listed above.    Follow-up:   Physical Therapy evaluated him and deemed his appropriate for a rehab facility. On day of discharge, the patient was tolerating a normal diet, had pain controlled on PO pain meds, and voiding spontaneously, and with instructions to follow up with his Vascular Neurologist, Dr. Greene, in 1-2 weeks, Plastic Surgeon Dr. Suarez on November 15th, with Dr. Cerrato in 2 weeks, and his cardiologist Dr. Bynum for coumadin monitoring.

## 2018-11-12 NOTE — PROGRESS NOTE ADULT - ASSESSMENT
ASSESSMENT: 73M with recent spine surgery now s/p RLE embolectomy for acute RLE arterial occlusion now s/p RLE fasciotomies, split thickness skin graft and placement of wound vac over closure to RLE.     PLAN:  - Appreciate plastics input: may get OOB to chair, off abx, continue donor dressing. To follow up with plastics for wound vac take down on Thursday.   - Therapeutic on coumadin with INR of 2.29, will d/c with 2.5 of coumadin.   - Pain control  - Reg diet  - OOB with PT  - Elevate RLE, heel splint on at all times.   - PT recommending DANIEL.  - Dispo: rehab today pending placement.     Vascular Surgery Pager #[phone]

## 2018-11-12 NOTE — DISCHARGE NOTE ADULT - PROVIDER TOKENS
TOKDEANNA:'[id number]',MISAEL:'[id number]' TOKEN:'[id number]',TOKEN:'[id number]',FREE:[LAST:[Misa],FIRST:[Mando],PHONE:[phone]:[(   )    -],ADDRESS:[[address] [place], [place]  [address]]] TOKEN:'[id number]',TOKEN:'[id number]',FREE:[LAST:[Misa],FIRST:[Mando],PHONE:[phone]],FAX:[(   )    -],ADDRESS:[[address] [facility] [address]]],TOKEN:'[id number]'

## 2018-11-12 NOTE — DISCHARGE NOTE ADULT - CARE PLAN
Principal Discharge DX:	Arterial occlusion  Goal:	Postoperative recovery  Assessment and plan of treatment:	DIET: You may resume your regular diet.  BATHING: Please do not submerge wound underwater or bathe until cleared by both your Vascular Surgeon Dr. Cerrato and your Plastic Surgeon Dr. Suarez.  You may shower and/or sponge bathe.  ACTIVITY: No walking on Right Leg until cleared by your Plastic Surgeon, Dr. Suarez. Otherwise, you may return to your usual level of physical activity. If you are taking narcotic pain medication (such as oxycodone) DO NOT make important decisions.  NOTIFY YOUR SURGEON IF: You have any bleeding that does not stop, any pus draining from your wound(s), any fever (over 100.4 F) or chills, persistent nausea/vomiting, persistent diarrhea, or if your pain is not controlled on your discharge pain medications.  WOUND CARE:  Please keep ACE wrap and dressings on as instructed by your surgeons. Please ask your plastic surgeon Dr. Suarez during your follow up visit on how to continue to dress wounds. Please keep incisions clean and dry. Please do not Scrub or rub incisions. Do not use lotion or powder on incisions. Principal Discharge DX:	Arterial occlusion  Goal:	Postoperative recovery  Assessment and plan of treatment:	DIET: You may resume your regular diet.  BATHING: Please do not submerge wound underwater or bathe until cleared by both your Vascular Surgeon Dr. Cerrato and your Plastic Surgeon Dr. Suarez.  You may shower and/or sponge bathe.  ACTIVITY: No walking on Right Leg until cleared by your Plastic Surgeon, Dr. Suarez. Otherwise, you may return to your usual level of physical activity. If you are taking narcotic pain medication (such as oxycodone) DO NOT make important decisions.  NOTIFY YOUR SURGEON IF: You have any bleeding that does not stop, any pus draining from your wound(s), any fever (over 100.4 F) or chills, persistent nausea/vomiting, persistent diarrhea, or if your pain is not controlled on your discharge pain medications.  WOUND CARE:  Please keep ACE wrap and dressings on as instructed by your surgeons. Please ask your plastic surgeon Dr. Suarez during your follow up visit on how to continue to dress wounds. Please keep incisions clean and dry. Please do not Scrub or rub incisions. Do not use lotion or powder on incisions.  Goal:	Recovery from skin graft surgery  Assessment and plan of treatment:	WOUND CARE:  Please keep incisions clean and dry. Please do not Scrub or rub incisions. Please do not change the dressing for the skin graft donor site - if leaking you can reinforce with abdominal pads/gauze and tape. This will be taken down at his follow up appointment. Please change his right lower extremity dressing every other day (last changed 11/15). Dress with xeroform, abdominal pads, and an ace bandage to the knee. Please continue wearing your boot.   BATHING: Please do not submerge wound underwater. Please sponge bath, do not get your bandages wet.   ACTIVITY: No heavy lifting or straining. Otherwise, you may return to your usual level of physical activity. If you are taking narcotic pain medication (such as Percocet) DO NOT drive a car, operate machinery or make important decisions.  DIET: Return to your usual diet.  NOTIFY YOUR SURGEON IF: You have any bleeding that does not stop, any pus draining from your wound(s), any fever (over 100.4 F) or chills, tingling, inability to move foot, persistent nausea/vomiting, persistent diarrhea, or if your pain is not controlled on your discharge pain medications.  FOLLOW-UP: Please follow up with your primary care physician in one week regarding your hospitalization. Please follow-up with Dr. Suarez within 7-10 days of discharge. Call the office number as listed below to schedule your appointment. Principal Discharge DX:	Cerebrovascular accident (CVA) due to occlusion of left middle cerebral artery  Goal:	Treatment, Prevention, Rehab.  Assessment and plan of treatment:	- Continue comadin   - Daily INR to goal of 2.5-3.5  - Rehab  - f/u outpatient neurology in 1-2 wks.  Secondary Diagnosis:	Arterial occlusion  Goal:	Recovery from skin graft surgery, Postoperative recovery  Assessment and plan of treatment:	WOUND CARE:  Please keep incisions clean and dry. Please do not Scrub or rub incisions. Please do not change the dressing for the skin graft donor site - if leaking you can reinforce with abdominal pads/gauze and tape. This will be taken down at his follow up appointment. Please change his right lower extremity dressing every other day (last changed 11/15). Dress with xeroform, abdominal pads, and an ace bandage to the knee. Please continue wearing your boot.   BATHING: Please do not submerge wound underwater. Please sponge bath, do not get your bandages wet.   ACTIVITY: No heavy lifting or straining. Otherwise, you may return to your usual level of physical activity. If you are taking narcotic pain medication (such as Percocet) DO NOT drive a car, operate machinery or make important decisions.  DIET: Return to your usual diet.  NOTIFY YOUR SURGEON IF: You have any bleeding that does not stop, any pus draining from your wound(s), any fever (over 100.4 F) or chills, tingling, inability to move foot, persistent nausea/vomiting, persistent diarrhea, or if your pain is not controlled on your discharge pain medications.

## 2018-11-12 NOTE — PROGRESS NOTE ADULT - SUBJECTIVE AND OBJECTIVE BOX
PLASTIC SURGERY DAILY PROGRESS NOTE:     Subjective:  Pt seen and examined, no acute events overnight.     Objective:  NAD  leg wrapped, c/d/i, vac to suction, donor dressing site c/d/i    MEDICATIONS  (STANDING):  acetaminophen  IVPB .. 1000 milliGRAM(s) IV Intermittent once  aspirin enteric coated 81 milliGRAM(s) Oral daily  ATENolol  Tablet 25 milliGRAM(s) Oral daily  docusate sodium 100 milliGRAM(s) Oral daily  fluticasone propionate 50 MICROgram(s)/spray Nasal Spray 1 Spray(s) Both Nostrils daily  heparin  Infusion 1500 Unit(s)/Hr (15 mL/Hr) IV Continuous <Continuous>  NIFEdipine XL 60 milliGRAM(s) Oral daily  pantoprazole    Tablet 40 milliGRAM(s) Oral before breakfast  polyethylene glycol 3350 17 Gram(s) Oral daily  senna 2 Tablet(s) Oral at bedtime  simvastatin 20 milliGRAM(s) Oral at bedtime  tamsulosin 0.4 milliGRAM(s) Oral at bedtime  warfarin 2.5 milliGRAM(s) Oral at bedtime    MEDICATIONS  (PRN):  acetaminophen   Tablet .. 650 milliGRAM(s) Oral every 6 hours PRN Mild Pain (1 - 3)  oxyCODONE    IR 5 milliGRAM(s) Oral every 4 hours PRN Moderate Pain (4 - 6)  oxyCODONE    IR 10 milliGRAM(s) Oral every 4 hours PRN Severe Pain (7 - 10)  sodium biphosphate Rectal Enema 1 Enema Rectal once PRN if unable to go after suppository, thanks      Vital Signs Last 24 Hrs  T(C): 37.2 (12 Nov 2018 06:39), Max: 37.2 (12 Nov 2018 01:10)  T(F): 98.9 (12 Nov 2018 06:39), Max: 99 (12 Nov 2018 01:10)  HR: 66 (12 Nov 2018 06:39) (65 - 80)  BP: 132/74 (12 Nov 2018 06:39) (129/65 - 154/68)  BP(mean): --  RR: 20 (12 Nov 2018 06:39) (16 - 20)  SpO2: 95% (12 Nov 2018 06:39) (94% - 99%)    I&O's Detail    10 Nov 2018 07:01  -  11 Nov 2018 07:00  --------------------------------------------------------  IN:    heparin Infusion: 13 mL    Oral Fluid: 595 mL  Total IN: 608 mL    OUT:    Voided: 1500 mL  Total OUT: 1500 mL    Total NET: -892 mL      11 Nov 2018 07:01  -  12 Nov 2018 06:48  --------------------------------------------------------  IN:    heparin Infusion: 78 mL    IV PiggyBack: 50 mL    Oral Fluid: 1320 mL  Total IN: 1448 mL    OUT:    Voided: 2125 mL  Total OUT: 2125 mL    Total NET: -677 mL          Daily     Daily     LABS:                        10.4   13.3  )-----------( 832      ( 11 Nov 2018 07:05 )             32.6     11-11    135  |  96  |  13  ----------------------------<  97  4.0   |  27  |  0.77    Ca    9.1      11 Nov 2018 07:05  Phos  3.2     11-11  Mg     1.9     11-11      PT/INR - ( 11 Nov 2018 07:15 )   PT: 14.3 sec;   INR: 1.24 ratio         PTT - ( 11 Nov 2018 07:15 )  PTT:76.2 sec      RADIOLOGY & ADDITIONAL STUDIES:

## 2018-11-12 NOTE — DISCHARGE NOTE ADULT - CARE PROVIDERS DIRECT ADDRESSES
[email or web address]_Unknown [email or web address],DirectAddress_Unknown,DirectAddress_Unknown [email or web address],DirectAddress_Unknown,DirectAddress_Unknown,DirectAddress_Unknown

## 2018-11-12 NOTE — PHARMACOTHERAPY INTERVENTION NOTE - COMMENTS
Delta INR this morning was 1.05, INR on 11/11 was 1.24, this morning 11/12 was 2.29. Patient received dose of 2.5mg coumadin last night. Recommended to hold dose tonight to avoid overshoot above 3 in INR tomorrow, then resume 2 or 2.5mg daily.

## 2018-11-12 NOTE — DISCHARGE NOTE ADULT - MEDICATION SUMMARY - MEDICATIONS TO STOP TAKING
I will STOP taking the medications listed below when I get home from the hospital:    chlorthalidone 25 mg oral tablet  -- 1 tab(s) by mouth once a day    omeprazole 20 mg oral delayed release capsule  -- 1 cap(s) by mouth 3 times a week    terazosin 10 mg oral tablet  -- orally once a day

## 2018-11-13 ENCOUNTER — APPOINTMENT (OUTPATIENT)
Age: 73
End: 2018-11-13
Payer: MEDICARE

## 2018-11-13 LAB
ALBUMIN SERPL ELPH-MCNC: 3.1 G/DL — LOW (ref 3.3–5)
ALP SERPL-CCNC: 157 U/L — HIGH (ref 40–120)
ALT FLD-CCNC: 35 U/L — SIGNIFICANT CHANGE UP (ref 10–45)
ANION GAP SERPL CALC-SCNC: 10 MMOL/L — SIGNIFICANT CHANGE UP (ref 5–17)
ANION GAP SERPL CALC-SCNC: 13 MMOL/L — SIGNIFICANT CHANGE UP (ref 5–17)
ANION GAP SERPL CALC-SCNC: 13 MMOL/L — SIGNIFICANT CHANGE UP (ref 5–17)
APTT BLD: 35.8 SEC — SIGNIFICANT CHANGE UP (ref 27.5–36.3)
APTT BLD: 38.6 SEC — HIGH (ref 27.5–36.3)
APTT BLD: 38.8 SEC — HIGH (ref 27.5–36.3)
AST SERPL-CCNC: 44 U/L — HIGH (ref 10–40)
BASOPHILS # BLD AUTO: 0 K/UL — SIGNIFICANT CHANGE UP (ref 0–0.2)
BASOPHILS NFR BLD AUTO: 0.4 % — SIGNIFICANT CHANGE UP (ref 0–2)
BILIRUB SERPL-MCNC: 0.4 MG/DL — SIGNIFICANT CHANGE UP (ref 0.2–1.2)
BLD GP AB SCN SERPL QL: NEGATIVE — SIGNIFICANT CHANGE UP
BUN SERPL-MCNC: 13 MG/DL — SIGNIFICANT CHANGE UP (ref 7–23)
BUN SERPL-MCNC: 14 MG/DL — SIGNIFICANT CHANGE UP (ref 7–23)
BUN SERPL-MCNC: 16 MG/DL — SIGNIFICANT CHANGE UP (ref 7–23)
CALCIUM SERPL-MCNC: 8.9 MG/DL — SIGNIFICANT CHANGE UP (ref 8.4–10.5)
CALCIUM SERPL-MCNC: 9 MG/DL — SIGNIFICANT CHANGE UP (ref 8.4–10.5)
CALCIUM SERPL-MCNC: 9.2 MG/DL — SIGNIFICANT CHANGE UP (ref 8.4–10.5)
CHLORIDE SERPL-SCNC: 96 MMOL/L — SIGNIFICANT CHANGE UP (ref 96–108)
CHLORIDE SERPL-SCNC: 96 MMOL/L — SIGNIFICANT CHANGE UP (ref 96–108)
CHLORIDE SERPL-SCNC: 98 MMOL/L — SIGNIFICANT CHANGE UP (ref 96–108)
CO2 SERPL-SCNC: 24 MMOL/L — SIGNIFICANT CHANGE UP (ref 22–31)
CO2 SERPL-SCNC: 25 MMOL/L — SIGNIFICANT CHANGE UP (ref 22–31)
CO2 SERPL-SCNC: 26 MMOL/L — SIGNIFICANT CHANGE UP (ref 22–31)
CREAT SERPL-MCNC: 0.72 MG/DL — SIGNIFICANT CHANGE UP (ref 0.5–1.3)
CREAT SERPL-MCNC: 0.77 MG/DL — SIGNIFICANT CHANGE UP (ref 0.5–1.3)
CREAT SERPL-MCNC: 0.81 MG/DL — SIGNIFICANT CHANGE UP (ref 0.5–1.3)
EOSINOPHIL # BLD AUTO: 0.4 K/UL — SIGNIFICANT CHANGE UP (ref 0–0.5)
EOSINOPHIL NFR BLD AUTO: 3.5 % — SIGNIFICANT CHANGE UP (ref 0–6)
GLUCOSE SERPL-MCNC: 116 MG/DL — HIGH (ref 70–99)
GLUCOSE SERPL-MCNC: 141 MG/DL — HIGH (ref 70–99)
GLUCOSE SERPL-MCNC: 97 MG/DL — SIGNIFICANT CHANGE UP (ref 70–99)
HCT VFR BLD CALC: 29.9 % — LOW (ref 39–50)
HCT VFR BLD CALC: 32.1 % — LOW (ref 39–50)
HCT VFR BLD CALC: 32.8 % — LOW (ref 39–50)
HGB BLD-MCNC: 10.3 G/DL — LOW (ref 13–17)
HGB BLD-MCNC: 10.6 G/DL — LOW (ref 13–17)
HGB BLD-MCNC: 9.7 G/DL — LOW (ref 13–17)
INR BLD: 2.39 RATIO — HIGH (ref 0.88–1.16)
INR BLD: 2.46 RATIO — HIGH (ref 0.88–1.16)
INR BLD: 2.47 RATIO — HIGH (ref 0.88–1.16)
LYMPHOCYTES # BLD AUTO: 2.7 K/UL — SIGNIFICANT CHANGE UP (ref 1–3.3)
LYMPHOCYTES # BLD AUTO: 22.2 % — SIGNIFICANT CHANGE UP (ref 13–44)
MAGNESIUM SERPL-MCNC: 1.9 MG/DL — SIGNIFICANT CHANGE UP (ref 1.6–2.6)
MAGNESIUM SERPL-MCNC: 2 MG/DL — SIGNIFICANT CHANGE UP (ref 1.6–2.6)
MCHC RBC-ENTMCNC: 31.3 PG — SIGNIFICANT CHANGE UP (ref 27–34)
MCHC RBC-ENTMCNC: 31.4 PG — SIGNIFICANT CHANGE UP (ref 27–34)
MCHC RBC-ENTMCNC: 31.8 PG — SIGNIFICANT CHANGE UP (ref 27–34)
MCHC RBC-ENTMCNC: 32.2 GM/DL — SIGNIFICANT CHANGE UP (ref 32–36)
MCHC RBC-ENTMCNC: 32.2 GM/DL — SIGNIFICANT CHANGE UP (ref 32–36)
MCHC RBC-ENTMCNC: 32.3 GM/DL — SIGNIFICANT CHANGE UP (ref 32–36)
MCV RBC AUTO: 97.2 FL — SIGNIFICANT CHANGE UP (ref 80–100)
MCV RBC AUTO: 97.3 FL — SIGNIFICANT CHANGE UP (ref 80–100)
MCV RBC AUTO: 98.7 FL — SIGNIFICANT CHANGE UP (ref 80–100)
MONOCYTES # BLD AUTO: 1.2 K/UL — HIGH (ref 0–0.9)
MONOCYTES NFR BLD AUTO: 9.4 % — SIGNIFICANT CHANGE UP (ref 2–14)
NEUTROPHILS # BLD AUTO: 8 K/UL — HIGH (ref 1.8–7.4)
NEUTROPHILS NFR BLD AUTO: 64.5 % — SIGNIFICANT CHANGE UP (ref 43–77)
PHOSPHATE SERPL-MCNC: 3.2 MG/DL — SIGNIFICANT CHANGE UP (ref 2.5–4.5)
PHOSPHATE SERPL-MCNC: 3.7 MG/DL — SIGNIFICANT CHANGE UP (ref 2.5–4.5)
PLATELET # BLD AUTO: 809 K/UL — HIGH (ref 150–400)
PLATELET # BLD AUTO: 818 K/UL — HIGH (ref 150–400)
PLATELET # BLD AUTO: 833 K/UL — HIGH (ref 150–400)
POTASSIUM SERPL-MCNC: 4.2 MMOL/L — SIGNIFICANT CHANGE UP (ref 3.5–5.3)
POTASSIUM SERPL-MCNC: 4.2 MMOL/L — SIGNIFICANT CHANGE UP (ref 3.5–5.3)
POTASSIUM SERPL-MCNC: 4.5 MMOL/L — SIGNIFICANT CHANGE UP (ref 3.5–5.3)
POTASSIUM SERPL-SCNC: 4.2 MMOL/L — SIGNIFICANT CHANGE UP (ref 3.5–5.3)
POTASSIUM SERPL-SCNC: 4.2 MMOL/L — SIGNIFICANT CHANGE UP (ref 3.5–5.3)
POTASSIUM SERPL-SCNC: 4.5 MMOL/L — SIGNIFICANT CHANGE UP (ref 3.5–5.3)
PROT SERPL-MCNC: 6.8 G/DL — SIGNIFICANT CHANGE UP (ref 6–8.3)
PROTHROM AB SERPL-ACNC: 28.2 SEC — HIGH (ref 10–12.9)
PROTHROM AB SERPL-ACNC: 28.8 SEC — HIGH (ref 10–12.9)
PROTHROM AB SERPL-ACNC: 28.9 SEC — HIGH (ref 10–12.9)
RBC # BLD: 3.08 M/UL — LOW (ref 4.2–5.8)
RBC # BLD: 3.3 M/UL — LOW (ref 4.2–5.8)
RBC # BLD: 3.33 M/UL — LOW (ref 4.2–5.8)
RBC # FLD: 14.4 % — SIGNIFICANT CHANGE UP (ref 10.3–14.5)
RBC # FLD: 14.5 % — SIGNIFICANT CHANGE UP (ref 10.3–14.5)
RBC # FLD: 14.8 % — HIGH (ref 10.3–14.5)
RH IG SCN BLD-IMP: POSITIVE — SIGNIFICANT CHANGE UP
SODIUM SERPL-SCNC: 133 MMOL/L — LOW (ref 135–145)
SODIUM SERPL-SCNC: 133 MMOL/L — LOW (ref 135–145)
SODIUM SERPL-SCNC: 135 MMOL/L — SIGNIFICANT CHANGE UP (ref 135–145)
WBC # BLD: 11.4 K/UL — HIGH (ref 3.8–10.5)
WBC # BLD: 11.5 K/UL — HIGH (ref 3.8–10.5)
WBC # BLD: 12.3 K/UL — HIGH (ref 3.8–10.5)
WBC # FLD AUTO: 11.4 K/UL — HIGH (ref 3.8–10.5)
WBC # FLD AUTO: 11.5 K/UL — HIGH (ref 3.8–10.5)
WBC # FLD AUTO: 12.3 K/UL — HIGH (ref 3.8–10.5)

## 2018-11-13 PROCEDURE — 70498 CT ANGIOGRAPHY NECK: CPT | Mod: 26

## 2018-11-13 PROCEDURE — 70450 CT HEAD/BRAIN W/O DYE: CPT | Mod: 26,59

## 2018-11-13 PROCEDURE — 99291 CRITICAL CARE FIRST HOUR: CPT

## 2018-11-13 PROCEDURE — 61645 PERQ ART M-THROMBECT &/NFS: CPT | Mod: LT

## 2018-11-13 PROCEDURE — 70496 CT ANGIOGRAPHY HEAD: CPT | Mod: 26

## 2018-11-13 RX ORDER — WARFARIN SODIUM 2.5 MG/1
2 TABLET ORAL AT BEDTIME
Qty: 0 | Refills: 0 | Status: COMPLETED | OUTPATIENT
Start: 2018-11-13 | End: 2018-11-13

## 2018-11-13 RX ORDER — SODIUM CHLORIDE 9 MG/ML
1000 INJECTION INTRAMUSCULAR; INTRAVENOUS; SUBCUTANEOUS
Qty: 0 | Refills: 0 | Status: DISCONTINUED | OUTPATIENT
Start: 2018-11-13 | End: 2018-11-13

## 2018-11-13 RX ORDER — SODIUM CHLORIDE 5 G/100ML
1000 INJECTION, SOLUTION INTRAVENOUS
Qty: 0 | Refills: 0 | Status: DISCONTINUED | OUTPATIENT
Start: 2018-11-13 | End: 2018-11-15

## 2018-11-13 RX ORDER — CHLORHEXIDINE GLUCONATE 213 G/1000ML
1 SOLUTION TOPICAL
Qty: 0 | Refills: 0 | Status: DISCONTINUED | OUTPATIENT
Start: 2018-11-13 | End: 2018-11-14

## 2018-11-13 RX ORDER — NIFEDIPINE 30 MG
60 TABLET, EXTENDED RELEASE 24 HR ORAL DAILY
Qty: 0 | Refills: 0 | Status: DISCONTINUED | OUTPATIENT
Start: 2018-11-13 | End: 2018-11-16

## 2018-11-13 RX ORDER — WARFARIN SODIUM 2.5 MG/1
2 TABLET ORAL ONCE
Qty: 0 | Refills: 0 | Status: DISCONTINUED | OUTPATIENT
Start: 2018-11-13 | End: 2018-11-13

## 2018-11-13 RX ADMIN — OXYCODONE HYDROCHLORIDE 5 MILLIGRAM(S): 5 TABLET ORAL at 21:52

## 2018-11-13 RX ADMIN — TAMSULOSIN HYDROCHLORIDE 0.4 MILLIGRAM(S): 0.4 CAPSULE ORAL at 21:53

## 2018-11-13 RX ADMIN — OXYCODONE HYDROCHLORIDE 5 MILLIGRAM(S): 5 TABLET ORAL at 22:50

## 2018-11-13 RX ADMIN — Medication 60 MILLIGRAM(S): at 06:23

## 2018-11-13 RX ADMIN — PANTOPRAZOLE SODIUM 40 MILLIGRAM(S): 20 TABLET, DELAYED RELEASE ORAL at 06:23

## 2018-11-13 RX ADMIN — OXYCODONE HYDROCHLORIDE 5 MILLIGRAM(S): 5 TABLET ORAL at 07:00

## 2018-11-13 RX ADMIN — Medication 100 MILLIGRAM(S): at 11:27

## 2018-11-13 RX ADMIN — OXYCODONE HYDROCHLORIDE 5 MILLIGRAM(S): 5 TABLET ORAL at 14:15

## 2018-11-13 RX ADMIN — Medication 81 MILLIGRAM(S): at 11:26

## 2018-11-13 RX ADMIN — OXYCODONE HYDROCHLORIDE 5 MILLIGRAM(S): 5 TABLET ORAL at 13:30

## 2018-11-13 RX ADMIN — SIMVASTATIN 20 MILLIGRAM(S): 20 TABLET, FILM COATED ORAL at 21:53

## 2018-11-13 RX ADMIN — ATENOLOL 25 MILLIGRAM(S): 25 TABLET ORAL at 06:23

## 2018-11-13 RX ADMIN — WARFARIN SODIUM 2 MILLIGRAM(S): 2.5 TABLET ORAL at 21:52

## 2018-11-13 RX ADMIN — OXYCODONE HYDROCHLORIDE 5 MILLIGRAM(S): 5 TABLET ORAL at 06:30

## 2018-11-13 RX ADMIN — SENNA PLUS 2 TABLET(S): 8.6 TABLET ORAL at 21:52

## 2018-11-13 NOTE — PROGRESS NOTE ADULT - ASSESSMENT
L M2 proximal embolectomy TICI 3; hx afib, anticoagulation held curtis op    Neuro:  CTH in am  Na 135-145  stroke core measures    Cards:  -160  TTE +/- AYLA  rate control    Pulm:  IS    GI:  advance diet as tolerated    Renal:  f/u Uosm, Amina  start 2%    Heme/Onc:  cont coumadin INR 2-3    ID:  Monitor for fevers    Full code  family updated at bedside    At risk for neurological deterioration/death due to acute CVA, cerebral edema, hemorrhagic conversion  critical care time 45min

## 2018-11-13 NOTE — PROGRESS NOTE ADULT - SUBJECTIVE AND OBJECTIVE BOX
72yo man PMH afib on coumadin x20yrs, was off for back surgery in 01/2018 had CVA, required revision surgery in 10/2018, was discharged to rehab, had RLE embolus, re adm to vascular surgery for embolectomy and fasciotomy, while on vascular service restarted on heparin gtt and bridged to coumadin, had acute AMS around 16:30 on 10/13, slurred speech and R sided weakness, taken to IR now s/p L MCA M2 proximal superior division TICI 3 recanalization.     Vitals/labs/meds/imaging reviewed    alert, awake, pleasant, oriented to self/place/month but not year; BUE no drift and 5/5  RLE in splint/wound vac, antigravity  L groin safeguard, c/d/i, no hematoma, distally 5/5

## 2018-11-13 NOTE — PROGRESS NOTE ADULT - ASSESSMENT
73 year old man with PMH of atrial fibrillation (AC held for recent surgery), HTN, Hodgkin's lymphoma s/p splenectomy (1975), hip replacement, and MOE s/p recent RLE fasciotomies.  Neurological exam significant for R facial droop, R hemiplegia, and global aphasia   CTH showing dense LMCA sign, CTA showing distal M1 occlusion    Impression: LMCA infarct secondary to M1 occlusion    Recommendations:  - IR for angiography, possible embolectomy  - post-procedure protocol per NSCU

## 2018-11-13 NOTE — PROGRESS NOTE ADULT - SUBJECTIVE AND OBJECTIVE BOX
Subjective: Interval History - patient found not speaking, R facial droop, R hemiplegia, R field cut, s/p code stroke NIHSS 15    Objective:   Vital Signs Last 24 Hrs  T(C): 36.8 (06 Nov 2018 09:25), Max: 37.4 (06 Nov 2018 05:15)  T(F): 98.2 (06 Nov 2018 09:25), Max: 99.3 (06 Nov 2018 05:15)  HR: 97 (06 Nov 2018 05:15) (67 - 97)  BP: 161/72 (06 Nov 2018 05:15) (120/76 - 165/72)  BP(mean): 98 (05 Nov 2018 14:00) (82 - 107)  RR: 20 (06 Nov 2018 09:25) (15 - 20)  SpO2: 95% (06 Nov 2018 09:25) (91% - 100%)    General Exam:   General appearance: No acute distress                   Neurological Exam:  Mental Status: not following commands, not speaking    Cranial Nerves: PERRL, EOMI, R field cut,   CN V1-3 intact to light touch.  R facial droop.      Motor:   Tone: normal.                  Strength: R hemiplegia, LUE/LLE full motor strength           Sensation: intact to light touch    Other:    11-06    134<L>  |  95<L>  |  15  ----------------------------<  140<H>  4.2   |  27  |  0.82    Ca    8.7      06 Nov 2018 03:14  Phos  3.3     11-06  Mg     2.1     11-06                        8.5    18.1  )-----------( 673      ( 06 Nov 2018 03:14 )             26.6     MEDICATIONS  (STANDING):  aspirin enteric coated 81 milliGRAM(s) Oral daily  ATENolol  Tablet 25 milliGRAM(s) Oral daily  chlorthalidone 25 milliGRAM(s) Oral daily  ciprofloxacin     Tablet 500 milliGRAM(s) Oral every 12 hours  docusate sodium 100 milliGRAM(s) Oral daily  fluticasone propionate 50 MICROgram(s)/spray Nasal Spray 1 Spray(s) Both Nostrils daily  heparin  Infusion 1400 Unit(s)/Hr (16 mL/Hr) IV Continuous <Continuous>  HYDROmorphone  Injectable 0.5 milliGRAM(s) IV Push daily  NIFEdipine XL 60 milliGRAM(s) Oral daily  pantoprazole    Tablet 40 milliGRAM(s) Oral before breakfast  polyethylene glycol 3350 17 Gram(s) Oral daily  senna 2 Tablet(s) Oral at bedtime  silver nitrate Applicator 1 Application(s) Topical once  simvastatin 20 milliGRAM(s) Oral at bedtime  tamsulosin 0.4 milliGRAM(s) Oral at bedtime    < from: CT Angio Neck w/ IV Cont (11.13.18 @ 17:36) >  IMPRESSION: Confirmation of a left M1 occlusion.    Lack of contrast enhancement of the right proximal vertebral artery   suspicious for possible occlusion. The mid and distal portions of the   right cervical vertebral arteries enhance, which may be retrograde from   theintracranial vertebrobasilar confluence.    Atherosclerosis of the bilateral carotid bifurcations with approximately   20-30% stenosis of the right internal carotid artery origin by NASCET   criteria. No flow-limiting stenosis on the left.    Anatomic variants, as discussed in the body the report.    < end of copied text >

## 2018-11-13 NOTE — PROGRESS NOTE ADULT - SUBJECTIVE AND OBJECTIVE BOX
pt seen and examined, no complaints, ROS - .     acetaminophen   Tablet .. 650 milliGRAM(s) Oral every 6 hours PRN  acetaminophen  IVPB .. 1000 milliGRAM(s) IV Intermittent once  aspirin enteric coated 81 milliGRAM(s) Oral daily  ATENolol  Tablet 25 milliGRAM(s) Oral daily  docusate sodium 100 milliGRAM(s) Oral daily  fluticasone propionate 50 MICROgram(s)/spray Nasal Spray 1 Spray(s) Both Nostrils daily  NIFEdipine XL 60 milliGRAM(s) Oral daily  oxyCODONE    IR 5 milliGRAM(s) Oral every 4 hours PRN  oxyCODONE    IR 10 milliGRAM(s) Oral every 4 hours PRN  pantoprazole    Tablet 40 milliGRAM(s) Oral before breakfast  polyethylene glycol 3350 17 Gram(s) Oral daily  senna 2 Tablet(s) Oral at bedtime  simvastatin 20 milliGRAM(s) Oral at bedtime  sodium biphosphate Rectal Enema 1 Enema Rectal once PRN  tamsulosin 0.4 milliGRAM(s) Oral at bedtime                            10.6   12.0  )-----------( 811      ( 12 Nov 2018 07:11 )             33.1       Hemoglobin: 10.6 g/dL (11-12 @ 07:11)  Hemoglobin: 10.4 g/dL (11-11 @ 07:05)  Hemoglobin: 11.0 g/dL (11-10 @ 07:09)  Hemoglobin: 10.1 g/dL (11-10 @ 00:34)  Hemoglobin: 10.4 g/dL (11-09 @ 06:28)      11-12    136  |  98  |  12  ----------------------------<  114<H>  4.2   |  29  |  0.73    Ca    9.4      12 Nov 2018 07:13  Phos  3.4     11-12  Mg     2.0     11-12      Creatinine Trend: 0.73<--, 0.77<--, 0.77<--, 0.73<--, 0.78<--, 0.75<--    COAGS: PT/INR - ( 12 Nov 2018 20:45 )   PT: 31.1 sec;   INR: 2.62 ratio         PTT - ( 12 Nov 2018 20:45 )  PTT:37.4 sec          T(C): 37.1 (11-13-18 @ 01:36), Max: 37.2 (11-12-18 @ 06:39)  HR: 89 (11-13-18 @ 01:36) (61 - 89)  BP: 132/64 (11-13-18 @ 01:36) (112/71 - 143/65)  RR: 16 (11-13-18 @ 01:36) (16 - 20)  SpO2: 95% (11-13-18 @ 01:36) (94% - 98%)  Wt(kg): --    I&O's Summary    11 Nov 2018 07:01  -  12 Nov 2018 07:00  --------------------------------------------------------  IN: 1448 mL / OUT: 2125 mL / NET: -677 mL    12 Nov 2018 07:01  -  13 Nov 2018 05:45  --------------------------------------------------------  IN: 1095 mL / OUT: 850 mL / NET: 245 mL        Appearance: Normal	  HEENT:   Normal oral mucosa, PERRL, EOMI	  Lymphatic: No lymphadenopathy , no edema  Cardiovascular: irregular irregular  S1 S2, No JVD, No murmurs , Peripheral pulses palpable 2+ bilaterally  Respiratory: Lungs clear to auscultation, normal effort 	  Gastrointestinal:  Soft, Non-tender, + BS	  Skin: No rashes, No ecchymoses, No cyanosis, warm to touch  Musculoskeletal: Normal range of motion, normal strength, RLE with vac in place  Psychiatry:  Mood & affect appropriate    TELEMETRY: 	  none  ECG:  	< from: 12 Lead ECG (10.25.18 @ 14:08) >  Diagnosis Line ATRIAL FIBRILLATION  PROLONGED QT  ABNORMAL ECG    < end of copied text >      ASSESSMENT/PLAN: 	73y Male  history of Afib previously on Coumadin secondary to spine surgery 10/19/18, HTN, CVA, MOE admitted with RLE arterial occlusion now s/p embolectomy of right AT and peroneal artery. RLE fasciotomies , unable to close ,  s/p VAC placement pending OR on Friday for RLE fasciotomy closure:   s/p pee fasciotomy debrided and wound cleaned, s/p skin grafts , vac applied     - cont BB, ASA, statin   - plastics follow up   - HTN - controlled with BB/procardia/chlorthalidone  - A/C with heparin gtt, bridge to coumadin   - GI / DVT prophylaxis,  keep K>4, mag >2.0   - VAC and  wound care per sx  - HD stable no CHF  - f/u with Dr Bynum upon dc for cardiology   D/W Dr Varghese

## 2018-11-13 NOTE — PROVIDER CONTACT NOTE (CHANGE IN STATUS NOTIFICATION) - ASSESSMENT
wound vac placed. 11/6 EEG 11/9 RLE skin graft placement from R thigh. 11/12 Heparin gtt stopped and pt put on coumadin.  Pt currently aphasic with R sided drooping & right sided weakness

## 2018-11-13 NOTE — PROVIDER CONTACT NOTE (CHANGE IN STATUS NOTIFICATION) - BACKGROUND
Pt admitted 10/25 RLE ischemia went to OR for embolectomy developed compartment syndrome went for a RLE fasciotomy on 10/27. 11/1 Pt had episode of AMS. 11/5 failed attempt of fasciotomy closure,

## 2018-11-13 NOTE — PROGRESS NOTE ADULT - ASSESSMENT
ASSESSMENT: 73M with recent spine surgery now s/p RLE embolectomy for acute RLE arterial occlusion now s/p RLE fasciotomies, split thickness skin graft and placement of wound vac over closure to RLE.     PLAN:  - Appreciate plastics input: To follow up with plastics for wound vac take down on Thursday.   - Therapeutic on coumadin.  - Pain control.  - Reg diet  - Elevate RLE, AFO at all times.   - PT recommending [facility], needs ambulatory status as per rehab.   - Dispo: rehab pending placement.     Vascular Surgery Pager #[phone]

## 2018-11-13 NOTE — CHART NOTE - NSCHARTNOTEFT_GEN_A_CORE
Interventional Neuro- Radiology   Procedure Note FRANK    Procedure: Selective Cerebral Angiography   Pre- Procedure Diagnosis:  Post- Procedure Diagnosis:    : Dr Khris Trejo    Physician Assistant: Concepcion Buchanan PA-C    RN:    Anesthesia: (MAC)   (general anesthesia)    Sheath:    I/Os:  Estimated blood loss less than 10cc  IV fluids:     cc     Urine output     cc        Contrast Omnipaque 240      cc         Antibiotics:    Vitals: BP         HR 86      Spo2 100%    Preliminary Report:  Using a 4 Fr short/long sheath to the right groin under MAC sedation via   left vertebral artery,  left common carotid artery, left external carotid artey, right vertebral arter,  right common carotid artery, right external carotid artery  a selective cerebral angiography was performed and  demonstrates ________. ( Official note to follow).  Patient tolerated procedure well, hemodynamically stable, no change in neurological status compared to baseline.  Results discussed with neurosurgery, patient and patient's  family.  Groin sheath was removed,  manual compression held to hemostasis  for  21 minutes, no active bleeding, no hematoma, quick clot and safeguard balloon dressing applied at _____h.  STAT labs:  CBC BMP  ____h.  Patient transfered to Recovery Room Interventional Neuro- Radiology   Procedure Note PA-C    Procedure: Selective Cerebral Angiography and stroke intervention    Pre- Procedure Diagnosis:  left M1  Post- Procedure Diagnosis:    : Dr Khris Trejo  [place]:   Dr Christine Rios       Physician Assistant: Concepcion Buchanan PA-C    Nurse:                     Jyothi Perez   Radiologic Tech:     Kapil Roberto  Anesthesiologist:     Dr Catracho Sethi  Sheath:                    6 [place]an neuron max to left groin     I/Os: EBL less than 10cc  IV fluids:600cc Urine output     cc  Contrast Omnipaque 240 32cc           Antibiotics: Ancef 2 grams     solitaire 4mm by 40mm    Vitals: /72       HR 86      Spo2 100%    Preliminary Report:  Using a 6 [place]an neuron max sheath to the right groin under MAC sedation via left ICA and left MCA a selective cerebral angiography was performed and demonstrated  Patient tolerated procedure well, hemodynamically stable, no change in neurological status compared to baseline.  Results discussed with neurosurgery, patient and patient's wife.  Left groin sheath was removed, manual compression held to hemostasis for 21 minutes, no active bleeding, no hematoma, quick clot and safeguard balloon dressing applied at _____h.  STAT labs: CBC BMP PTT PT INR now and 1930 and 2330 hours Interventional Neuro- Radiology   Procedure Note PA-C    Procedure: Selective Cerebral Angiography and stroke intervention    Pre- Procedure Diagnosis:  left M1  Post- Procedure Diagnosis:    : Dr Khris Trejo  [place]:   Dr Christine Rios       Physician Assistant: Concepcion Buchanan PA-C    Nurse:                     Jyothi Perez   Radiologic Tech:     Kapil Roberto  Anesthesiologist:     Dr Catracho Sethi  Sheath:                    6 [place] neuron max to left groin     I/Os: EBL less than 10cc  IV fluids:600cc Urine output     cc  Contrast Omnipaque 240 32cc           Antibiotics: Ancef 2 grams     solitaire 4mm by 40mm    Vitals: /72       HR 86      Spo2 100%    Preliminary Report:  Using a 6 [place] neuron max sheath to the right groin under MAC sedation via left ICA and left MCA a selective cerebral angiography was performed and demonstrated  Patient tolerated procedure well, hemodynamically stable, no change in neurological status compared to baseline.  Results discussed with neurosurgery, patient and patient's wife.  Left groin sheath was removed, manual compression held to hemostasis for 21 minutes, no active bleeding, no hematoma, quick clot and safeguard balloon dressing applied at 1900 hours. STAT labs: CBC BMP PTT PT INR now and 1930 and 2330 hours. Interventional Neuro- Radiology   Procedure Note PA-C    Procedure: Selective Cerebral Angiography and stroke intervention    Pre- Procedure Diagnosis:  left M1  Post- Procedure Diagnosis: Tici 3    : Dr Khris Trejo  [place]:   Dr Christine Rios       Physician Assistant: Concepcion Buchanan PA-C    Nurse:                     Jyothi Perez   Radiologic Tech:     Kapil Roberto  Anesthesiologist:     Dr Catracho Kendrick   Sheath:                   6 [place] neuron max to left groin     I/Os: EBL less than 10cc  IV fluids:600cc Urine output due to void Contrast Omnipaque 240 32cc           Antibiotics: Ancef 2 grams     solitaire 4mm by 40mm    Vitals: /72       HR 82      Spo2 100%    Preliminary Report:  Using a 6 [place] neuron max sheath to the right groin under MAC sedation via left ICA and left MCA a selective cerebral angiography was performed and demonstrated a left MCA proximal M2 segment, upper division occlusion. Patient underwent a mechanical thrombectomy, with a Tici 3 result.  Patient tolerated procedure well, hemodynamically stable, no change in neurological status compared to baseline. Results discussed with neurosurgery, patient and patient's wife. Left groin sheath was removed, manual compression held to hemostasis for 21 minutes, no active bleeding, no hematoma, quick clot and safeguard balloon dressing applied at 1900 hours. STAT labs: CBC BMP PTT PT INR now and 1930 and 2330 hours. Interventional Neuro- Radiology   Procedure Note PA-C    Procedure: Selective Cerebral Angiography and stroke intervention    Pre- Procedure Diagnosis:  left M1 occlusion  Post- Procedure Diagnosis: Tici 3    : Dr Khris Trejo  [place]:   Dr Christine Rios       Physician Assistant: Concepcion Buchanan PA-C    Nurse:                     Jyothi Perez   Radiologic Tech:     Kapil Roberto  Anesthesiologist:     Dr Catracho Walkeroza   Sheath:                   6 [place]istan[place] neuron max to left groin     I/Os: EBL less than 10cc  IV fluids:600cc Urine output due to void Contrast Omnipaque 240 32cc           Antibiotics: Ancef 2 grams     solitaire 4mm by 40mm    Vitals: /72       HR 82      Spo2 100%    Preliminary Report:  Using a 6 [place] neuron max sheath to the left groin under MAC sedation via left ICA and left MCA a selective cerebral angiography was performed and demonstrated a left MCA proximal M2 segment, upper division occlusion. Patient underwent a successful mechanical thrombectomy, with a Tici 3 reperfusion.   Patient tolerated procedure well, hemodynamically stable, no change in neurological status compared to baseline. Results discussed with neurosurgery, patient and patient's wife. Left groin sheath was removed, manual compression held to hemostasis for 21 minutes, no active bleeding, no hematoma, quick clot and safeguard balloon dressing applied at 1900 hours. STAT labs: CBC BMP PTT PT INR now and 1930 and 2330 hours. Disposition neuro ICU. Interventional Neuro- Radiology   Procedure Note PA-C    Procedure: Selective Cerebral Angiography and stroke intervention    Pre- Procedure Diagnosis:  left M1 occlusion  Post- Procedure Diagnosis: Tici 3    : Dr Khris Trejo  [place]:   Dr Christine Rios       Physician Assistant: Concepcion Buchanan PA-C    Nurse:                     Jyothi Perez RN  Radiologic Tech:     Kapil Roberto LRT  Anesthesiologist:     Dr Catracho Francis Kendrick   Sheath:                   6 Citizen of [place] neuron max to left groin     I/Os: EBL less than 10cc  IV fluids:600cc Urine output due to void Contrast Omnipaque 240 32cc           Antibiotics: Ancef 2 grams     solitaire 4mm by 40mm    Vitals: /72       HR 82      Spo2 100%    Preliminary Report:  Using a 6 Citizen of [place] neuron max sheath to the left groin under MAC sedation via left ICA and left MCA a selective cerebral angiography was performed and demonstrated a left MCA proximal M2 segment, upper division occlusion. Patient underwent a successful mechanical thrombectomy, with a Tici 3 reperfusion.   Patient tolerated procedure well, hemodynamically stable, no change in neurological status compared to baseline. Results discussed with neurosurgery, patient and patient's wife. Left groin sheath was removed, manual compression held to hemostasis for 21 minutes, no active bleeding, no hematoma, quick clot and safeguard balloon dressing applied at 1900 hours. STAT labs: CBC BMP PTT PT INR now and 1930 and 2330 hours. Disposition neuro ICU.

## 2018-11-13 NOTE — CHART NOTE - NSCHARTNOTEFT_GEN_A_CORE
Interventional Neuro Radiology  Pre-Procedure Note FRANK    This is a 73y ____ hand dominant Male      HPI:  Patient is a 73 year old man with PMH of atrial fibrillation, HTN, Hodgkin's lymphoma s/p splenectomy (1975), hip replacement, and MOE presented to the ED with right lower extremity pain and decreased sensation since 10am today. He recently underwent spine surgery on 10/19 for L3/L4 hardware revision and has been off anticoagulation since 10/18. He takes Coumadin and was bridged with Lovenox. He was discharged on 10/23 on aspirin and has not yet restarted anticoagulation. Of note, he had a CVA in Jan 2018 while off anticoagulation for spine surgery.     He states at 10am he began having pain from his right mid-thigh extending to his foot, as well as decreased sensation and weakness. He has never had symptoms like this in the past. After arrival to the ED his symptoms improved slightly.     He denies chest pain, shortness of breath, difficulty speaking, upper extremity deficits, fevers, or chills. In the ED his vital signs are within normal limits and he is hemodynamically normal. (25 Oct 2018 14:50)      Allergies: No Known Allergies      PAST MEDICAL & SURGICAL HISTORY:  Exposure to toxin: 9/11   Spondylolisthesis  Spinal stenosis: L3-L4  CVA (cerebral vascular accident): 1/2018 - attributed to no AC for 11 days preop/postop spine surgery - presented to ED with slurred speech, residual ST memory loss, per pt  Osteoarthritis  BPH (benign prostatic hyperplasia)  MOE (obstructive sleep apnea): positive sleep study many years ago, was recommended to use CPAP, patient non-compliant  GERD (gastroesophageal reflux disease)  HTN (hypertension)  Atrial fibrillation: over 20 years  Hodgkin lymphoma: 1975  History of lumbar spinal fusion: 1/5/2018  History of cardioversion: for AF - &quot;many years ago&quot; - unsuccessful  S/P hip replacement, right: 2014  S/P splenectomy: 1975      Social History:     FAMILY HISTORY:      Current Medications: acetaminophen   Tablet .. 650 milliGRAM(s) Oral every 6 hours PRN  acetaminophen  IVPB .. 1000 milliGRAM(s) IV Intermittent once  aspirin enteric coated 81 milliGRAM(s) Oral daily  ATENolol  Tablet 25 milliGRAM(s) Oral daily  docusate sodium 100 milliGRAM(s) Oral daily  fluticasone propionate 50 MICROgram(s)/spray Nasal Spray 1 Spray(s) Both Nostrils daily  NIFEdipine XL 60 milliGRAM(s) Oral daily  oxyCODONE    IR 5 milliGRAM(s) Oral every 4 hours PRN  oxyCODONE    IR 10 milliGRAM(s) Oral every 4 hours PRN  pantoprazole    Tablet 40 milliGRAM(s) Oral before breakfast  polyethylene glycol 3350 17 Gram(s) Oral daily  senna 2 Tablet(s) Oral at bedtime  simvastatin 20 milliGRAM(s) Oral at bedtime  sodium biphosphate Rectal Enema 1 Enema Rectal once PRN  tamsulosin 0.4 milliGRAM(s) Oral at bedtime  warfarin 2 milliGRAM(s) Oral at bedtime      Labs:                         10.3   11.4  )-----------( 833      ( 13 Nov 2018 17:20 )             32.1       11-13    133<L>  |  96  |  16  ----------------------------<  141<H>  4.5   |  24  |  0.81    Ca    9.0      13 Nov 2018 17:20  Phos  3.2     11-13  Mg     2.0     11-13    TPro  6.8  /  Alb  3.1<L>  /  TBili  0.4  /  DBili  x   /  AST  44<H>  /  ALT  35  /  AlkPhos  157<H>  11-13          Blood Bank:       Assessment/Plan:     This is a 73y  year old right  hand dominant Male  presents with   Patient presents to neuro-IR for selective cerebral angiography.   Procedure, goals, risks, benefits and alternatives  were discussed with patient and patient's family.  All questions were answered.  Risks include but are not limited to stroke, vessel injury, hemorrhage, and or right  groin hematoma.  Patient demonstrates understanding  of all risks involved with this procedure and wishes to continue.   Appropriate  content was obtained from patient and consent is in the patient's chart. Interventional Neuro Radiology  Pre-Procedure Note FRANK    This is a 73 year old right hand dominant male with PMH of atrial fibrillation, HTN, Hodgkin's lymphoma s/p splenectomy (1975), hip replacement, and OME presented to the ED with right lower extremity pain and decreased sensation since 10am today. He recently underwent spine surgery on 10/19 for L3/L4 hardware revision and has been off anticoagulation since 10/18. He takes Coumadin and was bridged with Lovenox. He was discharged on 10/23 on aspirin and has not yet restarted anticoagulation. Of note, he had a CVA in Jan 2018 while off anticoagulation for spine surgery.     He states at 10am he began having pain from his right mid-thigh extending to his foot, as well as decreased sensation and weakness. He has never had symptoms like this in the past. After arrival to the ED his symptoms improved slightly. P          Allergies: No Known Allergies  PMHX: Spondylolisthesis spinal stenosis: L3-L4 CVA ,osteoarthritis, benign prostatic hyperplasia, obstructive sleep apnea, many years ago, GERD hypertension)  Atrial fibrillation: over 20 years  Hodgkin lymphoma: 1975  History of lumbar spinal fusion: 1/5/2018  History of cardioversion: for AF - &quot;many years ago&quot; - unsuccessful  S/P hip replacement, right: 2014  S/P splenectomy: 1975    Social History:   FAMILY HISTORY:  Current Medications:   acetaminophen   Tablet .. 650 milliGRAM(s) Oral every 6 hours PRN  acetaminophen  IVPB .. 1000 milliGRAM(s) IV Intermittent once  aspirin enteric coated 81 milliGRAM(s) Oral daily  ATENolol  Tablet 25 milliGRAM(s) Oral daily  docusate sodium 100 milliGRAM(s) Oral daily  fluticasone propionate 50 MICROgram(s)/spray Nasal Spray 1 Spray(s) Both Nostrils daily  NIFEdipine XL 60 milliGRAM(s) Oral daily  oxyCODONE    IR 5 milliGRAM(s) Oral every 4 hours PRN  oxyCODONE    IR 10 milliGRAM(s) Oral every 4 hours PRN  pantoprazole    Tablet 40 milliGRAM(s) Oral before breakfast  polyethylene glycol 3350 17 Gram(s) Oral daily  senna 2 Tablet(s) Oral at bedtime  simvastatin 20 milliGRAM(s) Oral at bedtime  sodium biphosphate Rectal Enema 1 Enema Rectal once PRN  tamsulosin 0.4 milliGRAM(s) Oral at bedtime  warfarin 2 milliGRAM(s) Oral at bedtime      Labs:                         10.3   11.4  )-----------( 833      ( 13 Nov 2018 17:20 )             32.1       11-13    133<L>  |  96  |  16  ----------------------------<  141<H>  4.5   |  24  |  0.81    Ca    9.0      13 Nov 2018 17:20  Phos  3.2     11-13  Mg     2.0     11-13    TPro  6.8  /  Alb  3.1<L>  /  TBili  0.4  /  DBili  x   /  AST  44<H>  /  ALT  35  /  AlkPhos  157<H>  11-13          Blood Bank:       Assessment/Plan:     This is a 73y  year old right  hand dominant Male  presents with   Patient presents to neuro-IR for selective cerebral angiography.   Procedure, goals, risks, benefits and alternatives  were discussed with patient and patient's family.  All questions were answered.  Risks include but are not limited to stroke, vessel injury, hemorrhage, and or right  groin hematoma.  Patient demonstrates understanding  of all risks involved with this procedure and wishes to continue.   Appropriate  content was obtained from patient and consent is in the patient's chart. Interventional Neuro Radiology  Pre-Procedure Note FRANK    This is a 73 year old right hand dominant male with PMH of atrial fibrillation, HTN, Hodgkin's lymphoma s/p splenectomy (1975), hip replacement, and MOE presented to the ED with right lower extremity pain and decreased sensation since 10am today. He recently underwent spine surgery on 10/19 for L3/L4 hardware revision and has been off anticoagulation since 10/18. He takes Coumadin and was bridged with Lovenox. He was discharged on 10/23 on aspirin and has not yet restarted anticoagulation. Of note, he had a CVA in Jan 2018 while off anticoagulation for spine surgery.     He states at 10am he began having pain from his right mid-thigh extending to his foot, as well as decreased sensation and weakness. He has never had symptoms like this in the past. After arrival to the ED his symptoms improved slightly. P    Allergies: No Known Allergies  PMHX: Spondylolisthesis spinal stenosis: L3-L4 CVA ,osteoarthritis, benign prostatic hyperplasia, obstructive sleep apnea, many years ago, GERD hypertension atrial fibrillation: over 20 years hodgkin lymphoma  PSHX: hip replacement, right: 2014, splenectomy: 1975    Social History:   FAMILY HISTORY:  Current Medications:   acetaminophen   Tablet .. 650 milliGRAM(s) Oral every 6 hours PRN  acetaminophen  IVPB .. 1000 milliGRAM(s) IV Intermittent once  aspirin enteric coated 81 milliGRAM(s) Oral daily  ATENolol  Tablet 25 milliGRAM(s) Oral daily  docusate sodium 100 milliGRAM(s) Oral daily  fluticasone propionate 50 MICROgram(s)/spray Nasal Spray 1 Spray(s) Both Nostrils daily  NIFEdipine XL 60 milliGRAM(s) Oral daily  oxyCODONE    IR 5 milliGRAM(s) Oral every 4 hours PRN  oxyCODONE    IR 10 milliGRAM(s) Oral every 4 hours PRN  pantoprazole    Tablet 40 milliGRAM(s) Oral before breakfast  polyethylene glycol 3350 17 Gram(s) Oral daily  senna 2 Tablet(s) Oral at bedtime  simvastatin 20 milliGRAM(s) Oral at bedtime  sodium biphosphate Rectal Enema 1 Enema Rectal once PRN  tamsulosin 0.4 milliGRAM(s) Oral at bedtime  warfarin 2 milliGRAM(s) Oral at bedtime      Labs:                         10.3   11.4  )-----------( 833      ( 13 Nov 2018 17:20 )             32.1       11-13    133<L>  |  96  |  16  ----------------------------<  141<H>  4.5   |  24  |  0.81    Ca    9.0      13 Nov 2018 17:20  Phos  3.2     11-13  Mg     2.0     11-13    TPro  6.8  /  Alb  3.1<L>  /  TBili  0.4  /  DBili  x   /  AST  44<H>  /  ALT  35  /  AlkPhos  157<H>  11-13          Blood Bank:       Assessment/Plan:     This is a 73y  year old right  hand dominant Male  presents with   Patient presents to neuro-IR for selective cerebral angiography.   Procedure, goals, risks, benefits and alternatives  were discussed with patient and patient's family.  All questions were answered.  Risks include but are not limited to stroke, vessel injury, hemorrhage, and or right  groin hematoma.  Patient demonstrates understanding  of all risks involved with this procedure and wishes to continue.   Appropriate  content was obtained from patient and consent is in the patient's chart. Interventional Neuro Radiology  Pre-Procedure Note FRANK    This is a 73 year old right hand dominant male with PMH of atrial fibrillation, HTN, Hodgkin's lymphoma s/p splenectomy (1975), hip replacement, and MOE presented to the ED with right lower extremity pain and decreased sensation since 10am today. He recently underwent spine surgery on 10/19 for L3/L4 hardware revision and has been off anticoagulation since 10/18. He takes Coumadin and was bridged with Lovenox. He was discharged on 10/23 on aspirin and has not yet restarted anticoagulation.   He states at 10am he began having pain from his right mid-thigh extending to his foot, as well as decreased sensation and weakness. Patient was transported to Neuro IR for a selective cerebral angiography and stroke intervention.     Allergies: No Known Allergies  PMHX: Spondylolisthesis spinal stenosis: L3-L4 CVA ,osteoarthritis, benign prostatic hyperplasia, obstructive sleep apnea, many years ago, GERD hypertension atrial fibrillation: over 20 years Hodgkin's lymphoma  PSHX: hip replacement, right: 2014, splenectomy: 1975  Social History:   FAMILY HISTORY: non-contributory    Current Medications:  acetaminophen  IVPB .. 1000 milliGRAM(s) IV Intermittent once  aspirin enteric coated 81 milliGRAM(s) Oral daily  Atenolol  Tablet 25 milliGRAM(s) Oral daily  docusate sodium 100 milliGRAM(s) Oral daily  fluticasone propionate 50 MICROgram(s)/spray Nasal Spray 1 Spray(s) Both Nostrils daily  NIFEdipine XL 60 milliGRAM(s) Oral daily  oxyCODONE    IR 5 milliGRAM(s) Oral every 4 hours PRN  oxyCODONE    IR 10 milliGRAM(s) Oral every 4 hours PRN  pantoprazole    Tablet 40 milliGRAM(s) Oral before breakfast  polyethylene glycol 3350 17 Gram(s) Oral daily  senna 2 Tablet(s) Oral at bedtime  simvastatin 20 milliGRAM(s) Oral at bedtime  sodium biphosphate Rectal Enema 1 Enema Rectal once   tamsulosin 0.4 milliGRAM(s) Oral at bedtime  warfarin 2 milliGRAM(s) Oral at bedtime    CBC                        10.3   11.4  )-----------( 833                 32.1       BMP    133<L>  |  96  |  16  ----------------------------<  141  4.5   |  24  |  0.81    Blood Bank: O positive available    Assessment/Plan:   This is a 73 year old right hand dominant male    Procedure, goals, risks, benefits and alternatives were discussed with patient and patient's daughter. All questions were answered. Risks include but are not limited to stroke, vessel injury, hemorrhage, and or right groin hematoma. Patient's daughter demonstrates understanding of all risks involved with this procedure and wishes to continue. Appropriate content was obtained from patient's daughter and consent is in the patient's chart. Interventional Neuro Radiology  Pre-Procedure Note PA-JOSE    This is a 73 year old right hand dominant male with PMH of atrial fibrillation, HTN, Hodgkin's lymphoma s/p splenectomy (1975), hip replacement, and MOE presented to the ED with right lower extremity pain and decreased sensation since 10am today. He recently underwent spine surgery on 10/19 for L3/L4 hardware revision and has been off anticoagulation since 10/18. He takes Coumadin and was bridged with Lovenox. He was discharged on 10/23 on aspirin and has not yet restarted anticoagulation.   He states at 10am he began having pain from his right mid-thigh extending to his foot, as well as decreased sensation and weakness. CTA of the head revealed a distal M1 occlusion. NIHSS was 15. Patient was transported to Neuro IR for a selective cerebral angiography and stroke intervention.     Allergies: No Known Allergies  PMHX: Spondylolisthesis spinal stenosis: L3-L4 CVA ,osteoarthritis, benign prostatic hyperplasia, obstructive sleep apnea, many years ago, GERD hypertension atrial fibrillation: over 20 years Hodgkin's lymphoma  PSHX:light lower extremity fasciotomy 10/27/2018 hip replacement, right: 2014, splenectomy: 1975  Social History:   FAMILY HISTORY: non-contributory    Current Medications:  acetaminophen  IVPB .. 1000 milliGRAM(s) IV Intermittent once  aspirin enteric coated 81 milliGRAM(s) Oral daily  Atenolol  Tablet 25 milliGRAM(s) Oral daily  docusate sodium 100 milliGRAM(s) Oral daily  fluticasone propionate 50 MICROgram(s)/spray Nasal Spray 1 Spray(s) Both Nostrils daily  NIFEdipine XL 60 milliGRAM(s) Oral daily  oxyCODONE    IR 5 milliGRAM(s) Oral every 4 hours PRN  oxyCODONE    IR 10 milliGRAM(s) Oral every 4 hours PRN  pantoprazole    Tablet 40 milliGRAM(s) Oral before breakfast  polyethylene glycol 3350 17 Gram(s) Oral daily  senna 2 Tablet(s) Oral at bedtime  simvastatin 20 milliGRAM(s) Oral at bedtime  sodium biphosphate Rectal Enema 1 Enema Rectal once   tamsulosin 0.4 milliGRAM(s) Oral at bedtime  warfarin 2 milliGRAM(s) Oral at bedtime    CBC                        10.3   11.4  )-----------( 833                 32.1       BMP    133<L>  |  96  |  16  ----------------------------<  141  4.5   |  24  |  0.81    Blood Bank: O positive available    Assessment/Plan:   This is a 73 year old right hand dominant male with a distal M1 occlusion. Patient was transported to Neuro IR for a selective cerebral angiography and stroke intervention. Procedure, goals, risks, benefits and alternatives were discussed with patient and patient's daughter. All questions were answered. Risks include but are not limited to stroke, vessel injury, hemorrhage, and or right groin hematoma. Patient's daughter demonstrates understanding of all risks involved with this procedure and wishes to continue. Appropriate content was obtained from patient's daughter and consent is in the patient's chart.

## 2018-11-13 NOTE — PROGRESS NOTE ADULT - SUBJECTIVE AND OBJECTIVE BOX
Surgery Progress Note     Subjective/24hour Events:   Patient seen and examined.   No acute events overnight.   Pain well controlled.   Has been OOB and pivot.     Vital Signs:  Vital Signs Last 24 Hrs  T(C): 37.3 (13 Nov 2018 06:22), Max: 37.3 (13 Nov 2018 06:22)  T(F): 99.2 (13 Nov 2018 06:22), Max: 99.2 (13 Nov 2018 06:22)  HR: 70 (13 Nov 2018 06:22) (61 - 89)  BP: 118/66 (13 Nov 2018 06:22) (112/71 - 141/67)  BP(mean): --  RR: 16 (13 Nov 2018 06:22) (16 - 18)  SpO2: 94% (13 Nov 2018 06:22) (94% - 98%)    CAPILLARY BLOOD GLUCOSE          I&O's Detail    12 Nov 2018 07:01  -  13 Nov 2018 07:00  --------------------------------------------------------  IN:    Oral Fluid: 1195 mL  Total IN: 1195 mL    OUT:    Voided: 1450 mL  Total OUT: 1450 mL    Total NET: -255 mL          MEDICATIONS  (STANDING):  acetaminophen  IVPB .. 1000 milliGRAM(s) IV Intermittent once  aspirin enteric coated 81 milliGRAM(s) Oral daily  ATENolol  Tablet 25 milliGRAM(s) Oral daily  docusate sodium 100 milliGRAM(s) Oral daily  fluticasone propionate 50 MICROgram(s)/spray Nasal Spray 1 Spray(s) Both Nostrils daily  NIFEdipine XL 60 milliGRAM(s) Oral daily  pantoprazole    Tablet 40 milliGRAM(s) Oral before breakfast  polyethylene glycol 3350 17 Gram(s) Oral daily  senna 2 Tablet(s) Oral at bedtime  simvastatin 20 milliGRAM(s) Oral at bedtime  tamsulosin 0.4 milliGRAM(s) Oral at bedtime    MEDICATIONS  (PRN):  acetaminophen   Tablet .. 650 milliGRAM(s) Oral every 6 hours PRN Mild Pain (1 - 3)  oxyCODONE    IR 5 milliGRAM(s) Oral every 4 hours PRN Moderate Pain (4 - 6)  oxyCODONE    IR 10 milliGRAM(s) Oral every 4 hours PRN Severe Pain (7 - 10)  sodium biphosphate Rectal Enema 1 Enema Rectal once PRN if unable to go after suppository, thanks      Physical Exam:  Gen: NAD, laying comfortably in bed, converses easily.   Lungs: Non labored breathing.   Ext: RLE with wound vac to suction, donor site dressing intact. Sensation to light touch intact, wiggles toes. DP palpable 2+.       Labs:    11-13    135  |  96  |  13  ----------------------------<  97  4.2   |  26  |  0.72    Ca    9.2      13 Nov 2018 07:13  Phos  3.2     11-13  Mg     2.0     11-13                              10.6   11.5  )-----------( 818      ( 13 Nov 2018 07:13 )             32.8     PT/INR - ( 13 Nov 2018 07:13 )   PT: 28.2 sec;   INR: 2.39 ratio         PTT - ( 13 Nov 2018 07:13 )  PTT:35.8 sec    Imaging:

## 2018-11-13 NOTE — CHART NOTE - NSCHARTNOTEFT_GEN_A_CORE
SURGERY  Post Procedure Check  Procedure: Selective Cerebral Angiography and stroke intervention     SUBJECTIVE/ROS:  CODE Stroke called at 17:01. Patient found to have left sided MCA thrombosis. Patient was taken by interventional neuro radiology for a cerebral angiography and mechanical thrombectomy. Patient seen and evaluated at the bedside in NSCU post procedure. Patient states that he feels well. Denies headache, dizziness, numbness and tingling. Endorses chronic pain in right lower extremity.        OBJECTIVE:    Vital Signs Last 24 Hrs  T(C): 36.8 (13 Nov 2018 19:45), Max: 37.3 (13 Nov 2018 06:22)  T(F): 98.2 (13 Nov 2018 19:45), Max: 99.2 (13 Nov 2018 06:22)  HR: 70 (13 Nov 2018 22:30) (55 - 89)  BP: 142/76 (13 Nov 2018 22:00) (113/62 - 144/74)  BP(mean): 95 (13 Nov 2018 22:00) (79 - 95)  RR: 18 (13 Nov 2018 22:30) (10 - 24)  SpO2: 94% (13 Nov 2018 22:30) (93% - 98%)  I&O's Detail    12 Nov 2018 07:01  -  13 Nov 2018 07:00  --------------------------------------------------------  IN:    Oral Fluid: 1195 mL  Total IN: 1195 mL    OUT:    Voided: 1450 mL  Total OUT: 1450 mL    Total NET: -255 mL      13 Nov 2018 07:01  -  13 Nov 2018 23:10  --------------------------------------------------------  IN:    Oral Fluid: 660 mL    sodium chloride 2% .: 150 mL  Total IN: 810 mL    OUT:    Voided: 500 mL  Total OUT: 500 mL    Total NET: 310 mL      Daily     Daily   MEDICATIONS  (STANDING):  acetaminophen  IVPB .. 1000 milliGRAM(s) IV Intermittent once  ATENolol  Tablet 25 milliGRAM(s) Oral daily  chlorhexidine 4% Liquid 1 Application(s) Topical <User Schedule>  docusate sodium 100 milliGRAM(s) Oral daily  fluticasone propionate 50 MICROgram(s)/spray Nasal Spray 1 Spray(s) Both Nostrils daily  NIFEdipine XL 60 milliGRAM(s) Oral daily  pantoprazole    Tablet 40 milliGRAM(s) Oral before breakfast  polyethylene glycol 3350 17 Gram(s) Oral daily  senna 2 Tablet(s) Oral at bedtime  simvastatin 20 milliGRAM(s) Oral at bedtime  sodium chloride 2% . 1000 milliLiter(s) (75 mL/Hr) IV Continuous <Continuous>  tamsulosin 0.4 milliGRAM(s) Oral at bedtime    MEDICATIONS  (PRN):  acetaminophen   Tablet .. 650 milliGRAM(s) Oral every 6 hours PRN Mild Pain (1 - 3)  oxyCODONE    IR 5 milliGRAM(s) Oral every 4 hours PRN Moderate Pain (4 - 6)  oxyCODONE    IR 10 milliGRAM(s) Oral every 4 hours PRN Severe Pain (7 - 10)  sodium biphosphate Rectal Enema 1 Enema Rectal once PRN if unable to go after suppository, thanks      LABS:                        9.7    12.3  )-----------( 809      ( 13 Nov 2018 20:12 )             29.9     11-13    133<L>  |  98  |  14  ----------------------------<  116<H>  4.2   |  25  |  0.77    Ca    8.9      13 Nov 2018 20:12  Phos  3.7     11-13  Mg     1.9     11-13    TPro  6.8  /  Alb  3.1<L>  /  TBili  0.4  /  DBili  x   /  AST  44<H>  /  ALT  35  /  AlkPhos  157<H>  11-13    PT/INR - ( 13 Nov 2018 20:12 )   PT: 28.9 sec;   INR: 2.47 ratio         PTT - ( 13 Nov 2018 20:12 )  PTT:38.8 sec        PHYSICAL EXAM:  Constitutional: well developed, well nourished, NAD, AAO x 3   Neuro: CN exam intact, non-focal exam, sensation and motor function intact, facial symmetry, appropriate speech  Respiratory: non-labored breathing   Gastrointestinal: abdomen soft, nontender, nondistended  Extremities: RLE wound vac to suction with ACE bandage in place, donor site dressing saturated with sanguineous drainage, intact   LLE groin, no hematoma, safeguard balloon dressing in place, FROM of all 4 extremities     Assessment  73M with recent spine surgery now s/p RLE embolectomy for acute RLE arterial occlusion now s/p RLE fasciotomies, split thickness skin graft and placement of wound vac over closure to RLE. Patient with acute left MCA infarct now s/p Selective Cerebral Angiography and stroke intervention 11/13/18.     Plan  - f/u neurology recommendations post stroke   - neuro checks   - f/u CT Head   - continue with Coumadin   - continue with wound vac to suction of RLE   - f/u with plastics recommendations on graft site dressing     Orquidea Mcguire PA-C   Vascular Surgery   p [id number]

## 2018-11-14 LAB
ANION GAP SERPL CALC-SCNC: 9 MMOL/L — SIGNIFICANT CHANGE UP (ref 5–17)
ANION GAP SERPL CALC-SCNC: 9 MMOL/L — SIGNIFICANT CHANGE UP (ref 5–17)
APTT BLD: 40.8 SEC — HIGH (ref 27.5–36.3)
BUN SERPL-MCNC: 11 MG/DL — SIGNIFICANT CHANGE UP (ref 7–23)
BUN SERPL-MCNC: 12 MG/DL — SIGNIFICANT CHANGE UP (ref 7–23)
CALCIUM SERPL-MCNC: 8.9 MG/DL — SIGNIFICANT CHANGE UP (ref 8.4–10.5)
CALCIUM SERPL-MCNC: 9 MG/DL — SIGNIFICANT CHANGE UP (ref 8.4–10.5)
CHLORIDE SERPL-SCNC: 100 MMOL/L — SIGNIFICANT CHANGE UP (ref 96–108)
CHLORIDE SERPL-SCNC: 102 MMOL/L — SIGNIFICANT CHANGE UP (ref 96–108)
CHOLEST SERPL-MCNC: 139 MG/DL — SIGNIFICANT CHANGE UP (ref 10–199)
CO2 SERPL-SCNC: 26 MMOL/L — SIGNIFICANT CHANGE UP (ref 22–31)
CO2 SERPL-SCNC: 26 MMOL/L — SIGNIFICANT CHANGE UP (ref 22–31)
CREAT SERPL-MCNC: 0.69 MG/DL — SIGNIFICANT CHANGE UP (ref 0.5–1.3)
CREAT SERPL-MCNC: 0.73 MG/DL — SIGNIFICANT CHANGE UP (ref 0.5–1.3)
GLUCOSE SERPL-MCNC: 108 MG/DL — HIGH (ref 70–99)
GLUCOSE SERPL-MCNC: 112 MG/DL — HIGH (ref 70–99)
HBA1C BLD-MCNC: 5.6 % — SIGNIFICANT CHANGE UP (ref 4–5.6)
HCT VFR BLD CALC: 30.5 % — LOW (ref 39–50)
HDLC SERPL-MCNC: 43 MG/DL — SIGNIFICANT CHANGE UP
HGB BLD-MCNC: 9.8 G/DL — LOW (ref 13–17)
INR BLD: 2.32 RATIO — HIGH (ref 0.88–1.16)
LIPID PNL WITH DIRECT LDL SERPL: 73 MG/DL — SIGNIFICANT CHANGE UP
MAGNESIUM SERPL-MCNC: 1.9 MG/DL — SIGNIFICANT CHANGE UP (ref 1.6–2.6)
MCHC RBC-ENTMCNC: 31.5 PG — SIGNIFICANT CHANGE UP (ref 27–34)
MCHC RBC-ENTMCNC: 32.3 GM/DL — SIGNIFICANT CHANGE UP (ref 32–36)
MCV RBC AUTO: 97.5 FL — SIGNIFICANT CHANGE UP (ref 80–100)
OSMOLALITY SERPL: 289 MOS/KG — SIGNIFICANT CHANGE UP (ref 275–300)
OSMOLALITY UR: 391 MOS/KG — SIGNIFICANT CHANGE UP (ref 300–900)
PHOSPHATE SERPL-MCNC: 3.6 MG/DL — SIGNIFICANT CHANGE UP (ref 2.5–4.5)
PLATELET # BLD AUTO: 821 K/UL — HIGH (ref 150–400)
POTASSIUM SERPL-MCNC: 4.3 MMOL/L — SIGNIFICANT CHANGE UP (ref 3.5–5.3)
POTASSIUM SERPL-MCNC: 4.3 MMOL/L — SIGNIFICANT CHANGE UP (ref 3.5–5.3)
POTASSIUM SERPL-SCNC: 4.3 MMOL/L — SIGNIFICANT CHANGE UP (ref 3.5–5.3)
POTASSIUM SERPL-SCNC: 4.3 MMOL/L — SIGNIFICANT CHANGE UP (ref 3.5–5.3)
PROTHROM AB SERPL-ACNC: 27.1 SEC — HIGH (ref 10–12.9)
RBC # BLD: 3.13 M/UL — LOW (ref 4.2–5.8)
RBC # FLD: 14.2 % — SIGNIFICANT CHANGE UP (ref 10.3–14.5)
SODIUM SERPL-SCNC: 135 MMOL/L — SIGNIFICANT CHANGE UP (ref 135–145)
SODIUM SERPL-SCNC: 137 MMOL/L — SIGNIFICANT CHANGE UP (ref 135–145)
T3 SERPL-MCNC: 114 NG/DL — SIGNIFICANT CHANGE UP (ref 80–200)
T4 AB SER-ACNC: 7.6 UG/DL — SIGNIFICANT CHANGE UP (ref 4.6–12)
TOTAL CHOLESTEROL/HDL RATIO MEASUREMENT: 3.2 RATIO — LOW (ref 3.4–9.6)
TRIGL SERPL-MCNC: 116 MG/DL — SIGNIFICANT CHANGE UP (ref 10–149)
TSH SERPL-MCNC: 1.67 UIU/ML — SIGNIFICANT CHANGE UP (ref 0.27–4.2)
WBC # BLD: 12 K/UL — HIGH (ref 3.8–10.5)
WBC # FLD AUTO: 12 K/UL — HIGH (ref 3.8–10.5)

## 2018-11-14 PROCEDURE — 71260 CT THORAX DX C+: CPT | Mod: 26

## 2018-11-14 PROCEDURE — 99233 SBSQ HOSP IP/OBS HIGH 50: CPT

## 2018-11-14 PROCEDURE — 70450 CT HEAD/BRAIN W/O DYE: CPT | Mod: 26

## 2018-11-14 PROCEDURE — 99222 1ST HOSP IP/OBS MODERATE 55: CPT | Mod: GC

## 2018-11-14 PROCEDURE — 74177 CT ABD & PELVIS W/CONTRAST: CPT | Mod: 26

## 2018-11-14 RX ORDER — ASPIRIN/CALCIUM CARB/MAGNESIUM 324 MG
81 TABLET ORAL DAILY
Qty: 0 | Refills: 0 | Status: DISCONTINUED | OUTPATIENT
Start: 2018-11-14 | End: 2018-11-16

## 2018-11-14 RX ORDER — MAGNESIUM SULFATE 500 MG/ML
1 VIAL (ML) INJECTION ONCE
Qty: 0 | Refills: 0 | Status: COMPLETED | OUTPATIENT
Start: 2018-11-14 | End: 2018-11-14

## 2018-11-14 RX ADMIN — TAMSULOSIN HYDROCHLORIDE 0.4 MILLIGRAM(S): 0.4 CAPSULE ORAL at 21:59

## 2018-11-14 RX ADMIN — SENNA PLUS 2 TABLET(S): 8.6 TABLET ORAL at 21:59

## 2018-11-14 RX ADMIN — OXYCODONE HYDROCHLORIDE 5 MILLIGRAM(S): 5 TABLET ORAL at 17:02

## 2018-11-14 RX ADMIN — Medication 81 MILLIGRAM(S): at 12:04

## 2018-11-14 RX ADMIN — OXYCODONE HYDROCHLORIDE 5 MILLIGRAM(S): 5 TABLET ORAL at 02:04

## 2018-11-14 RX ADMIN — OXYCODONE HYDROCHLORIDE 5 MILLIGRAM(S): 5 TABLET ORAL at 21:59

## 2018-11-14 RX ADMIN — OXYCODONE HYDROCHLORIDE 5 MILLIGRAM(S): 5 TABLET ORAL at 08:45

## 2018-11-14 RX ADMIN — OXYCODONE HYDROCHLORIDE 5 MILLIGRAM(S): 5 TABLET ORAL at 03:00

## 2018-11-14 RX ADMIN — POLYETHYLENE GLYCOL 3350 17 GRAM(S): 17 POWDER, FOR SOLUTION ORAL at 12:03

## 2018-11-14 RX ADMIN — PANTOPRAZOLE SODIUM 40 MILLIGRAM(S): 20 TABLET, DELAYED RELEASE ORAL at 08:51

## 2018-11-14 RX ADMIN — Medication 100 MILLIGRAM(S): at 12:04

## 2018-11-14 RX ADMIN — OXYCODONE HYDROCHLORIDE 5 MILLIGRAM(S): 5 TABLET ORAL at 13:00

## 2018-11-14 RX ADMIN — OXYCODONE HYDROCHLORIDE 5 MILLIGRAM(S): 5 TABLET ORAL at 12:30

## 2018-11-14 RX ADMIN — ATENOLOL 25 MILLIGRAM(S): 25 TABLET ORAL at 05:11

## 2018-11-14 RX ADMIN — OXYCODONE HYDROCHLORIDE 5 MILLIGRAM(S): 5 TABLET ORAL at 19:01

## 2018-11-14 RX ADMIN — Medication 100 GRAM(S): at 03:48

## 2018-11-14 RX ADMIN — SODIUM CHLORIDE 75 MILLILITER(S): 5 INJECTION, SOLUTION INTRAVENOUS at 15:10

## 2018-11-14 RX ADMIN — Medication 1 SPRAY(S): at 13:22

## 2018-11-14 RX ADMIN — SIMVASTATIN 20 MILLIGRAM(S): 20 TABLET, FILM COATED ORAL at 22:00

## 2018-11-14 RX ADMIN — CHLORHEXIDINE GLUCONATE 1 APPLICATION(S): 213 SOLUTION TOPICAL at 00:16

## 2018-11-14 RX ADMIN — Medication 60 MILLIGRAM(S): at 05:11

## 2018-11-14 RX ADMIN — OXYCODONE HYDROCHLORIDE 5 MILLIGRAM(S): 5 TABLET ORAL at 08:15

## 2018-11-14 NOTE — PHYSICAL THERAPY INITIAL EVALUATION ADULT - ADDITIONAL COMMENTS
Pt lives in private home with wife. + 4 steps to enter(handrail), another 4 steps to bed/bathroom+handrail Pt independent with ADL's and amb PTA. Pt has rolling walker at home

## 2018-11-14 NOTE — PHYSICAL THERAPY INITIAL EVALUATION ADULT - BED MOBILITY TRAINING, PT EVAL
GOAL: Pt to perform bed mobs req min assist x1 in 2 weeks
GOAL: Pt will perform all bedmobility independently within 3-4 wks.

## 2018-11-14 NOTE — PROGRESS NOTE ADULT - SUBJECTIVE AND OBJECTIVE BOX
HPI:  74yo man PMH afib on coumadin x20yrs, was off for back surgery in 01/2018 had CVA, required revision surgery in 10/2018, was discharged to rehab, had RLE embolus, re adm to vascular surgery for embolectomy and fasciotomy, while on vascular service restarted on heparin gtt and bridged to coumadin, had acute AMS around 16:30 on 10/13, slurred speech and R sided weakness, taken to IR now s/p L MCA M2 proximal superior division TICI 3 recanalization.     OVERNIGHT EVENTS:   In the ICU post-angio    VITALS:  T(C): , Max: 37 (11-14-18 @ 07:00)  HR:  (53 - 96)  BP:  (111/93 - 152/62)  ABP: --  RR:  (9 - 24)  SpO2:  (91% - 99%)  Wt(kg): --    11-13-18 @ 07:01  -  11-14-18 @ 07:00  --------------------------------------------------------  IN: 1864 mL / OUT: 1250 mL / NET: 614 mL    11-14-18 @ 07:01  -  11-14-18 @ 13:50  --------------------------------------------------------  IN: 790 mL / OUT: 350 mL / NET: 440 mL      LABS:  Na: 137 (11-14 @ 01:39), 135 (11-13 @ 23:49), 133 (11-13 @ 20:12), 133 (11-13 @ 17:20), 135 (11-13 @ 07:13), 136 (11-12 @ 07:13)  K: 4.3 (11-14 @ 01:39), 4.3 (11-13 @ 23:49), 4.2 (11-13 @ 20:12), 4.5 (11-13 @ 17:20), 4.2 (11-13 @ 07:13), 4.2 (11-12 @ 07:13)  Cl: 102 (11-14 @ 01:39), 100 (11-13 @ 23:49), 98 (11-13 @ 20:12), 96 (11-13 @ 17:20), 96 (11-13 @ 07:13), 98 (11-12 @ 07:13)  CO2: 26 (11-14 @ 01:39), 26 (11-13 @ 23:49), 25 (11-13 @ 20:12), 24 (11-13 @ 17:20), 26 (11-13 @ 07:13), 29 (11-12 @ 07:13)  BUN: 11 (11-14 @ 01:39), 12 (11-13 @ 23:49), 14 (11-13 @ 20:12), 16 (11-13 @ 17:20), 13 (11-13 @ 07:13), 12 (11-12 @ 07:13)  Cr: 0.69 (11-14 @ 01:39), 0.73 (11-13 @ 23:49), 0.77 (11-13 @ 20:12), 0.81 (11-13 @ 17:20), 0.72 (11-13 @ 07:13), 0.73 (11-12 @ 07:13)  Glu: 108(11-14 @ 01:39), 112(11-13 @ 23:49), 116(11-13 @ 20:12), 141(11-13 @ 17:20), 97(11-13 @ 07:13), 114(11-12 @ 07:13)    Hgb: 9.8 (11-13 @ 23:49), 9.7 (11-13 @ 20:12), 10.3 (11-13 @ 17:20), 10.6 (11-13 @ 07:13), 10.6 (11-12 @ 07:11)  Hct: 30.5 (11-13 @ 23:49), 29.9 (11-13 @ 20:12), 32.1 (11-13 @ 17:20), 32.8 (11-13 @ 07:13), 33.1 (11-12 @ 07:11)  WBC: 12.0 (11-13 @ 23:49), 12.3 (11-13 @ 20:12), 11.4 (11-13 @ 17:20), 11.5 (11-13 @ 07:13), 12.0 (11-12 @ 07:11)  Plt: 821 (11-13 @ 23:49), 809 (11-13 @ 20:12), 833 (11-13 @ 17:20), 818 (11-13 @ 07:13), 811 (11-12 @ 07:11)    INR: 2.32 11-13-18 @ 23:49, 2.47 11-13-18 @ 20:12, 2.46 11-13-18 @ 17:20, 2.39 11-13-18 @ 07:13, 2.62 11-12-18 @ 20:45, 2.29 11-12-18 @ 07:13  PTT: 40.8 11-13-18 @ 23:49, 38.8 11-13-18 @ 20:12, 38.6 11-13-18 @ 17:20, 35.8 11-13-18 @ 07:13, 37.4 11-12-18 @ 20:45, 164.2 11-12-18 @ 07:13    IMAGING:   Recent imaging studies were reviewed.    MEDICATIONS:  acetaminophen   Tablet .. 650 milliGRAM(s) Oral every 6 hours PRN  aspirin enteric coated 81 milliGRAM(s) Oral daily  ATENolol  Tablet 25 milliGRAM(s) Oral daily  chlorhexidine 4% Liquid 1 Application(s) Topical <User Schedule>  docusate sodium 100 milliGRAM(s) Oral daily  fluticasone propionate 50 MICROgram(s)/spray Nasal Spray 1 Spray(s) Both Nostrils daily  NIFEdipine XL 60 milliGRAM(s) Oral daily  oxyCODONE    IR 5 milliGRAM(s) Oral every 4 hours PRN  oxyCODONE    IR 10 milliGRAM(s) Oral every 4 hours PRN  pantoprazole    Tablet 40 milliGRAM(s) Oral before breakfast  polyethylene glycol 3350 17 Gram(s) Oral daily  senna 2 Tablet(s) Oral at bedtime  simvastatin 20 milliGRAM(s) Oral at bedtime  sodium chloride 2% . 1000 milliLiter(s) IV Continuous <Continuous>  tamsulosin 0.4 milliGRAM(s) Oral at bedtime    EXAMINATION:  General:  calm  HEENT:  MMM  Neuro:  awake, alert, oriented x 3, follows commands, moves all extremities  Cards:  RRR  Respiratory:  no respiratory distress  Adomen:  soft  Extremities:  no edema  Skin:  warm/[id number] man PMH afib on coumadin x20yrs, was off for back surgery in 01/2018 had CVA, required revision surgery in 10/2018, was discharged to rehab, had RLE embolus, re adm to vascular surgery for embolectomy and fasciotomy, while on vascular service restarted on heparin gtt and bridged to coumadin, had acute AMS around 16:30 on 10/13, slurred speech and R sided weakness, taken to IR now s/p L MCA M2 proximal superior division TICI 3 recanalization.     OVERNIGHT

## 2018-11-14 NOTE — PHYSICAL THERAPY INITIAL EVALUATION ADULT - IMPAIRMENTS FOUND, PT EVAL
integumentary integrity
gait, locomotion, and balance/aerobic capacity/endurance
gross motor/neuromotor development and sensory integration/muscle strength/ROM/joint integrity and mobility/gait, locomotion, and balance

## 2018-11-14 NOTE — PHYSICAL THERAPY INITIAL EVALUATION ADULT - GAIT TRAINING, PT EVAL
GOAL: Pt to be able to amb 4o feet with rolling walker req minAx1 in 2weeks
GOAL: Pt will ambulate 50' independently with or without AD as needed in 3-4wks.

## 2018-11-14 NOTE — CONSULT NOTE ADULT - SUBJECTIVE AND OBJECTIVE BOX
HPI:  73M w/ PMH of atrial fibrillation (on coumadin), h/o Hodgkin's lymphoma s/p splenectomy (1975), who presented on 10/25 with right lower extremity pain and decreased sensation. He recently underwent spine surgery on 10/19 for L3/L4 hardware revision and had been off anticoagulation since 10/18, was discharged on aspirin on 10/23 and had not yet restarted anticoagulation.  INR on 10/25 was 1.12.  He had a CVA in Jan 2018 while off anticoagulation for spine surgery.  On admission he underwent RLE embolectomy and fasciotomy.  He was restarted on anticoagulation with coumadin.   Hospital course was complicated by a Code Stroke on 11/13 and was found to have left MCA infarct.  INR on 11/13 was 2.32.     Hematology consulted for thrombosis while on anticoagulation.         PAST MEDICAL & SURGICAL HISTORY:  Exposure to toxin: 9/11   Spondylolisthesis  Spinal stenosis: L3-L4  CVA (cerebral vascular accident): 1/2018 - attributed to no AC for 11 days preop/postop spine surgery - presented to ED with slurred speech, residual ST memory loss, per pt  Osteoarthritis  BPH (benign prostatic hyperplasia)  MOE (obstructive sleep apnea): positive sleep study many years ago, was recommended to use CPAP, patient non-compliant  GERD (gastroesophageal reflux disease)  HTN (hypertension)  Atrial fibrillation: over 20 years  Hodgkin lymphoma: 1975  History of lumbar spinal fusion: 1/5/2018  History of cardioversion: for AF - &quot;many years ago&quot; - unsuccessful  S/P hip replacement, right: 2014  S/P splenectomy: 1975      Allergies  No Known Allergies        MEDICATIONS  (STANDING):  aspirin enteric coated 81 milliGRAM(s) Oral daily  ATENolol  Tablet 25 milliGRAM(s) Oral daily  chlorhexidine 4% Liquid 1 Application(s) Topical <User Schedule>  docusate sodium 100 milliGRAM(s) Oral daily  fluticasone propionate 50 MICROgram(s)/spray Nasal Spray 1 Spray(s) Both Nostrils daily  NIFEdipine XL 60 milliGRAM(s) Oral daily  pantoprazole    Tablet 40 milliGRAM(s) Oral before breakfast  polyethylene glycol 3350 17 Gram(s) Oral daily  senna 2 Tablet(s) Oral at bedtime  simvastatin 20 milliGRAM(s) Oral at bedtime  sodium chloride 2% . 1000 milliLiter(s) (75 mL/Hr) IV Continuous <Continuous>  tamsulosin 0.4 milliGRAM(s) Oral at bedtime    MEDICATIONS  (PRN):  acetaminophen   Tablet .. 650 milliGRAM(s) Oral every 6 hours PRN Mild Pain (1 - 3)  oxyCODONE    IR 5 milliGRAM(s) Oral every 4 hours PRN Moderate Pain (4 - 6)  oxyCODONE    IR 10 milliGRAM(s) Oral every 4 hours PRN Severe Pain (7 - 10)        SOCIAL HISTORY: No EtOH, no tobacco    REVIEW OF SYSTEMS:    CONSTITUTIONAL: No weakness, fevers or chills  EYES/ENT: No visual changes;  No vertigo or throat pain   NECK: No pain or stiffness  RESPIRATORY: No cough, wheezing, hemoptysis; No shortness of breath  CARDIOVASCULAR: No chest pain or palpitations  GASTROINTESTINAL: No abdominal or epigastric pain. No nausea, vomiting, or hematemesis; No diarrhea or constipation. No melena or hematochezia.  GENITOURINARY: No dysuria, frequency or hematuria  NEUROLOGICAL: No numbness or weakness  SKIN: No itching, burning, rashes, or lesions   All other review of systems is negative unless indicated above.        T(F): 98.4 (11-14-18 @ 11:00), Max: 98.6 (11-14-18 @ 07:00)  HR: 65 (11-14-18 @ 13:00)  BP: 112/53 (11-14-18 @ 13:00)  RR: 15 (11-14-18 @ 13:00)  SpO2: 94% (11-14-18 @ 13:00)        GENERAL: NAD, well-developed  HEAD:  Atraumatic, Normocephalic  EYES: EOMI, PERRLA, conjunctiva and sclera clear  NECK: Supple, No JVD  CHEST/LUNG: Clear to auscultation bilaterally; No wheeze  HEART: Regular rate and rhythm; No murmurs, rubs, or gallops  ABDOMEN: Soft, Nontender, Nondistended; Bowel sounds present  EXTREMITIES:  2+ Peripheral Pulses, No clubbing, cyanosis, or edema  NEUROLOGY: non-focal  SKIN: No rashes or lesions                          9.8    12.0  )-----------( 821      ( 13 Nov 2018 23:49 )             30.5       11-14    137  |  102  |  11  ----------------------------<  108<H>  4.3   |  26  |  0.69    Ca    8.9      14 Nov 2018 01:39  Phos  3.6     11-13  Mg     1.9     11-13    TPro  6.8  /  Alb  3.1<L>  /  TBili  0.4  /  DBili  x   /  AST  44<H>  /  ALT  35  /  AlkPhos  157<H>  11-13      Magnesium, Serum: 1.9 mg/dL (11-13 @ 23:49)  Phosphorus Level, Serum: 3.6 mg/dL (11-13 @ 23:49)  Magnesium, Serum: 1.9 mg/dL (11-13 @ 20:12)  Phosphorus Level, Serum: 3.7 mg/dL (11-13 @ 20:12)      PT/INR - ( 13 Nov 2018 23:49 )   PT: 27.1 sec;   INR: 2.32 ratio         PTT - ( 13 Nov 2018 23:49 )  PTT:40.8 sec HPI:  73M w/ PMH of atrial fibrillation (on coumadin), h/o Hodgkin's lymphoma s/p splenectomy (1975), who presented on 10/25 with right lower extremity pain and decreased sensation. He recently underwent spine surgery on 10/19 for L3/L4 hardware revision and had been off anticoagulation since 10/18, was discharged on aspirin on 10/23 and had not yet restarted anticoagulation.  INR on 10/25 was 1.12.  He had a CVA in Jan 2018 while off anticoagulation for spine surgery.  On admission he underwent RLE embolectomy and fasciotomy.  He was restarted on anticoagulation with coumadin.   Hospital course was complicated by a Code Stroke on 11/13 and was found to have left MCA infarct.  INR on 11/13 was 2.32.     Hematology consulted for thrombosis while on anticoagulation.     Last colonoscopy was 3-4 years ago.  No family or personal history of bleeding or clotting disorders.  Besides CVA in Jan 2018, no prior clots.         PAST MEDICAL & SURGICAL HISTORY:  Exposure to toxin: 9/11   Spondylolisthesis  Spinal stenosis: L3-L4  CVA (cerebral vascular accident): 1/2018 - attributed to no AC for 11 days preop/postop spine surgery - presented to ED with slurred speech, residual ST memory loss, per pt  Osteoarthritis  BPH (benign prostatic hyperplasia)  MOE (obstructive sleep apnea): positive sleep study many years ago, was recommended to use CPAP, patient non-compliant  GERD (gastroesophageal reflux disease)  HTN (hypertension)  Atrial fibrillation: over 20 years  Hodgkin lymphoma: 1975  History of lumbar spinal fusion: 1/5/2018  History of cardioversion: for AF - &quot;many years ago&quot; - unsuccessful  S/P hip replacement, right: 2014  S/P splenectomy: 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      Allergies  No Known Allergies        MEDICATIONS  (STANDING):  aspirin enteric coated 81 milliGRAM(s) Oral daily  ATENolol  Tablet 25 milliGRAM(s) Oral daily  chlorhexidine 4% Liquid 1 Application(s) Topical <User Schedule>  docusate sodium 100 milliGRAM(s) Oral daily  fluticasone propionate 50 MICROgram(s)/spray Nasal Spray 1 Spray(s) Both Nostrils daily  NIFEdipine XL 60 milliGRAM(s) Oral daily  pantoprazole    Tablet 40 milliGRAM(s) Oral before breakfast  polyethylene glycol 3350 17 Gram(s) Oral daily  senna 2 Tablet(s) Oral at bedtime  simvastatin 20 milliGRAM(s) Oral at bedtime  sodium chloride 2% . 1000 milliLiter(s) (75 mL/Hr) IV Continuous <Continuous>  tamsulosin 0.4 milliGRAM(s) Oral at bedtime    MEDICATIONS  (PRN):  acetaminophen   Tablet .. 650 milliGRAM(s) Oral every 6 hours PRN Mild Pain (1 - 3)  oxyCODONE    IR 5 milliGRAM(s) Oral every 4 hours PRN Moderate Pain (4 - 6)  oxyCODONE    IR 10 milliGRAM(s) Oral every 4 hours PRN Severe Pain (7 - 10)        SOCIAL HISTORY: No EtOH, no tobacco    REVIEW OF SYSTEMS:  CONSTITUTIONAL: No weakness, fevers or chills  EYES/ENT: No visual changes;  No vertigo or throat pain   NECK: No pain or stiffness  RESPIRATORY: No cough, wheezing, hemoptysis; No shortness of breath  CARDIOVASCULAR: No chest pain or palpitations  GASTROINTESTINAL: No abdominal or epigastric pain. No nausea, vomiting, or hematemesis; No diarrhea or constipation. No melena or hematochezia.  GENITOURINARY: No dysuria, frequency or hematuria  NEUROLOGICAL: No numbness or weakness  SKIN: No itching, burning, rashes, or lesions   All other review of systems is negative unless indicated above.        T(F): 98.4 (11-14-18 @ 11:00), Max: 98.6 (11-14-18 @ 07:00)  HR: 65 (11-14-18 @ 13:00)  BP: 112/53 (11-14-18 @ 13:00)  RR: 15 (11-14-18 @ 13:00)  SpO2: 94% (11-14-18 @ 13:00)        GENERAL: NAD, well-developed  HEAD:  Atraumatic, Normocephalic  EYES: EOMI, conjunctiva and sclera clear  NECK: Supple  CHEST/LUNG: Clear to auscultation bilaterally; No wheezes or crackles   HEART: Regular rate and rhythm; No murmurs, rubs, or gallops  ABDOMEN: Soft, Nontender, Nondistended  EXTREMITIES:  left: no clubbing, cyanosis, or edema; right: in bandage  NEUROLOGY: non-focal  SKIN: No rashes or lesions                          9.8    12.0  )-----------( 821      ( 13 Nov 2018 23:49 )             30.5       11-14    137  |  102  |  11  ----------------------------<  108<H>  4.3   |  26  |  0.69    Ca    8.9      14 Nov 2018 01:39  Phos  3.6     11-13  Mg     1.9     11-13    TPro  6.8  /  Alb  3.1<L>  /  TBili  0.4  /  DBili  x   /  AST  44<H>  /  ALT  35  /  AlkPhos  157<H>  11-13      Magnesium, Serum: 1.9 mg/dL (11-13 @ 23:49)  Phosphorus Level, Serum: 3.6 mg/dL (11-13 @ 23:49)  Magnesium, Serum: 1.9 mg/dL (11-13 @ 20:12)  Phosphorus Level, Serum: 3.7 mg/dL (11-13 @ 20:12)      PT/INR - ( 13 Nov 2018 23:49 )   PT: 27.1 sec;   INR: 2.32 ratio         PTT - ( 13 Nov 2018 23:49 )  PTT:40.8 sec HPI:  73M w/ PMH of atrial fibrillation (on coumadin), h/o Hodgkin's lymphoma s/p splenectomy (1975), who presented on 10/25 with right lower extremity pain and decreased sensation. He recently underwent spine surgery on 10/19 for L3/L4 hardware revision and had been off anticoagulation since 10/18, was discharged on aspirin on 10/23 and had not yet restarted anticoagulation.  INR on 10/25 was 1.12.  He had a CVA in Jan 2018 while off anticoagulation for spine surgery.  On admission he underwent RLE embolectomy and fasciotomy.  He was restarted on anticoagulation with coumadin.   Hospital course was complicated by a Code Stroke on 11/13 and was found to have left MCA infarct.  INR on 11/13 was 2.32.     Hematology consulted for thrombosis while on anticoagulation.     Patient was on a heparin gtt prior to restarting coumadin.  Coumadin dosing started 11/10 and has continued daily.  Heparin gtt was discontinued on 11/12 at INR 2.2.      Last colonoscopy was 3-4 years ago.  No family or personal history of bleeding or clotting disorders.  Besides CVA in Jan 2018, no prior clots.         PAST MEDICAL & SURGICAL HISTORY:  Exposure to toxin: 9/11   Spondylolisthesis  Spinal stenosis: L3-L4  CVA (cerebral vascular accident): 1/2018 - attributed to no AC for 11 days preop/postop spine surgery - presented to ED with slurred speech, residual ST memory loss, per pt  Osteoarthritis  BPH (benign prostatic hyperplasia)  MOE (obstructive sleep apnea): positive sleep study many years ago, was recommended to use CPAP, patient non-compliant  GERD (gastroesophageal reflux disease)  HTN (hypertension)  Atrial fibrillation: over 20 years  Hodgkin lymphoma: 1975  History of lumbar spinal fusion: 1/5/2018  History of cardioversion: for AF - &quot;many years ago&quot; - unsuccessful  S/P hip replacement, right: 2014  S/P splenectomy: 1975      Allergies  No Known Allergies        MEDICATIONS  (STANDING):  aspirin enteric coated 81 milliGRAM(s) Oral daily  ATENolol  Tablet 25 milliGRAM(s) Oral daily  chlorhexidine 4% Liquid 1 Application(s) Topical <User Schedule>  docusate sodium 100 milliGRAM(s) Oral daily  fluticasone propionate 50 MICROgram(s)/spray Nasal Spray 1 Spray(s) Both Nostrils daily  NIFEdipine XL 60 milliGRAM(s) Oral daily  pantoprazole    Tablet 40 milliGRAM(s) Oral before breakfast  polyethylene glycol 3350 17 Gram(s) Oral daily  senna 2 Tablet(s) Oral at bedtime  simvastatin 20 milliGRAM(s) Oral at bedtime  sodium chloride 2% . 1000 milliLiter(s) (75 mL/Hr) IV Continuous <Continuous>  tamsulosin 0.4 milliGRAM(s) Oral at bedtime    MEDICATIONS  (PRN):  acetaminophen   Tablet .. 650 milliGRAM(s) Oral every 6 hours PRN Mild Pain (1 - 3)  oxyCODONE    IR 5 milliGRAM(s) Oral every 4 hours PRN Moderate Pain (4 - 6)  oxyCODONE    IR 10 milliGRAM(s) Oral every 4 hours PRN Severe Pain (7 - 10)        SOCIAL HISTORY: No EtOH, no tobacco    REVIEW OF SYSTEMS:  CONSTITUTIONAL: No weakness, fevers or chills  EYES/ENT: No visual changes;  No vertigo or throat pain   NECK: No pain or stiffness  RESPIRATORY: No cough, wheezing, hemoptysis; No shortness of breath  CARDIOVASCULAR: No chest pain or palpitations  GASTROINTESTINAL: No abdominal or epigastric pain. No nausea, vomiting, or hematemesis; No diarrhea or constipation. No melena or hematochezia.  GENITOURINARY: No dysuria, frequency or hematuria  NEUROLOGICAL: No numbness or weakness  SKIN: No itching, burning, rashes, or lesions   All other review of systems is negative unless indicated above.        T(F): 98.4 (11-14-18 @ 11:00), Max: 98.6 (11-14-18 @ 07:00)  HR: 65 (11-14-18 @ 13:00)  BP: 112/53 (11-14-18 @ 13:00)  RR: 15 (11-14-18 @ 13:00)  SpO2: 94% (11-14-18 @ 13:00)        GENERAL: NAD, well-developed  HEAD:  Atraumatic, Normocephalic  EYES: EOMI, conjunctiva and sclera clear  NECK: Supple  CHEST/LUNG: Clear to auscultation bilaterally; No wheezes or crackles   HEART: Regular rate and rhythm; No murmurs, rubs, or gallops  ABDOMEN: Soft, Nontender, Nondistended  EXTREMITIES:  left: no clubbing, cyanosis, or edema; right: in bandage  NEUROLOGY: non-focal  SKIN: No rashes or lesions                          9.8    12.0  )-----------( 821      ( 13 Nov 2018 23:49 )             30.5       11-14    137  |  102  |  11  ----------------------------<  108<H>  4.3   |  26  |  0.69    Ca    8.9      14 Nov 2018 01:39  Phos  3.6     11-13  Mg     1.9     11-13    TPro  6.8  /  Alb  3.1<L>  /  TBili  0.4  /  DBili  x   /  AST  44<H>  /  ALT  35  /  AlkPhos  157<H>  11-13      Magnesium, Serum: 1.9 mg/dL (11-13 @ 23:49)  Phosphorus Level, Serum: 3.6 mg/dL (11-13 @ 23:49)  Magnesium, Serum: 1.9 mg/dL (11-13 @ 20:12)  Phosphorus Level, Serum: 3.7 mg/dL (11-13 @ 20:12)      PT/INR - ( 13 Nov 2018 23:49 )   PT: 27.1 sec;   INR: 2.32 ratio         PTT - ( 13 Nov 2018 23:49 )  PTT:40.8 sec

## 2018-11-14 NOTE — PHYSICAL THERAPY INITIAL EVALUATION ADULT - PLANNED THERAPY INTERVENTIONS, PT EVAL
Negative Pressure Wound Therapy
ROM/strengthening/bed mobility training/gait training/transfer training
transfer training/balance training/bed mobility training/gait training

## 2018-11-14 NOTE — PROGRESS NOTE ADULT - ASSESSMENT
L M2 proximal embolectomy TICI 3; hx afib, anticoagulation held curtis op    Neuro:  CTH in am  Na 135-145  stroke core measures    Cards:  -160  TTE +/- AYLA  rate control    Pulm:  IS    GI:  advance diet as tolerated    Renal:  f/u Uosm, Amina  start 2%    Heme/Onc:  heme/onc c/s for hypercoag work-up  further coumadin dosing and PT/INR target per heme-onc/neurology/surgery    ID:  Monitor for fevers    Full code  family updated at bedside    Patient was not critically ill, but was medically complex.  30 minutes spent. L M2 proximal embolectomy TICI 3; hx afib, anticoagulation held curtis op    Neuro:  CTH in am  Na 135-145  stroke core measures    Cards:  -160  TTE +/- AYLA  rate control    Pulm:  IS    GI:  advance diet as tolerated    Renal:  f/u Uosm, Amina  start 2%    Heme/Onc:  heme/onc c/s for hypercoag work-up  further coumadin dosing and PT/INR target per heme-onc/neurology/surgery; prior coumadin dosing has been on a 1-time basis per evening with varied doses    ID:  Monitor for fevers    Full code  family updated at bedside    Patient was not critically ill, but was medically complex.  30 minutes spent.

## 2018-11-14 NOTE — PHYSICAL THERAPY INITIAL EVALUATION ADULT - ACTIVE RANGE OF MOTION EXAMINATION, REHAB EVAL
RLE Knee ext -5/flex 70 degrees,/LLE Active ROM was WNL (within normal limits)/pee. upper extremity Active ROM was WNL (within normal limits)
no Active ROM deficits were identified

## 2018-11-14 NOTE — PROGRESS NOTE ADULT - SUBJECTIVE AND OBJECTIVE BOX
Events noted, patient developed left sided MCA thrombosis and underwent successful cerebral angio with neuro IR   no deficits today  Patient with no anginal chest pain or shortness of breath      MEDICATIONS  (STANDING):  aspirin enteric coated 81 milliGRAM(s) Oral daily  ATENolol  Tablet 25 milliGRAM(s) Oral daily  chlorhexidine 4% Liquid 1 Application(s) Topical <User Schedule>  docusate sodium 100 milliGRAM(s) Oral daily  fluticasone propionate 50 MICROgram(s)/spray Nasal Spray 1 Spray(s) Both Nostrils daily  NIFEdipine XL 60 milliGRAM(s) Oral daily  pantoprazole    Tablet 40 milliGRAM(s) Oral before breakfast  polyethylene glycol 3350 17 Gram(s) Oral daily  senna 2 Tablet(s) Oral at bedtime  simvastatin 20 milliGRAM(s) Oral at bedtime  sodium chloride 2% . 1000 milliLiter(s) (75 mL/Hr) IV Continuous <Continuous>  tamsulosin 0.4 milliGRAM(s) Oral at bedtime    MEDICATIONS  (PRN):  acetaminophen   Tablet .. 650 milliGRAM(s) Oral every 6 hours PRN Mild Pain (1 - 3)  oxyCODONE    IR 5 milliGRAM(s) Oral every 4 hours PRN Moderate Pain (4 - 6)  oxyCODONE    IR 10 milliGRAM(s) Oral every 4 hours PRN Severe Pain (7 - 10)      LABS:                        9.8    12.0  )-----------( 821      ( 13 Nov 2018 23:49 )             30.5     Hemoglobin: 9.8 g/dL (11-13 @ 23:49)  Hemoglobin: 9.7 g/dL (11-13 @ 20:12)  Hemoglobin: 10.3 g/dL (11-13 @ 17:20)  Hemoglobin: 10.6 g/dL (11-13 @ 07:13)  Hemoglobin: 10.6 g/dL (11-12 @ 07:11)    11-14    137  |  102  |  11  ----------------------------<  108<H>  4.3   |  26  |  0.69    Ca    8.9      14 Nov 2018 01:39  Phos  3.6     11-13  Mg     1.9     11-13    TPro  6.8  /  Alb  3.1<L>  /  TBili  0.4  /  DBili  x   /  AST  44<H>  /  ALT  35  /  AlkPhos  157<H>  11-13    Creatinine Trend: 0.69<--, 0.73<--, 0.77<--, 0.81<--, 0.72<--, 0.73<--   PT/INR - ( 13 Nov 2018 23:49 )   PT: 27.1 sec;   INR: 2.32 ratio                 PHYSICAL EXAM  Vital Signs Last 24 Hrs  T(C): 36.9 (14 Nov 2018 11:00), Max: 37 (14 Nov 2018 07:00)  T(F): 98.4 (14 Nov 2018 11:00), Max: 98.6 (14 Nov 2018 07:00)  HR: 65 (14 Nov 2018 13:00) (53 - 96)  BP: 112/53 (14 Nov 2018 13:00) (111/93 - 152/62)  BP(mean): 72 (14 Nov 2018 13:00) (72 - 99)  RR: 15 (14 Nov 2018 13:00) (9 - 24)  SpO2: 94% (14 Nov 2018 13:00) (91% - 99%)        Appearance: Normal	  HEENT:   Normal oral mucosa, PERRL, EOMI	  Lymphatic: No lymphadenopathy , no edema  Cardiovascular: irregular irregular  S1 S2, No JVD, No murmurs , Peripheral pulses palpable 2+ bilaterally  Respiratory: Lungs clear to auscultation, normal effort 	  Gastrointestinal:  Soft, Non-tender, + BS	  Skin: No rashes, No ecchymoses, No cyanosis, warm to touch  Musculoskeletal: Normal range of motion, normal strength, RLE with vac in place  Psychiatry:  Mood & affect appropriate    TELEMETRY: 	 afib  14 beats WCT (likely aberrant AF)  ECG:  	< from: 12 Lead ECG (10.25.18 @ 14:08) >  Diagnosis Line ATRIAL FIBRILLATION  PROLONGED QT  ABNORMAL ECG    < end of copied text >      ASSESSMENT/PLAN: 	73y Male  history of Afib previously on Coumadin , held secondary to spine surgery 10/19/18, HTN, CVA, MOE admitted with RLE arterial occlusion now s/p embolectomy of right AT and peroneal artery. RLE fasciotomies , unable to close ,  s/p VAC placement pending OR on Friday for RLE fasciotomy closure s/p pee fasciotomy debrided and wound cleaned, s/p skin grafts , vac applied  with acute left MCA thrombosis 11/13 s/p successful neuro IR cerebral angio:     - care per neurosurgery   - INR therapeutic 11/13 - f/u neurosx/neuro re: continuing with Coumadin given MCA thrombosis, h/o CVA, known AF  - cont BB, ASA, statin   - plastics follow up /VAC care   - HTN - controlled with BB/procardia  - GI / DVT prophylaxis,  keep K>4, mag >2.0   - HD stable no CHF  - f/u with Dr Bynum upon dc for cardiology

## 2018-11-14 NOTE — PHYSICAL THERAPY INITIAL EVALUATION ADULT - TRANSFER TRAINING, PT EVAL
GOAL: Pt to transfer req min assist x1 in 2 weeks
GOAL: Pt will perform sit to/from stand transfers independently with/without AD as needed within 3-4weeks.

## 2018-11-14 NOTE — PHYSICAL THERAPY INITIAL EVALUATION ADULT - PRECAUTIONS/LIMITATIONS, REHAB EVAL
fall precautions/CTA:  No pulmonary embolus. No aortic aneurysm or dissection; Aortic and coronary atherosclerosis. CTH: No significant change from the prior exam. No mass effect, hemorrhage or evidence of acute intracranial pathology./spinal precautions
fall precautions
spinal precautions/fall precautions

## 2018-11-14 NOTE — PROGRESS NOTE ADULT - ASSESSMENT
73M with recent spine surgery s/p RLE embolectomy for acute RLE arterial occlusion and RLE fasciotomies now s/p split thickness skin graft and placement of wound vac over closure to RLE.     -continue vac - will take down 11/15  -OK to be OOB to chair and stand to pivot  -AFO boot on at all times  -continue donor site dressing  -care per primary team    Plastic surgery  [phone]

## 2018-11-14 NOTE — CONSULT NOTE ADULT - ASSESSMENT
73M w/ PMH of atrial fibrillation (on coumadin), h/o Hodgkin's lymphoma s/p splenectomy (1975), now with RLE arterial occlusion in the setting of of being off anticoagulation, and new left MCA CVA while therapeutically anticoagulated.  Hematology consulted fro CVA while on anticoagulation.     # left MCA CVA:   - would check TTE to evaluate for any vegetation, thrombus  - check CT C/A/P 73M w/ PMH of atrial fibrillation (on coumadin), h/o Hodgkin's lymphoma s/p splenectomy (1975), now with RLE arterial occlusion in the setting of of being off anticoagulation, and new left MCA CVA while therapeutically anticoagulated.  Hematology consulted fro CVA while on anticoagulation.     # left MCA CVA:   - would check TTE to evaluate for any vegetation, thrombus  - check CT C/A/P  - check bilateral LE dopplers for DVT 73M w/ PMH of atrial fibrillation (on coumadin), h/o Hodgkin's lymphoma s/p splenectomy (1975), now with RLE arterial occlusion in the setting of of being off anticoagulation, and new left MCA CVA while therapeutically anticoagulated.  Hematology consulted for CVA while on anticoagulation.     # left MCA CVA:    Patient was transitioned from heparin gtt to coumadin, however was only bridged for 2 days.  Patient is very high risk of thrombosis off coumadin given CVA in Jan 2018 in a similar setting.   All future bridging must be for 5 days.  In the setting of coumadin, patient are hypercoagulable within the first 48 hours as protein C and S decrease initially.    - can check TTE to evaluate for any vegetation, thrombus  - check bilateral LE dopplers for DVT  - check antiphospholipid antibodies, beta-2-glycoprotein, anticardiolipin. 73M w/ PMH of atrial fibrillation (on coumadin), h/o Hodgkin's lymphoma s/p splenectomy (1975), now with RLE arterial occlusion in the setting of of being off anticoagulation, and new left MCA CVA while therapeutically anticoagulated.  Hematology consulted for CVA while on anticoagulation.     # left MCA CVA:    Patient was transitioned from heparin gtt to coumadin, however was only bridged for 2 days.  Patient is very high risk of thrombosis off coumadin given CVA in Jan 2018 in a similar setting.   All future bridging must be for 5 days.  In the setting of coumadin, patient are hypercoagulable within the first 48 hours as protein C and S decrease initially.    - can check TTE to evaluate for any vegetation, thrombus  - check bilateral LE dopplers for DVT  - order lupus profile (antiphospholipid antibodies, beta-2-glycoprotein, anticardiolipin) 73M w/ PMH of atrial fibrillation (on coumadin), h/o Hodgkin's lymphoma s/p splenectomy (1975), now with RLE arterial occlusion in the setting of of being off anticoagulation, and new left MCA CVA while therapeutically anticoagulated.  Hematology consulted for CVA while on anticoagulation.     # left MCA CVA:    Patient was transitioned from heparin gtt to coumadin, however was only bridged for 2 days.  Patient is very high risk of thrombosis off coumadin given CVA in Jan 2018 in a similar setting.   All future bridging must be for 5 days.  In the setting of coumadin, patient are hypercoagulable within the first 48 hours as protein C and S decrease initially.    - can check TTE to evaluate for any vegetation, thrombus  - check bilateral LE dopplers for DVT  - order lupus profile (antiphospholipid antibodies, beta-2-glycoprotein, anticardiolipin)    Patient is followed by an outpatient hematologist, Dr. Henrique Angeles.  He should be called to assume care while inpatient.  Please call for him to follow patient tomorrow.     Rochelle Bowling MD  Hematology/Oncology Fellow, PGY-4  pager: [phone] 73M w/ PMH of atrial fibrillation (on coumadin), h/o Hodgkin's lymphoma s/p splenectomy (1975), now with RLE arterial occlusion in the setting of of being off anticoagulation, and new left MCA CVA while therapeutically anticoagulated.  Hematology consulted for CVA while on anticoagulation.     # left MCA CVA:    Patient was transitioned from heparin gtt to coumadin, however was only bridged for 2 days.  Patient is very high risk of thrombosis off coumadin given CVA in Jan 2018 in a similar setting.   All future bridging must be for a minimum of 5 days.  In the setting of coumadin, patient are hypercoagulable within the first 48 hours as protein C and S decrease initially.    - can check TTE to evaluate for any vegetation, thrombus  - check bilateral LE dopplers for DVT  - order lupus profile (antiphospholipid antibodies, beta-2-glycoprotein, anticardiolipin)    Patient is followed by an outpatient hematologist, Dr. Henrique Angeles.  He should be called to assume care while inpatient.  Please call for him to follow patient tomorrow.     Rochelle Bowling MD  Hematology/Oncology Fellow, PGY-4  pager: [phone]

## 2018-11-14 NOTE — PHYSICAL THERAPY INITIAL EVALUATION ADULT - PERTINENT HX OF CURRENT PROBLEM, REHAB EVAL
72 yo M with h/o CVA Jan 2018, recent spine surgery Oct 2018, a/w acute RLE arterial occlusion. S/p RLE embolectomy to right AT and peroneal artery 10/25, c/b post-op compartment syndrome requiring RTOR 10/27 for RLE fasciotomies and RTOR 11/5 for closure of fasciotomy, however unable to close R medial/lateral fasciotomy sites primarily, and proceeded with vac placement. Course c/b MAGALI, hyponatremia, AMS, hypoxia. Cont'd...
Pt is a 73 yoM presents with sudden onset RLE pain and numbness. Pt recently underwent spine surgery on 10/19 for L3/L4 hardware revision and has been off anticoagulation since 10/18. He was discharged on 10/23 on aspirin not yet started on anticoagulation. Pt found to have +lower extremity embolism. 10/25 s/p arteriotomy clot removal from peroneal and ant tibial A. 10/28 s/p RLE fasciotomies
Pt is a 74yo man PMH afib on coumadin x20yrs, was off for back surgery in 01/2018 had CVA, required revision surgery in 10/2018, was discharged to rehab, had RLE embolus, re adm to vascular surgery for embolectomy and fasciotomy, while on vascular service restarted on heparin gtt and bridged to coumadin, had acute AMS around 16:30 on 10/13, slurred speech and R sided weakness, taken to IR now s/p L MCA M2 proximal superior division TICI 3 recanalization.

## 2018-11-14 NOTE — PHYSICAL THERAPY INITIAL EVALUATION ADULT - CRITERIA FOR SKILLED THERAPEUTIC INTERVENTIONS
impairments found
impairments found
functional limitations in following categories/impairments found

## 2018-11-14 NOTE — PHYSICAL THERAPY INITIAL EVALUATION ADULT - GENERAL OBSERVATIONS, REHAB EVAL
Pt rec'd semisupine in bed. NAD.+NC(2L), +iv, RLE ace/bandage +sanguinous drainage,+DP pulse, Lumbar incision+rei. RN (Maite) made aware of incisions and pt received pain meds prior to eval
pt received supine in bed w/ RLE wound vac

## 2018-11-14 NOTE — CONSULT NOTE ADULT - REASON FOR ADMISSION
Right lower extremity arterial occlusion

## 2018-11-14 NOTE — CONSULT NOTE ADULT - ATTENDING COMMENTS
agree with hx as per fellows note  pt followed by heme/onc - dr nancy junior as outpt  recc apl panel   agree with AYLA for eval of cardiac source of thrombus  cont heparin gtt for now  bridge to coumadin with goal INR 2.5-3.5  heme f/u post dc

## 2018-11-14 NOTE — PHYSICAL THERAPY INITIAL EVALUATION ADULT - DIAGNOSIS, PT EVAL
impaired integument
Pt presents with decreased ROM/strength RLE, decreased functional mobility and endurance
impaired integument

## 2018-11-14 NOTE — PHYSICAL THERAPY INITIAL EVALUATION ADULT - MANUAL MUSCLE TESTING RESULTS, REHAB EVAL
grossly 3=/ throughout except RLE, Hip/knee&ankle 3-/5,/grossly assessed due to
grossly assessed due to/Strength is grossly at least 3/5 throughout

## 2018-11-14 NOTE — PROGRESS NOTE ADULT - SUBJECTIVE AND OBJECTIVE BOX
PLASTIC SURGERY DAILY PROGRESS NOTE:     Subjective:  Pt seen and examined. Pt had a L MCA stroke yesterday evening, and is now s/p angiography and thrombectomy w/ IR. Transferred to neuro ICU. Pt reports he is feeling well this am.     Objective:  NAD  Vac in place, holding suction, leg wrapped, c/d/i  moving extremities spontaneously bilaterally, face w/o evidence of droop at this time      MEDICATIONS  (STANDING):  aspirin enteric coated 81 milliGRAM(s) Oral daily  ATENolol  Tablet 25 milliGRAM(s) Oral daily  chlorhexidine 4% Liquid 1 Application(s) Topical <User Schedule>  docusate sodium 100 milliGRAM(s) Oral daily  fluticasone propionate 50 MICROgram(s)/spray Nasal Spray 1 Spray(s) Both Nostrils daily  NIFEdipine XL 60 milliGRAM(s) Oral daily  pantoprazole    Tablet 40 milliGRAM(s) Oral before breakfast  polyethylene glycol 3350 17 Gram(s) Oral daily  senna 2 Tablet(s) Oral at bedtime  simvastatin 20 milliGRAM(s) Oral at bedtime  sodium chloride 2% . 1000 milliLiter(s) (75 mL/Hr) IV Continuous <Continuous>  tamsulosin 0.4 milliGRAM(s) Oral at bedtime    MEDICATIONS  (PRN):  acetaminophen   Tablet .. 650 milliGRAM(s) Oral every 6 hours PRN Mild Pain (1 - 3)  oxyCODONE    IR 5 milliGRAM(s) Oral every 4 hours PRN Moderate Pain (4 - 6)  oxyCODONE    IR 10 milliGRAM(s) Oral every 4 hours PRN Severe Pain (7 - 10)      Vital Signs Last 24 Hrs  T(C): 37 (14 Nov 2018 07:00), Max: 37.2 (13 Nov 2018 10:40)  T(F): 98.6 (14 Nov 2018 07:00), Max: 98.9 (13 Nov 2018 10:40)  HR: 62 (14 Nov 2018 07:00) (55 - 96)  BP: 136/50 (14 Nov 2018 07:00) (113/62 - 152/62)  BP(mean): 74 (14 Nov 2018 07:00) (74 - 97)  RR: 15 (14 Nov 2018 07:00) (9 - 24)  SpO2: 97% (14 Nov 2018 07:00) (91% - 99%)    I&O's Detail    13 Nov 2018 07:01  -  14 Nov 2018 07:00  --------------------------------------------------------  IN:    IV PiggyBack: 100 mL    Oral Fluid: 939 mL    sodium chloride 2% .: 750 mL  Total IN: 1789 mL    OUT:    Voided: 1250 mL  Total OUT: 1250 mL    Total NET: 539 mL          Daily     Daily     LABS:                        9.8    12.0  )-----------( 821      ( 13 Nov 2018 23:49 )             30.5     11-14    137  |  102  |  11  ----------------------------<  108<H>  4.3   |  26  |  0.69    Ca    8.9      14 Nov 2018 01:39  Phos  3.6     11-13  Mg     1.9     11-13    TPro  6.8  /  Alb  3.1<L>  /  TBili  0.4  /  DBili  x   /  AST  44<H>  /  ALT  35  /  AlkPhos  157<H>  11-13    PT/INR - ( 13 Nov 2018 23:49 )   PT: 27.1 sec;   INR: 2.32 ratio         PTT - ( 13 Nov 2018 23:49 )  PTT:40.8 sec      RADIOLOGY & ADDITIONAL STUDIES:

## 2018-11-15 LAB
ANION GAP SERPL CALC-SCNC: 11 MMOL/L — SIGNIFICANT CHANGE UP (ref 5–17)
ANION GAP SERPL CALC-SCNC: 9 MMOL/L — SIGNIFICANT CHANGE UP (ref 5–17)
APTT BLD: 38.8 SEC — HIGH (ref 27.5–36.3)
BUN SERPL-MCNC: 10 MG/DL — SIGNIFICANT CHANGE UP (ref 7–23)
BUN SERPL-MCNC: 11 MG/DL — SIGNIFICANT CHANGE UP (ref 7–23)
CALCIUM SERPL-MCNC: 8.7 MG/DL — SIGNIFICANT CHANGE UP (ref 8.4–10.5)
CALCIUM SERPL-MCNC: 8.7 MG/DL — SIGNIFICANT CHANGE UP (ref 8.4–10.5)
CHLORIDE SERPL-SCNC: 103 MMOL/L — SIGNIFICANT CHANGE UP (ref 96–108)
CHLORIDE SERPL-SCNC: 106 MMOL/L — SIGNIFICANT CHANGE UP (ref 96–108)
CO2 SERPL-SCNC: 24 MMOL/L — SIGNIFICANT CHANGE UP (ref 22–31)
CO2 SERPL-SCNC: 25 MMOL/L — SIGNIFICANT CHANGE UP (ref 22–31)
CREAT SERPL-MCNC: 0.7 MG/DL — SIGNIFICANT CHANGE UP (ref 0.5–1.3)
CREAT SERPL-MCNC: 0.75 MG/DL — SIGNIFICANT CHANGE UP (ref 0.5–1.3)
GLUCOSE SERPL-MCNC: 104 MG/DL — HIGH (ref 70–99)
GLUCOSE SERPL-MCNC: 116 MG/DL — HIGH (ref 70–99)
HCT VFR BLD CALC: 29.8 % — LOW (ref 39–50)
HGB BLD-MCNC: 9.1 G/DL — LOW (ref 13–17)
INR BLD: 2.31 RATIO — HIGH (ref 0.88–1.16)
MCHC RBC-ENTMCNC: 30.5 GM/DL — LOW (ref 32–36)
MCHC RBC-ENTMCNC: 30.8 PG — SIGNIFICANT CHANGE UP (ref 27–34)
MCV RBC AUTO: 101 FL — HIGH (ref 80–100)
PLATELET # BLD AUTO: 798 K/UL — HIGH (ref 150–400)
POTASSIUM SERPL-MCNC: 4.1 MMOL/L — SIGNIFICANT CHANGE UP (ref 3.5–5.3)
POTASSIUM SERPL-MCNC: 4.4 MMOL/L — SIGNIFICANT CHANGE UP (ref 3.5–5.3)
POTASSIUM SERPL-SCNC: 4.1 MMOL/L — SIGNIFICANT CHANGE UP (ref 3.5–5.3)
POTASSIUM SERPL-SCNC: 4.4 MMOL/L — SIGNIFICANT CHANGE UP (ref 3.5–5.3)
PROTHROM AB SERPL-ACNC: 27 SEC — HIGH (ref 10–12.9)
RBC # BLD: 2.95 M/UL — LOW (ref 4.2–5.8)
RBC # FLD: 16.2 % — HIGH (ref 10.3–14.5)
SODIUM SERPL-SCNC: 139 MMOL/L — SIGNIFICANT CHANGE UP (ref 135–145)
SODIUM SERPL-SCNC: 139 MMOL/L — SIGNIFICANT CHANGE UP (ref 135–145)
WBC # BLD: 10.8 K/UL — HIGH (ref 3.8–10.5)
WBC # FLD AUTO: 10.8 K/UL — HIGH (ref 3.8–10.5)

## 2018-11-15 PROCEDURE — 93306 TTE W/DOPPLER COMPLETE: CPT | Mod: 26

## 2018-11-15 PROCEDURE — 93970 EXTREMITY STUDY: CPT | Mod: 26

## 2018-11-15 RX ORDER — OMEPRAZOLE 10 MG/1
1 CAPSULE, DELAYED RELEASE ORAL
Qty: 0 | Refills: 0 | COMMUNITY

## 2018-11-15 RX ORDER — SIMVASTATIN 20 MG/1
1 TABLET, FILM COATED ORAL
Qty: 0 | Refills: 0 | DISCHARGE
Start: 2018-11-15

## 2018-11-15 RX ORDER — ATENOLOL 25 MG/1
1 TABLET ORAL
Qty: 0 | Refills: 0 | COMMUNITY

## 2018-11-15 RX ORDER — WARFARIN SODIUM 2.5 MG/1
1 TABLET ORAL
Qty: 0 | Refills: 0 | DISCHARGE
Start: 2018-11-15

## 2018-11-15 RX ORDER — SENNA PLUS 8.6 MG/1
2 TABLET ORAL
Qty: 0 | Refills: 0 | DISCHARGE
Start: 2018-11-15

## 2018-11-15 RX ORDER — SIMVASTATIN 20 MG/1
1 TABLET, FILM COATED ORAL
Qty: 0 | Refills: 0 | COMMUNITY

## 2018-11-15 RX ORDER — TERAZOSIN HYDROCHLORIDE 10 MG/1
0 CAPSULE ORAL
Qty: 0 | Refills: 0 | COMMUNITY

## 2018-11-15 RX ORDER — FLUTICASONE PROPIONATE 50 MCG
1 SPRAY, SUSPENSION NASAL
Qty: 0 | Refills: 0 | DISCHARGE
Start: 2018-11-15

## 2018-11-15 RX ORDER — DOCUSATE SODIUM 100 MG
1 CAPSULE ORAL
Qty: 0 | Refills: 0 | DISCHARGE
Start: 2018-11-15

## 2018-11-15 RX ORDER — WARFARIN SODIUM 2.5 MG/1
2 TABLET ORAL ONCE
Qty: 0 | Refills: 0 | Status: COMPLETED | OUTPATIENT
Start: 2018-11-15 | End: 2018-11-15

## 2018-11-15 RX ORDER — POLYETHYLENE GLYCOL 3350 17 G/17G
17 POWDER, FOR SOLUTION ORAL
Qty: 0 | Refills: 0 | DISCHARGE
Start: 2018-11-15

## 2018-11-15 RX ORDER — LISINOPRIL 2.5 MG/1
1 TABLET ORAL
Qty: 0 | Refills: 0 | COMMUNITY

## 2018-11-15 RX ORDER — NIFEDIPINE 30 MG
1 TABLET, EXTENDED RELEASE 24 HR ORAL
Qty: 0 | Refills: 0 | DISCHARGE
Start: 2018-11-15

## 2018-11-15 RX ORDER — TAMSULOSIN HYDROCHLORIDE 0.4 MG/1
2 CAPSULE ORAL
Qty: 0 | Refills: 0 | DISCHARGE
Start: 2018-11-15

## 2018-11-15 RX ORDER — PANTOPRAZOLE SODIUM 20 MG/1
1 TABLET, DELAYED RELEASE ORAL
Qty: 0 | Refills: 0 | DISCHARGE
Start: 2018-11-15

## 2018-11-15 RX ORDER — ASPIRIN/CALCIUM CARB/MAGNESIUM 324 MG
1 TABLET ORAL
Qty: 0 | Refills: 0 | DISCHARGE
Start: 2018-11-15

## 2018-11-15 RX ORDER — ATENOLOL 25 MG/1
1 TABLET ORAL
Qty: 0 | Refills: 0 | DISCHARGE
Start: 2018-11-15

## 2018-11-15 RX ORDER — CHLORTHALIDONE 50 MG
1 TABLET ORAL
Qty: 0 | Refills: 0 | COMMUNITY

## 2018-11-15 RX ORDER — TAMSULOSIN HYDROCHLORIDE 0.4 MG/1
1 CAPSULE ORAL
Qty: 0 | Refills: 0 | DISCHARGE
Start: 2018-11-15

## 2018-11-15 RX ADMIN — OXYCODONE HYDROCHLORIDE 5 MILLIGRAM(S): 5 TABLET ORAL at 22:20

## 2018-11-15 RX ADMIN — OXYCODONE HYDROCHLORIDE 5 MILLIGRAM(S): 5 TABLET ORAL at 04:53

## 2018-11-15 RX ADMIN — OXYCODONE HYDROCHLORIDE 5 MILLIGRAM(S): 5 TABLET ORAL at 13:04

## 2018-11-15 RX ADMIN — TAMSULOSIN HYDROCHLORIDE 0.4 MILLIGRAM(S): 0.4 CAPSULE ORAL at 21:48

## 2018-11-15 RX ADMIN — SENNA PLUS 2 TABLET(S): 8.6 TABLET ORAL at 21:48

## 2018-11-15 RX ADMIN — Medication 81 MILLIGRAM(S): at 11:37

## 2018-11-15 RX ADMIN — OXYCODONE HYDROCHLORIDE 5 MILLIGRAM(S): 5 TABLET ORAL at 21:50

## 2018-11-15 RX ADMIN — Medication 100 MILLIGRAM(S): at 11:37

## 2018-11-15 RX ADMIN — PANTOPRAZOLE SODIUM 40 MILLIGRAM(S): 20 TABLET, DELAYED RELEASE ORAL at 04:52

## 2018-11-15 RX ADMIN — Medication 1 SPRAY(S): at 11:36

## 2018-11-15 RX ADMIN — OXYCODONE HYDROCHLORIDE 10 MILLIGRAM(S): 5 TABLET ORAL at 17:00

## 2018-11-15 RX ADMIN — OXYCODONE HYDROCHLORIDE 10 MILLIGRAM(S): 5 TABLET ORAL at 16:46

## 2018-11-15 RX ADMIN — POLYETHYLENE GLYCOL 3350 17 GRAM(S): 17 POWDER, FOR SOLUTION ORAL at 11:37

## 2018-11-15 RX ADMIN — OXYCODONE HYDROCHLORIDE 5 MILLIGRAM(S): 5 TABLET ORAL at 13:30

## 2018-11-15 RX ADMIN — Medication 60 MILLIGRAM(S): at 04:52

## 2018-11-15 RX ADMIN — WARFARIN SODIUM 2 MILLIGRAM(S): 2.5 TABLET ORAL at 21:48

## 2018-11-15 RX ADMIN — SIMVASTATIN 20 MILLIGRAM(S): 20 TABLET, FILM COATED ORAL at 21:48

## 2018-11-15 RX ADMIN — ATENOLOL 25 MILLIGRAM(S): 25 TABLET ORAL at 04:52

## 2018-11-15 NOTE — PROGRESS NOTE ADULT - SUBJECTIVE AND OBJECTIVE BOX
THE PATIENT WAS SEEN AND EXAMINED BY ME WITH THE HOUSESTAFF AND STROKE TEAM DURING MORNING ROUNDS.   HPI:  Patient is a 73 year old man with PMH of atrial fibrillation, HTN, Hodgkin's lymphoma s/p splenectomy (1975), hip replacement, and MOE presented to the ED with right lower extremity pain and decreased sensation since 10am on 10/25.  He recently underwent spine surgery on 10/19 for L3/L4 hardware revision and has been off anticoagulation since 10/18. He takes Coumadin and was bridged with Lovenox. He was discharged on 10/23 on aspirin and has not yet restarted anticoagulation. Of note, he had a CVA in Jan 2018 while off anticoagulation for spine surgery. On 10/25 patient underwent RLE embolectomy for acute RLE arterial occlusion.  On 11/13 patient became aphasic, right facial droop, R hemiplegia and a Right field cute, a stroke code was called, NIHSS 15. Patient found to have L MCA thrombus. Patient was taken to IR for intervention: L MCA M2 proximal superior division TICI 3 recanalization    SUBJECTIVE: No events overnight.  No new neurologic complaints.      acetaminophen   Tablet .. 650 milliGRAM(s) Oral every 6 hours PRN  aspirin enteric coated 81 milliGRAM(s) Oral daily  ATENolol  Tablet 25 milliGRAM(s) Oral daily  docusate sodium 100 milliGRAM(s) Oral daily  fluticasone propionate 50 MICROgram(s)/spray Nasal Spray 1 Spray(s) Both Nostrils daily  NIFEdipine XL 60 milliGRAM(s) Oral daily  oxyCODONE    IR 5 milliGRAM(s) Oral every 4 hours PRN  oxyCODONE    IR 10 milliGRAM(s) Oral every 4 hours PRN  pantoprazole    Tablet 40 milliGRAM(s) Oral before breakfast  polyethylene glycol 3350 17 Gram(s) Oral daily  senna 2 Tablet(s) Oral at bedtime  simvastatin 20 milliGRAM(s) Oral at bedtime  tamsulosin 0.4 milliGRAM(s) Oral at bedtime  warfarin 2 milliGRAM(s) Oral once      PHYSICAL EXAM:   Vital Signs Last 24 Hrs  T(C): 36.9 (15 Nov 2018 11:52), Max: 37.6 (14 Nov 2018 15:30)  T(F): 98.5 (15 Nov 2018 11:52), Max: 99.7 (14 Nov 2018 15:30)  HR: 65 (15 Nov 2018 11:52) (64 - 86)  BP: 148/66 (15 Nov 2018 11:52) (126/69 - 158/68)  BP(mean): 87 (14 Nov 2018 14:00) (87 - 87)  RR: 18 (15 Nov 2018 11:52) (15 - 19)  SpO2: 96% (15 Nov 2018 11:52) (92% - 96%)    General: No acute distress  HEENT: EOM intact, visual fields full  Abdomen: Soft, nontender, nondistended   Extremities: No edema, right leg wrapped with wound vac in place    NEUROLOGICAL EXAM:  Mental status: Awake, alert, oriented x3, no aphasia, no neglect, normal memory   Cranial Nerves: No facial asymmetry, no nystagmus, no dysarthria,  tongue midline  Motor exam: Normal tone, no drift, 5/5 RUE, 5/5 RLE, 5/5 LUE, 5/5 LLE, normal fine finger movements.  Sensation: Intact to light touch   Coordination/ Gait: No dysmetria, ESSENCE intact and symmetric bilaterally    LABS:                        9.1    10.80 )-----------( 798      ( 15 Nov 2018 07:27 )             29.8    11-15    139  |  106  |  11  ----------------------------<  104<H>  4.1   |  24  |  0.70    Ca    8.7      15 Nov 2018 05:21  Phos  3.6     11-13  Mg     1.9     11-13    TPro  6.8  /  Alb  3.1<L>  /  TBili  0.4  /  DBili  x   /  AST  44<H>  /  ALT  35  /  AlkPhos  157<H>  11-13  PT/INR - ( 15 Nov 2018 12:54 )   PT: 27.0 sec;   INR: 2.31 ratio         PTT - ( 15 Nov 2018 12:54 )  PTT:38.8 sec  Hemoglobin A1C, Whole Blood: 5.6 % (11-13 @ 22:17)      IMAGING: Reviewed by me.     CT HEAD (11/13/18):  No acute intracranial hemorrhage,  Hyperdense appearing left-sided middle cerebral artery concerning for thrombosis. Age-indeterminate small infarct within the left cerebellar hemisphere. Additional chronic infarcts and similar-appearing microvascular   disease.    CTA HEAD/NECK (11/13/18): Lack of contrast enhancement of the right proximal vertebral artery suspicious for possible occlusion. The mid and distal portions of the   right cervical vertebral arteries enhance, which may be retrograde from   the intracranial vertebrobasilar confluence.  Atherosclerosis of the bilateral carotid bifurcations with approximately   20-30% stenosis of the right internal carotid artery origin by NASCET   criteria. No flow-limiting stenosis on the left.

## 2018-11-15 NOTE — PROGRESS NOTE ADULT - ASSESSMENT
73M with recent spine surgery s/p RLE embolectomy for acute RLE arterial occlusion and RLE fasciotomies now s/p split thickness skin graft and placement of wound vac over closure to RLE.     -will take down vac today  -OK to be OOB to chair and stand to pivot  -AFO boot on at all times  -continue donor site dressing  -care per primary team    Plastic surgery  [phone]

## 2018-11-15 NOTE — PROGRESS NOTE ADULT - SUBJECTIVE AND OBJECTIVE BOX
Skin graft with good take  Donor site as excepted      DC VAC  Xerofrom to graft site on leg every other day cover with xeroform, ABD and wrap with ace from toes to knee  Donor site leave aquacel and tegaderm in place  Reinforce over it with ABD and Tape as needed    Out patient FU week of 26th  [phone]

## 2018-11-15 NOTE — PROGRESS NOTE ADULT - SUBJECTIVE AND OBJECTIVE BOX
PLASTIC SURGERY DAILY PROGRESS NOTE:     Subjective:  Pt seen and examined during rounds. No acute events. Vac down today.    Objective:  NAD  leg wrapped, vac functioning  moving extremities spontaneously bilaterally, face w/o evidence of droop    MEDICATIONS  (STANDING):  aspirin enteric coated 81 milliGRAM(s) Oral daily  ATENolol  Tablet 25 milliGRAM(s) Oral daily  docusate sodium 100 milliGRAM(s) Oral daily  fluticasone propionate 50 MICROgram(s)/spray Nasal Spray 1 Spray(s) Both Nostrils daily  NIFEdipine XL 60 milliGRAM(s) Oral daily  pantoprazole    Tablet 40 milliGRAM(s) Oral before breakfast  polyethylene glycol 3350 17 Gram(s) Oral daily  senna 2 Tablet(s) Oral at bedtime  simvastatin 20 milliGRAM(s) Oral at bedtime  sodium chloride 2% . 1000 milliLiter(s) (75 mL/Hr) IV Continuous <Continuous>  tamsulosin 0.4 milliGRAM(s) Oral at bedtime    MEDICATIONS  (PRN):  acetaminophen   Tablet .. 650 milliGRAM(s) Oral every 6 hours PRN Mild Pain (1 - 3)  oxyCODONE    IR 5 milliGRAM(s) Oral every 4 hours PRN Moderate Pain (4 - 6)  oxyCODONE    IR 10 milliGRAM(s) Oral every 4 hours PRN Severe Pain (7 - 10)      Vital Signs Last 24 Hrs  T(C): 37.2 (15 Nov 2018 04:55), Max: 37.6 (2018 15:30)  T(F): 98.9 (15 Nov 2018 04:55), Max: 99.7 (2018 15:30)  HR: 80 (15 Nov 2018 04:55) (53 - 86)  BP: 154/71 (15 Nov 2018 04:55) (111/93 - 158/68)  BP(mean): 87 (2018 14:00) (72 - 99)  RR: 18 (15 Nov 2018 04:55) (14 - 19)  SpO2: 92% (15 Nov 2018 04:55) (92% - 98%)    I&O's Detail    2018 07:01  -  2018 07:00  --------------------------------------------------------  IN:    IV PiggyBack: 100 mL    Oral Fluid: 939 mL    sodium chloride 2% .: 825 mL  Total IN: 1864 mL    OUT:    Voided: 1250 mL  Total OUT: 1250 mL    Total NET: 614 mL      2018 07:01  -  15 Nov 2018 06:33  --------------------------------------------------------  IN:    Oral Fluid: 580 mL    sodium chloride 2% .: 645 mL  Total IN: 1225 mL    OUT:    Voided: 2025 mL  Total OUT: 2025 mL    Total NET: -800 mL          Daily     Daily Weight in k.2 (2018 08:00)    LABS:                        9.8    12.0  )-----------( 821      ( 2018 23:49 )             30.5     -15    139  |  106  |  11  ----------------------------<  104<H>  4.1   |  24  |  0.70    Ca    8.7      15 Nov 2018 05:21  Phos  3.6     11-  Mg     1.9         TPro  6.8  /  Alb  3.1<L>  /  TBili  0.4  /  DBili  x   /  AST  44<H>  /  ALT  35  /  AlkPhos  157<H>      PT/INR - ( 2018 23:49 )   PT: 27.1 sec;   INR: 2.32 ratio         PTT - ( 2018 23:49 )  PTT:40.8 sec      RADIOLOGY & ADDITIONAL STUDIES:

## 2018-11-15 NOTE — PROGRESS NOTE ADULT - SUBJECTIVE AND OBJECTIVE BOX
pt seen and examined, no complaints, ROS - .    pt on 2% NS , serum NA   139     acetaminophen   Tablet .. 650 milliGRAM(s) Oral every 6 hours PRN  aspirin enteric coated 81 milliGRAM(s) Oral daily  ATENolol  Tablet 25 milliGRAM(s) Oral daily  docusate sodium 100 milliGRAM(s) Oral daily  fluticasone propionate 50 MICROgram(s)/spray Nasal Spray 1 Spray(s) Both Nostrils daily  NIFEdipine XL 60 milliGRAM(s) Oral daily  oxyCODONE    IR 5 milliGRAM(s) Oral every 4 hours PRN  oxyCODONE    IR 10 milliGRAM(s) Oral every 4 hours PRN  pantoprazole    Tablet 40 milliGRAM(s) Oral before breakfast  polyethylene glycol 3350 17 Gram(s) Oral daily  senna 2 Tablet(s) Oral at bedtime  simvastatin 20 milliGRAM(s) Oral at bedtime  sodium chloride 2% . 1000 milliLiter(s) IV Continuous <Continuous>  tamsulosin 0.4 milliGRAM(s) Oral at bedtime                            9.8    12.0  )-----------( 821      ( 13 Nov 2018 23:49 )             30.5       Hemoglobin: 9.8 g/dL (11-13 @ 23:49)  Hemoglobin: 9.7 g/dL (11-13 @ 20:12)  Hemoglobin: 10.3 g/dL (11-13 @ 17:20)  Hemoglobin: 10.6 g/dL (11-13 @ 07:13)  Hemoglobin: 10.6 g/dL (11-12 @ 07:11)      11-14    139  |  103  |  10  ----------------------------<  116<H>  4.4   |  25  |  0.75    Ca    8.7      14 Nov 2018 23:35  Phos  3.6     11-13  Mg     1.9     11-13    TPro  6.8  /  Alb  3.1<L>  /  TBili  0.4  /  DBili  x   /  AST  44<H>  /  ALT  35  /  AlkPhos  157<H>  11-13    Creatinine Trend: 0.75<--, 0.69<--, 0.73<--, 0.77<--, 0.81<--, 0.72<--    COAGS:           T(C): 36.8 (11-14-18 @ 23:44), Max: 37.6 (11-14-18 @ 15:30)  HR: 80 (11-14-18 @ 23:44) (53 - 86)  BP: 136/70 (11-14-18 @ 23:44) (111/93 - 158/68)  RR: 19 (11-14-18 @ 23:44) (14 - 19)  SpO2: 95% (11-14-18 @ 23:44) (92% - 99%)  Wt(kg): --    I&O's Summary    13 Nov 2018 07:01  -  14 Nov 2018 07:00  --------------------------------------------------------  IN: 1864 mL / OUT: 1250 mL / NET: 614 mL    14 Nov 2018 07:01  -  15 Nov 2018 04:37  --------------------------------------------------------  IN: 1105 mL / OUT: 1625 mL / NET: -520 mL        Appearance: Normal	  HEENT:   Normal oral mucosa, PERRL, EOMI	  Lymphatic: No lymphadenopathy , no edema  Cardiovascular: irregular irregular  S1 S2, No JVD, No murmurs , Peripheral pulses palpable 2+ bilaterally  Respiratory: Lungs clear to auscultation, normal effort 	  Gastrointestinal:  Soft, Non-tender, + BS	  Skin: No rashes, No ecchymoses, No cyanosis, warm to touch  Musculoskeletal: Normal range of motion, normal strength, RLE with vac in place  Psychiatry:  Mood & affect appropriate    TELEMETRY: 	 afib  14 beats WCT (likely aberrant AF)  ECG:  	< from: 12 Lead ECG (10.25.18 @ 14:08) >  Diagnosis Line ATRIAL FIBRILLATION  PROLONGED QT  ABNORMAL ECG    < end of copied text >      ASSESSMENT/PLAN: 	73y Male  history of Afib previously on Coumadin , held secondary to spine surgery 10/19/18, HTN, CVA, MOE admitted with RLE arterial occlusion now s/p embolectomy of right AT and peroneal artery. RLE fasciotomies , unable to close ,  s/p VAC placement pending OR on Friday for RLE fasciotomy closure s/p pee fasciotomy debrided and wound cleaned, s/p skin grafts , vac applied  with acute left MCA thrombosis 11/13 s/p successful neuro IR cerebral angio.     - care per neurosurgery   - A/C with coumadin , per Neuro sx  - cont BB, ASA, statin   - plastics follow up /VAC care   - HTN - controlled with BB/procardia  - GI / DVT prophylaxis,  keep K>4, mag >2.0   - HD stable no CHF  - PT follow up   - f/u with Dr Bynum upon dc for cardiology   D/W Dr George

## 2018-11-15 NOTE — PROGRESS NOTE ADULT - ASSESSMENT
Patient is a 73 year old man with PMH of atrial fibrillation, HTN, Hodgkin's lymphoma s/p splenectomy (1975), hip replacement, and MOE presented to the ED with right lower extremity pain and decreased sensation since 10am on 10/25.  He recently underwent spine surgery on 10/19 for L3/L4 hardware revision and has been off anticoagulation since 10/18. He takes Coumadin and was bridged with Lovenox. He was discharged on 10/23 on aspirin and has not yet restarted anticoagulation. Of note, he had a CVA in Jan 2018 while off anticoagulation for spine surgery. On 10/25 patient underwent RLE embolectomy for acute RLE arterial occlusion.  On 11/13 patient became aphasic, right facial droop, R hemiplegia and a Right field cute, a stroke code was called, NIHSS 15. Patient found to have L MCA thrombus. Patient was taken to IR for intervention: L MCA M2 proximal superior division TICI 3 recanalization.  Impression: LMCA infarct secondary to M1 occlusion Patient is a 73 year old man with PMH of atrial fibrillation, HTN, Hodgkin's lymphoma s/p splenectomy (1975), hip replacement, and MOE presented to the ED with right lower extremity pain and decreased sensation since 10am on 10/25.  He recently underwent spine surgery on 10/19 for L3/L4 hardware revision and has been off anticoagulation since 10/18. He takes Coumadin and was bridged with Lovenox. He was discharged on 10/23 on aspirin and has not yet restarted anticoagulation. Of note, he had a CVA in Jan 2018 while off anticoagulation for spine surgery. On 10/25 patient underwent RLE embolectomy for acute RLE arterial occlusion.  On 11/13 patient became aphasic, right facial droop, R hemiplegia and a Right field cute, a stroke code was called, NIHSS 15. Patient found to have L MCA thrombus. Patient was taken to IR for intervention: L MCA M2 proximal superior division TICI 3 recanalization.  Impression: LMCA infarct secondary to M1 occlusion    NEURO: Continue close monitoring for neurologic deterioration, permissive HTN, titrate statin to LDL goal less than 70, Physical therapy/OT recommends DANIEL     ANTITHROMBOTIC THERAPY: ASA    PULMONARY: CXR clear, protecting airway, saturating well     CARDIOVASCULAR: check TTE, cardiac monitoring                              SBP goal:     GASTROINTESTINAL:  dysphagia screen- passed, tolerating diet     Diet: Regukar    RENAL: BUN/Cr without acute change, good urine output      Na Goal: Greater than 135     Kaufman: N    HEMATOLOGY: H/H without acute change. Heme consulted for thrombosis while on anticoagulation. Patient was bridged from heparin gtt to coumadin, goal INR 2-3. Will be discharged on coumadin. Doppler of LE ordered to r/o DVT and lupus profile as per heme.      DVT ppx: coumadin    ID: afebrile, no leukocytosis     OTHER: Surgery following for RLE embolectomy and RLE fasciotomies, s/p split thickness skin graft and placement of wound vac, plan for vac to be removed today.    Plan of care discussed with patient at bedside. All questions and concerns addressed. 	    DISPOSITION: [facility] once stable and workup is complete      CORE MEASURES:        Admission NIHSS: 15     TPA: [] YES [x] NO      LDL/HDL: 73/43     Depression Screen: 0     Statin Therapy: y     Dysphagia Screen: [x] PASS [] FAIL     Smoking [] YES [x] NO      Afib [x] YES [] NO     Stroke Education [x] YES [] NO Patient is a 73 year old man with PMH of atrial fibrillation, HTN, Hodgkin's lymphoma s/p splenectomy (1975), hip replacement, and MOE presented to the ED with right lower extremity pain and decreased sensation since 10am on 10/25.  He recently underwent spine surgery on 10/19 for L3/L4 hardware revision and has been off anticoagulation since 10/18. He takes Coumadin and was bridged with Lovenox. He was discharged on 10/23 on aspirin and has not yet restarted anticoagulation. Of note, he had a CVA in Jan 2018 while off anticoagulation for spine surgery. On 10/25 patient underwent RLE embolectomy for acute RLE arterial occlusion.  On 11/13 patient became aphasic, right facial droop, R hemiplegia and a Right field cute, a stroke code was called, NIHSS 15. Patient found to have L MCA thrombus. Patient was taken to IR for intervention: L MCA M2 proximal superior division TICI 3 recanalization.  Impression: LMCA infarct secondary to M1 occlusion s/p TICI 3 recanilization    NEURO: Continue close monitoring for neurologic deterioration, permissive HTN, titrate statin to LDL goal less than 70, Physical therapy/OT recommends DANIEL     ANTITHROMBOTIC THERAPY: Coumadin    PULMONARY: CXR clear, protecting airway, saturating well     CARDIOVASCULAR: check TTE, cardiac monitoring                              SBP goal:     GASTROINTESTINAL:  dysphagia screen- passed, tolerating diet     Diet: Regukar    RENAL: BUN/Cr without acute change, good urine output      Na Goal: Greater than 135     Kaufman: N    HEMATOLOGY: H/H without acute change. Heme consulted for thrombosis while on anticoagulation. Patient was bridged from heparin gtt to coumadin, goal INR 2-3. Will be discharged on coumadin. Doppler of LE ordered to r/o DVT and lupus profile as per heme.      DVT ppx: coumadin    ID: afebrile, no leukocytosis     OTHER: Surgery following for RLE embolectomy and RLE fasciotomies, s/p split thickness skin graft and placement of wound vac, plan for vac to be removed today.    Plan of care discussed with patient at bedside. All questions and concerns addressed. 	    DISPOSITION: [facility] once stable and workup is complete      CORE MEASURES:        Admission NIHSS: 15     TPA: [] YES [x] NO      LDL/HDL: 73/43     Depression Screen: 0     Statin Therapy: y     Dysphagia Screen: [x] PASS [] FAIL     Smoking [] YES [x] NO      Afib [x] YES [] NO     Stroke Education [x] YES [] NO

## 2018-11-15 NOTE — PROGRESS NOTE ADULT - ATTENDING COMMENTS
Agree with above  -heparin to be on hold for vacular surgery  -restart ac when ok with vascular for afib    Jaz Varghese MD
Agree with above assessment and plan as outlined above.    - Care per neurosurgery    Michael George MD, FACC
Agree with above assessment and plan as outlined above.    - wound care per surgery    Michael George MD, FACC
Agree with above.   -ac if no contraindications  -follow up vascular    Jaz Varghese MD
Agree with above.  continue with lifelong ac for cva prevention    Jaz Varghese MD
CARDIOLOGY ATTENDING    Agree with above. No further inpatient cardiac workup needed. Continue lifelong a/c for afib.
CARDIOLOGY ATTENDING    Agree with above. No further inpatient cardiac workup needed. Continue lifelong a/c for afib. f/u vascular and continue perioperative beta blockers.
Patient seen and examined, agree with above assessment and plan as transcribed above.    - Cont heparin gtt curtis procedure  - For fasciotomy closure tomorrow    Michael George MD, FACC
Patient seen and examined, agree with above assessment and plan as transcribed above.    - Progressing well  - Coumadin INR 2-3 if ok with vascular        Michael George MD, FACC
Patient seen and examined.  Agree with above.   -continue with ac if ok with neuro/NSx  -will need lifelong ac given elevated chads-vasc and history of cva    Jaz Varghese MD
Patient seen and examined.  Agree with above.   -has tolerated procedures well in terms of cv perspective  -continue with hep gtt for afib    Jaz Varghese MD
Patient seen and examined.  Agree with above.   No further inpatient cardiac workup needed at this time.   Continue with ac if ok with vascular    Jaz Varghese MD
Agree with above.   continue with ac for afib  follow up vascular    Jaz Varghese MD
Pt. was seen and examined. Agree with above. Plan d/w the team.  WBC is elevated but pt. is afebrile  UA is neg  CXR neg  Fasciotomy sites are clean, no signs of infection  swelling is improving  mild foot drop will get splint  pt. needs PT
Pt. was seen and examined. Agree with above. Plan d/w the team.  palpable pedal pulses on the R  therapeutic on coumadin  s/p skin graft to fasciotomies by plastics  dispo to rehab
VASCULAR NEUROLOGY ATTENDING  The patient is seen and examined the history and imaging are reviewed. I agree with the resident note unless otherwise noted. Patient with TICI 3 recnilazation. Exam essentially normal. Continue AC. Repeat CT head. TTE. Hypercoag work-up given thrombosis in the setting of theraputic INR. Plan for early hospital discharge with close outpatient follow-up
As above.  Patient with hypoxemia due to atelectasis.  With deep breathing RA saturation 86 --> 96%.  Continue incentive spirometry.  No pulmonary contraindication to proceed with planned surgery.
Pt. is doing well. R foot is warm, motor and sensation are intact  cont Hep  awaiting fasciotomy closure by plastics once swelling improves
Patient with significant right calf pain, numbness in plantar aspect of foot and 1st web space. Some difficulty moving toes.  -Will proceed to OR for RLE fasciotomy

## 2018-11-16 VITALS
HEART RATE: 61 BPM | SYSTOLIC BLOOD PRESSURE: 144 MMHG | OXYGEN SATURATION: 97 % | DIASTOLIC BLOOD PRESSURE: 64 MMHG | RESPIRATION RATE: 18 BRPM | TEMPERATURE: 98 F

## 2018-11-16 LAB
ANION GAP SERPL CALC-SCNC: 11 MMOL/L — SIGNIFICANT CHANGE UP (ref 5–17)
APTT BLD: 36.1 SEC — SIGNIFICANT CHANGE UP (ref 27.5–36.3)
BUN SERPL-MCNC: 9 MG/DL — SIGNIFICANT CHANGE UP (ref 7–23)
CALCIUM SERPL-MCNC: 8.9 MG/DL — SIGNIFICANT CHANGE UP (ref 8.4–10.5)
CHLORIDE SERPL-SCNC: 98 MMOL/L — SIGNIFICANT CHANGE UP (ref 96–108)
CO2 SERPL-SCNC: 28 MMOL/L — SIGNIFICANT CHANGE UP (ref 22–31)
CREAT SERPL-MCNC: 0.74 MG/DL — SIGNIFICANT CHANGE UP (ref 0.5–1.3)
DRVVT 50/50: 32.6 SEC — SIGNIFICANT CHANGE UP
DRVVT SCREEN TO CONFIRM RATIO: SIGNIFICANT CHANGE UP
GLUCOSE SERPL-MCNC: 106 MG/DL — HIGH (ref 70–99)
HCT VFR BLD CALC: 30.5 % — LOW (ref 39–50)
HGB BLD-MCNC: 9.8 G/DL — LOW (ref 13–17)
INR BLD: 1.78 RATIO — HIGH (ref 0.88–1.16)
LA NT DPL PPP QL: 40.4 SEC — SIGNIFICANT CHANGE UP
MCHC RBC-ENTMCNC: 31.9 PG — SIGNIFICANT CHANGE UP (ref 27–34)
MCHC RBC-ENTMCNC: 32.2 GM/DL — SIGNIFICANT CHANGE UP (ref 32–36)
MCV RBC AUTO: 99 FL — SIGNIFICANT CHANGE UP (ref 80–100)
NORMALIZED SCT PPP-RTO: 0.87 RATIO — SIGNIFICANT CHANGE UP (ref 0–1.16)
NORMALIZED SCT PPP-RTO: SIGNIFICANT CHANGE UP
PLATELET # BLD AUTO: 740 K/UL — HIGH (ref 150–400)
POTASSIUM SERPL-MCNC: 4 MMOL/L — SIGNIFICANT CHANGE UP (ref 3.5–5.3)
POTASSIUM SERPL-SCNC: 4 MMOL/L — SIGNIFICANT CHANGE UP (ref 3.5–5.3)
PROTHROM AB SERPL-ACNC: 20.8 SEC — HIGH (ref 10–12.9)
RBC # BLD: 3.08 M/UL — LOW (ref 4.2–5.8)
RBC # FLD: 14.5 % — SIGNIFICANT CHANGE UP (ref 10.3–14.5)
SODIUM SERPL-SCNC: 137 MMOL/L — SIGNIFICANT CHANGE UP (ref 135–145)
WBC # BLD: 11.4 K/UL — HIGH (ref 3.8–10.5)
WBC # FLD AUTO: 11.4 K/UL — HIGH (ref 3.8–10.5)

## 2018-11-16 PROCEDURE — 84480 ASSAY TRIIODOTHYRONINE (T3): CPT

## 2018-11-16 PROCEDURE — 93306 TTE W/DOPPLER COMPLETE: CPT

## 2018-11-16 PROCEDURE — 93005 ELECTROCARDIOGRAM TRACING: CPT

## 2018-11-16 PROCEDURE — 84295 ASSAY OF SERUM SODIUM: CPT

## 2018-11-16 PROCEDURE — 94640 AIRWAY INHALATION TREATMENT: CPT

## 2018-11-16 PROCEDURE — 84443 ASSAY THYROID STIM HORMONE: CPT

## 2018-11-16 PROCEDURE — P9016: CPT

## 2018-11-16 PROCEDURE — 97161 PT EVAL LOW COMPLEX 20 MIN: CPT

## 2018-11-16 PROCEDURE — 80048 BASIC METABOLIC PNL TOTAL CA: CPT

## 2018-11-16 PROCEDURE — 82570 ASSAY OF URINE CREATININE: CPT

## 2018-11-16 PROCEDURE — 85014 HEMATOCRIT: CPT

## 2018-11-16 PROCEDURE — 86923 COMPATIBILITY TEST ELECTRIC: CPT

## 2018-11-16 PROCEDURE — 97116 GAIT TRAINING THERAPY: CPT

## 2018-11-16 PROCEDURE — 36430 TRANSFUSION BLD/BLD COMPNT: CPT

## 2018-11-16 PROCEDURE — 83935 ASSAY OF URINE OSMOLALITY: CPT

## 2018-11-16 PROCEDURE — 85730 THROMBOPLASTIN TIME PARTIAL: CPT

## 2018-11-16 PROCEDURE — 93321 DOPPLER ECHO F-UP/LMTD STD: CPT | Mod: 26

## 2018-11-16 PROCEDURE — 85027 COMPLETE CBC AUTOMATED: CPT

## 2018-11-16 PROCEDURE — 74177 CT ABD & PELVIS W/CONTRAST: CPT

## 2018-11-16 PROCEDURE — 61645 PERQ ART M-THROMBECT &/NFS: CPT

## 2018-11-16 PROCEDURE — 83930 ASSAY OF BLOOD OSMOLALITY: CPT

## 2018-11-16 PROCEDURE — 84100 ASSAY OF PHOSPHORUS: CPT

## 2018-11-16 PROCEDURE — 80053 COMPREHEN METABOLIC PANEL: CPT

## 2018-11-16 PROCEDURE — 82550 ASSAY OF CK (CPK): CPT

## 2018-11-16 PROCEDURE — 82330 ASSAY OF CALCIUM: CPT

## 2018-11-16 PROCEDURE — 97760 ORTHOTIC MGMT&TRAING 1ST ENC: CPT

## 2018-11-16 PROCEDURE — 81003 URINALYSIS AUTO W/O SCOPE: CPT

## 2018-11-16 PROCEDURE — 70450 CT HEAD/BRAIN W/O DYE: CPT

## 2018-11-16 PROCEDURE — 82435 ASSAY OF BLOOD CHLORIDE: CPT

## 2018-11-16 PROCEDURE — 86850 RBC ANTIBODY SCREEN: CPT

## 2018-11-16 PROCEDURE — 70496 CT ANGIOGRAPHY HEAD: CPT

## 2018-11-16 PROCEDURE — 70498 CT ANGIOGRAPHY NECK: CPT

## 2018-11-16 PROCEDURE — 82436 ASSAY OF URINE CHLORIDE: CPT

## 2018-11-16 PROCEDURE — C1887: CPT

## 2018-11-16 PROCEDURE — 99285 EMERGENCY DEPT VISIT HI MDM: CPT

## 2018-11-16 PROCEDURE — 83036 HEMOGLOBIN GLYCOSYLATED A1C: CPT

## 2018-11-16 PROCEDURE — 97530 THERAPEUTIC ACTIVITIES: CPT

## 2018-11-16 PROCEDURE — 84436 ASSAY OF TOTAL THYROXINE: CPT

## 2018-11-16 PROCEDURE — 95951: CPT

## 2018-11-16 PROCEDURE — 93308 TTE F-UP OR LMTD: CPT | Mod: 26

## 2018-11-16 PROCEDURE — 82947 ASSAY GLUCOSE BLOOD QUANT: CPT

## 2018-11-16 PROCEDURE — 71045 X-RAY EXAM CHEST 1 VIEW: CPT

## 2018-11-16 PROCEDURE — 82805 BLOOD GASES W/O2 SATURATION: CPT

## 2018-11-16 PROCEDURE — C1757: CPT

## 2018-11-16 PROCEDURE — 86901 BLOOD TYPING SEROLOGIC RH(D): CPT

## 2018-11-16 PROCEDURE — 71260 CT THORAX DX C+: CPT

## 2018-11-16 PROCEDURE — 83735 ASSAY OF MAGNESIUM: CPT

## 2018-11-16 PROCEDURE — 82565 ASSAY OF CREATININE: CPT

## 2018-11-16 PROCEDURE — 97164 PT RE-EVAL EST PLAN CARE: CPT

## 2018-11-16 PROCEDURE — 86900 BLOOD TYPING SEROLOGIC ABO: CPT

## 2018-11-16 PROCEDURE — 72131 CT LUMBAR SPINE W/O DYE: CPT

## 2018-11-16 PROCEDURE — 84300 ASSAY OF URINE SODIUM: CPT

## 2018-11-16 PROCEDURE — 82803 BLOOD GASES ANY COMBINATION: CPT

## 2018-11-16 PROCEDURE — 82962 GLUCOSE BLOOD TEST: CPT

## 2018-11-16 PROCEDURE — C1760: CPT

## 2018-11-16 PROCEDURE — 84132 ASSAY OF SERUM POTASSIUM: CPT

## 2018-11-16 PROCEDURE — 83605 ASSAY OF LACTIC ACID: CPT

## 2018-11-16 PROCEDURE — C1889: CPT

## 2018-11-16 PROCEDURE — C1769: CPT

## 2018-11-16 PROCEDURE — 93970 EXTREMITY STUDY: CPT

## 2018-11-16 PROCEDURE — 85610 PROTHROMBIN TIME: CPT

## 2018-11-16 PROCEDURE — 71275 CT ANGIOGRAPHY CHEST: CPT

## 2018-11-16 PROCEDURE — 95819 EEG AWAKE AND ASLEEP: CPT

## 2018-11-16 PROCEDURE — 88304 TISSUE EXAM BY PATHOLOGIST: CPT

## 2018-11-16 PROCEDURE — 93321 DOPPLER ECHO F-UP/LMTD STD: CPT

## 2018-11-16 PROCEDURE — 97110 THERAPEUTIC EXERCISES: CPT

## 2018-11-16 PROCEDURE — 93308 TTE F-UP OR LMTD: CPT

## 2018-11-16 PROCEDURE — 80061 LIPID PANEL: CPT

## 2018-11-16 RX ORDER — WARFARIN SODIUM 2.5 MG/1
1 TABLET ORAL ONCE
Qty: 0 | Refills: 0 | Status: COMPLETED | OUTPATIENT
Start: 2018-11-16 | End: 2018-11-16

## 2018-11-16 RX ADMIN — OXYCODONE HYDROCHLORIDE 5 MILLIGRAM(S): 5 TABLET ORAL at 06:00

## 2018-11-16 RX ADMIN — PANTOPRAZOLE SODIUM 40 MILLIGRAM(S): 20 TABLET, DELAYED RELEASE ORAL at 05:06

## 2018-11-16 RX ADMIN — ATENOLOL 25 MILLIGRAM(S): 25 TABLET ORAL at 05:06

## 2018-11-16 RX ADMIN — OXYCODONE HYDROCHLORIDE 5 MILLIGRAM(S): 5 TABLET ORAL at 05:12

## 2018-11-16 RX ADMIN — WARFARIN SODIUM 1 MILLIGRAM(S): 2.5 TABLET ORAL at 11:20

## 2018-11-16 RX ADMIN — Medication 81 MILLIGRAM(S): at 11:06

## 2018-11-16 RX ADMIN — OXYCODONE HYDROCHLORIDE 5 MILLIGRAM(S): 5 TABLET ORAL at 11:05

## 2018-11-16 RX ADMIN — Medication 60 MILLIGRAM(S): at 05:06

## 2018-11-16 RX ADMIN — OXYCODONE HYDROCHLORIDE 5 MILLIGRAM(S): 5 TABLET ORAL at 12:25

## 2018-11-16 RX ADMIN — Medication 100 MILLIGRAM(S): at 11:06

## 2018-11-16 NOTE — PROGRESS NOTE ADULT - PROVIDER SPECIALTY LIST ADULT
Cardiology
NSICU
NSICU
Neurology
Neurosurgery
Plastic Surgery
Vascular Surgery
Pulmonology
Vascular Surgery

## 2018-11-16 NOTE — PROVIDER CONTACT NOTE (OTHER) - SITUATION
Pt temp 100 F oral, HR 94 and /71
SBP in the 170
pt complaining of numbness and tingling in B/l hand/fingers
pt with ptt 87.5 per order range should be 50-70. per Dr. Quiroz dosent want to change. Dr. Quiroz will verify with attending if needs to be change.
s/p spinal surgery  s/p emboelctomy and fasciotomy in RLE  s/p embolectomy for L MCA thrombosis
urinary retention
Pt unable to void independently, bladder scanned mult times, most recent bs 428

## 2018-11-16 NOTE — PROGRESS NOTE ADULT - SUBJECTIVE AND OBJECTIVE BOX
THE PATIENT WAS SEEN AND EXAMINED BY ME WITH THE HOUSESTAFF AND STROKE TEAM DURING MORNING ROUNDS.   HPI:  Patient is a 73 year old man with PMH of atrial fibrillation, HTN, Hodgkin's lymphoma s/p splenectomy (1975), hip replacement, and MOE presented to the ED with right lower extremity pain and decreased sensation since 10am on 10/25.  He recently underwent spine surgery on 10/19 for L3/L4 hardware revision and has been off anticoagulation since 10/18. He takes Coumadin and was bridged with Lovenox. He was discharged on 10/23 on aspirin and has not yet restarted anticoagulation. Of note, he had a CVA in Jan 2018 while off anticoagulation for spine surgery. On 10/25 patient underwent RLE embolectomy for acute RLE arterial occlusion.  On 11/13 patient became aphasic, right facial droop, R hemiplegia and a Right field cute, a stroke code was called, NIHSS 15. Patient found to have L MCA thrombus. Patient was taken to IR for intervention: L MCA M2 proximal superior division TICI 3 recanalization  THE PATIENT WAS SEEN AND EXAMINED BY ME WITH THE HOUSESTAFF AND STROKE TEAM DURING MORNING ROUNDS.   HPI:  Patient is a 73 year old man with PMH of atrial fibrillation, HTN, Hodgkin's lymphoma s/p splenectomy (1975), hip replacement, and MOE presented to the ED with right lower extremity pain and decreased sensation since 10am today. He recently underwent spine surgery on 10/19 for L3/L4 hardware revision and has been off anticoagulation since 10/18. He takes Coumadin and was bridged with Lovenox. He was discharged on 10/23 on aspirin and has not yet restarted anticoagulation. Of note, he had a CVA in Jan 2018 while off anticoagulation for spine surgery.     He states at 10am he began having pain from his right mid-thigh extending to his foot, as well as decreased sensation and weakness. He has never had symptoms like this in the past. After arrival to the ED his symptoms improved slightly.     He denies chest pain, shortness of breath, difficulty speaking, upper extremity deficits, fevers, or chills. In the ED his vital signs are within normal limits and he is hemodynamically normal. (25 Oct 2018 14:50)      SUBJECTIVE: No events overnight.  No new neurologic complaints.      acetaminophen   Tablet .. 650 milliGRAM(s) Oral every 6 hours PRN  aspirin enteric coated 81 milliGRAM(s) Oral daily  ATENolol  Tablet 25 milliGRAM(s) Oral daily  docusate sodium 100 milliGRAM(s) Oral daily  fluticasone propionate 50 MICROgram(s)/spray Nasal Spray 1 Spray(s) Both Nostrils daily  NIFEdipine XL 60 milliGRAM(s) Oral daily  oxyCODONE    IR 5 milliGRAM(s) Oral every 4 hours PRN  oxyCODONE    IR 10 milliGRAM(s) Oral every 4 hours PRN  pantoprazole    Tablet 40 milliGRAM(s) Oral before breakfast  polyethylene glycol 3350 17 Gram(s) Oral daily  senna 2 Tablet(s) Oral at bedtime  simvastatin 20 milliGRAM(s) Oral at bedtime  tamsulosin 0.4 milliGRAM(s) Oral at bedtime      PHYSICAL EXAM:   Vital Signs Last 24 Hrs  T(C): 36.7 (16 Nov 2018 04:46), Max: 37.1 (15 Nov 2018 07:55)  T(F): 98.1 (16 Nov 2018 04:46), Max: 98.8 (15 Nov 2018 07:55)  HR: 88 (16 Nov 2018 04:46) (65 - 88)  BP: 164/57 (16 Nov 2018 04:46) (148/66 - 174/64)  BP(mean): --  RR: 18 (16 Nov 2018 04:46) (18 - 18)  SpO2: 96% (16 Nov 2018 04:46) (93% - 96%)    General: No acute distress  HEENT: EOM intact, visual fields full  Abdomen: Soft, nontender, nondistended   Extremities: No edema    NEUROLOGICAL EXAM:  Mental status: Awake, alert, oriented x3, no aphasia, no neglect, normal memory   Cranial Nerves: No facial asymmetry, no nystagmus, no dysarthria,  tongue midline  Motor exam: Normal tone, no drift, 5/5 RUE, 5/5 RLE, 5/5 LUE, 5/5 LLE, normal fine finger movements.  Sensation: Intact to light touch   Coordination/ Gait: No dysmetria, ESSENCE intact and symmetric bilaterally    LABS:                        9.1    10.80 )-----------( 798      ( 15 Nov 2018 07:27 )             29.8    11-15    139  |  106  |  11  ----------------------------<  104<H>  4.1   |  24  |  0.70    Ca    8.7      15 Nov 2018 05:21    PT/INR - ( 15 Nov 2018 12:54 )   PT: 27.0 sec;   INR: 2.31 ratio         PTT - ( 15 Nov 2018 12:54 )  PTT:38.8 sec  Hemoglobin A1C, Whole Blood: 5.6 % (11-13 @ 22:17)      IMAGING: Reviewed by me.       CT HEAD (11/13/18):  No acute intracranial hemorrhage,  Hyperdense appearing left-sided middle cerebral artery concerning for thrombosis. Age-indeterminate small infarct within the left cerebellar hemisphere. Additional chronic infarcts and similar-appearing microvascular   disease.    CTA HEAD/NECK (11/13/18): Lack of contrast enhancement of the right proximal vertebral artery suspicious for possible occlusion. The mid and distal portions of the   right cervical vertebral arteries enhance, which may be retrograde from   the intracranial vertebrobasilar confluence.  Atherosclerosis of the bilateral carotid bifurcations with approximately   20-30% stenosis of the right internal carotid artery origin by NASCET   criteria. No flow-limiting stenosis on the left. THE PATIENT WAS SEEN AND EXAMINED BY ME WITH THE HOUSESTAFF AND STROKE TEAM DURING MORNING ROUNDS.   HPI:  Patient is a 73 year old man with PMH of atrial fibrillation, HTN, Hodgkin's lymphoma s/p splenectomy (1975), hip replacement, and MOE presented to the ED with right lower extremity pain and decreased sensation since 10am on 10/25.  He recently underwent spine surgery on 10/19 for L3/L4 hardware revision and has been off anticoagulation since 10/18. He takes Coumadin and was bridged with Lovenox. He was discharged on 10/23 on aspirin and has not yet restarted anticoagulation. Of note, he had a CVA in Jan 2018 while off anticoagulation for spine surgery. On 10/25 patient underwent RLE embolectomy for acute RLE arterial occlusion.  On 11/13 patient became aphasic, right facial droop, R hemiplegia and a Right field cute, a stroke code was called, NIHSS 15. Patient found to have L MCA thrombus. Patient was taken to IR for intervention: L MCA M2 proximal superior division TICI 3 recanalization    SUBJECTIVE: No events overnight.  No new neurologic complaints.      acetaminophen   Tablet .. 650 milliGRAM(s) Oral every 6 hours PRN  aspirin enteric coated 81 milliGRAM(s) Oral daily  ATENolol  Tablet 25 milliGRAM(s) Oral daily  docusate sodium 100 milliGRAM(s) Oral daily  fluticasone propionate 50 MICROgram(s)/spray Nasal Spray 1 Spray(s) Both Nostrils daily  NIFEdipine XL 60 milliGRAM(s) Oral daily  oxyCODONE    IR 5 milliGRAM(s) Oral every 4 hours PRN  oxyCODONE    IR 10 milliGRAM(s) Oral every 4 hours PRN  pantoprazole    Tablet 40 milliGRAM(s) Oral before breakfast  polyethylene glycol 3350 17 Gram(s) Oral daily  senna 2 Tablet(s) Oral at bedtime  simvastatin 20 milliGRAM(s) Oral at bedtime  tamsulosin 0.4 milliGRAM(s) Oral at bedtime      PHYSICAL EXAM:   Vital Signs Last 24 Hrs  T(C): 36.7 (16 Nov 2018 04:46), Max: 37.1 (15 Nov 2018 07:55)  T(F): 98.1 (16 Nov 2018 04:46), Max: 98.8 (15 Nov 2018 07:55)  HR: 88 (16 Nov 2018 04:46) (65 - 88)  BP: 164/57 (16 Nov 2018 04:46) (148/66 - 174/64)  BP(mean): --  RR: 18 (16 Nov 2018 04:46) (18 - 18)  SpO2: 96% (16 Nov 2018 04:46) (93% - 96%)    General: No acute distress  HEENT: EOM intact, visual fields full  Abdomen: Soft, nontender, nondistended   Extremities: No edema    NEUROLOGICAL EXAM:  Mental status: Awake, alert, oriented x3, no aphasia, no neglect, normal memory   Cranial Nerves: No facial asymmetry, no nystagmus, no dysarthria,  tongue midline  Motor exam: Normal tone, no drift, 5/5 RUE, 5/5 RLE, 5/5 LUE, 5/5 LLE, normal fine finger movements.  Sensation: Intact to light touch   Coordination/ Gait: No dysmetria, ESSENCE intact and symmetric bilaterally    LABS:                        9.1    10.80 )-----------( 798      ( 15 Nov 2018 07:27 )             29.8    11-15    139  |  106  |  11  ----------------------------<  104<H>  4.1   |  24  |  0.70    Ca    8.7      15 Nov 2018 05:21    PT/INR - ( 15 Nov 2018 12:54 )   PT: 27.0 sec;   INR: 2.31 ratio         PTT - ( 15 Nov 2018 12:54 )  PTT:38.8 sec  Hemoglobin A1C, Whole Blood: 5.6 % (11-13 @ 22:17)      IMAGING: Reviewed by me.       CT HEAD (11/13/18):  No acute intracranial hemorrhage,  Hyperdense appearing left-sided middle cerebral artery concerning for thrombosis. Age-indeterminate small infarct within the left cerebellar hemisphere. Additional chronic infarcts and similar-appearing microvascular   disease.    CTA HEAD/NECK (11/13/18): Lack of contrast enhancement of the right proximal vertebral artery suspicious for possible occlusion. The mid and distal portions of the   right cervical vertebral arteries enhance, which may be retrograde from   the intracranial vertebrobasilar confluence.  Atherosclerosis of the bilateral carotid bifurcations with approximately   20-30% stenosis of the right internal carotid artery origin by NASCET   criteria. No flow-limiting stenosis on the left.

## 2018-11-16 NOTE — PROVIDER CONTACT NOTE (OTHER) - BACKGROUND
Pt s/p RLE embolectomy and fasciotomy.
S/p right leg skin graft
pt with L3-L4 back sx, ot with RLE occulison with emblectomy and fascitomy
pt with ROXANNE rivera
s/p thrombectomy & embolectomy to RLE, s/p fasciotomy to RLE

## 2018-11-16 NOTE — PROGRESS NOTE ADULT - SUBJECTIVE AND OBJECTIVE BOX
PLASTIC SURGERY DAILY PROGRESS NOTE:     Subjective:  Pt seen and examined. No acute events. Vac taken down and wound dressed.     Objective:  NAD  dressing in place, c/d/i    MEDICATIONS  (STANDING):  aspirin enteric coated 81 milliGRAM(s) Oral daily  ATENolol  Tablet 25 milliGRAM(s) Oral daily  docusate sodium 100 milliGRAM(s) Oral daily  fluticasone propionate 50 MICROgram(s)/spray Nasal Spray 1 Spray(s) Both Nostrils daily  NIFEdipine XL 60 milliGRAM(s) Oral daily  pantoprazole    Tablet 40 milliGRAM(s) Oral before breakfast  polyethylene glycol 3350 17 Gram(s) Oral daily  senna 2 Tablet(s) Oral at bedtime  simvastatin 20 milliGRAM(s) Oral at bedtime  tamsulosin 0.4 milliGRAM(s) Oral at bedtime    MEDICATIONS  (PRN):  acetaminophen   Tablet .. 650 milliGRAM(s) Oral every 6 hours PRN Mild Pain (1 - 3)  oxyCODONE    IR 5 milliGRAM(s) Oral every 4 hours PRN Moderate Pain (4 - 6)  oxyCODONE    IR 10 milliGRAM(s) Oral every 4 hours PRN Severe Pain (7 - 10)      Vital Signs Last 24 Hrs  T(C): 36.7 (16 Nov 2018 04:46), Max: 36.9 (15 Nov 2018 11:52)  T(F): 98.1 (16 Nov 2018 04:46), Max: 98.5 (15 Nov 2018 11:52)  HR: 88 (16 Nov 2018 04:46) (65 - 88)  BP: 164/57 (16 Nov 2018 04:46) (148/66 - 174/64)  BP(mean): --  RR: 18 (16 Nov 2018 04:46) (18 - 18)  SpO2: 96% (16 Nov 2018 04:46) (93% - 96%)    I&O's Detail    15 Nov 2018 07:01  -  16 Nov 2018 07:00  --------------------------------------------------------  IN:    Oral Fluid: 1040 mL  Total IN: 1040 mL    OUT:    Voided: 1750 mL  Total OUT: 1750 mL    Total NET: -710 mL          Daily     Daily     LABS:                        9.1    10.80 )-----------( 798      ( 15 Nov 2018 07:27 )             29.8     11-15    139  |  106  |  11  ----------------------------<  104<H>  4.1   |  24  |  0.70    Ca    8.7      15 Nov 2018 05:21      PT/INR - ( 15 Nov 2018 12:54 )   PT: 27.0 sec;   INR: 2.31 ratio         PTT - ( 15 Nov 2018 12:54 )  PTT:38.8 sec      RADIOLOGY & ADDITIONAL STUDIES:

## 2018-11-16 NOTE — PROVIDER CONTACT NOTE (OTHER) - ASSESSMENT
Pt A&Ox4. vss. pt complains of no pain at this time. pt with difficulty voiding. pt voided small amounts during day. bladder scan 654.
A&Ox4; forgetful at times (d/t short term memory loss from hx of CVA), T 100 F oral, HR 94, /71 RR 18 O2 sat 93% r/a. Dsg c/d/i ; changed 2 hrs by MD Regalado. Hep gtt @ 12 ml/hr; no s/s of bleeding. Adequate urine output thus far on my shift. Pt denies chest pain, SOB, dizziness, nausea, headache or distress at this time; resting comfortably.
Pt denies pain, in no apparent distress
pt asymptomatic
pt hands are warm and capillary refill WNL
pt a&ox4, R leg with ace wrap c/d/i, holguin d/c'd today and pt was DTV by 1800, pt bladder scanned mult times with low amt of urine in bladder, pt denies discomfort, pain or pressure. current bladder scan 428

## 2018-11-16 NOTE — PROVIDER CONTACT NOTE (OTHER) - DATE AND TIME:
01-Nov-2018 01:55
03-Nov-2018 13:59
07-Nov-2018 17:52
08-Nov-2018 14:30
15-Nov-2018 01:20
16-Nov-2018 00:03
29-Oct-2018 23:42

## 2018-11-16 NOTE — PROGRESS NOTE ADULT - ASSESSMENT
Patient is a 73 year old man with PMH of atrial fibrillation, HTN, Hodgkin's lymphoma s/p splenectomy (1975), hip replacement, and MOE presented to the ED with right lower extremity pain and decreased sensation since 10am on 10/25.  He recently underwent spine surgery on 10/19 for L3/L4 hardware revision and has been off anticoagulation since 10/18. He takes Coumadin and was bridged with Lovenox. He was discharged on 10/23 on aspirin and has not yet restarted anticoagulation. Of note, he had a CVA in Jan 2018 while off anticoagulation for spine surgery. On 10/25 patient underwent RLE embolectomy for acute RLE arterial occlusion.  On 11/13 patient became aphasic, right facial droop, R hemiplegia and a Right field cute, a stroke code was called, NIHSS 15. Patient found to have L MCA thrombus. Patient was taken to IR for intervention: L MCA M2 proximal superior division TICI 3 recanalization.  Impression: LMCA infarct secondary to M1 occlusion s/p TICI 3 recanilization    NEURO: Continue close monitoring for neurologic deterioration, permissive HTN, titrate statin to LDL goal less than 70, Physical therapy/OT recommends DANIEL     ANTITHROMBOTIC THERAPY: Coumadin    PULMONARY: CXR clear, protecting airway, saturating well     CARDIOVASCULAR: check TTE, cardiac monitoring                              SBP goal:     GASTROINTESTINAL:  dysphagia screen- passed, tolerating diet     Diet: Regukar    RENAL: BUN/Cr without acute change, good urine output      Na Goal: Greater than 135     Kaufman: N    HEMATOLOGY: H/H without acute change. Heme consulted for thrombosis while on anticoagulation. Patient was bridged from heparin gtt to coumadin, goal INR 2-3. Will be discharged on coumadin. Doppler of LE ordered to r/o DVT and lupus profile as per heme.      DVT ppx: coumadin    ID: afebrile, no leukocytosis     OTHER: Surgery following for RLE embolectomy and RLE fasciotomies, s/p split thickness skin graft and placement of wound vac, wound vac with discontinued on 11/15, Xerofrom to graft site on leg every other day cover with xeroform, ABD and wrap with ace from toes to knee  Donor site leave aquacel and tegaderm in place  Plan of care discussed with patient at bedside. All questions and concerns addressed. 	    DISPOSITION: [facility] once stable and workup is complete      CORE MEASURES:        Admission NIHSS: 15     TPA: [] YES [x] NO      LDL/HDL: 73/43     Depression Screen: 0     Statin Therapy: y     Dysphagia Screen: [x] PASS [] FAIL     Smoking [] YES [x] NO      Afib [x] YES [] NO     Stroke Education [x] YES [] NO Patient is a 73 year old man with PMH of atrial fibrillation, HTN, Hodgkin's lymphoma s/p splenectomy (1975), hip replacement, and MOE presented to the ED with right lower extremity pain and decreased sensation since 10am on 10/25.  He recently underwent spine surgery on 10/19 for L3/L4 hardware revision and has been off anticoagulation since 10/18. He takes Coumadin and was bridged with Lovenox. He was discharged on 10/23 on aspirin and has not yet restarted anticoagulation. Of note, he had a CVA in Jan 2018 while off anticoagulation for spine surgery. On 10/25 patient underwent RLE embolectomy for acute RLE arterial occlusion.  On 11/13 patient became aphasic, right facial droop, R hemiplegia and a Right field cute, a stroke code was called, NIHSS 15. Patient found to have L MCA thrombus. Patient was taken to IR for intervention: L MCA M2 proximal superior division TICI 3 recanalization.  Impression: LMCA infarct secondary to M1 occlusion s/p TICI 3 recanilization    NEURO: neurologically without acute change, permissive HTN, titrate statin to LDL goal less than 70, Physical therapy/OT recommends DANIEL     ANTITHROMBOTIC THERAPY: Coumadin    PULMONARY: CXR clear, protecting airway, saturating well     CARDIOVASCULAR: check TTE, cardiac monitoring                              SBP goal:     GASTROINTESTINAL:  dysphagia screen- passed, tolerating diet     Diet: Regukar    RENAL: BUN/Cr without acute change, good urine output      Na Goal: Greater than 135     Kaufman: N    HEMATOLOGY: H/H without acute change. Heme consulted for thrombosis while on anticoagulation. Patient was bridged from heparin gtt to coumadin, goal INR 2-3. Will be discharged on coumadin. Doppler of LE negative for DVT  and lupus profile as per heme.      DVT ppx: coumadin    ID: afebrile, no leukocytosis     OTHER: Surgery following for RLE embolectomy and RLE fasciotomies, s/p split thickness skin graft and placement of wound vac, wound vac with discontinued on 11/15, Xerofrom to graft site on leg every other day cover with xeroform, ABD and wrap with ace from toes to knee  Donor site leave aquacel and tegaderm in place  Plan of care discussed with patient at bedside. All questions and concerns addressed. 	    DISPOSITION: [facility] today      CORE MEASURES:        Admission NIHSS: 15     TPA: [] YES [x] NO      LDL/HDL: 73/43     Depression Screen: 0     Statin Therapy: y     Dysphagia Screen: [x] PASS [] FAIL     Smoking [] YES [x] NO      Afib [x] YES [] NO     Stroke Education [x] YES [] NO Patient is a 73 year old man with PMH of atrial fibrillation, HTN, Hodgkin's lymphoma s/p splenectomy (1975), hip replacement, and MOE presented to the ED with right lower extremity pain and decreased sensation since 10am on 10/25.  He recently underwent spine surgery on 10/19 for L3/L4 hardware revision and has been off anticoagulation since 10/18. He takes Coumadin and was bridged with Lovenox. He was discharged on 10/23 on aspirin and has not yet restarted anticoagulation. Of note, he had a CVA in Jan 2018 while off anticoagulation for spine surgery. On 10/25 patient underwent RLE embolectomy for acute RLE arterial occlusion.  On 11/13 patient became aphasic, right facial droop, R hemiplegia and a Right field cute, a stroke code was called, NIHSS 15. Patient found to have L MCA thrombus. Patient was taken to IR for intervention: L MCA M2 proximal superior division TICI 3 recanalization.  Impression: LMCA infarct secondary to M1 occlusion s/p TICI 3 recanilization    NEURO: neurologically without acute change, permissive HTN, titrate statin to LDL goal less than 70, Physical therapy/OT recommends DANIEL     ANTITHROMBOTIC THERAPY: Coumadin ASA    PULMONARY: CXR clear, protecting airway, saturating well     CARDIOVASCULAR: check TTE, cardiac monitoring                              SBP goal:     GASTROINTESTINAL:  dysphagia screen- passed, tolerating diet     Diet: Regukar    RENAL: BUN/Cr without acute change, good urine output      Na Goal: Greater than 135     Kaufman: N    HEMATOLOGY: H/H without acute change. Heme consulted for thrombosis while on anticoagulation. Patient was bridged from heparin gtt to coumadin, goal INR 2-3. Will be discharged on coumadin. Doppler of LE negative for DVT  and lupus profile as per heme.      DVT ppx: coumadin    ID: afebrile, no leukocytosis     OTHER: Surgery following for RLE embolectomy and RLE fasciotomies, s/p split thickness skin graft and placement of wound vac, wound vac with discontinued on 11/15, Xerofrom to graft site on leg every other day cover with xeroform, ABD and wrap with ace from toes to knee  Donor site leave aquacel and tegaderm in place  Plan of care discussed with patient at bedside. All questions and concerns addressed. 	    DISPOSITION: [facility] today      CORE MEASURES:        Admission NIHSS: 15     TPA: [] YES [x] NO      LDL/HDL: 73/43     Depression Screen: 0     Statin Therapy: y     Dysphagia Screen: [x] PASS [] FAIL     Smoking [] YES [x] NO      Afib [x] YES [] NO     Stroke Education [x] YES [] NO

## 2018-11-16 NOTE — PROGRESS NOTE ADULT - ASSESSMENT
73M with recent spine surgery s/p RLE embolectomy for acute RLE arterial occlusion and RLE fasciotomies now s/p split thickness skin graft and placement of wound vac over closure to RLE.     -vac off - xeroform, abd, ace  -donor site - aquacel, do not change  -OK to be OOB to chair and stand to pivot  -AFO boot on at all times  -continue donor site dressing  -care per primary team    Plastic surgery  [phone] 73M with recent spine surgery s/p RLE embolectomy for acute RLE arterial occlusion and RLE fasciotomies now s/p split thickness skin graft and placement of wound vac over closure to RLE.     -vac off - xeroform, abd, ace, change every other day  -donor site - aquacel, do not change  -OK to be OOB to chair and stand to pivot  -AFO boot on at all times  -continue donor site dressing  -care per primary team    Plastic surgery  [phone]

## 2018-11-16 NOTE — PROVIDER CONTACT NOTE (OTHER) - RECOMMENDATIONS
straight cath per policy
IV BP meds
keep at current rate of 15. will recall and confirm if need to be change
make team aware, encourage ROM in hands

## 2018-11-16 NOTE — PROGRESS NOTE ADULT - REASON FOR ADMISSION
Right lower extremity arterial occlusion

## 2018-11-16 NOTE — PROVIDER CONTACT NOTE (OTHER) - ACTION/TREATMENT ORDERED:
SAW Duffy made aware, pending orders to straight cath
No action at this time as per [place]. Continue 2% NS at 75mL/hr and will recheck in am.
Dr. Davis at bedside at this time, & aware, no intervention at this time.
No intervention necessary at this time. Continue to monitor. Reassess VS as per routine.
made Dr. Arash Davis aware will continue to monitor.
recheck BP at 0100
will follow up with team and will continue to monitor.

## 2018-11-16 NOTE — PROVIDER CONTACT NOTE (OTHER) - REASON
Pt on 2% NS @75mL/hr , sodium level 137
Pt temp 100 F oral, HR 94 and /71
SBP in the 170
per Dr. Quiroz not changing heparin drip
pt complain of numbness and tingling in b/l hand/fingers
urinary retention
Pt unable to void independently, bladder scanned mult times, most recent bs 428

## 2018-11-16 NOTE — PROGRESS NOTE ADULT - SUBJECTIVE AND OBJECTIVE BOX
S: no chest pain or sob       acetaminophen   Tablet .. 650 milliGRAM(s) Oral every 6 hours PRN  aspirin enteric coated 81 milliGRAM(s) Oral daily  ATENolol  Tablet 25 milliGRAM(s) Oral daily  docusate sodium 100 milliGRAM(s) Oral daily  fluticasone propionate 50 MICROgram(s)/spray Nasal Spray 1 Spray(s) Both Nostrils daily  NIFEdipine XL 60 milliGRAM(s) Oral daily  oxyCODONE    IR 5 milliGRAM(s) Oral every 4 hours PRN  oxyCODONE    IR 10 milliGRAM(s) Oral every 4 hours PRN  pantoprazole    Tablet 40 milliGRAM(s) Oral before breakfast  polyethylene glycol 3350 17 Gram(s) Oral daily  senna 2 Tablet(s) Oral at bedtime  simvastatin 20 milliGRAM(s) Oral at bedtime  tamsulosin 0.4 milliGRAM(s) Oral at bedtime                            9.8    11.4  )-----------( 740      ( 16 Nov 2018 09:15 )             30.5       11-16    137  |  98  |  9   ----------------------------<  106<H>  4.0   |  28  |  0.74    Ca    8.9      16 Nov 2018 09:15              T(C): 36.7 (11-16-18 @ 12:06), Max: 36.9 (11-15-18 @ 16:13)  HR: 61 (11-16-18 @ 12:06) (61 - 88)  BP: 144/64 (11-16-18 @ 12:06) (144/64 - 174/64)  RR: 18 (11-16-18 @ 12:06) (18 - 18)  SpO2: 97% (11-16-18 @ 12:06) (93% - 97%)  Wt(kg): --    I&O's Summary    15 Nov 2018 07:01  -  16 Nov 2018 07:00  --------------------------------------------------------  IN: 1040 mL / OUT: 1750 mL / NET: -710 mL    16 Nov 2018 07:01  -  16 Nov 2018 13:52  --------------------------------------------------------  IN: 240 mL / OUT: 700 mL / NET: -460 mL          	  Lymphatic: No lymphadenopathy , no edema  Cardiovascular: irregular irregular  S1 S2, No JVD, No murmurs  Respiratory: Lungs clear to auscultation, normal effort 	  Gastrointestinal:  Soft, Non-tender, + BS	      TELEMETRY: 	 afib   ECG:  	< from: 12 Lead ECG (10.25.18 @ 14:08) >  Diagnosis Line ATRIAL FIBRILLATION  PROLONGED QT  ABNORMAL ECG    < end of copied text >      ASSESSMENT/PLAN: 	73y Male  history of Afib previously on Coumadin , held secondary to spine surgery 10/19/18, HTN, CVA, MOE admitted with RLE arterial occlusion now s/p embolectomy of right AT and peroneal artery. RLE fasciotomies , unable to close ,  s/p VAC placement pending OR on Friday for RLE fasciotomy closure s/p pee fasciotomy debrided and wound cleaned, s/p skin grafts , vac applied  with acute left MCA thrombosis 11/13 s/p successful neuro IR cerebral angio.     - care per neurosurgery   - recommend lifelong ac for cva prevention  - INR subtherapeutic today - ? bridging - will defer to NSx and neurology  - follow up neuro about possible bridging vs changing to NOAC  - discussed above with neurology resident   - further workup pending above    Jaz Varghese MD

## 2018-11-28 ENCOUNTER — INBOUND DOCUMENT (OUTPATIENT)
Age: 73
End: 2018-11-28

## 2018-12-02 ENCOUNTER — OUTPATIENT (OUTPATIENT)
Dept: OUTPATIENT SERVICES | Facility: HOSPITAL | Age: 73
LOS: 1 days | Discharge: HOME | End: 2018-12-02
Payer: COMMERCIAL

## 2018-12-02 DIAGNOSIS — Z90.81 ACQUIRED ABSENCE OF SPLEEN: Chronic | ICD-10-CM

## 2018-12-02 DIAGNOSIS — Z98.1 ARTHRODESIS STATUS: Chronic | ICD-10-CM

## 2018-12-02 DIAGNOSIS — G89.29 OTHER CHRONIC PAIN: ICD-10-CM

## 2018-12-02 DIAGNOSIS — Z96.641 PRESENCE OF RIGHT ARTIFICIAL HIP JOINT: Chronic | ICD-10-CM

## 2018-12-02 DIAGNOSIS — Z98.890 OTHER SPECIFIED POSTPROCEDURAL STATES: Chronic | ICD-10-CM

## 2018-12-02 PROCEDURE — 93971 EXTREMITY STUDY: CPT | Mod: 26,RT

## 2018-12-03 NOTE — H&P PST ADULT - HISTORY OF PRESENT ILLNESS
73 y/o M PMH A fib, S/P cardioversion "many years ago," on coumadin, Hodgkins lymphoma, S/P splenectomy, chemotherapy and radiation 1975, 73 y/o M PMH A fib, S/P cardioversion "many years ago," on coumadin, Hodgkins lymphoma, S/P splenectomy, chemotherapy and radiation 1975, now with c/o chronic low back pain, denies pain or numbness of the lower extremities.  Presents today for L3-4 lumbar laminectomy with pedicle screws. Graft Basting Suture (Optional): 6-0 Prolene

## 2018-12-06 NOTE — ED PROVIDER NOTE - CONSTITUTIONAL, MLM
Central Scheduling called and states that the order for the CT of the Chest that was put in back in July something is wrong with the order and they would like for that order to be put in again    normal... Well appearing, well nourished, awake, alert, oriented to person, place, time/situation and in no apparent distress.

## 2018-12-06 NOTE — ED ADULT NURSE NOTE - CHIEF COMPLAINT
Telephone Encounter by Oma Mane Oma Mane RN (Registered Nurse)            Dr. Suh please advise message below thanks[EC1.1M]      Revision History        User Key Date/Time User Provider Type Action    > EC1.1 09/11/17 03:27 PM Oma Mane, RN Registered Nurse Sign    M - Manual             The patient is a 73y Male complaining of

## 2018-12-12 ENCOUNTER — APPOINTMENT (OUTPATIENT)
Dept: VASCULAR SURGERY | Facility: CLINIC | Age: 73
End: 2018-12-12
Payer: MEDICARE

## 2018-12-12 VITALS
SYSTOLIC BLOOD PRESSURE: 132 MMHG | HEART RATE: 70 BPM | WEIGHT: 195 LBS | TEMPERATURE: 98.4 F | HEIGHT: 68 IN | BODY MASS INDEX: 29.55 KG/M2 | DIASTOLIC BLOOD PRESSURE: 80 MMHG

## 2018-12-12 PROCEDURE — 99024 POSTOP FOLLOW-UP VISIT: CPT

## 2018-12-31 ENCOUNTER — OTHER (OUTPATIENT)
Age: 73
End: 2018-12-31

## 2019-01-04 ENCOUNTER — TRANSCRIPTION ENCOUNTER (OUTPATIENT)
Age: 74
End: 2019-01-04

## 2019-01-08 ENCOUNTER — APPOINTMENT (OUTPATIENT)
Dept: SPINE | Facility: CLINIC | Age: 74
End: 2019-01-08
Payer: MEDICARE

## 2019-01-08 VITALS
HEIGHT: 68 IN | DIASTOLIC BLOOD PRESSURE: 84 MMHG | SYSTOLIC BLOOD PRESSURE: 134 MMHG | WEIGHT: 200 LBS | BODY MASS INDEX: 30.31 KG/M2

## 2019-01-08 PROCEDURE — 99024 POSTOP FOLLOW-UP VISIT: CPT

## 2019-01-28 ENCOUNTER — CLINICAL ADVICE (OUTPATIENT)
Age: 74
End: 2019-01-28

## 2019-01-30 NOTE — PROGRESS NOTE ADULT - SUBJECTIVE AND OBJECTIVE BOX
GENERAL SURGERY DAILY PROGRESS NOTE:       Subjective:  POD2 s/p RLE embolectomy. Patient transferred from PACU to floor without complication. RLE painful, but pain has not increased. ACE bandage for compression. Heparin gtt therapeutic. Coumadin started overnight. Tolerating diet. Will lance holguin this am        Objective:    PE:  Gen: NAD  Resp: nonlabored respirations  CVS: RRR  Abd: soft, ND, NT, no rebound or guarding  Ext: significant RLE edema, no pain with palpation or passive flexion but painful with movement, incision c/d/i, sensation intact, warm, appropriate cap refill.   Neuro: AAOx3, no focal deficits    Vital Signs Last 24 Hrs  T(C): 37.2 (27 Oct 2018 05:42), Max: 37.3 (26 Oct 2018 13:00)  T(F): 99 (27 Oct 2018 05:42), Max: 99.1 (26 Oct 2018 13:00)  HR: 96 (27 Oct 2018 05:42) (64 - 96)  BP: 166/76 (27 Oct 2018 05:42) (131/62 - 166/76)  BP(mean): 91 (26 Oct 2018 13:45) (89 - 111)  RR: 18 (27 Oct 2018 05:42) (14 - 22)  SpO2: 98% (27 Oct 2018 05:42) (93% - 98%)    I&O's Detail    25 Oct 2018 07:01  -  26 Oct 2018 07:00  --------------------------------------------------------  IN:    heparin  Infusion.: 102 mL    IV PiggyBack: 100 mL    lactated ringers.: 600 mL    Oral Fluid: 420 mL  Total IN: 1222 mL    OUT:    Indwelling Catheter - Urethral: 760 mL  Total OUT: 760 mL    Total NET: 462 mL      26 Oct 2018 07:01  -  27 Oct 2018 06:45  --------------------------------------------------------  IN:    heparin  Infusion.: 204 mL    lactated ringers.: 600 mL    Oral Fluid: 240 mL  Total IN: 1044 mL    OUT:    Indwelling Catheter - Urethral: 1865 mL  Total OUT: 1865 mL    Total NET: -821 mL          Daily     Daily     MEDICATIONS  (STANDING):  acetaminophen   Tablet .. 650 milliGRAM(s) Oral every 6 hours  aspirin enteric coated 81 milliGRAM(s) Oral daily  ATENolol  Tablet 25 milliGRAM(s) Oral daily  fluticasone propionate 50 MICROgram(s)/spray Nasal Spray 1 Spray(s) Both Nostrils daily  heparin  Infusion. 1400 Unit(s)/Hr (14 mL/Hr) IV Continuous <Continuous>  lisinopril 40 milliGRAM(s) Oral daily  NIFEdipine XL 60 milliGRAM(s) Oral daily  pantoprazole    Tablet 40 milliGRAM(s) Oral before breakfast  simvastatin 20 milliGRAM(s) Oral at bedtime    MEDICATIONS  (PRN):  oxyCODONE    IR 5 milliGRAM(s) Oral every 4 hours PRN Moderate Pain (4 - 6)      LABS:                        10.3   17.0  )-----------( 385      ( 26 Oct 2018 07:37 )             31.9     10-26    138  |  96  |  22  ----------------------------<  156<H>  4.2   |  30  |  0.71    Ca    8.9      26 Oct 2018 07:37  Mg     2.2     10-26    TPro  7.1  /  Alb  3.6  /  TBili  0.5  /  DBili  x   /  AST  27  /  ALT  26  /  AlkPhos  167<H>  10-25    PT/INR - ( 26 Oct 2018 07:38 )   PT: 12.5 sec;   INR: 1.15 ratio         PTT - ( 27 Oct 2018 01:31 )  PTT:98.7 sec      RADIOLOGY & ADDITIONAL STUDIES: GENERAL SURGERY DAILY PROGRESS NOTE:       Subjective:  POD2 s/p RLE embolectomy. Patient transferred from PACU to floor without complication. RLE painful, but pain has not increased. ACE bandage for compression. Heparin gtt therapeutic. Coumadin started overnight. Tolerating diet. Will lance holguin this am        Objective:    PE:  Gen: NAD  Resp: nonlabored respirations  CVS: RRR  Abd: soft, ND, NT, no rebound or guarding  Ext: significant RLE edema, no pain with palpation or passive flexion but painful with movement, incision c/d/i, sensation intact, warm, appropriate cap refill. Palpable DP  Neuro: AAOx3, no focal deficits    Vital Signs Last 24 Hrs  T(C): 37.2 (27 Oct 2018 05:42), Max: 37.3 (26 Oct 2018 13:00)  T(F): 99 (27 Oct 2018 05:42), Max: 99.1 (26 Oct 2018 13:00)  HR: 96 (27 Oct 2018 05:42) (64 - 96)  BP: 166/76 (27 Oct 2018 05:42) (131/62 - 166/76)  BP(mean): 91 (26 Oct 2018 13:45) (89 - 111)  RR: 18 (27 Oct 2018 05:42) (14 - 22)  SpO2: 98% (27 Oct 2018 05:42) (93% - 98%)    I&O's Detail    25 Oct 2018 07:01  -  26 Oct 2018 07:00  --------------------------------------------------------  IN:    heparin  Infusion.: 102 mL    IV PiggyBack: 100 mL    lactated ringers.: 600 mL    Oral Fluid: 420 mL  Total IN: 1222 mL    OUT:    Indwelling Catheter - Urethral: 760 mL  Total OUT: 760 mL    Total NET: 462 mL      26 Oct 2018 07:01  -  27 Oct 2018 06:45  --------------------------------------------------------  IN:    heparin  Infusion.: 204 mL    lactated ringers.: 600 mL    Oral Fluid: 240 mL  Total IN: 1044 mL    OUT:    Indwelling Catheter - Urethral: 1865 mL  Total OUT: 1865 mL    Total NET: -821 mL          Daily     Daily     MEDICATIONS  (STANDING):  acetaminophen   Tablet .. 650 milliGRAM(s) Oral every 6 hours  aspirin enteric coated 81 milliGRAM(s) Oral daily  ATENolol  Tablet 25 milliGRAM(s) Oral daily  fluticasone propionate 50 MICROgram(s)/spray Nasal Spray 1 Spray(s) Both Nostrils daily  heparin  Infusion. 1400 Unit(s)/Hr (14 mL/Hr) IV Continuous <Continuous>  lisinopril 40 milliGRAM(s) Oral daily  NIFEdipine XL 60 milliGRAM(s) Oral daily  pantoprazole    Tablet 40 milliGRAM(s) Oral before breakfast  simvastatin 20 milliGRAM(s) Oral at bedtime    MEDICATIONS  (PRN):  oxyCODONE    IR 5 milliGRAM(s) Oral every 4 hours PRN Moderate Pain (4 - 6)      LABS:                        10.3   17.0  )-----------( 385      ( 26 Oct 2018 07:37 )             31.9     10-26    138  |  96  |  22  ----------------------------<  156<H>  4.2   |  30  |  0.71    Ca    8.9      26 Oct 2018 07:37  Mg     2.2     10-26    TPro  7.1  /  Alb  3.6  /  TBili  0.5  /  DBili  x   /  AST  27  /  ALT  26  /  AlkPhos  167<H>  10-25    PT/INR - ( 26 Oct 2018 07:38 )   PT: 12.5 sec;   INR: 1.15 ratio         PTT - ( 27 Oct 2018 01:31 )  PTT:98.7 sec      RADIOLOGY & ADDITIONAL STUDIES: GENERAL SURGERY DAILY PROGRESS NOTE:       Subjective:  POD2 s/p RLE embolectomy. Patient transferred from PACU to floor without complication. RLE painful, but pain has not increased. ACE bandage for compression. Heparin gtt therapeutic. Coumadin started overnight. Tolerating diet. Dressings changed at bedside.         Objective:    PE:  Gen: NAD  Resp: nonlabored respirations  CVS: RRR  Abd: soft, ND, NT, no rebound or guarding  Ext: significant RLE edema, no pain with palpation, new mild pain with passive flexion and continues to be painful with movement, incision c/d/i, sensation intact, warm, appropriate cap refill. Palpable DP  Neuro: AAOx3, no focal deficits    Vital Signs Last 24 Hrs  T(C): 37.2 (27 Oct 2018 05:42), Max: 37.3 (26 Oct 2018 13:00)  T(F): 99 (27 Oct 2018 05:42), Max: 99.1 (26 Oct 2018 13:00)  HR: 96 (27 Oct 2018 05:42) (64 - 96)  BP: 166/76 (27 Oct 2018 05:42) (131/62 - 166/76)  BP(mean): 91 (26 Oct 2018 13:45) (89 - 111)  RR: 18 (27 Oct 2018 05:42) (14 - 22)  SpO2: 98% (27 Oct 2018 05:42) (93% - 98%)    I&O's Detail    25 Oct 2018 07:01  -  26 Oct 2018 07:00  --------------------------------------------------------  IN:    heparin  Infusion.: 102 mL    IV PiggyBack: 100 mL    lactated ringers.: 600 mL    Oral Fluid: 420 mL  Total IN: 1222 mL    OUT:    Indwelling Catheter - Urethral: 760 mL  Total OUT: 760 mL    Total NET: 462 mL      26 Oct 2018 07:01  -  27 Oct 2018 06:45  --------------------------------------------------------  IN:    heparin  Infusion.: 204 mL    lactated ringers.: 600 mL    Oral Fluid: 240 mL  Total IN: 1044 mL    OUT:    Indwelling Catheter - Urethral: 1865 mL  Total OUT: 1865 mL    Total NET: -821 mL          Daily     Daily     MEDICATIONS  (STANDING):  acetaminophen   Tablet .. 650 milliGRAM(s) Oral every 6 hours  aspirin enteric coated 81 milliGRAM(s) Oral daily  ATENolol  Tablet 25 milliGRAM(s) Oral daily  fluticasone propionate 50 MICROgram(s)/spray Nasal Spray 1 Spray(s) Both Nostrils daily  heparin  Infusion. 1400 Unit(s)/Hr (14 mL/Hr) IV Continuous <Continuous>  lisinopril 40 milliGRAM(s) Oral daily  NIFEdipine XL 60 milliGRAM(s) Oral daily  pantoprazole    Tablet 40 milliGRAM(s) Oral before breakfast  simvastatin 20 milliGRAM(s) Oral at bedtime    MEDICATIONS  (PRN):  oxyCODONE    IR 5 milliGRAM(s) Oral every 4 hours PRN Moderate Pain (4 - 6)      LABS:                        10.3   17.0  )-----------( 385      ( 26 Oct 2018 07:37 )             31.9     10-26    138  |  96  |  22  ----------------------------<  156<H>  4.2   |  30  |  0.71    Ca    8.9      26 Oct 2018 07:37  Mg     2.2     10-26    TPro  7.1  /  Alb  3.6  /  TBili  0.5  /  DBili  x   /  AST  27  /  ALT  26  /  AlkPhos  167<H>  10-25    PT/INR - ( 26 Oct 2018 07:38 )   PT: 12.5 sec;   INR: 1.15 ratio         PTT - ( 27 Oct 2018 01:31 )  PTT:98.7 sec      RADIOLOGY & ADDITIONAL STUDIES: 47 years

## 2019-02-05 ENCOUNTER — EMERGENCY (EMERGENCY)
Facility: HOSPITAL | Age: 74
LOS: 1 days | Discharge: HOME | End: 2019-02-05
Attending: EMERGENCY MEDICINE
Payer: COMMERCIAL

## 2019-02-05 VITALS
WEIGHT: 214.95 LBS | DIASTOLIC BLOOD PRESSURE: 74 MMHG | HEIGHT: 68 IN | HEART RATE: 68 BPM | RESPIRATION RATE: 20 BRPM | SYSTOLIC BLOOD PRESSURE: 163 MMHG | TEMPERATURE: 98 F | OXYGEN SATURATION: 94 %

## 2019-02-05 VITALS
OXYGEN SATURATION: 97 % | SYSTOLIC BLOOD PRESSURE: 170 MMHG | HEART RATE: 98 BPM | DIASTOLIC BLOOD PRESSURE: 76 MMHG | TEMPERATURE: 99 F | RESPIRATION RATE: 18 BRPM

## 2019-02-05 DIAGNOSIS — Z96.641 PRESENCE OF RIGHT ARTIFICIAL HIP JOINT: Chronic | ICD-10-CM

## 2019-02-05 DIAGNOSIS — Z98.890 OTHER SPECIFIED POSTPROCEDURAL STATES: Chronic | ICD-10-CM

## 2019-02-05 DIAGNOSIS — Z90.81 ACQUIRED ABSENCE OF SPLEEN: Chronic | ICD-10-CM

## 2019-02-05 DIAGNOSIS — Z98.1 ARTHRODESIS STATUS: Chronic | ICD-10-CM

## 2019-02-05 LAB
ALBUMIN SERPL ELPH-MCNC: 3.7 G/DL — SIGNIFICANT CHANGE UP (ref 3.3–5)
ALP SERPL-CCNC: 116 U/L — SIGNIFICANT CHANGE UP (ref 40–120)
ALT FLD-CCNC: 14 U/L — SIGNIFICANT CHANGE UP (ref 10–45)
ANION GAP SERPL CALC-SCNC: 15 MMOL/L — SIGNIFICANT CHANGE UP (ref 5–17)
APTT BLD: 41.5 SEC — HIGH (ref 27.5–36.3)
AST SERPL-CCNC: 22 U/L — SIGNIFICANT CHANGE UP (ref 10–40)
BASOPHILS # BLD AUTO: 0.1 K/UL — SIGNIFICANT CHANGE UP (ref 0–0.2)
BASOPHILS NFR BLD AUTO: 0.7 % — SIGNIFICANT CHANGE UP (ref 0–2)
BILIRUB SERPL-MCNC: 0.5 MG/DL — SIGNIFICANT CHANGE UP (ref 0.2–1.2)
BUN SERPL-MCNC: 12 MG/DL — SIGNIFICANT CHANGE UP (ref 7–23)
CALCIUM SERPL-MCNC: 9.1 MG/DL — SIGNIFICANT CHANGE UP (ref 8.4–10.5)
CHLORIDE SERPL-SCNC: 100 MMOL/L — SIGNIFICANT CHANGE UP (ref 96–108)
CO2 SERPL-SCNC: 24 MMOL/L — SIGNIFICANT CHANGE UP (ref 22–31)
CREAT SERPL-MCNC: 0.62 MG/DL — SIGNIFICANT CHANGE UP (ref 0.5–1.3)
EOSINOPHIL # BLD AUTO: 0.4 K/UL — SIGNIFICANT CHANGE UP (ref 0–0.5)
EOSINOPHIL NFR BLD AUTO: 3.2 % — SIGNIFICANT CHANGE UP (ref 0–6)
GLUCOSE SERPL-MCNC: 92 MG/DL — SIGNIFICANT CHANGE UP (ref 70–99)
HCT VFR BLD CALC: 31.6 % — LOW (ref 39–50)
HGB BLD-MCNC: 10.1 G/DL — LOW (ref 13–17)
INR BLD: 2.31 RATIO — HIGH (ref 0.88–1.16)
LYMPHOCYTES # BLD AUTO: 26.8 % — SIGNIFICANT CHANGE UP (ref 13–44)
LYMPHOCYTES # BLD AUTO: 3 K/UL — SIGNIFICANT CHANGE UP (ref 1–3.3)
MCHC RBC-ENTMCNC: 30.5 PG — SIGNIFICANT CHANGE UP (ref 27–34)
MCHC RBC-ENTMCNC: 31.8 GM/DL — LOW (ref 32–36)
MCV RBC AUTO: 95.7 FL — SIGNIFICANT CHANGE UP (ref 80–100)
MONOCYTES # BLD AUTO: 1 K/UL — HIGH (ref 0–0.9)
MONOCYTES NFR BLD AUTO: 8.5 % — SIGNIFICANT CHANGE UP (ref 2–14)
NEUTROPHILS # BLD AUTO: 6.9 K/UL — SIGNIFICANT CHANGE UP (ref 1.8–7.4)
NEUTROPHILS NFR BLD AUTO: 60.8 % — SIGNIFICANT CHANGE UP (ref 43–77)
PLATELET # BLD AUTO: 524 K/UL — HIGH (ref 150–400)
POTASSIUM SERPL-MCNC: 4.1 MMOL/L — SIGNIFICANT CHANGE UP (ref 3.5–5.3)
POTASSIUM SERPL-SCNC: 4.1 MMOL/L — SIGNIFICANT CHANGE UP (ref 3.5–5.3)
PROT SERPL-MCNC: 7.4 G/DL — SIGNIFICANT CHANGE UP (ref 6–8.3)
PROTHROM AB SERPL-ACNC: 27 SEC — HIGH (ref 10–12.9)
RBC # BLD: 3.3 M/UL — LOW (ref 4.2–5.8)
RBC # FLD: 16.3 % — HIGH (ref 10.3–14.5)
SODIUM SERPL-SCNC: 139 MMOL/L — SIGNIFICANT CHANGE UP (ref 135–145)
WBC # BLD: 11.4 K/UL — HIGH (ref 3.8–10.5)
WBC # FLD AUTO: 11.4 K/UL — HIGH (ref 3.8–10.5)

## 2019-02-05 PROCEDURE — 85610 PROTHROMBIN TIME: CPT

## 2019-02-05 PROCEDURE — 93970 EXTREMITY STUDY: CPT | Mod: 26

## 2019-02-05 PROCEDURE — 71046 X-RAY EXAM CHEST 2 VIEWS: CPT | Mod: 26

## 2019-02-05 PROCEDURE — 93970 EXTREMITY STUDY: CPT

## 2019-02-05 PROCEDURE — 99283 EMERGENCY DEPT VISIT LOW MDM: CPT

## 2019-02-05 PROCEDURE — 99284 EMERGENCY DEPT VISIT MOD MDM: CPT | Mod: 25

## 2019-02-05 PROCEDURE — 71046 X-RAY EXAM CHEST 2 VIEWS: CPT

## 2019-02-05 PROCEDURE — 99284 EMERGENCY DEPT VISIT MOD MDM: CPT

## 2019-02-05 PROCEDURE — 85027 COMPLETE CBC AUTOMATED: CPT

## 2019-02-05 PROCEDURE — 85730 THROMBOPLASTIN TIME PARTIAL: CPT

## 2019-02-05 PROCEDURE — 80053 COMPREHEN METABOLIC PANEL: CPT

## 2019-02-05 NOTE — ED PROVIDER NOTE - ATTENDING CONTRIBUTION TO CARE
73 y/o M CVA x2, afib on coumadin, recent RLE arterial occlusion with complication of RLE compartment syndrome s/p embolectomy and complicated post op course, presenting for L > R b/l LE swelling x3 days with serous drainage from RLE medial fasciotomy site drainage, sent in for evaluation r/o DVT and vascular eval. Will obtain b/l DVT studies and bloodwork, vascular consult and reassess. AMERICA.

## 2019-02-05 NOTE — ED PROVIDER NOTE - NSFOLLOWUPINSTRUCTIONS_ED_ALL_ED_FT
1. Continue your home medications as directed  2. Follow-up with rapid cardiology follow-up information given to you within 24-48hours,   3. Return to the ER sooner for any new or worsening symptoms

## 2019-02-05 NOTE — ED PROVIDER NOTE - PROGRESS NOTE DETAILS
patient without DVT bilaterally. vascular surgery saw patient and discussed no vascular intervention needed at this time. patient elicited to vascular resident that he has been feeling mild shortness of breath for the last 2-3 days, and that his cardiologist recently left the practice and has not been able to obtain follow-up. will obtain cxr and if does not appear overloaded, will provide patient with rapid cardiology follow-up as outpatient. -Babita Gregg PA-C cxr appears improved from previous and no significant signs of fluid overload. d/w patient discharge with follow-up with rapid cardiology clinic follow-up appointment to establish new provider, obtain echo, and HF consult. patient understands and agrees, d/w Dr. Adri cruz for d/c at this time. -DEREJE BenitezC

## 2019-02-05 NOTE — ED PROVIDER NOTE - MEDICAL DECISION MAKING DETAILS
75 y/o M CVA x2, afib on coumadin, recent RLE arterial occlusion with complication of RLE compartment syndrome s/p embolectomy and complicated post op course, presenting for L > R b/l LE swelling x3 days with serous drainage from RLE medial fasciotomy site drainage, sent in for evaluation r/o DVT and vascular eval. No fever or chills. No chest pain. Will obtain b/l DVT studies and bloodwork, vascular consult and reassess. AMERICA.

## 2019-02-05 NOTE — ED ADULT NURSE NOTE - OBJECTIVE STATEMENT
73 yo M aaox4 c/o of B/l leg swelling PMH Spondylolisthesis, spinal stenosis, CVA, Osteoarthritis, BPH, Atrial fibrillation, Hodgkin lymphoma. Provider at bedside. Evaluating Denies CP dizziness weakness or SOB.

## 2019-02-05 NOTE — ED PROVIDER NOTE - NS ED ROS FT
Constitutional: No fever or chills  Eyes: No visual changes, eye pain or redness  HEENT: No throat pain, ear pain, nasal pain. No nose bleeding.  CV: No chest pain +b/l LE edema with drainage at R fasciotomy site  Resp: No SOB no cough  GI: No abd pain. No nausea or vomiting. No diarrhea. No constipation.   : No dysuria, hematuria.   MSK: No musculoskeletal pain  Skin: +drainage at R medial fasciotomy site  Neuro: No headache. No numbness or tingling. No weakness.

## 2019-02-05 NOTE — CONSULT NOTE ADULT - ASSESSMENT
Assessment/Plan: 74y Male s/p right tibial embolectomy 3-4 months ago now presents with bilateral LE swelling.   Has palpable DPs bilaterally and no evidence of DVT.   LE edema more likely associated with cardiac etiology. No CKD.     - No vascular intervention recommended at this time  - Continue coumadin for atrial fibrillation  - Will need cardiology workup and followup    Discussed with Dr. Barber  Pager [phone]

## 2019-02-05 NOTE — ED PROVIDER NOTE - PHYSICAL EXAMINATION
GEN: Well Appearing, Nontoxic, NAD  HEENT: NC/AT, Symm Facies. PERRL, EOMI, MMM, posterior pharynx clear  CV: No JVD/Bruits or stridor;  +S1S2, RRR w/o m/g/r  RESP: CTAB w/o w/r/r  ABD: Soft, nt/nd, +BS. No guarding/rebound. No RUQ tender, no CVAT  EXT/MSK: +b/l LE edema L > R with DP pulses detectable by bedside doppler, with clear serous drainage from R medial fasciotomy site. No calf tenderness.  WWP, palpable pulses.  SKIN: No erythema, lesions or rash  Neuro: Grossly intact, AOX3 with normal speech, CN II-XII intact; Sensation intact, motor 5/5 throughout. Gait normal

## 2019-02-05 NOTE — ED PROVIDER NOTE - OBJECTIVE STATEMENT
73 y/o M pmhx afib on coumadin, htn, hodgkin's lymphoma s/p splenectomy (1975), hip replacement, MOE, s/p spine surgery 10/19 for hardware revision, suffered subsequent CVA while off AC s/p spine surgery, developed acute RLE arterial occlusion went to OR for embolectomy and developed RLE compartment syndrome, went back to OR for two-incision fasciotomies of RLE with Dr. Cerrato and Dr. Suarez, had second CVA while inpatient. Presenting today for concern for b/l LE swelling x3 days. Patient reports that he felt his RLE was more swollen since Friday; however, since yesterday has noted LLE even more swollen than right. Patient went to PE yesterday and was told that his fasciotomy site on R medial leg was having some drainage. This morning went to Dr. Suarez's office and was advised to come to the ER for evaluation for r/o DVT of b/l LE. Patient denies any fevers or chills. Denies any chest pain. Reports mild shortness of breath but reports that he has had that chronically, increasing.

## 2019-02-12 ENCOUNTER — NON-APPOINTMENT (OUTPATIENT)
Age: 74
End: 2019-02-12

## 2019-02-12 ENCOUNTER — APPOINTMENT (OUTPATIENT)
Dept: CARDIOLOGY | Facility: CLINIC | Age: 74
End: 2019-02-12
Payer: MEDICARE

## 2019-02-12 VITALS
SYSTOLIC BLOOD PRESSURE: 155 MMHG | HEIGHT: 68 IN | DIASTOLIC BLOOD PRESSURE: 74 MMHG | OXYGEN SATURATION: 95 % | WEIGHT: 228 LBS | HEART RATE: 69 BPM | BODY MASS INDEX: 34.56 KG/M2

## 2019-02-12 DIAGNOSIS — I63.9 CEREBRAL INFARCTION, UNSPECIFIED: ICD-10-CM

## 2019-02-12 PROCEDURE — 93000 ELECTROCARDIOGRAM COMPLETE: CPT

## 2019-02-12 PROCEDURE — 99205 OFFICE O/P NEW HI 60 MIN: CPT

## 2019-02-12 RX ORDER — OMEPRAZOLE 20 MG/1
20 TABLET, DELAYED RELEASE ORAL
Refills: 0 | Status: DISCONTINUED | COMMUNITY
End: 2019-02-12

## 2019-02-12 RX ORDER — UBIDECARENONE/VIT E ACET 100MG-5
CAPSULE ORAL
Refills: 0 | Status: DISCONTINUED | COMMUNITY
End: 2019-02-12

## 2019-02-12 RX ORDER — ACETAMINOPHEN 325 MG/1
TABLET, FILM COATED ORAL
Refills: 0 | Status: DISCONTINUED | COMMUNITY
End: 2019-02-12

## 2019-02-12 RX ORDER — AMLODIPINE BESYLATE 5 MG/1
5 TABLET ORAL DAILY
Qty: 30 | Refills: 3 | Status: DISCONTINUED | COMMUNITY
End: 2019-02-12

## 2019-02-12 RX ORDER — FLUTICASONE PROPIONATE 50 MCG
50 SPRAY, SUSPENSION NASAL
Refills: 0 | Status: DISCONTINUED | COMMUNITY
End: 2019-02-12

## 2019-02-12 RX ORDER — CHLORTHALIDONE 25 MG/1
25 TABLET ORAL
Refills: 0 | Status: DISCONTINUED | COMMUNITY
End: 2019-02-12

## 2019-02-12 RX ORDER — CALCIUM CITRATE 150 MG
CAPSULE ORAL
Refills: 0 | Status: DISCONTINUED | COMMUNITY
End: 2019-02-12

## 2019-02-12 RX ORDER — WARFARIN 2.5 MG/1
2.5 TABLET ORAL
Refills: 0 | Status: DISCONTINUED | COMMUNITY
End: 2019-02-12

## 2019-02-12 RX ORDER — LISINOPRIL 20 MG/1
20 TABLET ORAL DAILY
Refills: 0 | Status: DISCONTINUED | COMMUNITY
End: 2019-02-12

## 2019-02-12 RX ORDER — TERAZOSIN HCL 10 MG
10 TABLET ORAL
Refills: 0 | Status: DISCONTINUED | COMMUNITY
End: 2019-02-12

## 2019-02-12 RX ORDER — TAMSULOSIN HYDROCHLORIDE 0.4 MG/1
0.4 CAPSULE ORAL
Refills: 0 | Status: ACTIVE | COMMUNITY

## 2019-02-12 NOTE — REASON FOR VISIT
[Initial Evaluation] : an initial evaluation of [Atrial Fibrillation] : atrial fibrillation [Heart Failure] : congestive heart failure

## 2019-02-12 NOTE — REVIEW OF SYSTEMS
[Shortness Of Breath] : shortness of breath [Dyspnea on exertion] : dyspnea during exertion [Lower Ext Edema] : lower extremity edema [Negative] : Heme/Lymph

## 2019-02-12 NOTE — DISCUSSION/SUMMARY
[FreeTextEntry1] : The patient is a 74-year-old gentleman chronic afib, hypertension, hyperlipidemia, s/p back surgery with RLE fasciotomy and skin graft now with 30 pound weight gain. \par #1 HFpEF- furosemide 40mg and spironolactone 12.m5 with BMP in 10 days, keep leg elevated\par #2 Htn- stop amlodipine and start lisinopril 5mg \par #3 Afib- rate controlled on atenolol, no bleeding on warfarin, therapeutic\par #4 Lipids- on simvastatin\par #5 BPH- on tamsulosin

## 2019-02-12 NOTE — PHYSICAL EXAM
[General Appearance - Well Developed] : well developed [Normal Appearance] : normal appearance [Well Groomed] : well groomed [General Appearance - Well Nourished] : well nourished [No Deformities] : no deformities [General Appearance - In No Acute Distress] : no acute distress [Normal Conjunctiva] : the conjunctiva exhibited no abnormalities [Eyelids - No Xanthelasma] : the eyelids demonstrated no xanthelasmas [Normal Oral Mucosa] : normal oral mucosa [No Oral Pallor] : no oral pallor [No Oral Cyanosis] : no oral cyanosis [Normal Jugular Venous A Waves Present] : normal jugular venous A waves present [Normal Jugular Venous V Waves Present] : normal jugular venous V waves present [No Jugular Venous Bee A Waves] : no jugular venous bee A waves [Heart Sounds] : normal S1 and S2 [Murmurs] : no murmurs present [Irregularly Irregular] : the rhythm was irregularly irregular [Respiration, Rhythm And Depth] : normal respiratory rhythm and effort [Exaggerated Use Of Accessory Muscles For Inspiration] : no accessory muscle use [Auscultation Breath Sounds / Voice Sounds] : lungs were clear to auscultation bilaterally [Abdomen Soft] : soft [Abdomen Tenderness] : non-tender [Abdomen Mass (___ Cm)] : no abdominal mass palpated [Abnormal Walk] : normal gait [Gait - Sufficient For Exercise Testing] : the gait was sufficient for exercise testing [Nail Clubbing] : no clubbing of the fingernails [Cyanosis, Localized] : no localized cyanosis [Petechial Hemorrhages (___cm)] : no petechial hemorrhages [Skin Color & Pigmentation] : normal skin color and pigmentation [] : no rash [No Venous Stasis] : no venous stasis [Skin Lesions] : no skin lesions [No Skin Ulcers] : no skin ulcer [No Xanthoma] : no  xanthoma was observed [Oriented To Time, Place, And Person] : oriented to person, place, and time [Affect] : the affect was normal [Mood] : the mood was normal [No Anxiety] : not feeling anxious [FreeTextEntry1] : + pitting edema

## 2019-02-12 NOTE — HISTORY OF PRESENT ILLNESS
[FreeTextEntry1] : Jean-Pierre is a 74-year-old gentleman hypertension, afib, s/p back surgery last year with blood clots in leg requiring fasciotomy then to rehab and discharged December 6, 2018. Since then he has gained  weight. LE edema and fluid extravasation. On coumadin for afib. Stroke post op.

## 2019-02-25 NOTE — DISCHARGE NOTE ADULT - NS AS DC STATIN YN RESTRICT
[FreeTextEntry1] : Blood work obtained.\par Will call with results of blood work.\par Pt will follow up with cardiology for WPW evaluation. 
yes

## 2019-03-04 ENCOUNTER — OTHER (OUTPATIENT)
Age: 74
End: 2019-03-04

## 2019-03-08 ENCOUNTER — TRANSCRIPTION ENCOUNTER (OUTPATIENT)
Age: 74
End: 2019-03-08

## 2019-03-12 ENCOUNTER — APPOINTMENT (OUTPATIENT)
Dept: SPINE | Facility: CLINIC | Age: 74
End: 2019-03-12
Payer: MEDICARE

## 2019-03-12 VITALS
SYSTOLIC BLOOD PRESSURE: 132 MMHG | BODY MASS INDEX: 30.31 KG/M2 | HEIGHT: 68 IN | WEIGHT: 200 LBS | DIASTOLIC BLOOD PRESSURE: 70 MMHG

## 2019-03-12 DIAGNOSIS — M48.062 SPINAL STENOSIS, LUMBAR REGION WITH NEUROGENIC CLAUDICATION: ICD-10-CM

## 2019-03-12 PROCEDURE — 99214 OFFICE O/P EST MOD 30 MIN: CPT

## 2019-03-19 ENCOUNTER — OUTPATIENT (OUTPATIENT)
Dept: OUTPATIENT SERVICES | Facility: HOSPITAL | Age: 74
LOS: 1 days | End: 2019-03-19
Payer: COMMERCIAL

## 2019-03-19 ENCOUNTER — APPOINTMENT (OUTPATIENT)
Dept: CARDIOLOGY | Facility: CLINIC | Age: 74
End: 2019-03-19
Payer: MEDICARE

## 2019-03-19 ENCOUNTER — NON-APPOINTMENT (OUTPATIENT)
Age: 74
End: 2019-03-19

## 2019-03-19 ENCOUNTER — APPOINTMENT (OUTPATIENT)
Dept: CV DIAGNOSITCS | Facility: HOSPITAL | Age: 74
End: 2019-03-19

## 2019-03-19 VITALS
HEIGHT: 68 IN | HEART RATE: 68 BPM | DIASTOLIC BLOOD PRESSURE: 69 MMHG | OXYGEN SATURATION: 96 % | BODY MASS INDEX: 30.31 KG/M2 | SYSTOLIC BLOOD PRESSURE: 139 MMHG | WEIGHT: 200 LBS

## 2019-03-19 DIAGNOSIS — R06.09 OTHER FORMS OF DYSPNEA: ICD-10-CM

## 2019-03-19 DIAGNOSIS — Z96.641 PRESENCE OF RIGHT ARTIFICIAL HIP JOINT: Chronic | ICD-10-CM

## 2019-03-19 DIAGNOSIS — Z90.81 ACQUIRED ABSENCE OF SPLEEN: Chronic | ICD-10-CM

## 2019-03-19 DIAGNOSIS — Z98.1 ARTHRODESIS STATUS: Chronic | ICD-10-CM

## 2019-03-19 DIAGNOSIS — Z98.890 OTHER SPECIFIED POSTPROCEDURAL STATES: Chronic | ICD-10-CM

## 2019-03-19 PROCEDURE — 93000 ELECTROCARDIOGRAM COMPLETE: CPT

## 2019-03-19 PROCEDURE — 99214 OFFICE O/P EST MOD 30 MIN: CPT

## 2019-03-19 PROCEDURE — 93306 TTE W/DOPPLER COMPLETE: CPT | Mod: 26

## 2019-03-19 PROCEDURE — 93306 TTE W/DOPPLER COMPLETE: CPT

## 2019-03-19 NOTE — DISCUSSION/SUMMARY
[___ Month(s)] : [unfilled] month(s) [FreeTextEntry1] : The patient is a 74-year-old gentleman chronic afib, hypertension, hyperlipidemia, s/p back surgery with RLE fasciotomy and skin graft who is improving with 25/30 pound weight loss. . \par #1 HFpEF- furosemide 40mg and spironolactone 12.5mg improved, keep leg elevated, preliminary moderate aortic stenosis on ECHO and normal LV function\par #2 Htn- lisinopril 5mg \par #3 Afib- rate controlled on atenolol, no bleeding on warfarin but not therapeutic\par #4 Lipids- on simvastatin\par #5 BPH- on tamsulosin

## 2019-03-19 NOTE — PHYSICAL EXAM
[General Appearance - Well Developed] : well developed [Normal Appearance] : normal appearance [Well Groomed] : well groomed [General Appearance - Well Nourished] : well nourished [No Deformities] : no deformities [General Appearance - In No Acute Distress] : no acute distress [Eyelids - No Xanthelasma] : the eyelids demonstrated no xanthelasmas [Normal Conjunctiva] : the conjunctiva exhibited no abnormalities [No Oral Pallor] : no oral pallor [Normal Oral Mucosa] : normal oral mucosa [Normal Jugular Venous A Waves Present] : normal jugular venous A waves present [No Oral Cyanosis] : no oral cyanosis [No Jugular Venous Bee A Waves] : no jugular venous bee A waves [Normal Jugular Venous V Waves Present] : normal jugular venous V waves present [Respiration, Rhythm And Depth] : normal respiratory rhythm and effort [Auscultation Breath Sounds / Voice Sounds] : lungs were clear to auscultation bilaterally [Exaggerated Use Of Accessory Muscles For Inspiration] : no accessory muscle use [Murmurs] : no murmurs present [Heart Sounds] : normal S1 and S2 [Irregularly Irregular] : the rhythm was irregularly irregular [Abdomen Soft] : soft [Abdomen Tenderness] : non-tender [Abdomen Mass (___ Cm)] : no abdominal mass palpated [Abnormal Walk] : normal gait [Gait - Sufficient For Exercise Testing] : the gait was sufficient for exercise testing [Cyanosis, Localized] : no localized cyanosis [Nail Clubbing] : no clubbing of the fingernails [Petechial Hemorrhages (___cm)] : no petechial hemorrhages [] : no rash [Skin Color & Pigmentation] : normal skin color and pigmentation [No Venous Stasis] : no venous stasis [Skin Lesions] : no skin lesions [No Skin Ulcers] : no skin ulcer [No Xanthoma] : no  xanthoma was observed [Oriented To Time, Place, And Person] : oriented to person, place, and time [Mood] : the mood was normal [Affect] : the affect was normal [No Anxiety] : not feeling anxious [FreeTextEntry1] : RLE s/p fasciotomy

## 2019-03-19 NOTE — REASON FOR VISIT
[Atrial Fibrillation] : atrial fibrillation [Heart Failure] : congestive heart failure [Follow-Up - Clinic] : a clinic follow-up of

## 2019-03-19 NOTE — HISTORY OF PRESENT ILLNESS
[FreeTextEntry1] : Jean-Pierre is a 74-year-old gentleman hypertension, afib, s/p back surgery last year with blood clots in leg requiring fasciotomy then to rehab and discharged December 6, 2018.  On coumadin for afib. Stroke post op. He can walk a lot further now and has lost a lot of weight with medication changes.

## 2019-03-19 NOTE — REVIEW OF SYSTEMS
[Negative] : Heme/Lymph [Recent Weight Loss (___ Lbs)] : recent [unfilled] ~Ulb weight loss [Shortness Of Breath] : no shortness of breath [Dyspnea on exertion] : not dyspnea during exertion [Lower Ext Edema] : no extremity edema

## 2019-04-05 NOTE — ED ADULT NURSE NOTE - NSSISCREENINGQ2_ED_A_ED
The following information and instructions were given:    Nothing to eat or drink after midnight except sips of water with routine prescribed medication (except blood thinner, certain blood pressure medications, diabetes, or weight reducing medication) unless otherwise instructed by your physician.  Do not eat, drink, smoke or chew gum after midnight the night before surgery. This also includes no mints.    EXCEPTION: [facility] patients Patient instructed to drink 20 ounces (or until full) of Gatorade or 20 ounces of G2 (if diabetic) and complete 1 hour before arrival time on the day of surgery. (NO RED Gatorade or G2)    Patient verbalized understanding.      DO NOT shave for two days before your procedure.  Do not wear makeup.      DO NOT wear fingernail polish (gel/regular) and/or acrylic/artificial nails on the day of surgery.   If a patient had recent manicure and would rather not remove polish or artificial nails, then the minimum requirement is that the polish/artificial nails must be removed from the middle finger on each hand.      If patient was having surgery on an upper extremity, then the patient was instructed that fingernail polish/artificial fingernails must be removed for surgery.  NO EXCEPTIONS.      If patient was having surgery on a lower extremity, then the patient was instructed that toenail polish on both extremities must be removed for surgery.  NO EXCEPTIONS.    Remove all jewelry (advised to go to jeweler if unable to remove).  Jewelry especially rings can no longer be taped for surgery.    Leave anything you consider valuable at home.      Bring the following with you (if applicable)   -picture ID and insurance cards   -Co-pay/deductible required by insurance   -Medications in the original bottles (not a list) including all over-the-counter meds     Education booklet, brochure, and/or given to patient.    Patient must have a  for transportation home after procedure.  It must be an   adult  that will take responsibility for care for 24 hours after surgery.     No

## 2019-06-25 ENCOUNTER — APPOINTMENT (OUTPATIENT)
Dept: CARDIOLOGY | Facility: CLINIC | Age: 74
End: 2019-06-25
Payer: MEDICARE

## 2019-06-25 ENCOUNTER — NON-APPOINTMENT (OUTPATIENT)
Age: 74
End: 2019-06-25

## 2019-06-25 VITALS
WEIGHT: 217 LBS | HEART RATE: 58 BPM | SYSTOLIC BLOOD PRESSURE: 148 MMHG | OXYGEN SATURATION: 95 % | HEIGHT: 68 IN | BODY MASS INDEX: 32.89 KG/M2 | DIASTOLIC BLOOD PRESSURE: 70 MMHG

## 2019-06-25 VITALS — SYSTOLIC BLOOD PRESSURE: 110 MMHG | DIASTOLIC BLOOD PRESSURE: 62 MMHG

## 2019-06-25 PROCEDURE — 99215 OFFICE O/P EST HI 40 MIN: CPT

## 2019-06-25 PROCEDURE — 93000 ELECTROCARDIOGRAM COMPLETE: CPT

## 2019-06-25 RX ORDER — ATENOLOL 25 MG/1
25 TABLET ORAL DAILY
Qty: 30 | Refills: 3 | Status: DISCONTINUED | COMMUNITY
End: 2019-06-25

## 2019-06-25 NOTE — PHYSICAL EXAM
[General Appearance - Well Developed] : well developed [Normal Appearance] : normal appearance [Well Groomed] : well groomed [General Appearance - Well Nourished] : well nourished [No Deformities] : no deformities [General Appearance - In No Acute Distress] : no acute distress [Eyelids - No Xanthelasma] : the eyelids demonstrated no xanthelasmas [Normal Conjunctiva] : the conjunctiva exhibited no abnormalities [Normal Oral Mucosa] : normal oral mucosa [No Oral Cyanosis] : no oral cyanosis [No Oral Pallor] : no oral pallor [Normal Jugular Venous A Waves Present] : normal jugular venous A waves present [Normal Jugular Venous V Waves Present] : normal jugular venous V waves present [No Jugular Venous Bee A Waves] : no jugular venous bee A waves [Exaggerated Use Of Accessory Muscles For Inspiration] : no accessory muscle use [Respiration, Rhythm And Depth] : normal respiratory rhythm and effort [Heart Sounds] : normal S1 and S2 [Auscultation Breath Sounds / Voice Sounds] : lungs were clear to auscultation bilaterally [Murmurs] : no murmurs present [Irregularly Irregular] : the rhythm was irregularly irregular [Abdomen Soft] : soft [Abdomen Tenderness] : non-tender [Abdomen Mass (___ Cm)] : no abdominal mass palpated [Abnormal Walk] : normal gait [Gait - Sufficient For Exercise Testing] : the gait was sufficient for exercise testing [Nail Clubbing] : no clubbing of the fingernails [Cyanosis, Localized] : no localized cyanosis [Petechial Hemorrhages (___cm)] : no petechial hemorrhages [Skin Color & Pigmentation] : normal skin color and pigmentation [] : no rash [No Venous Stasis] : no venous stasis [Skin Lesions] : no skin lesions [No Skin Ulcers] : no skin ulcer [No Xanthoma] : no  xanthoma was observed [Oriented To Time, Place, And Person] : oriented to person, place, and time [Affect] : the affect was normal [Mood] : the mood was normal [No Anxiety] : not feeling anxious [FreeTextEntry1] : RLE s/p fasciotomy

## 2019-06-25 NOTE — REASON FOR VISIT
[Follow-Up - Clinic] : a clinic follow-up of [Heart Failure] : congestive heart failure [Atrial Fibrillation] : atrial fibrillation

## 2019-06-25 NOTE — REVIEW OF SYSTEMS
[Recent Weight Loss (___ Lbs)] : recent [unfilled] ~Ulb weight loss [Negative] : Endocrine [Dyspnea on exertion] : not dyspnea during exertion [Shortness Of Breath] : no shortness of breath [Lower Ext Edema] : no extremity edema

## 2019-06-25 NOTE — DISCUSSION/SUMMARY
[___ Month(s)] : [unfilled] month(s) [FreeTextEntry1] : The patient is a 74-year-old gentleman chronic afib, hypertension, hyperlipidemia, s/p back surgery with RLE fasciotomy and skin graft with markedly slow afib and fatigue. \par #1 HFpEF- furosemide 40mg and spironolactone 12.5mg improved, keep leg elevated,\par #2 AS- moderate, normal LV function\par #2 Htn- lisinopril 5mg \par #3 Afib- very slow, stop atenolol and event monitor placed, no bleeding on warfarin, now therapeutic\par #4 Lipids- on simvastatin\par #5 BPH- on tamsulosin

## 2019-06-25 NOTE — HISTORY OF PRESENT ILLNESS
[FreeTextEntry1] : Jean-Pierre is a 74-year-old gentleman hypertension, afib, s/p back surgery last year with blood clots in leg requiring fasciotomy then to rehab and discharged December 6, 2018.  On coumadin for afib. Stroke post op. He has been noted by his family to sleep a lot during the day. INR finally 2.4

## 2019-07-05 ENCOUNTER — CLINICAL ADVICE (OUTPATIENT)
Age: 74
End: 2019-07-05

## 2019-07-12 ENCOUNTER — TRANSCRIPTION ENCOUNTER (OUTPATIENT)
Age: 74
End: 2019-07-12

## 2019-07-16 ENCOUNTER — APPOINTMENT (OUTPATIENT)
Dept: SPINE | Facility: CLINIC | Age: 74
End: 2019-07-16
Payer: MEDICARE

## 2019-07-16 VITALS
DIASTOLIC BLOOD PRESSURE: 73 MMHG | HEIGHT: 68 IN | HEART RATE: 56 BPM | WEIGHT: 217 LBS | SYSTOLIC BLOOD PRESSURE: 149 MMHG | BODY MASS INDEX: 32.89 KG/M2

## 2019-07-16 DIAGNOSIS — M54.9 DORSALGIA, UNSPECIFIED: ICD-10-CM

## 2019-07-16 DIAGNOSIS — G89.29 DORSALGIA, UNSPECIFIED: ICD-10-CM

## 2019-07-16 PROCEDURE — 99214 OFFICE O/P EST MOD 30 MIN: CPT

## 2019-07-24 ENCOUNTER — RX RENEWAL (OUTPATIENT)
Age: 74
End: 2019-07-24

## 2019-08-24 NOTE — ED ADULT NURSE NOTE - NS ED NOTE  TALK SOMEONE YN
No
Acute Chest Syndrome  2003 and possibly 2008, with intubation, exchange transfusion  Avascular Necrosis of Bone  hips  DVT (deep venous thrombosis)    Personal History of PE (Pulmonary Embolism)  2008, post 6mo coumadin  Sickle Cell Anemia  [place]

## 2019-09-17 ENCOUNTER — NON-APPOINTMENT (OUTPATIENT)
Age: 74
End: 2019-09-17

## 2019-09-17 ENCOUNTER — APPOINTMENT (OUTPATIENT)
Dept: CARDIOLOGY | Facility: CLINIC | Age: 74
End: 2019-09-17
Payer: MEDICARE

## 2019-09-17 VITALS
WEIGHT: 222 LBS | SYSTOLIC BLOOD PRESSURE: 147 MMHG | DIASTOLIC BLOOD PRESSURE: 76 MMHG | BODY MASS INDEX: 33.65 KG/M2 | HEIGHT: 68 IN | OXYGEN SATURATION: 97 % | HEART RATE: 72 BPM

## 2019-09-17 VITALS — SYSTOLIC BLOOD PRESSURE: 134 MMHG | DIASTOLIC BLOOD PRESSURE: 68 MMHG

## 2019-09-17 DIAGNOSIS — H40.9 UNSPECIFIED GLAUCOMA: ICD-10-CM

## 2019-09-17 PROCEDURE — 93000 ELECTROCARDIOGRAM COMPLETE: CPT

## 2019-09-17 PROCEDURE — 99215 OFFICE O/P EST HI 40 MIN: CPT

## 2019-09-17 RX ORDER — LATANOPROST/PF 0.005 %
0.01 DROPS OPHTHALMIC (EYE)
Refills: 0 | Status: ACTIVE | COMMUNITY
Start: 2019-09-17

## 2019-09-17 NOTE — HISTORY OF PRESENT ILLNESS
[FreeTextEntry1] : Jean-Pierre is a 74-year-old gentleman hypertension, afib, s/p back surgery last year with blood clots in leg requiring fasciotomy then to rehab and discharged December 6, 2018.  On coumadin for afib. Stroke post op. He continues to go to PT twice a week and is walking more independently. Diagnosed with glaucoma. INR has been over 2.5

## 2019-09-17 NOTE — PHYSICAL EXAM
[General Appearance - Well Developed] : well developed [Normal Appearance] : normal appearance [Well Groomed] : well groomed [General Appearance - Well Nourished] : well nourished [No Deformities] : no deformities [General Appearance - In No Acute Distress] : no acute distress [Normal Conjunctiva] : the conjunctiva exhibited no abnormalities [Eyelids - No Xanthelasma] : the eyelids demonstrated no xanthelasmas [No Oral Pallor] : no oral pallor [Normal Oral Mucosa] : normal oral mucosa [No Oral Cyanosis] : no oral cyanosis [Normal Jugular Venous A Waves Present] : normal jugular venous A waves present [No Jugular Venous Bee A Waves] : no jugular venous bee A waves [Normal Jugular Venous V Waves Present] : normal jugular venous V waves present [Respiration, Rhythm And Depth] : normal respiratory rhythm and effort [Exaggerated Use Of Accessory Muscles For Inspiration] : no accessory muscle use [Heart Sounds] : normal S1 and S2 [Auscultation Breath Sounds / Voice Sounds] : lungs were clear to auscultation bilaterally [Irregularly Irregular] : the rhythm was irregularly irregular [Murmurs] : no murmurs present [Abdomen Soft] : soft [Abdomen Tenderness] : non-tender [Abdomen Mass (___ Cm)] : no abdominal mass palpated [Abnormal Walk] : normal gait [Gait - Sufficient For Exercise Testing] : the gait was sufficient for exercise testing [Nail Clubbing] : no clubbing of the fingernails [Cyanosis, Localized] : no localized cyanosis [Petechial Hemorrhages (___cm)] : no petechial hemorrhages [] : no rash [Skin Color & Pigmentation] : normal skin color and pigmentation [No Venous Stasis] : no venous stasis [No Skin Ulcers] : no skin ulcer [Skin Lesions] : no skin lesions [No Xanthoma] : no  xanthoma was observed [Oriented To Time, Place, And Person] : oriented to person, place, and time [Mood] : the mood was normal [Affect] : the affect was normal [No Anxiety] : not feeling anxious [FreeTextEntry1] : RLE s/p fasciotomy

## 2019-09-17 NOTE — REVIEW OF SYSTEMS
[Recent Weight Loss (___ Lbs)] : recent [unfilled] ~Ulb weight loss [Negative] : Heme/Lymph [Dyspnea on exertion] : not dyspnea during exertion [Shortness Of Breath] : no shortness of breath [Lower Ext Edema] : no extremity edema

## 2019-09-17 NOTE — DISCUSSION/SUMMARY
[___ Month(s)] : [unfilled] month(s) [FreeTextEntry1] : The patient is a 74-year-old gentleman chronic afib, hypertension, hyperlipidemia, s/p back surgery with RLE fasciotomy and skin graft with afib controlled off medications.\par #1 HFpEF- furosemide 40mg and spironolactone 12.5mg improved, keep leg elevated,\par #2 AS- moderate, normal LV function\par #2 Htn- lisinopril 5mg \par #3 Afib- off AV addison agents, no bleeding on warfarin, now therapeutic\par #4 Lipids- on simvastatin\par #5 BPH- on tamsulosin

## 2019-09-17 NOTE — REASON FOR VISIT
[Follow-Up - Clinic] : a clinic follow-up of [Atrial Fibrillation] : atrial fibrillation [Heart Failure] : congestive heart failure

## 2019-10-02 ENCOUNTER — OTHER (OUTPATIENT)
Age: 74
End: 2019-10-02

## 2019-10-11 ENCOUNTER — TRANSCRIPTION ENCOUNTER (OUTPATIENT)
Age: 74
End: 2019-10-11

## 2019-10-15 ENCOUNTER — APPOINTMENT (OUTPATIENT)
Dept: SPINE | Facility: CLINIC | Age: 74
End: 2019-10-15
Payer: MEDICARE

## 2019-10-15 VITALS
WEIGHT: 215 LBS | BODY MASS INDEX: 32.58 KG/M2 | SYSTOLIC BLOOD PRESSURE: 123 MMHG | HEIGHT: 68 IN | HEART RATE: 73 BPM | DIASTOLIC BLOOD PRESSURE: 70 MMHG

## 2019-10-15 PROCEDURE — 99212 OFFICE O/P EST SF 10 MIN: CPT

## 2019-12-19 ENCOUNTER — APPOINTMENT (OUTPATIENT)
Dept: CARDIOLOGY | Facility: CLINIC | Age: 74
End: 2019-12-19
Payer: MEDICARE

## 2019-12-19 ENCOUNTER — NON-APPOINTMENT (OUTPATIENT)
Age: 74
End: 2019-12-19

## 2019-12-19 VITALS
HEIGHT: 68 IN | WEIGHT: 215 LBS | BODY MASS INDEX: 32.58 KG/M2 | SYSTOLIC BLOOD PRESSURE: 157 MMHG | DIASTOLIC BLOOD PRESSURE: 63 MMHG | HEART RATE: 67 BPM | OXYGEN SATURATION: 98 %

## 2019-12-19 VITALS — DIASTOLIC BLOOD PRESSURE: 68 MMHG | SYSTOLIC BLOOD PRESSURE: 118 MMHG

## 2019-12-19 PROCEDURE — 99214 OFFICE O/P EST MOD 30 MIN: CPT

## 2019-12-19 PROCEDURE — 93000 ELECTROCARDIOGRAM COMPLETE: CPT

## 2019-12-19 NOTE — PHYSICAL EXAM
[General Appearance - Well Developed] : well developed [Normal Appearance] : normal appearance [Well Groomed] : well groomed [General Appearance - Well Nourished] : well nourished [No Deformities] : no deformities [General Appearance - In No Acute Distress] : no acute distress [Normal Conjunctiva] : the conjunctiva exhibited no abnormalities [Eyelids - No Xanthelasma] : the eyelids demonstrated no xanthelasmas [Normal Oral Mucosa] : normal oral mucosa [No Oral Pallor] : no oral pallor [No Oral Cyanosis] : no oral cyanosis [Normal Jugular Venous A Waves Present] : normal jugular venous A waves present [Normal Jugular Venous V Waves Present] : normal jugular venous V waves present [No Jugular Venous Bee A Waves] : no jugular venous bee A waves [Respiration, Rhythm And Depth] : normal respiratory rhythm and effort [Exaggerated Use Of Accessory Muscles For Inspiration] : no accessory muscle use [Auscultation Breath Sounds / Voice Sounds] : lungs were clear to auscultation bilaterally [Heart Sounds] : normal S1 and S2 [Murmurs] : no murmurs present [Irregularly Irregular] : the rhythm was irregularly irregular [Abdomen Soft] : soft [Abdomen Tenderness] : non-tender [Abdomen Mass (___ Cm)] : no abdominal mass palpated [Abnormal Walk] : normal gait [Gait - Sufficient For Exercise Testing] : the gait was sufficient for exercise testing [Nail Clubbing] : no clubbing of the fingernails [Cyanosis, Localized] : no localized cyanosis [Petechial Hemorrhages (___cm)] : no petechial hemorrhages [Skin Color & Pigmentation] : normal skin color and pigmentation [No Venous Stasis] : no venous stasis [] : no rash [Skin Lesions] : no skin lesions [No Skin Ulcers] : no skin ulcer [No Xanthoma] : no  xanthoma was observed [Oriented To Time, Place, And Person] : oriented to person, place, and time [Affect] : the affect was normal [Mood] : the mood was normal [No Anxiety] : not feeling anxious [FreeTextEntry1] : RLE s/p fasciotomy

## 2019-12-19 NOTE — DISCUSSION/SUMMARY
[___ Month(s)] : [unfilled] month(s) [FreeTextEntry1] : The patient is a 74-year-old gentleman chronic afib, hypertension, hyperlipidemia, s/p back surgery with RLE fasciotomy and skin graft with afib controlled off medications.\par #1 HFpEF- furosemide 40mg and spironolactone 12.5mg improved, keep leg elevated,\par #2 AS- moderate, normal LV function, ECHO next visit\par #2 Htn- lisinopril 5mg \par #3 Afib- off AV addison agents, no bleeding on warfarin,  therapeutic\par #4 Lipids- on simvastatin\par #5 BPH- on tamsulosin

## 2019-12-19 NOTE — HISTORY OF PRESENT ILLNESS
[FreeTextEntry1] : Jean-Pierre is a 74-year-old gentleman hypertension, afib, AS, s/p back surgery last year with blood clots in leg requiring fasciotomy then to rehab and discharged December 6, 2018.  On coumadin for afib. Stroke post op. He continues to go to PT twice a week and is walking more independently. Diagnosed with glaucoma. \par \par Still unable to walk much or exercise. Denies any chest pain, palpitations or shortness of breath.

## 2019-12-19 NOTE — REVIEW OF SYSTEMS
[Recent Weight Loss (___ Lbs)] : recent [unfilled] ~Ulb weight loss [Negative] : Heme/Lymph [Shortness Of Breath] : no shortness of breath [Dyspnea on exertion] : not dyspnea during exertion [Lower Ext Edema] : no extremity edema

## 2020-01-15 ENCOUNTER — RX RENEWAL (OUTPATIENT)
Age: 75
End: 2020-01-15

## 2020-02-03 NOTE — H&P PST ADULT - BACK
CHIEF COMPLAINT: The patient presents for CEE and CL fit    HISTORY OF PRESENT ILLNESS:  agree with tech note, denies pain, redness or discharge and denies vision changes.      ASSESSMENT/PLAN      ASSESSMENT: (H52.13) Myopia of both eyes  (primary encounter diagnosis)  Comment: slight increase in Rx  Plan: Rx was written as shown in the SmartForm.                       detailed exam

## 2020-05-05 ENCOUNTER — APPOINTMENT (OUTPATIENT)
Dept: CV DIAGNOSITCS | Facility: HOSPITAL | Age: 75
End: 2020-05-05

## 2020-05-05 ENCOUNTER — OUTPATIENT (OUTPATIENT)
Dept: OUTPATIENT SERVICES | Facility: HOSPITAL | Age: 75
LOS: 1 days | End: 2020-05-05
Payer: COMMERCIAL

## 2020-05-05 ENCOUNTER — APPOINTMENT (OUTPATIENT)
Dept: CARDIOLOGY | Facility: CLINIC | Age: 75
End: 2020-05-05
Payer: MEDICARE

## 2020-05-05 VITALS
HEIGHT: 68 IN | OXYGEN SATURATION: 97 % | SYSTOLIC BLOOD PRESSURE: 151 MMHG | WEIGHT: 225 LBS | DIASTOLIC BLOOD PRESSURE: 73 MMHG | HEART RATE: 76 BPM | BODY MASS INDEX: 34.1 KG/M2

## 2020-05-05 DIAGNOSIS — Z98.1 ARTHRODESIS STATUS: Chronic | ICD-10-CM

## 2020-05-05 DIAGNOSIS — Z98.890 OTHER SPECIFIED POSTPROCEDURAL STATES: Chronic | ICD-10-CM

## 2020-05-05 DIAGNOSIS — Z96.641 PRESENCE OF RIGHT ARTIFICIAL HIP JOINT: Chronic | ICD-10-CM

## 2020-05-05 DIAGNOSIS — I25.10 ATHEROSCLEROTIC HEART DISEASE OF NATIVE CORONARY ARTERY WITHOUT ANGINA PECTORIS: ICD-10-CM

## 2020-05-05 DIAGNOSIS — Z90.81 ACQUIRED ABSENCE OF SPLEEN: Chronic | ICD-10-CM

## 2020-05-05 PROCEDURE — 93306 TTE W/DOPPLER COMPLETE: CPT | Mod: 26

## 2020-05-05 PROCEDURE — C8929: CPT

## 2020-05-05 PROCEDURE — 99214 OFFICE O/P EST MOD 30 MIN: CPT

## 2020-05-05 NOTE — PHYSICAL EXAM
[Normal Appearance] : normal appearance [General Appearance - Well Developed] : well developed [Well Groomed] : well groomed [General Appearance - Well Nourished] : well nourished [No Deformities] : no deformities [General Appearance - In No Acute Distress] : no acute distress [Normal Conjunctiva] : the conjunctiva exhibited no abnormalities [Normal Oral Mucosa] : normal oral mucosa [Eyelids - No Xanthelasma] : the eyelids demonstrated no xanthelasmas [No Oral Cyanosis] : no oral cyanosis [No Oral Pallor] : no oral pallor [Normal Jugular Venous A Waves Present] : normal jugular venous A waves present [Normal Jugular Venous V Waves Present] : normal jugular venous V waves present [No Jugular Venous Bee A Waves] : no jugular venous bee A waves [Respiration, Rhythm And Depth] : normal respiratory rhythm and effort [Exaggerated Use Of Accessory Muscles For Inspiration] : no accessory muscle use [Auscultation Breath Sounds / Voice Sounds] : lungs were clear to auscultation bilaterally [Heart Sounds] : normal S1 and S2 [Murmurs] : no murmurs present [Irregularly Irregular] : the rhythm was irregularly irregular [Abdomen Tenderness] : non-tender [Abdomen Soft] : soft [Abdomen Mass (___ Cm)] : no abdominal mass palpated [Gait - Sufficient For Exercise Testing] : the gait was sufficient for exercise testing [Abnormal Walk] : normal gait [Nail Clubbing] : no clubbing of the fingernails [Cyanosis, Localized] : no localized cyanosis [Petechial Hemorrhages (___cm)] : no petechial hemorrhages [] : no rash [No Venous Stasis] : no venous stasis [Skin Color & Pigmentation] : normal skin color and pigmentation [Skin Lesions] : no skin lesions [No Xanthoma] : no  xanthoma was observed [No Skin Ulcers] : no skin ulcer [Oriented To Time, Place, And Person] : oriented to person, place, and time [Affect] : the affect was normal [Mood] : the mood was normal [No Anxiety] : not feeling anxious [FreeTextEntry1] : RLE s/p fasciotomy

## 2020-05-05 NOTE — HISTORY OF PRESENT ILLNESS
[FreeTextEntry1] : Jean-Pierre is a 74-year-old gentleman hypertension, afib, AS, s/p back surgery last year with blood clots in leg requiring fasciotomy s/p COVID last month who is feeling much better. Denies any chest pain, palpitations or shortness of breath.

## 2020-05-05 NOTE — DISCUSSION/SUMMARY
[___ Month(s)] : [unfilled] month(s) [FreeTextEntry1] : The patient is a 75-year-old gentleman chronic afib, hypertension, hyperlipidemia, s/p back surgery with RLE fasciotomy and skin graft , afib s/p COVID who is doing well\par #1 HFpEF- furosemide 40mg and spironolactone 12.5mg improved, keep leg elevated,\par #2 AS- moderate, normal LV function, ECHO unchanged\par #2 Htn- lisinopril 5mg \par #3 Afib- off AV addison agents, no bleeding on warfarin,  therapeutic\par #4 Lipids- on simvastatin\par #5 BPH- on tamsulosin

## 2020-07-13 ENCOUNTER — RX RENEWAL (OUTPATIENT)
Age: 75
End: 2020-07-13

## 2020-09-08 ENCOUNTER — NON-APPOINTMENT (OUTPATIENT)
Age: 75
End: 2020-09-08

## 2020-09-08 ENCOUNTER — APPOINTMENT (OUTPATIENT)
Dept: CARDIOLOGY | Facility: CLINIC | Age: 75
End: 2020-09-08
Payer: MEDICARE

## 2020-09-08 ENCOUNTER — APPOINTMENT (OUTPATIENT)
Dept: ELECTROPHYSIOLOGY | Facility: CLINIC | Age: 75
End: 2020-09-08
Payer: MEDICARE

## 2020-09-08 VITALS
WEIGHT: 3 LBS | HEART RATE: 55 BPM | HEIGHT: 68 IN | SYSTOLIC BLOOD PRESSURE: 136 MMHG | OXYGEN SATURATION: 97 % | DIASTOLIC BLOOD PRESSURE: 78 MMHG | BODY MASS INDEX: 0.45 KG/M2

## 2020-09-08 PROCEDURE — 93000 ELECTROCARDIOGRAM COMPLETE: CPT

## 2020-09-08 PROCEDURE — 99214 OFFICE O/P EST MOD 30 MIN: CPT

## 2020-09-08 NOTE — HISTORY OF PRESENT ILLNESS
[FreeTextEntry1] : Jean-Pierre is a 75-year-old gentleman hypertension, afib, AS, s/p back surgery last year with blood clots in leg requiring fasciotomy s/p COVID who is doing well.  Denies any chest pain, palpitations or shortness of breath.

## 2020-09-08 NOTE — DISCUSSION/SUMMARY
[___ Month(s)] : [unfilled] month(s) [FreeTextEntry1] : The patient is a 75-year-old gentleman chronic afib, hypertension, hyperlipidemia, s/p back surgery with RLE fasciotomy and skin graft , afib s/p COVID who is in slow afib\par #1 HFpEF- furosemide 40mg and spironolactone 12.5mg improved, keep leg elevated,\par #2 AS- moderate, normal LV function, ECHO unchanged\par #2 Htn- lisinopril 5mg \par #3 Afib- off AV addison agents, no bleeding on warfarin, holter monitor today \par #4 Lipids- on simvastatin\par #5 BPH- on tamsulosin

## 2020-09-08 NOTE — PHYSICAL EXAM
[General Appearance - Well Developed] : well developed [Normal Appearance] : normal appearance [Well Groomed] : well groomed [General Appearance - Well Nourished] : well nourished [No Deformities] : no deformities [Normal Conjunctiva] : the conjunctiva exhibited no abnormalities [General Appearance - In No Acute Distress] : no acute distress [Normal Oral Mucosa] : normal oral mucosa [No Oral Pallor] : no oral pallor [Eyelids - No Xanthelasma] : the eyelids demonstrated no xanthelasmas [Normal Jugular Venous A Waves Present] : normal jugular venous A waves present [No Oral Cyanosis] : no oral cyanosis [Normal Jugular Venous V Waves Present] : normal jugular venous V waves present [No Jugular Venous Bee A Waves] : no jugular venous bee A waves [Respiration, Rhythm And Depth] : normal respiratory rhythm and effort [Auscultation Breath Sounds / Voice Sounds] : lungs were clear to auscultation bilaterally [Exaggerated Use Of Accessory Muscles For Inspiration] : no accessory muscle use [Heart Sounds] : normal S1 and S2 [Murmurs] : no murmurs present [Abdomen Soft] : soft [Irregularly Irregular] : the rhythm was irregularly irregular [Abdomen Tenderness] : non-tender [Abnormal Walk] : normal gait [Abdomen Mass (___ Cm)] : no abdominal mass palpated [Nail Clubbing] : no clubbing of the fingernails [Gait - Sufficient For Exercise Testing] : the gait was sufficient for exercise testing [Cyanosis, Localized] : no localized cyanosis [Petechial Hemorrhages (___cm)] : no petechial hemorrhages [Skin Color & Pigmentation] : normal skin color and pigmentation [] : no rash [No Venous Stasis] : no venous stasis [No Xanthoma] : no  xanthoma was observed [Skin Lesions] : no skin lesions [No Skin Ulcers] : no skin ulcer [Oriented To Time, Place, And Person] : oriented to person, place, and time [Affect] : the affect was normal [Mood] : the mood was normal [No Anxiety] : not feeling anxious [FreeTextEntry1] : RLE s/p fasciotomy

## 2020-09-08 NOTE — REVIEW OF SYSTEMS
[Negative] : Heme/Lymph [Recent Weight Loss (___ Lbs)] : no recent weight loss [Shortness Of Breath] : no shortness of breath [Dyspnea on exertion] : not dyspnea during exertion [Lower Ext Edema] : no extremity edema

## 2020-09-14 ENCOUNTER — NON-APPOINTMENT (OUTPATIENT)
Age: 75
End: 2020-09-14

## 2020-09-14 PROCEDURE — 93227 XTRNL ECG REC<48 HR R&I: CPT

## 2020-09-29 ENCOUNTER — APPOINTMENT (OUTPATIENT)
Dept: ELECTROPHYSIOLOGY | Facility: CLINIC | Age: 75
End: 2020-09-29
Payer: MEDICARE

## 2020-09-29 ENCOUNTER — NON-APPOINTMENT (OUTPATIENT)
Age: 75
End: 2020-09-29

## 2020-09-29 VITALS — SYSTOLIC BLOOD PRESSURE: 115 MMHG | DIASTOLIC BLOOD PRESSURE: 67 MMHG

## 2020-09-29 VITALS — WEIGHT: 228 LBS | HEART RATE: 51 BPM | BODY MASS INDEX: 34.67 KG/M2 | OXYGEN SATURATION: 97 %

## 2020-09-29 PROCEDURE — 93000 ELECTROCARDIOGRAM COMPLETE: CPT

## 2020-09-29 PROCEDURE — 99215 OFFICE O/P EST HI 40 MIN: CPT

## 2020-09-29 NOTE — HISTORY OF PRESENT ILLNESS
[FreeTextEntry1] : 75 year old man with hypertension, atrial fibrillation (managed with a strategy of rate control and anticoagulation), CVA presents for an evaluation of AF with slow ventricular response.   His AF was first found > 20 years ago; he was having palpitations.  He did undergo an attempt at cardioversion with early recurrence.  On 2 occasions his oral AC was held for planned noncardiac surgery and he had 2 strokes.  With one of the events he had the blood clot removed from his brain within 45 minutes of the event.  He does not have any residual.  He remains on oral AC with Warfarin.  His goal INR is 2.5.  His INR tends to e predictable in the 2.4 - 2.7 range.  At one point he had been on atenolol, but in the setting of bradycardia this was stopped.  Today's visit is prompted by an abnormal holter.  This study was done because of a slow ventricular response on the holter routine ECG.  He was not very active the day that he wore the monitor.  He does not exercise because he is limited by back pain and orthopedic issues.  He never get lightheaded or dizzy.  No history of syncope or collapse.  He does admit to low energy and his wife adds that he gets exhausted even taking a shower.

## 2020-09-29 NOTE — REVIEW OF SYSTEMS
[Feeling Fatigued] : feeling fatigued [Negative] : Heme/Lymph [FreeTextEntry2] : back pain, better when sitting

## 2020-09-29 NOTE — REASON FOR VISIT
[Consultation] : a consultation regarding [Atrial Fibrillation] : atrial fibrillation [Spouse] : spouse

## 2020-09-29 NOTE — PHYSICAL EXAM
[General Appearance - Well Developed] : well developed [Normal Appearance] : normal appearance [Well Groomed] : well groomed [General Appearance - Well Nourished] : well nourished [No Deformities] : no deformities [General Appearance - In No Acute Distress] : no acute distress [Normal Conjunctiva] : the conjunctiva exhibited no abnormalities [Eyelids - No Xanthelasma] : the eyelids demonstrated no xanthelasmas [Normal Oral Mucosa] : normal oral mucosa [No Oral Pallor] : no oral pallor [No Oral Cyanosis] : no oral cyanosis [Normal Jugular Venous A Waves Present] : normal jugular venous A waves present [Normal Jugular Venous V Waves Present] : normal jugular venous V waves present [No Jugular Venous Bee A Waves] : no jugular venous bee A waves [Heart Sounds] : normal S1 and S2 [Irregularly Irregular] : the rhythm was irregularly irregular [Systolic grade ___/6] : A grade [unfilled]/6 systolic murmur was heard. [Respiration, Rhythm And Depth] : normal respiratory rhythm and effort [Exaggerated Use Of Accessory Muscles For Inspiration] : no accessory muscle use [Auscultation Breath Sounds / Voice Sounds] : lungs were clear to auscultation bilaterally [Abdomen Soft] : soft [Abdomen Tenderness] : non-tender [Abdomen Mass (___ Cm)] : no abdominal mass palpated [Nail Clubbing] : no clubbing of the fingernails [Cyanosis, Localized] : no localized cyanosis [Petechial Hemorrhages (___cm)] : no petechial hemorrhages [Skin Color & Pigmentation] : normal skin color and pigmentation [] : no rash [No Venous Stasis] : no venous stasis [Skin Lesions] : no skin lesions [No Skin Ulcers] : no skin ulcer [No Xanthoma] : no  xanthoma was observed [Oriented To Time, Place, And Person] : oriented to person, place, and time [Affect] : the affect was normal [Mood] : the mood was normal [No Anxiety] : not feeling anxious [FreeTextEntry1] : walks with a cane

## 2020-10-04 DIAGNOSIS — Z01.818 ENCOUNTER FOR OTHER PREPROCEDURAL EXAMINATION: ICD-10-CM

## 2020-10-06 ENCOUNTER — APPOINTMENT (OUTPATIENT)
Dept: DISASTER EMERGENCY | Facility: CLINIC | Age: 75
End: 2020-10-06

## 2020-10-07 LAB — SARS-COV-2 N GENE NPH QL NAA+PROBE: NOT DETECTED

## 2020-10-09 ENCOUNTER — INPATIENT (INPATIENT)
Facility: HOSPITAL | Age: 75
LOS: 0 days | Discharge: HOME | DRG: 229 | End: 2020-10-10
Attending: INTERNAL MEDICINE | Admitting: INTERNAL MEDICINE
Payer: COMMERCIAL

## 2020-10-09 ENCOUNTER — TRANSCRIPTION ENCOUNTER (OUTPATIENT)
Age: 75
End: 2020-10-09

## 2020-10-09 VITALS
TEMPERATURE: 98 F | HEIGHT: 68 IN | WEIGHT: 225.09 LBS | RESPIRATION RATE: 17 BRPM | SYSTOLIC BLOOD PRESSURE: 176 MMHG | DIASTOLIC BLOOD PRESSURE: 85 MMHG | OXYGEN SATURATION: 98 % | HEART RATE: 71 BPM

## 2020-10-09 DIAGNOSIS — Z98.42 CATARACT EXTRACTION STATUS, LEFT EYE: Chronic | ICD-10-CM

## 2020-10-09 DIAGNOSIS — Z98.1 ARTHRODESIS STATUS: Chronic | ICD-10-CM

## 2020-10-09 DIAGNOSIS — Z90.81 ACQUIRED ABSENCE OF SPLEEN: Chronic | ICD-10-CM

## 2020-10-09 DIAGNOSIS — I48.91 UNSPECIFIED ATRIAL FIBRILLATION: ICD-10-CM

## 2020-10-09 DIAGNOSIS — Z98.890 OTHER SPECIFIED POSTPROCEDURAL STATES: Chronic | ICD-10-CM

## 2020-10-09 DIAGNOSIS — Z96.641 PRESENCE OF RIGHT ARTIFICIAL HIP JOINT: Chronic | ICD-10-CM

## 2020-10-09 LAB
ANION GAP SERPL CALC-SCNC: 10 MMOL/L — SIGNIFICANT CHANGE UP (ref 5–17)
APTT BLD: 47.9 SEC — HIGH (ref 27.5–35.5)
BLD GP AB SCN SERPL QL: NEGATIVE — SIGNIFICANT CHANGE UP
BUN SERPL-MCNC: 24 MG/DL — HIGH (ref 7–23)
CALCIUM SERPL-MCNC: 9.4 MG/DL — SIGNIFICANT CHANGE UP (ref 8.4–10.5)
CHLORIDE SERPL-SCNC: 107 MMOL/L — SIGNIFICANT CHANGE UP (ref 96–108)
CO2 SERPL-SCNC: 23 MMOL/L — SIGNIFICANT CHANGE UP (ref 22–31)
CREAT SERPL-MCNC: 1.03 MG/DL — SIGNIFICANT CHANGE UP (ref 0.5–1.3)
GLUCOSE SERPL-MCNC: 104 MG/DL — HIGH (ref 70–99)
HCT VFR BLD CALC: 40 % — SIGNIFICANT CHANGE UP (ref 39–50)
HGB BLD-MCNC: 12.6 G/DL — LOW (ref 13–17)
INR BLD: 2.53 RATIO — HIGH (ref 0.88–1.16)
MCHC RBC-ENTMCNC: 31.5 GM/DL — LOW (ref 32–36)
MCHC RBC-ENTMCNC: 32.1 PG — SIGNIFICANT CHANGE UP (ref 27–34)
MCV RBC AUTO: 102 FL — HIGH (ref 80–100)
NRBC # BLD: 0 /100 WBCS — SIGNIFICANT CHANGE UP (ref 0–0)
PLATELET # BLD AUTO: 430 K/UL — HIGH (ref 150–400)
POTASSIUM SERPL-MCNC: 4.3 MMOL/L — SIGNIFICANT CHANGE UP (ref 3.5–5.3)
POTASSIUM SERPL-SCNC: 4.3 MMOL/L — SIGNIFICANT CHANGE UP (ref 3.5–5.3)
PROTHROM AB SERPL-ACNC: 29.1 SEC — HIGH (ref 10.6–13.6)
RBC # BLD: 3.92 M/UL — LOW (ref 4.2–5.8)
RBC # FLD: 15 % — HIGH (ref 10.3–14.5)
RH IG SCN BLD-IMP: POSITIVE — SIGNIFICANT CHANGE UP
SODIUM SERPL-SCNC: 140 MMOL/L — SIGNIFICANT CHANGE UP (ref 135–145)
WBC # BLD: 11.97 K/UL — HIGH (ref 3.8–10.5)
WBC # FLD AUTO: 11.97 K/UL — HIGH (ref 3.8–10.5)

## 2020-10-09 PROCEDURE — 93010 ELECTROCARDIOGRAM REPORT: CPT

## 2020-10-09 PROCEDURE — 33274 TCAT INSJ/RPL PERM LDLS PM: CPT | Mod: Q0

## 2020-10-09 RX ORDER — TAMSULOSIN HYDROCHLORIDE 0.4 MG/1
0.4 CAPSULE ORAL AT BEDTIME
Refills: 0 | Status: DISCONTINUED | OUTPATIENT
Start: 2020-10-09 | End: 2020-10-10

## 2020-10-09 RX ORDER — DORZOLAMIDE HYDROCHLORIDE 20 MG/ML
1 SOLUTION/ DROPS OPHTHALMIC THREE TIMES A DAY
Refills: 0 | Status: DISCONTINUED | OUTPATIENT
Start: 2020-10-09 | End: 2020-10-10

## 2020-10-09 RX ORDER — ASPIRIN/CALCIUM CARB/MAGNESIUM 324 MG
81 TABLET ORAL DAILY
Refills: 0 | Status: DISCONTINUED | OUTPATIENT
Start: 2020-10-09 | End: 2020-10-10

## 2020-10-09 RX ORDER — ACETAMINOPHEN 500 MG
1000 TABLET ORAL ONCE
Refills: 0 | Status: DISCONTINUED | OUTPATIENT
Start: 2020-10-09 | End: 2020-10-10

## 2020-10-09 RX ORDER — PREDNISOLONE SODIUM PHOSPHATE 1 %
1 DROPS OPHTHALMIC (EYE)
Refills: 0 | Status: DISCONTINUED | OUTPATIENT
Start: 2020-10-09 | End: 2020-10-10

## 2020-10-09 RX ORDER — WARFARIN SODIUM 2.5 MG/1
2 TABLET ORAL ONCE
Refills: 0 | Status: COMPLETED | OUTPATIENT
Start: 2020-10-09 | End: 2020-10-09

## 2020-10-09 RX ORDER — LISINOPRIL 2.5 MG/1
5 TABLET ORAL DAILY
Refills: 0 | Status: DISCONTINUED | OUTPATIENT
Start: 2020-10-09 | End: 2020-10-10

## 2020-10-09 RX ORDER — LANOLIN ALCOHOL/MO/W.PET/CERES
3 CREAM (GRAM) TOPICAL AT BEDTIME
Refills: 0 | Status: DISCONTINUED | OUTPATIENT
Start: 2020-10-09 | End: 2020-10-10

## 2020-10-09 RX ORDER — ACETAMINOPHEN 500 MG
650 TABLET ORAL ONCE
Refills: 0 | Status: COMPLETED | OUTPATIENT
Start: 2020-10-09 | End: 2020-10-09

## 2020-10-09 RX ORDER — SIMVASTATIN 20 MG/1
20 TABLET, FILM COATED ORAL AT BEDTIME
Refills: 0 | Status: DISCONTINUED | OUTPATIENT
Start: 2020-10-09 | End: 2020-10-10

## 2020-10-09 RX ORDER — SPIRONOLACTONE 25 MG/1
12.5 TABLET, FILM COATED ORAL DAILY
Refills: 0 | Status: DISCONTINUED | OUTPATIENT
Start: 2020-10-09 | End: 2020-10-10

## 2020-10-09 RX ORDER — FUROSEMIDE 40 MG
40 TABLET ORAL DAILY
Refills: 0 | Status: DISCONTINUED | OUTPATIENT
Start: 2020-10-09 | End: 2020-10-10

## 2020-10-09 RX ORDER — LATANOPROST 0.05 MG/ML
1 SOLUTION/ DROPS OPHTHALMIC; TOPICAL
Qty: 0 | Refills: 0 | DISCHARGE

## 2020-10-09 RX ORDER — ATENOLOL 25 MG/1
25 TABLET ORAL DAILY
Refills: 0 | Status: DISCONTINUED | OUTPATIENT
Start: 2020-10-09 | End: 2020-10-10

## 2020-10-09 RX ORDER — PREDNISOLONE SODIUM PHOSPHATE 1 %
1 DROPS OPHTHALMIC (EYE)
Qty: 0 | Refills: 0 | DISCHARGE

## 2020-10-09 RX ORDER — LATANOPROST 0.05 MG/ML
1 SOLUTION/ DROPS OPHTHALMIC; TOPICAL AT BEDTIME
Refills: 0 | Status: DISCONTINUED | OUTPATIENT
Start: 2020-10-09 | End: 2020-10-10

## 2020-10-09 RX ORDER — OFLOXACIN 0.3 %
1 DROPS OPHTHALMIC (EYE)
Refills: 0 | Status: DISCONTINUED | OUTPATIENT
Start: 2020-10-09 | End: 2020-10-10

## 2020-10-09 RX ORDER — PILOCARPINE HCL 4 %
1 DROPS OPHTHALMIC (EYE)
Refills: 0 | Status: DISCONTINUED | OUTPATIENT
Start: 2020-10-09 | End: 2020-10-10

## 2020-10-09 RX ADMIN — Medication 1 DROP(S): at 22:47

## 2020-10-09 RX ADMIN — Medication 3 MILLIGRAM(S): at 22:40

## 2020-10-09 RX ADMIN — Medication 1 DROP(S): at 17:50

## 2020-10-09 RX ADMIN — Medication 1 DROP(S): at 21:03

## 2020-10-09 RX ADMIN — Medication 1 DROP(S): at 20:14

## 2020-10-09 RX ADMIN — Medication 650 MILLIGRAM(S): at 21:01

## 2020-10-09 RX ADMIN — Medication 1 DROP(S): at 17:44

## 2020-10-09 RX ADMIN — Medication 1 DROP(S): at 17:24

## 2020-10-09 RX ADMIN — LATANOPROST 1 DROP(S): 0.05 SOLUTION/ DROPS OPHTHALMIC; TOPICAL at 21:47

## 2020-10-09 RX ADMIN — DORZOLAMIDE HYDROCHLORIDE 1 DROP(S): 20 SOLUTION/ DROPS OPHTHALMIC at 21:47

## 2020-10-09 RX ADMIN — SIMVASTATIN 20 MILLIGRAM(S): 20 TABLET, FILM COATED ORAL at 21:02

## 2020-10-09 RX ADMIN — Medication 1 DROP(S): at 22:40

## 2020-10-09 RX ADMIN — WARFARIN SODIUM 2 MILLIGRAM(S): 2.5 TABLET ORAL at 21:02

## 2020-10-09 RX ADMIN — TAMSULOSIN HYDROCHLORIDE 0.4 MILLIGRAM(S): 0.4 CAPSULE ORAL at 21:02

## 2020-10-09 RX ADMIN — Medication 650 MILLIGRAM(S): at 21:31

## 2020-10-09 RX ADMIN — ATENOLOL 25 MILLIGRAM(S): 25 TABLET ORAL at 18:08

## 2020-10-09 NOTE — PRE-ANESTHESIA EVALUATION ADULT - NSANTHPMHFT_GEN_ALL_CORE
76 y/o M with PMHx of HTN, Afib (managed with RC and A/C), CVA, GERD,  with residual short term memory loss who has symptomatic bradycardia going for PPM

## 2020-10-09 NOTE — DISCHARGE NOTE PROVIDER - HOSPITAL COURSE
76 y/o M with PMHx of HTN, Afib (managed with RC and A/C), CVA, GERD,  with residual short term memory loss, wh presented to Dr. Hamilton in the office for evaluation of AF with slow ventricular response.  His AF was first found over 20 years ago when he was having c/o palpitations.  He did undergo an attempt at cardioversion with early recurrence.  On 2 occasions his oral AC was held for planned non-cardiac surgery and he has had 2 strokes.  With on of these events he had the blood clot removed from his brain within 45 minutes of the vent..  He does not have any residual motor defects.  He remains on oral AC with warfarin.  His goal INR is 2.5, and his INR tends to be predictable in the range of 2.4-2.7.  AT one point he had been on atenolol but in the setting of bradycardia this was stopped.  His office visit was prompted by an abnormal Holter.  This study was done because of a slow ventricular response on the holter routine EKG.  He was not very active  the day that he wore the monitor.  He does not exercise because he is limited by back pain and orthopedic issues.  He never gets lightheaded or dizzy.  NO history of syncope or collapse.  He does admit to low energy and his wife addes that he gets exhausted even taking a shower.  On the morning of his procedure he is feeling well, denies CP, dyspnea, dizziness/lightehadedness.  OF note he recently underwent cataract surgery on the left eye last week. (09 Oct 2020 10:40)    10/09/2020 76 y/o M with PMHx of HTN, Afib (managed with RC and A/C), CVA, GERD,  with residual short term memory loss, wh presented to Dr. Hamilton in the office for evaluation of AF with slow ventricular response.  His AF was first found over 20 years ago when he was having c/o palpitations.  He did undergo an attempt at cardioversion with early recurrence.  On 2 occasions his oral AC was held for planned non-cardiac surgery and he has had 2 strokes.  With on of these events he had the blood clot removed from his brain within 45 minutes of the vent..  He does not have any residual motor defects.  He remains on oral AC with warfarin.  His goal INR is 2.5, and his INR tends to be predictable in the range of 2.4-2.7.  AT one point he had been on atenolol but in the setting of bradycardia this was stopped.  His office visit was prompted by an abnormal Holter.  This study was done because of a slow ventricular response on the holter routine EKG.  He was not very active  the day that he wore the monitor.  He does not exercise because he is limited by back pain and orthopedic issues.  He never gets lightheaded or dizzy.  NO history of syncope or collapse.  He does admit to low energy and his wife addes that he gets exhausted even taking a shower.  On the morning of his procedure he is feeling well, denies CP, dyspnea, dizziness/lightehadedness.  OF note he recently underwent cataract surgery on the left eye last week. (09 Oct 2020 10:40)    10/09/2020 s/p leadless pacemaker via RFV access

## 2020-10-09 NOTE — DISCHARGE NOTE PROVIDER - NSDCCPTREATMENT_GEN_ALL_CORE_FT
PRINCIPAL PROCEDURE  Procedure: Insertion, pacemaker, leadless  Findings and Treatment: via RFV access

## 2020-10-09 NOTE — H&P CARDIOLOGY - RS GEN PE MLT RESP DETAILS PC
Therapy Progress Note


Attempted OT treatment at 1110. Pt sitting in chair with spouse present. Pt 

states he will be going home today, declined therapy at this time. Spouse 

states they have everything set up at home. They have no questions or concerns 

at this time. Pt sitting in chair with needs met.


1, visit











AMY GONZALEZ OT Jan 29, 2019 11:20 clear to auscultation bilaterally/respirations non-labored/good air movement

## 2020-10-09 NOTE — DISCHARGE NOTE PROVIDER - NSDCCPCAREPLAN_GEN_ALL_CORE_FT
PRINCIPAL DISCHARGE DIAGNOSIS  Diagnosis: Afib  Assessment and Plan of Treatment: Your heart rate will be controlled.   Continue with follow-up visits to your electrophysiology team and with your home remote device monitoring (if applicable). Continue your medications as prescribed.  No heavy lifting, strenuous activity, bending, straining or unnecessary stair climbing  for 2 weeks. No sex for 1 week.  No driving for 2 days. You may shower 24 hours following procedure but avoid baths and swimming for 1 week. Check groin site for bleeding and/or swelling daily following procedure. Call your doctor/cardiologist immediately should it occur or if you have increased/persistent pain at the site. Follow up with your cardiologist in 1- 2 weeks.      SECONDARY DISCHARGE DIAGNOSES  Diagnosis: HTN (hypertension)  Assessment and Plan of Treatment: Your blood pressure will be controlled.   Continue with your blood pressure medications; eat a heart healthy diet with low salt diet; exercise regularly (consult with your physician or cardiologist first); maintain a heart healthy weight; if you smoke - quit (A resource to help you stop smoking is the [facility] Tobacco [facility] – phone number [phone].); include healthy ways to manage stress. Continue to follow with your primary care physician or cardiologist.     PRINCIPAL DISCHARGE DIAGNOSIS  Diagnosis: Afib  Assessment and Plan of Treatment: Your heart rate will be controlled.   Continue with follow-up visits to your electrophysiology team and with your home remote device monitoring (if applicable). Continue your medications as prescribed.   No heavy lifting, strenuous activity, bending, straining or unnecessary stair climbing  for 2 weeks. No sex for 1 week.  No driving for 2 days. You may shower 24 hours following procedure but avoid baths and swimming for 1 week. Check groin site for bleeding and/or swelling daily following procedure. Call your doctor/cardiologist immediately should it occur or if you have increased/persistent pain at the site. Follow up with your cardiologist in 1- 2 weeks.      SECONDARY DISCHARGE DIAGNOSES  Diagnosis: HTN (hypertension)  Assessment and Plan of Treatment: Your blood pressure will be controlled.   Continue with your blood pressure medications; eat a heart healthy diet with low salt diet; exercise regularly (consult with your physician or cardiologist first); maintain a heart healthy weight; if you smoke - quit (A resource to help you stop smoking is the [facility] Tobacco Control – phone number [phone].); include healthy ways to manage stress. Continue to follow with your primary care physician or cardiologist.

## 2020-10-09 NOTE — DISCHARGE NOTE PROVIDER - NSDCFUSCHEDAPPT_GEN_ALL_CORE_FT
RACHEL ANNE ; 10/21/2020 ; [facility] Cardio Electro 300 Comm RACHEL Larry ; 10/27/2020 ; [facility] Cardio Electro 300 Comm RACHEL Larry ; 12/15/2020 ; [facility] Card[facility] 300 Comm.

## 2020-10-09 NOTE — DISCHARGE NOTE PROVIDER - NSDCMRMEDTOKEN_GEN_ALL_CORE_FT
aspirin 81 mg oral delayed release tablet: 1 tab(s) orally once a day  dorzolamide 2% ophthalmic solution: 1 drop(s) in the left eye 3 times a day  Lasix 40 mg oral tablet: 1 tab(s) orally once a day  latanoprost 0.005% ophthalmic solution: 1 drop(s) to the right eye once a day (in the evening)  lisinopril 5 mg oral tablet: 1 tab(s) orally once a day  ofloxacin 0.3% ophthalmic solution: 1 drop(s) in the left eye 4 times a day  pilocarpine 1% ophthalmic solution: 1 drop(s) to each affected eye 4 times a day  prednisoLONE acetate 1% ophthalmic suspension: 1 drop(s) in the left eye every 2 hours  simvastatin 20 mg oral tablet: 1 tab(s) orally once a day (at bedtime)  spironolactone 25 mg oral tablet: 0.5 tab(s) orally once a day  tamsulosin 0.4 mg oral capsule: 1 cap(s) orally once a day (at bedtime)  warfarin 2 mg oral tablet: 1 tab(s) orally once a day.  INR Goal 2.5-3.5.   aspirin 81 mg oral delayed release tablet: 1 tab(s) orally once a day  atenolol 25 mg oral tablet: 1 tab(s) orally once a day MDD:1  dorzolamide 2% ophthalmic solution: 1 drop(s) in the left eye 3 times a day  Lasix 40 mg oral tablet: 1 tab(s) orally once a day  latanoprost 0.005% ophthalmic solution: 1 drop(s) to the right eye once a day (in the evening)  lisinopril 5 mg oral tablet: 1 tab(s) orally once a day  ofloxacin 0.3% ophthalmic solution: 1 drop(s) in the left eye 4 times a day  pilocarpine 1% ophthalmic solution: 1 drop(s) to each affected eye 4 times a day  prednisoLONE acetate 1% ophthalmic suspension: 1 drop(s) in the left eye every 2 hours  simvastatin 20 mg oral tablet: 1 tab(s) orally once a day (at bedtime)  spironolactone 25 mg oral tablet: 0.5 tab(s) orally once a day  tamsulosin 0.4 mg oral capsule: 1 cap(s) orally once a day (at bedtime)  warfarin 2 mg oral tablet: 1 tab(s) orally once a day.  INR Goal 2.5-3.5.

## 2020-10-09 NOTE — PROCEDURE NOTE - ADDITIONAL PROCEDURE DETAILS
RACHEL ANNE  [id number]    PROCEDURE:  Implant of Micra pacemaker       INDICATION:  Afib with slow ventricular response   bradycardia with essential medication beta blocker      ELECTROPHYSIOLOGIST(S):  Attending Dr. Hamilton  Fellow Eric Ríos      ANESTHESIOLOGY:  MAC by EP Anesthesia service      FINDINGS:  Written informed consent was obtained from the patient after a full explanation of the risks and benefits of the procedure. The patient was brought to the lab in the fasting state. Continuous electrocardiographic and hemodynamic monitoring was initiated. The implantation site was meticulously prepared with surgical scrub. Sterile draping was applied to cover the operation site. The image intensifier was draped with a sterile bag and positioned over the patient's chest. Local anesthesia was infiltrated subcutaneously at the access site. A conscious sedation protocol was used during the case. Using the modified Seldinger technique a 6 F sheath was placed in the right femoral vein. A stiff wire was advanced under fluoroscopy into the SVC. The groin access was progressively dilated; the Micra sheath was then introduced and advanced under fluoroscopy into the right atrium. The Micra catheter was flushed and then introduced into the sheath. The device was positioned in the mid-RV septum. The device was deployed. Pace and sensing were evaluated through the PSA. A tug test was performed to confirm positioning and the device released from the catheter. The sheaths were pulled with figure-of -8 ethibond suture x2 used to achieve hemostasis. There were no apparent complications.      COMPLICATIONS:  no immediate complications      RECOMMENDATIONS:  - monitor groin for hematoma/bleeding  - restart Atenolol   - remove suture at 11 pm RACHEL ANNE  [id number]    PROCEDURE:  Implant of Micra pacemaker       INDICATION:  Afib with slow ventricular response   bradycardia with essential medication beta blocker      ELECTROPHYSIOLOGIST(S):  Attending Dr. Hamilton  Fellow Eric Ríos      ANESTHESIOLOGY:  MAC by EP Anesthesia service      FINDINGS:  Written informed consent was obtained from the patient after a full explanation of the risks and benefits of the procedure. The patient was brought to the lab in the fasting state. Continuous electrocardiographic and hemodynamic monitoring was initiated. The implantation site was meticulously prepared with surgical scrub. Sterile draping was applied to cover the operation site. The image intensifier was draped with a sterile bag and positioned over the patient's chest. Local anesthesia was infiltrated subcutaneously at the access site. A conscious sedation protocol was used during the case. Using the modified Seldinger technique a 6 F sheath was placed in the right femoral vein. A stiff wire was advanced under fluoroscopy into the SVC. The groin access was progressively dilated; the Micra sheath was then introduced and advanced under fluoroscopy into the right atrium. The Micra catheter was flushed and then introduced into the sheath. The device was positioned in the mid-RV septum. The device was deployed. Pace and sensing were evaluated through the PSA. A tug test was performed to confirm positioning and the device released from the catheter. The sheaths were pulled with figure-of -8 ethibond suture x2 used to achieve hemostasis. There were no apparent complications.      COMPLICATIONS:  no immediate complications      RECOMMENDATIONS:  - monitor groin for hematoma/bleeding  - restart Atenolol   - remove suture at 4 am

## 2020-10-09 NOTE — H&P CARDIOLOGY - HISTORY OF PRESENT ILLNESS
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 Hamilton in the office for evaluation of AF with slow ventricular response.  His AF was first found over 20 years ago when he was having c/o palpitations.  He did undergo an attempt at cardioversion with early recurrence.  On 2 occasions his oral AC was held for planned non-cardiac surgery and he has had 2 strokes.  With on of these events he had the blood clot removed from his brain within 45 minutes of the vent..  He does not have any residual motor defects.  He remains on oral AC with warfarin.  His goal INR is 2.5 74 y/o M with PMHx of HTN, Afib (managed with RC and A/C), CVA, GERD,  with residual short term memory loss, wh presented to Dr. Hamilton in the office for evaluation of AF with slow ventricular response.  His AF was first found over 20 years ago when he was having c/o palpitations.  He did undergo an attempt at cardioversion with early recurrence.  On 2 occasions his oral AC was held for planned non-cardiac surgery and he has had 2 strokes.  With on of these events he had the blood clot removed from his brain within 45 minutes of the vent..  He does not have any residual motor defects.  He remains on oral AC with warfarin.  His goal INR is 2.5, and his INR tends to be predictable in the range of 2.4-2.7.  AT one point he had been on atenolol but in the setting of bradycardia this was stopped.  His office visit was prompted by an abnormal Holter.  This study was done bevause of a slow ventricular response on the holter routine EKG.  He was not very active  the day that he wore the monitor.  He does not exercise bevause he is limitec by back pain and orthopedic issues.  He never gets lightheaded or dizzy.  NO history of syncope or collapse.  He does admit to low energy and his wife addes that he gets exhausted even taking a hsower. 76 y/o M with PMHx of HTN, Afib (managed with RC and A/C), CVA, GERD,  with residual short term memory loss, wh presented to Dr. Hamilton in the office for evaluation of AF with slow ventricular response.  His AF was first found over 20 years ago when he was having c/o palpitations.  He did undergo an attempt at cardioversion with early recurrence.  On 2 occasions his oral AC was held for planned non-cardiac surgery and he has had 2 strokes.  With on of these events he had the blood clot removed from his brain within 45 minutes of the vent..  He does not have any residual motor defects.  He remains on oral AC with warfarin.  His goal INR is 2.5, and his INR tends to be predictable in the range of 2.4-2.7.  AT one point he had been on atenolol but in the setting of bradycardia this was stopped.  His office visit was prompted by an abnormal Holter.  This study was done bevause of a slow ventricular response on the holter routine EKG.  He was not very active  the day that he wore the monitor.  He does not exercise bevause he is limitec by back pain and orthopedic issues.  He never gets lightheaded or dizzy.  NO history of syncope or collapse.  He does admit to low energy and his wife addes that he gets exhausted even taking a shower. 76 y/o M with PMHx of HTN, Afib (managed with RC and A/C), CVA, GERD,  with residual short term memory loss, wh presented to Dr. Hamilton in the office for evaluation of AF with slow ventricular response.  His AF was first found over 20 years ago when he was having c/o palpitations.  He did undergo an attempt at cardioversion with early recurrence.  On 2 occasions his oral AC was held for planned non-cardiac surgery and he has had 2 strokes.  With on of these events he had the blood clot removed from his brain within 45 minutes of the vent..  He does not have any residual motor defects.  He remains on oral AC with warfarin.  His goal INR is 2.5, and his INR tends to be predictable in the range of 2.4-2.7.  AT one point he had been on atenolol but in the setting of bradycardia this was stopped.  His office visit was prompted by an abnormal Holter.  This study was done bevause of a slow ventricular response on the holter routine EKG.  He was not very active  the day that he wore the monitor.  He does not exercise bevause he is limitec by back pain and orthopedic issues.  He never gets lightheaded or dizzy.  NO history of syncope or collapse.  He does admit to low energy and his wife addes that he gets exhausted even taking a shower.  On the morning of his procedure he is feeling well, denies CP, dyspnea, dizziness/lightehadedness.  OF note he recently underwent cataract surgery on the left eye last week, 76 y/o M with PMHx of HTN, Afib (managed with RC and A/C), CVA, GERD,  with residual short term memory loss, wh presented to Dr. Hamilton in the office for evaluation of AF with slow ventricular response.  His AF was first found over 20 years ago when he was having c/o palpitations.  He did undergo an attempt at cardioversion with early recurrence.  On 2 occasions his oral AC was held for planned non-cardiac surgery and he has had 2 strokes.  With on of these events he had the blood clot removed from his brain within 45 minutes of the vent..  He does not have any residual motor defects.  He remains on oral AC with warfarin.  His goal INR is 2.5, and his INR tends to be predictable in the range of 2.4-2.7.  AT one point he had been on atenolol but in the setting of bradycardia this was stopped.  His office visit was prompted by an abnormal Holter.  This study was done bevause of a slow ventricular response on the holter routine EKG.  He was not very active  the day that he wore the monitor.  He does not exercise because he is limited by back pain and orthopedic issues.  He never gets lightheaded or dizzy.  NO history of syncope or collapse.  He does admit to low energy and his wife addes that he gets exhausted even taking a shower.  On the morning of his procedure he is feeling well, denies CP, dyspnea, dizziness/lightehadedness.  OF note he recently underwent cataract surgery on the left eye last week.

## 2020-10-09 NOTE — DISCHARGE NOTE PROVIDER - CARE PROVIDER_API CALL
Sherman Hamilton)  Cardiac Electrophysiology; Cardiology  [address] [place] [address]  Phone: [phone]  Fax: [phone]  Follow Up Time:

## 2020-10-09 NOTE — H&P CARDIOLOGY - FAMILY HISTORY
Father  Still living? Unknown  Family history of myocardial infarction, Age at diagnosis: Age Unknown  Family history of stroke, Age at diagnosis: Age Unknown

## 2020-10-10 ENCOUNTER — TRANSCRIPTION ENCOUNTER (OUTPATIENT)
Age: 75
End: 2020-10-10

## 2020-10-10 VITALS
HEART RATE: 62 BPM | SYSTOLIC BLOOD PRESSURE: 135 MMHG | OXYGEN SATURATION: 97 % | TEMPERATURE: 98 F | DIASTOLIC BLOOD PRESSURE: 64 MMHG | RESPIRATION RATE: 18 BRPM

## 2020-10-10 DIAGNOSIS — I10 ESSENTIAL (PRIMARY) HYPERTENSION: ICD-10-CM

## 2020-10-10 DIAGNOSIS — I48.91 UNSPECIFIED ATRIAL FIBRILLATION: ICD-10-CM

## 2020-10-10 LAB
ANION GAP SERPL CALC-SCNC: 10 MMOL/L — SIGNIFICANT CHANGE UP (ref 5–17)
BUN SERPL-MCNC: 20 MG/DL — SIGNIFICANT CHANGE UP (ref 7–23)
CALCIUM SERPL-MCNC: 9.4 MG/DL — SIGNIFICANT CHANGE UP (ref 8.4–10.5)
CHLORIDE SERPL-SCNC: 109 MMOL/L — HIGH (ref 96–108)
CO2 SERPL-SCNC: 22 MMOL/L — SIGNIFICANT CHANGE UP (ref 22–31)
CREAT SERPL-MCNC: 0.91 MG/DL — SIGNIFICANT CHANGE UP (ref 0.5–1.3)
GLUCOSE SERPL-MCNC: 104 MG/DL — HIGH (ref 70–99)
HCT VFR BLD CALC: 37.1 % — LOW (ref 39–50)
HGB BLD-MCNC: 11.8 G/DL — LOW (ref 13–17)
INR BLD: 2.74 RATIO — HIGH (ref 0.88–1.16)
MAGNESIUM SERPL-MCNC: 2.3 MG/DL — SIGNIFICANT CHANGE UP (ref 1.6–2.6)
MCHC RBC-ENTMCNC: 31.8 GM/DL — LOW (ref 32–36)
MCHC RBC-ENTMCNC: 31.8 PG — SIGNIFICANT CHANGE UP (ref 27–34)
MCV RBC AUTO: 100 FL — SIGNIFICANT CHANGE UP (ref 80–100)
NRBC # BLD: 0 /100 WBCS — SIGNIFICANT CHANGE UP (ref 0–0)
PLATELET # BLD AUTO: 412 K/UL — HIGH (ref 150–400)
POTASSIUM SERPL-MCNC: 4.5 MMOL/L — SIGNIFICANT CHANGE UP (ref 3.5–5.3)
POTASSIUM SERPL-SCNC: 4.5 MMOL/L — SIGNIFICANT CHANGE UP (ref 3.5–5.3)
PROTHROM AB SERPL-ACNC: 31.3 SEC — HIGH (ref 10.6–13.6)
RBC # BLD: 3.71 M/UL — LOW (ref 4.2–5.8)
RBC # FLD: 14.8 % — HIGH (ref 10.3–14.5)
SODIUM SERPL-SCNC: 141 MMOL/L — SIGNIFICANT CHANGE UP (ref 135–145)
WBC # BLD: 12.75 K/UL — HIGH (ref 3.8–10.5)
WBC # FLD AUTO: 12.75 K/UL — HIGH (ref 3.8–10.5)

## 2020-10-10 PROCEDURE — 85610 PROTHROMBIN TIME: CPT

## 2020-10-10 PROCEDURE — 86900 BLOOD TYPING SEROLOGIC ABO: CPT

## 2020-10-10 PROCEDURE — 33274 TCAT INSJ/RPL PERM LDLS PM: CPT | Mod: Q0

## 2020-10-10 PROCEDURE — 85027 COMPLETE CBC AUTOMATED: CPT

## 2020-10-10 PROCEDURE — 83735 ASSAY OF MAGNESIUM: CPT

## 2020-10-10 PROCEDURE — 86850 RBC ANTIBODY SCREEN: CPT

## 2020-10-10 PROCEDURE — C1894: CPT

## 2020-10-10 PROCEDURE — 93005 ELECTROCARDIOGRAM TRACING: CPT

## 2020-10-10 PROCEDURE — 80048 BASIC METABOLIC PNL TOTAL CA: CPT

## 2020-10-10 PROCEDURE — C1786: CPT

## 2020-10-10 PROCEDURE — 85730 THROMBOPLASTIN TIME PARTIAL: CPT

## 2020-10-10 PROCEDURE — 86901 BLOOD TYPING SEROLOGIC RH(D): CPT

## 2020-10-10 RX ORDER — ACETAMINOPHEN 500 MG
650 TABLET ORAL ONCE
Refills: 0 | Status: COMPLETED | OUTPATIENT
Start: 2020-10-10 | End: 2020-10-10

## 2020-10-10 RX ORDER — ATENOLOL 25 MG/1
1 TABLET ORAL
Qty: 30 | Refills: 0
Start: 2020-10-10 | End: 2020-11-08

## 2020-10-10 RX ADMIN — DORZOLAMIDE HYDROCHLORIDE 1 DROP(S): 20 SOLUTION/ DROPS OPHTHALMIC at 08:14

## 2020-10-10 RX ADMIN — Medication 1 DROP(S): at 08:14

## 2020-10-10 RX ADMIN — Medication 650 MILLIGRAM(S): at 05:23

## 2020-10-10 RX ADMIN — ATENOLOL 25 MILLIGRAM(S): 25 TABLET ORAL at 05:01

## 2020-10-10 RX ADMIN — Medication 1 DROP(S): at 05:00

## 2020-10-10 RX ADMIN — Medication 1 DROP(S): at 05:11

## 2020-10-10 RX ADMIN — Medication 1 DROP(S): at 04:54

## 2020-10-10 RX ADMIN — Medication 1 DROP(S): at 00:17

## 2020-10-10 RX ADMIN — Medication 650 MILLIGRAM(S): at 04:53

## 2020-10-10 RX ADMIN — Medication 40 MILLIGRAM(S): at 05:01

## 2020-10-10 RX ADMIN — SPIRONOLACTONE 12.5 MILLIGRAM(S): 25 TABLET, FILM COATED ORAL at 05:06

## 2020-10-10 RX ADMIN — LISINOPRIL 5 MILLIGRAM(S): 2.5 TABLET ORAL at 05:06

## 2020-10-10 NOTE — PROGRESS NOTE ADULT - ASSESSMENT
74 y/o M with PMHx of HTN, Afib (managed with RC and A/C), CVA, GERD,  with residual short term memory loss, wh presented to Dr. Hamilton in the office for evaluation of AF with slow ventricular response; now s/p leadless pacemaker via RFV access. Pt tolerated the procedure well, site benign. Post-procedure discharge instructions discussed and questions addressed

## 2020-10-10 NOTE — CHART NOTE - NSCHARTNOTEFT_GEN_A_CORE
Pulses in the (right) lower extremity are (palpable). The patient is s/p right groin suture removal at 4am by night shift ACP  ambulated around 6:30 am w/o issues   right groin stable, soft, mildly tender , w/o bleeding or hematoma     Complications: None    Comments: below teaching/ instructions discussed with patient .  Pt verbalizes understanding and give positive feedback  okay to be dc home at this time as per Devon Velez     WOUND CARE:   the DAY AFTER your procedure: remove the bandage at the site, GENTLY clean with mild soap and water, and pat dry, leave OPEN TO AIR  - you may shower  - DO NOT apply lotions, powders, ointments, or perfumes  - check groin every day.  A small amount of soarness and bruising is normal, small pump ( nickel size) is normal  - DO NOT SOAK site for 1 week ( no baths, no pools, no tubs, etc...)    ACTIVITY:  YOur procedure was done through your groin  for the next 5 DAYS:  - Limit climbing stairs, no strenuous activity, pushing , pulling, or straining   DO NOT LIFT anything 10 lbs or heavier   you may resume sexual activity in 7 days, unless instructed otherwise    Follow heart healthy diet recommended by your doctor, , if you smoke STOP SMOKING ( may call [phone] for center of tobacco control if you need assistance).  for the next 24 hours:   - stay at home and rest, do not drive or operate heavy machinery   do not drink alcoholic beverages   do not make important personal or business decisions     ***CALL YOUR DOCTOR ***  IF you have fever, chills, body aches, or severe pain, swelling, redness, heat, yellow drainage from your incision site  IF bleeding  or significant new swelling from your puncture site  IF your experience lightheadedness, dizziness, or fainting spell.  IF unable to ge tin contact with your doctor, you may call the Cardiology Office at [facility] at [phone]

## 2020-10-10 NOTE — PROGRESS NOTE ADULT - PROBLEM SELECTOR PLAN 1
Your heart rate and rhythm will be controlled.   Continue with your cardiologist and primary care MD. Continue your current medications. Call your physician for palpitations, feelings of rapid heart beat, lightheadedness, or dizziness. If you are on warfarin (Coumadin), have your blood work drawn (prescription given) on ________________. Ask your nurse for written material about Coumadin and to provide patient education before discharge.

## 2020-10-10 NOTE — PROGRESS NOTE ADULT - SUBJECTIVE AND OBJECTIVE BOX
74 y/o M with PMHx of HTN, Afib (managed with RC and A/C), CVA, GERD,  with residual short term memory loss, wh presented to Dr. Hamilton in the office for evaluation of AF with slow ventricular response.  His AF was first found over 20 years ago when he was having c/o palpitations.  He did undergo an attempt at cardioversion with early recurrence.  On 2 occasions his oral AC was held for planned non-cardiac surgery and he has had 2 strokes.  With on of these events he had the blood clot removed from his brain within 45 minutes of the vent..  He does not have any residual motor defects.  He remains on oral AC with warfarin.  His goal INR is 2.5, and his INR tends to be predictable in the range of 2.4-2.7.  AT one point he had been on atenolol but in the setting of bradycardia this was stopped.  His office visit was prompted by an abnormal Holter.  This study was done bevause of a slow ventricular response on the holter routine EKG.  He was not very active  the day that he wore the monitor.  He does not exercise because he is limited by back pain and orthopedic issues.  He never gets lightheaded or dizzy.  NO history of syncope or collapse.  He does admit to low energy and his wife addes that he gets exhausted even taking a shower.  On the morning of his procedure he is feeling well, denies CP, dyspnea, dizziness/lightehadedness.  OF note he recently underwent cataract surgery on the left eye last week. (09 Oct 2020 10:40)    10/09/20 s/p Leadless pacemaker via RFV access

## 2020-10-10 NOTE — PROGRESS NOTE ADULT - PROBLEM SELECTOR PLAN 2
Your blood pressure will be controlled.   Continue with your blood pressure medications; eat a heart healthy diet with low salt diet; exercise regularly (consult with your physician or cardiologist first); maintain a heart healthy weight; if you smoke - quit (A resource to help you stop smoking is the [facility] – phone number [phone].); include healthy ways to manage stress. Continue to follow with your primary care physician or cardiologist.

## 2020-10-10 NOTE — DISCHARGE NOTE NURSING/CASE MANAGEMENT/SOCIAL WORK - PATIENT PORTAL LINK FT
You can access the FollowMyHealth Patient Portal offered by [facility] by registering at the following website: [email or web address]. By joining [employer]’s FollowMyHealth portal, you will also be able to view your health information using other applications (apps) compatible with our system.

## 2020-10-21 ENCOUNTER — APPOINTMENT (OUTPATIENT)
Dept: ELECTROPHYSIOLOGY | Facility: CLINIC | Age: 75
End: 2020-10-21
Payer: MEDICARE

## 2020-10-21 ENCOUNTER — NON-APPOINTMENT (OUTPATIENT)
Age: 75
End: 2020-10-21

## 2020-10-21 VITALS
HEART RATE: 78 BPM | HEIGHT: 68 IN | OXYGEN SATURATION: 98 % | DIASTOLIC BLOOD PRESSURE: 74 MMHG | BODY MASS INDEX: 34.56 KG/M2 | SYSTOLIC BLOOD PRESSURE: 124 MMHG | WEIGHT: 228 LBS

## 2020-10-21 PROCEDURE — 93279 PRGRMG DEV EVAL PM/LDLS PM: CPT

## 2020-10-21 PROCEDURE — 99024 POSTOP FOLLOW-UP VISIT: CPT

## 2020-12-15 ENCOUNTER — APPOINTMENT (OUTPATIENT)
Dept: ELECTROPHYSIOLOGY | Facility: CLINIC | Age: 75
End: 2020-12-15
Payer: MEDICARE

## 2020-12-15 ENCOUNTER — NON-APPOINTMENT (OUTPATIENT)
Age: 75
End: 2020-12-15

## 2020-12-15 ENCOUNTER — APPOINTMENT (OUTPATIENT)
Dept: CARDIOLOGY | Facility: CLINIC | Age: 75
End: 2020-12-15
Payer: MEDICARE

## 2020-12-15 VITALS — OXYGEN SATURATION: 97 % | DIASTOLIC BLOOD PRESSURE: 80 MMHG | HEART RATE: 83 BPM | SYSTOLIC BLOOD PRESSURE: 131 MMHG

## 2020-12-15 PROCEDURE — 99024 POSTOP FOLLOW-UP VISIT: CPT

## 2020-12-15 PROCEDURE — 93279 PRGRMG DEV EVAL PM/LDLS PM: CPT

## 2020-12-15 PROCEDURE — 99214 OFFICE O/P EST MOD 30 MIN: CPT

## 2020-12-15 PROCEDURE — 99072 ADDL SUPL MATRL&STAF TM PHE: CPT

## 2020-12-15 RX ORDER — PREDNISOLONE ACETATE 10 MG/ML
1 SUSPENSION/ DROPS OPHTHALMIC 4 TIMES DAILY
Refills: 0 | Status: DISCONTINUED | COMMUNITY
Start: 2019-09-17 | End: 2020-12-15

## 2020-12-17 NOTE — PROCEDURE
[Pacemaker] : pacemaker [VVIR] : VVIR [Lead Imp:  ___ohms] : lead impedance was [unfilled] ohms [Sensing Amplitude ___mv] : sensing amplitude was [unfilled] mv [___V @] : [unfilled] V [___ ms] : [unfilled] ms [Sense ___ %] : Sense [unfilled]% [Pace ___ %] : Pace [unfilled]% [de-identified] : Medtronic [de-identified] : Yaima [de-identified] : [id number] [de-identified] : 10/9/2020 [de-identified] : 60110 [de-identified] : >8.0 years

## 2020-12-17 NOTE — REASON FOR VISIT
[Atrial Fibrillation] : atrial fibrillation [Heart Failure] : congestive heart failure [Follow-Up - From Hospitalization] : follow-up of a recent hospitalization for [Pacemaker Evaluation] : pacemaker ~T evaluation ~C was performed

## 2020-12-17 NOTE — HISTORY OF PRESENT ILLNESS
[de-identified] : Mr. Gillis is 75 year old male presenting today for afib evaluation. 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 He has been feeling significantly better post procedure. He is been able to tolerate more activities recently. His breathing has improved.

## 2020-12-17 NOTE — PHYSICAL EXAM
[General Appearance - Well Developed] : well developed [Normal Appearance] : normal appearance [Well Groomed] : well groomed [General Appearance - Well Nourished] : well nourished [No Deformities] : no deformities [General Appearance - In No Acute Distress] : no acute distress [Heart Sounds] : normal S1 and S2 [Irregularly Irregular] : the rhythm was irregularly irregular [Systolic grade ___/6] : A grade [unfilled]/6 systolic murmur was heard. [Respiration, Rhythm And Depth] : normal respiratory rhythm and effort [Exaggerated Use Of Accessory Muscles For Inspiration] : no accessory muscle use [Auscultation Breath Sounds / Voice Sounds] : lungs were clear to auscultation bilaterally [Abdomen Soft] : soft [Abdomen Tenderness] : non-tender [Abdomen Mass (___ Cm)] : no abdominal mass palpated [Nail Clubbing] : no clubbing of the fingernails [Cyanosis, Localized] : no localized cyanosis [Petechial Hemorrhages (___cm)] : no petechial hemorrhages [Normal Conjunctiva] : the conjunctiva exhibited no abnormalities [Eyelids - No Xanthelasma] : the eyelids demonstrated no xanthelasmas [Normal Oral Mucosa] : normal oral mucosa [No Oral Pallor] : no oral pallor [No Oral Cyanosis] : no oral cyanosis [Normal Jugular Venous A Waves Present] : normal jugular venous A waves present [Normal Jugular Venous V Waves Present] : normal jugular venous V waves present [No Jugular Venous Bee A Waves] : no jugular venous bee A waves [Skin Color & Pigmentation] : normal skin color and pigmentation [] : no rash [No Venous Stasis] : no venous stasis [Skin Lesions] : no skin lesions [No Skin Ulcers] : no skin ulcer [No Xanthoma] : no  xanthoma was observed [Oriented To Time, Place, And Person] : oriented to person, place, and time [Affect] : the affect was normal [Mood] : the mood was normal [No Anxiety] : not feeling anxious [FreeTextEntry1] : walks with a cane

## 2020-12-17 NOTE — HISTORY OF PRESENT ILLNESS
[FreeTextEntry1] : Jean-Pierre is a 75-year-old gentleman hypertension, afib, AS, s/p back surgery last year with blood clots in leg requiring fasciotomy s/p COVID s/p PPM for slow afib who is feeling well.   Denies any chest pain, palpitations or shortness of breath. His leg still bothers him so no real exercise.

## 2020-12-17 NOTE — PHYSICAL EXAM
[General Appearance - Well Developed] : well developed [Normal Appearance] : normal appearance [Well Groomed] : well groomed [General Appearance - Well Nourished] : well nourished [No Deformities] : no deformities [General Appearance - In No Acute Distress] : no acute distress [Normal Conjunctiva] : the conjunctiva exhibited no abnormalities [Eyelids - No Xanthelasma] : the eyelids demonstrated no xanthelasmas [Normal Oral Mucosa] : normal oral mucosa [No Oral Pallor] : no oral pallor [No Oral Cyanosis] : no oral cyanosis [Normal Jugular Venous A Waves Present] : normal jugular venous A waves present [Normal Jugular Venous V Waves Present] : normal jugular venous V waves present [No Jugular Venous Bee A Waves] : no jugular venous bee A waves [Respiration, Rhythm And Depth] : normal respiratory rhythm and effort [Exaggerated Use Of Accessory Muscles For Inspiration] : no accessory muscle use [Auscultation Breath Sounds / Voice Sounds] : lungs were clear to auscultation bilaterally [Heart Sounds] : normal S1 and S2 [Murmurs] : no murmurs present [Irregularly Irregular] : the rhythm was irregularly irregular [Abdomen Soft] : soft [Abdomen Tenderness] : non-tender [Abdomen Mass (___ Cm)] : no abdominal mass palpated [Abnormal Walk] : normal gait [Gait - Sufficient For Exercise Testing] : the gait was sufficient for exercise testing [Nail Clubbing] : no clubbing of the fingernails [Cyanosis, Localized] : no localized cyanosis [Petechial Hemorrhages (___cm)] : no petechial hemorrhages [Skin Color & Pigmentation] : normal skin color and pigmentation [] : no rash [No Venous Stasis] : no venous stasis [Skin Lesions] : no skin lesions [No Skin Ulcers] : no skin ulcer [No Xanthoma] : no  xanthoma was observed [Oriented To Time, Place, And Person] : oriented to person, place, and time [Affect] : the affect was normal [Mood] : the mood was normal [No Anxiety] : not feeling anxious [FreeTextEntry1] : RLE s/p fasciotomy

## 2020-12-17 NOTE — DISCUSSION/SUMMARY
[___ Month(s)] : [unfilled] month(s) [FreeTextEntry1] : The patient is a 75-year-old gentleman chronic afib, hypertension, hyperlipidemia, s/p back surgery with RLE fasciotomy and skin graft , afib s/p COVID s/p PPM for slow afib\par #1 HFpEF- furosemide 40mg and spironolactone 12.5mg, keep leg elevated when sitting\par #2 AS- moderate, normal LV function, ECHO unchanged\par #2 Htn- lisinopril 5mg \par #3 Afib- s/p PPM for tachybrady, restarted atenolol 25mg, no bleeding on warfarin- monitored by PCP, f/u Dr. Hamilton today \par #4 Lipids- on simvastatin\par #5 BPH- on tamsulosin

## 2020-12-17 NOTE — DISCUSSION/SUMMARY
[FreeTextEntry1] : He is doing well post Micra. Continue with current therapy. Will follow up in 6 months.

## 2021-03-15 ENCOUNTER — APPOINTMENT (OUTPATIENT)
Dept: ELECTROPHYSIOLOGY | Facility: CLINIC | Age: 76
End: 2021-03-15
Payer: MEDICARE

## 2021-03-15 ENCOUNTER — NON-APPOINTMENT (OUTPATIENT)
Age: 76
End: 2021-03-15

## 2021-03-15 PROCEDURE — 93296 REM INTERROG EVL PM/IDS: CPT

## 2021-03-15 PROCEDURE — 93294 REM INTERROG EVL PM/LDLS PM: CPT

## 2021-04-12 ENCOUNTER — NON-APPOINTMENT (OUTPATIENT)
Age: 76
End: 2021-04-12

## 2021-05-05 ENCOUNTER — RX RENEWAL (OUTPATIENT)
Age: 76
End: 2021-05-05

## 2021-06-08 ENCOUNTER — NON-APPOINTMENT (OUTPATIENT)
Age: 76
End: 2021-06-08

## 2021-06-08 ENCOUNTER — APPOINTMENT (OUTPATIENT)
Dept: ELECTROPHYSIOLOGY | Facility: CLINIC | Age: 76
End: 2021-06-08
Payer: MEDICARE

## 2021-06-08 ENCOUNTER — APPOINTMENT (OUTPATIENT)
Dept: CARDIOLOGY | Facility: CLINIC | Age: 76
End: 2021-06-08
Payer: MEDICARE

## 2021-06-08 VITALS
OXYGEN SATURATION: 99 % | SYSTOLIC BLOOD PRESSURE: 126 MMHG | HEART RATE: 80 BPM | DIASTOLIC BLOOD PRESSURE: 68 MMHG | HEIGHT: 68 IN | BODY MASS INDEX: 35.01 KG/M2 | WEIGHT: 231 LBS

## 2021-06-08 PROCEDURE — 93279 PRGRMG DEV EVAL PM/LDLS PM: CPT

## 2021-06-08 PROCEDURE — 93000 ELECTROCARDIOGRAM COMPLETE: CPT

## 2021-06-08 PROCEDURE — 99215 OFFICE O/P EST HI 40 MIN: CPT

## 2021-06-09 LAB
ANION GAP SERPL CALC-SCNC: 15 MMOL/L
BUN SERPL-MCNC: 30 MG/DL
CALCIUM SERPL-MCNC: 9.2 MG/DL
CHLORIDE SERPL-SCNC: 104 MMOL/L
CO2 SERPL-SCNC: 22 MMOL/L
CREAT SERPL-MCNC: 1.3 MG/DL
GLUCOSE SERPL-MCNC: 93 MG/DL
NT-PROBNP SERPL-MCNC: 2012 PG/ML
POTASSIUM SERPL-SCNC: 5.3 MMOL/L
SODIUM SERPL-SCNC: 141 MMOL/L

## 2021-06-10 NOTE — DISCUSSION/SUMMARY
[___ Month(s)] : in [unfilled] month(s) [FreeTextEntry1] : The patient is a 76-year-old gentleman A-fib, hypertension, hyperlipidemia, s/p back surgery with RLE fasciotomy and skin graft, s/p COVID, PPM with progressive dyspnea on exertion and aortic stenosis.\par #1 HFpEF- increase furosemide 40mg AM and midday 3x week, and spironolactone 12.5mg, keep leg elevated when sitting\par #2 AS- 1.2 last year, recommend reevaluation with ECHO and structural heart 6/15/21\par #2 Htn- continue lisinopril 5mg \par #3 Afib- s/p PPM for tachybrady, continue atenolol 25mg, no bleeding on warfarin- monitored by PCP\par #4 Lipids- continue simvastatin\par #5 BPH- on tamsulosin\par #6 General- mild COVID 2020, s/p Moderna vaccines

## 2021-06-10 NOTE — HISTORY OF PRESENT ILLNESS
[FreeTextEntry1] : Jean-Pierre moved up his appointment because noticing increase shortness of breath walking even a block which he did not have before. Unrelated to wearing mask. He had COVID last year but this started a few weeks ago. Denies any chest pain, palpitations or dizziness.

## 2021-06-10 NOTE — PHYSICAL EXAM

## 2021-06-10 NOTE — HISTORY OF PRESENT ILLNESS
[FreeTextEntry1] : Patient is a 76-year-old gentleman A-fib, hypertension, hyperlipidemia, s/p back surgery with RLE fasciotomy and skin graft, s/p COVID, PPM with progressive dyspnea on exertion and aortic stenosis.\par \par

## 2021-06-10 NOTE — REVIEW OF SYSTEMS
[SOB] : shortness of breath [Dyspnea on exertion] : dyspnea during exertion [Chest Discomfort] : no chest discomfort [Lower Ext Edema] : no extremity edema [Leg Claudication] : no intermittent leg claudication [Orthopnea] : no orthopnea [PND] : no PND [Syncope] : no syncope [Negative] : Heme/Lymph

## 2021-06-15 ENCOUNTER — APPOINTMENT (OUTPATIENT)
Dept: CARDIOTHORACIC SURGERY | Facility: CLINIC | Age: 76
End: 2021-06-15

## 2021-06-15 ENCOUNTER — NON-APPOINTMENT (OUTPATIENT)
Age: 76
End: 2021-06-15

## 2021-06-22 ENCOUNTER — APPOINTMENT (OUTPATIENT)
Dept: GASTROENTEROLOGY | Facility: CLINIC | Age: 76
End: 2021-06-22
Payer: MEDICARE

## 2021-06-22 VITALS
BODY MASS INDEX: 35.01 KG/M2 | TEMPERATURE: 98.1 F | SYSTOLIC BLOOD PRESSURE: 145 MMHG | HEART RATE: 77 BPM | OXYGEN SATURATION: 87 % | DIASTOLIC BLOOD PRESSURE: 77 MMHG | WEIGHT: 231 LBS | RESPIRATION RATE: 16 BRPM | HEIGHT: 68 IN

## 2021-06-22 PROCEDURE — 99204 OFFICE O/P NEW MOD 45 MIN: CPT

## 2021-06-24 ENCOUNTER — APPOINTMENT (OUTPATIENT)
Dept: GASTROENTEROLOGY | Facility: CLINIC | Age: 76
End: 2021-06-24
Payer: MEDICARE

## 2021-06-24 VITALS
HEIGHT: 68 IN | RESPIRATION RATE: 16 BRPM | DIASTOLIC BLOOD PRESSURE: 75 MMHG | SYSTOLIC BLOOD PRESSURE: 135 MMHG | WEIGHT: 227 LBS | TEMPERATURE: 97.6 F | HEART RATE: 83 BPM | OXYGEN SATURATION: 96 % | BODY MASS INDEX: 34.4 KG/M2

## 2021-06-24 PROCEDURE — 91110 GI TRC IMG INTRAL ESOPH-ILE: CPT

## 2021-06-24 NOTE — REASON FOR VISIT
[Follow-Up: _____] : a [unfilled] follow-up visit [Spouse] : spouse [FreeTextEntry1] : Anemia, small bowel capsule endoscopy

## 2021-06-24 NOTE — HISTORY OF PRESENT ILLNESS
[de-identified] : This is a pleasant 76 year year old male being seen for a procedure visit for small bowel capsule endoscopy. He has micro PPM had discussion with Dr Martins about possible slight disturbance of small bowel capsule and PPM. They both are in agreement to proceed the procedure with this possible interaction.  \par I have reviewed the risks, benefits and alternatives of small bowel capsule endoscopy were discussed with the patient including missed lesions as well as capsule retention that could possibly result in bowel obstruction to necessitate surgical removal. The informed written consent was obtained prior to ingestion of the pill cam.  The patient ingested the small bowel capsule without difficult.  He tolerated the procedure well, without immediate complications.  Real time video shows capsule in to the stomach.  The patient instructed to contact the office with any questions or concerns. \par Informed him if He require MRI within 1 month He will need abdominal Xray to conform the capsule exit.\par \par Post capsule ingestion instructions were given to patient .\par \par \par

## 2021-06-24 NOTE — ASSESSMENT
[FreeTextEntry1] : 76 year old male with anemia here for the small bowel capsule endoscopy tolerated the procedure. Post procedure instruction provided.

## 2021-06-27 NOTE — REVIEW OF SYSTEMS
[Fever] : no fever [Chills] : no chills [Chest Pain] : no chest pain [Abdominal Pain] : no abdominal pain [FreeTextEntry3] : Glaucoma  [FreeTextEntry5] : HTN, history of LE DVT  [FreeTextEntry6] : ELLIS  [FreeTextEntry7] : Denies BRBPR or melena  [FreeTextEntry9] : Gel injections to left knee , history of spinal surgeries 2017/2018  . Fasciotomy RLE  2/2 DVT 2018 [de-identified] : hx of CVA when off anticoagulation  [de-identified] : anemia takes supplemental FeSO4

## 2021-06-27 NOTE — CONSULT LETTER
[FreeTextEntry3] : Jimi Martins MD, MPH, FASGE\par Chief of Clinical Quality in Gastroenterology, [facility]\par Associate Chief of Gastroenterology, [facility]\par Director of Endoscopic Ultrasound, [facility]\par [address], [address]\par [place], [address]\par 24 hours [phone]\par \par

## 2021-06-27 NOTE — ASSESSMENT
[FreeTextEntry1] : Patient is a  76 male with history of aortic stenosis ( TAVR pending) ,A-fib, LE DVT, CVA on Coumadin and ASA ,bradycardia has a Micra PPM, remote history of Hodgkins lymphoma , HTN, HLD. with decreased Hgb/ Hct without any sign of active GI blood loss \par Recent labs dated  6/11/2021 his HGB was  7.8.   .8  \par IRON 102   TIBC  386  Ferritin 40 (WNL) \par Based on past history , he  is high risk for a neurological events if taken off anticoagulant.\par Discussed non invasive/non endoscopic evaluation of his GI tract with capsule study and CT colonography.\par Discussed taking 40 mg Omeprazole daily as  prophylaxis   for PUD  \par Will discuss upper endoscopy/colonoscopy depending on results of above.\par

## 2021-06-27 NOTE — HISTORY OF PRESENT ILLNESS
[FreeTextEntry1] : Mr Gillis is a 76 male with history of aortic stenosis ( TAVR pending) ,A-fib, LE DVT, CVA on Coumadin and ASA ,bradycardia has a Micra PPM, remote history of Hodgkins lymphoma , HTN, HLD. He was referred by hematologist for further evaluation of his anemia .He reports SOB when ambulating a long distance.  Patient  denies any BRBPR or melena, recalls a prior colonoscopy 4 years ago \par \par No fevers, chills, sweats, abdominal pain, heartburn, dysphagia, nausea, vomiting, constipation, diarrhea, jaundice or weight loss.\par \par Denies a history of PUD,. He takes daily Coumadin and 81 mg ASA  . He reports having a stroke after  his anticoagulation was  held  for past procedures/surgeries despite bridging .with alternate anticoagulants . \par His HGB was 11.8 on 10/2020 and more recently on 6/11/2021 was reported as 7.8.   .8  \par IRON 102   TIBC  386  Ferritin 40 (WNL) \par Patient reports he is now talking FeSO4.  \par CT dated Nov 2018 reported cirrhotic morphology of liver, diverticulosis , no obstruction or suspicious mass or lymphadenopathy

## 2021-07-14 RX ORDER — SIMVASTATIN 20 MG/1
20 TABLET, FILM COATED ORAL
Qty: 10 | Refills: 0 | Status: DISCONTINUED | COMMUNITY
End: 2021-07-14

## 2021-07-27 ENCOUNTER — APPOINTMENT (OUTPATIENT)
Dept: CARDIOLOGY | Facility: CLINIC | Age: 76
End: 2021-07-27
Payer: MEDICARE

## 2021-07-27 VITALS
HEART RATE: 86 BPM | DIASTOLIC BLOOD PRESSURE: 66 MMHG | WEIGHT: 230 LBS | SYSTOLIC BLOOD PRESSURE: 158 MMHG | OXYGEN SATURATION: 98 % | BODY MASS INDEX: 34.97 KG/M2

## 2021-07-27 VITALS — SYSTOLIC BLOOD PRESSURE: 124 MMHG | DIASTOLIC BLOOD PRESSURE: 68 MMHG

## 2021-07-27 PROCEDURE — 99214 OFFICE O/P EST MOD 30 MIN: CPT

## 2021-07-27 PROCEDURE — 93000 ELECTROCARDIOGRAM COMPLETE: CPT

## 2021-07-28 ENCOUNTER — RX RENEWAL (OUTPATIENT)
Age: 76
End: 2021-07-28

## 2021-07-28 NOTE — DISCUSSION/SUMMARY
[___ Month(s)] : in [unfilled] month(s) [FreeTextEntry1] : The patient is a 76-year-old gentleman A-fib, hypertension, hyperlipidemia, s/p back surgery with RLE fasciotomy and skin graft, s/p COVID, PPM , AS with anemia and dyspnea on exertion\par #1 HFpEF-continue lasix, spironolactone 12.5mg, keep leg elevated when sitting\par #2 AS- 1.2 last year, recommend reevaluation with ECHO and structural heart once anemia improved\par #2 Htn- continue lisinopril 5mg \par #3 Afib- s/p PPM for tachybrady, continue atenolol 25mg, no bleeding on warfarin- monitored by PCP\par #4 Lipids- continue simvastatin\par #5 BPH- on tamsulosin\par #6 GI- evaluation negative, discuss increase iron, ? heme evaluation, Hb 8.3\par #7 General- mild COVID 2020, s/p Moderna vaccines

## 2021-07-28 NOTE — HISTORY OF PRESENT ILLNESS
[FreeTextEntry1] : Jean-Pierre feels better. No etiology for anemia. Small bowel endoscopy negative. Still guaiac positive. INR elevated and coumadin decreased. Hb 8.3 not increasing despite iron. Still gets short of breath but not as bad as before.

## 2021-07-28 NOTE — PHYSICAL EXAM

## 2021-07-28 NOTE — REVIEW OF SYSTEMS
[SOB] : shortness of breath [Dyspnea on exertion] : dyspnea during exertion [Negative] : Heme/Lymph [Chest Discomfort] : no chest discomfort [Lower Ext Edema] : no extremity edema [Leg Claudication] : no intermittent leg claudication [Orthopnea] : no orthopnea [PND] : no PND [Syncope] : no syncope

## 2021-08-28 ENCOUNTER — NON-APPOINTMENT (OUTPATIENT)
Age: 76
End: 2021-08-28

## 2021-09-28 ENCOUNTER — APPOINTMENT (OUTPATIENT)
Dept: CARDIOLOGY | Facility: CLINIC | Age: 76
End: 2021-09-28
Payer: MEDICARE

## 2021-09-28 ENCOUNTER — NON-APPOINTMENT (OUTPATIENT)
Age: 76
End: 2021-09-28

## 2021-09-28 VITALS
HEART RATE: 68 BPM | HEIGHT: 68 IN | BODY MASS INDEX: 34.25 KG/M2 | OXYGEN SATURATION: 99 % | WEIGHT: 226 LBS | SYSTOLIC BLOOD PRESSURE: 136 MMHG | DIASTOLIC BLOOD PRESSURE: 72 MMHG

## 2021-09-28 PROCEDURE — 99214 OFFICE O/P EST MOD 30 MIN: CPT

## 2021-09-28 PROCEDURE — 93000 ELECTROCARDIOGRAM COMPLETE: CPT

## 2021-09-29 NOTE — ED PROVIDER NOTE - NS_EDPROVIDERDISPOUSERTYPE_ED_A_ED
How Severe Is Your Skin Lesion?: mild Has Your Skin Lesion Been Treated?: not been treated Is This A New Presentation, Or A Follow-Up?: Skin Lesion Additional History: Site seems to be getting more red, but other than that no symptoms Is This A New Presentation, Or A Follow-Up?: Skin Lesions Attending Attestation (For Attendings USE Only)...

## 2021-10-03 NOTE — DISCUSSION/SUMMARY
[___ Month(s)] : in [unfilled] month(s) [FreeTextEntry1] : The patient is a 76-year-old gentleman A-fib, hypertension, hyperlipidemia, s/p back surgery with RLE fasciotomy and skin graft, s/p COVID, PPM , AS with anemia and dyspnea on exertion of unclear etiology\par #1 HFpEF-continue lasix, spironolactone 12.5mg, keep leg elevated when sitting\par #2 AS- 1.2 last year, recommend reevaluation with ECHO and structural heart once anemia improved\par #2 Htn- continue lisinopril 5mg \par #3 Afib- s/p PPM for tachybrady, continue atenolol 25mg,no bleeding on warfarin- monitored by PCP\par #4 Lipids- continue simvastatin\par #5 BPH- on tamsulosin\par #6 GI- evaluation negative, discuss increase iron, heme evaluation, Hb 8.3\par #7 General- mild COVID 2020, s/p Moderna vaccines, they are concerned about the valve but more likely the anemia causing the issue, close monitoring for now, may have to consider Watchman as well.

## 2021-10-03 NOTE — REVIEW OF SYSTEMS
[SOB] : shortness of breath [Dyspnea on exertion] : dyspnea during exertion [Negative] : Heme/Lymph [Weight Loss (___ Lbs)] : [unfilled] ~Ulb weight loss [Chest Discomfort] : no chest discomfort [Lower Ext Edema] : no extremity edema [Leg Claudication] : no intermittent leg claudication [Orthopnea] : no orthopnea [PND] : no PND [Syncope] : no syncope

## 2021-10-03 NOTE — PHYSICAL EXAM

## 2021-10-03 NOTE — HISTORY OF PRESENT ILLNESS
[FreeTextEntry1] : Jean-Pierre feels better. No etiology for anemia. Small bowel endoscopy negative. Hb 8.3 and INR 2.4. Still gets SOB on minimal exertion. Goes to coumadin clinic at [facility].

## 2021-10-15 ENCOUNTER — RX RENEWAL (OUTPATIENT)
Age: 76
End: 2021-10-15

## 2021-10-15 RX ORDER — ROSUVASTATIN CALCIUM 10 MG/1
10 TABLET, FILM COATED ORAL
Qty: 90 | Refills: 3 | Status: ACTIVE | COMMUNITY
Start: 2021-07-14 | End: 1900-01-01

## 2021-10-18 ENCOUNTER — RX RENEWAL (OUTPATIENT)
Age: 76
End: 2021-10-18

## 2021-10-19 ENCOUNTER — APPOINTMENT (OUTPATIENT)
Dept: ELECTROPHYSIOLOGY | Facility: CLINIC | Age: 76
End: 2021-10-19
Payer: MEDICARE

## 2021-10-19 ENCOUNTER — APPOINTMENT (OUTPATIENT)
Dept: CARDIOLOGY | Facility: CLINIC | Age: 76
End: 2021-10-19
Payer: MEDICARE

## 2021-10-19 VITALS
HEART RATE: 82 BPM | SYSTOLIC BLOOD PRESSURE: 130 MMHG | SYSTOLIC BLOOD PRESSURE: 158 MMHG | OXYGEN SATURATION: 97 % | DIASTOLIC BLOOD PRESSURE: 79 MMHG | DIASTOLIC BLOOD PRESSURE: 74 MMHG

## 2021-10-19 PROCEDURE — 93000 ELECTROCARDIOGRAM COMPLETE: CPT | Mod: 59

## 2021-10-19 PROCEDURE — 93000 ELECTROCARDIOGRAM COMPLETE: CPT

## 2021-10-19 PROCEDURE — 93279 PRGRMG DEV EVAL PM/LDLS PM: CPT

## 2021-10-19 PROCEDURE — 99214 OFFICE O/P EST MOD 30 MIN: CPT

## 2021-10-20 ENCOUNTER — NON-APPOINTMENT (OUTPATIENT)
Age: 76
End: 2021-10-20

## 2021-10-20 NOTE — DISCUSSION/SUMMARY
[FreeTextEntry1] : The patient is a 76-year-old gentleman A-fib, hypertension, hyperlipidemia, s/p back surgery with RLE fasciotomy and skin graft, s/p COVID, PPM , AS with anemia and dyspnea.\par #1 HFpEF- continue lasix, spironolactone 12.5mg, keep leg elevated when sitting\par #2 AS- 1.2 last year, recommend ECHO next visit and structural heart once anemia stable\par #2 Htn- continue lisinopril 5mg \par #3 Afib- s/p PPM for tachybrady, continue atenolol 25mg,no bleeding on warfarin- monitored by PCP\par #4 Lipids- continue simvastatin\par #5 BPH- on tamsulosin\par #6 GI- evaluation negative, on iron, heme evaluation, Hb 9\par #7 General- mild COVID 2020, s/p Moderna vaccines, may have to consider Watchman as well.

## 2021-10-20 NOTE — REVIEW OF SYSTEMS
[Weight Loss (___ Lbs)] : [unfilled] ~Ulb weight loss [SOB] : shortness of breath [Dyspnea on exertion] : dyspnea during exertion [Negative] : Heme/Lymph [Chest Discomfort] : no chest discomfort [Lower Ext Edema] : no extremity edema [Leg Claudication] : no intermittent leg claudication [Orthopnea] : no orthopnea [PND] : no PND [Syncope] : no syncope

## 2021-10-20 NOTE — HISTORY OF PRESENT ILLNESS
[FreeTextEntry1] : Jean-Pierre is not doing much because short of breath. Hb 9.0. Now needs to meet new doctor. INR 2.0. Otherwise, no CP, palpitations, dizziness or lightheadedness. NO PND or orthopnea

## 2021-10-20 NOTE — PHYSICAL EXAM

## 2021-11-17 ENCOUNTER — OUTPATIENT (OUTPATIENT)
Dept: OUTPATIENT SERVICES | Facility: HOSPITAL | Age: 76
LOS: 1 days | End: 2021-11-17
Payer: COMMERCIAL

## 2021-11-17 ENCOUNTER — APPOINTMENT (OUTPATIENT)
Dept: CV DIAGNOSITCS | Facility: HOSPITAL | Age: 76
End: 2021-11-17

## 2021-11-17 DIAGNOSIS — Z96.641 PRESENCE OF RIGHT ARTIFICIAL HIP JOINT: Chronic | ICD-10-CM

## 2021-11-17 DIAGNOSIS — Z98.1 ARTHRODESIS STATUS: Chronic | ICD-10-CM

## 2021-11-17 DIAGNOSIS — I35.0 NONRHEUMATIC AORTIC (VALVE) STENOSIS: ICD-10-CM

## 2021-11-17 DIAGNOSIS — Z98.890 OTHER SPECIFIED POSTPROCEDURAL STATES: Chronic | ICD-10-CM

## 2021-11-17 DIAGNOSIS — Z98.42 CATARACT EXTRACTION STATUS, LEFT EYE: Chronic | ICD-10-CM

## 2021-11-17 DIAGNOSIS — Z90.81 ACQUIRED ABSENCE OF SPLEEN: Chronic | ICD-10-CM

## 2021-11-17 PROCEDURE — 93306 TTE W/DOPPLER COMPLETE: CPT | Mod: 26

## 2021-11-17 PROCEDURE — C8929: CPT

## 2021-12-21 ENCOUNTER — APPOINTMENT (OUTPATIENT)
Dept: GASTROENTEROLOGY | Facility: CLINIC | Age: 76
End: 2021-12-21
Payer: MEDICARE

## 2021-12-21 VITALS
WEIGHT: 220 LBS | TEMPERATURE: 98.7 F | HEIGHT: 68 IN | OXYGEN SATURATION: 95 % | BODY MASS INDEX: 33.34 KG/M2 | HEART RATE: 75 BPM

## 2021-12-21 VITALS — DIASTOLIC BLOOD PRESSURE: 71 MMHG | SYSTOLIC BLOOD PRESSURE: 123 MMHG

## 2021-12-21 PROCEDURE — 99214 OFFICE O/P EST MOD 30 MIN: CPT

## 2021-12-27 NOTE — HISTORY OF PRESENT ILLNESS
[FreeTextEntry1] : Pt f/u for iron def anemia and small bowel angioectasias.\par \par Doing well.\par Not fevers, abdominal pain, heartburn, dysphagia, nausea, vomiting, constipation, diarrhea, melena, hematochezia, jaundice or weight loss.\par \par Video capsule endoscopy positive for small bowel angioectasias.\par CT colonography performed 7/6/2021.  Imaging quality was good.  Distention was good.  Preparation was adequate.  Normal colon.  No diverticulosis or colonic lesions or colonic polyps visualized.\par \par On oral iron, Hb 9 (janet 7.3)\par Planned for IV iron per hematology\par \par No plan for cardiac intervention at this time per recent cardiology office note.\par \par \par

## 2021-12-27 NOTE — CONSULT LETTER
[Dear  ___] : Dear  [unfilled], [Consult Letter:] : I had the pleasure of evaluating your patient, [unfilled]. [Please see my note below.] : Please see my note below. [Consult Closing:] : Thank you very much for allowing me to participate in the care of this patient.  If you have any questions, please do not hesitate to contact me. [Sincerely,] : Sincerely, [DrDuy  ___] : Dr. BEYER [FreeTextEntry3] : Jimi Martins MD, MPH, FASGE\par Chief of Clinical Quality in Gastroenterology, [facility]\par Associate Chief of Gastroenterology, [facility]\par Director of Endoscopic Ultrasound, [facility]\par [address], [address]\par [place], [address]\par 24 hours [phone]\par \par  [DrDuy ___] : Dr. BEYER

## 2021-12-27 NOTE — ASSESSMENT
[FreeTextEntry1] : Patient is a 76 male with history of severe aortic stenosis ( TAVR being considered),A-fib, LE DVT, CVA on Coumadin and ASA ,bradycardia has a Micra PPM, remote history of Hodgkins lymphoma , HTN, HLD. with decreased iron deficiency anemia without any sign of overt GI blood loss.  Prior COVID -19 infection.\par Based on past history , he is high risk for a neurological events if taken off anticoagulant.\par Video capsule endoscopy demonstrated small bowel angiectasia.  CT colonography was negative for colonic polyps or masses.  His iron deficiency anemia has responded to iron supplementation.\par \par Plan:\par \par GI office followup in 6 months\par No plan for deep enteroscopy at this time due to high cardiac risk and response to medical therapy.  Will reconsider as necessary if significant iron deficiency anemia persists despite maximal iron supplementation.

## 2022-01-11 ENCOUNTER — NON-APPOINTMENT (OUTPATIENT)
Age: 77
End: 2022-01-11

## 2022-01-11 ENCOUNTER — APPOINTMENT (OUTPATIENT)
Dept: CARDIOLOGY | Facility: CLINIC | Age: 77
End: 2022-01-11
Payer: MEDICARE

## 2022-01-11 VITALS
HEART RATE: 80 BPM | SYSTOLIC BLOOD PRESSURE: 160 MMHG | OXYGEN SATURATION: 98 % | BODY MASS INDEX: 33.91 KG/M2 | WEIGHT: 223 LBS | DIASTOLIC BLOOD PRESSURE: 68 MMHG

## 2022-01-11 VITALS — SYSTOLIC BLOOD PRESSURE: 110 MMHG | DIASTOLIC BLOOD PRESSURE: 68 MMHG

## 2022-01-11 PROCEDURE — 93000 ELECTROCARDIOGRAM COMPLETE: CPT

## 2022-01-11 PROCEDURE — 99214 OFFICE O/P EST MOD 30 MIN: CPT

## 2022-01-11 NOTE — HISTORY OF PRESENT ILLNESS
[FreeTextEntry1] : Jean-Pierre Hb 8.4 and INR 2.4. Still has difficulty breathing on exertion. No CP, palpitations, dizziness or lightheadedness. Still has memory issues, Wife was not allowed in today because of COVID.

## 2022-01-11 NOTE — PHYSICAL EXAM

## 2022-01-11 NOTE — DISCUSSION/SUMMARY
[FreeTextEntry1] : The patient is a 76-year-old gentleman A-fib, hypertension, hyperlipidemia, s/p back surgery with RLE fasciotomy and skin graft, s/p COVID, PPM , AS with anemia and dyspnea.\par #1 HFpEF- continue lasix, spironolactone 12.5mg, keep leg elevated when sitting\par #2 AS- 1.2 last year, recommend ECHO this summer\par #2 Htn- continue lisinopril 5mg \par #3 Afib- s/p PPM for tachybrady, continue atenolol 25mg,no bleeding on warfarin- monitored by PCP\par #4 Lipids- continue simvastatin\par #5 BPH- on tamsulosin\par #6 GI- evaluation negative, on iron, heme evaluation may include biopsy and suggest iron infusion\par #7 General- mild COVID 2020, s/p Moderna vaccines, may have to consider Watchman as well.

## 2022-01-19 ENCOUNTER — APPOINTMENT (OUTPATIENT)
Dept: ELECTROPHYSIOLOGY | Facility: CLINIC | Age: 77
End: 2022-01-19

## 2022-01-24 ENCOUNTER — RX RENEWAL (OUTPATIENT)
Age: 77
End: 2022-01-24

## 2022-01-26 ENCOUNTER — RX RENEWAL (OUTPATIENT)
Age: 77
End: 2022-01-26

## 2022-01-31 ENCOUNTER — FORM ENCOUNTER (OUTPATIENT)
Age: 77
End: 2022-01-31

## 2022-02-01 ENCOUNTER — APPOINTMENT (OUTPATIENT)
Dept: ELECTROPHYSIOLOGY | Facility: CLINIC | Age: 77
End: 2022-02-01
Payer: MEDICARE

## 2022-02-01 ENCOUNTER — NON-APPOINTMENT (OUTPATIENT)
Age: 77
End: 2022-02-01

## 2022-02-01 PROCEDURE — 93296 REM INTERROG EVL PM/IDS: CPT

## 2022-02-01 PROCEDURE — 93294 REM INTERROG EVL PM/LDLS PM: CPT

## 2022-03-15 ENCOUNTER — APPOINTMENT (OUTPATIENT)
Dept: CARDIOLOGY | Facility: CLINIC | Age: 77
End: 2022-03-15
Payer: MEDICARE

## 2022-03-15 ENCOUNTER — NON-APPOINTMENT (OUTPATIENT)
Age: 77
End: 2022-03-15

## 2022-03-15 VITALS
HEIGHT: 68 IN | DIASTOLIC BLOOD PRESSURE: 69 MMHG | HEART RATE: 77 BPM | OXYGEN SATURATION: 97 % | SYSTOLIC BLOOD PRESSURE: 136 MMHG | BODY MASS INDEX: 35.16 KG/M2 | WEIGHT: 232 LBS

## 2022-03-15 DIAGNOSIS — U07.1 COVID-19: ICD-10-CM

## 2022-03-15 PROCEDURE — 99213 OFFICE O/P EST LOW 20 MIN: CPT

## 2022-03-15 PROCEDURE — 93000 ELECTROCARDIOGRAM COMPLETE: CPT

## 2022-03-15 NOTE — REVIEW OF SYSTEMS
[SOB] : shortness of breath [Dyspnea on exertion] : dyspnea during exertion [Negative] : Heme/Lymph [Weight Gain (___ Lbs)] : [unfilled] ~Ulb weight gain [Weight Loss (___ Lbs)] : no recent weight loss [Chest Discomfort] : no chest discomfort [Lower Ext Edema] : no extremity edema [Leg Claudication] : no intermittent leg claudication [Orthopnea] : no orthopnea [PND] : no PND [Syncope] : no syncope

## 2022-03-15 NOTE — DISCUSSION/SUMMARY
[___ Month(s)] : in [unfilled] month(s) [FreeTextEntry1] : The patient is a 77-year-old gentleman A-fib, hypertension, hyperlipidemia, s/p back surgery with RLE fasciotomy and skin graft, s/p COVID, PPM , AS with stable anemia and dyspnea.\par #1 HFpEF- continue lasix bid with labs today to adjust, spironolactone 12.5mg, keep leg elevated when sitting\par #2 AS- 1.2 last year, recommend ECHO this summer\par #2 Htn- continue lisinopril 5mg \par #3 Afib- s/p PPM for tachybrady, continue atenolol 25mg,no bleeding on warfarin- monitored by PCP\par #4 Lipids- continue simvastatin\par #5 BPH- on tamsulosin\par #6 GI- evaluation negative, on iron, Hb 9\par #7 General- mild COVID 2020, s/p Moderna vaccines, may have to consider Watchman as well.

## 2022-03-15 NOTE — HISTORY OF PRESENT ILLNESS
[FreeTextEntry1] : Jean-Pierre is feeling better since starting bid lasix on Saturday. He was wheezing and bilateral LE edema. No CP, palpitations or dizziness.

## 2022-03-15 NOTE — PHYSICAL EXAM

## 2022-03-16 LAB
ANION GAP SERPL CALC-SCNC: 14 MMOL/L
BUN SERPL-MCNC: 38 MG/DL
CALCIUM SERPL-MCNC: 9.6 MG/DL
CHLORIDE SERPL-SCNC: 103 MMOL/L
CO2 SERPL-SCNC: 26 MMOL/L
CREAT SERPL-MCNC: 1.29 MG/DL
EGFR: 57 ML/MIN/1.73M2
GLUCOSE SERPL-MCNC: 102 MG/DL
NT-PROBNP SERPL-MCNC: 2331 PG/ML
POTASSIUM SERPL-SCNC: 5.4 MMOL/L
SODIUM SERPL-SCNC: 142 MMOL/L

## 2022-04-25 ENCOUNTER — INPATIENT (INPATIENT)
Facility: HOSPITAL | Age: 77
LOS: 3 days | Discharge: HOME | DRG: 682 | End: 2022-04-29
Attending: INTERNAL MEDICINE | Admitting: HOSPITALIST
Payer: COMMERCIAL

## 2022-04-25 VITALS
SYSTOLIC BLOOD PRESSURE: 115 MMHG | HEIGHT: 68 IN | RESPIRATION RATE: 18 BRPM | WEIGHT: 227.96 LBS | TEMPERATURE: 98 F | OXYGEN SATURATION: 98 % | DIASTOLIC BLOOD PRESSURE: 56 MMHG | HEART RATE: 78 BPM

## 2022-04-25 DIAGNOSIS — Z98.42 CATARACT EXTRACTION STATUS, LEFT EYE: Chronic | ICD-10-CM

## 2022-04-25 DIAGNOSIS — Z96.641 PRESENCE OF RIGHT ARTIFICIAL HIP JOINT: Chronic | ICD-10-CM

## 2022-04-25 DIAGNOSIS — Z98.890 OTHER SPECIFIED POSTPROCEDURAL STATES: Chronic | ICD-10-CM

## 2022-04-25 DIAGNOSIS — Z98.1 ARTHRODESIS STATUS: Chronic | ICD-10-CM

## 2022-04-25 DIAGNOSIS — Z90.81 ACQUIRED ABSENCE OF SPLEEN: Chronic | ICD-10-CM

## 2022-04-25 LAB
ALBUMIN SERPL ELPH-MCNC: 3.9 G/DL — SIGNIFICANT CHANGE UP (ref 3.3–5)
ALP SERPL-CCNC: 77 U/L — SIGNIFICANT CHANGE UP (ref 40–120)
ALT FLD-CCNC: 15 U/L — SIGNIFICANT CHANGE UP (ref 10–45)
ANION GAP SERPL CALC-SCNC: 14 MMOL/L — SIGNIFICANT CHANGE UP (ref 5–17)
ANISOCYTOSIS BLD QL: SLIGHT — SIGNIFICANT CHANGE UP
AST SERPL-CCNC: 23 U/L — SIGNIFICANT CHANGE UP (ref 10–40)
BASOPHILS # BLD AUTO: 0.16 K/UL — SIGNIFICANT CHANGE UP (ref 0–0.2)
BASOPHILS NFR BLD AUTO: 0.9 % — SIGNIFICANT CHANGE UP (ref 0–2)
BILIRUB SERPL-MCNC: <0.1 MG/DL — LOW (ref 0.2–1.2)
BLD GP AB SCN SERPL QL: NEGATIVE — SIGNIFICANT CHANGE UP
BUN SERPL-MCNC: 51 MG/DL — HIGH (ref 7–23)
CALCIUM SERPL-MCNC: 9.3 MG/DL — SIGNIFICANT CHANGE UP (ref 8.4–10.5)
CHLORIDE SERPL-SCNC: 104 MMOL/L — SIGNIFICANT CHANGE UP (ref 96–108)
CO2 SERPL-SCNC: 21 MMOL/L — LOW (ref 22–31)
CREAT SERPL-MCNC: 1.52 MG/DL — HIGH (ref 0.5–1.3)
EGFR: 47 ML/MIN/1.73M2 — LOW
ELLIPTOCYTES BLD QL SMEAR: SLIGHT — SIGNIFICANT CHANGE UP
EOSINOPHIL # BLD AUTO: 0 K/UL — SIGNIFICANT CHANGE UP (ref 0–0.5)
EOSINOPHIL NFR BLD AUTO: 0 % — SIGNIFICANT CHANGE UP (ref 0–6)
GLUCOSE SERPL-MCNC: 114 MG/DL — HIGH (ref 70–99)
HCT VFR BLD CALC: 23.4 % — LOW (ref 39–50)
HGB BLD-MCNC: 7.2 G/DL — LOW (ref 13–17)
LYMPHOCYTES # BLD AUTO: 1.42 K/UL — SIGNIFICANT CHANGE UP (ref 1–3.3)
LYMPHOCYTES # BLD AUTO: 7.9 % — LOW (ref 13–44)
MACROCYTES BLD QL: SIGNIFICANT CHANGE UP
MANUAL SMEAR VERIFICATION: SIGNIFICANT CHANGE UP
MCHC RBC-ENTMCNC: 30.8 GM/DL — LOW (ref 32–36)
MCHC RBC-ENTMCNC: 33.2 PG — SIGNIFICANT CHANGE UP (ref 27–34)
MCV RBC AUTO: 107.8 FL — HIGH (ref 80–100)
MONOCYTES # BLD AUTO: 1.88 K/UL — HIGH (ref 0–0.9)
MONOCYTES NFR BLD AUTO: 10.5 % — SIGNIFICANT CHANGE UP (ref 2–14)
NEUTROPHILS # BLD AUTO: 14.31 K/UL — HIGH (ref 1.8–7.4)
NEUTROPHILS NFR BLD AUTO: 79.8 % — HIGH (ref 43–77)
NRBC # BLD: 1 /100 — HIGH (ref 0–0)
PLAT MORPH BLD: NORMAL — SIGNIFICANT CHANGE UP
PLATELET # BLD AUTO: 451 K/UL — HIGH (ref 150–400)
POIKILOCYTOSIS BLD QL AUTO: SLIGHT — SIGNIFICANT CHANGE UP
POLYCHROMASIA BLD QL SMEAR: SLIGHT — SIGNIFICANT CHANGE UP
POTASSIUM SERPL-MCNC: 5.2 MMOL/L — SIGNIFICANT CHANGE UP (ref 3.5–5.3)
POTASSIUM SERPL-SCNC: 5.2 MMOL/L — SIGNIFICANT CHANGE UP (ref 3.5–5.3)
PROT SERPL-MCNC: 7.1 G/DL — SIGNIFICANT CHANGE UP (ref 6–8.3)
RBC # BLD: 2.17 M/UL — LOW (ref 4.2–5.8)
RBC # FLD: 16.6 % — HIGH (ref 10.3–14.5)
RBC BLD AUTO: ABNORMAL
RH IG SCN BLD-IMP: POSITIVE — SIGNIFICANT CHANGE UP
SCHISTOCYTES BLD QL AUTO: SIGNIFICANT CHANGE UP
SODIUM SERPL-SCNC: 139 MMOL/L — SIGNIFICANT CHANGE UP (ref 135–145)
TARGETS BLD QL SMEAR: SLIGHT — SIGNIFICANT CHANGE UP
TROPONIN T, HIGH SENSITIVITY RESULT: 128 NG/L — HIGH (ref 0–51)
VARIANT LYMPHS # BLD: 0.9 % — SIGNIFICANT CHANGE UP (ref 0–6)
WBC # BLD: 17.93 K/UL — HIGH (ref 3.8–10.5)
WBC # FLD AUTO: 17.93 K/UL — HIGH (ref 3.8–10.5)

## 2022-04-25 PROCEDURE — 71045 X-RAY EXAM CHEST 1 VIEW: CPT | Mod: 26

## 2022-04-25 PROCEDURE — 93010 ELECTROCARDIOGRAM REPORT: CPT

## 2022-04-25 PROCEDURE — 99285 EMERGENCY DEPT VISIT HI MDM: CPT

## 2022-04-25 RX ORDER — SODIUM CHLORIDE 9 MG/ML
500 INJECTION, SOLUTION INTRAVENOUS ONCE
Refills: 0 | Status: DISCONTINUED | OUTPATIENT
Start: 2022-04-25 | End: 2022-04-25

## 2022-04-25 NOTE — ED PROVIDER NOTE - NSICDXPASTMEDICALHX_GEN_ALL_CORE_FT
PAST MEDICAL HISTORY:  Atrial fibrillation over 20 years    BPH (benign prostatic hyperplasia)     CVA (cerebral vascular accident) 1/2018 - attributed to no AC for 11 days preop/postop spine surgery - presented to ED with slurred speech, residual ST memory loss, per pt    Exposure to toxin 9/11     GERD (gastroesophageal reflux disease)     Hodgkin lymphoma 1975    HTN (hypertension)     MOE (obstructive sleep apnea) positive sleep study many years ago, was recommended to use CPAP, patient non-compliant    Osteoarthritis     Spinal stenosis L3-L4    Spondylolisthesis

## 2022-04-25 NOTE — ED PROVIDER NOTE - OBJECTIVE STATEMENT
77M PMH HTN, Afib on coumadin, CVA, newly diagnosed anemia (b/l 7-8), aortic stenosis (supposed to receive a TAVR soon), GERD sent in by hematologist dr. schroeder for anemia. Pt complains of ELLIS for past few days. He had a colonoscopy/endoscopy 6 months ago which he reports were negative. Found to have low B12 and received a B12 shot recently. Does endorse dark stools for past few months including currently. Also found to have new CKD and supposed to see a nephrologist soon. No f/c, chest pain, abd pain, leg swelling

## 2022-04-25 NOTE — ED ADULT TRIAGE NOTE - CHIEF COMPLAINT QUOTE
Called in for low hemoglobin on outpatient labs - 7.2 today  hx of anemia for 1 year, has received iron infusions

## 2022-04-25 NOTE — ED PROVIDER NOTE - PHYSICAL EXAMINATION
Physical Exam:  Gen: awake alert   HEENT: conjunctival pallor, oral mucosa moist  Lung: CTAB, no respiratory distress, no wheezes/rhonchi/rales B/L, speaking in full sentences  CV: RRR  Abd: soft, NT, ND, no guarding, no rigidity, no rebound tenderness, no CVA tenderness   Rectal (chaperone RN Cori): no tracey blood on internal exam, brown stool present  MSK: no visible deformities  Skin: Warm, well perfused, no rash, no leg swelling  ~Hardy Augustine MD (PGY-2)

## 2022-04-25 NOTE — ED PROVIDER NOTE - NSICDXPASTSURGICALHX_GEN_ALL_CORE_FT
Perilesional Excision Additional Text (Leave Blank If You Do Not Want): The margin was drawn around the clinically apparent lesion. Incisions were then made along these lines to the appropriate tissue plane and the lesion was extirpated. PAST SURGICAL HISTORY:  History of cardioversion for AF - "many years ago" - unsuccessful    History of lumbar spinal fusion 1/5/2018    S/P hip replacement, right 2014    S/P splenectomy 1975    Status post left cataract extraction

## 2022-04-25 NOTE — ED PROVIDER NOTE - PROGRESS NOTE DETAILS
Davide VU (PGY-2)  positive trops: likely demand ischemia; pt denies chest pain and ECG nonischemic    pt consented for blood and consent placed in chart. will admit pt to medicine under hospitalist for heme/GI/cards w/u. family already aware of plan from previous conversation

## 2022-04-25 NOTE — ED PROVIDER NOTE - CLINICAL SUMMARY MEDICAL DECISION MAKING FREE TEXT BOX
77M PMH HTN, Afib on coumadin, CVA, newly diagnosed anemia (b/l 7-8), aortic stenosis (supposed to receive a TAVR soon), GERD p/w symptomatic anemia. ECG unchanged. No tracey blood on rectal exam, lungs ctab, abd NT, no leg swelling  Concern for anemia 2/2 hemolysis vs. malignancy vs. kidney disease vs. GI bleed  Plan: labs, type/screen, ua/ucx, transfuse, admit for further w/u

## 2022-04-25 NOTE — ED PROVIDER NOTE - RAPID ASSESSMENT
77y M with PMHx of HTN, Afib, CVA, and GERD presents to the ED due to low outpatient HGB of 7.2 today and some SOB. 1 year ago he had an episode of low HGB, unsure what the cause was. Denies CP. Hematologist: Dr. Goel. Patient is awake, alert and in no acute distress.    Scribe Statement: I, Katherine Otero, attest that this documentation has been prepared under the direction and in the presence of Usama Dos Santos) 77y M with PMHx of HTN, Afib, CVA, and GERD presents to the ED due to low outpatient HGB of 7.2 today and some SOB. 1 year ago he had an episode of low HGB, unsure what the cause was. Denies CP. Hematologist: Dr. Goel. Patient is awake, alert and in no acute distress.    Scribe Statement: I, Katherine Otero, attest that this documentation has been prepared under the direction and in the presence of Usama Dos Santos)    PT seen as Tele/Triage/Q-Doc w scribe, full eval/H&P to be performed once pt is transferred to main ED.  Usama Dos Santos MD, Facep

## 2022-04-25 NOTE — ED PROVIDER NOTE - NS ED ROS FT
CONST: no fevers, no chills  ENT: no sore throat  CV: no chest pain, no leg swelling  RESP: no shortness of breath, no cough  ABD: +abdominal pain, +dark stools, no nausea, no vomiting, no diarrhea  : no dysuria, no flank pain, no hematuria  MSK: no back pain, no extremity pain  SKIN:  no rash

## 2022-04-25 NOTE — ED PROVIDER NOTE - ATTENDING CONTRIBUTION TO CARE
Dr. Finch (Attending Physician)  I performed a history and physical exam of the patient and discussed their management with the resident. I reviewed the resident's note and agree with the documented findings and plan of care. My medical decision making and observations are found above.

## 2022-04-25 NOTE — ED PROVIDER NOTE - NSICDXFAMILYHX_GEN_ALL_CORE_FT
FAMILY HISTORY:  Father  Still living? Unknown  Family history of myocardial infarction, Age at diagnosis: Age Unknown  Family history of stroke, Age at diagnosis: Age Unknown

## 2022-04-26 ENCOUNTER — APPOINTMENT (OUTPATIENT)
Dept: CARDIOLOGY | Facility: CLINIC | Age: 77
End: 2022-04-26

## 2022-04-26 DIAGNOSIS — Z85.71 PERSONAL HISTORY OF HODGKIN LYMPHOMA: ICD-10-CM

## 2022-04-26 DIAGNOSIS — K21.9 GASTRO-ESOPHAGEAL REFLUX DISEASE WITHOUT ESOPHAGITIS: ICD-10-CM

## 2022-04-26 DIAGNOSIS — N17.9 ACUTE KIDNEY FAILURE, UNSPECIFIED: ICD-10-CM

## 2022-04-26 DIAGNOSIS — I10 ESSENTIAL (PRIMARY) HYPERTENSION: ICD-10-CM

## 2022-04-26 DIAGNOSIS — D64.9 ANEMIA, UNSPECIFIED: ICD-10-CM

## 2022-04-26 DIAGNOSIS — R60.0 LOCALIZED EDEMA: ICD-10-CM

## 2022-04-26 DIAGNOSIS — R77.8 OTHER SPECIFIED ABNORMALITIES OF PLASMA PROTEINS: ICD-10-CM

## 2022-04-26 DIAGNOSIS — I48.20 CHRONIC ATRIAL FIBRILLATION, UNSPECIFIED: ICD-10-CM

## 2022-04-26 DIAGNOSIS — D72.829 ELEVATED WHITE BLOOD CELL COUNT, UNSPECIFIED: ICD-10-CM

## 2022-04-26 DIAGNOSIS — Z29.9 ENCOUNTER FOR PROPHYLACTIC MEASURES, UNSPECIFIED: ICD-10-CM

## 2022-04-26 LAB
APTT BLD: 38 SEC — HIGH (ref 27.5–35.5)
BLD GP AB SCN SERPL QL: NEGATIVE — SIGNIFICANT CHANGE UP
CK MB BLD-MCNC: 3.7 % — HIGH (ref 0–3.5)
CK MB CFR SERPL CALC: 5.9 NG/ML — SIGNIFICANT CHANGE UP (ref 0–6.7)
CK SERPL-CCNC: 161 U/L — SIGNIFICANT CHANGE UP (ref 30–200)
CREAT ?TM UR-MCNC: 121 MG/DL — SIGNIFICANT CHANGE UP
HAPTOGLOB SERPL-MCNC: 249 MG/DL — HIGH (ref 34–200)
HCT VFR BLD CALC: 22.3 % — LOW (ref 39–50)
HCT VFR BLD CALC: 23.9 % — LOW (ref 39–50)
HGB BLD-MCNC: 7 G/DL — CRITICAL LOW (ref 13–17)
HGB BLD-MCNC: 7.5 G/DL — LOW (ref 13–17)
INR BLD: 2.32 RATIO — HIGH (ref 0.88–1.16)
INR BLD: 2.78 RATIO — HIGH (ref 0.88–1.16)
LDH SERPL L TO P-CCNC: 229 U/L — SIGNIFICANT CHANGE UP (ref 50–242)
MCHC RBC-ENTMCNC: 31.4 GM/DL — LOW (ref 32–36)
MCHC RBC-ENTMCNC: 31.4 GM/DL — LOW (ref 32–36)
MCHC RBC-ENTMCNC: 32.1 PG — SIGNIFICANT CHANGE UP (ref 27–34)
MCHC RBC-ENTMCNC: 33.3 PG — SIGNIFICANT CHANGE UP (ref 27–34)
MCV RBC AUTO: 102.1 FL — HIGH (ref 80–100)
MCV RBC AUTO: 106.2 FL — HIGH (ref 80–100)
NRBC # BLD: 1 /100 WBCS — HIGH (ref 0–0)
NRBC # BLD: 1 /100 WBCS — HIGH (ref 0–0)
OB PNL STL: POSITIVE
PLATELET # BLD AUTO: 398 K/UL — SIGNIFICANT CHANGE UP (ref 150–400)
PLATELET # BLD AUTO: 468 K/UL — HIGH (ref 150–400)
PROTHROM AB SERPL-ACNC: 27.1 SEC — HIGH (ref 10.5–13.4)
PROTHROM AB SERPL-ACNC: 32.6 SEC — HIGH (ref 10.5–13.4)
RBC # BLD: 2.1 M/UL — LOW (ref 4.2–5.8)
RBC # BLD: 2.34 M/UL — LOW (ref 4.2–5.8)
RBC # FLD: 16.2 % — HIGH (ref 10.3–14.5)
RBC # FLD: 17.3 % — HIGH (ref 10.3–14.5)
RH IG SCN BLD-IMP: POSITIVE — SIGNIFICANT CHANGE UP
SARS-COV-2 RNA SPEC QL NAA+PROBE: SIGNIFICANT CHANGE UP
TROPONIN T, HIGH SENSITIVITY RESULT: 119 NG/L — HIGH (ref 0–51)
TROPONIN T, HIGH SENSITIVITY RESULT: 141 NG/L — HIGH (ref 0–51)
WBC # BLD: 15.78 K/UL — HIGH (ref 3.8–10.5)
WBC # BLD: 19.28 K/UL — HIGH (ref 3.8–10.5)
WBC # FLD AUTO: 15.78 K/UL — HIGH (ref 3.8–10.5)
WBC # FLD AUTO: 19.28 K/UL — HIGH (ref 3.8–10.5)

## 2022-04-26 PROCEDURE — 93010 ELECTROCARDIOGRAM REPORT: CPT

## 2022-04-26 RX ORDER — FUROSEMIDE 40 MG
20 TABLET ORAL ONCE
Refills: 0 | Status: COMPLETED | OUTPATIENT
Start: 2022-04-26 | End: 2022-04-27

## 2022-04-26 RX ORDER — ATENOLOL 25 MG/1
25 TABLET ORAL DAILY
Refills: 0 | Status: DISCONTINUED | OUTPATIENT
Start: 2022-04-26 | End: 2022-04-26

## 2022-04-26 RX ORDER — ATORVASTATIN CALCIUM 80 MG/1
80 TABLET, FILM COATED ORAL AT BEDTIME
Refills: 0 | Status: DISCONTINUED | OUTPATIENT
Start: 2022-04-26 | End: 2022-04-29

## 2022-04-26 RX ORDER — TAMSULOSIN HYDROCHLORIDE 0.4 MG/1
0.8 CAPSULE ORAL AT BEDTIME
Refills: 0 | Status: DISCONTINUED | OUTPATIENT
Start: 2022-04-26 | End: 2022-04-29

## 2022-04-26 RX ORDER — ERYTHROPOIETIN 10000 [IU]/ML
10000 INJECTION, SOLUTION INTRAVENOUS; SUBCUTANEOUS
Refills: 0 | Status: DISCONTINUED | OUTPATIENT
Start: 2022-04-26 | End: 2022-04-29

## 2022-04-26 RX ORDER — SPIRONOLACTONE 25 MG/1
0 TABLET, FILM COATED ORAL
Qty: 0 | Refills: 1 | DISCHARGE

## 2022-04-26 RX ORDER — OFLOXACIN 0.3 %
1 DROPS OPHTHALMIC (EYE)
Qty: 0 | Refills: 0 | DISCHARGE

## 2022-04-26 RX ORDER — LISINOPRIL 2.5 MG/1
0 TABLET ORAL
Qty: 0 | Refills: 1 | DISCHARGE

## 2022-04-26 RX ORDER — WARFARIN SODIUM 2.5 MG/1
1 TABLET ORAL ONCE
Refills: 0 | Status: COMPLETED | OUTPATIENT
Start: 2022-04-26 | End: 2022-04-26

## 2022-04-26 RX ORDER — ATENOLOL 25 MG/1
25 TABLET ORAL DAILY
Refills: 0 | Status: DISCONTINUED | OUTPATIENT
Start: 2022-04-26 | End: 2022-04-29

## 2022-04-26 RX ORDER — ROSUVASTATIN CALCIUM 5 MG/1
0 TABLET ORAL
Qty: 0 | Refills: 1 | DISCHARGE

## 2022-04-26 RX ORDER — ALLOPURINOL 300 MG
100 TABLET ORAL DAILY
Refills: 0 | Status: DISCONTINUED | OUTPATIENT
Start: 2022-04-26 | End: 2022-04-29

## 2022-04-26 RX ORDER — DORZOLAMIDE HYDROCHLORIDE 20 MG/ML
1 SOLUTION/ DROPS OPHTHALMIC
Qty: 0 | Refills: 0 | DISCHARGE

## 2022-04-26 RX ORDER — PANTOPRAZOLE SODIUM 20 MG/1
40 TABLET, DELAYED RELEASE ORAL
Refills: 0 | Status: DISCONTINUED | OUTPATIENT
Start: 2022-04-26 | End: 2022-04-29

## 2022-04-26 RX ORDER — SPIRONOLACTONE 25 MG/1
12.5 TABLET, FILM COATED ORAL DAILY
Refills: 0 | Status: DISCONTINUED | OUTPATIENT
Start: 2022-04-26 | End: 2022-04-26

## 2022-04-26 RX ORDER — ASCORBIC ACID 60 MG
500 TABLET,CHEWABLE ORAL DAILY
Refills: 0 | Status: DISCONTINUED | OUTPATIENT
Start: 2022-04-26 | End: 2022-04-29

## 2022-04-26 RX ORDER — FLUTICASONE PROPIONATE 50 MCG
2 SPRAY, SUSPENSION NASAL
Refills: 0 | Status: DISCONTINUED | OUTPATIENT
Start: 2022-04-26 | End: 2022-04-29

## 2022-04-26 RX ORDER — PREDNISOLONE SODIUM PHOSPHATE 1 %
1 DROPS OPHTHALMIC (EYE)
Qty: 0 | Refills: 0 | DISCHARGE

## 2022-04-26 RX ORDER — TETRAHYDROZOLINE/POLYETHYL GLY 0.05 %-1 %
1 DROPS OPHTHALMIC (EYE)
Qty: 0 | Refills: 0 | DISCHARGE

## 2022-04-26 RX ORDER — FERROUS SULFATE 325(65) MG
325 TABLET ORAL DAILY
Refills: 0 | Status: DISCONTINUED | OUTPATIENT
Start: 2022-04-26 | End: 2022-04-29

## 2022-04-26 RX ORDER — ASPIRIN/CALCIUM CARB/MAGNESIUM 324 MG
81 TABLET ORAL DAILY
Refills: 0 | Status: DISCONTINUED | OUTPATIENT
Start: 2022-04-26 | End: 2022-04-29

## 2022-04-26 RX ORDER — PILOCARPINE HCL 4 %
1 DROPS OPHTHALMIC (EYE)
Qty: 0 | Refills: 0 | DISCHARGE

## 2022-04-26 RX ORDER — LATANOPROST 0.05 MG/ML
1 SOLUTION/ DROPS OPHTHALMIC; TOPICAL AT BEDTIME
Refills: 0 | Status: DISCONTINUED | OUTPATIENT
Start: 2022-04-26 | End: 2022-04-29

## 2022-04-26 RX ADMIN — Medication 2 SPRAY(S): at 17:31

## 2022-04-26 RX ADMIN — Medication 500 MILLIGRAM(S): at 17:30

## 2022-04-26 RX ADMIN — ERYTHROPOIETIN 10000 UNIT(S): 10000 INJECTION, SOLUTION INTRAVENOUS; SUBCUTANEOUS at 17:31

## 2022-04-26 RX ADMIN — LATANOPROST 1 DROP(S): 0.05 SOLUTION/ DROPS OPHTHALMIC; TOPICAL at 22:07

## 2022-04-26 RX ADMIN — PANTOPRAZOLE SODIUM 40 MILLIGRAM(S): 20 TABLET, DELAYED RELEASE ORAL at 17:28

## 2022-04-26 RX ADMIN — ATORVASTATIN CALCIUM 80 MILLIGRAM(S): 80 TABLET, FILM COATED ORAL at 22:06

## 2022-04-26 RX ADMIN — Medication 100 MILLIGRAM(S): at 17:28

## 2022-04-26 RX ADMIN — SPIRONOLACTONE 12.5 MILLIGRAM(S): 25 TABLET, FILM COATED ORAL at 17:29

## 2022-04-26 RX ADMIN — Medication 81 MILLIGRAM(S): at 17:28

## 2022-04-26 RX ADMIN — TAMSULOSIN HYDROCHLORIDE 0.8 MILLIGRAM(S): 0.4 CAPSULE ORAL at 22:06

## 2022-04-26 RX ADMIN — WARFARIN SODIUM 1 MILLIGRAM(S): 2.5 TABLET ORAL at 22:05

## 2022-04-26 RX ADMIN — Medication 325 MILLIGRAM(S): at 17:28

## 2022-04-26 NOTE — CONSULT NOTE ADULT - ASSESSMENT
77 year-old man with CKD 3a, multifactorial in nature.    1. CKD 3a- monitor renal fxn.  Defer further w/u to outpatient setting as pt is stable from a renal perspective.  2. HTN- Continue with current anti-hypertensive medications. Monitor BP.  3. Mild hyperkalemia in the setting of spironolactone. As long as potassium does not increase, okay to continue spironolactone.  4. Acidosis- mild.  Monitor for now.      [facility] NEPHROLOGY  Jean-Pierre Candelaria M.D.  Cory Smith D.O.  Rani Cabello M.D.  Nalini Kumar, MSN, ANP-C    Telephone: [phone]  Facsimile: [phone]    [address] [place] [address]

## 2022-04-26 NOTE — H&P ADULT - PROBLEM SELECTOR PLAN 2
MAGALI on CKD  Unclear Cr baseline  Send urine electrolytes MAGALI on CKD  Unclear Cr baseline  Send urine electrolytes  Kidney and bladder ultrasound

## 2022-04-26 NOTE — CONSULT NOTE ADULT - SUBJECTIVE AND OBJECTIVE BOX
Reason for consult: Anemia, essential thrombocythemia    HPI: 77M PMH HTN, Afib on coumadin, CVA, newly diagnosed anemia (b/l 7-8), aortic stenosis (supposed to receive a TAVR soon), GERD sent in by hematologist dr. goel for anemia. Pt complains of ELLIS for past few days. He had a colonoscopy/endoscopy 6 months ago which he reports were negative. Found to have low B12 and received a B12 shot recently. Does endorse dark stools for past few months including currently. Also found to have new CKD and supposed to see a nephrologist soon. No f/c, chest pain, abd pain, leg swelling    Hematology/Oncology called to see patient who is under  care by Dr. Negro Goel for a history of Hodgkin's Lymphoma s/p splenectomy and chemotherapy in 1975), essential thrombocythemia and worsening macrocytic anemia. Workup was done - he did have iron deficiency and received IV iron 2/2022 but more recent testing showed normal iron levels. He underwent a bone marrow aspirate and biopsy on 4/6/2022 which showed early/mild evidence of dysplastic changes although B12 and folate deficiency or other causes of macrocytosis could not be ruled out. Additionial workup showed chronic kidney disease and B12 deficiency (he received a B12 injection on 4/20/2022). He was scheduled to begin Aranesp injections in the office on 4/29/2022 (pending auth). Labs done on 4/20 in the office (Hgb was 8.6) revealed a low normal ferritin level of 38 with normal Fe and TIBC. He was prescribed PO iron in the office once daily (which would explain dark stools). The patient has also had EGD, colonoscopy and capsule endoscopy in the recent past, which were negative.       PMH is significant for high uric acid for which he takes Allopurinol 100mg daily and atrial fibrillation for which he takes warfarin.    PAST MEDICAL & SURGICAL HISTORY:  Hodgkin lymphoma  1975    Atrial fibrillation  over 20 years    HTN (hypertension)    GERD (gastroesophageal reflux disease)    MOE (obstructive sleep apnea)  positive sleep study many years ago, was recommended to use CPAP, patient non-compliant    BPH (benign prostatic hyperplasia)    Osteoarthritis    CVA (cerebral vascular accident)  1/2018 - attributed to no AC for 11 days preop/postop spine surgery - presented to ED with slurred speech, residual ST memory loss, per pt    Spinal stenosis  L3-L4    Spondylolisthesis    Exposure to toxin  9/11     S/P splenectomy  1975    S/P hip replacement, right  2014    History of cardioversion  for AF - &quot;many years ago&quot; - unsuccessful    History of lumbar spinal fusion  1/5/2018    Status post left cataract extraction        FAMILY HISTORY:  Family history of myocardial infarction (Father)    Family history of stroke (Father)        Alcohol Denied  Smoking: Nonsmoker  Drug Use: Denied  Marital Status:         Allergies    No Known Allergies    Intolerances        MEDICATIONS  (STANDING):    MEDICATIONS  (PRN):      ROS  No fever, sweats, chills  No epistaxis, HA, sore throat  No CP, SOB, cough, sputum  No n/v/d, abd pain, melena, hematochezia  No edema  No rash  No anxiety  No back pain, joint pain  No bleeding, bruising  No dysuria, hematuria    T(C): 36.9 (04-26-22 @ 05:14), Max: 36.9 (04-26-22 @ 01:56)  HR: 79 (04-26-22 @ 05:14) (74 - 85)  BP: 113/50 (04-26-22 @ 05:14) (111/65 - 123/44)  RR: 18 (04-26-22 @ 05:14) (17 - 25)  SpO2: 100% (04-26-22 @ 05:14) (98% - 100%)  Wt(kg): --    PE  NAD  Awake, alert  Anicteric, MMM  RRR  CTAB  Abd soft, NT, ND  No c/c/e  No rash grossly                            7.0    19.28 )-----------( 468      ( 26 Apr 2022 00:16 )             22.3       04-25    139  |  104  |  51<H>  ----------------------------<  114<H>  5.2   |  21<L>  |  1.52<H>    Ca    9.3      25 Apr 2022 22:13    TPro  7.1  /  Alb  3.9  /  TBili  <0.1<L>  /  DBili  x   /  AST  23  /  ALT  15  /  AlkPhos  77  04-25

## 2022-04-26 NOTE — H&P ADULT - PROBLEM SELECTOR PLAN 9
DVT PPx: SCDs, on warfarin for afib DVT PPx: SCDs, on warfarin for afib    Diet: DASH    Dispo: pending clinical course

## 2022-04-26 NOTE — H&P ADULT - ASSESSMENT
77M PMH HTN, Afib on coumadin, CVA, Hodgkin's Lymphoma s/p splenectomy and chemo in 1975, essential thrombocytopenia, atrial fibrillation on coumadin, HTN, MOE, newly diagnosed anemia (b/l 7-8), aortic stenosis (pending TAVR), GERD sent in by hematologist Dr. Goel for symptomatic anemia i/s/o dark stools 77M PMH HTN, Afib on coumadin, CVA, RLE DVT, Hodgkin's Lymphoma s/p splenectomy and chemo in 1975, essential thrombocytopenia, atrial fibrillation on coumadin, HTN, MOE, newly diagnosed anemia (b/l 7-8), aortic stenosis (pending TAVR), GERD sent in by hematologist Dr. Goel for symptomatic anemia i/s/o dark stools

## 2022-04-26 NOTE — H&P ADULT - PROBLEM SELECTOR PLAN 6
Hodgkin's Lymphoma s/p splenectomy and chemo in 1975  F/u Heme/onc recs On lisinopril, spironolactone, atenolol, Lasix outpt  Patient Hypertensive in ED  Restart atenolol  Hold lisinopril, lasix, spironolactone i/s/o MAGALI

## 2022-04-26 NOTE — H&P ADULT - NSHPREVIEWOFSYSTEMS_GEN_ALL_CORE
CONSTITUTIONAL:  No weight loss, fever, chills, weakness or fatigue.  HEENT:  Eyes:  No visual loss, blurred vision, double vision or yellow sclerae. Ears, Nose, Throat:  No hearing loss, sneezing, congestion, runny nose or sore throat.  SKIN:  No rash or itching.  CARDIOVASCULAR:  No chest pain, chest pressure or chest discomfort. No palpitations.  RESPIRATORY:  No shortness of breath, cough or sputum.  GASTROINTESTINAL:  No anorexia, nausea, vomiting or diarrhea. No abdominal pain or blood.  GENITOURINARY:  Denies hematuria, dysuria.   NEUROLOGICAL:  No headache, dizziness, syncope, paralysis, ataxia, numbness or tingling in the extremities. No change in bowel or bladder control.  MUSCULOSKELETAL:  No muscle, back pain, joint pain or stiffness.  HEMATOLOGIC:  No anemia, bleeding or bruising.  LYMPHATICS:  No enlarged nodes.   PSYCHIATRIC:  No history of depression or anxiety.  ENDOCRINOLOGIC:  No reports of sweating, cold or heat intolerance. No polyuria or polydipsia.  ALLERGIES:  No history of asthma, hives, eczema or rhinitis. CONSTITUTIONAL:  No weight loss, fever, chills, weakness or fatigue.  HEENT:  Eyes:  No visual loss, blurred vision, double vision or yellow sclerae. Ears, Nose, Throat:  No hearing loss, sneezing, congestion, runny nose or sore throat.  SKIN:  No rash or itching.  CARDIOVASCULAR:  No chest pain, chest pressure or chest discomfort. No palpitations.  RESPIRATORY:  + shortness of breath, cough or sputum.  GASTROINTESTINAL:  No anorexia, nausea, vomiting or diarrhea. No abdominal pain or blood.  GENITOURINARY:  Denies hematuria, dysuria.   NEUROLOGICAL:  No headache, dizziness, syncope, paralysis, ataxia, numbness or tingling in the extremities. No change in bowel or bladder control.  MUSCULOSKELETAL:  No muscle, back pain, joint pain or stiffness.  HEMATOLOGIC:  No anemia, bleeding or bruising.  LYMPHATICS:  No enlarged nodes.   PSYCHIATRIC:  No history of depression or anxiety.  ENDOCRINOLOGIC:  No reports of sweating, cold or heat intolerance. No polyuria or polydipsia.  ALLERGIES:  No history of asthma, hives, eczema or rhinitis.

## 2022-04-26 NOTE — H&P ADULT - PROBLEM SELECTOR PLAN 5
On lisinopril, spironolactone, atenolol, Lasix outpt  Patient Hypertensive in ED  Restart spironolactone, atenolol  Hold lisinopril, lasix i/s/o MAGALI Likely reactive i/s/o anemia  Trend CBC  Monitor off Abx On lisinopril, spironolactone, atenolol, Lasix outpt  Patient Hypertensive in ED  Restart atenolol  Hold lisinopril, lasix, spironolactone i/s/o MAGALI

## 2022-04-26 NOTE — H&P ADULT - PROBLEM SELECTOR PLAN 7
Patient of Warfarin  Send coags  C/w warfarin Patient of Warfarin  Send coags daily goal INR 2-3  C/w warfarin dose daily Hodgkin's Lymphoma s/p splenectomy and chemo in 1975  F/u Heme/onc recs

## 2022-04-26 NOTE — H&P ADULT - HISTORY OF PRESENT ILLNESS
77M PMH HTN, Afib on coumadin, CVA, Hodgkin's Lymphoma s/p splenectomy and chemo in 1975, essential thrombocytopenia, atrial fibrillation on coumadin, HTN, MOE, newly diagnosed anemia (b/l 7-8), aortic stenosis (pending TAVR), GERD sent in by hematologist dr. goel for anemia. Pt complains of ELLIS for past few days. He had a colonoscopy/endoscopy 6 months ago which he reports were negative. Found to have low B12 and received a B12 shot recently on 4/20/2022. Does endorse dark stools in the last week, no overt hematochezia. Also found to have new CKD and supposed to see a nephrologist soon. No f/c, chest pain, abd pain, leg swelling  Patient follows with Dr. Negro Goel for a history of Hodgkin's Lymphoma s/p splenectomy and chemotherapy in 1975), essential thrombocythemia and worsening macrocytic anemia. Workup was done - he did have iron deficiency and received IV iron 2/2022 but more recent testing showed normal iron levels. He underwent a bone marrow aspirate and biopsy on 4/6/2022 which showed early/mild evidence of dysplastic changes although B12 and folate deficiency or other causes of macrocytosis could not be ruled out. Additionial workup showed chronic kidney disease and B12 deficiency (he received a B12 injection on 4/20/2022). He was scheduled to begin Aranesp injections in the office on 4/29/2022 (pending auth). Labs done on 4/20 in the office (Hgb was 8.6) revealed a low normal ferritin level of 38 with normal Fe and TIBC. He was prescribed PO iron in the office once daily (which would explain dark stools). The patient has also had EGD, colonoscopy and capsule endoscopy in the recent past, which were negative.     In ED VS T: 98.2F HR: 90 (62 - 96) BP: 142/69 (111/65 - 181/69) RR: 18 (26 Apr 2022 13:28) (16 - 25) SpO2: 98% RA   77M PMH HTN, Afib on coumadin, CVA, Hodgkin's Lymphoma s/p splenectomy and chemo in 1975, essential thrombocytopenia, atrial fibrillation on coumadin, HTN, MOE, newly diagnosed anemia (b/l 7-8), aortic stenosis (pending TAVR), GERD sent in by hematologist dr. goel for anemia. Pt complains of ELLIS for past few days. He had a colonoscopy/endoscopy 6 months ago which he reports were negative. Found to have low B12 and received a B12 shot recently on 4/20/2022. Does endorse dark stools in the last week, no overt hematochezia. Also found to have new CKD and supposed to see a nephrologist soon. No f/c, chest pain, abd pain, leg swelling  Patient follows with Dr. Negro Goel for a history of Hodgkin's Lymphoma s/p splenectomy and chemotherapy in 1975), essential thrombocythemia and worsening macrocytic anemia. Workup was done - he did have iron deficiency and received IV iron 2/2022 but more recent testing showed normal iron levels. He underwent a bone marrow aspirate and biopsy on 4/6/2022 which showed early/mild evidence of dysplastic changes although B12 and folate deficiency or other causes of macrocytosis could not be ruled out. Additional workup showed chronic kidney disease and B12 deficiency (he received a B12 injection on 4/20/2022). He was scheduled to begin Aranesp injections in the office on 4/29/2022 (pending auth). Labs done on 4/20 in the office (Hgb was 8.6) revealed a low normal ferritin level of 38 with normal Fe and TIBC. He was prescribed PO iron in the office once daily (which would explain dark stools). The patient has also had EGD, colonoscopy and capsule endoscopy in the recent past, which were negative.     In ED VS T: 98.2F HR: 90 (62 - 96) BP: 142/69 (111/65 - 181/69) RR: 18  (16 - 25) SpO2: 98% RA  Labs notable for Hgb 7.2->7.0, WBC 18, Haptoglobin 249, , Troponin 128-141, Serum proBNP 4123, Cr 1.52  CXR showed Clear lungs  Patient given 1U pRBC,

## 2022-04-26 NOTE — H&P ADULT - PROBLEM SELECTOR PLAN 8
DVT PPx: SCDs, on warfarin for afib    Diet: DASH    Dispo: pending clinical course Patient of Warfarin  Send coags daily goal INR 2-3  C/w warfarin dose daily

## 2022-04-26 NOTE — ED ADULT NURSE NOTE - NSICDXPASTSURGICALHX_GEN_ALL_CORE_FT
PAST SURGICAL HISTORY:  History of cardioversion for AF - "many years ago" - unsuccessful    History of lumbar spinal fusion 1/5/2018    S/P hip replacement, right 2014    S/P splenectomy 1975    Status post left cataract extraction

## 2022-04-26 NOTE — H&P ADULT - ATTENDING COMMENTS
discussed management plan with house staff  anemia- monitor for active signs of bleeding , heme f/u, hemolysis labs

## 2022-04-26 NOTE — ED ADULT NURSE REASSESSMENT NOTE - NS ED NURSE REASSESS COMMENT FT1
Received pt sleeping, connected to cardiac and respiratory monitor, vitals WNL, will continue to monitor closely

## 2022-04-26 NOTE — H&P ADULT - PROBLEM SELECTOR PLAN 3
Likely Demand ischemia i/s/o Likely Demand ischemia i/s/o anemia  No ischemic changes on EKG  Trend trops to peak Likely Demand ischemia i/s/o anemia, MAGALI  Repeat EKG  Trend trops to peak Likely Demand ischemia i/s/o anemia, MAGALI  Repeat EKG  TTE 11/21 showed EF 65% normal LV systolic fxn  Repeat TTE  Trend trops to peak

## 2022-04-26 NOTE — CONSULT NOTE ADULT - SUBJECTIVE AND OBJECTIVE BOX
[facility] NEPHROLOGY- CONSULTATION NOTE    Patient is a 77y Male with complicated hematologic history with known mild CKD who was sent in to hospital by hematology for anemia.  Creatinine was found to be elevated, though pt reports mild kidney disease.  Pt does report that he was having dark stool over the last week or so.      Pt feels okay, no physical complaints today.         PAST MEDICAL & SURGICAL HISTORY:  Hodgkin lymphoma  1975    Atrial fibrillation  over 20 years    HTN (hypertension)    GERD (gastroesophageal reflux disease)    MOE (obstructive sleep apnea)  positive sleep study many years ago, was recommended to use CPAP, patient non-compliant    BPH (benign prostatic hyperplasia)    Osteoarthritis    CVA (cerebral vascular accident)  1/2018 - attributed to no AC for 11 days preop/postop spine surgery - presented to ED with slurred speech, residual ST memory loss, per pt    Spinal stenosis  L3-L4    Spondylolisthesis    Exposure to toxin  9/11     S/P splenectomy  1975    S/P hip replacement, right  2014    History of cardioversion  for AF - &quot;many years ago&quot; - unsuccessful    History of lumbar spinal fusion  1/5/2018    Status post left cataract extraction      No Known Allergies    Home Medications Reviewed  Hospital Medications:   MEDICATIONS  (STANDING):  allopurinol 100 milliGRAM(s) Oral daily  ascorbic acid 500 milliGRAM(s) Oral daily  aspirin enteric coated 81 milliGRAM(s) Oral daily  ATENolol  Tablet 25 milliGRAM(s) Oral daily  atorvastatin 80 milliGRAM(s) Oral at bedtime  epoetin macrina-epbx (RETACRIT) Injectable 46511 Unit(s) SubCutaneous every 7 days  ferrous    sulfate 325 milliGRAM(s) Oral daily  fluticasone propionate 50 MICROgram(s)/spray Nasal Spray 2 Spray(s) Both Nostrils two times a day  latanoprost 0.005% Ophthalmic Solution 1 Drop(s) Right EYE at bedtime  pantoprazole    Tablet 40 milliGRAM(s) Oral before breakfast  spironolactone 12.5 milliGRAM(s) Oral daily  tamsulosin 0.8 milliGRAM(s) Oral at bedtime    SOCIAL HISTORY:  Denies ETOh,Smoking,   FAMILY HISTORY:  Family history of myocardial infarction (Father)    Family history of stroke (Father)      REVIEW OF SYSTEMS:  CONSTITUTIONAL: No weakness, fevers or chills  EYES/ENT: No visual changes;  No vertigo or throat pain   NECK: No pain or stiffness  RESPIRATORY: No cough, wheezing, hemoptysis; No shortness of breath  CARDIOVASCULAR: No chest pain or palpitations.  GASTROINTESTINAL: No abdominal or epigastric pain. No nausea, vomiting, or hematemesis; No diarrhea or constipation. +dark stool.  GENITOURINARY: No dysuria, frequency, foamy urine, urinary urgency, incontinence or hematuria  NEUROLOGICAL: No numbness or weakness  SKIN: No itching, burning, rashes, or lesions   VASCULAR: No bilateral lower extremity edema.   All other review of systems is negative unless indicated above.    VITALS:  T(F): 98.2 (04-26-22 @ 13:28), Max: 98.5 (04-26-22 @ 01:56)  HR: 90 (04-26-22 @ 13:28)  BP: 142/69 (04-26-22 @ 13:28)  RR: 18 (04-26-22 @ 13:28)  SpO2: 98% (04-26-22 @ 11:05)  Wt(kg): --    Height (cm): 172.7 (04-25 @ 18:19)  Weight (kg): 105.2 (04-25 @ 23:30)  BMI (kg/m2): 35.3 (04-25 @ 23:30)  BSA (m2): 2.18 (04-25 @ 23:30)    PHYSICAL EXAM:  Constitutional: NAD  HEENT: anicteric sclera, oropharynx clear, MMM  Neck: No JVD  Respiratory: CTAB, no wheezes, rales or rhonchi  Cardiovascular: S1, S2, RRR  Gastrointestinal: BS+, soft, NT/ND  Extremities: No cyanosis or clubbing. RLE with surgical scar and edema (hx fasciotomy in setting of DVT).  No LLE edema.  Neurological: A/O x 3, no focal deficits  Psychiatric: Normal mood, normal affect  : No CVA tenderness. No holguin.   Skin: No rashes      LABS:  04-25    139  |  104  |  51<H>  ----------------------------<  114<H>  5.2   |  21<L>  |  1.52<H>    Ca    9.3      25 Apr 2022 22:13    TPro  7.1  /  Alb  3.9  /  TBili  <0.1<L>  /  DBili      /  AST  23  /  ALT  15  /  AlkPhos  77  04-25    Creatinine Trend: 1.52 <--                        7.5    15.78 )-----------( 398      ( 26 Apr 2022 08:55 )             23.9     RADIOLOGY & ADDITIONAL STUDIES:        < from: Xray Chest 1 View AP/PA (04.25.22 @ 21:34) >    ACC: [id number] EXAM:  XR CHEST AP OR PA 1V                          PROCEDURE DATE:  04/25/2022          INTERPRETATION:  CHEST X-RAY: AP PORTABLE    INDICATION: anemia fatigue and dyspnea on exertion.    COMPARISON: Chest x-ray 02/05/2019.    FINDINGS:    Normal heart size. Leadless cardiac pacemaker.    Clear lungs.    No pleural effusion or pneumothorax.    IMPRESSION:  Clear lungs.    --- End of Report ---          ANNIKA STINSON MD; Resident Radiologist  This document has been electronically signed.  SHANIA BILLINGSLEY MD; Attending Radiologist  This document has been electronically signed. Apr 26 2022  8:26AM    < end of copied text >

## 2022-04-26 NOTE — H&P ADULT - NSHPLABSRESULTS_GEN_ALL_CORE
7.5    15.78 )-----------( 398      ( 26 Apr 2022 08:55 )             23.9     Auto Eosinophil # x     / Auto Eosinophil % x     / Auto Neutrophil # x     / Auto Neutrophil % x     / BANDS % x                            7.0    19.28 )-----------( 468      ( 26 Apr 2022 00:16 )             22.3     Auto Eosinophil # x     / Auto Eosinophil % x     / Auto Neutrophil # x     / Auto Neutrophil % x     / BANDS % x                            7.2    17.93 )-----------( 451      ( 25 Apr 2022 22:13 )             23.4     Auto Eosinophil # 0.00  / Auto Eosinophil % 0.0   / Auto Neutrophil # 14.31 / Auto Neutrophil % 79.8  / BANDS % x        04-25    139  |  104  |  51<H>  ----------------------------<  114<H>  5.2   |  21<L>  |  1.52<H>    Ca    9.3      25 Apr 2022 22:13  TPro  7.1  /  Alb  3.9  /  TBili  <0.1<L>  /  DBili  x   /  AST  23  /  ALT  15  /  AlkPhos  77  04-25    PT/INR - ( 25 Apr 2022 23:39 )   PT: 32.6 sec;   INR: 2.78 ratio         PTT - ( 25 Apr 2022 23:39 )  PTT:38.0 sec            RESPIRATORY  VENT:    ABG:     VBG:

## 2022-04-26 NOTE — CONSULT NOTE ADULT - ASSESSMENT
77M PMH HTN, Afib on coumadin, CVA, newly diagnosed anemia (b/l 7-8), aortic stenosis (supposed to receive a TAVR soon), GERD sent in by hematologist dr. schroeder for anemia. Pt complains of ELLIS for past few days. He had a colonoscopy/endoscopy 6 months ago which he reports were negative. Found to have low B12 and received a B12 shot recently. Does endorse dark stools for past few months including currently. Also found to have new CKD and supposed to see a nephrologist soon. No f/c, chest pain, abd pain, leg swelling    Hematology/Oncology called to see patient who is under  care by Dr. Negro Schroeder for a history of Hodgkin's Lymphoma s/p splenectomy and chemotherapy in 1975), essential thrombocythemia and worsening macrocytic anemia. Workup was done - he did have iron deficiency and received IV iron 2/2022 but more recent testing showed normal iron levels. He underwent a bone marrow aspirate and biopsy on 4/6/2022 which showed early/mild evidence of dysplastic changes although B12 and folate deficiency or other causes of macrocytosis could not be ruled out. Additional workup showed chronic kidney disease and B12 deficiency (he received a B12 injection on 4/20/2022). He was scheduled to begin Aranesp injections in the office on 4/29/2022 (pending auth). Labs done on 4/20 in the office (Hgb was 8.6) revealed a low normal ferritin level of 38 with normal Fe and TIBC. He was prescribed PO iron in the office once daily (which would explain dark stools). The patient has also had EGD, colonoscopy and capsule endoscopy in the recent past, which were negative.     Anemia  --Under care by Dr. Negro Schroeder of [facility]  --Work-up positive for B12 deficiency, chronic kidney disease and mild iron deficiency  --Would start give patient Retacrit 10,000 units weekly while in hospital and patent may start Aranesp after discharge  --B12 is monthly - he received his first dose on 4/20 - not due for another yet  --Please continue PO iron 325 mg daily  --Please transfuse PRBC's for Hgb <7.0 grams (or 8.0 grams if patient is symptomatic or advised by Cardiology)    Essential Thrombocythemia  --Also under care by Dr. Schroeder  --On ASA only    Chronic Kidney Disease  --Recommend Nephrology consultation    Atrial Fibrillation, Hx CVA's  --Patient on Warfarin  --Management per Cardiology/Primary team    After discharge patient may continue care with Dr. Negro Schroeder of [facility]    Thank you for the opportunity to participate in Mr. Gillis's care.    Joe Jansen PA-C  Hematology/Oncology  [facility]   [phone] (office)  [phone] (alt office)  Evenings and weekends please call MD on call or office     77M PMH HTN, Afib on coumadin, CVA, newly diagnosed anemia (b/l 7-8), aortic stenosis (supposed to receive a TAVR soon), GERD sent in by hematologist dr. schroeder for anemia. Pt complains of ELLIS for past few days. He had a colonoscopy/endoscopy 6 months ago which he reports were negative. Found to have low B12 and received a B12 shot recently. Does endorse dark stools for past few months including currently. Also found to have new CKD and supposed to see a nephrologist soon. No f/c, chest pain, abd pain, leg swelling    Hematology/Oncology called to see patient who is under  care by Dr. Negro Schroeder for a history of Hodgkin's Lymphoma s/p splenectomy and chemotherapy in 1975), essential thrombocythemia and worsening macrocytic anemia. Workup was done - he did have iron deficiency and received IV iron 2/2022 but more recent testing showed normal iron levels. He underwent a bone marrow aspirate and biopsy on 4/6/2022 which showed early/mild evidence of dysplastic changes although B12 and folate deficiency or other causes of macrocytosis could not be ruled out. Additional workup showed chronic kidney disease and B12 deficiency (he received a B12 injection on 4/20/2022). He was scheduled to begin Aranesp injections in the office on 4/29/2022 (pending auth). Labs done on 4/20 in the office (Hgb was 8.6) revealed a low normal ferritin level of 38 with normal Fe and TIBC. He was prescribed PO iron in the office once daily (which would explain dark stools). The patient has also had EGD, colonoscopy and capsule endoscopy in the recent past, which were negative.     Anemia  --Under care by Dr. Negro Schroeder of [facility]  --Work-up positive for B12 deficiency, chronic kidney disease and mild iron deficiency  --Would start to give patient Retacrit 10,000 units weekly while in hospital and patent may start Aranesp after discharge  --B12 is monthly - he received his first dose on 4/20 - not due for another yet  --Please continue PO iron 325 mg daily  --Will consider giving IV Fe as well to maximize MEGAN efficiency  --Please transfuse PRBC's for Hgb <7.0 grams (or 8.0 grams if patient is symptomatic or advised by Cardiology)    Essential Thrombocythemia  --Also under care by Dr. Schroeder  --On ASA only    Chronic Kidney Disease  --Recommend Nephrology consultation    Atrial Fibrillation, Hx CVA's  --Patient on Warfarin - currently on hold  --Management per Cardiology/Primary team    Hx CVA  --Patient concern for recurrent CVA given the fact that he is off anticoagulation right now    Guiac Positive  --On FOBT  --S/P EGD/Colonoscopy/Capsule study within past 6 months  --Would still get GI consultation    After discharge patient may continue care with Dr. Negro Schroeder of [facility]    Thank you for the opportunity to participate in Mr. Gillis's care.    Joe Jansen PA-C  Hematology/Oncology  [facility]   [phone] (office)  [phone] (alt office)  Evenings and weekends please call MD on call or office

## 2022-04-26 NOTE — ED ADULT NURSE NOTE - OBJECTIVE STATEMENT
76 y/o male coming to the ER with c/o abnormal labs. A&Ox4. Ambulatory. PMH a.fib on coumadin, stroke with residual right sided weakness ambulates with a cane, HTN, chronic DVT of the left leg, CHF, cardiac pacemaker. Patient states he has had chronically low hemoglobin for the last year, patient states he has had an endoscopy and colonoscopy in the last year to attempt to identify where the Blood is coming from with no definitive dx. Patient was at his PCP today and was told to come to the ER due to low hemoglobin. Patient endorses exertional  SOB over the last 2 weeks. Patient has pitting edema to the b/l lower extremities patient states that they recently increased his diuretics. Abdomen is soft, non distended, non tender. Patient denies chest pain, fever, chills, n/v/d, urinary symptoms, abdominal pain. Safety measures maintained. Bed in the lowest position. Call bell within reach.  Provider at the bedside. No acute distress noted or further complaints at this time.

## 2022-04-26 NOTE — H&P ADULT - PROBLEM SELECTOR PLAN 4
Likely Likely reactive i/s/o anemia  Trend CBC  Monitor off Abx Patient reports chronic LE edema L>R  Obtain Duplex LE to r/o DVT  Likely venous stasis, less likely HF  Will give lasix w/ pRBC

## 2022-04-27 ENCOUNTER — NON-APPOINTMENT (OUTPATIENT)
Age: 77
End: 2022-04-27

## 2022-04-27 DIAGNOSIS — J96.01 ACUTE RESPIRATORY FAILURE WITH HYPOXIA: ICD-10-CM

## 2022-04-27 LAB
ANION GAP SERPL CALC-SCNC: 14 MMOL/L — SIGNIFICANT CHANGE UP (ref 5–17)
APPEARANCE UR: CLEAR — SIGNIFICANT CHANGE UP
BASOPHILS # BLD AUTO: 0.09 K/UL — SIGNIFICANT CHANGE UP (ref 0–0.2)
BASOPHILS NFR BLD AUTO: 0.4 % — SIGNIFICANT CHANGE UP (ref 0–2)
BILIRUB UR-MCNC: NEGATIVE — SIGNIFICANT CHANGE UP
BUN SERPL-MCNC: 36 MG/DL — HIGH (ref 7–23)
CALCIUM SERPL-MCNC: 9.2 MG/DL — SIGNIFICANT CHANGE UP (ref 8.4–10.5)
CHLORIDE SERPL-SCNC: 103 MMOL/L — SIGNIFICANT CHANGE UP (ref 96–108)
CO2 SERPL-SCNC: 22 MMOL/L — SIGNIFICANT CHANGE UP (ref 22–31)
COLOR SPEC: SIGNIFICANT CHANGE UP
CREAT SERPL-MCNC: 1.16 MG/DL — SIGNIFICANT CHANGE UP (ref 0.5–1.3)
DIFF PNL FLD: NEGATIVE — SIGNIFICANT CHANGE UP
EGFR: 64 ML/MIN/1.73M2 — SIGNIFICANT CHANGE UP
EOSINOPHIL # BLD AUTO: 0.26 K/UL — SIGNIFICANT CHANGE UP (ref 0–0.5)
EOSINOPHIL NFR BLD AUTO: 1.2 % — SIGNIFICANT CHANGE UP (ref 0–6)
FERRITIN SERPL-MCNC: 76 NG/ML — SIGNIFICANT CHANGE UP (ref 30–400)
FOLATE SERPL-MCNC: 4.8 NG/ML — SIGNIFICANT CHANGE UP
GLUCOSE SERPL-MCNC: 104 MG/DL — HIGH (ref 70–99)
GLUCOSE UR QL: NEGATIVE — SIGNIFICANT CHANGE UP
HAPTOGLOB SERPL-MCNC: 245 MG/DL — HIGH (ref 34–200)
HCT VFR BLD CALC: 28.8 % — LOW (ref 39–50)
HGB BLD-MCNC: 9 G/DL — LOW (ref 13–17)
IMM GRANULOCYTES NFR BLD AUTO: 1.2 % — SIGNIFICANT CHANGE UP (ref 0–1.5)
INR BLD: 1.93 RATIO — HIGH (ref 0.88–1.16)
IRON SATN MFR SERPL: 191 UG/DL — HIGH (ref 45–165)
IRON SATN MFR SERPL: 54 % — SIGNIFICANT CHANGE UP (ref 16–55)
KETONES UR-MCNC: NEGATIVE — SIGNIFICANT CHANGE UP
LDH SERPL L TO P-CCNC: 195 U/L — SIGNIFICANT CHANGE UP (ref 50–242)
LEUKOCYTE ESTERASE UR-ACNC: NEGATIVE — SIGNIFICANT CHANGE UP
LYMPHOCYTES # BLD AUTO: 1.75 K/UL — SIGNIFICANT CHANGE UP (ref 1–3.3)
LYMPHOCYTES # BLD AUTO: 7.9 % — LOW (ref 13–44)
MAGNESIUM SERPL-MCNC: 2.4 MG/DL — SIGNIFICANT CHANGE UP (ref 1.6–2.6)
MCHC RBC-ENTMCNC: 31.3 GM/DL — LOW (ref 32–36)
MCHC RBC-ENTMCNC: 32.3 PG — SIGNIFICANT CHANGE UP (ref 27–34)
MCV RBC AUTO: 103.2 FL — HIGH (ref 80–100)
MONOCYTES # BLD AUTO: 1.87 K/UL — HIGH (ref 0–0.9)
MONOCYTES NFR BLD AUTO: 8.4 % — SIGNIFICANT CHANGE UP (ref 2–14)
NEUTROPHILS # BLD AUTO: 18.05 K/UL — HIGH (ref 1.8–7.4)
NEUTROPHILS NFR BLD AUTO: 80.9 % — HIGH (ref 43–77)
NITRITE UR-MCNC: NEGATIVE — SIGNIFICANT CHANGE UP
NRBC # BLD: 2 /100 WBCS — HIGH (ref 0–0)
PH UR: 5.5 — SIGNIFICANT CHANGE UP (ref 5–8)
PHOSPHATE SERPL-MCNC: 2.6 MG/DL — SIGNIFICANT CHANGE UP (ref 2.5–4.5)
PLATELET # BLD AUTO: 466 K/UL — HIGH (ref 150–400)
POTASSIUM SERPL-MCNC: 4.5 MMOL/L — SIGNIFICANT CHANGE UP (ref 3.5–5.3)
POTASSIUM SERPL-SCNC: 4.5 MMOL/L — SIGNIFICANT CHANGE UP (ref 3.5–5.3)
PROCALCITONIN SERPL-MCNC: 0.07 NG/ML — SIGNIFICANT CHANGE UP (ref 0.02–0.1)
PROT UR-MCNC: NEGATIVE — SIGNIFICANT CHANGE UP
PROTHROM AB SERPL-ACNC: 22.4 SEC — HIGH (ref 10.5–13.4)
RAPID RVP RESULT: SIGNIFICANT CHANGE UP
RBC # BLD: 2.79 M/UL — LOW (ref 4.2–5.8)
RBC # FLD: 17.4 % — HIGH (ref 10.3–14.5)
SARS-COV-2 RNA SPEC QL NAA+PROBE: SIGNIFICANT CHANGE UP
SODIUM SERPL-SCNC: 139 MMOL/L — SIGNIFICANT CHANGE UP (ref 135–145)
SP GR SPEC: 1.02 — SIGNIFICANT CHANGE UP (ref 1.01–1.02)
TIBC SERPL-MCNC: 351 UG/DL — SIGNIFICANT CHANGE UP (ref 220–430)
UIBC SERPL-MCNC: 160 UG/DL — SIGNIFICANT CHANGE UP (ref 110–370)
UROBILINOGEN FLD QL: NEGATIVE — SIGNIFICANT CHANGE UP
UUN UR-MCNC: 1180 MG/DL — SIGNIFICANT CHANGE UP
VIT B12 SERPL-MCNC: 840 PG/ML — SIGNIFICANT CHANGE UP (ref 232–1245)
WBC # BLD: 22.17 K/UL — HIGH (ref 3.8–10.5)
WBC # FLD AUTO: 22.17 K/UL — HIGH (ref 3.8–10.5)

## 2022-04-27 PROCEDURE — 99222 1ST HOSP IP/OBS MODERATE 55: CPT

## 2022-04-27 PROCEDURE — 71045 X-RAY EXAM CHEST 1 VIEW: CPT | Mod: 26

## 2022-04-27 PROCEDURE — 93306 TTE W/DOPPLER COMPLETE: CPT | Mod: 26

## 2022-04-27 RX ORDER — WARFARIN SODIUM 2.5 MG/1
2 TABLET ORAL ONCE
Refills: 0 | Status: COMPLETED | OUTPATIENT
Start: 2022-04-27 | End: 2022-04-27

## 2022-04-27 RX ADMIN — ATENOLOL 25 MILLIGRAM(S): 25 TABLET ORAL at 05:09

## 2022-04-27 RX ADMIN — Medication 2 SPRAY(S): at 17:56

## 2022-04-27 RX ADMIN — LATANOPROST 1 DROP(S): 0.05 SOLUTION/ DROPS OPHTHALMIC; TOPICAL at 21:44

## 2022-04-27 RX ADMIN — PANTOPRAZOLE SODIUM 40 MILLIGRAM(S): 20 TABLET, DELAYED RELEASE ORAL at 05:09

## 2022-04-27 RX ADMIN — Medication 325 MILLIGRAM(S): at 12:14

## 2022-04-27 RX ADMIN — Medication 81 MILLIGRAM(S): at 12:15

## 2022-04-27 RX ADMIN — WARFARIN SODIUM 2 MILLIGRAM(S): 2.5 TABLET ORAL at 11:18

## 2022-04-27 RX ADMIN — Medication 20 MILLIGRAM(S): at 00:16

## 2022-04-27 RX ADMIN — Medication 100 MILLIGRAM(S): at 12:14

## 2022-04-27 RX ADMIN — Medication 500 MILLIGRAM(S): at 12:14

## 2022-04-27 RX ADMIN — TAMSULOSIN HYDROCHLORIDE 0.8 MILLIGRAM(S): 0.4 CAPSULE ORAL at 21:44

## 2022-04-27 RX ADMIN — Medication 2 SPRAY(S): at 05:09

## 2022-04-27 RX ADMIN — Medication 20 MILLIGRAM(S): at 04:38

## 2022-04-27 RX ADMIN — ATORVASTATIN CALCIUM 80 MILLIGRAM(S): 80 TABLET, FILM COATED ORAL at 21:43

## 2022-04-27 NOTE — PROVIDER CONTACT NOTE (CHANGE IN STATUS NOTIFICATION) - ACTION/TREATMENT ORDERED:
MD made aware, O2 2L NC placed, seen and examined by MD, Lasix 20 mg IVP given X2 post each half unit, voiding freely, will closely monitor pt.

## 2022-04-27 NOTE — PROGRESS NOTE ADULT - PROBLEM SELECTOR PLAN 2
MAGALI on CKD 3a  Unclear Cr baseline  Send urine electrolytes  F/u Kidney and bladder ultrasound  Rest of workup outpt per nephro  Appreciate nephro  recs MAGALI on CKD 3a  Unclear Cr baseline  Possibly pre-renal etiology i/s/o poor PO intake prior to admission on known CKD  FeUrea 29.1% suggesting pre-renal component  F/u Kidney and bladder ultrasound  Rest of workup outpt per nephro  Appreciate nephro  recs MAGALI on CKD 3a  Unclear Cr baseline  Creatinine downtrending  Possibly pre-renal etiology i/s/o poor PO intake prior to admission on known CKD  FeUrea 29.1% suggesting pre-renal component  F/u Kidney and bladder ultrasound  Rest of workup outpt per nephro  Appreciate nephro  recs

## 2022-04-27 NOTE — PROGRESS NOTE ADULT - PROBLEM SELECTOR PLAN 5
Likely reactive i/s/o anemia  Trend CBC  Monitor off Abx Possible pulmonary infection given elevated temperature to 100.3F vs reactive  WBC uptrending 22 this AM   F/u Full RVP  F/u CXR  F/u BCx  Trend CBC

## 2022-04-27 NOTE — CONSULT NOTE ADULT - ATTENDING COMMENTS
77 year old gentleman with PMH of HTN, Afib on coumadin, CVA, Hodgkin's Lymphoma s/p splenectomy and chemo in 1975, essential thrombocytopenia, atrial fibrillation on coumadin, HTN, MOE, newly diagnosed anemia (b/l 7-8), aortic stenosis (pending TAVR), GERD who was sent in by hematologist dr. schroeder for anemia. Pt complains of ELLIS for past few days.   Recent Workup: which he reports were negative. Found to have low B12 and received a B12 shot recently on 4/20/2022. He did have iron deficiency and received IV iron 2/2022 but more recent testing showed normal iron levels. He underwent a bone marrow aspirate and biopsy on 4/6/2022 which showed early/mild evidence of dysplastic changes although B12 and folate deficiency or other causes of macrocytosis could not be ruled out. Does endorse dark stools in the last week, no overt hematochezia. Also found to have new CKD and supposed to see a nephrologist soon. No f/c, chest pain, abd pain, leg swelling. He was scheduled to begin Aranesp injections in the office on 4/29/2022 (pending auth). Labs done on 4/20 in the office (Hgb was 8.6) revealed a low normal ferritin level of 38 with normal Fe and TIBC. He was prescribed PO iron in the office once daily (which would explain dark stools). The patient has also had EGD, colonoscopy and capsule endoscopy in the recent past (6 months ago), which were negative. Here VSS. Pt with leukocytosis to 17 k. CXR showed Clear lungs. ID consulted for the same.       WORKUP  Rapid RVP Result: NotDetec and Procalcitonin, Serum: 0.07 (04-27)  S/P EGD/Colonoscopy/Capsule study within past 6 months  CXR: Clear    IMPRESSION  Leukocytosis  fever  Acute respiratory failure with hypoxia.  MAGALI  Hx of Hodgkin's Lymphoma s/p splenectomy and chemo in 1975        RECOMMENDATIONS  Low grade fever to 100.3 today  Leukocytosis of 17 k which is trending up to 22k   Urinalysis ordered, Blood cx in lab  CXR and RVP negative, low procal, and BNP elevated at `4123 -> Currently on 2L NC  ROS and PE not revealing  Agree with being monitored off abx  Recommend CT a/p and CT chest with CON if able   ESR/CRP       Paula Hensley  Please contact through MS Teams   If no response or past 5 pm/weekend call [phone].

## 2022-04-27 NOTE — PROGRESS NOTE ADULT - SUBJECTIVE AND OBJECTIVE BOX
DATE OF SERVICE: 04-27-22 @ 06:43    Patient is a 77y old  Male who presents with a chief complaint of     SUBJECTIVE / OVERNIGHT EVENTS: Patient felt dyspnic o/n and desaturated to 89 placed on 2LNC. Patient with oral temperature of 100.3F this AM. Patient feels    MEDICATIONS  (STANDING):  allopurinol 100 milliGRAM(s) Oral daily  ascorbic acid 500 milliGRAM(s) Oral daily  aspirin enteric coated 81 milliGRAM(s) Oral daily  ATENolol  Tablet 25 milliGRAM(s) Oral daily  atorvastatin 80 milliGRAM(s) Oral at bedtime  epoetin macrina-epbx (RETACRIT) Injectable 23283 Unit(s) SubCutaneous every 7 days  ferrous    sulfate 325 milliGRAM(s) Oral daily  fluticasone propionate 50 MICROgram(s)/spray Nasal Spray 2 Spray(s) Both Nostrils two times a day  latanoprost 0.005% Ophthalmic Solution 1 Drop(s) Right EYE at bedtime  pantoprazole    Tablet 40 milliGRAM(s) Oral before breakfast  tamsulosin 0.8 milliGRAM(s) Oral at bedtime    MEDICATIONS  (PRN):      Vital Signs Last 24 Hrs  T(C): 37.9 (27 Apr 2022 04:49), Max: 37.9 (27 Apr 2022 04:49)  T(F): 100.3 (27 Apr 2022 04:49), Max: 100.3 (27 Apr 2022 04:49)  HR: 92 (27 Apr 2022 04:49) (62 - 120)  BP: 156/54 (27 Apr 2022 04:49) (123/63 - 181/69)  BP(mean): --  RR: 20 (27 Apr 2022 04:49) (16 - 20)  SpO2: 92% (27 Apr 2022 04:49) (89% - 98%)  CAPILLARY BLOOD GLUCOSE        I&O's Summary    26 Apr 2022 07:01  -  27 Apr 2022 06:43  --------------------------------------------------------  IN: 300 mL / OUT: 1550 mL / NET: -1250 mL        PHYSICAL EXAM:  GENERAL: NAD, well-developed  HEAD:  Atraumatic, Normocephalic  EYES: EOMI, PERRLA, conjunctiva and sclera clear  NECK: Supple, No JVD  CHEST/LUNG: Clear to auscultation bilaterally; No wheeze  HEART: Regular rate and rhythm; No murmurs, rubs, or gallops  ABDOMEN: Soft, Nontender, Nondistended; Bowel sounds present  EXTREMITIES:  2+ Peripheral Pulses, No clubbing, cyanosis, or edema  PSYCH: AAOx3  NEUROLOGY: non-focal  SKIN: No rashes or lesions    LABS:                        7.5    15.78 )-----------( 398      ( 26 Apr 2022 08:55 )             23.9     04-25    139  |  104  |  51<H>  ----------------------------<  114<H>  5.2   |  21<L>  |  1.52<H>    Ca    9.3      25 Apr 2022 22:13    TPro  7.1  /  Alb  3.9  /  TBili  <0.1<L>  /  DBili  x   /  AST  23  /  ALT  15  /  AlkPhos  77  04-25    PT/INR - ( 26 Apr 2022 13:32 )   PT: 27.1 sec;   INR: 2.32 ratio         PTT - ( 25 Apr 2022 23:39 )  PTT:38.0 sec  CARDIAC MARKERS ( 26 Apr 2022 18:02 )  x     / x     / 161 U/L / x     / 5.9 ng/mL          RADIOLOGY & ADDITIONAL TESTS:    Imaging Personally Reviewed:    Consultant(s) Notes Reviewed:      Care Discussed with Consultants/Other Providers:

## 2022-04-27 NOTE — PROGRESS NOTE ADULT - PROBLEM SELECTOR PLAN 1
Likely multifactorial i/s/o possible GI bleed, iron deficiency, B12 deficiency, CKD, ?AS causing Macroangiopathic hemolytic anemia  Patient noted to have pallor, dyspnea at home  Hgb 7.2 to 7 in ED  Patient endorses dark stools for past week, on warfarin for Afib  BM Bx 4/6/2022 showed early/mild evidence of dysplastic changes although B12/folate deficiency not ruled out  Outpt work up revealed CKD, B12 deficiency (s/p B12 injection 4/20) pending auth for Aranesp injections  On outpt labs low normal ferritin level of 38 with normal Fe and TIBC  Send Iron studies  Send B12, folate  Active T+S  Transfuse Hgb>7  C/w Iron 325 mg PO daily  Start Retacrit 10,000 units weekly, plan to d/c on Aranesp  Repeat LDH and haptoglobin  S/p 1/2u pRBC x 2   Appreciate heme/onc recs Likely multifactorial i/s/o possible GI bleed, iron deficiency, B12 deficiency, CKD, ?AS causing Macroangiopathic hemolytic anemia  Patient noted to have pallor, dyspnea at home  Hgb 7.2 to 7 in ED  Patient endorses dark stools for past week, on warfarin for Afib  BM Bx 4/6/2022 showed early/mild evidence of dysplastic changes although B12/folate deficiency not ruled out  Outpt work up revealed CKD, B12 deficiency (s/p B12 injection 4/20) pending auth for Aranesp injections  On outpt labs low normal ferritin level of 38 with normal Fe and TIBC  Iron 191, TIBC 351, ferritin 76  Send B12, folate  Active T+S  Transfuse Hgb>7  C/w Iron 325 mg PO daily  Start Retacrit 10,000 units weekly, plan to d/c on Aranesp    Haptoglobin 245  S/p 1/2u pRBC x 2   Appreciate heme/onc recs

## 2022-04-27 NOTE — PROGRESS NOTE ADULT - SUBJECTIVE AND OBJECTIVE BOX
Patient is a 77y old  Male who presents with a chief complaint of     Patient seen this morning. No new complaints. Febrile to 100.3.    MEDICATIONS  (STANDING):  allopurinol 100 milliGRAM(s) Oral daily  ascorbic acid 500 milliGRAM(s) Oral daily  aspirin enteric coated 81 milliGRAM(s) Oral daily  ATENolol  Tablet 25 milliGRAM(s) Oral daily  atorvastatin 80 milliGRAM(s) Oral at bedtime  epoetin macrina-epbx (RETACRIT) Injectable 34042 Unit(s) SubCutaneous every 7 days  ferrous    sulfate 325 milliGRAM(s) Oral daily  fluticasone propionate 50 MICROgram(s)/spray Nasal Spray 2 Spray(s) Both Nostrils two times a day  latanoprost 0.005% Ophthalmic Solution 1 Drop(s) Right EYE at bedtime  pantoprazole    Tablet 40 milliGRAM(s) Oral before breakfast  tamsulosin 0.8 milliGRAM(s) Oral at bedtime    MEDICATIONS  (PRN):      ROS  Fever to 100.3  No epistaxis, HA, sore throat  On O2 via NC  No n/v/d, abd pain, melena, hematochezia  No edema  No rash  No anxiety  No back pain, joint pain  No bleeding, bruising  No dysuria, hematuria    Vital Signs Last 24 Hrs  T(C): 37.9 (27 Apr 2022 04:49), Max: 37.9 (27 Apr 2022 04:49)  T(F): 100.3 (27 Apr 2022 04:49), Max: 100.3 (27 Apr 2022 04:49)  HR: 61 (27 Apr 2022 12:08) (61 - 120)  BP: 111/50 (27 Apr 2022 12:08) (111/50 - 161/59)  BP(mean): --  RR: 16 (27 Apr 2022 12:08) (16 - 20)  SpO2: 97% (27 Apr 2022 12:08) (89% - 97%)    PE  NAD  Awake, alert  Anicteric, MMM  No c/c/e  No rash grossly                            9.0    22.17 )-----------( 466      ( 27 Apr 2022 07:19 )             28.8       04-27    139  |  103  |  36<H>  ----------------------------<  104<H>  4.5   |  22  |  1.16    Ca    9.2      27 Apr 2022 07:11  Phos  2.6     04-27  Mg     2.4     04-27    TPro  7.1  /  Alb  3.9  /  TBili  <0.1<L>  /  DBili  x   /  AST  23  /  ALT  15  /  AlkPhos  77  04-25      ACC: [id number] EXAM:  XR CHEST AP OR PA 1V                          PROCEDURE DATE:  04/27/2022          INTERPRETATION:  EXAMINATION: XR CHEST    CLINICAL INDICATION: Hypoxia.    TECHNIQUE: Single frontal, portable view of the chest was obtained.    COMPARISON: Chest radiograph 4/25/2022    FINDINGS:    The heart size is not evaluated in this projection. Micra pacemaker in   place.  The lungs are clear. No focal consolidation  There is no pneumothorax or pleural effusion.    IMPRESSION:    Clear lungs.    --- End of Report ---

## 2022-04-27 NOTE — CONSULT NOTE ADULT - SUBJECTIVE AND OBJECTIVE BOX
Patient is a 77y old  Male who presents with a chief complaint of     HPI: Mr. Gillis is a 77 year old gentleman with PMH of HTN, Afib on coumadin, CVA, Hodgkin's Lymphoma s/p splenectomy and chemo in 1975, essential thrombocytopenia, atrial fibrillation on coumadin, HTN, MOE, newly diagnosed anemia (b/l 7-8), aortic stenosis (pending TAVR), GERD who was sent in by hematologist dr. schroeder for anemia. Pt complains of ELLIS for past few days.   Recent Workup: which he reports were negative. Found to have low B12 and received a B12 shot recently on 4/20/2022. He did have iron deficiency and received IV iron 2/2022 but more recent testing showed normal iron levels. He underwent a bone marrow aspirate and biopsy on 4/6/2022 which showed early/mild evidence of dysplastic changes although B12 and folate deficiency or other causes of macrocytosis could not be ruled out. Does endorse dark stools in the last week, no overt hematochezia. Also found to have new CKD and supposed to see a nephrologist soon. No f/c, chest pain, abd pain, leg swelling. He was scheduled to begin Aranesp injections in the office on 4/29/2022 (pending auth). Labs done on 4/20 in the office (Hgb was 8.6) revealed a low normal ferritin level of 38 with normal Fe and TIBC. He was prescribed PO iron in the office once daily (which would explain dark stools). The patient has also had EGD, colonoscopy and capsule endoscopy in the recent past (6 months ago), which were negative. Here VSS. Pt with leukocytosis to 17 k. CXR showed Clear lungs. ID consulted for the same.     REVIEW OF SYSTEMS  [  ] ROS unobtainable because:    [ x ] All other systems negative except as noted below    Constitutional:  [ ] fever [ ] chills  [ ] weight loss  [ ]night sweat  [ ]poor appetite/PO intake [ ]fatigue   Skin:  [ ] rash [ ] phlebitis	  Eyes: [ ] icterus [ ] pain  [ ] discharge	  ENMT: [ ] sore throat  [ ] thrush [ ] ulcers [ ] exudates [ ]anosmia  Respiratory: [ ] dyspnea [ ] hemoptysis [ ] cough [ ] sputum	  Cardiovascular:  [ ] chest pain [ ] palpitations [ ] edema	  Gastrointestinal:  [ ] nausea [ ] vomiting [ ] diarrhea [ ] constipation [ ] pain	  Genitourinary:  [ ] dysuria [ ] frequency [ ] hematuria [ ] discharge [ ] flank pain  [ ] incontinence  Musculoskeletal:  [ ] myalgias [ ] arthralgias [ ] arthritis  [ ] back pain  Neurological:  [ ] headache [ ] weakness [ ] seizures  [ ] confusion/altered mental status    prior hospital charts reviewed [V]  primary team notes reviewed [V]  other consultant notes reviewed [V]    PAST MEDICAL & SURGICAL HISTORY:  Hodgkin lymphoma 1975  Atrial fibrillation over 20 years  HTN (hypertension)   GERD (gastroesophageal reflux disease)  MOE (obstructive sleep apnea) positive sleep study many years ago, was recommended to use CPAP, patient non-compliant  BPH (benign prostatic hyperplasia)   Osteoarthritis  CVA (cerebral vascular accident) 1/2018 - attributed to no AC for 11 days preop/postop spine surgery - presented to ED with slurred speech, residual ST memory loss, per pt  Spinal stenosis L3-L4  Spondylolisthesis   Exposure to toxin 9/11   S/P splenectomy 1975  S/P hip replacement, right 2014  History of cardioversion for AF - &quot;many years ago&quot; - unsuccessful  History of lumbar spinal fusion 1/5/2018  Status post left cataract extraction     SOCIAL HISTORY:  - Denied smoking/vaping/alcohol/recreational drug use    FAMILY HISTORY:  Family history of myocardial infarction (Father)    Family history of stroke (Father)        Allergies  No Known Allergies        ANTIMICROBIALS:      ANTIMICROBIALS (past 90 days):  MEDICATIONS  (STANDING):        OTHER MEDS:   MEDICATIONS  (STANDING):  allopurinol 100 daily  aspirin enteric coated 81 daily  ATENolol  Tablet 25 daily  atorvastatin 80 at bedtime  epoetin macrina-epbx (RETACRIT) Injectable 90667 every 7 days  pantoprazole    Tablet 40 before breakfast  tamsulosin 0.8 at bedtime      VITALS:  Vital Signs Last 24 Hrs  T(F): 98.7 (04-27-22 @ 12:08), Max: 100.3 (04-27-22 @ 04:49)    Vital Signs Last 24 Hrs  HR: 61 (04-27-22 @ 12:08) (61 - 120)  BP: 111/50 (04-27-22 @ 12:08) (111/50 - 161/59)  RR: 16 (04-27-22 @ 12:08)  SpO2: 97% (04-27-22 @ 12:08) (89% - 97%)  Wt(kg): --    EXAM:    GA: NAD, AOx3  HEENT: oral cavity no lesion  CV: nl S1/S2, no RMG  Lungs: CTAB, No distress  Abd: BS+, soft, nontender, no rebounding pain  Ext: no edema  Neuro: No focal deficits  Skin: Intact  IV: no phlebitis    Labs:                        9.0    22.17 )-----------( 466      ( 27 Apr 2022 07:19 )             28.8     04-27    139  |  103  |  36<H>  ----------------------------<  104<H>  4.5   |  22  |  1.16    Ca    9.2      27 Apr 2022 07:11  Phos  2.6     04-27  Mg     2.4     04-27    TPro  7.1  /  Alb  3.9  /  TBili  <0.1<L>  /  DBili  x   /  AST  23  /  ALT  15  /  AlkPhos  77  04-25      WBC Trend:  WBC Count: 22.17 (04-27-22 @ 07:19)  WBC Count: 15.78 (04-26-22 @ 08:55)  WBC Count: 19.28 (04-26-22 @ 00:16)  WBC Count: 17.93 (04-25-22 @ 22:13)      Auto Neutrophil #: 18.05 K/uL (04-27-22 @ 07:19)  Auto Neutrophil #: 14.31 K/uL (04-25-22 @ 22:13)      Creatine Trend:  Creatinine, Serum: 1.16 (04-27)  Creatinine, Serum: 1.52 (04-25)      Liver Biochemical Testing Trend:  Alanine Aminotransferase (ALT/SGPT): 15 (04-25)  Aspartate Aminotransferase (AST/SGOT): 23 (04-25-22 @ 22:13)  Bilirubin Total, Serum: <0.1 (04-25)      Trend LDH  04-27-22 @ 07:11  195  04-25-22 @ 23:39  229      Auto Eosinophil %: 1.2 % (04-27-22 @ 07:19)  Auto Eosinophil %: 0.0 % (04-25-22 @ 22:13)          MICROBIOLOGY:      Rapid RVP Result: NotDetec (04-27 @ 08:38)  COVID-19 PCR: NotDetec (04-25-22 @ 22:12)  Procalcitonin, Serum: 0.07 (04-27)  Procalcitonin, Serum: 0.07 (04-25)  Ferritin, Serum: 76 (04-27)    Lactate Dehydrogenase, Serum: 195 (04-27)  Lactate Dehydrogenase, Serum: 229 (04-25)    Serum Pro-Brain Natriuretic Peptide: 4123 (04-25)    Troponin T, High Sensitivity Result: 119 (04-26)  Troponin T, High Sensitivity Result: 141 (04-25)  Troponin T, High Sensitivity Result: 128 (04-25)    RADIOLOGY:  imaging below personally reviewed    < from: Xray Chest 1 View AP/PA (04.27.22 @ 07:05) >  Clear lungs.    < end of copied text >                     Patient is a 77y old  Male who presents with a chief complaint of     HPI: Mr. Gillis is a 77 year old gentleman with PMH of HTN, Afib on coumadin, CVA, Hodgkin's Lymphoma s/p splenectomy and chemo in 1975, essential thrombocytopenia, atrial fibrillation on coumadin, HTN, MOE, newly diagnosed anemia (b/l 7-8), aortic stenosis (pending TAVR), GERD who was sent in by hematologist dr. schroeder for anemia. Pt complains of ELLIS for past few days.   Recent Workup: which he reports were negative. Found to have low B12 and received a B12 shot recently on 4/20/2022. He did have iron deficiency and received IV iron 2/2022 but more recent testing showed normal iron levels. He underwent a bone marrow aspirate and biopsy on 4/6/2022 which showed early/mild evidence of dysplastic changes although B12 and folate deficiency or other causes of macrocytosis could not be ruled out. Does endorse dark stools in the last week, no overt hematochezia. Also found to have new CKD and supposed to see a nephrologist soon. No f/c, chest pain, abd pain, leg swelling. He was scheduled to begin Aranesp injections in the office on 4/29/2022 (pending auth). Labs done on 4/20 in the office (Hgb was 8.6) revealed a low normal ferritin level of 38 with normal Fe and TIBC. He was prescribed PO iron in the office once daily (which would explain dark stools). The patient has also had EGD, colonoscopy and capsule endoscopy in the recent past (6 months ago), which were negative. Here VSS. Pt with leukocytosis to 17 k. CXR showed Clear lungs. ID consulted for the same.     REVIEW OF SYSTEMS  [  ] ROS unobtainable because:    [ x ] All other systems negative except as noted below    Constitutional:  [ ] fever [ ] chills  [ ] weight loss  [ ]night sweat  [ ]poor appetite/PO intake [ ]fatigue   Skin:  [ ] rash [ ] phlebitis	  Eyes: [ ] icterus [ ] pain  [ ] discharge	  ENMT: [ ] sore throat  [ ] thrush [ ] ulcers [ ] exudates [ ]anosmia  Respiratory: [ ] dyspnea [ ] hemoptysis [ ] cough [ ] sputum	  Cardiovascular:  [ ] chest pain [ ] palpitations [ ] edema	  Gastrointestinal:  [ ] nausea [ ] vomiting [ ] diarrhea [ ] constipation [ ] pain	  Genitourinary:  [ ] dysuria [ ] frequency [ ] hematuria [ ] discharge [ ] flank pain  [ ] incontinence  Musculoskeletal:  [ ] myalgias [ ] arthralgias [ ] arthritis  [ ] back pain  Neurological:  [ ] headache [ ] weakness [ ] seizures  [ ] confusion/altered mental status    prior hospital charts reviewed [V]  primary team notes reviewed [V]  other consultant notes reviewed [V]    PAST MEDICAL & SURGICAL HISTORY:  Hodgkin lymphoma 1975  Atrial fibrillation over 20 years  HTN (hypertension)   GERD (gastroesophageal reflux disease)  MOE (obstructive sleep apnea) positive sleep study many years ago, was recommended to use CPAP, patient non-compliant  BPH (benign prostatic hyperplasia)   Osteoarthritis  CVA (cerebral vascular accident) 1/2018 - attributed to no AC for 11 days preop/postop spine surgery - presented to ED with slurred speech, residual ST memory loss, per pt  Spinal stenosis L3-L4  Spondylolisthesis   Exposure to toxin 9/11   S/P splenectomy 1975  S/P hip replacement, right 2014  History of cardioversion for AF - &quot;many years ago&quot; - unsuccessful  History of lumbar spinal fusion 1/5/2018  Status post left cataract extraction     SOCIAL HISTORY:  - Denied smoking/vaping/alcohol/recreational drug use    FAMILY HISTORY:  Family history of myocardial infarction (Father)    Family history of stroke (Father)        Allergies  No Known Allergies        ANTIMICROBIALS:      ANTIMICROBIALS (past 90 days):  MEDICATIONS  (STANDING):        OTHER MEDS:   MEDICATIONS  (STANDING):  allopurinol 100 daily  aspirin enteric coated 81 daily  ATENolol  Tablet 25 daily  atorvastatin 80 at bedtime  epoetin macrina-epbx (RETACRIT) Injectable 05520 every 7 days  pantoprazole    Tablet 40 before breakfast  tamsulosin 0.8 at bedtime      VITALS:  Vital Signs Last 24 Hrs  T(F): 98.7 (04-27-22 @ 12:08), Max: 100.3 (04-27-22 @ 04:49)    Vital Signs Last 24 Hrs  HR: 61 (04-27-22 @ 12:08) (61 - 120)  BP: 111/50 (04-27-22 @ 12:08) (111/50 - 161/59)  RR: 16 (04-27-22 @ 12:08)  SpO2: 97% (04-27-22 @ 12:08) (89% - 97%)  Wt(kg): --    EXAM:    Constitutional: Not in acute distress  Eyes: pupils bilaterally reactive to light. No icterus.  Oral cavity: Clear, no lesions  Neck: No neck vein distension noted  RS: Chest clear to auscultation bilaterally. No wheeze/rhonchi/crepitations.  CVS: S1, S2 heard. Regular rate and rhythm. No murmurs/rubs/gallops.  Abdomen: Soft. No guarding/rigidity/tenderness.  : No acute abnormalities  Extremities: Warm. No pedal edema  Skin: No lesions noted  Vascular: No evidence of phlebitis  Neuro: Alert, oriented to time/place/person  Cranial nerves 2-12 grossly normal. No focal abnormalities      Labs:                        9.0    22.17 )-----------( 466      ( 27 Apr 2022 07:19 )             28.8     04-27    139  |  103  |  36<H>  ----------------------------<  104<H>  4.5   |  22  |  1.16    Ca    9.2      27 Apr 2022 07:11  Phos  2.6     04-27  Mg     2.4     04-27    TPro  7.1  /  Alb  3.9  /  TBili  <0.1<L>  /  DBili  x   /  AST  23  /  ALT  15  /  AlkPhos  77  04-25      WBC Trend:  WBC Count: 22.17 (04-27-22 @ 07:19)  WBC Count: 15.78 (04-26-22 @ 08:55)  WBC Count: 19.28 (04-26-22 @ 00:16)  WBC Count: 17.93 (04-25-22 @ 22:13)      Auto Neutrophil #: 18.05 K/uL (04-27-22 @ 07:19)  Auto Neutrophil #: 14.31 K/uL (04-25-22 @ 22:13)      Creatine Trend:  Creatinine, Serum: 1.16 (04-27)  Creatinine, Serum: 1.52 (04-25)      Liver Biochemical Testing Trend:  Alanine Aminotransferase (ALT/SGPT): 15 (04-25)  Aspartate Aminotransferase (AST/SGOT): 23 (04-25-22 @ 22:13)  Bilirubin Total, Serum: <0.1 (04-25)      Trend LDH  04-27-22 @ 07:11  195  04-25-22 @ 23:39  229      Auto Eosinophil %: 1.2 % (04-27-22 @ 07:19)  Auto Eosinophil %: 0.0 % (04-25-22 @ 22:13)          MICROBIOLOGY:      Rapid RVP Result: NotDetec (04-27 @ 08:38)  COVID-19 PCR: NotDetec (04-25-22 @ 22:12)  Procalcitonin, Serum: 0.07 (04-27)  Procalcitonin, Serum: 0.07 (04-25)  Ferritin, Serum: 76 (04-27)    Lactate Dehydrogenase, Serum: 195 (04-27)  Lactate Dehydrogenase, Serum: 229 (04-25)    Serum Pro-Brain Natriuretic Peptide: 4123 (04-25)    Troponin T, High Sensitivity Result: 119 (04-26)  Troponin T, High Sensitivity Result: 141 (04-25)  Troponin T, High Sensitivity Result: 128 (04-25)    RADIOLOGY:  imaging below personally reviewed    < from: Xray Chest 1 View AP/PA (04.27.22 @ 07:05) >  Clear lungs.    < end of copied text >                     Patient is a 77y old  Male who presents with a chief complaint of     HPI: Mr. Gillis is a 77 year old gentleman with PMH of HTN, Afib on coumadin, CVA, Hodgkin's Lymphoma s/p splenectomy and chemo in 1975, essential thrombocytopenia, atrial fibrillation on coumadin, HTN, MOE, newly diagnosed anemia (b/l 7-8), aortic stenosis (pending TAVR), GERD who was sent in by hematologist dr. schroeder for anemia. Pt complains of ELLIS for past few days.   Recent Workup: which he reports were negative. Found to have low B12 and received a B12 shot recently on 4/20/2022. He did have iron deficiency and received IV iron 2/2022 but more recent testing showed normal iron levels. He underwent a bone marrow aspirate and biopsy on 4/6/2022 which showed early/mild evidence of dysplastic changes although B12 and folate deficiency or other causes of macrocytosis could not be ruled out. Does endorse dark stools in the last week, no overt hematochezia. Also found to have new CKD and supposed to see a nephrologist soon. No f/c, chest pain, abd pain, leg swelling. He was scheduled to begin Aranesp injections in the office on 4/29/2022 (pending auth). Labs done on 4/20 in the office (Hgb was 8.6) revealed a low normal ferritin level of 38 with normal Fe and TIBC. He was prescribed PO iron in the office once daily (which would explain dark stools). The patient has also had EGD, colonoscopy and capsule endoscopy in the recent past (6 months ago), which were negative. Here VSS. Pt with leukocytosis to 17 k. CXR showed Clear lungs. ID consulted for the same.     REVIEW OF SYSTEMS  [  ] ROS unobtainable because:    [ x ] All other systems negative except as noted below    Constitutional:  [ ] fever [ ] chills  [ ] weight loss   Skin:  [ ] rash [ ] phlebitis	  Eyes: [ ] icterus [ ] pain  [ ] discharge	  ENMT: [ ] sore throat  [ ] thrush  Respiratory: [ ] dyspnea [ ] cough [ ] sputum	  Cardiovascular:  [ ] chest pain [ ] palpitations 	  Gastrointestinal:  [ ] nausea [ ] vomiting [ ] diarrhea [ ] constipation [ ] pain	  Genitourinary:  [ ] dysuria [ ] frequency   Musculoskeletal:  [ ] arthritis  [ ] back pain  Neurological:  [ ] headache [ ] weakness   Extremities: + swelling       prior hospital charts reviewed [V]  primary team notes reviewed [V]  other consultant notes reviewed [V]    PAST MEDICAL & SURGICAL HISTORY:  Hodgkin lymphoma 1975  Atrial fibrillation over 20 years  HTN (hypertension)   GERD (gastroesophageal reflux disease)  MOE (obstructive sleep apnea) positive sleep study many years ago, was recommended to use CPAP, patient non-compliant  BPH (benign prostatic hyperplasia)   Osteoarthritis  CVA (cerebral vascular accident) 1/2018 - attributed to no AC for 11 days preop/postop spine surgery - presented to ED with slurred speech, residual ST memory loss, per pt  Spinal stenosis L3-L4  Spondylolisthesis   Exposure to toxin 9/11   S/P splenectomy 1975  S/P hip replacement, right 2014  History of cardioversion for AF - &quot;many years ago&quot; - unsuccessful  History of lumbar spinal fusion 1/5/2018  Status post left cataract extraction     SOCIAL HISTORY:  - Denied smoking/vaping/alcohol/recreational drug use    FAMILY HISTORY:  Family history of myocardial infarction (Father)    Family history of stroke (Father)        Allergies  No Known Allergies        ANTIMICROBIALS:      ANTIMICROBIALS (past 90 days):  MEDICATIONS  (STANDING):        OTHER MEDS:   MEDICATIONS  (STANDING):  allopurinol 100 daily  aspirin enteric coated 81 daily  ATENolol  Tablet 25 daily  atorvastatin 80 at bedtime  epoetin macrina-epbx (RETACRIT) Injectable 29928 every 7 days  pantoprazole    Tablet 40 before breakfast  tamsulosin 0.8 at bedtime      VITALS:  Vital Signs Last 24 Hrs  T(F): 98.7 (04-27-22 @ 12:08), Max: 100.3 (04-27-22 @ 04:49)    Vital Signs Last 24 Hrs  HR: 61 (04-27-22 @ 12:08) (61 - 120)  BP: 111/50 (04-27-22 @ 12:08) (111/50 - 161/59)  RR: 16 (04-27-22 @ 12:08)  SpO2: 97% (04-27-22 @ 12:08) (89% - 97%)  Wt(kg): --    EXAM:    Constitutional: Not in acute distress  Eyes: No icterus.  Oral cavity: Clear, no lesions  Neck: supple  RS: + air entry b/l   CVS: S1, S2 heard. Regular rate and rhythm.   Abdomen: Soft. No tenderness.  : No holguin   Extremities: Warm. No pedal edema  Skin: No lesions noted  Vascular: No evidence of phlebitis  Neuro: Alert, oriented to time/place/person        Labs:                        9.0    22.17 )-----------( 466      ( 27 Apr 2022 07:19 )             28.8     04-27    139  |  103  |  36<H>  ----------------------------<  104<H>  4.5   |  22  |  1.16    Ca    9.2      27 Apr 2022 07:11  Phos  2.6     04-27  Mg     2.4     04-27    TPro  7.1  /  Alb  3.9  /  TBili  <0.1<L>  /  DBili  x   /  AST  23  /  ALT  15  /  AlkPhos  77  04-25      WBC Trend:  WBC Count: 22.17 (04-27-22 @ 07:19)  WBC Count: 15.78 (04-26-22 @ 08:55)  WBC Count: 19.28 (04-26-22 @ 00:16)  WBC Count: 17.93 (04-25-22 @ 22:13)      Auto Neutrophil #: 18.05 K/uL (04-27-22 @ 07:19)  Auto Neutrophil #: 14.31 K/uL (04-25-22 @ 22:13)      Creatine Trend:  Creatinine, Serum: 1.16 (04-27)  Creatinine, Serum: 1.52 (04-25)      Liver Biochemical Testing Trend:  Alanine Aminotransferase (ALT/SGPT): 15 (04-25)  Aspartate Aminotransferase (AST/SGOT): 23 (04-25-22 @ 22:13)  Bilirubin Total, Serum: <0.1 (04-25)      Trend LDH  04-27-22 @ 07:11  195  04-25-22 @ 23:39  229      Auto Eosinophil %: 1.2 % (04-27-22 @ 07:19)  Auto Eosinophil %: 0.0 % (04-25-22 @ 22:13)          MICROBIOLOGY:      Rapid RVP Result: NotDetec (04-27 @ 08:38)  COVID-19 PCR: NotDetec (04-25-22 @ 22:12)  Procalcitonin, Serum: 0.07 (04-27)  Procalcitonin, Serum: 0.07 (04-25)  Ferritin, Serum: 76 (04-27)    Lactate Dehydrogenase, Serum: 195 (04-27)  Lactate Dehydrogenase, Serum: 229 (04-25)    Serum Pro-Brain Natriuretic Peptide: 4123 (04-25)    Troponin T, High Sensitivity Result: 119 (04-26)  Troponin T, High Sensitivity Result: 141 (04-25)  Troponin T, High Sensitivity Result: 128 (04-25)    RADIOLOGY:  imaging below personally reviewed    < from: Xray Chest 1 View AP/PA (04.27.22 @ 07:05) >  Clear lungs.

## 2022-04-27 NOTE — PROGRESS NOTE ADULT - ASSESSMENT
77M PMH HTN, Afib on coumadin, CVA, RLE DVT, Hodgkin's Lymphoma s/p splenectomy and chemo in 1975, essential thrombocytopenia, atrial fibrillation on coumadin, HTN, MOE, newly diagnosed anemia (b/l 7-8), aortic stenosis (pending TAVR), GERD sent in by hematologist Dr. Goel for symptomatic anemia i/s/o dark stools, found to have temperature of 100.3F oral i/s/o hypoxia

## 2022-04-27 NOTE — PROGRESS NOTE ADULT - ASSESSMENT
77M PMH HTN, Afib on coumadin, CVA, newly diagnosed anemia (b/l 7-8), aortic stenosis (supposed to receive a TAVR soon), GERD sent in by hematologist dr. schroeder for anemia. Pt complains of ELLIS for past few days. He had a colonoscopy/endoscopy 6 months ago which he reports were negative. Found to have low B12 and received a B12 shot recently. Does endorse dark stools for past few months including currently. Also found to have new CKD and supposed to see a nephrologist soon. No f/c, chest pain, abd pain, leg swelling    Hematology/Oncology called to see patient who is under  care by Dr. Negro Schroeder for a history of Hodgkin's Lymphoma s/p splenectomy and chemotherapy in 1975), essential thrombocythemia and worsening macrocytic anemia. Workup was done - he did have iron deficiency and received IV iron 2/2022 but more recent testing showed normal iron levels. He underwent a bone marrow aspirate and biopsy on 4/6/2022 which showed early/mild evidence of dysplastic changes although B12 and folate deficiency or other causes of macrocytosis could not be ruled out. Additional workup showed chronic kidney disease and B12 deficiency (he received a B12 injection on 4/20/2022). He was scheduled to begin Aranesp injections in the office on 4/29/2022 (pending auth). Labs done on 4/20 in the office (Hgb was 8.6) revealed a low normal ferritin level of 38 with normal Fe and TIBC. He was prescribed PO iron in the office once daily (which would explain dark stools). The patient has also had EGD, colonoscopy and capsule endoscopy in the recent past, which were negative.     Anemia  --Under care by Dr. Negro Schroeder of [facility]  --Work-up positive for B12 deficiency, chronic kidney disease and mild iron deficiency  --Started Retacrit 10,000 units weekly while in hospital and patent may start Aranesp after discharge  --B12 is monthly - he received his first dose on 4/20 - not due for another yet  --Please continue PO iron 325 mg daily  --Will consider giving IV Fe as well to maximize MEGAN efficiency  --Please transfuse PRBC's for Hgb <7.0 grams (or 8.0 grams if patient is symptomatic or advised by Cardiology)    Essential Thrombocythemia  --Also under care by Dr. Schroeder  --On ASA only    Leukocytosis, Fever  --Patient's baseline WBC is normal  --Concern for acute infection  --Cultures pending  --Recommend ID consult    Chronic Kidney Disease  --Recommend Nephrology consultation    Atrial Fibrillation, Hx CVA's  --Patient on Warfarin - currently on hold  --Management per Cardiology/Primary team    Hx CVA  --ASA, Warfarin have been resumed    Guaiac Positive  --On FOBT  --S/P EGD/Colonoscopy/Capsule study within past 6 months  --Would still get GI consultation    After discharge patient may continue care with Dr. Negro Schroeder of [facility]    Thank you for the opportunity to participate in Mr. Gillis's care.    Joe Jansen PA-C  Hematology/Oncology  [facility]   [phone] (office)  [phone] (alt office)  Evenings and weekends please call MD on call or office

## 2022-04-27 NOTE — CONSULT NOTE ADULT - ASSESSMENT
Mr. Gillis is a 77 year old gentleman with PMH of HTN, Afib on coumadin, CVA, Hodgkin's Lymphoma s/p splenectomy and chemo in 1975, essential thrombocytopenia, atrial fibrillation on coumadin, HTN, MOE, newly diagnosed anemia (b/l 7-8), aortic stenosis (pending TAVR), GERD who was sent in by hematologist dr. schroeder for anemia. Pt complains of ELLIS for past few days.   Recent Workup: which he reports were negative. Found to have low B12 and received a B12 shot recently on 4/20/2022. He did have iron deficiency and received IV iron 2/2022 but more recent testing showed normal iron levels. He underwent a bone marrow aspirate and biopsy on 4/6/2022 which showed early/mild evidence of dysplastic changes although B12 and folate deficiency or other causes of macrocytosis could not be ruled out. Does endorse dark stools in the last week, no overt hematochezia. Also found to have new CKD and supposed to see a nephrologist soon. No f/c, chest pain, abd pain, leg swelling. He was scheduled to begin Aranesp injections in the office on 4/29/2022 (pending auth). Labs done on 4/20 in the office (Hgb was 8.6) revealed a low normal ferritin level of 38 with normal Fe and TIBC. He was prescribed PO iron in the office once daily (which would explain dark stools). The patient has also had EGD, colonoscopy and capsule endoscopy in the recent past (6 months ago), which were negative. Here VSS. Pt with leukocytosis to 17 k. CXR showed Clear lungs. ID consulted for the same.       WORKUP  Rapid RVP Result: NotDetec and Procalcitonin, Serum: 0.07 (04-27)  S/P EGD/Colonoscopy/Capsule study within past 6 months  CXR: Clear    IMPRESSION  ·	Leukocytosis  ·	 Acute respiratory failure with hypoxia.  ·	MAGALI  ·	Hx of Hodgkin's Lymphoma s/p splenectomy and chemo in 1975    RECOMMENDATIONS  Low grade fever to 100.3 today  Leukocytosis of 17 k which is trending up to 22k   Urinalysis ordered, Blood cx in lab  CXR and RVP negative, low procal, and BNP elevated at `4123 -> Currently on 2L NC  Currently being monitored off abx    PT TO BE SEEN. PRELIM NOTE  PENDING RECS. PLEASE WAIT FOR FINAL RECS AFTER DISCUSSION WITH ATTENDING#    Gage Solomon MD, PGY4   ID fellow  [employer] Teams Preferred  After 5pm/weekends call [phone]   Mr. Gillis is a 77 year old gentleman with PMH of HTN, Afib on coumadin, CVA, Hodgkin's Lymphoma s/p splenectomy and chemo in 1975, essential thrombocytopenia, atrial fibrillation on coumadin, HTN, MOE, newly diagnosed anemia (b/l 7-8), aortic stenosis (pending TAVR), GERD who was sent in by hematologist dr. schroeder for anemia. Pt complains of ELLIS for past few days.   Recent Workup: which he reports were negative. Found to have low B12 and received a B12 shot recently on 4/20/2022. He did have iron deficiency and received IV iron 2/2022 but more recent testing showed normal iron levels. He underwent a bone marrow aspirate and biopsy on 4/6/2022 which showed early/mild evidence of dysplastic changes although B12 and folate deficiency or other causes of macrocytosis could not be ruled out. Does endorse dark stools in the last week, no overt hematochezia. Also found to have new CKD and supposed to see a nephrologist soon. No f/c, chest pain, abd pain, leg swelling. He was scheduled to begin Aranesp injections in the office on 4/29/2022 (pending auth). Labs done on 4/20 in the office (Hgb was 8.6) revealed a low normal ferritin level of 38 with normal Fe and TIBC. He was prescribed PO iron in the office once daily (which would explain dark stools). The patient has also had EGD, colonoscopy and capsule endoscopy in the recent past (6 months ago), which were negative. Here VSS. Pt with leukocytosis to 17 k. CXR showed Clear lungs. ID consulted for the same.       WORKUP  Rapid RVP Result: NotDetec and Procalcitonin, Serum: 0.07 (04-27)  S/P EGD/Colonoscopy/Capsule study within past 6 months  CXR: Clear    IMPRESSION  ·	Leukocytosis  ·	 Acute respiratory failure with hypoxia.  ·	MAGALI  ·	Hx of Hodgkin's Lymphoma s/p splenectomy and chemo in 1975    RECOMMENDATIONS  Low grade fever to 100.3 today  Leukocytosis of 17 k which is trending up to 22k   Urinalysis ordered, Blood cx in lab  CXR and RVP negative, low procal, and BNP elevated at `4123 -> Currently on 2L NC  ROS and PE not revelaing  Agree with being monitored off abx  Recommend CT a/p and CT chest with CON if able     Pt seen and examined/. Case d/w attending and [facility] team    Gage Solomon MD, PGY4   ID fellow  [employer] Teams Preferred  After 5pm/weekends call [phone]

## 2022-04-27 NOTE — PROGRESS NOTE ADULT - PROBLEM SELECTOR PLAN 3
Possibly viral illness vs volume overload status i/s/o transfusions  Leukocytosis, febrile  COVID negative in ED  F/u CXR  Send full RVP  F/u BCx Possibly viral illness vs volume overload status i/s/o transfusions  Leukocytosis, febrile  COVID negative in ED  F/u CXR  Send full RVP  F/u BCx  F/u ID c/s

## 2022-04-27 NOTE — PROGRESS NOTE ADULT - ASSESSMENT
77 year-old man with MAGALI resolved (not actually CKD 3a as thought yesterday).    1. MAGALI (it seems pt does not have CKD 3a and had mild MAGALI.  Now resolved.  Will sign-off; please recall asnecessary.  2. HTN- Continue with current anti-hypertensive medications. Monitor BP.  3. Mild hyperkalemia in the setting of spironolactone. As long as potassium does not increase, okay to continue spironolactone.  4. Acidosis- mild.  Monitor for now.      [facility] NEPHROLOGY  Jean-Pierre Candelaria M.D.  Cory Smith D.O.  Rani Cabello M.D.  Nalini Kumar, MSN, ANP-C    Telephone: [phone]  Facsimile: [phone]    [address] [place] [address]

## 2022-04-27 NOTE — PROGRESS NOTE ADULT - SUBJECTIVE AND OBJECTIVE BOX
[facility] NEPHROLOGY- CONSULTATION NOTE    Patient is a 77y Male with complicated hematologic history with known mild CKD who was sent in to hospital by hematology for anemia.  Creatinine was found to be elevated, though pt reports mild kidney disease.  Pt does report that he was having dark stool over the last week or so.      Pt feels better today.      REVIEW OF SYSTEMS: no cp ,sob ,abd pain.     Vital Signs Last 24 Hrs  T(C): 37.1 (2022 12:08), Max: 37.9 (2022 04:49)  T(F): 98.7 (2022 12:08), Max: 100.3 (2022 04:49)  HR: 61 (2022 12:08) (61 - 120)  BP: 111/50 (2022 12:08) (111/50 - 161/59)  RR: 16 (2022 12:08) (16 - 20)  SpO2: 97% (2022 12:08) (89% - 97%)    PHYSICAL EXAM:  Constitutional: NAD  HEENT: anicteric sclera, oropharynx clear, MMM  Respiratory: CTAB, no wheezes, rales or rhonchi  Cardiovascular: S1, S2, RRR  Gastrointestinal: BS+, soft, NT/ND  Extremities: No cyanosis or clubbing. RLE with surgical scar and edema (hx fasciotomy in setting of DVT).  No LLE edema.  Neurological: A/O x 3, no focal deficits  Psychiatric: Normal mood, normal affect  : No CVA tenderness. No holguin.       LABS:      139  |  103  |  36<H>  ----------------------------<  104<H>  4.5   |  22  |  1.16    Ca    9.2      2022 07:11  Phos  2.6       Mg     2.4         TPro  7.1  /  Alb  3.9  /  TBili  <0.1<L>  /  DBili      /  AST  23  /  ALT  15  /  AlkPhos  77      Creatinine Trend: 1.16 <--, 1.52 <--                        9.0    22.17 )-----------( 466      ( 2022 07:19 )             28.8     Urine Studies:  Urinalysis Basic - ( 2022 17:53 )    Color: Light Yellow / Appearance: Clear / S.017 / pH:   Gluc:  / Ketone: Negative  / Bili: Negative / Urobili: Negative   Blood:  / Protein: Negative / Nitrite: Negative   Leuk Esterase: Negative / RBC:  / WBC    Sq Epi:  / Non Sq Epi:  / Bacteria:       Creatinine, Random Urine: 121 mg/dL ( @ 19:05)

## 2022-04-28 LAB
ALBUMIN SERPL ELPH-MCNC: 3.5 G/DL — SIGNIFICANT CHANGE UP (ref 3.3–5)
ALP SERPL-CCNC: 75 U/L — SIGNIFICANT CHANGE UP (ref 40–120)
ALT FLD-CCNC: 17 U/L — SIGNIFICANT CHANGE UP (ref 10–45)
ANION GAP SERPL CALC-SCNC: 13 MMOL/L — SIGNIFICANT CHANGE UP (ref 5–17)
AST SERPL-CCNC: 24 U/L — SIGNIFICANT CHANGE UP (ref 10–40)
BASOPHILS # BLD AUTO: 0 K/UL — SIGNIFICANT CHANGE UP (ref 0–0.2)
BASOPHILS NFR BLD AUTO: 0 % — SIGNIFICANT CHANGE UP (ref 0–2)
BILIRUB SERPL-MCNC: 0.3 MG/DL — SIGNIFICANT CHANGE UP (ref 0.2–1.2)
BUN SERPL-MCNC: 36 MG/DL — HIGH (ref 7–23)
CALCIUM SERPL-MCNC: 8.9 MG/DL — SIGNIFICANT CHANGE UP (ref 8.4–10.5)
CHLORIDE SERPL-SCNC: 102 MMOL/L — SIGNIFICANT CHANGE UP (ref 96–108)
CO2 SERPL-SCNC: 24 MMOL/L — SIGNIFICANT CHANGE UP (ref 22–31)
CREAT SERPL-MCNC: 1.24 MG/DL — SIGNIFICANT CHANGE UP (ref 0.5–1.3)
CRP SERPL-MCNC: 125 MG/L — HIGH (ref 0–4)
CULTURE RESULTS: SIGNIFICANT CHANGE UP
EGFR: 60 ML/MIN/1.73M2 — SIGNIFICANT CHANGE UP
EOSINOPHIL # BLD AUTO: 0.28 K/UL — SIGNIFICANT CHANGE UP (ref 0–0.5)
EOSINOPHIL NFR BLD AUTO: 1.8 % — SIGNIFICANT CHANGE UP (ref 0–6)
FOLATE SERPL-MCNC: 8 NG/ML — SIGNIFICANT CHANGE UP
GLUCOSE SERPL-MCNC: 91 MG/DL — SIGNIFICANT CHANGE UP (ref 70–99)
HCT VFR BLD CALC: 26 % — LOW (ref 39–50)
HGB BLD-MCNC: 8.3 G/DL — LOW (ref 13–17)
INR BLD: 1.88 RATIO — HIGH (ref 0.88–1.16)
LYMPHOCYTES # BLD AUTO: 1.48 K/UL — SIGNIFICANT CHANGE UP (ref 1–3.3)
LYMPHOCYTES # BLD AUTO: 9.6 % — LOW (ref 13–44)
MAGNESIUM SERPL-MCNC: 2.4 MG/DL — SIGNIFICANT CHANGE UP (ref 1.6–2.6)
MANUAL SMEAR VERIFICATION: SIGNIFICANT CHANGE UP
MCHC RBC-ENTMCNC: 31.9 GM/DL — LOW (ref 32–36)
MCHC RBC-ENTMCNC: 32.8 PG — SIGNIFICANT CHANGE UP (ref 27–34)
MCV RBC AUTO: 102.8 FL — HIGH (ref 80–100)
MONOCYTES # BLD AUTO: 0.94 K/UL — HIGH (ref 0–0.9)
MONOCYTES NFR BLD AUTO: 6.1 % — SIGNIFICANT CHANGE UP (ref 2–14)
NEUTROPHILS # BLD AUTO: 12.68 K/UL — HIGH (ref 1.8–7.4)
NEUTROPHILS NFR BLD AUTO: 82.5 % — HIGH (ref 43–77)
PHOSPHATE SERPL-MCNC: 3.8 MG/DL — SIGNIFICANT CHANGE UP (ref 2.5–4.5)
PLAT MORPH BLD: NORMAL — SIGNIFICANT CHANGE UP
PLATELET # BLD AUTO: 444 K/UL — HIGH (ref 150–400)
POTASSIUM SERPL-MCNC: 4.3 MMOL/L — SIGNIFICANT CHANGE UP (ref 3.5–5.3)
POTASSIUM SERPL-SCNC: 4.3 MMOL/L — SIGNIFICANT CHANGE UP (ref 3.5–5.3)
PROT SERPL-MCNC: 6.3 G/DL — SIGNIFICANT CHANGE UP (ref 6–8.3)
PROTHROM AB SERPL-ACNC: 21.8 SEC — HIGH (ref 10.5–13.4)
RBC # BLD: 2.53 M/UL — LOW (ref 4.2–5.8)
RBC # FLD: 17.3 % — HIGH (ref 10.3–14.5)
RBC BLD AUTO: SIGNIFICANT CHANGE UP
SODIUM SERPL-SCNC: 139 MMOL/L — SIGNIFICANT CHANGE UP (ref 135–145)
SPECIMEN SOURCE: SIGNIFICANT CHANGE UP
VIT B12 SERPL-MCNC: 668 PG/ML — SIGNIFICANT CHANGE UP (ref 232–1245)
WBC # BLD: 15.37 K/UL — HIGH (ref 3.8–10.5)
WBC # FLD AUTO: 15.37 K/UL — HIGH (ref 3.8–10.5)

## 2022-04-28 PROCEDURE — 99232 SBSQ HOSP IP/OBS MODERATE 35: CPT

## 2022-04-28 PROCEDURE — 76770 US EXAM ABDO BACK WALL COMP: CPT | Mod: 26

## 2022-04-28 PROCEDURE — 74177 CT ABD & PELVIS W/CONTRAST: CPT | Mod: 26

## 2022-04-28 PROCEDURE — 71260 CT THORAX DX C+: CPT | Mod: 26

## 2022-04-28 RX ORDER — WARFARIN SODIUM 2.5 MG/1
2 TABLET ORAL ONCE
Refills: 0 | Status: COMPLETED | OUTPATIENT
Start: 2022-04-28 | End: 2022-04-28

## 2022-04-28 RX ORDER — FOLIC ACID 0.8 MG
1 TABLET ORAL DAILY
Refills: 0 | Status: DISCONTINUED | OUTPATIENT
Start: 2022-04-28 | End: 2022-04-29

## 2022-04-28 RX ADMIN — LATANOPROST 1 DROP(S): 0.05 SOLUTION/ DROPS OPHTHALMIC; TOPICAL at 21:52

## 2022-04-28 RX ADMIN — Medication 2 SPRAY(S): at 17:50

## 2022-04-28 RX ADMIN — Medication 1 MILLIGRAM(S): at 12:16

## 2022-04-28 RX ADMIN — Medication 100 MILLIGRAM(S): at 12:17

## 2022-04-28 RX ADMIN — WARFARIN SODIUM 2 MILLIGRAM(S): 2.5 TABLET ORAL at 21:55

## 2022-04-28 RX ADMIN — ATORVASTATIN CALCIUM 80 MILLIGRAM(S): 80 TABLET, FILM COATED ORAL at 21:53

## 2022-04-28 RX ADMIN — Medication 2 SPRAY(S): at 06:04

## 2022-04-28 RX ADMIN — Medication 500 MILLIGRAM(S): at 12:16

## 2022-04-28 RX ADMIN — TAMSULOSIN HYDROCHLORIDE 0.8 MILLIGRAM(S): 0.4 CAPSULE ORAL at 21:53

## 2022-04-28 RX ADMIN — ATENOLOL 25 MILLIGRAM(S): 25 TABLET ORAL at 06:04

## 2022-04-28 RX ADMIN — Medication 81 MILLIGRAM(S): at 12:16

## 2022-04-28 RX ADMIN — PANTOPRAZOLE SODIUM 40 MILLIGRAM(S): 20 TABLET, DELAYED RELEASE ORAL at 06:05

## 2022-04-28 RX ADMIN — Medication 325 MILLIGRAM(S): at 12:16

## 2022-04-28 NOTE — PROGRESS NOTE ADULT - SUBJECTIVE AND OBJECTIVE BOX
Patient is a 77y old  Male who presents with a chief complaint of     Patient seen this morning. No new complaints.    MEDICATIONS  (STANDING):  allopurinol 100 milliGRAM(s) Oral daily  ascorbic acid 500 milliGRAM(s) Oral daily  aspirin enteric coated 81 milliGRAM(s) Oral daily  ATENolol  Tablet 25 milliGRAM(s) Oral daily  atorvastatin 80 milliGRAM(s) Oral at bedtime  epoetin macrina-epbx (RETACRIT) Injectable 23631 Unit(s) SubCutaneous every 7 days  ferrous    sulfate 325 milliGRAM(s) Oral daily  fluticasone propionate 50 MICROgram(s)/spray Nasal Spray 2 Spray(s) Both Nostrils two times a day  folic acid 1 milliGRAM(s) Oral daily  latanoprost 0.005% Ophthalmic Solution 1 Drop(s) Right EYE at bedtime  pantoprazole    Tablet 40 milliGRAM(s) Oral before breakfast  tamsulosin 0.8 milliGRAM(s) Oral at bedtime    MEDICATIONS  (PRN):      ROS  No fever, sweats, chills  No epistaxis, HA, sore throat  No CP, SOB, cough, sputum  No n/v/d, abd pain, melena, hematochezia  No edema  No rash  No anxiety  No back pain, joint pain  No bleeding, bruising  No dysuria, hematuria    Vital Signs Last 24 Hrs  T(C): 36.7 (28 Apr 2022 04:26), Max: 36.7 (28 Apr 2022 04:26)  T(F): 98.1 (28 Apr 2022 04:26), Max: 98.1 (28 Apr 2022 04:26)  HR: 76 (28 Apr 2022 04:26) (72 - 76)  BP: 130/59 (28 Apr 2022 04:26) (130/59 - 138/55)  BP(mean): --  RR: 18 (28 Apr 2022 04:26) (18 - 18)  SpO2: 98% (28 Apr 2022 04:26) (96% - 98%)    PE  NAD  Awake, alert  Anicteric, MMM  No c/c/e  No rash grossly                            8.3    15.37 )-----------( 444      ( 28 Apr 2022 07:05 )             26.0       04-28    139  |  102  |  36<H>  ----------------------------<  91  4.3   |  24  |  1.24    Ca    8.9      28 Apr 2022 07:10  Phos  3.8     04-28  Mg     2.4     04-28    TPro  6.3  /  Alb  3.5  /  TBili  0.3  /  DBili  x   /  AST  24  /  ALT  17  /  AlkPhos  75  04-28      ACC: [id number] EXAM:  CT ABDOMEN AND PELVIS IC                        ACC: [id number] EXAM:  CT CHEST IC                          PROCEDURE DATE:  04/28/2022          INTERPRETATION:  CLINICAL INFORMATION: Leukocytosis and fever of unknown   etiology    COMPARISON: CT chest/abdomen/pelvis 11/14/2018    CONTRAST/COMPLICATIONS:  IV Contrast: Omnipaque 350 (accession [id number]), IV contrast documented   in associated exam (accession [id number])  90 cc administered   10 cc   discarded  Oral Contrast: NONE  Complications: None reported at time of study completion    PROCEDURE:  CT of the Chest, Abdomen and Pelvis was performed.  Sagittal and coronal reformats were performed.    FINDINGS:  CHEST:  LUNGS AND LARGE AIRWAYS: Patent central airways. No pulmonary nodules.  PLEURA: No pleural effusion.  VESSELS: Atherosclerotic changes of the aorta and coronary arteries.  HEART: Heart size is normal. No pericardial effusion. Micra pacemaker in   place.  MEDIASTINUM AND TRESSA: No lymphadenopathy.  CHEST WALL AND LOWER NECK: Left axillary lymph node measures 1.6 x 1.2 cm   (3:26), unchanged in size since 11/14/2018. Gynecomastia.    ABDOMEN AND PELVIS:  LIVER: Cirrhotic morphology.  BILE DUCTS: Normal caliber.  GALLBLADDER: Cholelithiasis.  SPLEEN: Splenectomy.  PANCREAS: Within normal limits.  ADRENALS: Within normal limits.  KIDNEYS/URETERS: Left upper pole hypodense lesion too small to   characterize.    BLADDER: Within normal limits.  REPRODUCTIVE ORGANS: Prostate within normal limits.    BOWEL: Colonic diverticulosis without evidence of diverticulitis. No   bowel obstruction. Appendix is normal.  PERITONEUM: No ascites.  VESSELS: Atherosclerotic changes.  RETROPERITONEUM/LYMPH NODES: No lymphadenopathy.  ABDOMINAL WALL: Small fat-containing umbilical and bilateral inguinal   hernias.  BONES: Degenerative changes. L3-L4 posterior spinal fusion hardware   intact. Right hip arthroplasty.    IMPRESSION:  Colonic diverticulosis without evidence of diverticulitis.        --- End of Report ---

## 2022-04-28 NOTE — PROGRESS NOTE ADULT - ATTENDING COMMENTS
discussed management plan with house staff nad pt   pt had recent colonoscopy - guaiac +   monitot hb closely if drops further poss scope  leukocytosis - pt febrile, not at baseline as per heme , ID eval
discussed management plan with house staff   if hb stable tomorrow poss dc with heme out pt f/u

## 2022-04-28 NOTE — PROGRESS NOTE ADULT - PROBLEM SELECTOR PLAN 9
DVT PPx: SCDs, on warfarin for afib    Diet: DASH    Dispo: pending clinical course
DVT PPx: SCDs, on warfarin for afib    Diet: DASH    Dispo: pending clinical course

## 2022-04-28 NOTE — PROGRESS NOTE ADULT - PROBLEM SELECTOR PLAN 5
Possible pulmonary infection given elevated temperature to 100.3F vs reactive  WBC uptrending 22 this AM   F/u Full RVP  F/u CXR  F/u BCx  Trend CBC Possible pulmonary infection given elevated temperature to 100.3F vs reactive  F/u Full RVP  F/u CXR  F/u BCx  Trend CBC

## 2022-04-28 NOTE — PROGRESS NOTE ADULT - SUBJECTIVE AND OBJECTIVE BOX
PROGRESS NOTE:   Authored by Albertina Garcia MD     Patient is a 77y old  Male who presents with a chief complaint of     SUBJECTIVE / OVERNIGHT EVENTS:    ADDITIONAL REVIEW OF SYSTEMS:    MEDICATIONS  (STANDING):  allopurinol 100 milliGRAM(s) Oral daily  ascorbic acid 500 milliGRAM(s) Oral daily  aspirin enteric coated 81 milliGRAM(s) Oral daily  ATENolol  Tablet 25 milliGRAM(s) Oral daily  atorvastatin 80 milliGRAM(s) Oral at bedtime  epoetin macrina-epbx (RETACRIT) Injectable 81613 Unit(s) SubCutaneous every 7 days  ferrous    sulfate 325 milliGRAM(s) Oral daily  fluticasone propionate 50 MICROgram(s)/spray Nasal Spray 2 Spray(s) Both Nostrils two times a day  latanoprost 0.005% Ophthalmic Solution 1 Drop(s) Right EYE at bedtime  pantoprazole    Tablet 40 milliGRAM(s) Oral before breakfast  tamsulosin 0.8 milliGRAM(s) Oral at bedtime    MEDICATIONS  (PRN):      CAPILLARY BLOOD GLUCOSE        I&O's Summary    2022 07:01  -  2022 07:00  --------------------------------------------------------  IN: 100 mL / OUT: 0 mL / NET: 100 mL        PHYSICAL EXAM:  Vital Signs Last 24 Hrs  T(C): 36.7 (2022 04:26), Max: 37.1 (2022 12:08)  T(F): 98.1 (2022 04:26), Max: 98.7 (2022 12:08)  HR: 76 (2022 04:26) (61 - 76)  BP: 130/59 (2022 04:26) (111/50 - 138/55)  BP(mean): --  RR: 18 (2022 04:26) (16 - 18)  SpO2: 98% (2022 04:26) (96% - 98%)    GENERAL: No acute distress, well-developed  HEAD:  Atraumatic, Normocephalic  EYES: EOMI, PERRLA, conjunctiva and sclera clear  NECK: Supple, no lymphadenopathy, no JVD  CHEST/LUNG: CTAB; No wheezes, rales, or rhonchi  HEART: Regular rate and rhythm; No murmurs, rubs, or gallops  ABDOMEN: Soft, non-tender, non-distended; normal bowel sounds, no organomegaly  EXTREMITIES:  2+ peripheral pulses b/l, No clubbing, cyanosis, or edema  NEUROLOGY: A&O x 3, no focal deficits  SKIN: No rashes or lesions    LABS:                        8.3    15.37 )-----------( 444      ( 2022 07:05 )             26.0     04-27    139  |  103  |  36<H>  ----------------------------<  104<H>  4.5   |  22  |  1.16    Ca    9.2      2022 07:11  Phos  2.6     -  Mg     2.4     -      PT/INR - ( 2022 07:19 )   PT: 22.4 sec;   INR: 1.93 ratio           CARDIAC MARKERS ( 2022 18:02 )  x     / x     / 161 U/L / x     / 5.9 ng/mL      Urinalysis Basic - ( 2022 17:53 )    Color: Light Yellow / Appearance: Clear / S.017 / pH: x  Gluc: x / Ketone: Negative  / Bili: Negative / Urobili: Negative   Blood: x / Protein: Negative / Nitrite: Negative   Leuk Esterase: Negative / RBC: x / WBC x   Sq Epi: x / Non Sq Epi: x / Bacteria: x          RADIOLOGY & ADDITIONAL TESTS:  Results Reviewed:   Imaging Personally Reviewed:  Electrocardiogram Personally Reviewed:    COORDINATION OF CARE:  Care Discussed with Consultants/Other Providers [Y/N]:  Prior or Outpatient Records Reviewed [Y/N]:   PROGRESS NOTE:   Authored by Albertina Garcia MD     Patient is a 77y old  Male who presents with a chief complaint of     SUBJECTIVE / OVERNIGHT EVENTS:  patient walked from chair to bed without SOB or chest pain. He denied N/V/D, fever, chills, black stools.     ADDITIONAL REVIEW OF SYSTEMS:    MEDICATIONS  (STANDING):  allopurinol 100 milliGRAM(s) Oral daily  ascorbic acid 500 milliGRAM(s) Oral daily  aspirin enteric coated 81 milliGRAM(s) Oral daily  ATENolol  Tablet 25 milliGRAM(s) Oral daily  atorvastatin 80 milliGRAM(s) Oral at bedtime  epoetin macrina-epbx (RETACRIT) Injectable 86102 Unit(s) SubCutaneous every 7 days  ferrous    sulfate 325 milliGRAM(s) Oral daily  fluticasone propionate 50 MICROgram(s)/spray Nasal Spray 2 Spray(s) Both Nostrils two times a day  latanoprost 0.005% Ophthalmic Solution 1 Drop(s) Right EYE at bedtime  pantoprazole    Tablet 40 milliGRAM(s) Oral before breakfast  tamsulosin 0.8 milliGRAM(s) Oral at bedtime    MEDICATIONS  (PRN):      CAPILLARY BLOOD GLUCOSE        I&O's Summary    2022 07:01  -  2022 07:00  --------------------------------------------------------  IN: 100 mL / OUT: 0 mL / NET: 100 mL        PHYSICAL EXAM:  Vital Signs Last 24 Hrs  T(C): 36.7 (2022 04:26), Max: 37.1 (2022 12:08)  T(F): 98.1 (2022 04:26), Max: 98.7 (2022 12:08)  HR: 76 (2022 04:26) (61 - 76)  BP: 130/59 (2022 04:26) (111/50 - 138/55)  BP(mean): --  RR: 18 (2022 04:26) (16 - 18)  SpO2: 98% (2022 04:26) (96% - 98%)    GENERAL: No acute distress, well-developed  HEAD:  Atraumatic, Normocephalic  EYES: EOMI, PERRLA, conjunctiva and sclera clear  NECK: Supple, no lymphadenopathy, no JVD  CHEST/LUNG: CTAB; No wheezes, rales, or rhonchi  HEART: Regular rate and rhythm; No murmurs, rubs, or gallops  ABDOMEN: Soft, non-tender, non-distended; normal bowel sounds, no organomegaly  EXTREMITIES:  2+ peripheral pulses b/l, No clubbing, cyanosis, or edema  NEUROLOGY: A&O x 3, no focal deficits  SKIN: No rashes or lesions    LABS:                        8.3    15.37 )-----------( 444      ( 2022 07:05 )             26.0         139  |  103  |  36<H>  ----------------------------<  104<H>  4.5   |  22  |  1.16    Ca    9.2      2022 07:11  Phos  2.6       Mg     2.4           PT/INR - ( 2022 07:19 )   PT: 22.4 sec;   INR: 1.93 ratio           CARDIAC MARKERS ( 2022 18:02 )  x     / x     / 161 U/L / x     / 5.9 ng/mL      Urinalysis Basic - ( 2022 17:53 )    Color: Light Yellow / Appearance: Clear / S.017 / pH: x  Gluc: x / Ketone: Negative  / Bili: Negative / Urobili: Negative   Blood: x / Protein: Negative / Nitrite: Negative   Leuk Esterase: Negative / RBC: x / WBC x   Sq Epi: x / Non Sq Epi: x / Bacteria: x          RADIOLOGY & ADDITIONAL TESTS:  Results Reviewed:   Imaging Personally Reviewed:  Electrocardiogram Personally Reviewed:    COORDINATION OF CARE:  Care Discussed with Consultants/Other Providers [Y/N]:  Prior or Outpatient Records Reviewed [Y/N]:

## 2022-04-28 NOTE — PROGRESS NOTE ADULT - SUBJECTIVE AND OBJECTIVE BOX
77y old  Male who presents with a chief complaint of hypoxia       Interval history:  Afebrile, feeling well, offers no new complains.       Allergies:   No Known Allergies      Antimicrobials:      REVIEW OF SYSTEMS:  No chest pain   No abdominal pain  No dysuria  No rash.       Vital Signs Last 24 Hrs  T(C): 36.8 (04-28-22 @ 13:35), Max: 36.8 (04-28-22 @ 13:35)  T(F): 98.3 (04-28-22 @ 13:35), Max: 98.3 (04-28-22 @ 13:35)  HR: 72 (04-28-22 @ 13:35) (72 - 76)  BP: 128/59 (04-28-22 @ 13:35) (128/59 - 138/55)  BP(mean): --  RR: 18 (04-28-22 @ 13:35) (18 - 18)  SpO2: 98% (04-28-22 @ 13:35) (96% - 98%)      PHYSICAL EXAM:  Patient in no acute distress. AAOX3.  No icterus, no oral ulcers.  Cardiovascular: S1S2 normal.  Lungs: Good air entry B/L lung fields.  Gastrointestinal: soft, nontender, nondistended.  Extremities: no edema.  IV sites not inflamed.                           8.3    15.37 )-----------( 444      ( 28 Apr 2022 07:05 )             26.0   04-28    139  |  102  |  36<H>  ----------------------------<  91  4.3   |  24  |  1.24    Ca    8.9      28 Apr 2022 07:10  Phos  3.8     04-28  Mg     2.4     04-28    TPro  6.3  /  Alb  3.5  /  TBili  0.3  /  DBili  x   /  AST  24  /  ALT  17  /  AlkPhos  75  04-28      LIVER FUNCTIONS - ( 28 Apr 2022 07:10 )  Alb: 3.5 g/dL / Pro: 6.3 g/dL / ALK PHOS: 75 U/L / ALT: 17 U/L / AST: 24 U/L / GGT: x               Culture - Blood (collected 27 Apr 2022 12:21)  Source: .Blood Blood-Peripheral  Preliminary Report (28 Apr 2022 13:01):    No growth to date.    Culture - Blood (collected 27 Apr 2022 12:21)  Source: .Blood Blood-Peripheral  Preliminary Report (28 Apr 2022 13:01):    No growth to date.        Radiology:  < from: US Kidney and Bladder (04.28.22 @ 11:50) >    IMPRESSION:  No hydronephrosis.      < from: CT Abdomen and Pelvis w/ IV Cont (04.28.22 @ 10:49) >  IMPRESSION:  Colonic diverticulosis without evidence of diverticulitis.

## 2022-04-28 NOTE — PROGRESS NOTE ADULT - ASSESSMENT
77M PMH HTN, Afib on coumadin, CVA, newly diagnosed anemia (b/l 7-8), aortic stenosis (supposed to receive a TAVR soon), GERD sent in by hematologist dr. schroeder for anemia. Pt complains of ELLIS for past few days. He had a colonoscopy/endoscopy 6 months ago which he reports were negative. Found to have low B12 and received a B12 shot recently. Does endorse dark stools for past few months including currently. Also found to have new CKD and supposed to see a nephrologist soon. No f/c, chest pain, abd pain, leg swelling    Hematology/Oncology called to see patient who is under  care by Dr. Negro Schroeder for a history of Hodgkin's Lymphoma s/p splenectomy and chemotherapy in 1975), essential thrombocythemia and worsening macrocytic anemia. Workup was done - he did have iron deficiency and received IV iron 2/2022 but more recent testing showed normal iron levels. He underwent a bone marrow aspirate and biopsy on 4/6/2022 which showed early/mild evidence of dysplastic changes although B12 and folate deficiency or other causes of macrocytosis could not be ruled out. Additional workup showed chronic kidney disease and B12 deficiency (he received a B12 injection on 4/20/2022). He was scheduled to begin Aranesp injections in the office on 4/29/2022 (pending auth). Labs done on 4/20 in the office (Hgb was 8.6) revealed a low normal ferritin level of 38 with normal Fe and TIBC. He was prescribed PO iron in the office once daily (which would explain dark stools). The patient has also had EGD, colonoscopy and capsule endoscopy in the recent past, which were negative.     Anemia  --Under care by Dr. Negro Schroeder of [facility]  --Work-up positive for B12 deficiency, chronic kidney disease and mild iron deficiency. Also found to have low normal folate levels  --Started Retacrit 10,000 units weekly while in hospital and patent may start Aranesp after discharge  --B12 is monthly - he received his first dose on 4/20 - not due for another yet  --Please continue PO iron 325 mg daily  --Have added Folic Acid 1 mg daily   --Please transfuse PRBC's for Hgb <7.0 grams (or 8.0 grams if patient is symptomatic or advised by Cardiology)    Essential Thrombocythemia  --Also under care by Dr. Schroeder  --On ASA only    Leukocytosis, Fever  --Patient's baseline WBC is normal  --Concern for acute infection  --Cultures   --Recommend ID consult    Chronic Kidney Disease  --Recommend Nephrology consultation    Atrial Fibrillation, Hx CVA's  --Patient on Warfarin - currently on hold  --Management per Cardiology/Primary team    Hx CVA  --ASA, Warfarin have been resumed    Guaiac Positive  --On FOBT  --S/P EGD/Colonoscopy/Capsule study within past 6 months  --CT positive for Diverticulosis  --Could still consider GI input if indicated    After discharge patient may continue care with Dr. Negro Schroeder of [facility]    Thank you for the opportunity to participate in Mr. Gillis's care.    Joe Jansen PA-C  Hematology/Oncology  [facility]   [phone] (office)  [phone] (alt office)  Evenings and weekends please call MD on call or office

## 2022-04-28 NOTE — PROGRESS NOTE ADULT - PROBLEM SELECTOR PLAN 2
MAGALI on CKD 3a  Unclear Cr baseline  Creatinine downtrending  Possibly pre-renal etiology i/s/o poor PO intake prior to admission on known CKD  FeUrea 29.1% suggesting pre-renal component  F/u Kidney and bladder ultrasound  Rest of workup outpt per nephro  Appreciate nephro  recs MAGALI on CKD 3a  Unclear Cr baseline  Creatinine downtrending  Possibly pre-renal etiology i/s/o poor PO intake prior to admission on known CKD  FeUrea 29.1% suggesting pre-renal component  Kidney U/s without nephrohydrosis   Rest of workup outpt per nephro  Appreciate nephro  recs

## 2022-04-28 NOTE — PROGRESS NOTE ADULT - PROBLEM SELECTOR PLAN 3
Possibly viral illness vs volume overload status i/s/o transfusions  Leukocytosis, febrile  COVID negative in ED  F/u CXR  Send full RVP  F/u BCx  F/u ID c/s Possibly viral illness vs volume overload status i/s/o transfusions  Leukocytosis, febrile  COVID negative in ED  CXR and CT chest was non-revealing.   Send full RVP  F/u BCx  ID is following

## 2022-04-28 NOTE — PROGRESS NOTE ADULT - ASSESSMENT
77 year old gentleman with PMH of HTN, Afib on coumadin, CVA, Hodgkin's Lymphoma s/p splenectomy and chemo in 1975, essential thrombocytopenia, atrial fibrillation on coumadin, HTN, MOE, newly diagnosed anemia (b/l 7-8), aortic stenosis (pending TAVR), GERD who was sent in by hematologist dr. schroeder for anemia. Pt complains of ELLIS for past few days.   Recent Workup: which he reports were negative. Found to have low B12 and received a B12 shot recently on 4/20/2022. He did have iron deficiency and received IV iron 2/2022 but more recent testing showed normal iron levels. He underwent a bone marrow aspirate and biopsy on 4/6/2022 which showed early/mild evidence of dysplastic changes although B12 and folate deficiency or other causes of macrocytosis could not be ruled out. Does endorse dark stools in the last week, no overt hematochezia. Also found to have new CKD and supposed to see a nephrologist soon. No f/c, chest pain, abd pain, leg swelling. He was scheduled to begin Aranesp injections in the office on 4/29/2022 (pending auth). Labs done on 4/20 in the office (Hgb was 8.6) revealed a low normal ferritin level of 38 with normal Fe and TIBC. He was prescribed PO iron in the office once daily (which would explain dark stools). The patient has also had EGD, colonoscopy and capsule endoscopy in the recent past (6 months ago), which were negative. Here VSS. Pt with leukocytosis to 17 k. CXR showed Clear lungs. ID consulted for the same.       WORKUP  Rapid RVP Result: NotDetec and Procalcitonin, Serum: 0.07 (04-27)  S/P EGD/Colonoscopy/Capsule study within past 6 months  CXR: Clear    IMPRESSION  Leukocytosis  fever  Acute respiratory failure with hypoxia.  MAGALI  Hx of Hodgkin's Lymphoma s/p splenectomy and chemo in 1975        RECOMMENDATIONS  No obvious source of infection at this time.   afebrile today, isolated fever at this time.   Leukocytosis downtrended to 15, pt's baseline around 12-14 due to splenectomy    Urinalysis negative, Blood cx NTD   CXR and RVP negative, low procal, and BNP elevated at `4123 -> Currently on 2L NC  ROS and PE not revealing  monitor off abx   CT a/p and CT chest with no obvious source.    ESR/CRP       Paula Hensley  Please contact through MS Teams   If no response or past 5 pm/weekend call [phone].

## 2022-04-29 ENCOUNTER — TRANSCRIPTION ENCOUNTER (OUTPATIENT)
Age: 77
End: 2022-04-29

## 2022-04-29 VITALS — HEART RATE: 63 BPM | SYSTOLIC BLOOD PRESSURE: 141 MMHG | OXYGEN SATURATION: 98 % | DIASTOLIC BLOOD PRESSURE: 55 MMHG

## 2022-04-29 LAB
ANION GAP SERPL CALC-SCNC: 12 MMOL/L — SIGNIFICANT CHANGE UP (ref 5–17)
APTT BLD: 33.3 SEC — SIGNIFICANT CHANGE UP (ref 27.5–35.5)
BASOPHILS # BLD AUTO: 0 K/UL — SIGNIFICANT CHANGE UP (ref 0–0.2)
BASOPHILS NFR BLD AUTO: 0 % — SIGNIFICANT CHANGE UP (ref 0–2)
BUN SERPL-MCNC: 35 MG/DL — HIGH (ref 7–23)
CALCIUM SERPL-MCNC: 8.8 MG/DL — SIGNIFICANT CHANGE UP (ref 8.4–10.5)
CHLORIDE SERPL-SCNC: 104 MMOL/L — SIGNIFICANT CHANGE UP (ref 96–108)
CO2 SERPL-SCNC: 22 MMOL/L — SIGNIFICANT CHANGE UP (ref 22–31)
CREAT SERPL-MCNC: 1.39 MG/DL — HIGH (ref 0.5–1.3)
CRP SERPL-MCNC: 99 MG/L — HIGH (ref 0–4)
EGFR: 52 ML/MIN/1.73M2 — LOW
EOSINOPHIL # BLD AUTO: 0.13 K/UL — SIGNIFICANT CHANGE UP (ref 0–0.5)
EOSINOPHIL NFR BLD AUTO: 0.9 % — SIGNIFICANT CHANGE UP (ref 0–6)
ERYTHROCYTE [SEDIMENTATION RATE] IN BLOOD: 67 MM/HR — HIGH (ref 0–20)
ERYTHROCYTE [SEDIMENTATION RATE] IN BLOOD: 68 MM/HR — HIGH (ref 0–20)
GLUCOSE SERPL-MCNC: 100 MG/DL — HIGH (ref 70–99)
HCT VFR BLD CALC: 25.9 % — LOW (ref 39–50)
HGB BLD-MCNC: 8.1 G/DL — LOW (ref 13–17)
INR BLD: 1.73 RATIO — HIGH (ref 0.88–1.16)
LYMPHOCYTES # BLD AUTO: 1.73 K/UL — SIGNIFICANT CHANGE UP (ref 1–3.3)
LYMPHOCYTES # BLD AUTO: 12.2 % — LOW (ref 13–44)
MAGNESIUM SERPL-MCNC: 2.4 MG/DL — SIGNIFICANT CHANGE UP (ref 1.6–2.6)
MCHC RBC-ENTMCNC: 31.3 GM/DL — LOW (ref 32–36)
MCHC RBC-ENTMCNC: 32.3 PG — SIGNIFICANT CHANGE UP (ref 27–34)
MCV RBC AUTO: 103.2 FL — HIGH (ref 80–100)
MONOCYTES # BLD AUTO: 1.23 K/UL — HIGH (ref 0–0.9)
MONOCYTES NFR BLD AUTO: 8.7 % — SIGNIFICANT CHANGE UP (ref 2–14)
NEUTROPHILS # BLD AUTO: 11.06 K/UL — HIGH (ref 1.8–7.4)
NEUTROPHILS NFR BLD AUTO: 78.2 % — HIGH (ref 43–77)
PHOSPHATE SERPL-MCNC: 3.8 MG/DL — SIGNIFICANT CHANGE UP (ref 2.5–4.5)
PLATELET # BLD AUTO: 454 K/UL — HIGH (ref 150–400)
POTASSIUM SERPL-MCNC: 4.6 MMOL/L — SIGNIFICANT CHANGE UP (ref 3.5–5.3)
POTASSIUM SERPL-SCNC: 4.6 MMOL/L — SIGNIFICANT CHANGE UP (ref 3.5–5.3)
PROTHROM AB SERPL-ACNC: 20.2 SEC — HIGH (ref 10.5–13.4)
RBC # BLD: 2.51 M/UL — LOW (ref 4.2–5.8)
RBC # FLD: 17 % — HIGH (ref 10.3–14.5)
SODIUM SERPL-SCNC: 138 MMOL/L — SIGNIFICANT CHANGE UP (ref 135–145)
WBC # BLD: 14.14 K/UL — HIGH (ref 3.8–10.5)
WBC # FLD AUTO: 14.14 K/UL — HIGH (ref 3.8–10.5)

## 2022-04-29 PROCEDURE — 86923 COMPATIBILITY TEST ELECTRIC: CPT

## 2022-04-29 PROCEDURE — 84484 ASSAY OF TROPONIN QUANT: CPT

## 2022-04-29 PROCEDURE — 94640 AIRWAY INHALATION TREATMENT: CPT

## 2022-04-29 PROCEDURE — 83880 ASSAY OF NATRIURETIC PEPTIDE: CPT

## 2022-04-29 PROCEDURE — 82553 CREATINE MB FRACTION: CPT

## 2022-04-29 PROCEDURE — 80048 BASIC METABOLIC PNL TOTAL CA: CPT

## 2022-04-29 PROCEDURE — 82570 ASSAY OF URINE CREATININE: CPT

## 2022-04-29 PROCEDURE — 80053 COMPREHEN METABOLIC PANEL: CPT

## 2022-04-29 PROCEDURE — 82746 ASSAY OF FOLIC ACID SERUM: CPT

## 2022-04-29 PROCEDURE — 85652 RBC SED RATE AUTOMATED: CPT

## 2022-04-29 PROCEDURE — U0003: CPT

## 2022-04-29 PROCEDURE — 36415 COLL VENOUS BLD VENIPUNCTURE: CPT

## 2022-04-29 PROCEDURE — U0005: CPT

## 2022-04-29 PROCEDURE — 83735 ASSAY OF MAGNESIUM: CPT

## 2022-04-29 PROCEDURE — 0225U NFCT DS DNA&RNA 21 SARSCOV2: CPT

## 2022-04-29 PROCEDURE — 36430 TRANSFUSION BLD/BLD COMPNT: CPT

## 2022-04-29 PROCEDURE — 93306 TTE W/DOPPLER COMPLETE: CPT

## 2022-04-29 PROCEDURE — 82607 VITAMIN B-12: CPT

## 2022-04-29 PROCEDURE — 81003 URINALYSIS AUTO W/O SCOPE: CPT

## 2022-04-29 PROCEDURE — 84540 ASSAY OF URINE/UREA-N: CPT

## 2022-04-29 PROCEDURE — 84145 PROCALCITONIN (PCT): CPT

## 2022-04-29 PROCEDURE — 71045 X-RAY EXAM CHEST 1 VIEW: CPT

## 2022-04-29 PROCEDURE — 93005 ELECTROCARDIOGRAM TRACING: CPT

## 2022-04-29 PROCEDURE — 83010 ASSAY OF HAPTOGLOBIN QUANT: CPT

## 2022-04-29 PROCEDURE — 71260 CT THORAX DX C+: CPT

## 2022-04-29 PROCEDURE — 84100 ASSAY OF PHOSPHORUS: CPT

## 2022-04-29 PROCEDURE — 86985 SPLIT BLOOD OR PRODUCTS: CPT

## 2022-04-29 PROCEDURE — 86140 C-REACTIVE PROTEIN: CPT

## 2022-04-29 PROCEDURE — 82550 ASSAY OF CK (CPK): CPT

## 2022-04-29 PROCEDURE — 82728 ASSAY OF FERRITIN: CPT

## 2022-04-29 PROCEDURE — 86901 BLOOD TYPING SEROLOGIC RH(D): CPT

## 2022-04-29 PROCEDURE — 85730 THROMBOPLASTIN TIME PARTIAL: CPT

## 2022-04-29 PROCEDURE — 85610 PROTHROMBIN TIME: CPT

## 2022-04-29 PROCEDURE — 85025 COMPLETE CBC W/AUTO DIFF WBC: CPT

## 2022-04-29 PROCEDURE — 82272 OCCULT BLD FECES 1-3 TESTS: CPT

## 2022-04-29 PROCEDURE — 97161 PT EVAL LOW COMPLEX 20 MIN: CPT

## 2022-04-29 PROCEDURE — 99285 EMERGENCY DEPT VISIT HI MDM: CPT

## 2022-04-29 PROCEDURE — 76770 US EXAM ABDO BACK WALL COMP: CPT

## 2022-04-29 PROCEDURE — 86850 RBC ANTIBODY SCREEN: CPT

## 2022-04-29 PROCEDURE — 74177 CT ABD & PELVIS W/CONTRAST: CPT

## 2022-04-29 PROCEDURE — 83540 ASSAY OF IRON: CPT

## 2022-04-29 PROCEDURE — 86900 BLOOD TYPING SEROLOGIC ABO: CPT

## 2022-04-29 PROCEDURE — 83615 LACTATE (LD) (LDH) ENZYME: CPT

## 2022-04-29 PROCEDURE — P9011: CPT

## 2022-04-29 PROCEDURE — 87086 URINE CULTURE/COLONY COUNT: CPT

## 2022-04-29 PROCEDURE — 83550 IRON BINDING TEST: CPT

## 2022-04-29 PROCEDURE — 87040 BLOOD CULTURE FOR BACTERIA: CPT

## 2022-04-29 PROCEDURE — 85027 COMPLETE CBC AUTOMATED: CPT

## 2022-04-29 PROCEDURE — P9016: CPT

## 2022-04-29 RX ORDER — WARFARIN SODIUM 2.5 MG/1
2 TABLET ORAL ONCE
Refills: 0 | Status: COMPLETED | OUTPATIENT
Start: 2022-04-29 | End: 2022-04-29

## 2022-04-29 RX ORDER — FUROSEMIDE 40 MG
2 TABLET ORAL
Qty: 0 | Refills: 0 | DISCHARGE

## 2022-04-29 RX ORDER — ATENOLOL 25 MG/1
0 TABLET ORAL
Qty: 0 | Refills: 2 | DISCHARGE

## 2022-04-29 RX ADMIN — Medication 1 MILLIGRAM(S): at 11:37

## 2022-04-29 RX ADMIN — PANTOPRAZOLE SODIUM 40 MILLIGRAM(S): 20 TABLET, DELAYED RELEASE ORAL at 06:18

## 2022-04-29 RX ADMIN — Medication 325 MILLIGRAM(S): at 11:19

## 2022-04-29 RX ADMIN — Medication 500 MILLIGRAM(S): at 11:19

## 2022-04-29 RX ADMIN — WARFARIN SODIUM 2 MILLIGRAM(S): 2.5 TABLET ORAL at 11:00

## 2022-04-29 RX ADMIN — Medication 2 SPRAY(S): at 06:18

## 2022-04-29 RX ADMIN — ATENOLOL 25 MILLIGRAM(S): 25 TABLET ORAL at 06:18

## 2022-04-29 RX ADMIN — Medication 100 MILLIGRAM(S): at 11:20

## 2022-04-29 RX ADMIN — Medication 81 MILLIGRAM(S): at 11:19

## 2022-04-29 NOTE — PROGRESS NOTE ADULT - SUBJECTIVE AND OBJECTIVE BOX
DATE OF SERVICE: 22 @ 06:35    Patient is a 77y old  Male who presents with a chief complaint of     SUBJECTIVE / OVERNIGHT EVENTS: NAEO. Patient afebrile o/n. Patient feels     MEDICATIONS  (STANDING):  allopurinol 100 milliGRAM(s) Oral daily  ascorbic acid 500 milliGRAM(s) Oral daily  aspirin enteric coated 81 milliGRAM(s) Oral daily  ATENolol  Tablet 25 milliGRAM(s) Oral daily  atorvastatin 80 milliGRAM(s) Oral at bedtime  epoetin macrina-epbx (RETACRIT) Injectable 61806 Unit(s) SubCutaneous every 7 days  ferrous    sulfate 325 milliGRAM(s) Oral daily  fluticasone propionate 50 MICROgram(s)/spray Nasal Spray 2 Spray(s) Both Nostrils two times a day  folic acid 1 milliGRAM(s) Oral daily  latanoprost 0.005% Ophthalmic Solution 1 Drop(s) Right EYE at bedtime  pantoprazole    Tablet 40 milliGRAM(s) Oral before breakfast  tamsulosin 0.8 milliGRAM(s) Oral at bedtime    MEDICATIONS  (PRN):      Vital Signs Last 24 Hrs  T(C): 36.8 (2022 04:31), Max: 37.2 (2022 21:08)  T(F): 98.3 (2022 04:31), Max: 98.9 (2022 21:08)  HR: 79 (2022 04:31) (68 - 79)  BP: 134/64 (2022 04:31) (128/59 - 134/64)  BP(mean): --  RR: 18 (2022 04:31) (18 - 18)  SpO2: 95% (2022 04:31) (95% - 98%)  CAPILLARY BLOOD GLUCOSE        I&O's Summary    2022 07:01  -  2022 07:00  --------------------------------------------------------  IN: 100 mL / OUT: 0 mL / NET: 100 mL        PHYSICAL EXAM:  GENERAL: No acute distress, well-developed  HEAD:  Atraumatic, Normocephalic  EYES: EOMI, PERRLA, conjunctiva and sclera clear  NECK: Supple, no lymphadenopathy, no JVD  CHEST/LUNG: CTAB; No wheezes, rales, or rhonchi  HEART: Regular rate and rhythm; No murmurs, rubs, or gallops  ABDOMEN: Soft, non-tender, non-distended; normal bowel sounds, no organomegaly  EXTREMITIES:  2+ peripheral pulses b/l, No clubbing, cyanosis, or edema  NEUROLOGY: A&O x 3, no focal deficits  SKIN: No rashes or lesions    LABS:                        8.3    15.37 )-----------( 444      ( 2022 07:05 )             26.0     -    139  |  102  |  36<H>  ----------------------------<  91  4.3   |  24  |  1.24    Ca    8.9      2022 07:10  Phos  3.8       Mg     2.4         TPro  6.3  /  Alb  3.5  /  TBili  0.3  /  DBili  x   /  AST  24  /  ALT  17  /  AlkPhos  75  -    PT/INR - ( 2022 07:11 )   PT: 21.8 sec;   INR: 1.88 ratio               Urinalysis Basic - ( 2022 17:53 )    Color: Light Yellow / Appearance: Clear / S.017 / pH: x  Gluc: x / Ketone: Negative  / Bili: Negative / Urobili: Negative   Blood: x / Protein: Negative / Nitrite: Negative   Leuk Esterase: Negative / RBC: x / WBC x   Sq Epi: x / Non Sq Epi: x / Bacteria: x        RADIOLOGY & ADDITIONAL TESTS:    Imaging Personally Reviewed:    Consultant(s) Notes Reviewed:      Care Discussed with Consultants/Other Providers:   DATE OF SERVICE: 22 @ 06:35    Patient is a 77y old  Male who presents with a chief complaint of     SUBJECTIVE / OVERNIGHT EVENTS: NAEO. Patient afebrile o/n. Patient feels better this AM. Says SOB improving, now on RA. Denies CP, fevers/chills, N/V/D.    MEDICATIONS  (STANDING):  allopurinol 100 milliGRAM(s) Oral daily  ascorbic acid 500 milliGRAM(s) Oral daily  aspirin enteric coated 81 milliGRAM(s) Oral daily  ATENolol  Tablet 25 milliGRAM(s) Oral daily  atorvastatin 80 milliGRAM(s) Oral at bedtime  epoetin macrina-epbx (RETACRIT) Injectable 12375 Unit(s) SubCutaneous every 7 days  ferrous    sulfate 325 milliGRAM(s) Oral daily  fluticasone propionate 50 MICROgram(s)/spray Nasal Spray 2 Spray(s) Both Nostrils two times a day  folic acid 1 milliGRAM(s) Oral daily  latanoprost 0.005% Ophthalmic Solution 1 Drop(s) Right EYE at bedtime  pantoprazole    Tablet 40 milliGRAM(s) Oral before breakfast  tamsulosin 0.8 milliGRAM(s) Oral at bedtime    MEDICATIONS  (PRN):      Vital Signs Last 24 Hrs  T(C): 36.8 (2022 04:31), Max: 37.2 (2022 21:08)  T(F): 98.3 (2022 04:31), Max: 98.9 (2022 21:08)  HR: 79 (2022 04:31) (68 - 79)  BP: 134/64 (2022 04:31) (128/59 - 134/64)  BP(mean): --  RR: 18 (2022 04:31) (18 - 18)  SpO2: 95% (2022 04:31) (95% - 98%)  CAPILLARY BLOOD GLUCOSE        I&O's Summary    2022 07:01  -  2022 07:00  --------------------------------------------------------  IN: 100 mL / OUT: 0 mL / NET: 100 mL        PHYSICAL EXAM:  GENERAL: No acute distress, well-developed  HEAD:  Atraumatic, Normocephalic  EYES: EOMI, PERRLA, conjunctiva and sclera clear  NECK: Supple, no lymphadenopathy, no JVD  CHEST/LUNG: CTAB; No wheezes, rales, or rhonchi  HEART: Regular rate and rhythm; No murmurs, rubs, or gallops  ABDOMEN: Soft, non-tender, non-distended; normal bowel sounds, no organomegaly  EXTREMITIES:  2+ peripheral pulses b/l, No clubbing, cyanosis, or edema  NEUROLOGY: A&O x 3, no focal deficits  SKIN: No rashes or lesions    LABS:                        8.3    15.37 )-----------( 444      ( 2022 07:05 )             26.0     -    139  |  102  |  36<H>  ----------------------------<  91  4.3   |  24  |  1.24    Ca    8.9      2022 07:10  Phos  3.8     -  Mg     2.4     -    TPro  6.3  /  Alb  3.5  /  TBili  0.3  /  DBili  x   /  AST  24  /  ALT  17  /  AlkPhos  75  -28    PT/INR - ( 2022 07:11 )   PT: 21.8 sec;   INR: 1.88 ratio               Urinalysis Basic - ( 2022 17:53 )    Color: Light Yellow / Appearance: Clear / S.017 / pH: x  Gluc: x / Ketone: Negative  / Bili: Negative / Urobili: Negative   Blood: x / Protein: Negative / Nitrite: Negative   Leuk Esterase: Negative / RBC: x / WBC x   Sq Epi: x / Non Sq Epi: x / Bacteria: x        RADIOLOGY & ADDITIONAL TESTS:    Imaging Personally Reviewed:    Consultant(s) Notes Reviewed:      Care Discussed with Consultants/Other Providers:

## 2022-04-29 NOTE — DISCHARGE NOTE PROVIDER - CARE PROVIDERS DIRECT ADDRESSES
,DirectAddress_Unknown,DirectAddress_Unknown ,DirectAddress_Unknown,[email or web address]

## 2022-04-29 NOTE — DISCHARGE NOTE PROVIDER - NSDCCPCAREPLAN_GEN_ALL_CORE_FT
PRINCIPAL DISCHARGE DIAGNOSIS  Diagnosis: Symptomatic anemia  Assessment and Plan of Treatment: You came to the hospital because you had anemia that caused you to feel weak and short of breath. There are many causes of anemia including bleeding causing a defiency in iron, deficiency in vitamins, breakdown of blood cells because of an autoimmune process or because of a small valve in the heart, kidney problems, problems with the bone marrow etc. The hematology oncology team saw you and recommended some additional labs to rule out some causes. You did receive 2 units of red blood cells and iron supplementation as well as an injection that stimulates the growth of red blood cells. Your blood counts were stable. You will need to follow up with Dr. Goel and Dr. Milner to continue the workup for the anemia.   Please follow up with Dr. Goel to discuss the need for Aranesp (a medication that can help with anemia).  If you feel short of breath, weakness, notice blood in the stool or in the urine, have fainting spells or generally feel unwell please go see your primary care provider or return to the hospital.      SECONDARY DISCHARGE DIAGNOSES  Diagnosis: MAGALI (acute kidney injury)  Assessment and Plan of Treatment: You were noted to have decreased kidney function here in the hospital. You were diagnosed with chronic kidney disease. You had poor appetite prior to coming to the hospital, and this can affect the kidneys and worsen kidney function. The kidney numbers improved. Please continue to stay hydrated. Please follow up with Dr. Milner to monitor your kidney function.     PRINCIPAL DISCHARGE DIAGNOSIS  Diagnosis: Symptomatic anemia  Assessment and Plan of Treatment: You came to the hospital because you had anemia that caused you to feel weak and short of breath. There are many causes of anemia including bleeding causing a defiency in iron, deficiency in vitamins, breakdown of blood cells because of an autoimmune process or because of a small valve in the heart, kidney problems, problems with the bone marrow etc. The hematology oncology team saw you and recommended some additional labs to rule out some causes. You did receive 2 units of red blood cells and iron supplementation as well as an injection that stimulates the growth of red blood cells. Your blood counts were stable. You will need to follow up with Dr. Goel and Dr. Milner to continue the workup for the anemia.   Please follow up with Dr. Goel to discuss the need for Aranesp (a medication that can help with anemia).  If you feel short of breath, weakness, notice blood in the stool or in the urine, have fainting spells or generally feel unwell please go see your primary care provider or return to the hospital.      SECONDARY DISCHARGE DIAGNOSES  Diagnosis: MAGALI (acute kidney injury)  Assessment and Plan of Treatment: You were noted to have decreased kidney function here in the hospital. You were diagnosed with chronic kidney disease. You had poor appetite prior to coming to the hospital, and this can affect the kidneys and worsen kidney function. The kidney numbers improved. Please continue to stay hydrated. Please follow up with Dr. Adam to monitor your kidney function.

## 2022-04-29 NOTE — PROGRESS NOTE ADULT - PROBLEM SELECTOR PLAN 2
MAGALI on CKD 3a  Unclear Cr baseline  Creatinine downtrending  Possibly pre-renal etiology i/s/o poor PO intake prior to admission on known CKD  FeUrea 29.1% suggesting pre-renal component  Kidney U/s without nephrohydrosis   Rest of workup outpt per nephro  Appreciate nephro  recs

## 2022-04-29 NOTE — DISCHARGE NOTE PROVIDER - NSDCCPTREATMENT_GEN_ALL_CORE_FT
PRINCIPAL PROCEDURE  Procedure: CT chest, abdomen and pelvis  Findings and Treatment: IMPRESSION:  Colonic diverticulosis without evidence of diverticulitis.      SECONDARY PROCEDURE  Procedure: Transthoracic echo  Findings and Treatment: Conclusions:  EF (Marshall Rule): 52 %Doppler Peak Velocity (m/sec):  AoV=3.1  1. Normal mitral valve. Minimal mitral regurgitation.  2. Calcified trileaflet aortic valve with decreased  opening. Peak transaortic valve gradient equals 38 mm Hg,  mean transaortic valve gradient equals 24 mm Hg, the DI is  0.18,  estimated aortic valve area equals 0.6 sqcm (by  continuity equation), aortic valve velocity time integral  equals 72 cm, consistent with severe aortic stenosis.  Minimal aortic regurgitation.  3. Endocardial visualization enhanced with intravenous  injection of Ultrasonic Enhancing Agent (Lumason). Normal  left ventricular systolic function. No segmental wall  motion abnormalities. Septal motion consistent with  conduction defect. No left ventricular thrombus.  4. Normal diastolic function  5. Right ventricular enlargement with normal right  ventricular systolic function.  6. Thickend pericardium with trace pericardial effusion.  *** Compared with echocardiogram of 11/17/2021, the LVOT  VTI is lower on the current study with subsequent HAILE  calculations now equaling severe. Consider further  evaluation of the AV with CT scan or Dobutamine stress  echo.

## 2022-04-29 NOTE — CHART NOTE - NSCHARTNOTEFT_GEN_A_CORE
Pt with no fever, All cultures negative to date.   Imaging with no source of infection, WBC back to baseline without any intervention from ID perspective.   Will sign off, please call with questions.       Paula Hensley  Please contact through MS Teams   If no response or past 5 pm/weekend call [phone].

## 2022-04-29 NOTE — DISCHARGE NOTE PROVIDER - NSDCFUADDAPPT_GEN_ALL_CORE_FT
APPTS ARE READY TO BE MADE: [X] YES    Best Family or Patient Contact (if needed): [phone]    Additional Information about above appointments (if needed):    1: Negro Goel Heme/onc within 1 week  2:   3:     Other comments or requests:    APPTS ARE READY TO BE MADE: [X] YES    Best Family or Patient Contact (if needed): [phone]    Additional Information about above appointments (if needed):    1: Negro Goel/onc within 1 week  2:   3:     Other comments or requests:   										                           Patient was provided with the names of the providers on this discharge note and their    information. Patient was advised to call to schedule follow up within specified time frame.

## 2022-04-29 NOTE — DISCHARGE NOTE PROVIDER - NSDCMRMEDTOKEN_GEN_ALL_CORE_FT
allopurinol 100 mg oral tablet: 1 tab(s) orally once a day  aspirin 81 mg oral delayed release tablet: 1 tab(s) orally once a day  ATENOLOL 25 MG TABLET: each orally once a day  calcium: 2 tab(s) orally once a day  Coumadin 1 mg oral tablet: 1 tab(s) orally once a day Tues Thurs Sat Sun only.  Crestor 20 mg oral tablet: 0.5 tab(s) orally once a day  Flonase: 1 spray(s) nasal once a day  iron: 1 tab(s) orally once a day  Lasix 40 mg oral tablet: 2 tab(s) orally 4 times a week Tue,Thu,Sat,Sun  Lasix 40 mg oral tablet: 1 tab(s) orally once a day  latanoprost 0.005% ophthalmic solution: 1 drop(s) to the right eye once a day (in the evening)  lisinopril 5 mg oral tablet: 1 tab(s) orally once a day  omeprazole 20 mg oral delayed release capsule: 1 cap(s) orally once a day  spironolactone 25 mg oral tablet: 0.5 tab(s) orally once a day  tamsulosin 0.4 mg oral capsule: 2 cap(s) orally once a day (at bedtime)  Vitamin B-12: 1 dose(s) injectable once a month    Note:Gets from MD office  Vitamin C: 1 tab(s) orally once a day  warfarin 2 mg oral tablet: 1 tab(s) orally once a day Mon-Friday.     allopurinol 100 mg oral tablet: 1 tab(s) orally once a day  aspirin 81 mg oral delayed release tablet: 1 tab(s) orally once a day  ATENOLOL 25 MG TABLET: 1 tab(s) orally once a day  calcium: 2 tab(s) orally once a day  Coumadin 1 mg oral tablet: 1 tab(s) orally once a day Tues Thurs Sat Sun only.  Crestor 20 mg oral tablet: 0.5 tab(s) orally once a day  Flonase: 1 spray(s) nasal once a day  iron: 1 tab(s) orally once a day  Lasix 40 mg oral tablet: 1 tab(s) orally once a day  latanoprost 0.005% ophthalmic solution: 1 drop(s) to the right eye once a day (in the evening)  lisinopril 5 mg oral tablet: 1 tab(s) orally once a day  omeprazole 20 mg oral delayed release capsule: 1 cap(s) orally once a day  spironolactone 25 mg oral tablet: 0.5 tab(s) orally once a day  tamsulosin 0.4 mg oral capsule: 2 cap(s) orally once a day (at bedtime)  Vitamin B-12: 1 dose(s) injectable once a month    Note:Gets from MD office  Vitamin C: 1 tab(s) orally once a day  warfarin 2 mg oral tablet: 1 tab(s) orally once a day Tak-[id number]-[id number].

## 2022-04-29 NOTE — DISCHARGE NOTE NURSING/CASE MANAGEMENT/SOCIAL WORK - PATIENT PORTAL LINK FT
You can access the FollowMyHealth Patient Portal offered by [facility] by registering at the following website: [email or web address]. By joining [employer]’s FollowMyHealth portal, you will also be able to view your health information using other applications (apps) compatible with our system.

## 2022-04-29 NOTE — PROGRESS NOTE ADULT - PROBLEM SELECTOR PLAN 6
Hodgkin's Lymphoma s/p splenectomy and chemo in 1975  F/u Heme/onc recs
On lisinopril, spironolactone, atenolol, Lasix outpt  Patient Hypertensive in ED  Restart atenolol  Hold lisinopril, lasix, spironolactone i/s/o MAGALI
On lisinopril, spironolactone, atenolol, Lasix outpt  Patient Hypertensive in ED  Restart atenolol  Hold lisinopril, lasix, spironolactone i/s/o MAGALI

## 2022-04-29 NOTE — DISCHARGE NOTE NURSING/CASE MANAGEMENT/SOCIAL WORK - NSDCPEFALRISK_GEN_ALL_CORE
For information on Fall & Injury Prevention, visit: [email or web address] OR  [email or web address] OR  https://www.cdc.gov/steadi/patient.html

## 2022-04-29 NOTE — PROGRESS NOTE ADULT - PROBLEM SELECTOR PLAN 1
Likely multifactorial i/s/o possible GI bleed, iron deficiency, B12 deficiency, CKD, ?AS causing Macroangiopathic hemolytic anemia  Patient noted to have pallor, dyspnea at home  Hgb 7.2 to 7 in ED  Patient endorses dark stools for past week, on warfarin for Afib  BM Bx 4/6/2022 showed early/mild evidence of dysplastic changes although B12/folate deficiency not ruled out  Outpt work up revealed CKD, B12 deficiency (s/p B12 injection 4/20) pending auth for Aranesp injections  On outpt labs low normal ferritin level of 38 with normal Fe and TIBC  Iron 191, TIBC 351, ferritin 76  Send B12, folate   Active T+S  Transfuse Hgb>7  C/w Iron 325 mg PO daily  Start Retacrit 10,000 units weekly, plan to d/c on Aranesp  S/p 1/2u pRBC x 2   Appreciate heme/onc recs Likely multifactorial i/s/o possible GI bleed, iron deficiency, B12 deficiency, CKD, ?AS causing Macroangiopathic hemolytic anemia  Patient noted to have pallor, dyspnea at home  Hgb 7.2 to 7 in ED  Patient endorses dark stools for past week, on warfarin for Afib  BM Bx 4/6/2022 showed early/mild evidence of dysplastic changes although B12/folate deficiency not ruled out  Outpt work up revealed CKD, B12 deficiency (s/p B12 injection 4/20) pending auth for Aranesp injections  On outpt labs low normal ferritin level of 38 with normal Fe and TIBC  Iron 191, TIBC 351, ferritin 76  B12, folate wnl  Active T+S  Transfuse Hgb>7  C/w Iron 325 mg PO daily  Start Retacrit 10,000 units weekly, plan to d/c on Aranesp  S/p 1/2u pRBC x 2   Appreciate heme/onc recs

## 2022-04-29 NOTE — DISCHARGE NOTE NURSING/CASE MANAGEMENT/SOCIAL WORK - NSDCFUADDAPPT_GEN_ALL_CORE_FT
APPTS ARE READY TO BE MADE: [X] YES    Best Family or Patient Contact (if needed): [phone]    Additional Information about above appointments (if needed):    1: Negro Goel Heme/onc within 1 week  2:   3:     Other comments or requests:

## 2022-04-29 NOTE — PROGRESS NOTE ADULT - PROBLEM SELECTOR PLAN 5
On lisinopril, spironolactone, atenolol, Lasix outpt  Patient Hypertensive in ED  Restart atenolol  Hold lisinopril, lasix, spironolactone i/s/o MAGALI

## 2022-04-29 NOTE — DISCHARGE NOTE NURSING/CASE MANAGEMENT/SOCIAL WORK - NSDCPEPTCOWAFU_GEN_ALL_CORE
Go for blood tests as directed. Because your dose is based on the PT/INR blood test, it is very important that you get your blood tested on the scheduled date and time and to keep your health care provider appointments.   Please follow up with your doctor within 3 days of discharge to schedule your next blood test. no

## 2022-04-29 NOTE — PROGRESS NOTE ADULT - ASSESSMENT
77M PMH HTN, Afib on coumadin, CVA, RLE DVT, Hodgkin's Lymphoma s/p splenectomy and chemo in 1975, essential thrombocytopenia, atrial fibrillation on coumadin, HTN, MOE, newly diagnosed anemia (b/l 7-8), aortic stenosis (pending TAVR), GERD sent in by hematologist Dr. Goel for symptomatic anemia i/s/o dark stools, found to have temperature of 100.3F oral i/s/o hypoxia 77M PMH HTN, Afib on coumadin, CVA, RLE DVT, Hodgkin's Lymphoma s/p splenectomy and chemo in 1975, essential thrombocytopenia, atrial fibrillation on coumadin, HTN, MOE, newly diagnosed anemia (b/l 7-8), aortic stenosis (pending TAVR), GERD sent in by hematologist Dr. Goel for symptomatic anemia i/s/o dark stools, found to have temperature of 100.3F oral i/s/o hypoxia, hypoxia improved fever curve stable

## 2022-04-29 NOTE — PROGRESS NOTE ADULT - PROBLEM SELECTOR PLAN 3
Possibly viral illness vs volume overload status i/s/o transfusions  Leukocytosis, febrile  COVID negative in ED  CXR and CT chest was non-revealing.   Send full RVP  F/u BCx  ESR/CRP = 68/125  ID is following Possibly viral illness vs volume overload status i/s/o transfusions  Leukocytosis, febrile  COVID negative in ED  CXR and CT chest was non-revealing.   RVP wnl  BCx NGTD  ESR/CRP = 68/125  ID is following

## 2022-04-29 NOTE — PROGRESS NOTE ADULT - ASSESSMENT
77M PMH HTN, Afib on coumadin, CVA, newly diagnosed anemia (b/l 7-8), aortic stenosis (supposed to receive a TAVR soon), GERD sent in by hematologist dr. schroeder for anemia. Pt complains of ELLIS for past few days. He had a colonoscopy/endoscopy 6 months ago which he reports were negative. Found to have low B12 and received a B12 shot recently. Does endorse dark stools for past few months including currently. Also found to have new CKD and supposed to see a nephrologist soon. No f/c, chest pain, abd pain, leg swelling    Hematology/Oncology called to see patient who is under  care by Dr. Negro Schroeder for a history of Hodgkin's Lymphoma s/p splenectomy and chemotherapy in 1975), essential thrombocythemia and worsening macrocytic anemia. Workup was done - he did have iron deficiency and received IV iron 2/2022 but more recent testing showed normal iron levels. He underwent a bone marrow aspirate and biopsy on 4/6/2022 which showed early/mild evidence of dysplastic changes although B12 and folate deficiency or other causes of macrocytosis could not be ruled out. Additional workup showed chronic kidney disease and B12 deficiency (he received a B12 injection on 4/20/2022). He was scheduled to begin Aranesp injections in the office on 4/29/2022 (pending auth). Labs done on 4/20 in the office (Hgb was 8.6) revealed a low normal ferritin level of 38 with normal Fe and TIBC. He was prescribed PO iron in the office once daily (which would explain dark stools). The patient has also had EGD, colonoscopy and capsule endoscopy in the recent past, which were negative.     Anemia  --Under care by Dr. Negro Schroeder of [facility]  --Work-up positive for B12 deficiency, chronic kidney disease and mild iron deficiency. Also found to have low normal folate levels  --Started Retacrit 10,000 units weekly while in hospital and patent may start Aranesp after discharge  --B12 is monthly - he received his first dose on 4/20 - not due for another yet  --Please continue PO iron 325 mg daily  --Have added Folic Acid 1 mg daily   --Please transfuse PRBC's for Hgb <7.0 grams (or 8.0 grams if patient is symptomatic or advised by Cardiology)    Essential Thrombocythemia  --Also under care by Dr. Schroeder  --Splenectomy could also contribute to thrombocytosis  --On ASA only    Leukocytosis, Fever  --Patient's baseline WBC is normal  --Patient is S/P splenectomy which could cause reactive leukocytosis  --Concern for acute infection  --Cultures negative  --ID monitoring off antibiotics    Chronic Kidney Disease  --Recommend Nephrology consultation    Atrial Fibrillation, Hx CVA's  --Patient on Warfarin - currently on hold  --Management per Cardiology/Primary team    Hx CVA  --ASA, Warfarin have been resumed    Guaiac Positive  --On FOBT  --S/P EGD/Colonoscopy/Capsule study within past 6 months  --CT positive for Diverticulosis  --Could still consider GI input if indicated    After discharge patient may continue care with Dr. Negro Schroeder of [facility]    Thank you for the opportunity to participate in Mr. Gillis's care.    Joe Jansen PA-C  Hematology/Oncology  [facility]   [phone] (office)  [phone] (alt office)  Evenings and weekends please call MD on call or office

## 2022-04-29 NOTE — DISCHARGE NOTE PROVIDER - NSDCFUSCHEDAPPT_GEN_ALL_CORE_FT
[facility]  Cardio Electro [address]  Scheduled Appointment: 05/03/2022    Yumiko Mejia  [facility]  Cardio [address]  Scheduled Appointment: 05/24/2022    Jimi Martins  [facility]  Med Gastro [address]  Scheduled Appointment: 05/24/2022     Yumiko Mejia  [facility]  Cardio [address]  Scheduled Appointment: 05/24/2022    Jimi Martins  [facility]  Med Gastro [address]  Scheduled Appointment: 05/24/2022    [facility]  ELECTROPH [address]  Scheduled Appointment: 08/04/2022

## 2022-04-29 NOTE — PHYSICAL THERAPY INITIAL EVALUATION ADULT - PERTINENT HX OF CURRENT PROBLEM, REHAB EVAL
77M PMH HTN, Afib on coumadin, CVA, RLE DVT, Hodgkin's Lymphoma s/p splenectomy and chemo in 1975, essential thrombocytopenia, atrial fibrillation, HTN, MOE, newly diagnosed anemia (b/l 7-8), aortic stenosis (pending TAVR), GERD sent in by hematologist for symptomatic anemia i/s/o dark stools, found to have temperature of 100.3F oral i/s/o hypoxia. 4/25/22:Chest xray: Clear lungs 4/28/22: CT chest: Patent airways, CT abdomen: Colonic diverticulosis without evidence of diverticulitis.

## 2022-04-29 NOTE — PROGRESS NOTE ADULT - SUBJECTIVE AND OBJECTIVE BOX
Patient is a 77y old  Male who presents with a chief complaint of     Patient seen this morning. Still having mild exertional dyspnea. No GIB seen.    MEDICATIONS  (STANDING):  allopurinol 100 milliGRAM(s) Oral daily  ascorbic acid 500 milliGRAM(s) Oral daily  aspirin enteric coated 81 milliGRAM(s) Oral daily  ATENolol  Tablet 25 milliGRAM(s) Oral daily  atorvastatin 80 milliGRAM(s) Oral at bedtime  epoetin macrina-epbx (RETACRIT) Injectable 29916 Unit(s) SubCutaneous every 7 days  ferrous    sulfate 325 milliGRAM(s) Oral daily  fluticasone propionate 50 MICROgram(s)/spray Nasal Spray 2 Spray(s) Both Nostrils two times a day  folic acid 1 milliGRAM(s) Oral daily  latanoprost 0.005% Ophthalmic Solution 1 Drop(s) Right EYE at bedtime  pantoprazole    Tablet 40 milliGRAM(s) Oral before breakfast  tamsulosin 0.8 milliGRAM(s) Oral at bedtime    MEDICATIONS  (PRN):      ROS  No fever, sweats, chills  No epistaxis, HA, sore throat  Mild ELLIS  No n/v/d, abd pain, melena, hematochezia  No edema  No rash  No anxiety  No back pain, joint pain  No bleeding, bruising  No dysuria, hematuria    Vital Signs Last 24 Hrs  T(C): 37 (29 Apr 2022 11:42), Max: 37.2 (28 Apr 2022 21:08)  T(F): 98.6 (29 Apr 2022 11:42), Max: 98.9 (28 Apr 2022 21:08)  HR: 63 (29 Apr 2022 11:46) (63 - 79)  BP: 141/55 (29 Apr 2022 11:46) (128/59 - 141/55)  BP(mean): --  RR: 18 (29 Apr 2022 11:42) (18 - 18)  SpO2: 98% (29 Apr 2022 11:46) (95% - 98%)    PE  NAD  Awake, alert  Anicteric, MMM  No c/c/e  No rash grossly                            8.1    14.14 )-----------( 454      ( 29 Apr 2022 06:56 )             25.9       04-29    138  |  104  |  35<H>  ----------------------------<  100<H>  4.6   |  22  |  1.39<H>    Ca    8.8      29 Apr 2022 06:58  Phos  3.8     04-29  Mg     2.4     04-29    TPro  6.3  /  Alb  3.5  /  TBili  0.3  /  DBili  x   /  AST  24  /  ALT  17  /  AlkPhos  75  04-28      ACC: [id number] EXAM:  US KIDNEYS AND BLADDER                          PROCEDURE DATE:  04/28/2022          INTERPRETATION:  CLINICAL INFORMATION: Acute kidney injury, evaluate for   hydronephrosis.    COMPARISON: CT chest abdomen pelvis from the same date.    TECHNIQUE: Sonography of the kidneys and bladder.    FINDINGS:  Right kidney: 11.8 cm. No renal mass, hydronephrosis or calculi.    Left kidney: 11.5 cm. No hydronephrosis. There is a 1.2 cm lower pole   parapelvic cyst.    Urinary bladder: Within normal limits.    IMPRESSION:  No hydronephrosis.      ACC: [id number] EXAM:  CT ABDOMEN AND PELVIS IC                        ACC: [id number] EXAM:  CT CHEST IC                          PROCEDURE DATE:  04/28/2022          INTERPRETATION:  CLINICAL INFORMATION: Leukocytosis and fever of unknown   etiology    COMPARISON: CT chest/abdomen/pelvis 11/14/2018    CONTRAST/COMPLICATIONS:  IV Contrast: Omnipaque 350 (accession [id number]), IV contrast documented   in associated exam (accession [id number])  90 cc administered   10 cc   discarded  Oral Contrast: NONE  Complications: None reported at time of study completion    PROCEDURE:  CT of the Chest, Abdomen and Pelvis was performed.  Sagittal and coronal reformats were performed.    FINDINGS:  CHEST:  LUNGS AND LARGE AIRWAYS: Patent central airways. No pulmonary nodules.  PLEURA: No pleural effusion.  VESSELS: Atherosclerotic changes of the aorta and coronary arteries.  HEART: Heart size is normal. No pericardial effusion. Micra pacemaker in   place.  MEDIASTINUM AND TRESSA: No lymphadenopathy.  CHEST WALL AND LOWER NECK: Left axillary lymph node measures 1.6 x 1.2 cm   (3:26), unchanged in size since 11/14/2018. Gynecomastia.    ABDOMEN AND PELVIS:  LIVER: Cirrhotic morphology.  BILE DUCTS: Normal caliber.  GALLBLADDER: Cholelithiasis.  SPLEEN: Splenectomy.  PANCREAS: Within normal limits.  ADRENALS: Within normal limits.  KIDNEYS/URETERS: Left upper pole hypodense lesion too small to   characterize.    BLADDER: Within normal limits.  REPRODUCTIVE ORGANS: Prostate within normal limits.    BOWEL: Colonic diverticulosis without evidence of diverticulitis. No   bowel obstruction. Appendix is normal.  PERITONEUM: No ascites.  VESSELS: Atherosclerotic changes.  RETROPERITONEUM/LYMPH NODES: No lymphadenopathy.  ABDOMINAL WALL: Small fat-containing umbilical and bilateral inguinal   hernias.  BONES: Degenerative changes. L3-L4 posterior spinal fusion hardware   intact. Right hip arthroplasty.    IMPRESSION:  Colonic diverticulosis without evidence of diverticulitis.

## 2022-04-29 NOTE — PROGRESS NOTE ADULT - NS ATTEND AMEND GEN_ALL_CORE FT
76 y/o male with history of hodgkin's lymphoma, essential thrombocytosis, admitted with anemia and dyspnea.    - On iron, vitamin b12, folate supplement. On MEGAN.  - Continue aspirin for ET.  - CT imaging notable for diverticulosis.   - Monitor CBC and transfuse to HGB goal of 7.    Jason Gibson MD  Hematology/Oncology  O: [phone]
Agree with above
I have fully participated in the care of this patient. have made amendments to the documentation where necessary, and agree with the history, physical exam, and plan as documented by the ACP.     . luekocytosis/anemia.   likely infectious etiology  fu ID recs  r/o GIB f/u GI recs.    Thank you for the consultation    Jignesh Jordan MD  Hematology/Oncology  [phone]

## 2022-04-29 NOTE — PROGRESS NOTE ADULT - PROBLEM SELECTOR PLAN 8
DVT PPx: SCDs, on warfarin for afib    Diet: DASH    Dispo: pending clinical course
Patient of Warfarin  Send coags daily goal INR 2-3  C/w warfarin dose daily
Patient of Warfarin  Send coags daily goal INR 2-3  C/w warfarin dose daily
Total Tinetti score is 15/28. Based on this exam, pt is a high risk for falls

## 2022-04-29 NOTE — PHYSICAL THERAPY INITIAL EVALUATION ADULT - ADDITIONAL COMMENTS
Patient lives in private home with spouse, 4 steps to enter, 4 steps to bedroom. Independent household ambulation without assistive device, community ambulation with straight cane

## 2022-04-29 NOTE — DISCHARGE NOTE PROVIDER - CARE PROVIDER_API CALL
Negro Goel)  Hematology; Internal Medicine; Medical Oncology  [address], [address]  [place] [address]  Phone: [phone]  Fax: [phone]  Established Patient  Follow Up Time: 1 week    JARAD CARDONA  Internal Medicine  [address] [place] [address]  Phone: ([phone]  Fax: ()-  Established Patient  Follow Up Time: 1 week   Negro Goel)  Hematology; Internal Medicine; Medical Oncology  [address], [address]  [place] [address]  Phone: [phone]  Fax: [phone]  Established Patient  Follow Up Time: 1 week    [place] [address]  Phone: [phone]  Fax: [phone]  Established Patient  Scheduled Appointment: 05/06/2022 09:15 PM

## 2022-04-29 NOTE — DISCHARGE NOTE PROVIDER - HOSPITAL COURSE
77M PMH HTN, Afib on coumadin, CVA, Hodgkin's Lymphoma s/p splenectomy and chemo in 1975, essential thrombocytopenia, atrial fibrillation on coumadin, HTN, MOE, newly diagnosed anemia (b/l 7-8), aortic stenosis (pending TAVR), GERD sent in by hematologist dr. goel for anemia. Pt complains of ELLIS for past few days. He had a colonoscopy/endoscopy 6 months ago which he reports were negative. Found to have low B12 and received a B12 shot recently on 4/20/2022. Does endorse dark stools in the last week, no overt hematochezia. Also found to have new CKD and supposed to see a nephrologist soon. No f/c, chest pain, abd pain, leg swelling  Patient follows with Dr. Negro Goel for a history of Hodgkin's Lymphoma s/p splenectomy and chemotherapy in 1975), essential thrombocythemia and worsening macrocytic anemia. Workup was done - he did have iron deficiency and received IV iron 2/2022 but more recent testing showed normal iron levels. He underwent a bone marrow aspirate and biopsy on 4/6/2022 which showed early/mild evidence of dysplastic changes although B12 and folate deficiency or other causes of macrocytosis could not be ruled out. Additional workup showed chronic kidney disease and B12 deficiency (he received a B12 injection on 4/20/2022). He was scheduled to begin Aranesp injections in the office on 4/29/2022 (pending auth). Labs done on 4/20 in the office (Hgb was 8.6) revealed a low normal ferritin level of 38 with normal Fe and TIBC. He was prescribed PO iron in the office once daily (which would explain dark stools). The patient has also had EGD, colonoscopy and capsule endoscopy in the recent past, which were negative.     In ED VS T: 98.2F HR: 90 (62 - 96) BP: 142/69 (111/65 - 181/69) RR: 18  (16 - 25) SpO2: 98% RA  Labs notable for Hgb 7.2->7.0, WBC 18, Haptoglobin 249, , Troponin 128-141, Serum proBNP 4123, Cr 1.52  CXR showed Clear lungs  Patient given 1U pRBC,    Hospital Course:  Patient was given an additional 1/2U pRBC x 2 w/ lasix. Iron studies, B12, folate wnl. After transfusions patient had shortness of breath required oxygen but was quickly weaned off. Patient had an elevated temperature to 100.3F but no clear source of infection cultures negative/RVP wnl, patient defervesced. Infectious disease saw patient and recommended monitoring off antibiotics.   Patient found to have MAGALI, likely prerenal I/s/o low flow state, kidney/bladder US revealed no hydronephrosis.     On day of discharge, patient found to be clinically stable, Hgb stable ready to be discharged home no skilled PT needs.   Patient to follow up with Dr. Goel and Dr. Milner 77M PMH HTN, Afib on coumadin, CVA, Hodgkin's Lymphoma s/p splenectomy and chemo in 1975, essential thrombocytopenia, atrial fibrillation on coumadin, HTN, MOE, newly diagnosed anemia (b/l 7-8), aortic stenosis (pending TAVR), GERD sent in by hematologist dr. goel for anemia. Pt complains of ELLIS for past few days. He had a colonoscopy/endoscopy 6 months ago which he reports were negative. Found to have low B12 and received a B12 shot recently on 4/20/2022. Does endorse dark stools in the last week, no overt hematochezia. Also found to have new CKD and supposed to see a nephrologist soon. No f/c, chest pain, abd pain, leg swelling  Patient follows with Dr. Negro Goel for a history of Hodgkin's Lymphoma s/p splenectomy and chemotherapy in 1975), essential thrombocythemia and worsening macrocytic anemia. Workup was done - he did have iron deficiency and received IV iron 2/2022 but more recent testing showed normal iron levels. He underwent a bone marrow aspirate and biopsy on 4/6/2022 which showed early/mild evidence of dysplastic changes although B12 and folate deficiency or other causes of macrocytosis could not be ruled out. Additional workup showed chronic kidney disease and B12 deficiency (he received a B12 injection on 4/20/2022). He was scheduled to begin Aranesp injections in the office on 4/29/2022 (pending auth). Labs done on 4/20 in the office (Hgb was 8.6) revealed a low normal ferritin level of 38 with normal Fe and TIBC. He was prescribed PO iron in the office once daily (which would explain dark stools). The patient has also had EGD, colonoscopy and capsule endoscopy in the recent past, which were negative.     In ED VS T: 98.2F HR: 90 (62 - 96) BP: 142/69 (111/65 - 181/69) RR: 18  (16 - 25) SpO2: 98% RA  Labs notable for Hgb 7.2->7.0, WBC 18, Haptoglobin 249, , Troponin 128-141, Serum proBNP 4123, Cr 1.52  CXR showed Clear lungs  Patient given 1U pRBC,    Hospital Course:  Patient was given an additional 1/2U pRBC x 2 w/ lasix. Iron studies, B12, folate wnl. After transfusions patient had shortness of breath required oxygen but was quickly weaned off. Patient had an elevated temperature to 100.3F but no clear source of infection cultures negative/RVP wnl, patient defervesced. Infectious disease saw patient and recommended monitoring off antibiotics.   Patient found to have MAGALI, likely prerenal I/s/o low flow state, kidney/bladder US revealed no hydronephrosis.     On day of discharge, patient found to be clinically stable, Hgb stable ready to be discharged home no skilled PT needs.   Patient to follow up with Dr. Goel and Dr. Adam

## 2022-04-29 NOTE — DISCHARGE NOTE PROVIDER - PROVIDER TOKENS
PROVIDER:[TOKEN:[[id number]],FOLLOWUP:[1 week],ESTABLISHEDPATIENT:[T]],PROVIDER:[TOKEN:[[id number]],FOLLOWUP:[1 week],ESTABLISHEDPATIENT:[T]] PROVIDER:[TOKEN:[[id number]],FOLLOWUP:[1 week],ESTABLISHEDPATIENT:[T]],PROVIDER:[TOKEN:[[id number]],SCHEDULEDAPPT:[05/06/2022],SCHEDULEDAPPTTIME:[09:15 PM],ESTABLISHEDPATIENT:[T]]

## 2022-04-29 NOTE — PROGRESS NOTE ADULT - PROBLEM SELECTOR PLAN 7
Patient of Warfarin  Send coags daily goal INR 2-3  C/w warfarin dose daily
Hodgkin's Lymphoma s/p splenectomy and chemo in 1975  F/u Heme/onc recs
Hodgkin's Lymphoma s/p splenectomy and chemo in 1975  F/u Heme/onc recs

## 2022-04-29 NOTE — PROGRESS NOTE ADULT - PROBLEM SELECTOR PLAN 4
Likely Demand ischemia i/s/o anemia, MAGALI  Repeat EKG  TTE 11/21 showed EF 65% normal LV systolic fxn  Trops now downtrended  TTE 4/27 showed EF 52%, AS 0.6cm severe AS, Normal LV systolic fxn, normal diastolic fxn, LVOT VTI lower than previous
Likely Demand ischemia i/s/o anemia, MAGALI  Repeat EKG  TTE 11/21 showed EF 65% normal LV systolic fxn  Trops now downtrended  Repeat TTE
Likely Demand ischemia i/s/o anemia, MAGALI  Repeat EKG  TTE 11/21 showed EF 65% normal LV systolic fxn  Trops now downtrended  Repeat TTE

## 2022-04-29 NOTE — PROGRESS NOTE ADULT - PROVIDER SPECIALTY LIST ADULT
Heme/Onc
Infectious Disease
Heme/Onc
Heme/Onc
Nephrology
Internal Medicine

## 2022-05-02 ENCOUNTER — NON-APPOINTMENT (OUTPATIENT)
Age: 77
End: 2022-05-02

## 2022-05-02 LAB
CULTURE RESULTS: SIGNIFICANT CHANGE UP
CULTURE RESULTS: SIGNIFICANT CHANGE UP
SPECIMEN SOURCE: SIGNIFICANT CHANGE UP
SPECIMEN SOURCE: SIGNIFICANT CHANGE UP

## 2022-05-05 ENCOUNTER — APPOINTMENT (OUTPATIENT)
Dept: ELECTROPHYSIOLOGY | Facility: CLINIC | Age: 77
End: 2022-05-05
Payer: MEDICARE

## 2022-05-05 ENCOUNTER — NON-APPOINTMENT (OUTPATIENT)
Age: 77
End: 2022-05-05

## 2022-05-05 PROCEDURE — 93296 REM INTERROG EVL PM/IDS: CPT

## 2022-05-05 PROCEDURE — 93294 REM INTERROG EVL PM/LDLS PM: CPT

## 2022-05-24 ENCOUNTER — APPOINTMENT (OUTPATIENT)
Dept: CARDIOTHORACIC SURGERY | Facility: CLINIC | Age: 77
End: 2022-05-24

## 2022-05-24 ENCOUNTER — APPOINTMENT (OUTPATIENT)
Dept: CARDIOLOGY | Facility: CLINIC | Age: 77
End: 2022-05-24
Payer: MEDICARE

## 2022-05-24 ENCOUNTER — APPOINTMENT (OUTPATIENT)
Dept: GASTROENTEROLOGY | Facility: CLINIC | Age: 77
End: 2022-05-24
Payer: MEDICARE

## 2022-05-24 ENCOUNTER — APPOINTMENT (OUTPATIENT)
Dept: CARDIOTHORACIC SURGERY | Facility: CLINIC | Age: 77
End: 2022-05-24
Payer: MEDICARE

## 2022-05-24 ENCOUNTER — NON-APPOINTMENT (OUTPATIENT)
Age: 77
End: 2022-05-24

## 2022-05-24 VITALS
DIASTOLIC BLOOD PRESSURE: 71 MMHG | RESPIRATION RATE: 15 BRPM | BODY MASS INDEX: 34.1 KG/M2 | HEART RATE: 76 BPM | SYSTOLIC BLOOD PRESSURE: 132 MMHG | TEMPERATURE: 98.7 F | WEIGHT: 225 LBS | HEIGHT: 68 IN | OXYGEN SATURATION: 99 %

## 2022-05-24 VITALS
BODY MASS INDEX: 34.71 KG/M2 | WEIGHT: 229 LBS | HEART RATE: 71 BPM | DIASTOLIC BLOOD PRESSURE: 77 MMHG | SYSTOLIC BLOOD PRESSURE: 150 MMHG | HEIGHT: 68 IN | OXYGEN SATURATION: 97 %

## 2022-05-24 VITALS
DIASTOLIC BLOOD PRESSURE: 71 MMHG | SYSTOLIC BLOOD PRESSURE: 132 MMHG | WEIGHT: 225 LBS | BODY MASS INDEX: 34.1 KG/M2 | OXYGEN SATURATION: 96 % | HEIGHT: 68 IN | HEART RATE: 76 BPM

## 2022-05-24 DIAGNOSIS — I38 ENDOCARDITIS, VALVE UNSPECIFIED: ICD-10-CM

## 2022-05-24 PROCEDURE — 99213 OFFICE O/P EST LOW 20 MIN: CPT

## 2022-05-24 PROCEDURE — 93000 ELECTROCARDIOGRAM COMPLETE: CPT

## 2022-05-24 PROCEDURE — 99214 OFFICE O/P EST MOD 30 MIN: CPT

## 2022-05-24 PROCEDURE — ZZZZZ: CPT

## 2022-05-24 NOTE — REVIEW OF SYSTEMS
[Feeling Tired] : feeling tired [Chest Pain] : no chest pain [Palpitations] : no palpitations [Lower Ext Edema] : lower extremity edema [SOB on Exertion] : shortness of breath during exertion [Abdominal Pain] : no abdominal pain [Vomiting] : no vomiting [Constipation] : no constipation [Joint Stiffness] : joint stiffness [Dizziness] : no dizziness [Fainting] : no fainting [Easy Bleeding] : a tendency for easy bleeding [Negative] : Eyes

## 2022-05-24 NOTE — HISTORY OF PRESENT ILLNESS
[FreeTextEntry1] : Mr. Gillis is a 77 year old male, referred by Dr. Mejia for evaluation of aortic stenosis. He presented to the ED here on 4/25 at the request of his Hematologist Dr. White for evaluation of anemia (Hgb 7.2) with no signs of active bleeding. He was transfused 2u PRBC. Outpt evaluation (Endoscopy and colonoscopy last year) with Dr. Martins were negative for bleeding source. Bone marrow biopsy was negative, he was found to have a B-12 deficiency. He was discharged home after five days and is now being treated with aranesp by his hematologist. \par \par He notes an increase in fatigue and decline in stamina and activity tolerance over the past year (NYHA II). \par \par His activity is limited, he is dyspneic after ambulating 50 feet. He denies angina, PND, orthopnea, dizziness, or syncope. \par \par His past medical history includes AFib on Warfarin, CVA (2017) , HTN, HLD, Micra PPM (for bradycardia/AFib), Hodgkins Lymphoma treated with RT and chemo, lumbar laminectomy with RLE fasciotomy and skin graft due to RLE DVT postoperatively, and COVID-19.\par

## 2022-05-24 NOTE — END OF VISIT
[FreeTextEntry2] : I personally performed the services described in the documentation, reviewed the documentation recorded by the scribe in my presence and it accurately and completely records my words and actions.\par \par I, Chrissy Meza NP, am scribing for Dr. Usama Carlos, the following sections HISTORY OF PRESENT ILLNESS, PAST MEDICAL/FAMILY/SOCIAL HISTORY; REVIEW OF SYSTEMS; VITAL SIGNS; PHYSICAL EXAM; DISPOSITION.\par  [Time Spent: ___ minutes] : I have spent [unfilled] minutes of time on the encounter.

## 2022-05-24 NOTE — HISTORY OF PRESENT ILLNESS
[FreeTextEntry1] : Mr. Gillis is a 77 year old male, referred by Dr. Mejia for evaluation of aortic stenosis. He presented to the ED here on 4/25 at the request of his Hematologist Dr. White for evaluation of anemia (Hgb 7.2) with no signs of active bleeding. He was transfused 2u PRBC. Outpt evaluation (Endoscopy and colonoscopy last year) with Dr. Martins were negative for bleeding source. Bone marrow biopsy was negative, he was found to have a B-12 deficiency. He was discharged home after five days and is now being treated with aranesp by his hematologist. \par \par He notes an increase in fatigue and decline in stamina and activity tolerance over the past year. His activity is limited, he is dyspneic after ambulating 50 feet. He has no angina, PND, orthopnea, dizziness, or syncope. \par \par His past medical history includes AFib on Warfarin, CVA (2017) , HTN, HLD, Micra PPM (for bradycardia/AFib), Hodgkins Lymphoma treated with RT and chemo, lumbar laminectomy with RLE fasciotomy and skin graft due to RLE DVT postoperatively, and COVID-19.\par \par

## 2022-05-24 NOTE — CONSULT LETTER
[Dear  ___] : Dear ~RAFFI, [Courtesy Letter:] : I had the pleasure of seeing your patient, [unfilled], in my office today. [Please see my note below.] : Please see my note below. [Consult Closing:] : Thank you very much for allowing me to participate in the care of this patient.  If you have any questions, please do not hesitate to contact me. [Sincerely,] : Sincerely, [FreeTextEntry2] : Dr. Yumiko Mejia\par [address]\par [facility] [id number] [FreeTextEntry3] : Usama Carlos MD, FACS\par Attending Surgeon \par Cardiovascular & Thoracic Surgery\par  \par [employer]\par

## 2022-05-24 NOTE — REVIEW OF SYSTEMS
[Fever] : no fever [Chills] : no chills [Feeling Fatigued] : feeling fatigued [Dyspnea on exertion] : dyspnea during exertion [Chest Discomfort] : no chest discomfort [Lower Ext Edema] : lower extremity edema [Palpitations] : no palpitations [Orthopnea] : no orthopnea [PND] : no PND [Abdominal Pain] : no abdominal pain [Change in Appetite] : no change in appetite [Dizziness] : no dizziness [Negative] : Psychiatric [de-identified] : see HPI

## 2022-05-24 NOTE — PHYSICAL EXAM
[General Appearance - Alert] : alert [General Appearance - In No Acute Distress] : in no acute distress [Jugular Venous Distention Increased] : there was no jugular-venous distention [] : no respiratory distress [Respiration, Rhythm And Depth] : normal respiratory rhythm and effort [II] : a grade 2 [Left Carotid Bruit] : no bruit heard over the left carotid [Right Carotid Bruit] : no bruit heard over the right carotid [Cervical Lymph Nodes Enlarged Posterior Bilaterally] : posterior cervical [Cervical Lymph Nodes Enlarged Anterior Bilaterally] : anterior cervical [No CVA Tenderness] : no ~M costovertebral angle tenderness [Involuntary Movements] : no involuntary movements were seen [Skin Color & Pigmentation] : normal skin color and pigmentation [Oriented To Time, Place, And Person] : oriented to person, place, and time

## 2022-05-24 NOTE — ASSESSMENT
[FreeTextEntry1] : Mr. Gillis is a 77 year old male, referred by Dr. Mejia for evaluation of aortic stenosis. He presented to the ED here on 4/25 at the request of his Hematologist Dr. White for evaluation of anemia (Hgb 7.2) with no signs of active bleeding. He was transfused 2u PRBC. Outpt evaluation (Endoscopy and colonoscopy last year) with Dr. Martins were negative for bleeding source. Bone marrow biopsy was negative, he was found to have a B-12 deficiency. He was discharged home after five days and is now being treated with aranesp by his hematologist. \par \par He notes an increase in fatigue and decline in stamina and activity tolerance over the past year (NYHA II). \par \par His activity is limited, he is dyspneic after ambulating 50 feet. He denies angina, PND, orthopnea, dizziness, or syncope. \par \par His past medical history includes AFib on Warfarin, CVA (2017) , HTN, HLD, Micra PPM (for bradycardia/AFib), Hodgkins Lymphoma treated with RT and chemo, lumbar laminectomy with RLE fasciotomy and skin graft due to RLE DVT postoperatively, and COVID-19.\par \par I have reviewed the patient's medical records, diagnostic images during the time of this office consultation and have made the following recommendation. \par \par Plan:\par 1) Repeat TTE (to determine severity of AS- inconclusive based on today's TTE) \par 2) Return to see Dr. Bland after TTE

## 2022-05-24 NOTE — DATA REVIEWED
[FreeTextEntry1] : Echo: 5/5/2020\par E 60%, HAILE 1.2-1.4sqcm, mGr 9 mmHg, pGr 18 mmHg, AoV 2.1 m/s

## 2022-05-27 NOTE — PHYSICAL EXAM

## 2022-05-27 NOTE — REVIEW OF SYSTEMS
[Weight Gain (___ Lbs)] : [unfilled] ~Ulb weight gain [SOB] : shortness of breath [Dyspnea on exertion] : dyspnea during exertion [Negative] : Heme/Lymph [Weight Loss (___ Lbs)] : no recent weight loss [Chest Discomfort] : no chest discomfort [Lower Ext Edema] : no extremity edema [Leg Claudication] : no intermittent leg claudication [Orthopnea] : no orthopnea [PND] : no PND [Syncope] : no syncope

## 2022-05-27 NOTE — DISCUSSION/SUMMARY
[FreeTextEntry1] : The patient is a 77-year-old gentleman A-fib, hypertension, hyperlipidemia, s/p back surgery with RLE fasciotomy and skin graft, s/p COVID, PPM , AS with anemia s/p transfusion and aranesp.\par #1 HFpEF- continue lasix bid,, spironolactone 12.5mg, keep leg elevated when sitting\par #2 AS- repeat echo with progression of AS, refer to TAVR clinic today\par #2 Htn- continue lisinopril 5mg \par #3 Afib- s/p PPM for tachybrady, continue atenolol 25mg,no bleeding on warfarin- monitored by PCP\par #4 Lipids- continue simvastatin\par #5 BPH- on tamsulosin\par #6 GI-  on iron, s/p transfusion and aranesp, appt with Dr. Martins\par #7 General- mild COVID 2020, s/p Moderna vaccines, may have to consider Watchman as well.

## 2022-06-05 NOTE — CONSULT LETTER
[Dear  ___] : Dear  [unfilled], [Consult Letter:] : I had the pleasure of evaluating your patient, [unfilled]. [Please see my note below.] : Please see my note below. [Consult Closing:] : Thank you very much for allowing me to participate in the care of this patient.  If you have any questions, please do not hesitate to contact me. [Sincerely,] : Sincerely, [DrDuy  ___] : Dr. BEYER [DrDuy ___] : Dr. BEYER [FreeTextEntry3] : Jimi Martins MD, MPH, [place], [place]\par Chief of Clinical Quality in Gastroenterology, [facility]\par Associate Chief of Gastroenterology, [facility]\par Interim Director of Endoscopy, [facility]\par Director of Endoscopic Ultrasound, [facility]\par [address], [address]\par [place], [address]\par 24 hours [phone]\par \par

## 2022-06-05 NOTE — REVIEW OF SYSTEMS
[SOB on Exertion] : shortness of breath during exertion [Fever] : no fever [Chills] : no chills [Melena] : no melena [FreeTextEntry5] : History of aortic stenosis , A-fib on Coumadin  [FreeTextEntry7] : History of SB AVMs receives IV iron infusions, oral Iron  supplementation , B12

## 2022-06-05 NOTE — ASSESSMENT
[FreeTextEntry1] : Patient is a 76 male with history of severe aortic stenosis ( TAVR being considered),A-fib, LE DVT, CVA on Coumadin and ASA ,bradycardia has a Micra PPM, remote history of Hodgkins lymphoma , HTN, HLD. with decreased iron deficiency anemia without any sign of overt GI blood loss. Prior COVID -19 infection.\par Based on past history , he is high risk for a neurological events if taken off anticoagulant.\par Video capsule endoscopy demonstrated small bowel angiectasia. CT colonography was negative for colonic polyps or masses. His iron deficiency anemia has responded to iron supplementation.  Now on Vit B12 for deficiency and on bone marrow stimulant.\par \par Plan:\par Anemia is multifactoral.  No invasive procedures unless severe GI bleed.\par Hematology/oncology followup for iron and VitB12 deficiency anemia.\par Cardiology followup for severe aortic stenosis\par

## 2022-06-05 NOTE — HISTORY OF PRESENT ILLNESS
[FreeTextEntry1] : Pt follows up for iron def anemia and small bowel angioectasias.\par \par From hematology/oncology consultant ([facility] hospitalization April 2022)\par Has of Hodgkin's Lymphoma s/p splenectomy and chemotherapy in 1975), essential thrombocythemia and worsening macrocytic anemia. Workup was done - he did have iron deficiency and received IV iron 2/2022 but more recent testing showed normal iron levels. He underwent a bone marrow aspirate and biopsy on 4/6/2022 which showed early/mild evidence of dysplastic changes although B12 and folate deficiency or other causes of macrocytosis could not be ruled out. Additional workup showed chronic kidney disease and B12 deficiency (he received a B12 injection on 4/20/2022). He was scheduled to begin Aranesp injections in the office on 4/29/2022 (pending auth). Labs done on 4/20 in the office (Hgb was 8.6) revealed a low normal ferritin level of 38 with normal Fe and TIBC. He was prescribed PO iron in the office once daily (which would explain dark stools). The patient has also had EGD, colonoscopy and capsule endoscopy in the recent past, which were notable for small bowel angioectasias.\par \par Recent hospitalization for dyspnea \par No overt GIBleed  (FOBT positive)\par Hb 7\par Transfused 2 units\par \par Has dyspnea on exertion at < 50 ft.\par No melena, hematochezia, hematemesis, abdominal pain, n/v, chest pain\par \par Vit B12 deficient\par Iron studies adequate on iron infusions x 2\par Now on bone marrow stimulant\par \par \par

## 2022-06-05 NOTE — PHYSICAL EXAM
[General Appearance - Alert] : alert [General Appearance - In No Acute Distress] : in no acute distress [Sclera] : the sclera and conjunctiva were normal [Outer Ear] : the ears and nose were normal in appearance [Neck Appearance] : the appearance of the neck was normal [Auscultation Breath Sounds / Voice Sounds] : lungs were clear to auscultation bilaterally [Heart Sounds] : normal S1 and S2 [Bowel Sounds] : normal bowel sounds [Abdomen Tenderness] : non-tender [Skin Color & Pigmentation] : normal skin color and pigmentation [No Focal Deficits] : no focal deficits [Affect] : the affect was normal [Mood] : the mood was normal [FreeTextEntry1] : small dry scabs left arm patient reports are from pet dog

## 2022-06-27 NOTE — PATIENT PROFILE ADULT. - CAREGIVER RELATION TO PATIENT
Case Management Progress Note    Patient name Jamel Barnard  Location /-01 MRN [id number]  : 1948 Date 2022       LOS (days): 7  Geometric Mean LOS (GMLOS) (days): 3 50  Days to GMLOS:-3 1        OBJECTIVE:        Current admission status: Inpatient  Preferred Pharmacy:   [address] [facility], [address]  [facility] [address]  Phone: [phone] Fax: [phone]    Primary Care Provider: Aubrie Garza MD    Primary Insurance: MEDICARE  Secondary Insurance: [employer]    PROGRESS NOTE:  CM informed by [facility] that patient is not yet stable for dc d/t ongoing diarrhea  They are going to consult GI as a result  CM did reach out to [facility] and let her know that patient is anticipated for dc to their facility tomorrow  She will need a negative COVID swab prior to admission and [facility] is aware of same  As per RN, patient is still on 3L acute O2  CM will continue to follow  Wife

## 2022-07-12 ENCOUNTER — OUTPATIENT (OUTPATIENT)
Dept: OUTPATIENT SERVICES | Facility: HOSPITAL | Age: 77
LOS: 1 days | End: 2022-07-12
Payer: COMMERCIAL

## 2022-07-12 ENCOUNTER — APPOINTMENT (OUTPATIENT)
Dept: CARDIOTHORACIC SURGERY | Facility: CLINIC | Age: 77
End: 2022-07-12

## 2022-07-12 VITALS
BODY MASS INDEX: 34.1 KG/M2 | SYSTOLIC BLOOD PRESSURE: 129 MMHG | TEMPERATURE: 98.4 F | HEIGHT: 68 IN | OXYGEN SATURATION: 100 % | DIASTOLIC BLOOD PRESSURE: 64 MMHG | WEIGHT: 225 LBS | HEART RATE: 84 BPM | RESPIRATION RATE: 18 BRPM

## 2022-07-12 DIAGNOSIS — Z98.42 CATARACT EXTRACTION STATUS, LEFT EYE: Chronic | ICD-10-CM

## 2022-07-12 DIAGNOSIS — Z98.1 ARTHRODESIS STATUS: Chronic | ICD-10-CM

## 2022-07-12 DIAGNOSIS — Z98.890 OTHER SPECIFIED POSTPROCEDURAL STATES: Chronic | ICD-10-CM

## 2022-07-12 DIAGNOSIS — Z90.81 ACQUIRED ABSENCE OF SPLEEN: Chronic | ICD-10-CM

## 2022-07-12 DIAGNOSIS — I35.0 NONRHEUMATIC AORTIC (VALVE) STENOSIS: ICD-10-CM

## 2022-07-12 DIAGNOSIS — Z96.641 PRESENCE OF RIGHT ARTIFICIAL HIP JOINT: Chronic | ICD-10-CM

## 2022-07-12 PROCEDURE — 93306 TTE W/DOPPLER COMPLETE: CPT

## 2022-07-12 PROCEDURE — 99214 OFFICE O/P EST MOD 30 MIN: CPT

## 2022-07-12 PROCEDURE — 93306 TTE W/DOPPLER COMPLETE: CPT | Mod: 26

## 2022-07-12 PROCEDURE — 99204 OFFICE O/P NEW MOD 45 MIN: CPT

## 2022-07-12 RX ORDER — WARFARIN 2 MG/1
2 TABLET ORAL
Refills: 0 | Status: ACTIVE | COMMUNITY

## 2022-07-19 ENCOUNTER — NON-APPOINTMENT (OUTPATIENT)
Age: 77
End: 2022-07-19

## 2022-07-19 ENCOUNTER — APPOINTMENT (OUTPATIENT)
Dept: CARDIOLOGY | Facility: CLINIC | Age: 77
End: 2022-07-19

## 2022-07-19 VITALS
SYSTOLIC BLOOD PRESSURE: 135 MMHG | BODY MASS INDEX: 34.1 KG/M2 | HEART RATE: 64 BPM | WEIGHT: 225 LBS | HEIGHT: 68 IN | OXYGEN SATURATION: 97 % | DIASTOLIC BLOOD PRESSURE: 74 MMHG

## 2022-07-19 PROCEDURE — 99214 OFFICE O/P EST MOD 30 MIN: CPT | Mod: 25

## 2022-07-19 PROCEDURE — 93000 ELECTROCARDIOGRAM COMPLETE: CPT

## 2022-07-20 LAB
ANION GAP SERPL CALC-SCNC: 9 MMOL/L
BUN SERPL-MCNC: 27 MG/DL
CALCIUM SERPL-MCNC: 9.8 MG/DL
CHLORIDE SERPL-SCNC: 104 MMOL/L
CO2 SERPL-SCNC: 28 MMOL/L
CREAT SERPL-MCNC: 1.17 MG/DL
EGFR: 64 ML/MIN/1.73M2
GLUCOSE SERPL-MCNC: 102 MG/DL
NT-PROBNP SERPL-MCNC: 2562 PG/ML
POTASSIUM SERPL-SCNC: 5 MMOL/L
SODIUM SERPL-SCNC: 141 MMOL/L

## 2022-07-20 NOTE — HISTORY OF PRESENT ILLNESS
[FreeTextEntry1] : Jean-Pierre is having left hip pain and may need hip replacement. Hb borderline so no aranesp. Dr. Bland is watching his AS and waiting to see if TAVR candidate. Noticed he is more SOB recently and not walking as far.

## 2022-07-20 NOTE — ASU PATIENT PROFILE, ADULT - DOMESTIC TRAVEL HIGH RISK QUESTION
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    HISTORY OF PRESENTING COMPLAINT:  Mr. Scott is 45 years old.  He is a month out from his surgery, at home in a wheelchair, has been doing well in terms of maneuvering himself around, has not been putting too much pressure on the stump site.    PHYSICAL EXAMINATION:  Vital signs stable.  He is afebrile, in no obvious distress; 95% of the incision is well healed, a couple of superficial areas at the T-junction site anteriorly and at the most posterior aspect in the groin are still superficially open.  No evidence of infection.  He has no sensation in the flap.    ASSESSMENT AND PLAN:  Based on the above findings, a diagnosis of a right hip disarticulation and flap closure was made.  I think he is doing extremely well.  I think it is okay if he puts pressure on the flap now when he is sitting in a wheelchair, but every 10-15 minutes must give a respite to prevent pressure sores.  This was explained in detail to him.      I will see him back in 6 weeks to monitor his progress.  All of his questions were answered.  He was happy with the visit.  All exam and discussion done in presence of my nurse, Rachna Jurado.       No

## 2022-07-20 NOTE — DISCUSSION/SUMMARY
[EKG obtained to assist in diagnosis and management of assessed problem(s)] : EKG obtained to assist in diagnosis and management of assessed problem(s) [FreeTextEntry1] : The patient is a 77-year-old gentleman A-fib, hypertension, hyperlipidemia, s/p back surgery with RLE fasciotomy and skin graft, s/p COVID, PPM , AS, anemia with more dyspnea\par #1 HFpEF- c/w lasix qd/bid,, spironolactone 12.5mg, keep leg elevated when sitting\par #2 AS- 1.0, f/u Dr. Bland\par #2 Htn- continue lisinopril 5mg \par #3 Afib- s/p PPM for tachybrady, continue atenolol 25mg,no bleeding on warfarin- monitored by PCP\par #4 Lipids- continue simvastatin\par #5 BPH- on tamsulosin\par #6 GI-  on iron, s/p transfusion and aranesp, appt with Dr. Martins, Hb 10\par #7 General- mild COVID 2020, s/p Moderna vaccines, may have to consider Watchman as well.

## 2022-07-20 NOTE — PHYSICAL EXAM

## 2022-07-25 ENCOUNTER — RX RENEWAL (OUTPATIENT)
Age: 77
End: 2022-07-25

## 2022-08-04 ENCOUNTER — APPOINTMENT (OUTPATIENT)
Dept: ELECTROPHYSIOLOGY | Facility: CLINIC | Age: 77
End: 2022-08-04

## 2022-08-04 ENCOUNTER — FORM ENCOUNTER (OUTPATIENT)
Age: 77
End: 2022-08-04

## 2022-08-07 NOTE — REASON FOR VISIT
[Structural Heart and Valve Disease] : structural heart and valve disease [Other: ____] : [unfilled] [FreeTextEntry3] : Dr. Mejia

## 2022-08-07 NOTE — REVIEW OF SYSTEMS
[Feeling Fatigued] : feeling fatigued [Dyspnea on exertion] : dyspnea during exertion [Lower Ext Edema] : lower extremity edema [Joint Pain] : joint pain [Memory Lapses Or Loss] : memory lapses or loss [Negative] : Heme/Lymph [Fever] : no fever [Chills] : no chills [Chest Discomfort] : no chest discomfort [Abdominal Pain] : no abdominal pain [Change in Appetite] : no change in appetite [FreeTextEntry9] : see HPI

## 2022-08-07 NOTE — CARDIOLOGY SUMMARY
[de-identified] : May: V-paced with underlying AFIB [de-identified] : 5/5/2020\par E 60%, HAILE 1.2-1.4sqcm, mGr 9 mmHg, pGr 18 mmHg, AoV 2.1 m/s.
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
## 2022-08-07 NOTE — DISCUSSION/SUMMARY
[FreeTextEntry1] : Mr Carlin has fatigue and ELLIS that are likely multifactorial in etiology in context of his AS and multiple (non-cardiac) comorbidities. He has moderate AS based on today's TTE, the findings of which we reviewed in detail. As such, I am recommending a strategy of continued close monitoring, with a repeat TTE and follow up here in 4 months--or sooner if clinically indicated. He is interested in being considered for EXPAND II, and as such we will contact him once the trial begins enrolling patients here, which we anticipate will be shortly. The EXPAND II trial is for patients with symptomatic moderate AS, randomized STEPHIE to continued close monitoring. I also advised that he can take an extra Lasix in the afternoon for edema, which is at least 1+ bilaterally today. Ultimately, he will require heparin bridging for any interventional procedures, given his prior history of strokes with interruption of anticoagulation. \par

## 2022-08-07 NOTE — PHYSICAL EXAM
[Well Developed] : well developed [Well Nourished] : well nourished [Normal Rate] : normal [II] : a grade 2 [___ +] : bilateral [unfilled]U+ pretibial pitting edema [Clear Lung Fields] : clear lung fields [Soft] : abdomen soft [Non Tender] : non-tender [Normal Gait] : normal gait [Moves all extremities] : moves all extremities [Normal] : alert and oriented, normal memory [Right Carotid Bruit] : no bruit heard over the right carotid [Left Carotid Bruit] : no bruit heard over the left carotid [de-identified] : BLE edema R>L [de-identified] : RLE fasciotomy scar, healed

## 2022-08-08 ENCOUNTER — NON-APPOINTMENT (OUTPATIENT)
Age: 77
End: 2022-08-08

## 2022-08-08 ENCOUNTER — APPOINTMENT (OUTPATIENT)
Dept: ELECTROPHYSIOLOGY | Facility: CLINIC | Age: 77
End: 2022-08-08

## 2022-08-08 PROCEDURE — 93296 REM INTERROG EVL PM/IDS: CPT

## 2022-08-08 PROCEDURE — 93294 REM INTERROG EVL PM/LDLS PM: CPT

## 2022-09-09 ENCOUNTER — APPOINTMENT (OUTPATIENT)
Dept: PULMONOLOGY | Facility: CLINIC | Age: 77
End: 2022-09-09

## 2022-09-09 DIAGNOSIS — J42 UNSPECIFIED CHRONIC BRONCHITIS: ICD-10-CM

## 2022-09-09 PROCEDURE — ZZZZZ: CPT

## 2022-09-09 PROCEDURE — 94726 PLETHYSMOGRAPHY LUNG VOLUMES: CPT

## 2022-09-09 PROCEDURE — 82803 BLOOD GASES ANY COMBINATION: CPT

## 2022-09-09 PROCEDURE — 94010 BREATHING CAPACITY TEST: CPT

## 2022-09-09 PROCEDURE — 94729 DIFFUSING CAPACITY: CPT

## 2022-09-09 PROCEDURE — 36600 WITHDRAWAL OF ARTERIAL BLOOD: CPT | Mod: 59

## 2022-09-12 ENCOUNTER — APPOINTMENT (OUTPATIENT)
Dept: ORTHOPEDIC SURGERY | Facility: CLINIC | Age: 77
End: 2022-09-12

## 2022-09-12 ENCOUNTER — RESULT CHARGE (OUTPATIENT)
Age: 77
End: 2022-09-12

## 2022-09-12 VITALS — WEIGHT: 225 LBS | BODY MASS INDEX: 34.1 KG/M2 | HEIGHT: 68 IN

## 2022-09-12 DIAGNOSIS — Z78.9 OTHER SPECIFIED HEALTH STATUS: ICD-10-CM

## 2022-09-12 DIAGNOSIS — T79.A21A TRAUMATIC COMPARTMENT SYNDROME OF RIGHT LOWER EXTREMITY, INITIAL ENCOUNTER: ICD-10-CM

## 2022-09-12 PROCEDURE — 99214 OFFICE O/P EST MOD 30 MIN: CPT

## 2022-09-12 PROCEDURE — 73502 X-RAY EXAM HIP UNI 2-3 VIEWS: CPT | Mod: LT

## 2022-09-12 NOTE — DISCUSSION/SUMMARY
[de-identified] : The natural progression of Osteoarthritis was explained to the patient.  We discussed the possible treatment options from conservative to operative.  These included NSAIDs, Glucosamine and Chondrotin sulfate, and Physical Therapy as well different types of injections.  We also discussed that at some point they may progress to needed a MICHAEL.  Information and pamphlets were given when appropriate.\par \par Progress note completed by Martha West PA-C.\par The documentation recorded by the PA accurately reflects the service I personally performed and the decisions made by me. -Dr. Mcdaniels

## 2022-09-12 NOTE — HISTORY OF PRESENT ILLNESS
[Sudden] : sudden [8] : 8 [3] : 3 [Dull/Aching] : dull/aching [Frequent] : frequent [Meds] : meds [Standing] : standing [Walking] : walking [de-identified] : 78yo M with longstanding left hip pain that has recently worsened over the past 6 weeks with no injury. No formal tx for the hip to date. He is on Warfarin for a-fib and aortic valve stenosis. Admits to increasing pain and difficulty with prolonged walking and stairs. He recently started using the cane and wheelchair, which he had not used prior to ~6 weeks.  [] : no [FreeTextEntry1] : LT HIP [FreeTextEntry3] : CHRONIC [FreeTextEntry5] : PT STATED HES BEEN HAVING ESCALATING PAIN IN HIS LEFT HIP WITH NNI

## 2022-09-12 NOTE — ASSESSMENT
[FreeTextEntry1] : 77M with L hip OA\par \par Discussed anti-inflammatories, PT/HEP, IA hip CSI, and briefly MICHAEL. Discussed MICHAEL, r/b/a, and preop/postop periods in detail. He is waiting for aortic valve replacement and cannot do surgery at this time. tramadol given for pain, f/u pain mgmt re possible CSI

## 2022-09-20 ENCOUNTER — NON-APPOINTMENT (OUTPATIENT)
Age: 77
End: 2022-09-20

## 2022-09-20 ENCOUNTER — APPOINTMENT (OUTPATIENT)
Dept: CARDIOLOGY | Facility: CLINIC | Age: 77
End: 2022-09-20

## 2022-09-20 ENCOUNTER — APPOINTMENT (OUTPATIENT)
Dept: ORTHOPEDIC SURGERY | Facility: CLINIC | Age: 77
End: 2022-09-20

## 2022-09-20 VITALS
WEIGHT: 225 LBS | SYSTOLIC BLOOD PRESSURE: 136 MMHG | OXYGEN SATURATION: 97 % | HEART RATE: 70 BPM | BODY MASS INDEX: 44.17 KG/M2 | DIASTOLIC BLOOD PRESSURE: 74 MMHG | HEIGHT: 60 IN

## 2022-09-20 DIAGNOSIS — E78.5 HYPERLIPIDEMIA, UNSPECIFIED: ICD-10-CM

## 2022-09-20 DIAGNOSIS — R06.00 DYSPNEA, UNSPECIFIED: ICD-10-CM

## 2022-09-20 PROCEDURE — 99214 OFFICE O/P EST MOD 30 MIN: CPT | Mod: 25

## 2022-09-20 PROCEDURE — 93000 ELECTROCARDIOGRAM COMPLETE: CPT

## 2022-09-20 NOTE — PHYSICAL EXAM

## 2022-09-20 NOTE — HISTORY OF PRESENT ILLNESS
[FreeTextEntry1] : Jean-Pierre is having a hard time walking because of left hip. Because of AS and anemia not candidate for replacement. Now going for pain mgmt. RIght leg has been swollen more and trouble with shoes.

## 2022-09-20 NOTE — DISCUSSION/SUMMARY
[FreeTextEntry1] : The patient is a 77-year-old gentleman A-fib, hypertension, hyperlipidemia, s/p back surgery with RLE fasciotomy and skin graft, s/p COVID, PPM , AS, anemia with edema\par #1 HFpEF- c/w lasix but increase to bid more days, spironolactone 12.5mg, keep leg elevated when sitting\par #2 AS- 1.0, f/u Dr. Bland 11/15\par #2 Htn- continue lisinopril 5mg \par #3 Afib- s/p PPM for tachybrady, continue atenolol 25mg,no bleeding on warfarin- monitored by PCP\par #4 Lipids- continue simvastatin\par #5 BPH- on tamsulosin\par #6 GI-  on iron, s/p transfusion and aranesp, f/u Dr. Martins, Hb 10\par #7 Ortho- left hip pain, appt with pain mgmt 9/23/22\par #8 General- mild COVID 2020, s/p Moderna vaccines, may have to consider Watchman as well. [EKG obtained to assist in diagnosis and management of assessed problem(s)] : EKG obtained to assist in diagnosis and management of assessed problem(s)

## 2022-09-20 NOTE — REASON FOR VISIT
[Symptom and Test Evaluation] : symptom and test evaluation [Cardiac Failure] : cardiac failure [Spouse] : spouse

## 2022-09-23 ENCOUNTER — APPOINTMENT (OUTPATIENT)
Dept: PAIN MANAGEMENT | Facility: CLINIC | Age: 77
End: 2022-09-23

## 2022-09-23 VITALS — HEIGHT: 68 IN | BODY MASS INDEX: 34.1 KG/M2 | WEIGHT: 225 LBS

## 2022-09-23 PROCEDURE — 99204 OFFICE O/P NEW MOD 45 MIN: CPT

## 2022-09-23 NOTE — PHYSICAL EXAM
[de-identified] : Constitutional; Appears well, no apparent distress\par Ability to communicate: Normal \par Respiratory: non-labored breathing\par Skin: No rash noted\par Head: Normocephalic, atraumatic\par Neck: no visible thyroid enlargement\par Eyes: Extraocular movements intact\par Neurologic: Alert and oriented x3\par Psychiatric: normal mood, affect and behavior \par \par  [Left] : left hip [] : uses wheelchair

## 2022-09-23 NOTE — DISCUSSION/SUMMARY
[de-identified] : After discussing various treatment options with the patient including but not limited to oral medications, physical therapy, exercise modalities as well as interventional spinal injections, we have decided with the following plan:\par \par - Continue Home exercises, stretching, activity modification, physical therapy, and conservative care.\par - Recommend LEFT hip injection under fluoroscopic guidance with image.\par - The risks, benefits and alternatives of the proposed procedure were explained in detail with the patient. The risks outlined include but are not limited to infection, bleeding, nerve injury, a temporary increase in pain, failure to resolve symptoms, allergic reaction, symptom recurrence, and possible elevation of blood sugar in diabetics. All questions were answered to patient's apparent satisfaction and he/she verbalized an understanding.\par - The pain has not responded to conservative care including NSAID therapy and/or physical therapy.  There is no bleeding tendency, unstable medical condition, or systemic infection.\par - Follow up in 1-2 weeks post injection for re-evaluation.\par - Patient is on anticoagulation therapy and DOES NOT need to stop medication for the procedure. (Coumadin/ASA)\par - Please book patient at [facility] as patient has a pacemaker. \par \par \par

## 2022-09-23 NOTE — HISTORY OF PRESENT ILLNESS
[10] : 10 [5] : 5 [Dull/Aching] : dull/aching [Sharp] : sharp [Shooting] : shooting [Constant] : constant [Household chores] : household chores [Leisure] : leisure [Sleep] : sleep [Meds] : meds [Nothing helps with pain getting better] : Nothing helps with pain getting better [Sitting] : sitting [Standing] : standing [Walking] : walking [Lying in bed] : lying in bed [] : no [FreeTextEntry1] : left hip

## 2022-10-19 ENCOUNTER — APPOINTMENT (OUTPATIENT)
Age: 77
End: 2022-10-19

## 2022-10-19 LAB — SARS-COV-2 N GENE NPH QL NAA+PROBE: NOT DETECTED

## 2022-10-19 PROCEDURE — 73525 CONTRAST X-RAY OF HIP: CPT | Mod: 26

## 2022-10-19 PROCEDURE — 27093 INJECTION FOR HIP X-RAY: CPT | Mod: LT

## 2022-10-31 ENCOUNTER — RX RENEWAL (OUTPATIENT)
Age: 77
End: 2022-10-31

## 2022-11-04 ENCOUNTER — APPOINTMENT (OUTPATIENT)
Dept: PAIN MANAGEMENT | Facility: CLINIC | Age: 77
End: 2022-11-04

## 2022-11-04 VITALS — HEIGHT: 68 IN | WEIGHT: 225 LBS | BODY MASS INDEX: 34.1 KG/M2

## 2022-11-04 PROCEDURE — 99214 OFFICE O/P EST MOD 30 MIN: CPT

## 2022-11-04 NOTE — DISCUSSION/SUMMARY
[de-identified] : After discussing various treatment options with the patient including but not limited to oral medications, physical therapy, exercise modalities as well as interventional spinal injections, we have decided with the following plan:\par \par - Continue home exercises, stretching, activity modification, physical therapy, and conservative care.\par - Follow-up as needed.\par - Recommend Tylenol 500-1000mg Q8 hours PRN.\par - Will prescribe Tramadol 50mg BID PRN for pain control.\par - Recommend f/u with hip specialist after aortic valve replacement. \par

## 2022-11-04 NOTE — PHYSICAL EXAM
[Left] : left hip [de-identified] : Constitutional; Appears well, no apparent distress\par Ability to communicate: Normal \par Respiratory: non-labored breathing\par Skin: No rash noted\par Head: Normocephalic, atraumatic\par Neck: no visible thyroid enlargement\par Eyes: Extraocular movements intact\par Neurologic: Alert and oriented x3\par Psychiatric: normal mood, affect and behavior \par \par  [] : no ecchymosis

## 2022-11-07 ENCOUNTER — APPOINTMENT (OUTPATIENT)
Dept: ELECTROPHYSIOLOGY | Facility: CLINIC | Age: 77
End: 2022-11-07

## 2022-11-15 ENCOUNTER — OUTPATIENT (OUTPATIENT)
Dept: OUTPATIENT SERVICES | Facility: HOSPITAL | Age: 77
LOS: 1 days | End: 2022-11-15
Payer: COMMERCIAL

## 2022-11-15 ENCOUNTER — RESULT REVIEW (OUTPATIENT)
Age: 77
End: 2022-11-15

## 2022-11-15 ENCOUNTER — LABORATORY RESULT (OUTPATIENT)
Age: 77
End: 2022-11-15

## 2022-11-15 ENCOUNTER — NON-APPOINTMENT (OUTPATIENT)
Age: 77
End: 2022-11-15

## 2022-11-15 ENCOUNTER — APPOINTMENT (OUTPATIENT)
Dept: CARDIOTHORACIC SURGERY | Facility: CLINIC | Age: 77
End: 2022-11-15

## 2022-11-15 VITALS
RESPIRATION RATE: 18 BRPM | DIASTOLIC BLOOD PRESSURE: 60 MMHG | HEIGHT: 68 IN | BODY MASS INDEX: 34.1 KG/M2 | OXYGEN SATURATION: 97 % | WEIGHT: 225 LBS | HEART RATE: 65 BPM | SYSTOLIC BLOOD PRESSURE: 95 MMHG

## 2022-11-15 DIAGNOSIS — Z96.641 PRESENCE OF RIGHT ARTIFICIAL HIP JOINT: Chronic | ICD-10-CM

## 2022-11-15 DIAGNOSIS — Z98.42 CATARACT EXTRACTION STATUS, LEFT EYE: Chronic | ICD-10-CM

## 2022-11-15 DIAGNOSIS — Z98.1 ARTHRODESIS STATUS: Chronic | ICD-10-CM

## 2022-11-15 DIAGNOSIS — Z98.890 OTHER SPECIFIED POSTPROCEDURAL STATES: Chronic | ICD-10-CM

## 2022-11-15 DIAGNOSIS — I35.0 NONRHEUMATIC AORTIC (VALVE) STENOSIS: ICD-10-CM

## 2022-11-15 DIAGNOSIS — Z90.81 ACQUIRED ABSENCE OF SPLEEN: Chronic | ICD-10-CM

## 2022-11-15 PROCEDURE — C8929: CPT

## 2022-11-15 PROCEDURE — 99215 OFFICE O/P EST HI 40 MIN: CPT

## 2022-11-15 PROCEDURE — 93000 ELECTROCARDIOGRAM COMPLETE: CPT

## 2022-11-15 PROCEDURE — 93306 TTE W/DOPPLER COMPLETE: CPT | Mod: 26

## 2022-11-15 RX ORDER — LIDOCAINE 5% 700 MG/1
5 PATCH TOPICAL
Qty: 30 | Refills: 0 | Status: ACTIVE | COMMUNITY
Start: 2022-10-07

## 2022-11-16 ENCOUNTER — NON-APPOINTMENT (OUTPATIENT)
Age: 77
End: 2022-11-16

## 2022-11-16 LAB
ALBUMIN SERPL ELPH-MCNC: 3.9 G/DL
ALP BLD-CCNC: 63 U/L
ALT SERPL-CCNC: 13 U/L
ANION GAP SERPL CALC-SCNC: 14 MMOL/L
AST SERPL-CCNC: 25 U/L
BASOPHILS # BLD AUTO: 0.14 K/UL
BASOPHILS NFR BLD AUTO: 0.9 %
BILIRUB SERPL-MCNC: 0.2 MG/DL
BUN SERPL-MCNC: 46 MG/DL
CALCIUM SERPL-MCNC: 9.7 MG/DL
CHLORIDE SERPL-SCNC: 103 MMOL/L
CO2 SERPL-SCNC: 22 MMOL/L
CREAT SERPL-MCNC: 1.45 MG/DL
EGFR: 49 ML/MIN/1.73M2
EOSINOPHIL # BLD AUTO: 0.53 K/UL
EOSINOPHIL NFR BLD AUTO: 3.4 %
GLUCOSE SERPL-MCNC: 117 MG/DL
HCT VFR BLD CALC: 21.4 %
HGB BLD-MCNC: 6.1 G/DL
LYMPHOCYTES # BLD AUTO: 2.14 K/UL
LYMPHOCYTES NFR BLD AUTO: 13.8 %
MAN DIFF?: NORMAL
MCHC RBC-ENTMCNC: 28.5 GM/DL
MCHC RBC-ENTMCNC: 33 PG
MCV RBC AUTO: 115.7 FL
MONOCYTES # BLD AUTO: 1.19 K/UL
MONOCYTES NFR BLD AUTO: 7.7 %
NEUTROPHILS # BLD AUTO: 11.37 K/UL
NEUTROPHILS NFR BLD AUTO: 73.3 %
NT-PROBNP SERPL-MCNC: 5689 PG/ML
PLATELET # BLD AUTO: 537 K/UL
POTASSIUM SERPL-SCNC: 5.5 MMOL/L
PROT SERPL-MCNC: 6 G/DL
RBC # BLD: 1.85 M/UL
RBC # FLD: 18.2 %
SODIUM SERPL-SCNC: 139 MMOL/L
WBC # FLD AUTO: 15.51 K/UL

## 2022-11-16 NOTE — HISTORY OF PRESENT ILLNESS
[FreeTextEntry1] : Mr. Gillis is here today for a 4 month follow up of Aortic Stenosis. When he was last seen in July, he had moderate AS on his Echo, and his most prominent symptoms were fatigue and mild SOB with ambulation.\par \par Today he is here with his wife, stating he feels a little worse than he did when we saw him July. Currently he states even walking as little as ten feet with a walker causes him to have SOB and occasionally some dizziness that goes away with rest. He has some steps in his house where he will go up 1-2 steps at a time then stop to catch his breath. He denies any chest pain but does get occasional pedal edema. He has persistent swelling in his right leg due to a previous compartment syndrome.\par \par Transthoracic Echocardiogram today reveals severe AS with an HAILE of 0.7 sqcm, peak and mean gradients of 55 and 33 mmHg, a peak velocity of 3.7 m/s, and a DVI of 0.2; LV function is normal.\par \par He has a lot of left hip pain. He is becoming more dyspneic walking, even from his front door to his car. He is "sleeping a lot" per his wife. He is seeing pain management. He has no angina or syncopal symptoms. He has been on warfarin for many years for AF, but notes [facility] not [facility]AC due his valvular disease.

## 2022-11-16 NOTE — REASON FOR VISIT
[Structural Heart and Valve Disease] : structural heart and valve disease [Spouse] : spouse [FreeTextEntry3] : Dr. Mejia

## 2022-11-16 NOTE — DISCUSSION/SUMMARY
[FreeTextEntry1] : Mr Carlin has symptomatic AS that is now severe on today's TTE. As such, I would recommend he move forward with STEPHIE evaluation, to include a cardiac structural CT and coronary angiography. Given his reported history of recurrent strokes during brief periods of warfarin interruption (for other procedures, including when bridged with Lovenox, per his wife's report), he will need to be admitted for heparin bridging prior to and after any procedures. Given the complexity of this scenario, I am recommending the following plan:\par \par Proceed with the outpatient cardiac CT--which will be reviewed by the Heart Team. If his anatomy is amenable to TF STEPHIE, we would then plan on admitting him for coronary angiography (preferably from a right radial approach) after stopping his warfarin for 1 or 2 nights to let his INR drift down closer to 2 (he runs around 2.6, per his wife's report). He would be admitted after his cardiac catheterization, started back on a heparin drip 6 hours later, and we would plan on proceeding with STEPHIE later the same admission. The other option to consider would be to perform coronary angiography at the time of his STEPHIE. He would be started back on heparin 6 hours after STEPHIE and resume warfarin the same night. He would be discharged once his INR is therapeutic.\par \par I have discussed my impressions and recommendations with him and his wife in detail--they agree and are eager to proceed as outlined. I would anticipate, if all goes as planned, he would be able to proceed with his THR a few weeks after his STEPHIE.

## 2022-11-16 NOTE — REVIEW OF SYSTEMS
[Feeling Fatigued] : feeling fatigued [SOB] : shortness of breath [Dyspnea on exertion] : dyspnea during exertion [Lower Ext Edema] : lower extremity edema [Joint Pain] : joint pain [Joint Stiffness] : joint stiffness [Negative] : Heme/Lymph [Chest Discomfort] : no chest discomfort [Palpitations] : no palpitations [FreeTextEntry9] : Awaiting Left hip replacement.

## 2022-11-16 NOTE — PHYSICAL EXAM
[Normal S1, S2] : normal S1, S2 [No Rub] : no rub [Murmur] : murmur [Edema ___] : edema [unfilled] [Normal] : alert and oriented, normal memory [de-identified] : III/VI PARESH [de-identified] : 1+ RLE and trace LLE edema (chronic asymmetry, prior RLE surgery)

## 2022-11-17 ENCOUNTER — INPATIENT (INPATIENT)
Facility: HOSPITAL | Age: 77
LOS: 4 days | Discharge: HOME | DRG: 811 | End: 2022-11-22
Attending: STUDENT IN AN ORGANIZED HEALTH CARE EDUCATION/TRAINING PROGRAM | Admitting: STUDENT IN AN ORGANIZED HEALTH CARE EDUCATION/TRAINING PROGRAM
Payer: COMMERCIAL

## 2022-11-17 VITALS
HEART RATE: 72 BPM | DIASTOLIC BLOOD PRESSURE: 62 MMHG | SYSTOLIC BLOOD PRESSURE: 106 MMHG | OXYGEN SATURATION: 97 % | TEMPERATURE: 98 F | RESPIRATION RATE: 20 BRPM | WEIGHT: 225.09 LBS

## 2022-11-17 DIAGNOSIS — Z98.42 CATARACT EXTRACTION STATUS, LEFT EYE: Chronic | ICD-10-CM

## 2022-11-17 DIAGNOSIS — Z90.81 ACQUIRED ABSENCE OF SPLEEN: Chronic | ICD-10-CM

## 2022-11-17 DIAGNOSIS — D64.9 ANEMIA, UNSPECIFIED: ICD-10-CM

## 2022-11-17 DIAGNOSIS — Z98.890 OTHER SPECIFIED POSTPROCEDURAL STATES: Chronic | ICD-10-CM

## 2022-11-17 DIAGNOSIS — Z98.1 ARTHRODESIS STATUS: Chronic | ICD-10-CM

## 2022-11-17 DIAGNOSIS — Z96.641 PRESENCE OF RIGHT ARTIFICIAL HIP JOINT: Chronic | ICD-10-CM

## 2022-11-17 LAB
ALBUMIN SERPL ELPH-MCNC: 3.7 G/DL — SIGNIFICANT CHANGE UP (ref 3.3–5)
ALP SERPL-CCNC: 74 U/L — SIGNIFICANT CHANGE UP (ref 40–120)
ALT FLD-CCNC: 20 U/L — SIGNIFICANT CHANGE UP (ref 10–45)
ANION GAP SERPL CALC-SCNC: 10 MMOL/L — SIGNIFICANT CHANGE UP (ref 5–17)
ANISOCYTOSIS BLD QL: SLIGHT — SIGNIFICANT CHANGE UP
APTT BLD: 45.8 SEC — HIGH (ref 27.5–35.5)
AST SERPL-CCNC: 23 U/L — SIGNIFICANT CHANGE UP (ref 10–40)
BASOPHILS # BLD AUTO: 0 K/UL — SIGNIFICANT CHANGE UP (ref 0–0.2)
BASOPHILS NFR BLD AUTO: 0 % — SIGNIFICANT CHANGE UP (ref 0–2)
BILIRUB SERPL-MCNC: 0.2 MG/DL — SIGNIFICANT CHANGE UP (ref 0.2–1.2)
BLD GP AB SCN SERPL QL: NEGATIVE — SIGNIFICANT CHANGE UP
BUN SERPL-MCNC: 47 MG/DL — HIGH (ref 7–23)
CALCIUM SERPL-MCNC: 9.2 MG/DL — SIGNIFICANT CHANGE UP (ref 8.4–10.5)
CHLORIDE SERPL-SCNC: 101 MMOL/L — SIGNIFICANT CHANGE UP (ref 96–108)
CO2 SERPL-SCNC: 25 MMOL/L — SIGNIFICANT CHANGE UP (ref 22–31)
CREAT SERPL-MCNC: 1.55 MG/DL — HIGH (ref 0.5–1.3)
EGFR: 46 ML/MIN/1.73M2 — LOW
ELLIPTOCYTES BLD QL SMEAR: SLIGHT — SIGNIFICANT CHANGE UP
EOSINOPHIL # BLD AUTO: 0 K/UL — SIGNIFICANT CHANGE UP (ref 0–0.5)
EOSINOPHIL NFR BLD AUTO: 0 % — SIGNIFICANT CHANGE UP (ref 0–6)
FLUAV AG NPH QL: SIGNIFICANT CHANGE UP
FLUBV AG NPH QL: SIGNIFICANT CHANGE UP
GLUCOSE SERPL-MCNC: 102 MG/DL — HIGH (ref 70–99)
HCT VFR BLD CALC: 21.1 % — LOW (ref 39–50)
HGB BLD-MCNC: 6.3 G/DL — CRITICAL LOW (ref 13–17)
HYPOCHROMIA BLD QL: SLIGHT — SIGNIFICANT CHANGE UP
INR BLD: 4.79 RATIO — HIGH (ref 0.88–1.16)
LYMPHOCYTES # BLD AUTO: 1.96 K/UL — SIGNIFICANT CHANGE UP (ref 1–3.3)
LYMPHOCYTES # BLD AUTO: 11.3 % — LOW (ref 13–44)
MACROCYTES BLD QL: SLIGHT — SIGNIFICANT CHANGE UP
MAGNESIUM SERPL-MCNC: 2.5 MG/DL — SIGNIFICANT CHANGE UP (ref 1.6–2.6)
MANUAL SMEAR VERIFICATION: SIGNIFICANT CHANGE UP
MCHC RBC-ENTMCNC: 29.9 GM/DL — LOW (ref 32–36)
MCHC RBC-ENTMCNC: 32.8 PG — SIGNIFICANT CHANGE UP (ref 27–34)
MCV RBC AUTO: 109.9 FL — HIGH (ref 80–100)
MICROCYTES BLD QL: SLIGHT — SIGNIFICANT CHANGE UP
MONOCYTES # BLD AUTO: 1.06 K/UL — HIGH (ref 0–0.9)
MONOCYTES NFR BLD AUTO: 6.1 % — SIGNIFICANT CHANGE UP (ref 2–14)
NEUTROPHILS # BLD AUTO: 14.33 K/UL — HIGH (ref 1.8–7.4)
NEUTROPHILS NFR BLD AUTO: 82.6 % — HIGH (ref 43–77)
NRBC # BLD: 12 /100 — HIGH (ref 0–0)
NT-PROBNP SERPL-SCNC: 9286 PG/ML — HIGH (ref 0–300)
OVALOCYTES BLD QL SMEAR: SLIGHT — SIGNIFICANT CHANGE UP
PHOSPHATE SERPL-MCNC: 3.8 MG/DL — SIGNIFICANT CHANGE UP (ref 2.5–4.5)
PLAT MORPH BLD: NORMAL — SIGNIFICANT CHANGE UP
PLATELET # BLD AUTO: 564 K/UL — HIGH (ref 150–400)
POIKILOCYTOSIS BLD QL AUTO: SLIGHT — SIGNIFICANT CHANGE UP
POLYCHROMASIA BLD QL SMEAR: SLIGHT — SIGNIFICANT CHANGE UP
POTASSIUM SERPL-MCNC: 5 MMOL/L — SIGNIFICANT CHANGE UP (ref 3.5–5.3)
POTASSIUM SERPL-SCNC: 5 MMOL/L — SIGNIFICANT CHANGE UP (ref 3.5–5.3)
PROT SERPL-MCNC: 6.5 G/DL — SIGNIFICANT CHANGE UP (ref 6–8.3)
PROTHROM AB SERPL-ACNC: 56.4 SEC — HIGH (ref 10.5–13.4)
RBC # BLD: 1.92 M/UL — LOW (ref 4.2–5.8)
RBC # FLD: 17.4 % — HIGH (ref 10.3–14.5)
RBC BLD AUTO: ABNORMAL
RH IG SCN BLD-IMP: POSITIVE — SIGNIFICANT CHANGE UP
RSV RNA NPH QL NAA+NON-PROBE: SIGNIFICANT CHANGE UP
SARS-COV-2 RNA SPEC QL NAA+PROBE: SIGNIFICANT CHANGE UP
SODIUM SERPL-SCNC: 136 MMOL/L — SIGNIFICANT CHANGE UP (ref 135–145)
TARGETS BLD QL SMEAR: SLIGHT — SIGNIFICANT CHANGE UP
TROPONIN T, HIGH SENSITIVITY RESULT: 260 NG/L — HIGH (ref 0–51)
WBC # BLD: 17.35 K/UL — HIGH (ref 3.8–10.5)
WBC # FLD AUTO: 17.35 K/UL — HIGH (ref 3.8–10.5)

## 2022-11-17 PROCEDURE — 99291 CRITICAL CARE FIRST HOUR: CPT

## 2022-11-17 PROCEDURE — 71045 X-RAY EXAM CHEST 1 VIEW: CPT | Mod: 26

## 2022-11-17 PROCEDURE — 99223 1ST HOSP IP/OBS HIGH 75: CPT | Mod: GC

## 2022-11-17 RX ORDER — FUROSEMIDE 40 MG
40 TABLET ORAL ONCE
Refills: 0 | Status: COMPLETED | OUTPATIENT
Start: 2022-11-17 | End: 2022-11-17

## 2022-11-17 RX ORDER — ACETAMINOPHEN 500 MG
650 TABLET ORAL EVERY 6 HOURS
Refills: 0 | Status: DISCONTINUED | OUTPATIENT
Start: 2022-11-17 | End: 2022-11-22

## 2022-11-17 RX ADMIN — Medication 40 MILLIGRAM(S): at 19:53

## 2022-11-17 NOTE — H&P ADULT - PROBLEM SELECTOR PLAN 9
- Diet: DASH  - DVT: SCDs Presents with INR of 4. 70 on admission with Hgb 6.1 o/p. S/p 1u pRBC    - cont to hold coumadin  - monitor CBC/ INR q6 Presents with INR of 4. 70 on admission with Hgb 6.1 o/p. S/p 1u pRBC    - cont to hold coumadin  - monitor CBC/ INR q8

## 2022-11-17 NOTE — ED PROVIDER NOTE - OBJECTIVE STATEMENT
78 yo M w/ severe aortic stenosis (requiring valve replacement), afib on coumadin, B12 deficiency, pacemaker, HTN, and HLD presents for Hgb of 6.0 (today AM, down from 7.2 2 days prior) in the setting of a few days of fatigue, SOB at rest, chills, and poor PO intake. He received part of his Fe infusion today and Aranesp (darbepoetin). He has a history of 3 prior transfusions (last transfusion on June 2022) w/ no prior transfusion reactions. He received an endoscopy and colonoscopy and was found to have an old GI bleed. He is followed by Dr. Yumiko Mejia (cardiology) and Dr. Jimi Martins (GI, f/u on Tuesday).

## 2022-11-17 NOTE — H&P ADULT - PROBLEM SELECTOR PROBLEM 5
MOE (obstructive sleep apnea) BPH (benign prostatic hyperplasia) History of chronic CHF Elevated troponin

## 2022-11-17 NOTE — H&P ADULT - HISTORY OF PRESENT ILLNESS
77M PMH HTN, Afib on coumadin, CVA, Hodgkin's Lymphoma s/p splenectomy and chemo in 1975, essential thrombocytopenia, atrial fibrillation on coumadin, HTN, MOE, recently diagnosed anemia (b/l 7-8), aortic stenosis (pending TAVR), GERD presents due to recommendation for o/p physician for Hgb 6.1 11/15. Today, states he received Fe and Aranesp infusion. Pt states he has felt more lethargic the past few weeks. Reports dizziness and dyspnea on exertion. Has felt short of breath walking a few steps at home. Denies fall, LOC, N/V/F/C, CP, palpitations, abdominal pain, diarrhea, melena, hematochezia. No noted signs of bleeding. Patient continues to take coumadin (last INR check last week- 2mg x5 days, 1mg x2 days).  77M PMH HTN, Afib on coumadin, CVA, Hodgkin's Lymphoma s/p splenectomy and chemo in 1975, essential thrombocytopenia, atrial fibrillation on coumadin, HTN, MOE, recently diagnosed anemia (b/l 7-8), aortic stenosis (pending TAVR), GERD presents due to recommendation for o/p physician for Hgb 6.1 11/15. Today, states he received Fe and Aranesp infusion. Pt states he has felt more lethargic the past few weeks. Reports dizziness and dyspnea on exertion. Has felt short of breath walking a few steps at home. Denies fall, LOC, N/V/F/C, CP, palpitations, abdominal pain, diarrhea, melena, hematochezia. States he has dark stool chronically since taking iron supplementation. Pt has had EGD/ colonoscopy previously with no reported acute findings. Patient continues to take coumadin (last INR check last week- 2mg x5 days, 1mg x2 days).     Patient follows with Dr. Negro Goel for a history of Hodgkin's Lymphoma s/p splenectomy and chemotherapy in 1975), essential thrombocythemia and worsening macrocytic anemia. Pt found to have Fe def anemia and has been receiving infusions. Per documentation, bone marrow aspirate and biopsy on 4/6/2022 which showed early/mild evidence of dysplastic changes although B12 and folate deficiency or other causes of macrocytosis could not be ruled out. Additional workup showed chronic kidney disease and B12 deficiency s/p B12 injections.     In the ED- afebrile, HR 60- 70, - 120s, RA > 95%  Labs- WBC 17.35, Hgb 6.3 (baseline 7-8), Plt 564, K 5, Cr 1.55 (baseline 1.2), trop 260, BNP 9K, COVID negative  Meds- Lasix 40 (7pm), s/p 1u pRBC 77M PMH HTN, Afib on coumadin, CVA, Hodgkin's Lymphoma s/p splenectomy and chemo in 1975, essential thrombocytopenia, atrial fibrillation on coumadin, HTN, MOE, recently diagnosed anemia (b/l 7-8), aortic stenosis (pending TAVR), GERD presents due to recommendation for o/p physician for Hgb 6.1 11/15. Today, states he received Fe and Aranesp infusion. Pt states he has felt more lethargic the past few weeks. Reports dizziness and dyspnea on exertion. Has felt short of breath walking a few steps at home. Pt pending TAVR w/u. Denies fall, LOC, N/V/F/C, CP, palpitations, abdominal pain, diarrhea, melena, hematochezia. States he has dark stool chronically since taking iron supplementation. Pt has had EGD/ colonoscopy previously with no reported acute findings. Patient continues to take coumadin (last INR check last week- 2mg x5 days, 1mg x2 days).     Patient follows with Dr. Negro Goel for a history of Hodgkin's Lymphoma s/p splenectomy and chemotherapy in 1975), essential thrombocythemia and worsening macrocytic anemia. Pt found to have Fe def anemia and has been receiving infusions. Per documentation, bone marrow aspirate and biopsy on 4/6/2022 which showed early/mild evidence of dysplastic changes although B12 and folate deficiency or other causes of macrocytosis could not be ruled out. Additional workup showed chronic kidney disease and B12 deficiency s/p B12 injections.     In the ED- afebrile, HR 60- 70, - 120s, RA > 95%  Labs- WBC 17.35, Hgb 6.3 (baseline 7-8), Plt 564, K 5, Cr 1.55 (baseline 1.2), trop 260, BNP 9K, COVID negative  Meds- Lasix 40 (7pm), s/p 1u pRBC

## 2022-11-17 NOTE — ED ADULT NURSE REASSESSMENT NOTE - NS ED NURSE REASSESS COMMENT FT1
Pt endorsed from previous shift. Pt AxOx4. Pt admitted . Awaiting bed assignment. Blood transfusion completed. In no apparent distress. Will continue to monitor closely

## 2022-11-17 NOTE — H&P ADULT - NSHPLABSRESULTS_GEN_ALL_CORE
(11-17 @ 17:08)                      6.3  17.35 )-----------( 564                 21.1    Neutrophils = 14.33 (82.6%)  Lymphocytes = 1.96 (11.3%)  Eosinophils = 0.00 (0.0%)  Basophils = 0.00 (0.0%)  Monocytes = 1.06 (6.1%)  Bands = --%    11-17    136  |  101  |  47<H>  ----------------------------<  102<H>  5.0   |  25  |  1.55<H>    Ca    9.2      17 Nov 2022 17:08  Phos  3.8     11-17  Mg     2.5     11-17    TPro  6.5  /  Alb  3.7  /  TBili  0.2  /  DBili  x   /  AST  23  /  ALT  20  /  AlkPhos  74  11-17    ( 17 Nov 2022 17:08 )   PT: 56.4 sec;   INR: 4.79 ratio;       PTT:45.8 sec      RVP:          Tox:

## 2022-11-17 NOTE — H&P ADULT - PROBLEM SELECTOR PLAN 5
Home medication: Flomax 0.4    - C/w home medication EF 59% on 11/15/22. Home med: Furosemide 40, Lisinopril 5, Spironolactone 12.5, atenolol 25, asp 81, rousvastatin 10mg    - CTM I/Os  - hold home medications 2/2 MAGALI on CKD vs anemia. Trops 206 on admission with N/V/F/C, no chest pain, palpitations. Cardiology consulted.     - trend to peak 2/2 MAGALI on CKD vs anemia. Trops 206 on admission with N/V/F/C, no chest pain, palpitations. Cardiology consulted.     - trend to peak  - per cardiology recommendation, monitor on tele however currently on 5 HANS. Please place on tele in the am

## 2022-11-17 NOTE — H&P ADULT - PROBLEM SELECTOR PLAN 4
- Home med: omeprazole 40 daily    - c/w protonix 40 BID given possible GIB Presents with Cr 1.55. Baseline 1.2-1.3. Likely 2/2 hypovolemia and ATN    - CTM Cr  - s/p 1u pRBC Presents with Cr 1.55. Baseline 1.2-1.3. Likely 2/2 hypovolemia and ATN    - CTM Cr  - s/p 1u pRBC. Pending 2nd unit

## 2022-11-17 NOTE — H&P ADULT - NSHPPHYSICALEXAM_GEN_ALL_CORE
Vital Signs Last 24 Hrs  T(C): 37.2 (17 Nov 2022 23:55), Max: 37.2 (17 Nov 2022 23:55)  T(F): 99 (17 Nov 2022 23:55), Max: 99 (17 Nov 2022 23:55)  HR: 76 (17 Nov 2022 23:55) (65 - 76)  BP: 134/- (17 Nov 2022 23:55) (104/51 - 134/-)  BP(mean): --  RR: 18 (17 Nov 2022 23:55) (18 - 20)  SpO2: 99% (17 Nov 2022 23:55) (96% - 99%)    Parameters below as of 17 Nov 2022 23:55  Patient On (Oxygen Delivery Method): room air        CONSTITUTIONAL: NAD  EYES: No conjunctival or scleral injection, non-icteric; PERRLA and symmetric  ENMT: No external nasal lesions; oral mucosa with moist membranes  NECK: Trachea midline  RESPIRATORY: Breathing comfortably; lungs CTA without wheeze/rhonchi/rales  CARDIOVASCULAR: +S1S2, RRR, no M/G/R; trace lower extremity edema  GASTROINTESTINAL: Soft, nondistended and nontender; +BS throughout, no rebound/guarding  MUSCULOSKELETAL: normal strength and tone of extremities  SKIN: No rashes or ulcers noted  NEUROLOGIC: no focal deficits  PSYCHIATRIC: A+O x 3; mood and affect appropriate; appropriate insight and judgment Vital Signs Last 24 Hrs  T(C): 37.2 (17 Nov 2022 23:55), Max: 37.2 (17 Nov 2022 23:55)  T(F): 99 (17 Nov 2022 23:55), Max: 99 (17 Nov 2022 23:55)  HR: 76 (17 Nov 2022 23:55) (65 - 76)  BP: 134/- (17 Nov 2022 23:55) (104/51 - 134/-)  BP(mean): --  RR: 18 (17 Nov 2022 23:55) (18 - 20)  SpO2: 99% (17 Nov 2022 23:55) (96% - 99%)    Parameters below as of 17 Nov 2022 23:55  Patient On (Oxygen Delivery Method): room air        CONSTITUTIONAL: NAD  EYES: No conjunctival or scleral injection, non-icteric; PERRLA and symmetric  ENMT: No external nasal lesions; oral mucosa with moist membranes  NECK: Trachea midline  RESPIRATORY: Breathing comfortably; lungs CTA without wheeze/rhonchi/rales  CARDIOVASCULAR: +S1S2, RRR, + murmur; trace lower extremity edema  GASTROINTESTINAL: Soft, nondistended and nontender; +BS throughout, no rebound/guarding  MUSCULOSKELETAL: normal strength and tone of extremities  SKIN: No rashes or ulcers noted  NEUROLOGIC: no focal deficits  PSYCHIATRIC: A+O x 3; mood and affect appropriate; appropriate insight and judgment

## 2022-11-17 NOTE — ED PROVIDER NOTE - PROGRESS NOTE DETAILS
Jesse PGY1: BNP of 9286 and patient is volume overloaded. Plan to give patient lasix before transfusing patient. Fecal occult still pending. Jesse PGY1: BNP of 9286 and troponin of 260 is concerning for CHF, but is most likely due to severe aortic stenosis. Consulted cardiology. Plan to admit patient for blood transfusion back to Hgb of 8-10 as patient is expected to keep dropping Hgb due to AS and B12 deficiency.

## 2022-11-17 NOTE — CONSULT NOTE ADULT - SUBJECTIVE AND OBJECTIVE BOX
Patient seen and evaluated at bedside    Chief Complaint: Anemia    HPI:  77M MHx HTN,  CVA, RLE DVT, Hodgkin's Lymphoma s/p splenectomy and chemo in 1975, essential thrombocytopenia, atrial fibrillation on coumadin, anemia, HTN, MOE,  aortic stenosis (pending TAVR eval with Dr. Bland), sent in by hematologist for anemia. Pt states that he been having shortness of breath with exertion for which he is being evaluated for TAVR, but no recent change for at least the past few weeks. He also denies any chest pain, palpitations, light headedness, dizziness, nasuea vomiting, sweating. He states that his legs get swollen intermittently for which he sometimes takes an increased dose of lasix, but he has not had to do that recent. Denies orthopnea, or PND.   Of note, pt with anemia and chronically dark stools 2/2 iron supplements. intermittently 2/2 b12 ?bleeding? His INR of late has been supratherapeutic up to 11.       PMHx:   Hodgkin lymphoma    Atrial fibrillation    HTN (hypertension)    GERD (gastroesophageal reflux disease)    MOE (obstructive sleep apnea)    BPH (benign prostatic hyperplasia)    Osteoarthritis    CVA (cerebral vascular accident)    Spinal stenosis    Spondylolisthesis    Exposure to toxin        PSHx:   S/P splenectomy    S/P hip replacement, right    H/O Spinal surgery    History of cardioversion    History of lumbar spinal fusion    Status post left cataract extraction        Allergies:  No Known Allergies      Home Meds:  Pt to get home meds.           FAMILY HISTORY:  Family history of stroke (Father)        Social History: lives with wife.   Smoking History: denies  Alcohol Use:denies  Drug Use:denies    REVIEW OF SYSTEMS:  Constitutional:     [ x] negative [ ] fevers [ ] chills [ ] weight loss [ ] weight gain  HEENT:                  [ x] negative [ ] dry eyes [ ] eye irritation [ ] postnasal drip [ ] nasal congestion  CV:                         [ x] negative  [ ] chest pain [ ] orthopnea [ ] palpitations [ ] murmur  Resp:                   as above  [ ] negative [ ] cough [ ] shortness of breath [ ] dyspnea [ ] wheezing [ ] sputum [   GI:                          [x ] negative [ ] nausea [ ] vomiting [ ] diarrhea [ ] constipation [ ] abd pain [ ] dysphagia   :                        [x ] negative [ ] dysuria [ ] nocturia [ ] hematuria [ ] increased urinary frequency  Musculoskeletal: [x ] negative [ ] back pain [ ] myalgias [ ] arthralgias [ ] fracture  Skin:                       [x ] negative [ ] rash [ ] itch  Neurological:        [x ] negative [ ] headache [ ] dizziness [ ] syncope [ ] weakness [ ] numbness  Psychiatric:           [ x] negative [ ] anxiety [ ] depression  Endocrine:            [x ] negative [ ] diabetes [ ] thyroid problem  Heme/Lymph:      [x ] negative [ ] anemia [ ] bleeding problem  Allergic/Immune: [ ] negative [ ] itchy eyes [ ] nasal discharge [ ] hives [ ] angioedema    [x ] All other systems negative        Physical Exam:  T(F): 98.1 (11-17), Max: 98.8 (11-17)  HR: 65 (11-17) (65 - 72)  BP: 125/88 (11-17) (104/51 - 125/88)  RR: 18 (11-17)  SpO2: 99% (11-17)  GENERAL: No acute distress, well-developed  HEAD:  Atraumatic, Normocephalic  ENT: EOMI, PERRLA, conjunctiva and sclera clear, Neck supple, No JVD, moist mucosa  CHEST/LUNG: Clear to auscultation bilaterally; Mild bilateral wheeze posterior. No  rales or rhonchi  BACK: No spinal tenderness  HEART: Regular rate and rhythm; + systolic murmur heard best in the right parasternal second intercostal space  ABDOMEN: Soft, Nontender, Nondistended; Bowel sounds present  EXTREMITIES:  No clubbing, cyanosis, 1+ LE edema  PSYCH: Nl behavior, nl affect  NEUROLOGY: AAOx3, non-focal, cranial nerves intact  SKIN: Normal color, No rashes or lesions      Cardiovascular Diagnostic Testing:    ECG: Personally reviewed: Not obtained    Echo: Personally reviewed:  Study Date: 11/15/2022  Location: O/PSonographer: Maggy Friend RDCS  Study quality: Technically good  Referring Physician: Rocco Bland MD  Blood Pressure: 116/56 mmHg  Height: 173 cm  Weight: 102 kg  BSA: 2.2 m2  Heart Rate: 85 mmHg  ------------------------------------------------------------------------  PROCEDURE: Transthoracic echocardiogram with 2-D, M-Mode  and complete spectral and color flow Doppler. Intravenous  catheter inserted. Verbal consent was obtained for  injection of  Ultrasonic Enhancing Agent following a  discussion of risks and benefits. Following intravenous  injection of Ultrasonic Enhancing Agent, harmonic imaging  was performed.  INDICATION: Nonrheumatic aortic (valve) stenosis (I35.0)  ------------------------------------------------------------------------  MEASUREMENTS: (See below)  ------------------------------------------------------------------------  OBSERVATIONS:  Mitral Valve: There is mild mitral annular calcification.  Mild mitral regurgitation.  The peak/mean diastolic mitral  gradients= 7/2mmHg (HR= 96bpm).  Aortic Root: Aortic Root: 2.8 cm.  Ascending Aorta: 3.1 cm.  LVOT diameter: 2.1 cm.  Aortic Valve:Calcified trileaflet aortic valve with  decreased opening. Peak transaortic valve gradient equals  55 mm Hg, mean transaortic valve gradient equals 33 mm Hg,  estimated aortic valve area equals 0.8 sqcm (by continuity  equation), aortic valve velocity time integral equals 95  cm,  the DVI= 0.20, consistent with severe aortic stenosis.  LV SV= 73ml, LV SV index= 34ml/m2. No aortic valve  regurgitation seen. Peak left ventricular outflow tract  gradient equals 3 mm Hg, mean gradient is equal to 2 mm Hg,  LVOT velocity time integral equals 19 cm.  Left Atrium: Severely dilated left atrium.  LA volume index  = 80 cc/m2.  Left Ventricle: Normal left ventricular systolic function.  There is a small LV apical apenurysm of the apical cap.  The LVEF= 59% by biplane Marshall's method. Normal left  ventricular internal dimensions and wall thicknesses.  Right Heart: Severe right atrial enlargement.  RA area= 29cm2, RA volume= 98ml. Normal right ventricular  size and function. Normal tricuspid valve. Mild tricuspid  regurgitation. Normal pulmonic valve.  Pericardium/PleuraThere is a trivial pericardial effusion.  Hemodynamic: The estimated right atrial pressure is  elevated. Estimated right ventricular systolic pressure  equals 49 mm Hg, assuming right atrial pressure equals 13  mm Hg, consistent with mild pulmonary hypertension.  ------------------------------------------------------------------------  CONCLUSIONS:  1. Calcified trileaflet aortic valve with decreased  opening. Peak transaorticvalve gradient equals 55 mm Hg,  mean transaortic valve gradient equals 33 mm Hg, estimated  aortic valve area equals 0.8 sqcm (by continuity equation),  aortic valve velocity time integral equals 95 cm,  the DVI=  0.20, consistent with severe aorticstenosis.  LV SV= 73ml, LV SV index= 34ml/m2. No aortic valve  regurgitation seen.  2. Normal left ventricular systolic function. 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  The LVEF= 59%  by biplane Marshall's method.  *** Compared withechocardiogram of 7/12/2022, the aortic  stenosis is now severe.      Labs: Personally reviewed                        6.3    17.35 )-----------( 564      ( 17 Nov 2022 17:08 )             21.1     11-17    136  |  101  |  47<H>  ----------------------------<  102<H>  5.0   |  25  |  1.55<H>    Ca    9.2      17 Nov 2022 17:08  Phos  3.8     11-17  Mg     2.5     11-17    TPro  6.5  /  Alb  3.7  /  TBili  0.2  /  DBili  x   /  AST  23  /  ALT  20  /  AlkPhos  74  11-17    PT/INR - ( 17 Nov 2022 17:08 )   PT: 56.4 sec;   INR: 4.79 ratio         PTT - ( 17 Nov 2022 17:08 )  PTT:45.8 sec  Serum Pro-Brain Natriuretic Peptide: 9286 pg/mL (11-17 @ 17:08)

## 2022-11-17 NOTE — H&P ADULT - PROBLEM SELECTOR PLAN 8
- Diet: DASH  - DVT: SCDs Presents with INR of 4. 70 on admission with Hgb 6.1 o/p. S/p 1u pRBC    - cont to hold coumadin  - monitor CBC/ INR q6 Home medication: tamsulosin 8mg  - C/w home medication Home medication: tamsulosin .8mg  - C/w home medication

## 2022-11-17 NOTE — H&P ADULT - PROBLEM SELECTOR PLAN 6
- Diet: DASH  - DVT: SCDs Presents with INR of 4. 70 on admission with Hgb 6.1 o/p. S/p 1u pRBC    - cont to hold coumadin  - monitor CBC/ INR q6 - Home med: omeprazole 20 daily    - c/w protonix 20 BID given possible GIB EF 59% on 11/15/22. Home med: Furosemide 40, Lisinopril 5, Spironolactone 12.5, atenolol 25, asp 81, rousvastatin 10mg    - CTM I/Os  - hold home medications EF 59% on 11/15/22. Home med: Furosemide 40, Lisinopril 5, Spironolactone 12.5, atenolol 25, asp 81, rousvastatin 10mg    - CTM I/Os  - c/w atenolol + statin + lisinopril  - hold: furosemide and spirinolactone EF 59% on 11/15/22. Home med: Furosemide 40, Lisinopril 5, Spironolactone 12.5, atenolol 25, asp 81, rousvastatin 10mg    - CTM I/Os  - c/w atenolol + statin   - hold: furosemide and spirinolactone and lisinopril EF 59% on 11/15/22. Home med: Furosemide 40, Lisinopril 5, Spironolactone 12.5, atenolol 25, asp 81, rousvastatin 10mg    - CTM I/Os  - c/w atenolol + statin   - hold:  lisinopril  - c/w furosemide and spirinolactone

## 2022-11-17 NOTE — ED PROVIDER NOTE - CLINICAL SUMMARY MEDICAL DECISION MAKING FREE TEXT BOX
Yadira Spencer, Attending Physician: 77M with afib on coumadin (goal reported mid-2s), severe AS (seen on echo yesterday), b12 deficiency c/b anemia sent for outpatinet h/h in 6s. Pt with decreased exercise tolerance which may be secondary to anemia vs. severe AS. Pt's MCV elevated which does explain anemia as b12 in nature. No known GIB however patient has dark stools at baseline 2/2 iron supplementation. No recent colonoscopy because needs to be on a/c but reportedly had bleeding scan which was non-actionable. Will repeat labs, perform guiac and patient will require admission for transfusion as goal hgb >7 and will give lasix prior to transfusion given elevated bnp (and pt reportedly not taking afternoon lasix routinely). Yadira Spencer, Attending Physician: 77M with afib on coumadin (goal reported mid-2s), severe AS (seen on echo yesterday), b12 deficiency c/b anemia sent for outpatinet h/h in 6s. Pt with decreased exercise tolerance which may be secondary to anemia vs. severe AS. Pt's MCV elevated which does explain anemia as b12 in nature. No known GIB however patient has dark stools at baseline 2/2 iron supplementation. No recent colonoscopy because needs to be on a/c but reportedly had bleeding scan which was non-actionable. Will repeat labs, perform guiac and patient will require admission for transfusion as goal hgb >7 and will give lasix prior to transfusion given elevated bnp (and pt reportedly not taking afternoon lasix routinely). Will also check INR to eval if patient is supratherapeutic.

## 2022-11-17 NOTE — H&P ADULT - PROBLEM SELECTOR PROBLEM 7
Preventive measure R/O Atrial fibrillation Medication management BPH (benign prostatic hyperplasia) GERD (gastroesophageal reflux disease)

## 2022-11-17 NOTE — ED PROVIDER NOTE - PHYSICAL EXAMINATION
Yadira Spencer, Attending Physician:   PE:  Gen: NAD  Head: NCAT  ENT: MMM, pale conjunctiva   Chest: RRR, normal perfusion, +aortic murmur  Lungs: Symmetrical chest rise, lungs CTAB  Abdomen: soft, NTND, No rebound/guarding  Rectal: no fissures, soft & brown stool  Ext: No gross deformities  Neuro: awake and alert, Moving all extremities equally  Skin: no rashes

## 2022-11-17 NOTE — H&P ADULT - PROBLEM SELECTOR PROBLEM 6
BPH (benign prostatic hyperplasia) Preventive measure Supratherapeutic INR GERD (gastroesophageal reflux disease) History of chronic CHF

## 2022-11-17 NOTE — ED PROVIDER NOTE - NSICDXFAMILYHX_GEN_ALL_CORE_FT
FAMILY HISTORY:  Father  Still living? Unknown  Family history of myocardial infarction, Age at diagnosis: Age Unknown  Family history of stroke, Age at diagnosis: Age Unknown     FAMILY HISTORY:  Father  Still living? Unknown  Family history of stroke, Age at diagnosis: Age Unknown

## 2022-11-17 NOTE — ED PROVIDER NOTE - CARE PLAN
1 Principal Discharge DX:	Symptomatic anemia  Secondary Diagnosis:	Dyspnea   Principal Discharge DX:	Symptomatic anemia  Secondary Diagnosis:	Dyspnea  Secondary Diagnosis:	Elevated INR

## 2022-11-17 NOTE — CONSULT NOTE ADULT - ASSESSMENT
77M MHx HTN,  CVA, RLE DVT, Hodgkin's Lymphoma s/p splenectomy and chemo in 1975, essential thrombocytopenia, atrial fibrillation on coumadin, anemia, HTN, MOE,  aortic stenosis (pending TAVR eval with Dr. Bland), sent in by hematologist for anemia found to have elevated troponin, cardiology consulted.    #1 Elevated Troponin 2/2 Anemia ? bleeding and MAGALI VS CKD. This would be a very atypical presentation for ACS and is unlikely.   -Never had chest pain. No recent change in respiratory symptoms ( ELLIS from AS pending TAVR eval)  -HStrops: 260--> trend to peak  -Please obtain EKG:   -can Monitor on telemetry    Lm Gunderson, Cardiology Fellow, F-1    For all New Consults and Questions:  [email or web address]   Login: [email or web address]    *** Note not final until signed by attending   77M MHx HTN,  CVA, RLE DVT, Hodgkin's Lymphoma s/p splenectomy and chemo in 1975, essential thrombocytopenia, atrial fibrillation on coumadin, anemia, HTN, MOE,  aortic stenosis (pending TAVR eval with Dr. Bland), sent in by hematologist for anemia found to have elevated troponin, cardiology consulted.    #1 Elevated Troponin 2/2 Anemia ? bleeding and MAGALI VS CKD. This would be a very atypical presentation for ACS and is unlikely.   -Never had chest pain. No recent change in respiratory symptoms ( ELLIS from AS pending TAVR eval)  -HStrops: 260--> trend to peak  -Please obtain EKG:   -can Monitor on telemetry    Lm Gunderson, Cardiology Fellow, F-1    For all New Consults and Questions:  [email or web address]   Login: [email or web address]    *** Note not final until signed by attending    This patient was seen and examined personally by me and the plan was discussed with the fellow and/or resident above. Amendments were made as necessary to the above. Agree with the excellent note and plan above. 77M w AF, AS, anemia here w troponin elevation. No CP or new anginal equivalent. Treat anemia, mon for cardiac symptoms.    Yrn Turner MD, MPhil, [facility]  Cardiologist, [facility]  ; [facility] and [facility]  Email: [email or web address] Cardiology and Cardiovascular Surgery on-service contact/call information, go to [email or web address] and use "The Library" to login.  Outpatient Cardiology appointments, call [phone] to arrange with a colleague; I do not have outpatient Cardiology clinic. 77M MHx HTN,  CVA, RLE DVT, Hodgkin's Lymphoma s/p splenectomy and chemo in 1975, essential thrombocytopenia, atrial fibrillation on coumadin, anemia, HTN, MOE,  aortic stenosis (pending TAVR eval with Dr. Bland), sent in by hematologist for anemia found to have elevated troponin, cardiology consulted.    #1 Elevated Troponin 2/2 Anemia ? bleeding and MAGALI VS CKD. This would be a very atypical presentation for ACS and is unlikely.   -Never had chest pain. No recent change in respiratory symptoms ( ELLIS from AS pending TAVR eval)  -HStrops: 260--> trend to peak  -Please obtain EKG:   -can Monitor on telemetry    Lm Gunderson, Cardiology Fellow, F-1    For all New Consults and Questions:  [email or web address]   Login: [email or web address]    *** Note not final until signed by attending    This patient was seen and examined personally by me and the plan was discussed with the fellow and/or resident above. Amendments were made as necessary to the above. Agree with the excellent note and plan above. 77M w AF, AS, anemia here w troponin elevation. No CP or new anginal equivalent. Treat anemia, mon for cardiac symptoms. Consider Heyde syndrome w acquired von Willebrand, AS, GIB.    Yrn Turner MD, MPhil, [facility]  Cardiologist, [facility]  ; [facility] and [facility]  Email: [email or web address] Cardiology and Cardiovascular Surgery on-service contact/call information, go to [email or web address] and use "[email or web address]" to login.  Outpatient Cardiology appointments, call [phone] to arrange with a colleague; I do not have outpatient Cardiology clinic. 77M MHx HTN,  CVA, RLE DVT, Hodgkin's Lymphoma s/p splenectomy and chemo in 1975, essential thrombocytopenia, atrial fibrillation on coumadin, anemia, HTN, MOE,  aortic stenosis (pending TAVR eval with Dr. Bland), sent in by hematologist for anemia found to have elevated troponin, cardiology consulted.    #1 Elevated Troponin 2/2 Anemia ? bleeding and MAGALI VS CKD. This would be a very atypical presentation for ACS and is unlikely.   -Never had chest pain. No recent change in respiratory symptoms ( ELLIS from AS pending TAVR eval)  -HStrops: 260--> trend to peak  -Please obtain EKG:   -can Monitor on telemetry  -Consider Heyde syndrome w acquired von Willebrand, AS, GIB.      Lm Gunderson, Cardiology Fellow, F-1    For all New Consults and Questions:  [email or web address]   Login: [employer]    *** Note not final until signed by attending    This patient was seen and examined personally by me and the plan was discussed with the fellow and/or resident above. Amendments were made as necessary to the above. Agree with the excellent note and plan above. 77M w AF, AS, anemia here w troponin elevation. No CP or new anginal equivalent. Treat anemia, mon for cardiac symptoms. Consider Heyde syndrome w acquired von Willebrand, AS, GIB.    Yrn Turner MD, MPhil, [facility]  Cardiologist, [facility]  ; [facility] and [facility]  Email: [email or web address] Cardiology and Cardiovascular Surgery on-service contact/call information, go to [email or web address] and use "[employer]" to login.  Outpatient Cardiology appointments, call [phone] to arrange with a colleague; I do not have outpatient Cardiology clinic.

## 2022-11-17 NOTE — H&P ADULT - PROBLEM SELECTOR PLAN 1
2/2 GIB? vs severe AS vs malignancy?. Presenting with Hg 6.1 o/p with baseline 7- 8. Complains of lightheadedness, ELLIS. No reported bloody stools. EGD/ colonoscopy in the past reportedly negative for acute pathology. Has a history of B12, folate, and Fe def.    - s/p 1u pRBC  - CTM CBC  - f/u hemolysis labs, Fe, folate, B12 studies   - consult GI for colonoscopy  - Protonix PO 40 BID  - consult heme ? 2/2 GIB? vs severe AS vs malignancy? Low concern for GIB. Presenting with Hg 6.1 o/p with baseline 7- 8. Complains of lightheadedness, ELLIS. No reported bloody stools. EGD/ colonoscopy in the past reportedly negative for acute pathology. Has a history of B12, folate, and Fe def.    - s/p 1u pRBC. Pending 2nd unit  - CTM CBC q8  - f/u hemolysis labs, Fe, folate, B12 studies   - f/u with o/p hematologist - previously found to have dysplastic changes on bone marrow bx per prior records ?  - Protonix PO 40 daily 2/2 GIB? vs severe AS vs malignancy? Low concern for GIB. Presenting with Hg 6.1 o/p with baseline 7- 8. Complains of lightheadedness, ELLIS. No reported bloody stools. EGD/ colonoscopy in the past reportedly negative for acute pathology. Has a history of B12, folate, and Fe def.    - s/p 1u pRBC. Pending 2nd unit  - CTM CBC q8  - f/u hemolysis labs, Fe, folate, B12 studies   - f/u with o/p hematologist - previously found to have dysplastic changes on bone marrow bx per prior records ?  - Protonix PO 40 daily  - heme and GI emailed overnight

## 2022-11-17 NOTE — H&P ADULT - ASSESSMENT
77M PMH HTN, Afib on coumadin, CVA, Hodgkin's Lymphoma s/p splenectomy and chemo in 1975, essential thrombocytopenia, atrial fibrillation on coumadin, Admitted for symptomatic anemia s/p 1u pRBC.

## 2022-11-17 NOTE — H&P ADULT - PROBLEM SELECTOR PLAN 3
On admission, HR controlled. On coumadin-last INR check last week- 2mg x5 days, 1mg x2 days. Per cardiology documentation, has had INRs 11. However pt states has been stable?    - cont to hold coumadin- INR 4.79 on admission  - c/w BB blocker - home med atenolol 25mg daily per [facility] records On admission, HR controlled. On coumadin-last INR check last week- 2mg x5 days, 1mg x2 days.     - cont to hold coumadin- INR 4.79 on admission  - c/w BB blocker - home med atenolol 25mg

## 2022-11-17 NOTE — H&P ADULT - ATTENDING COMMENTS
77M PMH HTN, Afib on coumadin, CVA, Hodgkin's Lymphoma s/p splenectomy and chemo in 1975, essential thrombocytopenia, atrial fibrillation on coumadin, HTN, MOE, anemia of unclear etiology (b/l 7-8), aortic stenosis (pending TAVR), GERD, presenting w/ Hgb of 6.1 on outpatient labs. Admitted for anemia. Patient has been worked up for anemia over the past year: he did have iron deficiency and received IV iron but more recent testing showed normal iron levels. He underwent a bone marrow aspirate and biopsy on 4/6/2022 which showed early/mild evidence of dysplastic changes although B12 and folate deficiency or other causes of macrocytosis could not be ruled out. Follows w/ Dr. Negro Goel of [facility]. He reportedly also had EGD, colonoscopy and capsule endoscopy in the recent past, which were negative.     Seen and examined at bedside. Complains of some increased ELLIS and dizziness, although he also has severe AS pending TAVR so unclear if the symptoms may be related to that. He has chronically dark stools 2/2 iron however has had no changes in his BM or any other evidence of bleeding. Currently asymptomatic, VSS. PE notable for AS murmur, appears euvolemic.    #acute on chronic macrocytic anemia  - likely worsening of chronic anemia, less likely GIB  - f/u fecal occult blood test  - s/p 1U PRBC, additional 1U pending  - f/u hemolysis labs, Fe, folate, B12 studies   - trend Hgb q8h for now  - will need collateral from hematologist Dr. Goel  - GI and hemeonc emailed    #elevated troponin  - trop 260->280  - EKG reviewed: afib, LBBB, HR84, similar to prior  - cardiology recs appreciated: would be a very atypical presentation for ACS and is unlikely  - trend trop to peak  - monitor on tele    #severe AS  - TTE 11/15/2022: severe AS, normal LVSF, EF 59%  - s/p 40mg IV lasix in the ED prior to transfusion  - c/w home lasix and spironolactone  - outpatient followup for TAVR workup    #Afib on coumadin  - INR supratherapeutic, hold coumadin for now  - trend CBC and INR  - c/w home atenalol   - presentation unlikely 2/2 GIB, defer vitamin K for now    #MAGALI on CKD  - Cr 1.55 on admission, baseline 1.2-1.4  - hold home lisinopril  - monitor BMP    Agree with rest of plan as per resident note.  Discussed w/ resident Dr. Howard.    Isidro Gong MD, Hospitalist  Department of Internal Medicine  Pager: [phone]  Email: [email or web address]

## 2022-11-17 NOTE — H&P ADULT - PROBLEM SELECTOR PLAN 7
Home medication: tamsulosin 8mg  - C/w home medication - Home med: omeprazole 20 daily    - c/w protonix 20 BID given possible GIB - Home med: omeprazole 20 daily    - c/w protonix 40 QD

## 2022-11-17 NOTE — H&P ADULT - PROBLEM SELECTOR PLAN 2
History of severe AS with EF 59% on 11/15/22. Currently being worked up for TAVR. C/o ELLIS. No chest pain, palpitations, LOC.     - no need to repeat TTE at this time  - consult structural cardiology for inpatient w/u and TAVR? History of severe AS with EF 59% on 11/15/22. Currently being worked up for TAVR. C/o ELLIS. No chest pain, palpitations, LOC.     - no need to repeat TTE at this time

## 2022-11-17 NOTE — ED ADULT NURSE NOTE - OBJECTIVE STATEMENT
received pt  in [address] sent for abnormal labs pt with anemia in process of being worked up for cause.  pt found to have hgb of 6   pt awake alert color pale skin warm dry lungs with dec bs gage cp abd obeses soft nt IV placed r ac lab sent

## 2022-11-18 DIAGNOSIS — Z79.899 OTHER LONG TERM (CURRENT) DRUG THERAPY: ICD-10-CM

## 2022-11-18 DIAGNOSIS — R77.8 OTHER SPECIFIED ABNORMALITIES OF PLASMA PROTEINS: ICD-10-CM

## 2022-11-18 DIAGNOSIS — R79.1 ABNORMAL COAGULATION PROFILE: ICD-10-CM

## 2022-11-18 DIAGNOSIS — K21.9 GASTRO-ESOPHAGEAL REFLUX DISEASE WITHOUT ESOPHAGITIS: ICD-10-CM

## 2022-11-18 DIAGNOSIS — Z86.79 PERSONAL HISTORY OF OTHER DISEASES OF THE CIRCULATORY SYSTEM: ICD-10-CM

## 2022-11-18 DIAGNOSIS — N17.9 ACUTE KIDNEY FAILURE, UNSPECIFIED: ICD-10-CM

## 2022-11-18 DIAGNOSIS — G47.33 OBSTRUCTIVE SLEEP APNEA (ADULT) (PEDIATRIC): ICD-10-CM

## 2022-11-18 DIAGNOSIS — I35.0 NONRHEUMATIC AORTIC (VALVE) STENOSIS: ICD-10-CM

## 2022-11-18 DIAGNOSIS — I48.91 UNSPECIFIED ATRIAL FIBRILLATION: ICD-10-CM

## 2022-11-18 DIAGNOSIS — N40.0 BENIGN PROSTATIC HYPERPLASIA WITHOUT LOWER URINARY TRACT SYMPTOMS: ICD-10-CM

## 2022-11-18 DIAGNOSIS — Z29.9 ENCOUNTER FOR PROPHYLACTIC MEASURES, UNSPECIFIED: ICD-10-CM

## 2022-11-18 DIAGNOSIS — D64.9 ANEMIA, UNSPECIFIED: ICD-10-CM

## 2022-11-18 LAB
ALBUMIN SERPL ELPH-MCNC: 3.3 G/DL — SIGNIFICANT CHANGE UP (ref 3.3–5)
ALP SERPL-CCNC: 71 U/L — SIGNIFICANT CHANGE UP (ref 40–120)
ALT FLD-CCNC: 20 U/L — SIGNIFICANT CHANGE UP (ref 10–45)
ANION GAP SERPL CALC-SCNC: 11 MMOL/L — SIGNIFICANT CHANGE UP (ref 5–17)
APTT BLD: 43.3 SEC — HIGH (ref 27.5–35.5)
AST SERPL-CCNC: 22 U/L — SIGNIFICANT CHANGE UP (ref 10–40)
BASOPHILS # BLD AUTO: 0 K/UL — SIGNIFICANT CHANGE UP (ref 0–0.2)
BASOPHILS NFR BLD AUTO: 0 % — SIGNIFICANT CHANGE UP (ref 0–2)
BILIRUB SERPL-MCNC: 0.4 MG/DL — SIGNIFICANT CHANGE UP (ref 0.2–1.2)
BUN SERPL-MCNC: 40 MG/DL — HIGH (ref 7–23)
CALCIUM SERPL-MCNC: 8.8 MG/DL — SIGNIFICANT CHANGE UP (ref 8.4–10.5)
CHLORIDE SERPL-SCNC: 102 MMOL/L — SIGNIFICANT CHANGE UP (ref 96–108)
CO2 SERPL-SCNC: 26 MMOL/L — SIGNIFICANT CHANGE UP (ref 22–31)
CREAT SERPL-MCNC: 1.45 MG/DL — HIGH (ref 0.5–1.3)
EGFR: 49 ML/MIN/1.73M2 — LOW
EOSINOPHIL # BLD AUTO: 0 K/UL — SIGNIFICANT CHANGE UP (ref 0–0.5)
EOSINOPHIL NFR BLD AUTO: 0 % — SIGNIFICANT CHANGE UP (ref 0–6)
FERRITIN SERPL-MCNC: 129 NG/ML — SIGNIFICANT CHANGE UP (ref 30–400)
FOLATE SERPL-MCNC: 17 NG/ML — SIGNIFICANT CHANGE UP
GIANT PLATELETS BLD QL SMEAR: PRESENT — SIGNIFICANT CHANGE UP
GLUCOSE SERPL-MCNC: 101 MG/DL — HIGH (ref 70–99)
HAPTOGLOB SERPL-MCNC: 264 MG/DL — HIGH (ref 34–200)
HCT VFR BLD CALC: 24 % — LOW (ref 39–50)
HCT VFR BLD CALC: 25.4 % — LOW (ref 39–50)
HCT VFR BLD CALC: 27.3 % — LOW (ref 39–50)
HGB BLD-MCNC: 7.2 G/DL — LOW (ref 13–17)
HGB BLD-MCNC: 7.8 G/DL — LOW (ref 13–17)
HGB BLD-MCNC: 8.2 G/DL — LOW (ref 13–17)
INR BLD: 4.55 RATIO — HIGH (ref 0.88–1.16)
IRON SATN MFR SERPL: 10 % — LOW (ref 16–55)
IRON SATN MFR SERPL: 37 UG/DL — LOW (ref 45–165)
LDH SERPL L TO P-CCNC: 176 U/L — SIGNIFICANT CHANGE UP (ref 50–242)
LYMPHOCYTES # BLD AUTO: 1.15 K/UL — SIGNIFICANT CHANGE UP (ref 1–3.3)
LYMPHOCYTES # BLD AUTO: 6.9 % — LOW (ref 13–44)
MAGNESIUM SERPL-MCNC: 2.2 MG/DL — SIGNIFICANT CHANGE UP (ref 1.6–2.6)
MANUAL SMEAR VERIFICATION: SIGNIFICANT CHANGE UP
MCHC RBC-ENTMCNC: 30 GM/DL — LOW (ref 32–36)
MCHC RBC-ENTMCNC: 30 GM/DL — LOW (ref 32–36)
MCHC RBC-ENTMCNC: 30.7 GM/DL — LOW (ref 32–36)
MCHC RBC-ENTMCNC: 31.4 PG — SIGNIFICANT CHANGE UP (ref 27–34)
MCHC RBC-ENTMCNC: 31.7 PG — SIGNIFICANT CHANGE UP (ref 27–34)
MCHC RBC-ENTMCNC: 31.7 PG — SIGNIFICANT CHANGE UP (ref 27–34)
MCV RBC AUTO: 103.3 FL — HIGH (ref 80–100)
MCV RBC AUTO: 104.6 FL — HIGH (ref 80–100)
MCV RBC AUTO: 105.7 FL — HIGH (ref 80–100)
MONOCYTES # BLD AUTO: 1.3 K/UL — HIGH (ref 0–0.9)
MONOCYTES NFR BLD AUTO: 7.8 % — SIGNIFICANT CHANGE UP (ref 2–14)
NEUTROPHILS # BLD AUTO: 14.2 K/UL — HIGH (ref 1.8–7.4)
NEUTROPHILS NFR BLD AUTO: 85.3 % — HIGH (ref 43–77)
NRBC # BLD: 10 /100 — HIGH (ref 0–0)
NRBC # BLD: 5 /100 WBCS — HIGH (ref 0–0)
NRBC # BLD: 7 /100 WBCS — HIGH (ref 0–0)
PHOSPHATE SERPL-MCNC: 3.5 MG/DL — SIGNIFICANT CHANGE UP (ref 2.5–4.5)
PLAT MORPH BLD: ABNORMAL
PLATELET # BLD AUTO: 487 K/UL — HIGH (ref 150–400)
PLATELET # BLD AUTO: 526 K/UL — HIGH (ref 150–400)
PLATELET # BLD AUTO: 550 K/UL — HIGH (ref 150–400)
POTASSIUM SERPL-MCNC: 4.7 MMOL/L — SIGNIFICANT CHANGE UP (ref 3.5–5.3)
POTASSIUM SERPL-SCNC: 4.7 MMOL/L — SIGNIFICANT CHANGE UP (ref 3.5–5.3)
PROT SERPL-MCNC: 6.2 G/DL — SIGNIFICANT CHANGE UP (ref 6–8.3)
PROTHROM AB SERPL-ACNC: 53.6 SEC — HIGH (ref 10.5–13.4)
RBC # BLD: 2.27 M/UL — LOW (ref 4.2–5.8)
RBC # BLD: 2.46 M/UL — LOW (ref 4.2–5.8)
RBC # BLD: 2.46 M/UL — LOW (ref 4.2–5.8)
RBC # BLD: 2.61 M/UL — LOW (ref 4.2–5.8)
RBC # FLD: 18.7 % — HIGH (ref 10.3–14.5)
RBC # FLD: 19.2 % — HIGH (ref 10.3–14.5)
RBC # FLD: 19.3 % — HIGH (ref 10.3–14.5)
RBC BLD AUTO: SIGNIFICANT CHANGE UP
RETICS #: 178.1 K/UL — HIGH (ref 25–125)
RETICS/RBC NFR: 7.2 % — HIGH (ref 0.5–2.5)
SODIUM SERPL-SCNC: 139 MMOL/L — SIGNIFICANT CHANGE UP (ref 135–145)
TIBC SERPL-MCNC: 385 UG/DL — SIGNIFICANT CHANGE UP (ref 220–430)
TRANSFERRIN SERPL-MCNC: 262 MG/DL — SIGNIFICANT CHANGE UP (ref 200–360)
TROPONIN T, HIGH SENSITIVITY RESULT: 278 NG/L — HIGH (ref 0–51)
TROPONIN T, HIGH SENSITIVITY RESULT: 280 NG/L — HIGH (ref 0–51)
UIBC SERPL-MCNC: 348 UG/DL — SIGNIFICANT CHANGE UP (ref 110–370)
VIT B12 SERPL-MCNC: 1191 PG/ML — SIGNIFICANT CHANGE UP (ref 232–1245)
WBC # BLD: 16.19 K/UL — HIGH (ref 3.8–10.5)
WBC # BLD: 16.65 K/UL — HIGH (ref 3.8–10.5)
WBC # BLD: 18.63 K/UL — HIGH (ref 3.8–10.5)
WBC # FLD AUTO: 16.19 K/UL — HIGH (ref 3.8–10.5)
WBC # FLD AUTO: 16.65 K/UL — HIGH (ref 3.8–10.5)
WBC # FLD AUTO: 18.63 K/UL — HIGH (ref 3.8–10.5)

## 2022-11-18 PROCEDURE — 99232 SBSQ HOSP IP/OBS MODERATE 35: CPT | Mod: GC

## 2022-11-18 PROCEDURE — 99222 1ST HOSP IP/OBS MODERATE 55: CPT

## 2022-11-18 PROCEDURE — 93010 ELECTROCARDIOGRAM REPORT: CPT

## 2022-11-18 PROCEDURE — 99233 SBSQ HOSP IP/OBS HIGH 50: CPT

## 2022-11-18 RX ORDER — SPIRONOLACTONE 25 MG/1
12.5 TABLET, FILM COATED ORAL DAILY
Refills: 0 | Status: DISCONTINUED | OUTPATIENT
Start: 2022-11-18 | End: 2022-11-22

## 2022-11-18 RX ORDER — PANTOPRAZOLE SODIUM 20 MG/1
40 TABLET, DELAYED RELEASE ORAL
Refills: 0 | Status: DISCONTINUED | OUTPATIENT
Start: 2022-11-18 | End: 2022-11-18

## 2022-11-18 RX ORDER — LISINOPRIL 2.5 MG/1
5 TABLET ORAL DAILY
Refills: 0 | Status: DISCONTINUED | OUTPATIENT
Start: 2022-11-18 | End: 2022-11-18

## 2022-11-18 RX ORDER — WARFARIN SODIUM 2.5 MG/1
1 TABLET ORAL
Qty: 0 | Refills: 0 | DISCHARGE

## 2022-11-18 RX ORDER — ATENOLOL 25 MG/1
25 TABLET ORAL DAILY
Refills: 0 | Status: DISCONTINUED | OUTPATIENT
Start: 2022-11-18 | End: 2022-11-22

## 2022-11-18 RX ORDER — PANTOPRAZOLE SODIUM 20 MG/1
40 TABLET, DELAYED RELEASE ORAL
Refills: 0 | Status: DISCONTINUED | OUTPATIENT
Start: 2022-11-18 | End: 2022-11-21

## 2022-11-18 RX ORDER — ATORVASTATIN CALCIUM 80 MG/1
40 TABLET, FILM COATED ORAL AT BEDTIME
Refills: 0 | Status: DISCONTINUED | OUTPATIENT
Start: 2022-11-18 | End: 2022-11-22

## 2022-11-18 RX ORDER — ALLOPURINOL 300 MG
1 TABLET ORAL
Qty: 0 | Refills: 0 | DISCHARGE

## 2022-11-18 RX ORDER — FUROSEMIDE 40 MG
40 TABLET ORAL DAILY
Refills: 0 | Status: DISCONTINUED | OUTPATIENT
Start: 2022-11-18 | End: 2022-11-22

## 2022-11-18 RX ORDER — POTASSIUM CHLORIDE 20 MEQ
10 PACKET (EA) ORAL
Refills: 0 | Status: DISCONTINUED | OUTPATIENT
Start: 2022-11-18 | End: 2022-11-18

## 2022-11-18 RX ORDER — TAMSULOSIN HYDROCHLORIDE 0.4 MG/1
0.8 CAPSULE ORAL AT BEDTIME
Refills: 0 | Status: DISCONTINUED | OUTPATIENT
Start: 2022-11-18 | End: 2022-11-22

## 2022-11-18 RX ADMIN — ATORVASTATIN CALCIUM 40 MILLIGRAM(S): 80 TABLET, FILM COATED ORAL at 21:41

## 2022-11-18 RX ADMIN — Medication 650 MILLIGRAM(S): at 00:41

## 2022-11-18 RX ADMIN — PANTOPRAZOLE SODIUM 40 MILLIGRAM(S): 20 TABLET, DELAYED RELEASE ORAL at 17:06

## 2022-11-18 RX ADMIN — PANTOPRAZOLE SODIUM 40 MILLIGRAM(S): 20 TABLET, DELAYED RELEASE ORAL at 05:59

## 2022-11-18 RX ADMIN — Medication 650 MILLIGRAM(S): at 12:56

## 2022-11-18 RX ADMIN — Medication 650 MILLIGRAM(S): at 22:05

## 2022-11-18 RX ADMIN — Medication 650 MILLIGRAM(S): at 01:30

## 2022-11-18 RX ADMIN — SPIRONOLACTONE 12.5 MILLIGRAM(S): 25 TABLET, FILM COATED ORAL at 12:49

## 2022-11-18 RX ADMIN — Medication 650 MILLIGRAM(S): at 21:41

## 2022-11-18 RX ADMIN — ATENOLOL 25 MILLIGRAM(S): 25 TABLET ORAL at 12:56

## 2022-11-18 RX ADMIN — Medication 40 MILLIGRAM(S): at 10:20

## 2022-11-18 RX ADMIN — TAMSULOSIN HYDROCHLORIDE 0.8 MILLIGRAM(S): 0.4 CAPSULE ORAL at 21:41

## 2022-11-18 RX ADMIN — Medication 650 MILLIGRAM(S): at 13:26

## 2022-11-18 NOTE — PROGRESS NOTE ADULT - SUBJECTIVE AND OBJECTIVE BOX
**************************************  Anthony Francisco, PGY-1  **************************************    INTERVAL HPI/OVERNIGHT EVENTS:  Patient was seen and examined at bedside. As per nurse and patient, no o/n events, patient resting comfortably. No complaints at this time. Patient denies: fever, chills, dizziness, weakness, HA, Changes in vision, CP, palpitations, SOB, cough, N/V/D/C, dysuria, changes in bowel movements, LE edema. ROS otherwise negative.    VITAL SIGNS:  T(F): 98.3 (11-18-22 @ 06:41)  HR: 78 (11-18-22 @ 06:41)  BP: 120/59 (11-18-22 @ 06:41)  RR: 18 (11-18-22 @ 06:41)  SpO2: 97% (11-18-22 @ 06:41)  Wt(kg): --    PHYSICAL EXAM:    Constitutional: WDWN, NAD  HEENT: PERRL, EOMI, sclera non-icteric, neck supple, trachea midline, no masses, no JVD, MMM, good dentition  Respiratory: CTA b/l, good air entry b/l, no wheezing, no rhonchi, no rales, without accessory muscle use and no intercostal retractions  Cardiovascular: RRR, normal S1S2, no M/R/G  Gastrointestinal: soft, NTND, no masses palpable, BS normal  Extremities: Warm, well perfused, pulses equal bilateral upper and lower extremities, no edema, no clubbing. Capillary refill <2 sec  Neurological: AAOx3, CN Grossly intact  Skin: Normal temperature, warm, dry    MEDICATIONS  (STANDING):  atenolol  Tablet 25 milliGRAM(s) Oral daily  atorvastatin 40 milliGRAM(s) Oral at bedtime  furosemide    Tablet 40 milliGRAM(s) Oral daily  pantoprazole    Tablet 40 milliGRAM(s) Oral before breakfast  spironolactone 12.5 milliGRAM(s) Oral daily  tamsulosin 0.8 milliGRAM(s) Oral at bedtime    MEDICATIONS  (PRN):  acetaminophen     Tablet .. 650 milliGRAM(s) Oral every 6 hours PRN Temp greater or equal to 38C (100.4F), Mild Pain (1 - 3)      Allergies    No Known Allergies    Intolerances        LABS:                        7.2    18.63 )-----------( 550      ( 18 Nov 2022 01:06 )             24.0     11-17    136  |  101  |  47<H>  ----------------------------<  102<H>  5.0   |  25  |  1.55<H>    Ca    9.2      17 Nov 2022 17:08  Phos  3.8     11-17  Mg     2.5     11-17    TPro  6.5  /  Alb  3.7  /  TBili  0.2  /  DBili  x   /  AST  23  /  ALT  20  /  AlkPhos  74  11-17    PT/INR - ( 17 Nov 2022 17:08 )   PT: 56.4 sec;   INR: 4.79 ratio         PTT - ( 17 Nov 2022 17:08 )  PTT:45.8 sec      RADIOLOGY & ADDITIONAL TESTS:  Reviewed **************************************  Anthony Singh, PGY-1  **************************************    INTERVAL HPI/OVERNIGHT EVENTS:      VITAL SIGNS:  T(F): 98.3 (11-18-22 @ 06:41)  HR: 78 (11-18-22 @ 06:41)  BP: 120/59 (11-18-22 @ 06:41)  RR: 18 (11-18-22 @ 06:41)  SpO2: 97% (11-18-22 @ 06:41)  Wt(kg): --    PHYSICAL EXAM:    Constitutional: WDWN, NAD  HEENT: PERRL, EOMI, sclera non-icteric, neck supple, trachea midline, no masses, no JVD, MMM, good dentition  Respiratory: CTA b/l, good air entry b/l, no wheezing, no rhonchi, no rales, without accessory muscle use and no intercostal retractions  Cardiovascular: RRR, normal S1S2, no M/R/G  Gastrointestinal: soft, NTND, no masses palpable, BS normal  Extremities: Warm, well perfused, pulses equal bilateral upper and lower extremities, no edema, no clubbing. Capillary refill <2 sec  Neurological: AAOx3, CN Grossly intact  Skin: Normal temperature, warm, dry    MEDICATIONS  (STANDING):  atenolol  Tablet 25 milliGRAM(s) Oral daily  atorvastatin 40 milliGRAM(s) Oral at bedtime  furosemide    Tablet 40 milliGRAM(s) Oral daily  pantoprazole    Tablet 40 milliGRAM(s) Oral before breakfast  spironolactone 12.5 milliGRAM(s) Oral daily  tamsulosin 0.8 milliGRAM(s) Oral at bedtime    MEDICATIONS  (PRN):  acetaminophen     Tablet .. 650 milliGRAM(s) Oral every 6 hours PRN Temp greater or equal to 38C (100.4F), Mild Pain (1 - 3)      Allergies    No Known Allergies    Intolerances        LABS:                        7.2    18.63 )-----------( 550      ( 18 Nov 2022 01:06 )             24.0     11-17    136  |  101  |  47<H>  ----------------------------<  102<H>  5.0   |  25  |  1.55<H>    Ca    9.2      17 Nov 2022 17:08  Phos  3.8     11-17  Mg     2.5     11-17    TPro  6.5  /  Alb  3.7  /  TBili  0.2  /  DBili  x   /  AST  23  /  ALT  20  /  AlkPhos  74  11-17    PT/INR - ( 17 Nov 2022 17:08 )   PT: 56.4 sec;   INR: 4.79 ratio         PTT - ( 17 Nov 2022 17:08 )  PTT:45.8 sec      RADIOLOGY & ADDITIONAL TESTS:  Reviewed **************************************  Kokogurmeet Francisco, PGY-1  **************************************    INTERVAL HPI/OVERNIGHT EVENTS:      VITAL SIGNS:  T(F): 98.3 (11-18-22 @ 06:41)  HR: 78 (11-18-22 @ 06:41)  BP: 120/59 (11-18-22 @ 06:41)  RR: 18 (11-18-22 @ 06:41)  SpO2: 97% (11-18-22 @ 06:41)  Wt(kg): --    PHYSICAL EXAM:    Constitutional: WDWN, NAD  HEENT: PERRL, EOMI, sclera non-icteric, neck supple, trachea midline, no masses, no JVD, MMM, good dentition  Respiratory: CTA b/l, good air entry b/l, no wheezing, no rhonchi, no rales, without accessory muscle use and no intercostal retractions  Cardiovascular: RRR, normal S1S2, +late systolic murmur in the L 2nd interspace.   Gastrointestinal: soft, NTND, no masses palpable, BS normal  Extremities: trace pretibial edema, 2+ pulses bilaterally, no rashes or cyanosis. Notable well-healed scar from tissue removal in the lateral RLE from prior surgery for compartment syndrome.   Neurological: AAOx3, CN Grossly intact  Skin: Normal temperature, warm, dry    MEDICATIONS  (STANDING):  atenolol  Tablet 25 milliGRAM(s) Oral daily  atorvastatin 40 milliGRAM(s) Oral at bedtime  furosemide    Tablet 40 milliGRAM(s) Oral daily  pantoprazole    Tablet 40 milliGRAM(s) Oral before breakfast  spironolactone 12.5 milliGRAM(s) Oral daily  tamsulosin 0.8 milliGRAM(s) Oral at bedtime    MEDICATIONS  (PRN):  acetaminophen     Tablet .. 650 milliGRAM(s) Oral every 6 hours PRN Temp greater or equal to 38C (100.4F), Mild Pain (1 - 3)      Allergies    No Known Allergies    Intolerances        LABS:                        7.2    18.63 )-----------( 550      ( 18 Nov 2022 01:06 )             24.0     11-17    136  |  101  |  47<H>  ----------------------------<  102<H>  5.0   |  25  |  1.55<H>    Ca    9.2      17 Nov 2022 17:08  Phos  3.8     11-17  Mg     2.5     11-17    TPro  6.5  /  Alb  3.7  /  TBili  0.2  /  DBili  x   /  AST  23  /  ALT  20  /  AlkPhos  74  11-17    PT/INR - ( 17 Nov 2022 17:08 )   PT: 56.4 sec;   INR: 4.79 ratio         PTT - ( 17 Nov 2022 17:08 )  PTT:45.8 sec      RADIOLOGY & ADDITIONAL TESTS:  Reviewed

## 2022-11-18 NOTE — CONSULT NOTE ADULT - NS ATTEND AMEND GEN_ALL_CORE FT
Worsening anemia despite IV venofer/aranesp.  Bone marrow biopsy in April with mild dyspoiesis, but normal cytogenetics/MDS FISH negative.  No hemolysis, no iron/B12/folate deficiency.  No bleeding.  Check [facility] panel, parvovirus titers (though less likely with increased retic).  Once her INR improves, he will need a repeat bone marrow biopsy.

## 2022-11-18 NOTE — PATIENT PROFILE ADULT - FALL HARM RISK - PATIENT NEEDS ASSISTANCE
Crescentic Advancement Flap Text: The defect edges were debeveled with a #15 scalpel blade.  Given the location of the defect and the proximity to free margins a crescentic advancement flap was deemed most appropriate.  Using a sterile surgical marker, the appropriate advancement flap was drawn incorporating the defect and placing the expected incisions within the relaxed skin tension lines where possible.    The area thus outlined was incised deep to adipose tissue with a #15 scalpel blade.  The skin margins were undermined to an appropriate distance in all directions utilizing iris scissors. Standing/Walking/Moving from bed to chair

## 2022-11-18 NOTE — PATIENT PROFILE ADULT - FALL HARM RISK - RISK INTERVENTIONS

## 2022-11-18 NOTE — CONSULT NOTE ADULT - SUBJECTIVE AND OBJECTIVE BOX
Reason for consult: Anemia    HPI:  77M PMH HTN, Afib on coumadin, CVA, Hodgkin's Lymphoma s/p splenectomy and chemo in 1975, essential thrombocytopenia, atrial fibrillation on coumadin, HTN, MOE, recently diagnosed anemia (b/l 7-8), aortic stenosis (pending TAVR), GERD presents due to recommendation for o/p physician for Hgb 6.1 11/15. Today, states he received Fe and Aranesp infusion. Pt states he has felt more lethargic the past few weeks. Reports dizziness and dyspnea on exertion. Has felt short of breath walking a few steps at home. Pt pending TAVR w/u. Denies fall, LOC, N/V/F/C, CP, palpitations, abdominal pain, diarrhea, melena, hematochezia. States he has dark stool chronically since taking iron supplementation. Pt has had EGD/ colonoscopy previously with no reported acute findings. Patient continues to take coumadin (last INR check last week- 2mg x5 days, 1mg x2 days).     Patient follows with Dr. Negro Goel for a history of Hodgkin's Lymphoma s/p splenectomy and chemotherapy in 1975), essential thrombocythemia and worsening macrocytic anemia. Pt found to have Fe def anemia and has been receiving infusions. Per documentation, bone marrow aspirate and biopsy on 4/6/2022 which showed early/mild evidence of dysplastic changes although B12 and folate deficiency or other causes of macrocytosis could not be ruled out. Additional workup showed chronic kidney disease and B12 deficiency s/p B12 injections.     In the ED- afebrile, HR 60- 70, - 120s, RA > 95%  Labs- WBC 17.35, Hgb 6.3 (baseline 7-8), Plt 564, K 5, Cr 1.55 (baseline 1.2), trop 260, BNP 9K, COVID negative  Meds- Lasix 40 (7pm), s/p 1u pRBC (17 Nov 2022 23:27)    Hematology/Oncology called to see patient who is under the care of Dr Negro Goel of [facility] for the treatment/ monitoring of thrombocythemia and macrocytic anemia.    PAST MEDICAL & SURGICAL HISTORY:  Hodgkin lymphoma  1975      Atrial fibrillation  over 20 years      HTN (hypertension)      GERD (gastroesophageal reflux disease)      MOE (obstructive sleep apnea)  positive sleep study many years ago, was recommended to use CPAP, patient non-compliant      BPH (benign prostatic hyperplasia)      Osteoarthritis      CVA (cerebral vascular accident)  1/2018 - attributed to no AC for 11 days preop/postop spine surgery - presented to ED with slurred speech, residual ST memory loss, per pt      Spinal stenosis  L3-L4      Spondylolisthesis      Exposure to toxin  9/11       Atrial fibrillation  over 20 years      S/P splenectomy  1975      S/P hip replacement, right  2014      History of cardioversion  for AF - &quot;many years ago&quot; - unsuccessful      History of lumbar spinal fusion  1/5/2018      Status post left cataract extraction          FAMILY HISTORY:  Family history of stroke (Father)        Alochol: Denied  Smoking: Nonsmoker  Drug Use: Denied  Marital Status:         Allergies    No Known Allergies    Intolerances        MEDICATIONS  (STANDING):  atenolol  Tablet 25 milliGRAM(s) Oral daily  atorvastatin 40 milliGRAM(s) Oral at bedtime  furosemide    Tablet 40 milliGRAM(s) Oral daily  pantoprazole    Tablet 40 milliGRAM(s) Oral before breakfast  spironolactone 12.5 milliGRAM(s) Oral daily  tamsulosin 0.8 milliGRAM(s) Oral at bedtime    MEDICATIONS  (PRN):  acetaminophen     Tablet .. 650 milliGRAM(s) Oral every 6 hours PRN Temp greater or equal to 38C (100.4F), Mild Pain (1 - 3)      ROS  No fever, sweats, chills  No epistaxis, HA, sore throat  No CP, SOB, cough, sputum  No n/v/d, abd pain, melena, hematochezia  No edema  No rash  No anxiety  No back pain, joint pain  No bleeding, bruising  No dysuria, hematuria    T(C): 36.7 (11-18-22 @ 12:47), Max: 37.3 (11-18-22 @ 02:30)  HR: 95 (11-18-22 @ 12:47) (65 - 95)  BP: 128/56 (11-18-22 @ 12:47) (104/51 - 134/60)  RR: 18 (11-18-22 @ 12:47) (18 - 18)  SpO2: 96% (11-18-22 @ 12:47) (95% - 99%)  Wt(kg): --    PE  NAD  Awake, alert  Anicteric, MMM  RRR  CTAB  Abd soft, NT, ND  No c/c/e  No rash grossly  FROM                          7.8    16.65 )-----------( 487      ( 18 Nov 2022 07:43 )             25.4       11-18    139  |  102  |  40<H>  ----------------------------<  101<H>  4.7   |  26  |  1.45<H>    Ca    8.8      18 Nov 2022 07:42  Phos  3.5     11-18  Mg     2.2     11-18    TPro  6.2  /  Alb  3.3  /  TBili  0.4  /  DBili  x   /  AST  22  /  ALT  20  /  AlkPhos  71  11-18

## 2022-11-18 NOTE — CONSULT NOTE ADULT - SUBJECTIVE AND OBJECTIVE BOX
Chief Complaint:  Patient is a 77y old  Male who presents with a chief complaint of Anemia (17 Nov 2022 21:21)      HPI:  Mr. Gillis is a 76yo M PMH of HTN, Afib on coumadin, CVA, Hodgkin's Lymphoma s/p splenectomy and chemo in 1975, essential thrombocytopenia, atrial fibrillation on coumadin, HTN, MOE, recently diagnosed anemia (b/l 7-8), aortic stenosis (pending TAVR), GERD presents due to recommendation for o/p physician for Hgb 6.1 11/15.     Patient is known to the GI service. Was seen by Dr. Martins, last seen 5/22 for anemia. He underwent VCE and CT colonography showing large AVMs in the proximal and mid small bowel (2021). Negative colonography 2021. Patient was deemed a high risk for endoscopy given severe AS. A decision was made not to proceed with endoscopy unless patient has severe bleeding. Patient is on Fe and Aranesp infusion.     Patient now presents with worsening ELLIS and lightheadedness He is currently pending TAVR w/u. Denies fall, LOC, N/V/F/C, CP, palpitations, abdominal pain, diarrhea, melena, hematochezia. States he has dark stool chronically since taking iron supplementation. Pt has had EGD/ colonoscopy previously with no reported acute findings. Patient continues to take coumadin (last INR check last week- 2mg x5 days, 1mg x2 days).     Patient follows with Dr. Negro Goel for a history of Hodgkin's Lymphoma s/p splenectomy and chemotherapy in 1975), essential thrombocythemia and worsening macrocytic anemia. Pt found to have Fe def anemia and has been receiving infusions. Per documentation, bone marrow aspirate and biopsy on 4/6/2022 which showed early/mild evidence of dysplastic changes although B12 and folate deficiency or other causes of macrocytosis could not be ruled out. Additional workup showed chronic kidney disease and B12 deficiency s/p B12 injections.     In the ED- afebrile, HR 60- 70, - 120s, RA > 95%  Labs- WBC 17.35, Hgb 6.3 (baseline 7-8), Plt 564, K 5, Cr 1.55 (baseline 1.2), trop 260, BNP 9K, COVID negative. INR 5.  Meds- Lasix 40 (7pm), s/p 1u pRBC      Allergies:  No Known Allergies      Home Medications:  aspirin 81 mg oral delayed release tablet: 1 tab(s) orally once a day (18 Nov 2022 01:26)  ATENOLOL 25 MG TABLET: 1 tab(s) orally once a day (18 Nov 2022 01:26)  calcium: 2 tab(s) orally once a day (18 Nov 2022 01:26)  Coumadin 2 mg oral tablet: 2mg- M/T/W/Th/Fr  1mg- Sa/Sun (18 Nov 2022 01:26)  Crestor 20 mg oral tablet: 0.5 tab(s) orally once a day (18 Nov 2022 01:26)  Flonase: 1 spray(s) nasal once a day (18 Nov 2022 01:26)  iron: 1 tab(s) orally once a day (18 Nov 2022 01:26)  Lasix 40 mg oral tablet: 1 tab(s) orally once a day (18 Nov 2022 01:26)  latanoprost 0.005% ophthalmic solution: 1 drop(s) to the right eye once a day (in the evening) (18 Nov 2022 01:26)  lisinopril 5 mg oral tablet: 1 tab(s) orally once a day (18 Nov 2022 01:26)  omeprazole 20 mg oral delayed release capsule: 1 cap(s) orally once a day (18 Nov 2022 01:26)  spironolactone 25 mg oral tablet: 0.5 tab(s) orally once a day (18 Nov 2022 01:26)  tamsulosin 0.4 mg oral capsule: 2 cap(s) orally once a day (at bedtime) (18 Nov 2022 01:26)  Vitamin B-12: 1 dose(s) injectable once a month    Note:Gets from MD office (18 Nov 2022 01:26)  Vitamin C: 1 tab(s) orally once a day (18 Nov 2022 01:26)    Hospital Medications:  acetaminophen     Tablet .. 650 milliGRAM(s) Oral every 6 hours PRN  atenolol  Tablet 25 milliGRAM(s) Oral daily  atorvastatin 40 milliGRAM(s) Oral at bedtime  furosemide    Tablet 40 milliGRAM(s) Oral daily  pantoprazole    Tablet 40 milliGRAM(s) Oral before breakfast  spironolactone 12.5 milliGRAM(s) Oral daily  tamsulosin 0.8 milliGRAM(s) Oral at bedtime      PMHX/PSHX:  Hodgkin lymphoma    Atrial fibrillation    HTN (hypertension)    GERD (gastroesophageal reflux disease)    MOE (obstructive sleep apnea)    BPH (benign prostatic hyperplasia)    Osteoarthritis    CVA (cerebral vascular accident)    Spinal stenosis    Spondylolisthesis    Exposure to toxin    Atrial fibrillation    S/P splenectomy    S/P hip replacement, right    H/O Spinal surgery    History of cardioversion    History of lumbar spinal fusion    Status post left cataract extraction        Family history:  Family history of myocardial infarction (Father)    Family history of stroke (Father)        Denies family history of colon cancer/polyps, stomach cancer/polyps, pancreatic cancer/masses, liver cancer/disease, ovarian cancer and endometrial cancer.    Social History:     Tob: Denies  EtOH: As above  Illicit Drugs: Denies    ROS:   General:  No wt loss, fevers, chills, night sweats, fatigue  Eyes:  Good vision, no reported pain  ENT:  No sore throat, pain, runny nose, dysphagia  CV:  No pain, palpitations, hypo/hypertension  Pulm:  No dyspnea, cough, tachypnea, wheezing  GI:  As per HPI  :  No pain, bleeding, incontinence, nocturia  Muscle:  No pain, weakness  Neuro:  No weakness, tingling, memory problems  Psych:  No fatigue, insomnia, mood problems, depression  Endocrine:  No polyuria, polydipsia, cold/heat intolerance  Heme:  No petechiae, ecchymosis, easy bruisability  Skin:  No rash, tattoos, scars, edema    PHYSICAL EXAM:   GENERAL:  No acute distress  HEENT:  Normocephalic/atraumatic, no scleral icterus  CHEST:  No accessory muscle use  HEART:  Regular rate and rhythm  ABDOMEN:  Soft, non-tender, non-distended  EXTREMITIES: No cyanosis, clubbing, or edema  SKIN:  No rash  NEURO:  Alert and oriented x 3, no asterixis    Vital Signs:  Vital Signs Last 24 Hrs  T(C): 36.8 (18 Nov 2022 06:41), Max: 37.3 (18 Nov 2022 02:30)  T(F): 98.3 (18 Nov 2022 06:41), Max: 99.1 (18 Nov 2022 02:30)  HR: 94 (18 Nov 2022 10:20) (65 - 94)  BP: 114/52 (18 Nov 2022 10:20) (104/51 - 134/60)  BP(mean): --  RR: 18 (18 Nov 2022 06:41) (18 - 20)  SpO2: 97% (18 Nov 2022 06:41) (95% - 99%)    Parameters below as of 18 Nov 2022 06:41  Patient On (Oxygen Delivery Method): room air      Daily Height in cm: 172.72 (17 Nov 2022 23:55)    Daily     LABS:                        7.8    16.65 )-----------( 487      ( 18 Nov 2022 07:43 )             25.4     Mean Cell Volume: 103.3 fl (11-18-22 @ 07:43)    11-18    139  |  102  |  40<H>  ----------------------------<  101<H>  4.7   |  26  |  1.45<H>    Ca    8.8      18 Nov 2022 07:42  Phos  3.5     11-18  Mg     2.2     11-18    TPro  6.2  /  Alb  3.3  /  TBili  0.4  /  DBili  x   /  AST  22  /  ALT  20  /  AlkPhos  71  11-18    LIVER FUNCTIONS - ( 18 Nov 2022 07:42 )  Alb: 3.3 g/dL / Pro: 6.2 g/dL / ALK PHOS: 71 U/L / ALT: 20 U/L / AST: 22 U/L / GGT: x           PT/INR - ( 18 Nov 2022 07:41 )   PT: 53.6 sec;   INR: 4.55 ratio         PTT - ( 18 Nov 2022 07:41 )  PTT:43.3 sec                            7.8    16.65 )-----------( 487      ( 18 Nov 2022 07:43 )             25.4                         7.2    18.63 )-----------( 550      ( 18 Nov 2022 01:06 )             24.0                         6.3    17.35 )-----------( 564      ( 17 Nov 2022 17:08 )             21.1       Imaging:           Chief Complaint:  Patient is a 77y old  Male who presents with a chief complaint of Anemia (17 Nov 2022 21:21)      HPI:  Mr. Gillis is a 78yo M PMH of HTN, Afib on coumadin, CVA, Hodgkin's Lymphoma s/p splenectomy and chemo in 1975, ET, MOE, aortic stenosis (pending TAVR), GERD presents due to recommendation for o/p physician for Hgb 6.1 11/15.     Patient is known to the GI service. Was seen by Dr. Martins, last seen 5/22 for anemia. He underwent VCE and CT colonography showing large AVMs in the proximal and mid small bowel (2021). Negative colonography 2021. Patient was deemed a high risk for endoscopy given severe AS. A decision was made not to proceed with endoscopy unless patient has severe bleeding. Patient is on Fe and Aranesp infusion.     Patient now presents with worsening ELLIS and lightheadedness He is currently pending TAVR w/u. Denies fall, LOC, N/V/F/C, CP, palpitations, abdominal pain, diarrhea, melena, hematochezia. States he has dark stool chronically since taking iron supplementation. Pt has had EGD/ colonoscopy previously with no reported acute findings. Patient continues to take coumadin (last INR check last week- 2mg x5 days, 1mg x2 days).     Patient follows with Dr. Negro Goel for a history of Hodgkin's Lymphoma s/p splenectomy and chemotherapy in 1975), essential thrombocythemia and worsening macrocytic anemia. Pt found to have Fe def anemia and has been receiving infusions. Per documentation, bone marrow aspirate and biopsy on 4/6/2022 which showed early/mild evidence of dysplastic changes although B12 and folate deficiency or other causes of macrocytosis could not be ruled out. Additional workup showed chronic kidney disease and B12 deficiency s/p B12 injections.     In the ED- afebrile, HR 60- 70, - 120s, RA > 95%  Labs- WBC 17.35, Hgb 6.3 (baseline 7-8), Plt 564, K 5, Cr 1.55 (baseline 1.2), trop 260, BNP 9K, COVID negative. INR 5.  Meds- Lasix 40 (7pm), s/p 1u pRBC      Allergies:  No Known Allergies      Home Medications:  aspirin 81 mg oral delayed release tablet: 1 tab(s) orally once a day (18 Nov 2022 01:26)  ATENOLOL 25 MG TABLET: 1 tab(s) orally once a day (18 Nov 2022 01:26)  calcium: 2 tab(s) orally once a day (18 Nov 2022 01:26)  Coumadin 2 mg oral tablet: 2mg- M/T/W/Th/Fr  1mg- Sa/Sun (18 Nov 2022 01:26)  Crestor 20 mg oral tablet: 0.5 tab(s) orally once a day (18 Nov 2022 01:26)  Flonase: 1 spray(s) nasal once a day (18 Nov 2022 01:26)  iron: 1 tab(s) orally once a day (18 Nov 2022 01:26)  Lasix 40 mg oral tablet: 1 tab(s) orally once a day (18 Nov 2022 01:26)  latanoprost 0.005% ophthalmic solution: 1 drop(s) to the right eye once a day (in the evening) (18 Nov 2022 01:26)  lisinopril 5 mg oral tablet: 1 tab(s) orally once a day (18 Nov 2022 01:26)  omeprazole 20 mg oral delayed release capsule: 1 cap(s) orally once a day (18 Nov 2022 01:26)  spironolactone 25 mg oral tablet: 0.5 tab(s) orally once a day (18 Nov 2022 01:26)  tamsulosin 0.4 mg oral capsule: 2 cap(s) orally once a day (at bedtime) (18 Nov 2022 01:26)  Vitamin B-12: 1 dose(s) injectable once a month    Note:Gets from MD office (18 Nov 2022 01:26)  Vitamin C: 1 tab(s) orally once a day (18 Nov 2022 01:26)    Hospital Medications:  acetaminophen     Tablet .. 650 milliGRAM(s) Oral every 6 hours PRN  atenolol  Tablet 25 milliGRAM(s) Oral daily  atorvastatin 40 milliGRAM(s) Oral at bedtime  furosemide    Tablet 40 milliGRAM(s) Oral daily  pantoprazole    Tablet 40 milliGRAM(s) Oral before breakfast  spironolactone 12.5 milliGRAM(s) Oral daily  tamsulosin 0.8 milliGRAM(s) Oral at bedtime      PMHX/PSHX:  Hodgkin lymphoma    Atrial fibrillation    HTN (hypertension)    GERD (gastroesophageal reflux disease)    MOE (obstructive sleep apnea)    BPH (benign prostatic hyperplasia)    Osteoarthritis    CVA (cerebral vascular accident)    Spinal stenosis    Spondylolisthesis    Exposure to toxin    Atrial fibrillation    S/P splenectomy    S/P hip replacement, right    H/O Spinal surgery    History of cardioversion    History of lumbar spinal fusion    Status post left cataract extraction        Family history:  Family history of myocardial infarction (Father)    Family history of stroke (Father)        Denies family history of colon cancer/polyps, stomach cancer/polyps, pancreatic cancer/masses, liver cancer/disease, ovarian cancer and endometrial cancer.    Social History:     Tob: Denies  EtOH: As above  Illicit Drugs: Denies    ROS:   General:  No wt loss, fevers, chills, night sweats, fatigue  Eyes:  Good vision, no reported pain  ENT:  No sore throat, pain, runny nose, dysphagia  CV:  No pain, palpitations, hypo/hypertension  Pulm:  No dyspnea, cough, tachypnea, wheezing  GI:  As per HPI  :  No pain, bleeding, incontinence, nocturia  Muscle:  No pain, weakness  Neuro:  No weakness, tingling, memory problems  Psych:  No fatigue, insomnia, mood problems, depression  Endocrine:  No polyuria, polydipsia, cold/heat intolerance  Heme:  No petechiae, ecchymosis, easy bruisability  Skin:  No rash, tattoos, scars, edema    PHYSICAL EXAM:   GENERAL:  No acute distress  HEENT:  Normocephalic/atraumatic, no scleral icterus  CHEST:  No accessory muscle use  HEART:  Regular rate and rhythm  ABDOMEN:  Soft, non-tender, non-distended  EXTREMITIES: No cyanosis, clubbing, or edema  SKIN:  No rash  NEURO:  Alert and oriented x 3, no asterixis    Vital Signs:  Vital Signs Last 24 Hrs  T(C): 36.8 (18 Nov 2022 06:41), Max: 37.3 (18 Nov 2022 02:30)  T(F): 98.3 (18 Nov 2022 06:41), Max: 99.1 (18 Nov 2022 02:30)  HR: 94 (18 Nov 2022 10:20) (65 - 94)  BP: 114/52 (18 Nov 2022 10:20) (104/51 - 134/60)  BP(mean): --  RR: 18 (18 Nov 2022 06:41) (18 - 20)  SpO2: 97% (18 Nov 2022 06:41) (95% - 99%)    Parameters below as of 18 Nov 2022 06:41  Patient On (Oxygen Delivery Method): room air      Daily Height in cm: 172.72 (17 Nov 2022 23:55)    Daily     LABS:                        7.8    16.65 )-----------( 487      ( 18 Nov 2022 07:43 )             25.4     Mean Cell Volume: 103.3 fl (11-18-22 @ 07:43)    11-18    139  |  102  |  40<H>  ----------------------------<  101<H>  4.7   |  26  |  1.45<H>    Ca    8.8      18 Nov 2022 07:42  Phos  3.5     11-18  Mg     2.2     11-18    TPro  6.2  /  Alb  3.3  /  TBili  0.4  /  DBili  x   /  AST  22  /  ALT  20  /  AlkPhos  71  11-18    LIVER FUNCTIONS - ( 18 Nov 2022 07:42 )  Alb: 3.3 g/dL / Pro: 6.2 g/dL / ALK PHOS: 71 U/L / ALT: 20 U/L / AST: 22 U/L / GGT: x           PT/INR - ( 18 Nov 2022 07:41 )   PT: 53.6 sec;   INR: 4.55 ratio         PTT - ( 18 Nov 2022 07:41 )  PTT:43.3 sec                            7.8    16.65 )-----------( 487      ( 18 Nov 2022 07:43 )             25.4                         7.2    18.63 )-----------( 550      ( 18 Nov 2022 01:06 )             24.0                         6.3    17.35 )-----------( 564      ( 17 Nov 2022 17:08 )             21.1       Imaging:

## 2022-11-18 NOTE — PROGRESS NOTE ADULT - PROBLEM SELECTOR PLAN 3
On admission, HR controlled. On coumadin-last INR check last week- 2mg x5 days, 1mg x2 days.     - cont to hold coumadin- INR 4.79 on admission  - c/w BB blocker - home med atenolol 25mg

## 2022-11-18 NOTE — CONSULT NOTE ADULT - ASSESSMENT
76yo M PMH of HTN, Afib on coumadin, CVA, Hodgkin's Lymphoma s/p splenectomy and chemo in 1975, essential thrombocytopenia, atrial fibrillation on coumadin, HTN, MOE, recently diagnosed anemia (b/l 7-8), aortic stenosis (pending TAVR), GERD presents due to recommendation for o/p physician for Hgb 6.1 11/15.     # Anemia - acute on chronic, likely multifactorial. Patient has known AVMs. Noted INR of 5. Suspect diffuse mucosal bleeding in the setting of supratherapeutic INR. Patient is at a very high procedural risk given severe AS and an endoscopic yield is likely to be low in the setting of multiple small bowel AVMs. As such, would favor observation for now: If Hb continues to downtrend after normalization of INR - can consider endoscopic evaluation (repeat VCE?).   # Severe AS - pending TAVR w/u  # MOE  # CVA - on coumadin and ASA    Recommendations:  - PPI 40mg BID  - Trend H/H, correct per transfusion goals by cardiology  - Monitor for overt bleeding  - Clear liquid diet for now  - No plans for endoscopy at this time  - Trend INR for now  - c/w ASA    Please note that the recommendations are not final until attested by an attending.    Thank you for involving us in the care of this patient. Please reach out if any further questions.    José Miguel Jarquin, PGY-6  Gastroenterology/Hepatology Fellow    Available on [employer] Teams  After 5PM/Weekends, please contact the on-call GI fellow: [phone]

## 2022-11-18 NOTE — CONSULT NOTE ADULT - ATTENDING COMMENTS
77M, complex pmhx as above including afib on coumadin, aortic stenosis, hodgkins lymphoma s/p splenectomy and chemo, MOE, presenting with anemia and dark stools in setting of supra-therapeutic INR (INR = ~5). The patient has hx of AVM on capsule endoscopy.     Likely has bleeding from AVM in setting of supra-therapeutic INR.   He would be high risk for endoscopic evaluation at this time due to cardiac co-morbidities.   Continue resuscitation  trend INR  IV PPI   Ok to start clear liquid diet   Long discussion w patient, he would like to avoid procedures unless absolutely necessary as well given co-morbidities.   Will defer for now and continue to monitor     GI to follow, please call with questions.

## 2022-11-18 NOTE — PROGRESS NOTE ADULT - PROBLEM SELECTOR PLAN 4
Presents with Cr 1.55. Baseline 1.2-1.3. Likely 2/2 hypovolemia and ATN    - CTM Cr  - s/p 1u pRBC. Pending 2nd unit

## 2022-11-18 NOTE — PROGRESS NOTE ADULT - ASSESSMENT
77M PMH HTN, Afib on coumadin, CVA, Hodgkin's Lymphoma s/p splenectomy and chemo in 1975, essential thrombocytopenia, atrial fibrillation on coumadin, Admitted for symptomatic anemia (supratherapeutic INR, coumadin held) s/p 1u pRBC, receiving additional unit. Troponins being trended per Cardiology recommendations.  Hematology and GI consulted.

## 2022-11-18 NOTE — PROGRESS NOTE ADULT - PROBLEM SELECTOR PLAN 6
EF 59% on 11/15/22. Home med: Furosemide 40, Lisinopril 5, Spironolactone 12.5, atenolol 25, asp 81, rousvastatin 10mg    - CTM I/Os  - c/w atenolol + statin   - hold:  lisinopril  - c/w furosemide and spirinolactone EF 59% on 11/15/22. Home med: Furosemide 40, Lisinopril 5, Spironolactone 12.5, atenolol 25, asp 81, rousvastatin 10mg    - CTM I/Os  - c/w atenolol + statin   - hold:  lisinopril  - c/w furosemide and spironolactone

## 2022-11-18 NOTE — PROGRESS NOTE ADULT - PROBLEM SELECTOR PLAN 1
2/2 GIB? vs severe AS vs malignancy? Low concern for GIB. Presenting with Hg 6.1 o/p with baseline 7- 8. Complains of lightheadedness, ELLIS. No reported bloody stools. EGD/ colonoscopy in the past reportedly negative for acute pathology. Has a history of B12, folate, and Fe def.    - s/p 1u pRBC. Pending 2nd unit  - CTM CBC q8  - f/u hemolysis labs, Fe, folate, B12 studies   - f/u with o/p hematologist - previously found to have dysplastic changes on bone marrow bx per prior records ?  - Protonix PO 40 daily  - heme and GI emailed overnight 2/2 GIB? vs severe AS vs malignancy? Low concern for GIB. Presenting with Hg 6.1 o/p with baseline 7- 8. Complains of lightheadedness, ELLIS. No reported bloody stools. EGD/ colonoscopy in the past reportedly negative for acute pathology. Has a history of B12, folate, and Fe def.    - s/p 2 units RBCs,   - CTM CBC q12  - hemolysis labs wnl, B12 and folate wnl, Low iron   - f/u with o/p hematologist - previously found to have dysplastic changes on bone marrow bx per prior records   - Protonix PO 40 daily  - heme and GI emailed overnight

## 2022-11-18 NOTE — CONSULT NOTE ADULT - ASSESSMENT
77M PMH HTN, Afib on coumadin, CVA, Hodgkin's Lymphoma s/p splenectomy and chemo in 1975, essential thrombocytopenia, atrial fibrillation on coumadin, HTN, MOE, recently diagnosed anemia (b/l 7-8), aortic stenosis (pending TAVR), GERD presents due to recommendation for o/p physician for Hgb 6.1 11/15.     Anemia  --under the care of Dr Negro Goel of [facility]  --d/t chronic disease  --on Aranesp 200mcg; LD: 10/14  --s/p Venofer 11/17 for ferritin of 33  --please transfuse for Hgb <7  --BMBx done 4/14: negative for acute leukemia, myelodysplasia, lymphoproliferative disorder, metastatic tumor, or other bone marrow infiltrative disorders.  --GI consulted    Thrombocytosis   --likely reactive      aortic stenosis.   -- History of severe AS with EF 59% on 11/15/22.  -- Currently being worked up for TAVR. C/o ELLIS  -- management per cards and primary team     will continue to follow,    Jyothi Xiao NP  Hematology/ Oncology  [facility]  [phone] (office)  [phone] (alt office)  Evenings and weekends please call MD on call or office        77M PMH HTN, Afib on coumadin, CVA, Hodgkin's Lymphoma s/p splenectomy and chemo in 1975, essential thrombocytopenia, atrial fibrillation on coumadin, HTN, MOE, recently diagnosed anemia (b/l 7-8), aortic stenosis (pending TAVR), GERD presents due to recommendation for o/p physician for Hgb 6.1 11/15.     Anemia  --under the care of Dr Negro Goel of [facility]  --d/t chronic disease  --on Aranesp 200mcg; LD: 10/14  --s/p Venofer 11/17 for ferritin of 33  --please transfuse for Hgb <7  --hemolysis unlikely as t. bili and LDH wnl, hapto @264  --will check Ana test  --BMBx done 4/14: negative for acute leukemia, myelodysplasia, lymphoproliferative disorder, metastatic tumor, or other bone marrow infiltrative disorders.  --GI consulted    Thrombocytosis   --likely reactive      aortic stenosis.   -- History of severe AS with EF 59% on 11/15/22.  -- Currently being worked up for TAVR. C/o ELLIS  -- management per cards and primary team     will continue to follow,    Jyothi Xiao NP  Hematology/ Oncology  [facility]  [phone] (office)  [phone] (alt office)  Evenings and weekends please call MD on call or office

## 2022-11-18 NOTE — PROGRESS NOTE ADULT - PROBLEM SELECTOR PLAN 9
Presents with INR of 4. 70 on admission with Hgb 6.1 o/p. S/p 1u pRBC    - cont to hold coumadin  - monitor CBC/ INR q8

## 2022-11-19 LAB
ANION GAP SERPL CALC-SCNC: 9 MMOL/L — SIGNIFICANT CHANGE UP (ref 5–17)
APTT BLD: 41.6 SEC — HIGH (ref 27.5–35.5)
BUN SERPL-MCNC: 38 MG/DL — HIGH (ref 7–23)
CALCIUM SERPL-MCNC: 8.7 MG/DL — SIGNIFICANT CHANGE UP (ref 8.4–10.5)
CHLORIDE SERPL-SCNC: 103 MMOL/L — SIGNIFICANT CHANGE UP (ref 96–108)
CO2 SERPL-SCNC: 27 MMOL/L — SIGNIFICANT CHANGE UP (ref 22–31)
CREAT SERPL-MCNC: 1.48 MG/DL — HIGH (ref 0.5–1.3)
EGFR: 48 ML/MIN/1.73M2 — LOW
GLUCOSE SERPL-MCNC: 92 MG/DL — SIGNIFICANT CHANGE UP (ref 70–99)
HCT VFR BLD CALC: 25.4 % — LOW (ref 39–50)
HCT VFR BLD CALC: 26.1 % — LOW (ref 39–50)
HGB BLD-MCNC: 7.8 G/DL — LOW (ref 13–17)
HGB BLD-MCNC: 7.9 G/DL — LOW (ref 13–17)
INR BLD: 3.24 RATIO — HIGH (ref 0.88–1.16)
MAGNESIUM SERPL-MCNC: 2.4 MG/DL — SIGNIFICANT CHANGE UP (ref 1.6–2.6)
MCHC RBC-ENTMCNC: 30.3 GM/DL — LOW (ref 32–36)
MCHC RBC-ENTMCNC: 30.7 GM/DL — LOW (ref 32–36)
MCHC RBC-ENTMCNC: 31.5 PG — SIGNIFICANT CHANGE UP (ref 27–34)
MCHC RBC-ENTMCNC: 31.6 PG — SIGNIFICANT CHANGE UP (ref 27–34)
MCV RBC AUTO: 102.4 FL — HIGH (ref 80–100)
MCV RBC AUTO: 104.4 FL — HIGH (ref 80–100)
NRBC # BLD: 6 /100 WBCS — HIGH (ref 0–0)
NRBC # BLD: 6 /100 WBCS — HIGH (ref 0–0)
PHOSPHATE SERPL-MCNC: 3.2 MG/DL — SIGNIFICANT CHANGE UP (ref 2.5–4.5)
PLATELET # BLD AUTO: 544 K/UL — HIGH (ref 150–400)
PLATELET # BLD AUTO: 569 K/UL — HIGH (ref 150–400)
POTASSIUM SERPL-MCNC: 4.6 MMOL/L — SIGNIFICANT CHANGE UP (ref 3.5–5.3)
POTASSIUM SERPL-SCNC: 4.6 MMOL/L — SIGNIFICANT CHANGE UP (ref 3.5–5.3)
PROTHROM AB SERPL-ACNC: 38 SEC — HIGH (ref 10.5–13.4)
RBC # BLD: 2.48 M/UL — LOW (ref 4.2–5.8)
RBC # BLD: 2.5 M/UL — LOW (ref 4.2–5.8)
RBC # FLD: 18.1 % — HIGH (ref 10.3–14.5)
RBC # FLD: 18.9 % — HIGH (ref 10.3–14.5)
SODIUM SERPL-SCNC: 139 MMOL/L — SIGNIFICANT CHANGE UP (ref 135–145)
WBC # BLD: 12.44 K/UL — HIGH (ref 3.8–10.5)
WBC # BLD: 13.94 K/UL — HIGH (ref 3.8–10.5)
WBC # FLD AUTO: 12.44 K/UL — HIGH (ref 3.8–10.5)
WBC # FLD AUTO: 13.94 K/UL — HIGH (ref 3.8–10.5)

## 2022-11-19 PROCEDURE — 99233 SBSQ HOSP IP/OBS HIGH 50: CPT | Mod: GC

## 2022-11-19 PROCEDURE — 99232 SBSQ HOSP IP/OBS MODERATE 35: CPT

## 2022-11-19 RX ADMIN — ATENOLOL 25 MILLIGRAM(S): 25 TABLET ORAL at 06:22

## 2022-11-19 RX ADMIN — PANTOPRAZOLE SODIUM 40 MILLIGRAM(S): 20 TABLET, DELAYED RELEASE ORAL at 17:52

## 2022-11-19 RX ADMIN — PANTOPRAZOLE SODIUM 40 MILLIGRAM(S): 20 TABLET, DELAYED RELEASE ORAL at 06:23

## 2022-11-19 RX ADMIN — ATORVASTATIN CALCIUM 40 MILLIGRAM(S): 80 TABLET, FILM COATED ORAL at 21:32

## 2022-11-19 RX ADMIN — Medication 40 MILLIGRAM(S): at 06:22

## 2022-11-19 RX ADMIN — SPIRONOLACTONE 12.5 MILLIGRAM(S): 25 TABLET, FILM COATED ORAL at 06:20

## 2022-11-19 RX ADMIN — TAMSULOSIN HYDROCHLORIDE 0.8 MILLIGRAM(S): 0.4 CAPSULE ORAL at 21:32

## 2022-11-19 NOTE — PROGRESS NOTE ADULT - PROBLEM SELECTOR PLAN 3
On admission, HR controlled. On coumadin-last INR check last week- 2mg x5 days, 1mg x2 days.     - s/p Micra PPM one year ago, EKG during hospital course not demonstrating atrial fibrillation   - cont to hold coumadin- INR 4.79 on admission, today 3.24   - c/w BB blocker - home med atenolol 25mg

## 2022-11-19 NOTE — PROGRESS NOTE ADULT - PROBLEM SELECTOR PLAN 1
2/2 GIB? vs severe AS vs malignancy? Low concern for GIB. Presenting with Hg 6.1 o/p with baseline 7- 8. Complains of lightheadedness, ELLIS. No reported bloody stools. EGD/ colonoscopy in the past reportedly negative for acute pathology. Has a history of B12, folate, and Fe def.  - s/p 2 units RBCs, stable Hgb   - CTM CBC q12  - hemolysis labs wnl, B12 and folate wnl, Low iron   - f/u with o/p hematologist - previously found to have dysplastic changes on bone marrow bx per prior records   - Protonix PO 40 daily  - GI consulted but pt deemed high risk for endoscopy, also patient wishing to defer endoscopy at this time. - - Hematology consulted and pending workup for anemia and repeat BM biopsy pending resolution of supratherapeutic INR, (f/u parvovirus and P[facility] panel) 2/2 GIB? vs severe AS vs malignancy? Low concern for GIB. Presenting with Hg 6.1 o/p with baseline 7- 8. Complains of lightheadedness, ELLIS. No reported bloody stools. EGD/ colonoscopy in the past reportedly negative for acute pathology. Has a history of B12, folate, and Fe def.  - s/p 2 units RBCs, stable Hgb   - CTM CBC q12  - hemolysis labs wnl, haptoglobin elevated, B12 and folate wnl, Low iron   - f/u with o/p hematologist - previously found to have dysplastic changes on bone marrow bx per prior records   - Protonix PO 40 daily  - GI consulted but pt deemed high risk for endoscopy, also patient wishing to defer endoscopy at this time. - - Hematology consulted and pending workup for anemia and repeat BM biopsy pending resolution of supratherapeutic INR, (f/u parvovirus and PNH panel)

## 2022-11-19 NOTE — PROGRESS NOTE ADULT - PROBLEM SELECTOR PLAN 9
Presents with INR of 4.70 on admission with Hgb 6.1 o/p. S/p 2u pRBC  - cont to hold coumadin- INR 4.79 on admission, today 3.24   - cont to hold coumadin  - monitor CBC q12h

## 2022-11-19 NOTE — PROGRESS NOTE ADULT - SUBJECTIVE AND OBJECTIVE BOX
CHIEF COMPLAINT  Anemia    HISTORY OF PRESENT ILLNESS  RACHEL ANNE is a 77y Male who presents with a chief complaint of anemia.    No acute events. No complaints.    REVIEW OF SYSTEMS  A complete review of systems was performed; negative except per HPI    PHYSICAL EXAM  T(C): 36.9 (11-19-22 @ 14:21), Max: 36.9 (11-19-22 @ 14:21)  HR: 67 (11-19-22 @ 14:21) (67 - 84)  BP: 117/47 (11-19-22 @ 14:21) (117/47 - 146/59)  RR: 16 (11-19-22 @ 14:21) (16 - 18)  SpO2: 96% (11-19-22 @ 14:21) (92% - 97%)  Constitutional: alert, awake, in no acute distress  Eyes: PERRL, EOMI  HEENT: normocephalic, atraumatic  Neck: supple, non-tender  Cardiovascular: normal perfusion, no peripheral edema  Respiratory: normal respiratory efforts; no increased use of accessory muscles  Gastrointestinal: soft, non-tender  Musculoskeletal: normal range of motion, no deformities noted  Neurological: alert, CN II to XI grossly intact  Skin: warm, dry    LABORATORY DATA                        7.9    13.94 )-----------( 544      ( 19 Nov 2022 07:23 )             26.1     11-19    139  |  103  |  38<H>  ----------------------------<  92  4.6   |  27  |  1.48<H>    Ca    8.7      19 Nov 2022 07:23  Phos  3.2     11-19  Mg     2.4     11-19    TPro  6.2  /  Alb  3.3  /  TBili  0.4  /  DBili  x   /  AST  22  /  ALT  20  /  AlkPhos  71  11-18     CHIEF COMPLAINT  Anemia    HISTORY OF PRESENT ILLNESS  RACHEL ANNE is a 77y Male who presents with a chief complaint of anemia.    No acute events. No complaints. He is worried about stroke being off warfarin.    REVIEW OF SYSTEMS  A complete review of systems was performed; negative except per HPI    PHYSICAL EXAM  T(C): 36.9 (11-19-22 @ 14:21), Max: 36.9 (11-19-22 @ 14:21)  HR: 67 (11-19-22 @ 14:21) (67 - 84)  BP: 117/47 (11-19-22 @ 14:21) (117/47 - 146/59)  RR: 16 (11-19-22 @ 14:21) (16 - 18)  SpO2: 96% (11-19-22 @ 14:21) (92% - 97%)  Constitutional: alert, awake, in no acute distress  Eyes: PERRL, EOMI  HEENT: normocephalic, atraumatic  Neck: supple, non-tender  Cardiovascular: normal perfusion, no peripheral edema  Respiratory: normal respiratory efforts; no increased use of accessory muscles  Gastrointestinal: soft, non-tender  Musculoskeletal: normal range of motion, no deformities noted  Neurological: alert, CN II to XI grossly intact  Skin: warm, dry    LABORATORY DATA                        7.9    13.94 )-----------( 544      ( 19 Nov 2022 07:23 )             26.1     11-19    139  |  103  |  38<H>  ----------------------------<  92  4.6   |  27  |  1.48<H>    Ca    8.7      19 Nov 2022 07:23  Phos  3.2     11-19  Mg     2.4     11-19    TPro  6.2  /  Alb  3.3  /  TBili  0.4  /  DBili  x   /  AST  22  /  ALT  20  /  AlkPhos  71  11-18

## 2022-11-19 NOTE — PROGRESS NOTE ADULT - ASSESSMENT
77M PMH HTN, Afib on coumadin, CVA, Hodgkin's Lymphoma s/p splenectomy and chemo in 1975, essential thrombocytopenia, atrial fibrillation on coumadin, Admitted for symptomatic anemia (supratherapeutic INR, coumadin held) s/p 2 units of prbcs with stable Hgb. GI consulted but pt deemed high risk for endoscopy, also patient wishing to defer endoscopy at this time. Hematology consulted and pending workup for anemia and repeat BM biopsy pending resolution of supratherapeutic INR.

## 2022-11-19 NOTE — PROGRESS NOTE ADULT - ASSESSMENT
76yo M PMH of HTN, Afib on coumadin, CVA, Hodgkin's Lymphoma s/p splenectomy and chemo in 1975, essential thrombocytopenia, atrial fibrillation on coumadin, HTN, MOE, recently diagnosed anemia (b/l 7-8), aortic stenosis (pending TAVR), GERD presents due to recommendation for o/p physician for Hgb 6.1 11/15.     # Anemia - acute on chronic, likely multifactorial. Patient has known AVMs. Noted INR of 5. Suspect diffuse mucosal bleeding in the setting of supratherapeutic INR. Patient is at a very high procedural risk given severe AS and an endoscopic yield is likely to be low in the setting of multiple small bowel AVMs. As such, would favor observation for now: If Hb continues to downtrend after normalization of INR - can consider endoscopic evaluation (repeat VCE?).   # Severe AS - pending TAVR w/u  # MOE  # CVA - on coumadin and ASA    Recommendations:  - PPI 40mg BID  - Trend H/H, correct per transfusion goals by cardiology  - Monitor for overt bleeding  - Clear liquid diet for now  - No plans for endoscopy at this time  - Trend INR for now  - c/w ASA    All recommendations are tentative until note is attested by attending.     Nancy Hill, PGY-4   Gastroenterology/Hepatology Fellow  Available on [employer] Teams  [phone] ([employer] Short Range Pager)  [phone] ([facility] Long Range Pager)    After 5pm, please contact the on-call GI fellow. [phone]

## 2022-11-19 NOTE — PROGRESS NOTE ADULT - SUBJECTIVE AND OBJECTIVE BOX
Gastroenterology/Hepatology Progress Note    Interval Events:   - patient reports feeling well   - reports being constipated, has not had a BM since admission   - eating well, no complaints at this time     Allergies:  No Known Allergies      Hospital Medications:  acetaminophen     Tablet .. 650 milliGRAM(s) Oral every 6 hours PRN  atenolol  Tablet 25 milliGRAM(s) Oral daily  atorvastatin 40 milliGRAM(s) Oral at bedtime  furosemide    Tablet 40 milliGRAM(s) Oral daily  pantoprazole  Injectable 40 milliGRAM(s) IV Push two times a day  spironolactone 12.5 milliGRAM(s) Oral daily  tamsulosin 0.8 milliGRAM(s) Oral at bedtime      ROS: 14 point ROS negative unless otherwise state in subjective    PHYSICAL EXAM:   Vital Signs:  Vital Signs Last 24 Hrs  T(C): 36.6 (19 Nov 2022 06:02), Max: 36.7 (18 Nov 2022 12:47)  T(F): 97.9 (19 Nov 2022 06:02), Max: 98 (18 Nov 2022 12:47)  HR: 69 (19 Nov 2022 06:02) (69 - 95)  BP: 135/59 (19 Nov 2022 06:02) (114/52 - 146/59)  BP(mean): --  RR: 18 (19 Nov 2022 06:02) (18 - 18)  SpO2: 92% (19 Nov 2022 06:02) (92% - 97%)    Parameters below as of 19 Nov 2022 06:02  Patient On (Oxygen Delivery Method): room air      Daily     Daily     GENERAL:  No acute distress  HEENT:  Normocephalic/atraumatic, no scleral icterus  CHEST:  No accessory muscle use  HEART:  Regular rate and rhythm  ABDOMEN:  Soft, non-tender, non-distended  EXTREMITIES: No cyanosis, clubbing, or edema  SKIN:  No rash  NEURO:  Alert and oriented x 3, no asterixis    LABS:                        7.9    13.94 )-----------( 544      ( 19 Nov 2022 07:23 )             26.1     Mean Cell Volume: 104.4 fl (11-19-22 @ 07:23)    11-19    139  |  103  |  38<H>  ----------------------------<  92  4.6   |  27  |  1.48<H>    Ca    8.7      19 Nov 2022 07:23  Phos  3.2     11-19  Mg     2.4     11-19    TPro  6.2  /  Alb  3.3  /  TBili  0.4  /  DBili  x   /  AST  22  /  ALT  20  /  AlkPhos  71  11-18    LIVER FUNCTIONS - ( 18 Nov 2022 07:42 )  Alb: 3.3 g/dL / Pro: 6.2 g/dL / ALK PHOS: 71 U/L / ALT: 20 U/L / AST: 22 U/L / GGT: x           PT/INR - ( 19 Nov 2022 07:23 )   PT: 38.0 sec;   INR: 3.24 ratio         PTT - ( 19 Nov 2022 07:23 )  PTT:41.6 sec          Imaging:

## 2022-11-19 NOTE — PROGRESS NOTE ADULT - PROBLEM SELECTOR PLAN 4
Presents with Cr 1.55. Baseline 1.2-1.3. Likely 2/2 hypovolemia and ATN    - CTM Cr. Cr today of 1.48   - s/p 2 units of pRBCs, no need for fluids at this time given AS

## 2022-11-19 NOTE — PROGRESS NOTE ADULT - SUBJECTIVE AND OBJECTIVE BOX
**************************************  Kokogurmeet Francisco, PGY-1  **************************************    INTERVAL HPI/OVERNIGHT EVENTS:  Patient was seen and examined at bedside. As per nurse and patient, no o/n events, patient resting comfortably. No complaints at this time. Patient denies: fever, chills, dizziness, weakness, HA, Changes in vision, CP, palpitations, SOB, cough, N/V/D/C, dysuria, changes in bowel movements, LE edema. ROS otherwise negative.    VITAL SIGNS:  T(F): 97.9 (11-19-22 @ 06:02)  HR: 69 (11-19-22 @ 06:02)  BP: 135/59 (11-19-22 @ 06:02)  RR: 18 (11-19-22 @ 06:02)  SpO2: 92% (11-19-22 @ 06:02)  Wt(kg): --    PHYSICAL EXAM:    Constitutional: WDWN, NAD  HEENT: PERRL, EOMI, sclera non-icteric, neck supple, trachea midline, no masses, no JVD, MMM, good dentition  Respiratory: CTA b/l, good air entry b/l, no wheezing, no rhonchi, no rales, without accessory muscle use and no intercostal retractions  Cardiovascular: RRR, normal S1S2, +late systolic murmur in the L 2nd interspace.   Gastrointestinal: soft, NTND, no masses palpable, BS normal  Extremities: trace pretibial edema, 2+ pulses bilaterally, no rashes or cyanosis. Notable well-healed scar from tissue removal in the lateral RLE from prior surgery for compartment syndrome.   Neurological: AAOx3, CN Grossly intact  Skin: Normal temperature, warm, dry        MEDICATIONS  (STANDING):  atenolol  Tablet 25 milliGRAM(s) Oral daily  atorvastatin 40 milliGRAM(s) Oral at bedtime  furosemide    Tablet 40 milliGRAM(s) Oral daily  pantoprazole  Injectable 40 milliGRAM(s) IV Push two times a day  spironolactone 12.5 milliGRAM(s) Oral daily  tamsulosin 0.8 milliGRAM(s) Oral at bedtime    MEDICATIONS  (PRN):  acetaminophen     Tablet .. 650 milliGRAM(s) Oral every 6 hours PRN Temp greater or equal to 38C (100.4F), Mild Pain (1 - 3)      Allergies    No Known Allergies    Intolerances        LABS:                        7.9    13.94 )-----------( 544      ( 19 Nov 2022 07:23 )             26.1     11-19    139  |  103  |  38<H>  ----------------------------<  92  4.6   |  27  |  1.48<H>    Ca    8.7      19 Nov 2022 07:23  Phos  3.2     11-19  Mg     2.4     11-19    TPro  6.2  /  Alb  3.3  /  TBili  0.4  /  DBili  x   /  AST  22  /  ALT  20  /  AlkPhos  71  11-18    PT/INR - ( 19 Nov 2022 07:23 )   PT: 38.0 sec;   INR: 3.24 ratio         PTT - ( 19 Nov 2022 07:23 )  PTT:41.6 sec      RADIOLOGY & ADDITIONAL TESTS:  Reviewed

## 2022-11-19 NOTE — PROGRESS NOTE ADULT - ASSESSMENT
RACHEL ANNE is a 77y Male who presents with a chief complaint of anemia.    Anemia  - Patient follows with Dr. Negro Goel, [facility].  - Patient with acute worsening of anemia since September; hemoglobin was 11.6 at that time, and now in the 7s.  - Monitor and transfuse to maintain hemoglobin > 7.  - Patient had received a dose of IV iron sucrose on November 17th.   - He is also on MEGAN as outpatient.  - Noted bone marrow biopsy from April 2022 did not show any acute process though low-grade myelodysplastic syndrome cannot be ruled out.  - Gastroenterology following; no plans for endoscopy at this time.   - Will likely need repeat bone marrow biopsy.   - Hemoglobin stable today.     Thrombocytosis  - In the setting of iron deficiency, reactive process.  - Start oral iron if not already on.     Elevated INR  - In the setting of warfarin for atrial fibrillation.  - Remains on hold at this time. Monitor for bleeding.     Will continue to follow.    Jason Gibson MD  Hematology/Oncology  O: [phone] RACHEL ANNE is a 77y Male who presents with a chief complaint of anemia.    Anemia  - Patient follows with Dr. Negro Goel, [facility].  - Patient with acute worsening of anemia since September; hemoglobin was 11.6 at that time, and now in the 7s.  - Monitor and transfuse to maintain hemoglobin > 7.  - Patient had received a dose of IV iron sucrose on November 17th.   - He is also on MEGAN as outpatient.  - Noted bone marrow biopsy from April 2022 did not show any acute process though low-grade myelodysplastic syndrome cannot be ruled out.  - Gastroenterology following; no plans for endoscopy at this time. Rule out gastrointestinal hemorrhage.  - Will likely need repeat bone marrow biopsy. If patient will be hospitalized into next week, would recommend IR consultation for it. Otherwise can be expeditiously done outpatient.   - Hemoglobin stable today.     Thrombocytosis  - In the setting of iron deficiency, reactive process.  - Start oral iron if not already on.     Elevated INR  - In the setting of warfarin for atrial fibrillation.  - Remains on hold at this time. Monitor for bleeding.     Will continue to follow.    Jason Gibson MD  Hematology/Oncology  O: [phone]

## 2022-11-19 NOTE — PROGRESS NOTE ADULT - PROBLEM SELECTOR PLAN 6
EF 59% on 11/15/22. Home med: Furosemide 40, Lisinopril 5, Spironolactone 12.5, atenolol 25, asp 81, rousvastatin 10mg    - CTM I/Os  - c/w atenolol + statin   - hold:  lisinopril  - c/w furosemide and spironolactone

## 2022-11-20 LAB
ANION GAP SERPL CALC-SCNC: 11 MMOL/L — SIGNIFICANT CHANGE UP (ref 5–17)
APTT BLD: 40.5 SEC — HIGH (ref 27.5–35.5)
BUN SERPL-MCNC: 30 MG/DL — HIGH (ref 7–23)
CALCIUM SERPL-MCNC: 8.9 MG/DL — SIGNIFICANT CHANGE UP (ref 8.4–10.5)
CHLORIDE SERPL-SCNC: 101 MMOL/L — SIGNIFICANT CHANGE UP (ref 96–108)
CO2 SERPL-SCNC: 26 MMOL/L — SIGNIFICANT CHANGE UP (ref 22–31)
CREAT SERPL-MCNC: 1.42 MG/DL — HIGH (ref 0.5–1.3)
EGFR: 51 ML/MIN/1.73M2 — LOW
GLUCOSE SERPL-MCNC: 92 MG/DL — SIGNIFICANT CHANGE UP (ref 70–99)
HCT VFR BLD CALC: 26.2 % — LOW (ref 39–50)
HGB BLD-MCNC: 7.9 G/DL — LOW (ref 13–17)
INR BLD: 3.15 RATIO — HIGH (ref 0.88–1.16)
MAGNESIUM SERPL-MCNC: 2.4 MG/DL — SIGNIFICANT CHANGE UP (ref 1.6–2.6)
MCHC RBC-ENTMCNC: 30.2 GM/DL — LOW (ref 32–36)
MCHC RBC-ENTMCNC: 31.6 PG — SIGNIFICANT CHANGE UP (ref 27–34)
MCV RBC AUTO: 104.8 FL — HIGH (ref 80–100)
NRBC # BLD: 5 /100 WBCS — HIGH (ref 0–0)
PHOSPHATE SERPL-MCNC: 2.9 MG/DL — SIGNIFICANT CHANGE UP (ref 2.5–4.5)
PLATELET # BLD AUTO: 549 K/UL — HIGH (ref 150–400)
POTASSIUM SERPL-MCNC: 4.4 MMOL/L — SIGNIFICANT CHANGE UP (ref 3.5–5.3)
POTASSIUM SERPL-SCNC: 4.4 MMOL/L — SIGNIFICANT CHANGE UP (ref 3.5–5.3)
PROTHROM AB SERPL-ACNC: 37 SEC — HIGH (ref 10.5–13.4)
RBC # BLD: 2.5 M/UL — LOW (ref 4.2–5.8)
RBC # FLD: 18 % — HIGH (ref 10.3–14.5)
SODIUM SERPL-SCNC: 138 MMOL/L — SIGNIFICANT CHANGE UP (ref 135–145)
WBC # BLD: 12.33 K/UL — HIGH (ref 3.8–10.5)
WBC # FLD AUTO: 12.33 K/UL — HIGH (ref 3.8–10.5)

## 2022-11-20 PROCEDURE — 88189 FLOWCYTOMETRY/READ 16 & >: CPT

## 2022-11-20 PROCEDURE — 99232 SBSQ HOSP IP/OBS MODERATE 35: CPT | Mod: GC

## 2022-11-20 RX ADMIN — PANTOPRAZOLE SODIUM 40 MILLIGRAM(S): 20 TABLET, DELAYED RELEASE ORAL at 17:31

## 2022-11-20 RX ADMIN — Medication 40 MILLIGRAM(S): at 05:18

## 2022-11-20 RX ADMIN — TAMSULOSIN HYDROCHLORIDE 0.8 MILLIGRAM(S): 0.4 CAPSULE ORAL at 22:17

## 2022-11-20 RX ADMIN — SPIRONOLACTONE 12.5 MILLIGRAM(S): 25 TABLET, FILM COATED ORAL at 05:18

## 2022-11-20 RX ADMIN — PANTOPRAZOLE SODIUM 40 MILLIGRAM(S): 20 TABLET, DELAYED RELEASE ORAL at 05:19

## 2022-11-20 RX ADMIN — ATENOLOL 25 MILLIGRAM(S): 25 TABLET ORAL at 05:19

## 2022-11-20 RX ADMIN — ATORVASTATIN CALCIUM 40 MILLIGRAM(S): 80 TABLET, FILM COATED ORAL at 22:17

## 2022-11-20 NOTE — PROGRESS NOTE ADULT - SUBJECTIVE AND OBJECTIVE BOX
PROGRESS NOTE:   Authored by Dr. Yulissa Black / Internal Medicine Resident    Patient is a 77y old  Male who presents with a chief complaint of Anemia (17 Nov 2022 21:21)      SUBJECTIVE / OVERNIGHT EVENTS: No overnight events.    ADDITIONAL REVIEW OF SYSTEMS:    MEDICATIONS  (STANDING):  atenolol  Tablet 25 milliGRAM(s) Oral daily  atorvastatin 40 milliGRAM(s) Oral at bedtime  furosemide    Tablet 40 milliGRAM(s) Oral daily  pantoprazole  Injectable 40 milliGRAM(s) IV Push two times a day  spironolactone 12.5 milliGRAM(s) Oral daily  tamsulosin 0.8 milliGRAM(s) Oral at bedtime    MEDICATIONS  (PRN):  acetaminophen     Tablet .. 650 milliGRAM(s) Oral every 6 hours PRN Temp greater or equal to 38C (100.4F), Mild Pain (1 - 3)      CAPILLARY BLOOD GLUCOSE        I&O's Summary    19 Nov 2022 07:01  -  20 Nov 2022 07:00  --------------------------------------------------------  IN: 0 mL / OUT: 700 mL / NET: -700 mL        PHYSICAL EXAM:  Vital Signs Last 24 Hrs  T(C): 36.9 (20 Nov 2022 05:17), Max: 37.7 (19 Nov 2022 20:30)  T(F): 98.5 (20 Nov 2022 05:17), Max: 99.9 (19 Nov 2022 20:30)  HR: 69 (20 Nov 2022 05:17) (67 - 69)  BP: 134/75 (20 Nov 2022 05:17) (117/47 - 134/75)  BP(mean): --  RR: 18 (20 Nov 2022 05:17) (16 - 18)  SpO2: 94% (20 Nov 2022 05:17) (94% - 96%)    Parameters below as of 20 Nov 2022 05:17  Patient On (Oxygen Delivery Method): room air        GENERAL: NAD, lying comfortably in bed  HEAD: Atraumatic, normocephalic  EYES: EOMI b/l PERRLA b/l, conjunctiva and sclera clear  NECK: Supple, No JVD, No LAD  RESPIRATORY: Normal respiratory effort; lungs are clear to auscultation bilaterally  CARDIOVASCULAR: Regular rate and rhythm, normal S1 and S2, no murmur/rub/gallop; No lower extremity edema  ABDOMEN: Nontender, normoactive bowel sounds, no rebound/guarding; No hepatosplenomegaly  MUSCULOSKELETAL: no clubbing or cyanosis of digits; no joint swelling or tenderness to palpation  NEURO: Non focal   PSYCH: A+O to person, place, and time; affect appropriate    LABS:                          7.8    12.44 )-----------( 569      ( 19 Nov 2022 22:44 )             25.4     11-19    139  |  103  |  38<H>  ----------------------------<  92  4.6   |  27  |  1.48<H>    Ca    8.7      19 Nov 2022 07:23  Phos  3.2     11-19  Mg     2.4     11-19    TPro  6.2  /  Alb  3.3  /  TBili  0.4  /  DBili  x   /  AST  22  /  ALT  20  /  AlkPhos  71  11-18    PT/INR - ( 19 Nov 2022 07:23 )   PT: 38.0 sec;   INR: 3.24 ratio         PTT - ( 19 Nov 2022 07:23 )  PTT:41.6 sec           PROGRESS NOTE:   Authored by Dr. Yulissa Black / Internal Medicine Resident    Patient is a 77y old  Male who presents with a chief complaint of Anemia (17 Nov 2022 21:21)      SUBJECTIVE / OVERNIGHT EVENTS: No overnight events. Patient reported feeling but very anxious about not taking his warfarin; provider explained that his INR level had been high and is it continues to be above goal theres an increased risk of bleeding if we continue warfarin.    ADDITIONAL REVIEW OF SYSTEMS:  Review of Systems:  Constitutional: No fever, No weight loss, good appetite/po intake  Head: No headache   Eyes: No blurry vision, No diplopia  Neuro: No tremors, No muscle weakness   Cardiovascular: No chest pain, No palpitations  Respiratory: No SOB, No cough  GI: No nausea, No vomiting, No diarrhea  : No dysuria, No hematuria  Skin: No rash  MSK: No joint pain   Psych: No depression  Heme: No abnormal bruising, no abnormal bleeding      MEDICATIONS  (STANDING):  atenolol  Tablet 25 milliGRAM(s) Oral daily  atorvastatin 40 milliGRAM(s) Oral at bedtime  furosemide    Tablet 40 milliGRAM(s) Oral daily  pantoprazole  Injectable 40 milliGRAM(s) IV Push two times a day  spironolactone 12.5 milliGRAM(s) Oral daily  tamsulosin 0.8 milliGRAM(s) Oral at bedtime    MEDICATIONS  (PRN):  acetaminophen     Tablet .. 650 milliGRAM(s) Oral every 6 hours PRN Temp greater or equal to 38C (100.4F), Mild Pain (1 - 3)      CAPILLARY BLOOD GLUCOSE        I&O's Summary    19 Nov 2022 07:01  -  20 Nov 2022 07:00  --------------------------------------------------------  IN: 0 mL / OUT: 700 mL / NET: -700 mL        PHYSICAL EXAM:  Vital Signs Last 24 Hrs  T(C): 36.9 (20 Nov 2022 05:17), Max: 37.7 (19 Nov 2022 20:30)  T(F): 98.5 (20 Nov 2022 05:17), Max: 99.9 (19 Nov 2022 20:30)  HR: 69 (20 Nov 2022 05:17) (67 - 69)  BP: 134/75 (20 Nov 2022 05:17) (117/47 - 134/75)  BP(mean): --  RR: 18 (20 Nov 2022 05:17) (16 - 18)  SpO2: 94% (20 Nov 2022 05:17) (94% - 96%)    Parameters below as of 20 Nov 2022 05:17  Patient On (Oxygen Delivery Method): room air        GENERAL: NAD, lying comfortably in bed  HEAD: Atraumatic, normocephalic  EYES: EOMI b/l PERRLA b/l, conjunctiva and sclera clear  NECK: Supple, No JVD, No LAD  RESPIRATORY: Normal respiratory effort; lungs are clear to auscultation bilaterally  CARDIOVASCULAR: Regular rate and rhythm, normal S1 and S2, no murmur/rub/gallop; No lower extremity edema  ABDOMEN: Nontender, normoactive bowel sounds, no rebound/guarding; No hepatosplenomegaly  MUSCULOSKELETAL: no clubbing or cyanosis of digits; no joint swelling or tenderness to palpation  NEURO: Non focal   PSYCH: A+O to person, place, and time; affect appropriate    LABS:                          7.8    12.44 )-----------( 569      ( 19 Nov 2022 22:44 )             25.4     11-19    139  |  103  |  38<H>  ----------------------------<  92  4.6   |  27  |  1.48<H>    Ca    8.7      19 Nov 2022 07:23  Phos  3.2     11-19  Mg     2.4     11-19    TPro  6.2  /  Alb  3.3  /  TBili  0.4  /  DBili  x   /  AST  22  /  ALT  20  /  AlkPhos  71  11-18    PT/INR - ( 19 Nov 2022 07:23 )   PT: 38.0 sec;   INR: 3.24 ratio         PTT - ( 19 Nov 2022 07:23 )  PTT:41.6 sec

## 2022-11-20 NOTE — PHYSICAL THERAPY INITIAL EVALUATION ADULT - PERTINENT HX OF CURRENT PROBLEM, REHAB EVAL
77M PMH HTN, Afib on coumadin, CVA, Hodgkin's Lymphoma s/p splenectomy and chemo in 1975, essential thrombocytopenia, atrial fibrillation on coumadin, HTN, MOE, recently diagnosed anemia (b/l 7-8), aortic stenosis (pending TAVR), GERD presents due to recommendation for o/p physician for Hgb 6.1 11/15. Today, states he received Fe and Aranesp infusion. Pt states he has felt more lethargic the past few weeks. Reports dizziness and dyspnea on exertion. Has felt short of breath walking a few steps at home. Pt pending TAVR w/u. Denies fall, LOC, N/V/F/C, CP, palpitations, abdominal pain, diarrhea, melena, hematochezia. States he has dark stool chronically since taking iron supplementation. Pt has had EGD/ colonoscopy previously with no reported acute findings. Patient continues to take coumadin (last INR check last week- 2mg x5 days, 1mg x2 days). Patient follows with Dr. Negro Goel for a history of Hodgkin's Lymphoma s/p splenectomy and chemotherapy in 1975), essential thrombocythemia and worsening macrocytic anemia. Pt found to have Fe def anemia and has been receiving infusions. Per documentation, bone marrow aspirate and biopsy on 4/6/2022 which showed early/mild evidence of dysplastic changes although B12 and folate deficiency or other causes of macrocytosis could not be ruled out. Additional workup showed chronic kidney disease and B12 deficiency s/p B12 injections. HOSPITAL COURSE: 11/17 chest XRay: Mild pulmonary vascular congestion

## 2022-11-20 NOTE — PHYSICAL THERAPY INITIAL EVALUATION ADULT - ADDITIONAL COMMENTS
History obtained from pt. Pt reports living in a private house with 4steps to enter and 4 steps to bedroom and lives with spouse. Pt owns cane and rolling walker which he uses indoors and was independent with devices PTA.

## 2022-11-20 NOTE — PROGRESS NOTE ADULT - PROBLEM SELECTOR PLAN 1
2/2 GIB? vs severe AS vs malignancy? Low concern for GIB. Presenting with Hg 6.1 o/p with baseline 7- 8. Complains of lightheadedness, ELLIS. No reported bloody stools. EGD/ colonoscopy in the past reportedly negative for acute pathology. Has a history of B12, folate, and Fe def.  - s/p 2 units RBCs, stable Hgb   - CTM CBC q12  - hemolysis labs wnl, haptoglobin elevated, B12 and folate wnl, Low iron   - f/u with o/p hematologist - previously found to have dysplastic changes on bone marrow bx per prior records   - Protonix PO 40 daily  - GI consulted but pt deemed high risk for endoscopy, also patient wishing to defer endoscopy at this time. - - Hematology consulted and pending workup for anemia and repeat BM biopsy pending resolution of supratherapeutic INR, (f/u parvovirus and PNH panel) 2/2 GIB? vs severe AS vs malignancy? Low concern for GIB. Presenting with Hg 6.1 o/p with baseline 7- 8. Complains of lightheadedness, ELLIS. No reported bloody stools. EGD/ colonoscopy in the past reportedly negative for acute pathology. Has a history of B12, folate, and Fe def.  - s/p 2 units RBCs, stable Hgb   - CTM CBC q12  - hemolysis labs wnl, haptoglobin elevated, B12 and folate wnl, Low iron   - f/u with o/p hematologist - previously found to have dysplastic changes on bone marrow bx per prior records   - Protonix PO 40 daily  - GI consulted but pt deemed high risk for endoscopy, also patient wishing to defer endoscopy at this time. - - Hematology consulted and pending workup for anemia and repeat BM biopsy pending resolution of supratherapeutic INR, (f/u parvovirus and PNH panel)  - will need to follow up w/ GI regarding AC

## 2022-11-21 ENCOUNTER — TRANSCRIPTION ENCOUNTER (OUTPATIENT)
Age: 77
End: 2022-11-21

## 2022-11-21 DIAGNOSIS — R06.00 DYSPNEA, UNSPECIFIED: ICD-10-CM

## 2022-11-21 LAB
ANION GAP SERPL CALC-SCNC: 10 MMOL/L — SIGNIFICANT CHANGE UP (ref 5–17)
B19V DNA FLD QL NAA+PROBE: SIGNIFICANT CHANGE UP IU/ML
B19V DNA FLD QL NAA+PROBE: SIGNIFICANT CHANGE UP IU/ML
BUN SERPL-MCNC: 23 MG/DL — SIGNIFICANT CHANGE UP (ref 7–23)
CALCIUM SERPL-MCNC: 9 MG/DL — SIGNIFICANT CHANGE UP (ref 8.4–10.5)
CHLORIDE SERPL-SCNC: 101 MMOL/L — SIGNIFICANT CHANGE UP (ref 96–108)
CO2 SERPL-SCNC: 27 MMOL/L — SIGNIFICANT CHANGE UP (ref 22–31)
CREAT SERPL-MCNC: 1.25 MG/DL — SIGNIFICANT CHANGE UP (ref 0.5–1.3)
DAT POLY-SP REAG RBC QL: NEGATIVE — SIGNIFICANT CHANGE UP
EGFR: 59 ML/MIN/1.73M2 — LOW
GLUCOSE SERPL-MCNC: 94 MG/DL — SIGNIFICANT CHANGE UP (ref 70–99)
HCT VFR BLD CALC: 27.1 % — LOW (ref 39–50)
HGB BLD-MCNC: 8.3 G/DL — LOW (ref 13–17)
INR BLD: 3.01 RATIO — HIGH (ref 0.88–1.16)
MAGNESIUM SERPL-MCNC: 2.4 MG/DL — SIGNIFICANT CHANGE UP (ref 1.6–2.6)
MCHC RBC-ENTMCNC: 30.6 GM/DL — LOW (ref 32–36)
MCHC RBC-ENTMCNC: 31.6 PG — SIGNIFICANT CHANGE UP (ref 27–34)
MCV RBC AUTO: 103 FL — HIGH (ref 80–100)
NRBC # BLD: 3 /100 WBCS — HIGH (ref 0–0)
PHOSPHATE SERPL-MCNC: 2.7 MG/DL — SIGNIFICANT CHANGE UP (ref 2.5–4.5)
PLATELET # BLD AUTO: 604 K/UL — HIGH (ref 150–400)
POTASSIUM SERPL-MCNC: 4.2 MMOL/L — SIGNIFICANT CHANGE UP (ref 3.5–5.3)
POTASSIUM SERPL-SCNC: 4.2 MMOL/L — SIGNIFICANT CHANGE UP (ref 3.5–5.3)
PROTHROM AB SERPL-ACNC: 35.3 SEC — HIGH (ref 10.5–13.4)
RBC # BLD: 2.63 M/UL — LOW (ref 4.2–5.8)
RBC # FLD: 17.4 % — HIGH (ref 10.3–14.5)
SODIUM SERPL-SCNC: 138 MMOL/L — SIGNIFICANT CHANGE UP (ref 135–145)
WBC # BLD: 11.8 K/UL — HIGH (ref 3.8–10.5)
WBC # FLD AUTO: 11.8 K/UL — HIGH (ref 3.8–10.5)

## 2022-11-21 PROCEDURE — 99232 SBSQ HOSP IP/OBS MODERATE 35: CPT

## 2022-11-21 PROCEDURE — 99232 SBSQ HOSP IP/OBS MODERATE 35: CPT | Mod: GC

## 2022-11-21 RX ORDER — WARFARIN SODIUM 2.5 MG/1
1 TABLET ORAL
Qty: 0 | Refills: 0 | DISCHARGE

## 2022-11-21 RX ORDER — PANTOPRAZOLE SODIUM 20 MG/1
40 TABLET, DELAYED RELEASE ORAL
Refills: 0 | Status: DISCONTINUED | OUTPATIENT
Start: 2022-11-21 | End: 2022-11-22

## 2022-11-21 RX ADMIN — Medication 650 MILLIGRAM(S): at 23:00

## 2022-11-21 RX ADMIN — PANTOPRAZOLE SODIUM 40 MILLIGRAM(S): 20 TABLET, DELAYED RELEASE ORAL at 17:52

## 2022-11-21 RX ADMIN — SPIRONOLACTONE 12.5 MILLIGRAM(S): 25 TABLET, FILM COATED ORAL at 05:38

## 2022-11-21 RX ADMIN — Medication 650 MILLIGRAM(S): at 22:02

## 2022-11-21 RX ADMIN — TAMSULOSIN HYDROCHLORIDE 0.8 MILLIGRAM(S): 0.4 CAPSULE ORAL at 22:02

## 2022-11-21 RX ADMIN — Medication 40 MILLIGRAM(S): at 05:38

## 2022-11-21 RX ADMIN — ATORVASTATIN CALCIUM 40 MILLIGRAM(S): 80 TABLET, FILM COATED ORAL at 22:02

## 2022-11-21 RX ADMIN — ATENOLOL 25 MILLIGRAM(S): 25 TABLET ORAL at 05:38

## 2022-11-21 RX ADMIN — PANTOPRAZOLE SODIUM 40 MILLIGRAM(S): 20 TABLET, DELAYED RELEASE ORAL at 05:39

## 2022-11-21 NOTE — DISCHARGE NOTE PROVIDER - NSDCFUSCHEDAPPT_GEN_ALL_CORE_FT
Jimi Martins  [facility]  GASTRO [address]  Scheduled Appointment: 11/22/2022    [facility]  CARDIOLOGY [address]  Scheduled Appointment: 11/28/2022    [facility]  ELECTROPH [address]  Scheduled Appointment: 12/12/2022    Yumiko Mejia  [facility]  CARDIOLOGY [address]  Scheduled Appointment: 12/15/2022     [facility]  CARDIOLOGY [address]  Scheduled Appointment: 11/28/2022    [facility]  ELECTROPH [address]  Scheduled Appointment: 12/12/2022    Yumiko Mejia  [facility]  CARDIOLOGY [address]  Scheduled Appointment: 12/15/2022     [facility]  CARDIOLOGY [address]  Scheduled Appointment: 11/28/2022    Jimi Martins  [facility]  [address]STR[address]  Scheduled Appointment: 12/01/2022    [facility]  ELECTROPH [address]  Scheduled Appointment: 12/12/2022    Yumiko Mejia  [facility]  CARDIOLOGY [address]  Scheduled Appointment: 12/15/2022     [facility]  CARDIOLOGY [address]  Scheduled Appointment: 11/28/2022    Doctor, Unknown  [facility]  [facility]  Scheduled Appointment: 11/28/2022    Jimi Martins  [facility]  [address]  Scheduled Appointment: 12/01/2022    [facility]  ELECTROPH [address]  Scheduled Appointment: 12/12/2022    Yumiko Mejia  [facility]  CARDIOLOGY [address]  Scheduled Appointment: 12/15/2022

## 2022-11-21 NOTE — PROGRESS NOTE ADULT - ASSESSMENT
RACHEL ANNE is a 77y Male who presents with a chief complaint of anemia.    Anemia  - Patient follows with Dr. Negro Goel, [facility].  - Patient with acute worsening of anemia since September; hemoglobin was 11.6 at that time, and now in the 7s.  - Monitor and transfuse to maintain hemoglobin > 7.  - Patient had received a dose of IV iron sucrose on November 17th.   - He is also on MEGAN as outpatient.  - Noted bone marrow biopsy from April 2022 did not show any acute process though low-grade myelodysplastic syndrome cannot be ruled out.  - Gastroenterology following; no plans for endoscopy at this time. Rule out gastrointestinal hemorrhage.  - Will likely need repeat bone marrow biopsy. If patient will be hospitalized into next week, would recommend IR consultation for it. Otherwise can be expeditiously done outpatient.   - Hemoglobin stable today.     Thrombocytosis  - In the setting of iron deficiency, reactive process.  - Start oral iron if not already on.     Elevated INR  - In the setting of warfarin for atrial fibrillation.  - Remains on hold at this time. Monitor for bleeding.     Will continue to follow.    Jyothi Xiao NP  Hematology/ Oncology  [facility]  [phone] (office)  [phone] (alt office)  Evenings and weekends please call MD on call or office

## 2022-11-21 NOTE — PROGRESS NOTE ADULT - SUBJECTIVE AND OBJECTIVE BOX
**************************************  Anthony Singh, PGY-1  Senior Resident: Abhinav Mayfield  After 7PM, please contact night float at #[phone] or #[phone]  **************************************    INTERVAL HPI/OVERNIGHT EVENTS:  Patient was seen and examined at bedside. As per nurse and patient, no o/n events, patient resting comfortably. No complaints at this time. Patient denies: fever, chills, dizziness, weakness, HA, Changes in vision, CP, palpitations, SOB, cough, N/V/D/C, dysuria, changes in bowel movements, LE edema. ROS otherwise negative.    VITAL SIGNS:  T(F): 98.1 (11-21-22 @ 05:23)  HR: 72 (11-21-22 @ 05:23)  BP: 146/66 (11-21-22 @ 05:23)  RR: 18 (11-21-22 @ 05:23)  SpO2: 95% (11-21-22 @ 05:23)  Wt(kg): --    PHYSICAL EXAM:    Constitutional: WDWN, NAD  HEENT: PERRL, EOMI, sclera non-icteric, neck supple, trachea midline, no masses, no JVD, MMM, good dentition  Respiratory: CTA b/l, good air entry b/l, no wheezing, no rhonchi, no rales, without accessory muscle use and no intercostal retractions  Cardiovascular: RRR, normal S1S2, no M/R/G  Gastrointestinal: soft, NTND, no masses palpable, BS normal  Extremities: Warm, well perfused, pulses equal bilateral upper and lower extremities, no edema, no clubbing. Capillary refill <2 sec  Neurological: AAOx3, CN Grossly intact  Skin: Normal temperature, warm, dry    MEDICATIONS  (STANDING):  atenolol  Tablet 25 milliGRAM(s) Oral daily  atorvastatin 40 milliGRAM(s) Oral at bedtime  furosemide    Tablet 40 milliGRAM(s) Oral daily  pantoprazole  Injectable 40 milliGRAM(s) IV Push two times a day  spironolactone 12.5 milliGRAM(s) Oral daily  tamsulosin 0.8 milliGRAM(s) Oral at bedtime    MEDICATIONS  (PRN):  acetaminophen     Tablet .. 650 milliGRAM(s) Oral every 6 hours PRN Temp greater or equal to 38C (100.4F), Mild Pain (1 - 3)      Allergies    No Known Allergies    Intolerances        LABS:                        7.9    12.33 )-----------( 549      ( 20 Nov 2022 07:14 )             26.2     11-20    138  |  101  |  30<H>  ----------------------------<  92  4.4   |  26  |  1.42<H>    Ca    8.9      20 Nov 2022 07:12  Phos  2.9     11-20  Mg     2.4     11-20      PT/INR - ( 20 Nov 2022 07:14 )   PT: 37.0 sec;   INR: 3.15 ratio         PTT - ( 20 Nov 2022 07:14 )  PTT:40.5 sec      RADIOLOGY & ADDITIONAL TESTS:  Reviewed **************************************  Anthony Singh, PGY-1  **************************************    INTERVAL HPI/OVERNIGHT EVENTS:  Patient was seen and examined at bedside. No acute events overnight.       VITAL SIGNS:  T(F): 98.1 (11-21-22 @ 05:23)  HR: 72 (11-21-22 @ 05:23)  BP: 146/66 (11-21-22 @ 05:23)  RR: 18 (11-21-22 @ 05:23)  SpO2: 95% (11-21-22 @ 05:23)  Wt(kg): --        PHYSICAL EXAM:    Constitutional: WDWN, NAD  HEENT: PERRL, EOMI, sclera non-icteric, neck supple, trachea midline, no masses, no JVD, MMM, good dentition  Respiratory: CTA b/l, good air entry b/l, no wheezing, no rhonchi, no rales, without accessory muscle use and no intercostal retractions  Cardiovascular: RRR, normal S1S2, +late systolic murmur in the L 2nd interspace.   Gastrointestinal: soft, NTND, no masses palpable, BS normal  Extremities: trace pretibial edema, 2+ pulses bilaterally, no rashes or cyanosis. Notable well-healed scar from tissue removal in the lateral RLE from prior surgery for compartment syndrome.   Neurological: AAOx3, CN Grossly intact  Skin: Normal temperature, warm, dry                MEDICATIONS  (STANDING):  atenolol  Tablet 25 milliGRAM(s) Oral daily  atorvastatin 40 milliGRAM(s) Oral at bedtime  furosemide    Tablet 40 milliGRAM(s) Oral daily  pantoprazole  Injectable 40 milliGRAM(s) IV Push two times a day  spironolactone 12.5 milliGRAM(s) Oral daily  tamsulosin 0.8 milliGRAM(s) Oral at bedtime    MEDICATIONS  (PRN):  acetaminophen     Tablet .. 650 milliGRAM(s) Oral every 6 hours PRN Temp greater or equal to 38C (100.4F), Mild Pain (1 - 3)      Allergies    No Known Allergies    Intolerances        LABS:                        7.9    12.33 )-----------( 549      ( 20 Nov 2022 07:14 )             26.2     11-20    138  |  101  |  30<H>  ----------------------------<  92  4.4   |  26  |  1.42<H>    Ca    8.9      20 Nov 2022 07:12  Phos  2.9     11-20  Mg     2.4     11-20      PT/INR - ( 20 Nov 2022 07:14 )   PT: 37.0 sec;   INR: 3.15 ratio         PTT - ( 20 Nov 2022 07:14 )  PTT:40.5 sec      RADIOLOGY & ADDITIONAL TESTS:  Reviewed

## 2022-11-21 NOTE — DISCHARGE NOTE PROVIDER - NSDCFUADDAPPT_GEN_ALL_CORE_FT
APPTS ARE READY TO BE MADE: [ ] YES    Best Family or Patient Contact (if needed):    Additional Information about above appointments (if needed):    1: Please follow-up with PCP within one week of discharge.   2: Please follow-up with Gastroenterologist Dr. Jimi Martins within one week of discharge.   3: Please follow-up with Hematologist Dr. Goel within two weeks of discharge.     Other comments or requests:    APPTS ARE READY TO BE MADE: [ ] YES    Best Family or Patient Contact (if needed):    Additional Information about above appointments (if needed):    1: Please follow-up with PCP within one week of discharge.   2: Please follow-up with Gastroenterologist Dr. Jimi Martins within one week of discharge.   3: Please follow-up with Hematologist Dr. Goel within one week of discharge to check INR and dose coumadin.     Other comments or requests:    APPTS ARE READY TO BE MADE: [ ] YES    Best Family or Patient Contact (if needed):    Additional Information about above appointments (if needed):    1: Please follow-up with PCP within one week of discharge.   2: Please follow-up with Gastroenterologist Dr. Jimi Martins within one week of discharge.   3: Please follow-up with Hematologist Dr. Goel ([facility]) this week to check INR and adjust coumadin, also to check hemoglobin. -  Other comments or requests:    APPTS ARE READY TO BE MADE: [X] YES    Best Family or Patient Contact (if needed):    Additional Information about above appointments (if needed):    1: Please follow-up with PCP within one week of discharge.   2: Please follow-up with Gastroenterologist Dr. Jimi Martins within one week of discharge.   3: Please follow-up with Hematologist Dr. Goel ([facility]) this week to check INR and adjust coumadin, also to check hemoglobin.  4: Please follow-up with your cardiologist appointment on 11/28/2022 for management of your atrial fibrillation.   Other comments or requests   APPTS ARE READY TO BE MADE: [X] YES    Best Family or Patient Contact (if needed):    Additional Information about above appointments (if needed):    1: Please follow-up with PCP within one week of discharge.   2: Please follow-up with Gastroenterologist Dr. Jimi Martins within one week of discharge.   3: Please follow-up with Hematologist Dr. Goel ([facility]) this week to check INR and adjust coumadin, also to check hemoglobin.  4: Please follow-up with your cardiologist appointment on 11/28/2022 for management of your atrial fibrillation.   5: please contact IR booking office at [phone] to schedule appointment with Interventional Radiologist to schedule bone marrow biopsy.     Other comments or requests   APPTS ARE READY TO BE MADE: [X] YES    Best Family or Patient Contact (if needed):    Additional Information about above appointments (if needed):    1: Please follow-up with PCP within one week of discharge.   2: Please follow-up with Gastroenterologist Dr. Jimi Martins within one week of discharge.   3: Please follow-up with Hematologist Dr. Goel ([facility]) this week to check INR and adjust coumadin, also to check hemoglobin.  4: Please follow-up with your cardiologist appointment on 11/28/2022 for management of your atrial fibrillation.   5: please contact IR booking office at [phone] to schedule appointment with Interventional Radiologist to schedule bone marrow biopsy.     Other comments or requests  Patient was previously scheduled with Dr. Martins on 12/1 at .  Patient was provided with follow up request details and was advised to call to schedule follow up within specified time frame.

## 2022-11-21 NOTE — DISCHARGE NOTE PROVIDER - NSDCMRMEDTOKEN_GEN_ALL_CORE_FT
aspirin 81 mg oral delayed release tablet: 1 tab(s) orally once a day  ATENOLOL 25 MG TABLET: 1 tab(s) orally once a day  calcium: 2 tab(s) orally once a day  Coumadin 2 mg oral tablet: 2mg- M/T/W/Th/Fr  1mg- Sa/Sun  Crestor 20 mg oral tablet: 0.5 tab(s) orally once a day  Flonase: 1 spray(s) nasal once a day  iron: 1 tab(s) orally once a day  Lasix 40 mg oral tablet: 1 tab(s) orally once a day  latanoprost 0.005% ophthalmic solution: 1 drop(s) to the right eye once a day (in the evening)  lisinopril 5 mg oral tablet: 1 tab(s) orally once a day  omeprazole 20 mg oral delayed release capsule: 1 cap(s) orally once a day  spironolactone 25 mg oral tablet: 0.5 tab(s) orally once a day  tamsulosin 0.4 mg oral capsule: 2 cap(s) orally once a day (at bedtime)  Vitamin B-12: 1 dose(s) injectable once a month    Note:Gets from MD office  Vitamin C: 1 tab(s) orally once a day   aspirin 81 mg oral delayed release tablet: 1 tab(s) orally once a day  ATENOLOL 25 MG TABLET: 1 tab(s) orally once a day  calcium: 2 tab(s) orally once a day  Crestor 20 mg oral tablet: 0.5 tab(s) orally once a day  Flonase: 1 spray(s) nasal once a day  iron: 1 tab(s) orally once a day  Lasix 40 mg oral tablet: 1 tab(s) orally once a day  latanoprost 0.005% ophthalmic solution: 1 drop(s) to the right eye once a day (in the evening)  lisinopril 5 mg oral tablet: 1 tab(s) orally once a day  omeprazole 20 mg oral delayed release capsule: 1 cap(s) orally once a day  spironolactone 25 mg oral tablet: 0.5 tab(s) orally once a day  tamsulosin 0.4 mg oral capsule: 2 cap(s) orally once a day (at bedtime)  Vitamin B-12: 1 dose(s) injectable once a month    Note:Gets from MD office  Vitamin C: 1 tab(s) orally once a day   aspirin 81 mg oral delayed release tablet: 1 tab(s) orally once a day  ATENOLOL 25 MG TABLET: 1 tab(s) orally once a day  atorvastatin 80 mg oral tablet: 1 tab(s) orally once a day       **TEST SCRIPT***  calcium: 2 tab(s) orally once a day  Crestor 20 mg oral tablet: 0.5 tab(s) orally once a day  Flonase: 1 spray(s) nasal once a day  iron: 1 tab(s) orally once a day  Lasix 40 mg oral tablet: 1 tab(s) orally once a day  latanoprost 0.005% ophthalmic solution: 1 drop(s) to the right eye once a day (in the evening)  lisinopril 5 mg oral tablet: 1 tab(s) orally once a day  omeprazole 20 mg oral delayed release capsule: 1 cap(s) orally once a day  spironolactone 25 mg oral tablet: 0.5 tab(s) orally once a day  tamsulosin 0.4 mg oral capsule: 2 cap(s) orally once a day (at bedtime)  Vitamin B-12: 1 dose(s) injectable once a month    Note:Gets from MD office  Vitamin C: 1 tab(s) orally once a day   aspirin 81 mg oral delayed release tablet: 1 tab(s) orally once a day  ATENOLOL 25 MG TABLET: 1 tab(s) orally once a day  calcium: 2 tab(s) orally once a day  Crestor 20 mg oral tablet: 0.5 tab(s) orally once a day  Flonase: 1 spray(s) nasal once a day  iron: 1 tab(s) orally once a day  Lasix 40 mg oral tablet: 1 tab(s) orally once a day  latanoprost 0.005% ophthalmic solution: 1 drop(s) to the right eye once a day (in the evening)  lisinopril 5 mg oral tablet: 1 tab(s) orally once a day  pantoprazole 40 mg oral delayed release tablet: 1 tab(s) orally once a day  spironolactone 25 mg oral tablet: 0.5 tab(s) orally once a day  tamsulosin 0.4 mg oral capsule: 2 cap(s) orally once a day (at bedtime)  Vitamin B-12: 1 dose(s) injectable once a month    Note:Gets from MD office  Vitamin C: 1 tab(s) orally once a day   aspirin 81 mg oral delayed release tablet: 1 tab(s) orally once a day  ATENOLOL 25 MG TABLET: 1 tab(s) orally once a day  calcium: 2 tab(s) orally once a day  Crestor 20 mg oral tablet: 0.5 tab(s) orally once a day  ferrous sulfate 325 mg (65 mg elemental iron) oral tablet: 1 tab(s) orally once a day  Flonase: 1 spray(s) nasal once a day  iron: 1 tab(s) orally once a day  Lasix 40 mg oral tablet: 1 tab(s) orally once a day  latanoprost 0.005% ophthalmic solution: 1 drop(s) to the right eye once a day (in the evening)  lisinopril 5 mg oral tablet: 1 tab(s) orally once a day  pantoprazole 40 mg oral delayed release tablet: 1 tab(s) orally once a day  spironolactone 25 mg oral tablet: 0.5 tab(s) orally once a day  tamsulosin 0.4 mg oral capsule: 2 cap(s) orally once a day (at bedtime)  Vitamin B-12: 1 dose(s) injectable once a month    Note:Gets from MD office  Vitamin C: 1 tab(s) orally once a day   aspirin 81 mg oral delayed release tablet: 1 tab(s) orally once a day  ATENOLOL 25 MG TABLET: 1 tab(s) orally once a day  atorvastatin 40 mg oral tablet: 1 tab(s) orally once a day (at bedtime)  calcium: 2 tab(s) orally once a day  ferrous sulfate 325 mg (65 mg elemental iron) oral tablet: 1 tab(s) orally once a day  Flonase: 1 spray(s) nasal once a day  iron: 1 tab(s) orally once a day  Lasix 40 mg oral tablet: 1 tab(s) orally once a day  latanoprost 0.005% ophthalmic solution: 1 drop(s) to the right eye once a day (in the evening)  lisinopril 5 mg oral tablet: 1 tab(s) orally once a day  pantoprazole 40 mg oral delayed release tablet: 1 tab(s) orally once a day  spironolactone 25 mg oral tablet: 0.5 tab(s) orally once a day  tamsulosin 0.4 mg oral capsule: 2 cap(s) orally once a day (at bedtime)  Vitamin B-12: 1 dose(s) injectable once a month    Note:Gets from MD office  Vitamin C: 1 tab(s) orally once a day

## 2022-11-21 NOTE — PROGRESS NOTE ADULT - PROBLEM SELECTOR PLAN 1
2/2 GIB? vs severe AS vs malignancy? Low concern for GIB. Presenting with Hg 6.1 o/p with baseline 7- 8. Complains of lightheadedness, ELLIS. No reported bloody stools. EGD/ colonoscopy in the past reportedly negative for acute pathology. Has a history of B12, folate, and Fe def.  - s/p 2 units RBCs, stable Hgb   - CTM CBC q12  - hemolysis labs wnl, haptoglobin elevated, B12 and folate wnl, Low iron   - f/u with o/p hematologist - previously found to have dysplastic changes on bone marrow bx per prior records   - Protonix PO 40 daily  - GI consulted but pt deemed high risk for endoscopy, also patient wishing to defer endoscopy at this time. - - Hematology consulted and pending workup for anemia and repeat BM biopsy pending resolution of supratherapeutic INR, (f/u parvovirus and PNH panel)  - will need to follow up w/ GI regarding AC 2/2 GIB? vs severe AS vs malignancy? Low concern for GIB. Presenting with Hg 6.1 o/p with baseline 7- 8. Complains of lightheadedness, ELLIS. No reported bloody stools. EGD/ colonoscopy in the past reportedly negative for acute pathology. Has a history of B12, folate, and Fe def.  - s/p 2 units RBCs, stable Hgb   - CTM CBC q12  - hemolysis labs wnl, haptoglobin elevated, B12 and folate wnl, Low iron   - f/u with o/p hematologist - previously found to have dysplastic changes on bone marrow bx per prior records   - Protonix PO 40 IV BID per GI recs   - GI consulted but pt deemed high risk for endoscopy, also patient wishing to defer endoscopy at this time. - - Hematology consulted and pending workup for anemia and repeat BM biopsy pending resolution of supratherapeutic INR, (f/u parvovirus and PNH panel)  - will need to follow up w/ GI regarding AC

## 2022-11-21 NOTE — PROGRESS NOTE ADULT - PROBLEM SELECTOR PLAN 9
Presents with INR of 4.70 on admission with Hgb 6.1 o/p. S/p 2u pRBC  - cont to hold coumadin- INR 4.79 on admission, today 3.24   - cont to hold coumadin  - monitor CBC q12h Presents with INR of 4.70 on admission with Hgb 6.1 o/p. S/p 2u pRBC  - cont to hold coumadin- INR 4.79 on admission, today of   - cont to hold coumadin  - monitor CBC q12h

## 2022-11-21 NOTE — PROGRESS NOTE ADULT - PROBLEM SELECTOR PLAN 4
Presents with Cr 1.55. Baseline 1.2-1.3. Likely 2/2 hypovolemia and ATN    - CTM Cr. Cr today of 1.48   - s/p 2 units of pRBCs, no need for fluids at this time given AS Presents with Cr 1.55. Baseline 1.2-1.3. Likely 2/2 hypovolemia and ATN    - CTM Cr. Cr today of   - s/p 2 units of pRBCs, no need for fluids at this time given AS

## 2022-11-21 NOTE — DISCHARGE NOTE PROVIDER - NSDCCPCAREPLAN_GEN_ALL_CORE_FT
PRINCIPAL DISCHARGE DIAGNOSIS  Diagnosis: Symptomatic anemia  Assessment and Plan of Treatment: Anemia  Anemia is a condition in which the concentration of red blood cells or hemoglobin in the blood is below normal. Hemoglobin is a substance in red blood cells that carries oxygen to the tissues of the body. Anemia results in not enough oxygen reaching these tissues which can cause symptoms such as weakness, dizziness/lightheadedness, shortness of breath, chest pain, paleness, or nausea. The cause of your anemia may or may not be determined immediately. If your hemoglobin was dangerously low, you may have received a blood transfusion. Usually reactions to transfusions occur immediately but monitor yourself for any fevers, rash, or shortness of breath.  SEEK IMMEDIATE MEDICAL CARE IF YOU HAVE ANY OF THE FOLLOWING SYMPTOMS: extreme weakness/chest pain/shortness of breath, black or bloody stools, vomiting blood, fainting, fever, or any signs of dehydration.      SECONDARY DISCHARGE DIAGNOSES  Diagnosis: Dyspnea  Assessment and Plan of Treatment:     Diagnosis: Elevated INR  Assessment and Plan of Treatment:      PRINCIPAL DISCHARGE DIAGNOSIS  Diagnosis: Symptomatic anemia  Assessment and Plan of Treatment: Anemia  Anemia is a condition in which the concentration of red blood cells or hemoglobin in the blood is below normal. Hemoglobin is a substance in red blood cells that carries oxygen to the tissues of the body. Anemia results in not enough oxygen reaching these tissues which can cause symptoms such as weakness, dizziness/lightheadedness, shortness of breath, chest pain, paleness, or nausea. The cause of your anemia may or may not be determined immediately. If your hemoglobin was dangerously low, you may have received a blood transfusion. Usually reactions to transfusions occur immediately but monitor yourself for any fevers, rash, or shortness of breath.  SEEK IMMEDIATE MEDICAL CARE IF YOU HAVE ANY OF THE FOLLOWING SYMPTOMS: extreme weakness/chest pain/shortness of breath, black or bloody stools, vomiting blood, fainting, fever, or any signs of dehydration.      SECONDARY DISCHARGE DIAGNOSES  Diagnosis: Elevated INR  Assessment and Plan of Treatment:     Diagnosis: Dyspnea  Assessment and Plan of Treatment:      PRINCIPAL DISCHARGE DIAGNOSIS  Diagnosis: Symptomatic anemia  Assessment and Plan of Treatment: Anemia  Anemia is a condition in which the concentration of red blood cells or hemoglobin in the blood is below normal. Hemoglobin is a substance in red blood cells that carries oxygen to the tissues of the body. Anemia results in not enough oxygen reaching these tissues which can cause symptoms such as weakness, dizziness/lightheadedness, shortness of breath, chest pain, paleness, or nausea. The cause of your anemia may or may not be determined immediately. If your hemoglobin was dangerously low, you may have received a blood transfusion. Usually reactions to transfusions occur immediately but monitor yourself for any fevers, rash, or shortness of breath.  SEEK IMMEDIATE MEDICAL CARE IF YOU HAVE ANY OF THE FOLLOWING SYMPTOMS: extreme weakness/chest pain/shortness of breath, black or bloody stools, vomiting blood, fainting, fever, or any signs of dehydration.      SECONDARY DISCHARGE DIAGNOSES  Diagnosis: Elevated INR  Assessment and Plan of Treatment: Your INR was elevated beyond your goal therapeutic levels when you came to the hospital. This put you at risk for bleeding. As a result, your warfarin was held so that your INR levels could normalize. You have an appointment with Dr. Goel on 11/25/22 at 12:45 pm. It is very important to attend this appointment so that he could measure your INR and Hemloglobin and make adjustments to your care as necessary. He will decide when to restart warfarin.    Diagnosis: Atrial fibrillation  Assessment and Plan of Treatment: You have a known diagnosis of atrial fibrillation prior to your admission. This condition, if not treated, increases your risk of stroke or heart attack. Please continue to take your medications as prescribed such as atenolol. You have an appointment with Dr. Goel on 11/25/22 at 12:45 pm. It is very important to attend this appointment so that he could measure your INR and Hemloglobin and make adjustments to your care as necessary. He will decide when to restart warfarin. This medication is important to avoid developing blood clots and to lower your risk of stroke. Additionally, please follow up with your PCP (and/or cardiologist) on a regular basis to ensure that this condition does not require changes to the dose or type of medication.    Diagnosis: Severe aortic stenosis  Assessment and Plan of Treatment: You have been diagnosed with aortic valve stenosis. This means the aortic valve in your heart is stiff or remains in a near-closed position and has trouble opening. Because of this, your heart has to work harder to push the blood through the valve and out to the rest of the body. In some cases, this extra work makes the muscle of the heart thicken. The extra work can tire the heart and cause it to get weaker over time. This condition often changes very slowly and doesn't always need to be treated. But in some people, it may get worse faster and need to be treated. Some of the more common symptoms that can be caused by aortic valve stenosis are dizziness, fainting, shortness of breath, or chest pain. Rarely, the stenosis can be treated with just medicines. Typically, when the stenosis is severe or is causing significant symptoms, surgery is needed. Surgery to repair or replace the valve may be done through a catheter. This is called transcatheter aortic valve replacement (TAVR). Please follow-up with Dr. Bland so that you can complete your evaluation for TAVR.     PRINCIPAL DISCHARGE DIAGNOSIS  Diagnosis: Symptomatic anemia  Assessment and Plan of Treatment: Anemia  Anemia is a condition in which the concentration of red blood cells or hemoglobin in the blood is below normal. Hemoglobin is a substance in red blood cells that carries oxygen to the tissues of the body. Anemia results in not enough oxygen reaching these tissues which can cause symptoms such as weakness, dizziness/lightheadedness, shortness of breath, chest pain, paleness, or nausea. The cause of your anemia may or may not be determined immediately. If your hemoglobin was dangerously low, you may have received a blood transfusion. Usually reactions to transfusions occur immediately but monitor yourself for any fevers, rash, or shortness of breath.  SEEK IMMEDIATE MEDICAL CARE IF YOU HAVE ANY OF THE FOLLOWING SYMPTOMS: extreme weakness/chest pain/shortness of breath, black or bloody stools, vomiting blood, fainting, fever, or any signs of dehydration.      SECONDARY DISCHARGE DIAGNOSES  Diagnosis: Elevated INR  Assessment and Plan of Treatment: Your INR was elevated beyond your goal therapeutic levels when you came to the hospital. This put you at risk for bleeding. As a result, your warfarin was held so that your INR levels could normalize. You have an appointment with Dr. Goel on 11/25/22 at 12:45 pm. It is very important to attend this appointment so that he could measure your INR and Hemloglobin and make adjustments to your care as necessary. You should take your warfarin as prescribed when you are discharged and follow-up with your hematologist and primary care physician to determine if any changes should be made to your warfarin regimen.    Diagnosis: Atrial fibrillation  Assessment and Plan of Treatment: You have a known diagnosis of atrial fibrillation prior to your admission. This condition, if not treated, increases your risk of stroke or heart attack. Please continue to take your medications as prescribed such as atenolol. You have an appointment with Dr. Goel on 11/25/22 at 12:45 pm. It is very important to attend this appointment so that he could measure your INR and Hemloglobin and make adjustments to your care as necessary. He will decide when to restart warfarin. This medication is important to avoid developing blood clots and to lower your risk of stroke. Additionally, please follow up with your PCP (and/or cardiologist) on a regular basis to ensure that this condition does not require changes to the dose or type of medication.    Diagnosis: Severe aortic stenosis  Assessment and Plan of Treatment: You have been diagnosed with aortic valve stenosis. This means the aortic valve in your heart is stiff or remains in a near-closed position and has trouble opening. Because of this, your heart has to work harder to push the blood through the valve and out to the rest of the body. In some cases, this extra work makes the muscle of the heart thicken. The extra work can tire the heart and cause it to get weaker over time. This condition often changes very slowly and doesn't always need to be treated. But in some people, it may get worse faster and need to be treated. Some of the more common symptoms that can be caused by aortic valve stenosis are dizziness, fainting, shortness of breath, or chest pain. Rarely, the stenosis can be treated with just medicines. Typically, when the stenosis is severe or is causing significant symptoms, surgery is needed. Surgery to repair or replace the valve may be done through a catheter. This is called transcatheter aortic valve replacement (TAVR). Please follow-up with Dr. Bland so that you can complete your evaluation for TAVR.

## 2022-11-21 NOTE — DISCHARGE NOTE PROVIDER - NSDCCPTREATMENT_GEN_ALL_CORE_FT
PRINCIPAL PROCEDURE  Procedure: CXR, single AP view  Findings and Treatment: ACC: [id number] EXAM: XR CHEST PORTABLE URGENT 1V  PROCEDURE DATE: 11/17/2022  INTERPRETATION: EXAMINATION: XR CHEST URGENT  CLINICAL INDICATION: Shortness of breath.  TECHNIQUE: Single frontal, portable view of the chest was obtained.  COMPARISON: Chest x-ray 4/27/2022.  FINDINGS:  LINES/TUBES/SUPPORT DEVICES: Micra pacemaker.  LUNGS: Mildly prominent vascular markings.  PLEURA: No pleural effusion or pneumothorax.  HEART AND MEDIASTINUM: Heart size is poorly assessed.  BONES: No acute bony abnormality.  IMPRESSION:  Mild pulmonary vascular congestion.  --- End of Report ---

## 2022-11-21 NOTE — DISCHARGE NOTE PROVIDER - HOSPITAL COURSE
77M PMH HTN, Afib on coumadin, CVA, Hodgkin's Lymphoma s/p splenectomy and chemo in 1975, essential thrombocytopenia, atrial fibrillation on coumadin, Admitted for symptomatic anemia (supratherapeutic INR, coumadin held) s/p 2 units of prbcs with stable Hgb. GI consulted but pt deemed high risk for endoscopy, also patient wishing to defer endoscopy at this time. Hematology consulted and pending workup for anemia and repeat BM biopsy pending resolution of supratherapeutic INR.    77M PMH HTN, Afib on coumadin, CVA, Hodgkin's Lymphoma s/p splenectomy and chemo in 1975, essential thrombocytopenia, atrial fibrillation on coumadin, Admitted for symptomatic anemia (supratherapeutic INR, coumadin held) s/p 2 units of prbcs with stable Hgb. Patient's INR was initially 4.79 on presentation and resolved to ___ on the day of discharge, pt was not administered any reversal agents but coumadin was held without recurrence of any overt signs of bleeding throughout hospital course.  GI consulted but pt deemed high risk for endoscopy, also patient wishing to defer endoscopy at this time. Per GI recommendations, patient was started on Protonix 40 BID and Hgb remained stable.  Hematology consulted and recommended workup for anemia and repeat BM biopsy pending resolution of supratherapeutic INR.       Pt with known small bowel AVMs and no overt bleeding. Anemia likely multifactorial, from possible bone marrow process (pending bone marrow repeat Bx) vs slow oozing from angioectasia. Given elevated anesthesia risks and no overt bleeding, no immediate plans for endoscopic evaluation, as the angioectasia are likely to recur and unlikely to be completely eradicated endoscopically with ongoing severe aortic stenosis.     Of note, patient's hemolysis labs were wnl with an elevated haptoglobin of 264, and LDH wnl. Pt had low total serum iron of 37 and low % iron saturation of 10%. B12 and Folate levels were wnl. 77M PMH HTN, Afib on coumadin, CVA, Hodgkin's Lymphoma s/p splenectomy and chemo in 1975, essential thrombocytopenia, atrial fibrillation on coumadin, Admitted for symptomatic anemia (supratherapeutic INR, coumadin held) s/p 2 units of prbcs with stable Hgb. Patient's INR was initially 4.79 on presentation and resolved to ___ on the day of discharge, pt was not administered any reversal agents but coumadin was held without recurrence of any overt signs of bleeding throughout hospital course.  GI consulted but pt deemed high risk for endoscopy, also patient wishing to defer endoscopy at this time. Per GI recommendations, patient was started on Protonix 40 BID and Hgb remained stable.  Hematology consulted and recommended workup for anemia and repeat BM biopsy pending resolution of supratherapeutic INR. PNH panel and parvovirus titers were sent, the results of which were still pending at the time of discharge. Pt with known small bowel AVMs and no overt bleeding. Anemia was deemed likely multifactorial, from possible bone marrow process (pending bone marrow repeat Bx) vs slow oozing from angioectasia. Given elevated anesthesia risks and no overt bleeding, no immediate plans for endoscopic evaluation, as the angioectasia are likely to recur and unlikely to be completely eradicated endoscopically with ongoing severe aortic stenosis. Warfarin was held at the time of discharge because patient's INR remained supratherapeutic, pt has outpatient f/u appointment with hematologist Dr. Goel at 12:45 pm on 11/25/22.     Of note, patient's hemolysis labs were wnl with an elevated haptoglobin of 264, and LDH wnl. Pt had low total serum iron of 37 and low % iron saturation of 10%. B12 and Folate levels were wnl.     Of note, on presentation patient was noted to have a non-ischemic EKG (also not in atrial fibrillation) but elevated Troponins of 260 on presentation which peaked at 280 and downtrended to 278. Patient remained asymptomatic throughout presentation and never reported chest pain. Cardiology was consulted and recommended trending troponins to peak. 77M PMH HTN, Afib on coumadin, CVA, Hodgkin's Lymphoma s/p splenectomy and chemo in 1975, essential thrombocytopenia, atrial fibrillation on coumadin, Admitted for symptomatic anemia (supratherapeutic INR, coumadin held) s/p 2 units of prbcs with stable Hgb. Patient's INR was initially 4.79 on presentation and resolved to 1.8 on the day of discharge, pt was not administered any reversal agents but coumadin was held without recurrence of any overt signs of bleeding throughout hospital course.  GI consulted but pt deemed high risk for endoscopy, also patient wishing to defer endoscopy at this time. Per GI recommendations, patient was started on Protonix 40 BID and Hgb remained stable.  Hematology consulted and recommended workup for anemia and repeat BM biopsy pending resolution of supratherapeutic INR. PNH panel and parvovirus titers were sent. Pt with known small bowel AVMs and no overt bleeding. Anemia was deemed likely multifactorial, from possible bone marrow process (pending bone marrow repeat Bx) vs slow oozing from angioectasia. Given elevated anesthesia risks and no overt bleeding, no immediate plans for endoscopic evaluation, as the angioectasia are likely to recur and unlikely to be completely eradicated endoscopically with ongoing severe aortic stenosis. Warfarin was resumed at the time of discharge because patient's INR remained normalized to 1.8 pt has outpatient f/u appointment with hematologist Dr. Goel at 12:45 pm on 11/25/22.     Of note, patient's hemolysis labs were wnl with an elevated haptoglobin of 264, and LDH wnl. Pt had low total serum iron of 37 and low % iron saturation of 10%. B12 and Folate levels were wnl.     Of note, on presentation patient was noted to have a non-ischemic EKG (also not in atrial fibrillation) but elevated Troponins of 260 on presentation which peaked at 280 and downtrended to 278. Patient remained asymptomatic throughout presentation and never reported chest pain. Cardiology was consulted and recommended trending troponins to peak. Parvovirus was positive for IgG but negative IgM titers indicating prior infection.     Patient has f/u outpatient appointment with Gastroenterologist Dr. Jimi Martins. 77M PMH HTN, Afib on coumadin, CVA, Hodgkin's Lymphoma s/p splenectomy and chemo in 1975, essential thrombocytopenia, atrial fibrillation on coumadin, Admitted for symptomatic anemia (supratherapeutic INR, coumadin held) s/p 2 units of prbcs with stable Hgb. Patient's INR was initially 4.79 on presentation and resolved to 1.8 on the day of discharge, pt was not administered any reversal agents but coumadin was held without recurrence of any overt signs of bleeding throughout hospital course.  GI consulted but pt deemed high risk for endoscopy, also patient wishing to defer endoscopy at this time. Per GI recommendations, patient was started on Protonix 40 BID and Hgb remained stable.  Hematology consulted and recommended workup for anemia and repeat BM biopsy pending resolution of supratherapeutic INR. PNH panel and parvovirus titers were sent. Pt with known small bowel AVMs and no overt bleeding. Anemia was deemed likely multifactorial, from possible bone marrow process (pending bone marrow repeat Bx) vs slow oozing from angioectasia. Given elevated anesthesia risks and no overt bleeding, no immediate plans for endoscopic evaluation, as the angioectasia are likely to recur and unlikely to be completely eradicated endoscopically with ongoing severe aortic stenosis. Warfarin was resumed at the time of discharge because patient's INR remained normalized to 1.8 pt has outpatient f/u appointment with hematologist Dr. Goel at 12:45 pm on 11/25/22.     Of note, patient's hemolysis labs were wnl with an elevated haptoglobin of 264, and LDH wnl. Pt had low total serum iron of 37 and low % iron saturation of 10%. B12 and Folate levels were wnl.     Of note, on presentation patient was noted to have a non-ischemic EKG (also not in atrial fibrillation) but elevated Troponins of 260 on presentation which peaked at 280 and downtrended to 278. Patient remained asymptomatic throughout presentation and never reported chest pain. Cardiology was consulted and recommended trending troponins to peak. Parvovirus was positive for IgG but negative IgM titers indicating prior infection.     Patient has f/u outpatient appointment with Gastroenterologist Dr. Jimi Martins.     Of note, patient stated that his Crestor 20 mg medication was not covered and that he could not afford it. A prescription for Lipitor 80 mg was sent to [email or web address] pharmacy. 77M PMH HTN, Afib on coumadin, CVA, Hodgkin's Lymphoma s/p splenectomy and chemo in 1975, essential thrombocytopenia, atrial fibrillation on coumadin, Admitted for symptomatic anemia (supratherapeutic INR, coumadin held) s/p 2 units of prbcs with stable Hgb. Patient's INR was initially 4.79 on presentation and resolved to 1.8 on the day of discharge, pt was not administered any reversal agents but coumadin was held without recurrence of any overt signs of bleeding throughout hospital course.  GI consulted but pt deemed high risk for endoscopy, also patient wishing to defer endoscopy at this time. Per GI recommendations, patient was started on Protonix 40 BID and Hgb remained stable.  Hematology consulted and recommended workup for anemia and repeat BM biopsy pending resolution of supratherapeutic INR. PNH panel and parvovirus titers were sent. Pt with known small bowel AVMs and no overt bleeding. Anemia was deemed likely multifactorial, from possible bone marrow process (pending bone marrow repeat Bx) vs slow oozing from angioectasia. Given elevated anesthesia risks and no overt bleeding, no immediate plans for endoscopic evaluation, as the angioectasia are likely to recur and unlikely to be completely eradicated endoscopically with ongoing severe aortic stenosis. Warfarin was resumed at the time of discharge because patient's INR remained normalized to 1.8 pt has outpatient f/u appointment with hematologist Dr. Goel at 12:45 pm on 11/25/22. Warfarin dose was decreased at discharge by 1 mg weekly dose with a regimen of Monday-Thursday 2 mg per day, Friday Saturday, Sunday, 1 mg per day.     Of note, patient's hemolysis labs were wnl with an elevated haptoglobin of 264, and LDH wnl. Pt had low total serum iron of 37 and low % iron saturation of 10%. B12 and Folate levels were wnl.     Of note, on presentation patient was noted to have a non-ischemic EKG (also not in atrial fibrillation) but elevated Troponins of 260 on presentation which peaked at 280 and downtrended to 278. Patient remained asymptomatic throughout presentation and never reported chest pain. Cardiology was consulted and recommended trending troponins to peak. Parvovirus was positive for IgG but negative IgM titers indicating prior infection.     Patient has f/u outpatient appointment with Gastroenterologist Dr. Jimi Martins. Pt was transitioned to Protonix 40 mg PO qd on discharge per GI recommendations.     Of note, patient stated that his Crestor 20 mg medication was not covered and that he could not afford it. A prescription for Lipitor 80 mg was sent to [facility] pharmacy. 77M PMH HTN, Afib on coumadin, CVA, Hodgkin's Lymphoma s/p splenectomy and chemo in 1975, essential thrombocytopenia, atrial fibrillation on coumadin, Admitted for symptomatic anemia (supratherapeutic INR, coumadin held) s/p 2 units of prbcs with stable Hgb. Patient's INR was initially 4.79 on presentation and resolved to 1.8 on the day of discharge, pt was not administered any reversal agents but coumadin was held without recurrence of any overt signs of bleeding throughout hospital course.  GI consulted but pt deemed high risk for endoscopy, also patient wishing to defer endoscopy at this time. Per GI recommendations, patient was started on Protonix 40 BID and Hgb remained stable.  Hematology consulted and recommended workup for anemia and repeat BM biopsy pending resolution of supratherapeutic INR. PNH panel and parvovirus titers were sent. Pt with known small bowel AVMs and no overt bleeding. Anemia was deemed likely multifactorial, from possible bone marrow process (pending bone marrow repeat Bx) vs slow oozing from angioectasia. Given elevated anesthesia risks and no overt bleeding, no immediate plans for endoscopic evaluation, as the angioectasia are likely to recur and unlikely to be completely eradicated endoscopically with ongoing severe aortic stenosis. Warfarin was resumed at the time of discharge because patient's INR remained normalized to 1.8 pt has outpatient f/u appointment with hematologist Dr. Goel at 12:45 pm on 11/25/22. Warfarin dose was decreased at discharge by 1 mg weekly dose with a regimen of Monday-Thursday 2 mg per day, Friday Saturday, Sunday, 1 mg per day.     Of note, patient's hemolysis labs were wnl with an elevated haptoglobin of 264, and LDH wnl. Pt had low total serum iron of 37 and low % iron saturation of 10%. B12 and Folate levels were wnl.     Of note, on presentation patient was noted to have a non-ischemic EKG (also not in atrial fibrillation) but elevated Troponins of 260 on presentation which peaked at 280 and downtrended to 278. Patient remained asymptomatic throughout presentation and never reported chest pain. Cardiology was consulted and recommended trending troponins to peak. Parvovirus was positive for IgG but negative IgM titers indicating prior infection.     Patient has f/u outpatient appointment with Gastroenterologist Dr. Jimi Martins. Pt was transitioned to Protonix 40 mg PO qd on discharge per GI recommendations.     Of note, patient stated that his Crestor 20 mg medication was not covered and that he could not afford it. Patient was prescribed lipitor 80 mg qd which was covered by his insurance.

## 2022-11-21 NOTE — PROGRESS NOTE ADULT - ASSESSMENT
78yo M PMH of HTN, Afib on coumadin, CVA, Hodgkin's Lymphoma s/p splenectomy and chemo in 1975, essential thrombocytopenia, atrial fibrillation on coumadin, HTN, MOE, recently diagnosed anemia (b/l 7-8), aortic stenosis (pending TAVR), GERD presents due to recommendation for o/p physician for Hgb 6.1 11/15.     # Anemia - acute on chronic, likely multifactorial. Patient has known AVMs. Noted INR of 5. Suspect diffuse mucosal bleeding in the setting of supratherapeutic INR. Patient is at a very high procedural risk given severe AS and an endoscopic yield is likely to be low in the setting of multiple small bowel AVMs. As such, would favor observation for now: If Hb continues to downtrend after normalization of INR - can consider endoscopic evaluation (repeat VCE?).   # Severe AS - pending TAVR w/u  # MOE  # CVA - on coumadin and ASA    Recommendations:  - Deferring AC to cardiology/neurology. No GI contraindications to AC.   - PPI 40mg BID  - Trend H/H, correct per transfusion goals by cardiology  - Monitor for overt bleeding  - Diet as tolerated  - No plans for endoscopy at this time  - Trend INR  - c/w ASA  - GI to sign off    Please note that the recommendations are not final until attested by an attending.    Thank you for involving us in the care of this patient. Please reach out if any further questions.    José Miguel Jarquin, PGY-6  Gastroenterology/Hepatology Fellow    Available on [employer] Teams  After 5PM/Weekends, please contact the on-call GI fellow: [phone] 76yo M PMH of HTN, Afib on coumadin, CVA, Hodgkin's Lymphoma s/p splenectomy and chemo in 1975, essential thrombocytopenia, atrial fibrillation on coumadin, HTN, MOE, recently diagnosed anemia (b/l 7-8), aortic stenosis (pending TAVR), GERD presents due to recommendation for o/p physician for Hgb 6.1 11/15.     # Anemia - acute on chronic, likely multifactorial. Patient has known AVMs. Noted INR of 5. Suspect diffuse mucosal bleeding in the setting of supratherapeutic INR. Patient is at a very high procedural risk given severe AS and an endoscopic yield is likely to be low in the setting of multiple small bowel AVMs. As such, would favor observation for now: If Hb continues to downtrend after normalization of INR - can consider endoscopic evaluation (repeat VCE?).   # Severe AS - pending TAVR w/u  # MOE  # CVA - on coumadin and ASA    Recommendations:  - Deferring AC to cardiology/neurology. No GI contraindications to AC.   - PPI 40mg BID  - Trend H/H, correct per transfusion goals by cardiology  - Monitor for overt bleeding  - Diet as tolerated  - No plans for endoscopy at this time  - Iron supplementation  - Trend INR  - c/w ASA  - GI to sign off    Please note that the recommendations are not final until attested by an attending.    Thank you for involving us in the care of this patient. Please reach out if any further questions.    José Miguel Jarquin, PGY-6  Gastroenterology/Hepatology Fellow    Available on [employer] Teams  After 5PM/Weekends, please contact the on-call GI fellow: [phone]

## 2022-11-21 NOTE — PROGRESS NOTE ADULT - SUBJECTIVE AND OBJECTIVE BOX
Patient is a 77y old  Male who presents with a chief complaint of Anemia (21 Nov 2022 09:23)    Patient seen and examined this morning. Patient noted resting in recliner, offers no new complaints.     MEDICATIONS  (STANDING):  atenolol  Tablet 25 milliGRAM(s) Oral daily  atorvastatin 40 milliGRAM(s) Oral at bedtime  furosemide    Tablet 40 milliGRAM(s) Oral daily  pantoprazole    Tablet 40 milliGRAM(s) Oral two times a day  spironolactone 12.5 milliGRAM(s) Oral daily  tamsulosin 0.8 milliGRAM(s) Oral at bedtime    MEDICATIONS  (PRN):  acetaminophen     Tablet .. 650 milliGRAM(s) Oral every 6 hours PRN Temp greater or equal to 38C (100.4F), Mild Pain (1 - 3)      ROS  No fever, sweats, chills  No epistaxis, HA, sore throat  No CP, SOB, cough, sputum  No n/v/d, abd pain, melena, hematochezia  No edema  No rash  No anxiety  No back pain, joint pain  No bleeding, bruising  No dysuria, hematuria    Vital Signs Last 24 Hrs  T(C): 36.6 (21 Nov 2022 14:14), Max: 37.1 (20 Nov 2022 21:58)  T(F): 97.8 (21 Nov 2022 14:14), Max: 98.8 (20 Nov 2022 21:58)  HR: 71 (21 Nov 2022 14:14) (68 - 72)  BP: 130/67 (21 Nov 2022 14:14) (130/67 - 165/69)  BP(mean): --  RR: 18 (21 Nov 2022 14:14) (18 - 18)  SpO2: 97% (21 Nov 2022 14:14) (95% - 97%)    Parameters below as of 21 Nov 2022 14:14  Patient On (Oxygen Delivery Method): room air        PE  NAD  Awake, alert  Anicteric, MMM  RRR  CTAB  Abd soft, NT, ND  No c/c/e  No rash grossly  FROM                          8.3    11.80 )-----------( 604      ( 21 Nov 2022 07:22 )             27.1       11-21    138  |  101  |  23  ----------------------------<  94  4.2   |  27  |  1.25    Ca    9.0      21 Nov 2022 07:19  Phos  2.7     11-21  Mg     2.4     11-21

## 2022-11-21 NOTE — DISCHARGE NOTE PROVIDER - CARE PROVIDER_API CALL
Negro Goel)  Hematology; Internal Medicine; Medical Oncology  [address], [address]  [place] [address]  Phone: [phone]  Fax: [phone]  Established Patient  Follow Up Time: 2 weeks    Jimi Martins)  Gastroenterology; Internal Medicine  Division of Gastroenterology - [address] [place] [address], [address] [place] [address]  Phone: [phone]  Fax: [phone]  Established Patient  Follow Up Time: 1 week    Khris Turner)  Cardiovascular Disease; Internal Medicine  [address] [place] [address]  Phone: [phone]  Fax: [phone]  Follow Up Time: 1 month    Rocco Bland)  Interventional Cardiology  [address] [place] [address]  Phone: [phone]  Fax: [phone]  Established Patient  Follow Up Time: 1 month

## 2022-11-21 NOTE — DISCHARGE NOTE PROVIDER - CARE PROVIDERS DIRECT ADDRESSES
,DirectAddress_Unknown,[email or web address],DirectAddress_Unknown,[email or web address]

## 2022-11-21 NOTE — PROGRESS NOTE ADULT - SUBJECTIVE AND OBJECTIVE BOX
Interval Events:   - No acute events  - H/H remains stable  - INR still elevated    Allergies:  No Known Allergies        Hospital Medications:  acetaminophen     Tablet .. 650 milliGRAM(s) Oral every 6 hours PRN  atenolol  Tablet 25 milliGRAM(s) Oral daily  atorvastatin 40 milliGRAM(s) Oral at bedtime  furosemide    Tablet 40 milliGRAM(s) Oral daily  pantoprazole  Injectable 40 milliGRAM(s) IV Push two times a day  spironolactone 12.5 milliGRAM(s) Oral daily  tamsulosin 0.8 milliGRAM(s) Oral at bedtime      PMHX/PSHX:  Hodgkin lymphoma    Atrial fibrillation    HTN (hypertension)    GERD (gastroesophageal reflux disease)    MOE (obstructive sleep apnea)    BPH (benign prostatic hyperplasia)    Osteoarthritis    CVA (cerebral vascular accident)    Spinal stenosis    Spondylolisthesis    Exposure to toxin    Atrial fibrillation    S/P splenectomy    S/P hip replacement, right    H/O Spinal surgery    History of cardioversion    History of lumbar spinal fusion    Status post left cataract extraction        Family history:  Family history of myocardial infarction (Father)    Family history of stroke (Father)        ROS: As per HPI, otherwise 14-point ROS reviewed and negative.      PHYSICAL EXAM:   Vital Signs:  Vital Signs Last 24 Hrs  T(C): 36.7 (21 Nov 2022 05:23), Max: 37.1 (20 Nov 2022 21:58)  T(F): 98.1 (21 Nov 2022 05:23), Max: 98.8 (20 Nov 2022 21:58)  HR: 72 (21 Nov 2022 05:23) (68 - 72)  BP: 146/66 (21 Nov 2022 05:23) (137/59 - 165/69)  BP(mean): --  RR: 18 (21 Nov 2022 05:23) (18 - 18)  SpO2: 95% (21 Nov 2022 05:23) (95% - 99%)    Parameters below as of 21 Nov 2022 05:23  Patient On (Oxygen Delivery Method): room air      Daily     Daily       GENERAL:  No acute distress  HEENT:  Normocephalic/atraumatic, no scleral icterus  CHEST:  No accessory muscle use  HEART:  Regular rate and rhythm  ABDOMEN:  Soft, non-tender, non-distended  EXTREMITIES: No cyanosis, clubbing, or edema  SKIN:  No rash  NEURO:  Alert and oriented x 3, no asterixis    LABS:                        8.3    11.80 )-----------( 604      ( 21 Nov 2022 07:22 )             27.1     Mean Cell Volume: 103.0 fl (11-21-22 @ 07:22)    11-21    138  |  101  |  23  ----------------------------<  94  4.2   |  27  |  1.25    Ca    9.0      21 Nov 2022 07:19  Phos  2.7     11-21  Mg     2.4     11-21        PT/INR - ( 20 Nov 2022 07:14 )   PT: 37.0 sec;   INR: 3.15 ratio         PTT - ( 20 Nov 2022 07:14 )  PTT:40.5 sec                            8.3    11.80 )-----------( 604      ( 21 Nov 2022 07:22 )             27.1                         7.9    12.33 )-----------( 549      ( 20 Nov 2022 07:14 )             26.2                         7.8    12.44 )-----------( 569      ( 19 Nov 2022 22:44 )             25.4                         7.9    13.94 )-----------( 544      ( 19 Nov 2022 07:23 )             26.1                         8.2    16.19 )-----------( 526      ( 18 Nov 2022 19:22 )             27.3       Imaging:

## 2022-11-21 NOTE — DISCHARGE NOTE PROVIDER - NSDCCAREPROVSEEN_GEN_ALL_CORE_FT
[facility] Housestaff Team 7  Attending Physician, Cyndy Decker  Resident Physician, Codey Guillory  Resident Physician, Anthony Singh

## 2022-11-21 NOTE — PROGRESS NOTE ADULT - PROBLEM SELECTOR PLAN 7
- Home med: omeprazole 20 daily    - c/w protonix 40 QD - Home med: omeprazole 20 daily    - on protonix 40 BID per GI

## 2022-11-21 NOTE — DISCHARGE NOTE PROVIDER - NSDCFUADDINST_GEN_ALL_CORE_FT
Please attend the follow-up appointment with your Hematologist Dr. Goel from [facility] so that he can check your INR and your Hemoglobin levels and make adjustments as necessary.       Please followup with your primary care doctor within 1 week of discharge. If you have any new or recurrent symptoms, please call your doctor or go to the ED

## 2022-11-21 NOTE — DISCHARGE NOTE PROVIDER - PROVIDER TOKENS
PROVIDER:[TOKEN:[[id number]],FOLLOWUP:[2 weeks],ESTABLISHEDPATIENT:[T]],PROVIDER:[TOKEN:[[id number]],FOLLOWUP:[1 week],ESTABLISHEDPATIENT:[T]],PROVIDER:[TOKEN:[[id number]],FOLLOWUP:[1 month]],PROVIDER:[TOKEN:[[id number]],FOLLOWUP:[1 month],ESTABLISHEDPATIENT:[T]]

## 2022-11-21 NOTE — PROGRESS NOTE ADULT - PROBLEM SELECTOR PLAN 3
On admission, HR controlled. On coumadin-last INR check last week- 2mg x5 days, 1mg x2 days.     - s/p Micra PPM one year ago, EKG during hospital course not demonstrating atrial fibrillation   - cont to hold coumadin- INR 4.79 on admission, today 3.24   - c/w BB blocker - home med atenolol 25mg On admission, HR controlled. On coumadin-last INR check last week- 2mg x5 days, 1mg x2 days.     - s/p Micra PPM one year ago, EKG during hospital course not demonstrating atrial fibrillation   - cont to hold coumadin- INR 4.79 on admission, today of   - c/w BB blocker - home med atenolol 25mg

## 2022-11-22 ENCOUNTER — TRANSCRIPTION ENCOUNTER (OUTPATIENT)
Age: 77
End: 2022-11-22

## 2022-11-22 VITALS
OXYGEN SATURATION: 99 % | HEART RATE: 76 BPM | SYSTOLIC BLOOD PRESSURE: 138 MMHG | RESPIRATION RATE: 18 BRPM | DIASTOLIC BLOOD PRESSURE: 63 MMHG | TEMPERATURE: 98 F

## 2022-11-22 LAB
ANION GAP SERPL CALC-SCNC: 10 MMOL/L — SIGNIFICANT CHANGE UP (ref 5–17)
APTT BLD: 34.5 SEC — SIGNIFICANT CHANGE UP (ref 27.5–35.5)
B19V IGG SER-ACNC: 6.57 INDEX — HIGH (ref 0–0.9)
B19V IGG+IGM SER-IMP: POSITIVE
B19V IGG+IGM SER-IMP: SIGNIFICANT CHANGE UP
B19V IGM FLD-ACNC: 0.1 INDEX — SIGNIFICANT CHANGE UP (ref 0–0.9)
B19V IGM SER-ACNC: NEGATIVE — SIGNIFICANT CHANGE UP
BUN SERPL-MCNC: 27 MG/DL — HIGH (ref 7–23)
CALCIUM SERPL-MCNC: 8.7 MG/DL — SIGNIFICANT CHANGE UP (ref 8.4–10.5)
CHLORIDE SERPL-SCNC: 101 MMOL/L — SIGNIFICANT CHANGE UP (ref 96–108)
CO2 SERPL-SCNC: 28 MMOL/L — SIGNIFICANT CHANGE UP (ref 22–31)
CREAT SERPL-MCNC: 1.27 MG/DL — SIGNIFICANT CHANGE UP (ref 0.5–1.3)
EGFR: 58 ML/MIN/1.73M2 — LOW
GLUCOSE SERPL-MCNC: 101 MG/DL — HIGH (ref 70–99)
HAPTOGLOB SERPL-MCNC: 352 MG/DL — HIGH (ref 34–200)
HCT VFR BLD CALC: 27.9 % — LOW (ref 39–50)
HGB BLD-MCNC: 8.3 G/DL — LOW (ref 13–17)
INR BLD: 1.8 RATIO — HIGH (ref 0.88–1.16)
MAGNESIUM SERPL-MCNC: 2.5 MG/DL — SIGNIFICANT CHANGE UP (ref 1.6–2.6)
MCHC RBC-ENTMCNC: 29.7 GM/DL — LOW (ref 32–36)
MCHC RBC-ENTMCNC: 30.9 PG — SIGNIFICANT CHANGE UP (ref 27–34)
MCV RBC AUTO: 103.7 FL — HIGH (ref 80–100)
NRBC # BLD: 3 /100 WBCS — HIGH (ref 0–0)
PHOSPHATE SERPL-MCNC: 3.4 MG/DL — SIGNIFICANT CHANGE UP (ref 2.5–4.5)
PLATELET # BLD AUTO: 600 K/UL — HIGH (ref 150–400)
POTASSIUM SERPL-MCNC: 4.4 MMOL/L — SIGNIFICANT CHANGE UP (ref 3.5–5.3)
POTASSIUM SERPL-SCNC: 4.4 MMOL/L — SIGNIFICANT CHANGE UP (ref 3.5–5.3)
PROTHROM AB SERPL-ACNC: 20.8 SEC — HIGH (ref 10.5–13.4)
RBC # BLD: 2.69 M/UL — LOW (ref 4.2–5.8)
RBC # FLD: 17 % — HIGH (ref 10.3–14.5)
SODIUM SERPL-SCNC: 139 MMOL/L — SIGNIFICANT CHANGE UP (ref 135–145)
TM INTERPRETATION: SIGNIFICANT CHANGE UP
WBC # BLD: 11 K/UL — HIGH (ref 3.8–10.5)
WBC # FLD AUTO: 11 K/UL — HIGH (ref 3.8–10.5)

## 2022-11-22 PROCEDURE — 84484 ASSAY OF TROPONIN QUANT: CPT

## 2022-11-22 PROCEDURE — 86747 PARVOVIRUS ANTIBODY: CPT

## 2022-11-22 PROCEDURE — 93005 ELECTROCARDIOGRAM TRACING: CPT

## 2022-11-22 PROCEDURE — 83010 ASSAY OF HAPTOGLOBIN QUANT: CPT

## 2022-11-22 PROCEDURE — 82607 VITAMIN B-12: CPT

## 2022-11-22 PROCEDURE — 83550 IRON BINDING TEST: CPT

## 2022-11-22 PROCEDURE — 99285 EMERGENCY DEPT VISIT HI MDM: CPT

## 2022-11-22 PROCEDURE — 86923 COMPATIBILITY TEST ELECTRIC: CPT

## 2022-11-22 PROCEDURE — 84466 ASSAY OF TRANSFERRIN: CPT

## 2022-11-22 PROCEDURE — 99239 HOSP IP/OBS DSCHRG MGMT >30: CPT

## 2022-11-22 PROCEDURE — 84100 ASSAY OF PHOSPHORUS: CPT

## 2022-11-22 PROCEDURE — 86900 BLOOD TYPING SEROLOGIC ABO: CPT

## 2022-11-22 PROCEDURE — 97161 PT EVAL LOW COMPLEX 20 MIN: CPT

## 2022-11-22 PROCEDURE — 83540 ASSAY OF IRON: CPT

## 2022-11-22 PROCEDURE — 86850 RBC ANTIBODY SCREEN: CPT

## 2022-11-22 PROCEDURE — 36430 TRANSFUSION BLD/BLD COMPNT: CPT

## 2022-11-22 PROCEDURE — 96374 THER/PROPH/DIAG INJ IV PUSH: CPT

## 2022-11-22 PROCEDURE — 87205 SMEAR GRAM STAIN: CPT

## 2022-11-22 PROCEDURE — 87799 DETECT AGENT NOS DNA QUANT: CPT

## 2022-11-22 PROCEDURE — 85027 COMPLETE CBC AUTOMATED: CPT

## 2022-11-22 PROCEDURE — 85025 COMPLETE CBC W/AUTO DIFF WBC: CPT

## 2022-11-22 PROCEDURE — 86901 BLOOD TYPING SEROLOGIC RH(D): CPT

## 2022-11-22 PROCEDURE — 71045 X-RAY EXAM CHEST 1 VIEW: CPT

## 2022-11-22 PROCEDURE — 83735 ASSAY OF MAGNESIUM: CPT

## 2022-11-22 PROCEDURE — 80048 BASIC METABOLIC PNL TOTAL CA: CPT

## 2022-11-22 PROCEDURE — 83880 ASSAY OF NATRIURETIC PEPTIDE: CPT

## 2022-11-22 PROCEDURE — 86880 COOMBS TEST DIRECT: CPT

## 2022-11-22 PROCEDURE — 85045 AUTOMATED RETICULOCYTE COUNT: CPT

## 2022-11-22 PROCEDURE — 80053 COMPREHEN METABOLIC PANEL: CPT

## 2022-11-22 PROCEDURE — 87637 SARSCOV2&INF A&B&RSV AMP PRB: CPT

## 2022-11-22 PROCEDURE — 88185 FLOWCYTOMETRY/TC ADD-ON: CPT

## 2022-11-22 PROCEDURE — P9016: CPT

## 2022-11-22 PROCEDURE — 82728 ASSAY OF FERRITIN: CPT

## 2022-11-22 PROCEDURE — 82746 ASSAY OF FOLIC ACID SERUM: CPT

## 2022-11-22 PROCEDURE — 85730 THROMBOPLASTIN TIME PARTIAL: CPT

## 2022-11-22 PROCEDURE — 36415 COLL VENOUS BLD VENIPUNCTURE: CPT

## 2022-11-22 PROCEDURE — 83615 LACTATE (LD) (LDH) ENZYME: CPT

## 2022-11-22 PROCEDURE — 85610 PROTHROMBIN TIME: CPT

## 2022-11-22 RX ORDER — PANTOPRAZOLE SODIUM 20 MG/1
1 TABLET, DELAYED RELEASE ORAL
Qty: 30 | Refills: 0
Start: 2022-11-22 | End: 2022-12-21

## 2022-11-22 RX ORDER — ROSUVASTATIN CALCIUM 5 MG/1
0.5 TABLET ORAL
Qty: 0 | Refills: 0 | DISCHARGE

## 2022-11-22 RX ORDER — ATORVASTATIN CALCIUM 80 MG/1
1 TABLET, FILM COATED ORAL
Qty: 30 | Refills: 0
Start: 2022-11-22 | End: 2022-12-21

## 2022-11-22 RX ORDER — WARFARIN SODIUM 2.5 MG/1
1 TABLET ORAL
Qty: 1 | Refills: 0
Start: 2022-11-22

## 2022-11-22 RX ORDER — FERROUS SULFATE 325(65) MG
1 TABLET ORAL
Qty: 0 | Refills: 0 | DISCHARGE
Start: 2022-11-22

## 2022-11-22 RX ORDER — OMEPRAZOLE 10 MG/1
1 CAPSULE, DELAYED RELEASE ORAL
Qty: 0 | Refills: 0 | DISCHARGE

## 2022-11-22 RX ORDER — FERROUS SULFATE 325(65) MG
325 TABLET ORAL DAILY
Refills: 0 | Status: DISCONTINUED | OUTPATIENT
Start: 2022-11-22 | End: 2022-11-22

## 2022-11-22 RX ORDER — ATORVASTATIN CALCIUM 80 MG/1
1 TABLET, FILM COATED ORAL
Qty: 0 | Refills: 0 | DISCHARGE
Start: 2022-11-22

## 2022-11-22 RX ADMIN — PANTOPRAZOLE SODIUM 40 MILLIGRAM(S): 20 TABLET, DELAYED RELEASE ORAL at 17:05

## 2022-11-22 RX ADMIN — SPIRONOLACTONE 12.5 MILLIGRAM(S): 25 TABLET, FILM COATED ORAL at 05:54

## 2022-11-22 RX ADMIN — PANTOPRAZOLE SODIUM 40 MILLIGRAM(S): 20 TABLET, DELAYED RELEASE ORAL at 05:54

## 2022-11-22 RX ADMIN — ATENOLOL 25 MILLIGRAM(S): 25 TABLET ORAL at 05:53

## 2022-11-22 RX ADMIN — Medication 40 MILLIGRAM(S): at 05:53

## 2022-11-22 NOTE — PROGRESS NOTE ADULT - SUBJECTIVE AND OBJECTIVE BOX
**************************************  Kokogurmeet Francisco, PGY-1  **************************************    INTERVAL HPI/OVERNIGHT EVENTS:  Patient was seen and examined at bedside. As per nurse and patient, no o/n events, patient resting comfortably. No complaints at this time. Patient denies: fever, chills, dizziness, weakness, HA, Changes in vision, CP, palpitations, SOB, cough, N/V/D/C, dysuria, changes in bowel movements, LE edema. ROS otherwise negative.    VITAL SIGNS:  T(F): 97.9 (11-22-22 @ 05:31)  HR: 67 (11-22-22 @ 05:31)  BP: 150/67 (11-22-22 @ 05:31)  RR: 18 (11-22-22 @ 05:31)  SpO2: 94% (11-22-22 @ 05:31)  Wt(kg): --    PHYSICAL EXAM:    Constitutional: WDWN, NAD  HEENT: PERRL, EOMI, sclera non-icteric, neck supple, trachea midline, no masses, no JVD, MMM, good dentition  Respiratory: CTA b/l, good air entry b/l, no wheezing, no rhonchi, no rales, without accessory muscle use and no intercostal retractions  Cardiovascular: RRR, normal S1S2, +late systolic murmur in the L 2nd interspace.   Gastrointestinal: soft, NTND, no masses palpable, BS normal  Extremities: trace pretibial edema, 2+ pulses bilaterally, no rashes or cyanosis. Notable well-healed scar from tissue removal in the lateral RLE from prior surgery for compartment syndrome.   Neurological: AAOx3, CN Grossly intact  Skin: Normal temperature, warm, dry          MEDICATIONS  (STANDING):  atenolol  Tablet 25 milliGRAM(s) Oral daily  atorvastatin 40 milliGRAM(s) Oral at bedtime  furosemide    Tablet 40 milliGRAM(s) Oral daily  pantoprazole    Tablet 40 milliGRAM(s) Oral two times a day  spironolactone 12.5 milliGRAM(s) Oral daily  tamsulosin 0.8 milliGRAM(s) Oral at bedtime    MEDICATIONS  (PRN):  acetaminophen     Tablet .. 650 milliGRAM(s) Oral every 6 hours PRN Temp greater or equal to 38C (100.4F), Mild Pain (1 - 3)      Allergies    No Known Allergies    Intolerances        LABS:                        8.3    11.80 )-----------( 604      ( 21 Nov 2022 07:22 )             27.1     11-21    138  |  101  |  23  ----------------------------<  94  4.2   |  27  |  1.25    Ca    9.0      21 Nov 2022 07:19  Phos  2.7     11-21  Mg     2.4     11-21      PT/INR - ( 21 Nov 2022 10:37 )   PT: 35.3 sec;   INR: 3.01 ratio               RADIOLOGY & ADDITIONAL TESTS:  Reviewed **************************************  Kokogurmeet Francisco, PGY-1  **************************************    INTERVAL HPI/OVERNIGHT EVENTS:  Patient was seen and examined at bedside. patient resting comfortably. No complaints at this time. Patient denies: fever, chills, dizziness, weakness, HA, Changes in vision, CP, palpitations, SOB, cough, N/V/D/C, dysuria, changes in bowel movements, LE edema. ROS otherwise negative.    VITAL SIGNS:  T(F): 97.9 (11-22-22 @ 05:31)  HR: 67 (11-22-22 @ 05:31)  BP: 150/67 (11-22-22 @ 05:31)  RR: 18 (11-22-22 @ 05:31)  SpO2: 94% (11-22-22 @ 05:31)  Wt(kg): --    PHYSICAL EXAM:    Constitutional: WDWN, NAD  HEENT: PERRL, EOMI, sclera non-icteric, neck supple, trachea midline, no masses, no JVD, MMM, good dentition  Respiratory: CTA b/l, good air entry b/l, no wheezing, no rhonchi, no rales, without accessory muscle use and no intercostal retractions  Cardiovascular: RRR, normal S1S2, +late systolic murmur in the L 2nd interspace.   Gastrointestinal: soft, NTND, no masses palpable, BS normal  Extremities: trace pretibial edema, 2+ pulses bilaterally, no rashes or cyanosis. Notable well-healed scar from tissue removal in the lateral RLE from prior surgery for compartment syndrome.   Neurological: AAOx3, CN Grossly intact  Skin: Normal temperature, warm, dry          MEDICATIONS  (STANDING):  atenolol  Tablet 25 milliGRAM(s) Oral daily  atorvastatin 40 milliGRAM(s) Oral at bedtime  furosemide    Tablet 40 milliGRAM(s) Oral daily  pantoprazole    Tablet 40 milliGRAM(s) Oral two times a day  spironolactone 12.5 milliGRAM(s) Oral daily  tamsulosin 0.8 milliGRAM(s) Oral at bedtime    MEDICATIONS  (PRN):  acetaminophen     Tablet .. 650 milliGRAM(s) Oral every 6 hours PRN Temp greater or equal to 38C (100.4F), Mild Pain (1 - 3)      Allergies    No Known Allergies    Intolerances        LABS:                        8.3    11.80 )-----------( 604      ( 21 Nov 2022 07:22 )             27.1     11-21    138  |  101  |  23  ----------------------------<  94  4.2   |  27  |  1.25    Ca    9.0      21 Nov 2022 07:19  Phos  2.7     11-21  Mg     2.4     11-21      PT/INR - ( 21 Nov 2022 10:37 )   PT: 35.3 sec;   INR: 3.01 ratio               RADIOLOGY & ADDITIONAL TESTS:  Reviewed

## 2022-11-22 NOTE — PROGRESS NOTE ADULT - PROBLEM SELECTOR PLAN 8
Home medication: tamsulosin .8mg  - C/w home medication

## 2022-11-22 NOTE — PROGRESS NOTE ADULT - PROBLEM SELECTOR PLAN 10
- Diet: DASH  - DVT: SCDs

## 2022-11-22 NOTE — DISCHARGE NOTE NURSING/CASE MANAGEMENT/SOCIAL WORK - NSDCPEFALRISK_GEN_ALL_CORE
For information on Fall & Injury Prevention, visit: [email or web address] OR  [email or web address] OR  https://www.cdc.gov/steadi/patient.html

## 2022-11-22 NOTE — DISCHARGE NOTE NURSING/CASE MANAGEMENT/SOCIAL WORK - NSDCFUADDAPPT_GEN_ALL_CORE_FT
----- Message from Dipti Urrutia sent at 6/27/2022  2:24 PM CDT -----  Regarding: Patient call back  Who called: Jyoti Harris (wife)    What is the request in detail: Patient is requesting a call back. She states she would like to know if the provider would take her  as a new pt as his pcp recently retired. He states he was referred to Dr. Wolfe by their daughter who is a current patient as well as another provider. She states he does not need an appt until September.   Please advise.    Can the clinic reply by [facility]? No    Best call back number:  [phone]    Additional Information: N/A     APPTS ARE READY TO BE MADE: [X] YES    Best Family or Patient Contact (if needed):    Additional Information about above appointments (if needed):    1: Please follow-up with PCP within one week of discharge.   2: Please follow-up with Gastroenterologist Dr. Jimi Martins within one week of discharge.   3: Please follow-up with Hematologist Dr. Goel ([facility]) this week to check INR and adjust coumadin, also to check hemoglobin.  4: Please follow-up with your cardiologist appointment on 11/28/2022 for management of your atrial fibrillation.   5: please contact IR booking office at [phone] to schedule appointment with Interventional Radiologist to schedule bone marrow biopsy.     Other comments or requests

## 2022-11-22 NOTE — PROGRESS NOTE ADULT - PROBLEM SELECTOR PROBLEM 6
History of chronic CHF

## 2022-11-22 NOTE — PROGRESS NOTE ADULT - NS ATTEND AMEND GEN_ALL_CORE FT
pt to follow up outpt . rick will need bone marrow
His Hg is stable.  Uncertain etiology of his anemia. No hemolysis, no iron/B12/folate deficiency, no gross bleeding.    Once his INR improves, he will need a repeat bone marrow biopsy.

## 2022-11-22 NOTE — DISCHARGE NOTE NURSING/CASE MANAGEMENT/SOCIAL WORK - PATIENT PORTAL LINK FT
You can access the FollowMyHealth Patient Portal offered by [facility] by registering at the following website: [email or web address]. By joining [employer]’s FollowMyHealth portal, you will also be able to view your health information using other applications (apps) compatible with our system.

## 2022-11-22 NOTE — PROGRESS NOTE ADULT - ASSESSMENT
77M PMH HTN, Afib on coumadin, CVA, Hodgkin's Lymphoma s/p splenectomy and chemo in 1975, essential thrombocytopenia, atrial fibrillation on coumadin, Admitted for symptomatic anemia (supratherapeutic INR, coumadin held) s/p 2 units of prbcs with stable Hgb. GI consulted but pt deemed high risk for endoscopy, also patient wishing to defer endoscopy at this time. Hematology consulted and pending workup for anemia and repeat BM biopsy pending resolution of supratherapeutic INR.  77M PMH HTN, Afib on coumadin, CVA, Hodgkin's Lymphoma s/p splenectomy and chemo in 1975, essential thrombocytopenia, atrial fibrillation on coumadin, Admitted for symptomatic anemia (supratherapeutic INR, coumadin held) s/p 2 units of prbcs with stable Hgb. GI consulted but pt deemed high risk for endoscopy, also patient wishing to defer endoscopy at this time. Hematology consulted and pending workup for anemia and repeat BM biopsy pending resolution of supratherapeutic INR. Pt is currently hemodynamically stable with VSS and to f/u outpatient with Hematologist Dr Goel on Friday 11/25 to monitor INR and Hgb, continue care.

## 2022-11-22 NOTE — PROGRESS NOTE ADULT - PROVIDER SPECIALTY LIST ADULT
Gastroenterology
Heme/Onc
Internal Medicine
Gastroenterology
Internal Medicine

## 2022-11-22 NOTE — PROGRESS NOTE ADULT - PROBLEM SELECTOR PLAN 1
2/2 GIB? vs severe AS vs malignancy? Low concern for GIB. Presenting with Hg 6.1 o/p with baseline 7- 8. Complains of lightheadedness, ELLIS. No reported bloody stools. EGD/ colonoscopy in the past reportedly negative for acute pathology. Has a history of B12, folate, and Fe def.  - s/p 2 units RBCs, stable Hgb   - CTM CBC qd  - hemolysis labs wnl, haptoglobin elevated, B12 and folate wnl, Low iron   - f/u with o/p hematologist - previously found to have dysplastic changes on bone marrow bx per prior records   - Protonix PO 40 IV BID per GI recs   - GI consulted but pt deemed high risk for endoscopy, also patient wishing to defer endoscopy at this time. - - Hematology consulted and pending workup for anemia and repeat BM biopsy pending resolution of supratherapeutic INR, (parvovirus with positive IgG, negative IgM indicating prior infection, f/u [facility] flow cytometry panel)  - will need to follow up w/ GI regarding AC

## 2022-11-22 NOTE — PROGRESS NOTE ADULT - PROBLEM SELECTOR PLAN 4
Presents with Cr 1.55. Baseline 1.2-1.3. Likely 2/2 hypovolemia and ATN    - CTM Cr. Cr resolved to baseline as of 11/21 (Cr 1.25)    - s/p 2 units of pRBCs, no need for fluids at this time given AS

## 2022-11-22 NOTE — PROGRESS NOTE ADULT - PROBLEM SELECTOR PLAN 3
On admission, HR controlled. On coumadin-last INR check last week- 2mg x5 days, 1mg x2 days.     - s/p Micra PPM one year ago, EKG during hospital course not demonstrating atrial fibrillation   - cont to hold coumadin- INR 4.79 on admission, today of   - c/w BB blocker - home med atenolol 25mg On admission, HR controlled. On coumadin-last INR check last week- 2mg x5 days, 1mg x2 days.     - s/p Micra PPM one year ago, EKG during hospital course not demonstrating atrial fibrillation   - Will resume warfarin at time of discharge, INR today 11/22 <2.0   - c/w BB blocker - home med atenolol 25mg

## 2022-11-22 NOTE — PROGRESS NOTE ADULT - PROBLEM SELECTOR PLAN 9
Presents with INR of 4.70 on admission with Hgb 6.1 o/p. S/p 2u pRBC  - cont to hold coumadin- INR 4.79 on admission, today of   - cont to hold coumadin  - monitor CBC q12h Presents with INR of 4.70 on admission with Hgb 6.1 o/p. S/p 2u pRBC  - cont to hold coumadin- INR 4.79 on admission, today of   - cont to hold coumadin  - monitor CBC qd

## 2022-11-22 NOTE — PROGRESS NOTE ADULT - PROBLEM SELECTOR PLAN 5
2/2 MAGALI on CKD vs anemia. Trops 206 on admission with N/V/F/C, no chest pain, palpitations. Cardiology consulted.     - trend to peak  - per cardiology recommendation, monitor on tele however currently on 5 HANS. Please place on tele in the am
2/2 MAGALI on CKD vs demand ischemia in the setting of anemia. Trops 206 on admission with N/V/F/C, no chest pain, palpitations. Cardiology consulted.  - troponins trended to peak, downtrending, no need to further trend troponins at this time  - no need for telemetry at this time  - patient asymptomatic throughout hospital course

## 2022-11-22 NOTE — PROGRESS NOTE ADULT - SUBJECTIVE AND OBJECTIVE BOX
Patient is a 77y old  Male who presents with a chief complaint of Anemia (21 Nov 2022 09:23)    Patient seen and examined this morning. Patient noted resting comfortably in the recliner, offers no new complaints.     MEDICATIONS  (STANDING):  atenolol  Tablet 25 milliGRAM(s) Oral daily  atorvastatin 40 milliGRAM(s) Oral at bedtime  furosemide    Tablet 40 milliGRAM(s) Oral daily  pantoprazole    Tablet 40 milliGRAM(s) Oral two times a day  spironolactone 12.5 milliGRAM(s) Oral daily  tamsulosin 0.8 milliGRAM(s) Oral at bedtime    MEDICATIONS  (PRN):  acetaminophen     Tablet .. 650 milliGRAM(s) Oral every 6 hours PRN Temp greater or equal to 38C (100.4F), Mild Pain (1 - 3)      ROS  No fever, sweats, chills  No epistaxis, HA, sore throat  No CP, SOB, cough, sputum  No n/v/d, abd pain, melena, hematochezia  No edema  No rash  No anxiety  No back pain, joint pain  No bleeding, bruising  No dysuria, hematuria    Vital Signs Last 24 Hrs  T(C): 36.7 (22 Nov 2022 11:45), Max: 37.1 (21 Nov 2022 21:02)  T(F): 98.1 (22 Nov 2022 11:45), Max: 98.7 (21 Nov 2022 21:02)  HR: 81 (22 Nov 2022 11:45) (67 - 85)  BP: 116/50 (22 Nov 2022 11:45) (116/50 - 150/67)  BP(mean): --  RR: 18 (22 Nov 2022 11:45) (18 - 18)  SpO2: 96% (22 Nov 2022 11:45) (94% - 97%)    Parameters below as of 22 Nov 2022 11:45  Patient On (Oxygen Delivery Method): room air        PE  NAD  Awake, alert  Anicteric, MMM  RRR  CTAB  Abd soft, NT, ND  No c/c/e  No rash grossly  FROM                          8.3    11.00 )-----------( 600      ( 22 Nov 2022 07:19 )             27.9       11-22    139  |  101  |  27<H>  ----------------------------<  101<H>  4.4   |  28  |  1.27    Ca    8.7      22 Nov 2022 07:18  Phos  3.4     11-22  Mg     2.5     11-22

## 2022-11-22 NOTE — CHART NOTE - NSCHARTNOTEFT_GEN_A_CORE
IR consulted for bone marrow biopsy- pt pending discharge; procedure can be performed as outpt  -please contact IR booking office at [phone] to schedule appointment    Maria Del Carmen Ge NP  Available on Teams

## 2022-11-22 NOTE — PROGRESS NOTE ADULT - ASSESSMENT
RACHEL ANNE is a 77y Male who presents with a chief complaint of anemia.    Anemia  - Patient follows with Dr. Negro Goel, [facility].  - Patient with acute worsening of anemia since September; hemoglobin was 11.6 at that time, and now in the 7s.  - Monitor and transfuse to maintain hemoglobin > 7.  - Patient had received a dose of IV iron sucrose on November 17th.   - He is also on MEGAN as outpatient.  - Noted bone marrow biopsy from April 2022 did not show any acute process though low-grade myelodysplastic syndrome cannot be ruled out.  - Gastroenterology following; no plans for endoscopy at this time. Rule out gastrointestinal hemorrhage.  - Will likely need repeat bone marrow biopsy which can be done outpatient   - Hemoglobin stable today.     Thrombocytosis  - In the setting of iron deficiency, reactive process.  - Start oral iron if not already on.     Elevated INR  - In the setting of warfarin for atrial fibrillation.  - Remains on hold at this time. Monitor for bleeding.     Will continue to follow.    Jyothi Xiao NP  Hematology/ Oncology  [facility]  [phone] (office)  [phone] (alt office)  Evenings and weekends please call MD on call or office

## 2022-11-22 NOTE — PROGRESS NOTE ADULT - ATTENDING COMMENTS
Brown stool, stable hgb  INR down to 3.1  a/c as per cardiology/neuro teams - okay to continue from GI standpoint  has known avms, no role repeat procedures unless continued/recurring tracey bleeding  no barriers to cardiac procedures from GI standpoint
Agree with above. Pt with known small bowel AVMs and no overt bleeding. Anemia likely multifactorial, from possible bone marrow process (pending bone marrow repeat Bx) vs slow oozing from angioectasia. Given elevated anesthesia risks and no overt bleeding, no immediate plans for endoscopic evaluation, as the angioectasia are likely to recur and unlikely to be completely eradicated endoscopically with ongoing severe aortic stenosis. Continue supportive care, iron supplementation.
Patient seen and examined. Plan as discussed w/ Dr. Black: monitor hemodynamics/H&H/INR for symptomatic anemia iso supratherapeutic INR (holding Coumadin as INR remains high, vit K not administered); per GI, patient is high risk for EGD but recommending patient remain on CLD -- will discuss advancing diet and potential need to resume AC once INR < 2; he has O/P appt w/ Dr. Jimi Martins next week per wife; appreciate hematology's recommendations for ?repeat bone marrow biopsy next week; ongoing O/P TAVR evaluation.
Patient seen and examined. Plan as discussed w/ Dr. Singh: monitor hemodynamics/H&H/INR for symptomatic anemia iso supratherapeutic INR (holding Coumadin as INR remains high, vit K not administered); per GI, patient is high risk for EGD but recommending patient remain on CLD -- he has O/P appt w/ Dr. Jimi Martins next week per wife; appreciate hematology's recommendations for ?repeat bone marrow biopsy next week; ongoing O/P TAVR evaluation.
77M PMH HTN, Afib on coumadin, CVA, Hodgkin's Lymphoma s/p splenectomy and chemo in 1975, essential thrombocytopenia, atrial fibrillation on coumadin, HTN, MOE, anemia of unclear etiology (b/l 7-8), aortic stenosis (pending TAVR), GERD, presenting w/ Hgb of 6.1 on outpatient labs. Admitted for anemia. Patient has been worked up for anemia over the past year. He had capsule endoscopy in 6/22 duodenititis, nonbleeding medium and large angioectasias in proximal and mid small bowel. CT colonoscopy at that time showed mildly tortuoius colon with no persistent wall thickening, mass, or definite foal lesion. Patient has iron deficiency and received IV iron but more recent testing showed normal iron levels. He underwent a bone marrow aspirate and biopsy on 4/6/2022 which showed early/mild evidence of dysplastic changes although B12 and folate deficiency or other causes of macrocytosis could not be ruled out. Follows w/ Dr. Negro Goel of [facility].     # Anemia  # Elevated troponin  # Afib  # AS    - Acute on chronic anemia with Hgb 6.3 on admission. Anemia likely worsening chronic anemia vs GI bleed vs Heyde syndrome w/ acquired von Willebrand from AS  - s/p 2 units or pRBC. Check CBC q12, transfuse for Hgb <8  - hemolysis workup negative, f/u outpt hematologist  - f/u fecal occult  - GI consulted, f/u recs. CLD for now  - elevated troponin likely in the setting of anemia vs MAGALI vs CKD  - cardiology recs appreciated, less likely ACS  - trops peaked, TTE from 11/15 shows severe AS  - BNP 9K, s/p lasix in the ED, resume home lasix and spironolactone  - pending outpt TAVR  - if pt receives blood, would give additional lasix  - For Afib, holding Coumadin due to anemia. INR currently supra therapeutic. Trend daily  - MAGALI on CKD stable, continue to monitor
77M PMH HTN, Afib on coumadin, CVA, Hodgkin's Lymphoma s/p splenectomy and chemo in 1975, essential thrombocytopenia, atrial fibrillation on coumadin, HTN, MOE, anemia of unclear etiology (b/l 7-8), aortic stenosis (pending TAVR), GERD, presenting w/ Hgb of 6.1 on outpatient labs, admitted for anemia. Patient has been worked up for anemia over the past year. He had capsule endoscopy in 6/22 duodenitis, nonbleeding medium and large angiectasias in proximal and mid small bowel. CT colonoscopy at that time showed mildly tortuous colon with no persistent wall thickening, mass, or definite foal lesion. He underwent a bone marrow aspirate and biopsy on 4/6/2022 which showed early/mild evidence of dysplastic changes although B12 and folate deficiency or other causes of macrocytosis could not be ruled out. Follows w/ Dr. Negro Goel of [facility].     # Anemia  # Elevated troponin  # Afib  # AS    - Acute on chronic anemia with Hgb 6.3 on admission. Anemia likely worsening chronic anemia vs GI bleed vs Heyde syndrome w/ acquired von Willebrand from AS  - s/p 2 units of pRBC, now with stable hemoglobin  - hemolysis workup negative  - GI recs appreciated, ppi qd, no plans for endoscopy at this time as patient is high risk for EGD. Advanced diet.  - elevated troponin likely in the setting of anemia vs MAGALI vs CKD. Cardiology recs appreciated, less likely ACS  - trops peaked, TTE from 11/15 shows severe AS. Pending outpt TAVR  - INR <2 today, consulted IR for possible bone marrow biopsy per heme recs, however recommending outpt BM biopsy  - Will resume Coumadin for Afib at lower than home dose  - F/u on 11/25 with Dr Goel for INR and Hgb check  - Discharge home today    Discussed w/ HS7
77M PMH HTN, Afib on coumadin, CVA, Hodgkin's Lymphoma s/p splenectomy and chemo in 1975, essential thrombocytopenia, atrial fibrillation on coumadin, HTN, MOE, anemia of unclear etiology (b/l 7-8), aortic stenosis (pending TAVR), GERD, presenting w/ Hgb of 6.1 on outpatient labs. Admitted for anemia. Patient has been worked up for anemia over the past year. He had capsule endoscopy in 6/22 duodenititis, nonbleeding medium and large angioectasias in proximal and mid small bowel. CT colonoscopy at that time showed mildly tortuous colon with no persistent wall thickening, mass, or definite foal lesion. He underwent a bone marrow aspirate and biopsy on 4/6/2022 which showed early/mild evidence of dysplastic changes although B12 and folate deficiency or other causes of macrocytosis could not be ruled out. Follows w/ Dr. Negro Goel of [facility].     # Anemia  # Elevated troponin  # Afib  # AS    - Acute on chronic anemia with Hgb 6.3 on admission. Anemia likely worsening chronic anemia vs GI bleed vs Heyde syndrome w/ acquired von Willebrand from AS  - s/p 2 units of pRBC. Check CBC qd, transfuse for Hgb <7  - hemolysis workup negative, f/u outpt hematologist  - Hgb remains stable  - GI recs appreciated, continue ppi BID, no plans for endoscopy at this time as patient is high risk for EGD. Advanced diet.  - elevated troponin likely in the setting of anemia vs MAGALI vs CKD. Cardiology recs appreciated, less likely ACS  - trops peaked, TTE from 11/15 shows severe AS. Pending outpt TAVR  - For Afib, holding Coumadin due to anemia. INR 3 today, continue to trend daily  - F/u heme onc regarding need for inpatient repeat bone marrow biopsy as patient's INR is still elevated vs outpt BM biopsy    Discussed w/ HS7

## 2022-11-22 NOTE — PROGRESS NOTE ADULT - PROBLEM SELECTOR PLAN 2
History of severe AS with EF 59% on 11/15/22. Currently being worked up for TAVR outpatient C/o ELLIS. No chest pain, palpitations, LOC.     - no need to repeat TTE at this time
History of severe AS with EF 59% on 11/15/22. Currently being worked up for TAVR outpatient C/o ELLIS. No chest pain, palpitations, LOC.     - no need to repeat TTE at this time
History of severe AS with EF 59% on 11/15/22. Currently being worked up for TAVR outpatient C/o ELLSI. No chest pain, palpitations, LOC.     - no need to repeat TTE at this time
History of severe AS with EF 59% on 11/15/22. Currently being worked up for TAVR outpatient C/o ELLIS. No chest pain, palpitations, LOC.     - no need to repeat TTE at this time
History of severe AS with EF 59% on 11/15/22. Currently being worked up for TAVR. C/o ELLIS. No chest pain, palpitations, LOC.     - no need to repeat TTE at this time

## 2022-11-23 PROBLEM — I48.91 UNSPECIFIED ATRIAL FIBRILLATION: Chronic | Status: ACTIVE | Noted: 2022-11-18

## 2022-12-01 ENCOUNTER — APPOINTMENT (OUTPATIENT)
Dept: GASTROENTEROLOGY | Facility: CLINIC | Age: 77
End: 2022-12-01

## 2022-12-06 ENCOUNTER — APPOINTMENT (OUTPATIENT)
Dept: CARDIOLOGY | Facility: CLINIC | Age: 77
End: 2022-12-06

## 2022-12-06 ENCOUNTER — OUTPATIENT (OUTPATIENT)
Dept: OUTPATIENT SERVICES | Facility: HOSPITAL | Age: 77
LOS: 1 days | End: 2022-12-06
Payer: COMMERCIAL

## 2022-12-06 DIAGNOSIS — I35.0 NONRHEUMATIC AORTIC (VALVE) STENOSIS: ICD-10-CM

## 2022-12-06 DIAGNOSIS — Z98.1 ARTHRODESIS STATUS: Chronic | ICD-10-CM

## 2022-12-06 DIAGNOSIS — Z98.42 CATARACT EXTRACTION STATUS, LEFT EYE: Chronic | ICD-10-CM

## 2022-12-06 DIAGNOSIS — Z98.890 OTHER SPECIFIED POSTPROCEDURAL STATES: Chronic | ICD-10-CM

## 2022-12-06 DIAGNOSIS — Z90.81 ACQUIRED ABSENCE OF SPLEEN: Chronic | ICD-10-CM

## 2022-12-06 DIAGNOSIS — Z96.641 PRESENCE OF RIGHT ARTIFICIAL HIP JOINT: Chronic | ICD-10-CM

## 2022-12-06 DIAGNOSIS — Z00.00 ENCOUNTER FOR GENERAL ADULT MEDICAL EXAMINATION WITHOUT ABNORMAL FINDINGS: ICD-10-CM

## 2022-12-06 PROCEDURE — 75572 CT HRT W/3D IMAGE: CPT | Mod: 26

## 2022-12-06 PROCEDURE — 75572 CT HRT W/3D IMAGE: CPT

## 2022-12-15 ENCOUNTER — NON-APPOINTMENT (OUTPATIENT)
Age: 77
End: 2022-12-15

## 2022-12-15 ENCOUNTER — APPOINTMENT (OUTPATIENT)
Dept: ELECTROPHYSIOLOGY | Facility: CLINIC | Age: 77
End: 2022-12-15

## 2022-12-15 ENCOUNTER — APPOINTMENT (OUTPATIENT)
Dept: CARDIOLOGY | Facility: CLINIC | Age: 77
End: 2022-12-15

## 2022-12-15 VITALS
SYSTOLIC BLOOD PRESSURE: 90 MMHG | WEIGHT: 225 LBS | BODY MASS INDEX: 34.1 KG/M2 | HEART RATE: 119 BPM | DIASTOLIC BLOOD PRESSURE: 48 MMHG | OXYGEN SATURATION: 96 % | HEIGHT: 68 IN

## 2022-12-15 DIAGNOSIS — I10 ESSENTIAL (PRIMARY) HYPERTENSION: ICD-10-CM

## 2022-12-15 PROCEDURE — 93000 ELECTROCARDIOGRAM COMPLETE: CPT

## 2022-12-15 PROCEDURE — 93279 PRGRMG DEV EVAL PM/LDLS PM: CPT

## 2022-12-15 PROCEDURE — 99214 OFFICE O/P EST MOD 30 MIN: CPT | Mod: 25

## 2022-12-18 PROBLEM — I10 HTN (HYPERTENSION): Status: ACTIVE | Noted: 2017-01-27

## 2022-12-18 NOTE — HISTORY OF PRESENT ILLNESS
[FreeTextEntry1] : Jean-Pierre had 2 transfusions last month. INR 2.7. Bone marrow yesterday. CT scan 12/6/22. Still gets SOB easily.

## 2022-12-18 NOTE — DISCUSSION/SUMMARY
[FreeTextEntry1] : The patient is a 77-year-old gentleman A-fib, hypertension, hyperlipidemia, s/p back surgery with RLE fasciotomy and skin graft, s/p COVID, PPM , AS, anemia s/p bone marrow biopsy yesterday.\par #1 HFpEF- c/w lasix 1-2x day, spironolactone 12.5mg, keep leg elevated when sitting\par #2 AS- 1.0, f/u Dr. Bland possible intervention pending\par #2 HTN- stop lisinopril 5mg \par #3 Afib- s/p PPM for tachybrady, c/w atenolol 25mg,no bleeding on warfarin- monitored by PCP\par #4 Lipids- c/w simvastatin\par #5 BPH- on tamsulosin\par #6 GI-  on iron, s/p transfusion and aranesp, s/p bone marrow biopsy awaiting results,f/u Dr. Martins, Hb 10\par #7 Ortho- left hip pain\par #8 General- mild COVID 2020, s/p Moderna vaccines, may have to consider Watchman as well. [EKG obtained to assist in diagnosis and management of assessed problem(s)] : EKG obtained to assist in diagnosis and management of assessed problem(s)

## 2022-12-18 NOTE — PHYSICAL EXAM

## 2022-12-30 ENCOUNTER — NON-APPOINTMENT (OUTPATIENT)
Age: 77
End: 2022-12-30

## 2023-01-05 DIAGNOSIS — Z20.822 CONTACT WITH AND (SUSPECTED) EXPOSURE TO COVID-19: ICD-10-CM

## 2023-01-10 ENCOUNTER — NON-APPOINTMENT (OUTPATIENT)
Age: 78
End: 2023-01-10

## 2023-01-16 LAB — SARS-COV-2 N GENE NPH QL NAA+PROBE: NOT DETECTED

## 2023-01-17 ENCOUNTER — INPATIENT (INPATIENT)
Facility: HOSPITAL | Age: 78
LOS: 6 days | Discharge: HOME HEALTH | DRG: 267 | End: 2023-01-24
Attending: INTERNAL MEDICINE | Admitting: INTERNAL MEDICINE
Payer: COMMERCIAL

## 2023-01-17 ENCOUNTER — APPOINTMENT (OUTPATIENT)
Dept: GASTROENTEROLOGY | Facility: CLINIC | Age: 78
End: 2023-01-17

## 2023-01-17 VITALS
TEMPERATURE: 98 F | OXYGEN SATURATION: 98 % | SYSTOLIC BLOOD PRESSURE: 129 MMHG | DIASTOLIC BLOOD PRESSURE: 74 MMHG | HEART RATE: 61 BPM | WEIGHT: 227.08 LBS | HEIGHT: 68 IN | RESPIRATION RATE: 18 BRPM

## 2023-01-17 DIAGNOSIS — Z98.42 CATARACT EXTRACTION STATUS, LEFT EYE: Chronic | ICD-10-CM

## 2023-01-17 DIAGNOSIS — Z98.1 ARTHRODESIS STATUS: Chronic | ICD-10-CM

## 2023-01-17 DIAGNOSIS — Z96.641 PRESENCE OF RIGHT ARTIFICIAL HIP JOINT: Chronic | ICD-10-CM

## 2023-01-17 DIAGNOSIS — Z90.81 ACQUIRED ABSENCE OF SPLEEN: Chronic | ICD-10-CM

## 2023-01-17 DIAGNOSIS — I35.0 NONRHEUMATIC AORTIC (VALVE) STENOSIS: ICD-10-CM

## 2023-01-17 DIAGNOSIS — Z98.890 OTHER SPECIFIED POSTPROCEDURAL STATES: Chronic | ICD-10-CM

## 2023-01-17 DIAGNOSIS — Z95.0 PRESENCE OF CARDIAC PACEMAKER: Chronic | ICD-10-CM

## 2023-01-17 LAB
ALBUMIN SERPL ELPH-MCNC: 3.9 G/DL — SIGNIFICANT CHANGE UP (ref 3.3–5)
ALP SERPL-CCNC: 91 U/L — SIGNIFICANT CHANGE UP (ref 40–120)
ALT FLD-CCNC: 12 U/L — SIGNIFICANT CHANGE UP (ref 10–45)
ANION GAP SERPL CALC-SCNC: 8 MMOL/L — SIGNIFICANT CHANGE UP (ref 5–17)
APPEARANCE UR: CLEAR — SIGNIFICANT CHANGE UP
APTT BLD: 36.3 SEC — HIGH (ref 27.5–35.5)
AST SERPL-CCNC: 18 U/L — SIGNIFICANT CHANGE UP (ref 10–40)
BILIRUB SERPL-MCNC: <0.1 MG/DL — LOW (ref 0.2–1.2)
BILIRUB UR-MCNC: NEGATIVE — SIGNIFICANT CHANGE UP
BUN SERPL-MCNC: 39 MG/DL — HIGH (ref 7–23)
CALCIUM SERPL-MCNC: 9.5 MG/DL — SIGNIFICANT CHANGE UP (ref 8.4–10.5)
CHLORIDE SERPL-SCNC: 104 MMOL/L — SIGNIFICANT CHANGE UP (ref 96–108)
CO2 SERPL-SCNC: 27 MMOL/L — SIGNIFICANT CHANGE UP (ref 22–31)
COLOR SPEC: SIGNIFICANT CHANGE UP
CREAT SERPL-MCNC: 1.01 MG/DL — SIGNIFICANT CHANGE UP (ref 0.5–1.3)
DIFF PNL FLD: NEGATIVE — SIGNIFICANT CHANGE UP
EGFR: 76 ML/MIN/1.73M2 — SIGNIFICANT CHANGE UP
GLUCOSE SERPL-MCNC: 114 MG/DL — HIGH (ref 70–99)
GLUCOSE UR QL: NEGATIVE — SIGNIFICANT CHANGE UP
HCT VFR BLD CALC: 29.9 % — LOW (ref 39–50)
HGB BLD-MCNC: 9.4 G/DL — LOW (ref 13–17)
INR BLD: 1.71 RATIO — HIGH (ref 0.88–1.16)
KETONES UR-MCNC: NEGATIVE — SIGNIFICANT CHANGE UP
LEUKOCYTE ESTERASE UR-ACNC: NEGATIVE — SIGNIFICANT CHANGE UP
MCHC RBC-ENTMCNC: 31 PG — SIGNIFICANT CHANGE UP (ref 27–34)
MCHC RBC-ENTMCNC: 31.4 GM/DL — LOW (ref 32–36)
MCV RBC AUTO: 98.7 FL — SIGNIFICANT CHANGE UP (ref 80–100)
NITRITE UR-MCNC: NEGATIVE — SIGNIFICANT CHANGE UP
NRBC # BLD: 0 /100 WBCS — SIGNIFICANT CHANGE UP (ref 0–0)
PH UR: 6 — SIGNIFICANT CHANGE UP (ref 5–8)
PLATELET # BLD AUTO: 386 K/UL — SIGNIFICANT CHANGE UP (ref 150–400)
POTASSIUM SERPL-MCNC: 4.5 MMOL/L — SIGNIFICANT CHANGE UP (ref 3.5–5.3)
POTASSIUM SERPL-SCNC: 4.5 MMOL/L — SIGNIFICANT CHANGE UP (ref 3.5–5.3)
PROT SERPL-MCNC: 6.7 G/DL — SIGNIFICANT CHANGE UP (ref 6–8.3)
PROT UR-MCNC: NEGATIVE — SIGNIFICANT CHANGE UP
PROTHROM AB SERPL-ACNC: 20 SEC — HIGH (ref 10.5–13.4)
RBC # BLD: 3.03 M/UL — LOW (ref 4.2–5.8)
RBC # FLD: 17.6 % — HIGH (ref 10.3–14.5)
SODIUM SERPL-SCNC: 139 MMOL/L — SIGNIFICANT CHANGE UP (ref 135–145)
SP GR SPEC: 1.02 — SIGNIFICANT CHANGE UP (ref 1.01–1.02)
UROBILINOGEN FLD QL: NEGATIVE — SIGNIFICANT CHANGE UP
WBC # BLD: 9.82 K/UL — SIGNIFICANT CHANGE UP (ref 3.8–10.5)
WBC # FLD AUTO: 9.82 K/UL — SIGNIFICANT CHANGE UP (ref 3.8–10.5)

## 2023-01-17 PROCEDURE — 93010 ELECTROCARDIOGRAM REPORT: CPT | Mod: 77

## 2023-01-17 PROCEDURE — 92928 PRQ TCAT PLMT NTRAC ST 1 LES: CPT | Mod: RC

## 2023-01-17 PROCEDURE — 99152 MOD SED SAME PHYS/QHP 5/>YRS: CPT

## 2023-01-17 PROCEDURE — 93010 ELECTROCARDIOGRAM REPORT: CPT | Mod: 76

## 2023-01-17 PROCEDURE — 71045 X-RAY EXAM CHEST 1 VIEW: CPT | Mod: 26

## 2023-01-17 PROCEDURE — 92978 ENDOLUMINL IVUS OCT C 1ST: CPT | Mod: 26,RC

## 2023-01-17 PROCEDURE — 93454 CORONARY ARTERY ANGIO S&I: CPT | Mod: 26,59

## 2023-01-17 RX ORDER — FUROSEMIDE 40 MG
40 TABLET ORAL DAILY
Refills: 0 | Status: DISCONTINUED | OUTPATIENT
Start: 2023-01-17 | End: 2023-01-19

## 2023-01-17 RX ORDER — LISINOPRIL 2.5 MG/1
1 TABLET ORAL
Qty: 0 | Refills: 0 | DISCHARGE

## 2023-01-17 RX ORDER — ASCORBIC ACID 60 MG
500 TABLET,CHEWABLE ORAL DAILY
Refills: 0 | Status: DISCONTINUED | OUTPATIENT
Start: 2023-01-17 | End: 2023-01-19

## 2023-01-17 RX ORDER — FERROUS SULFATE 325(65) MG
325 TABLET ORAL DAILY
Refills: 0 | Status: DISCONTINUED | OUTPATIENT
Start: 2023-01-17 | End: 2023-01-19

## 2023-01-17 RX ORDER — ATORVASTATIN CALCIUM 80 MG/1
80 TABLET, FILM COATED ORAL AT BEDTIME
Refills: 0 | Status: DISCONTINUED | OUTPATIENT
Start: 2023-01-17 | End: 2023-01-19

## 2023-01-17 RX ORDER — CALCIUM CARBONATE 500(1250)
2 TABLET ORAL
Qty: 0 | Refills: 0 | DISCHARGE

## 2023-01-17 RX ORDER — HEPARIN SODIUM 5000 [USP'U]/ML
1800 INJECTION INTRAVENOUS; SUBCUTANEOUS
Qty: 25000 | Refills: 0 | Status: DISCONTINUED | OUTPATIENT
Start: 2023-01-17 | End: 2023-01-19

## 2023-01-17 RX ORDER — ATENOLOL 25 MG/1
25 TABLET ORAL DAILY
Refills: 0 | Status: DISCONTINUED | OUTPATIENT
Start: 2023-01-17 | End: 2023-01-19

## 2023-01-17 RX ORDER — ASPIRIN/CALCIUM CARB/MAGNESIUM 324 MG
81 TABLET ORAL DAILY
Refills: 0 | Status: DISCONTINUED | OUTPATIENT
Start: 2023-01-18 | End: 2023-01-19

## 2023-01-17 RX ORDER — SODIUM CHLORIDE 9 MG/ML
3 INJECTION INTRAMUSCULAR; INTRAVENOUS; SUBCUTANEOUS EVERY 8 HOURS
Refills: 0 | Status: DISCONTINUED | OUTPATIENT
Start: 2023-01-17 | End: 2023-01-19

## 2023-01-17 RX ORDER — CLOPIDOGREL BISULFATE 75 MG/1
75 TABLET, FILM COATED ORAL DAILY
Refills: 0 | Status: DISCONTINUED | OUTPATIENT
Start: 2023-01-18 | End: 2023-01-19

## 2023-01-17 RX ORDER — LATANOPROST 0.05 MG/ML
1 SOLUTION/ DROPS OPHTHALMIC; TOPICAL AT BEDTIME
Refills: 0 | Status: DISCONTINUED | OUTPATIENT
Start: 2023-01-17 | End: 2023-01-19

## 2023-01-17 RX ORDER — SPIRONOLACTONE 25 MG/1
12.5 TABLET, FILM COATED ORAL DAILY
Refills: 0 | Status: DISCONTINUED | OUTPATIENT
Start: 2023-01-17 | End: 2023-01-19

## 2023-01-17 RX ORDER — FLUTICASONE PROPIONATE 50 MCG
1 SPRAY, SUSPENSION NASAL
Refills: 0 | Status: DISCONTINUED | OUTPATIENT
Start: 2023-01-17 | End: 2023-01-19

## 2023-01-17 RX ORDER — PANTOPRAZOLE SODIUM 20 MG/1
40 TABLET, DELAYED RELEASE ORAL
Refills: 0 | Status: DISCONTINUED | OUTPATIENT
Start: 2023-01-17 | End: 2023-01-19

## 2023-01-17 RX ORDER — TAMSULOSIN HYDROCHLORIDE 0.4 MG/1
0.8 CAPSULE ORAL AT BEDTIME
Refills: 0 | Status: DISCONTINUED | OUTPATIENT
Start: 2023-01-17 | End: 2023-01-19

## 2023-01-17 RX ADMIN — Medication 1 SPRAY(S): at 17:59

## 2023-01-17 RX ADMIN — ATENOLOL 25 MILLIGRAM(S): 25 TABLET ORAL at 17:59

## 2023-01-17 RX ADMIN — Medication 40 MILLIGRAM(S): at 17:58

## 2023-01-17 RX ADMIN — Medication 500 MILLIGRAM(S): at 17:58

## 2023-01-17 RX ADMIN — ATORVASTATIN CALCIUM 80 MILLIGRAM(S): 80 TABLET, FILM COATED ORAL at 21:39

## 2023-01-17 RX ADMIN — SODIUM CHLORIDE 3 MILLILITER(S): 9 INJECTION INTRAMUSCULAR; INTRAVENOUS; SUBCUTANEOUS at 21:37

## 2023-01-17 RX ADMIN — TAMSULOSIN HYDROCHLORIDE 0.8 MILLIGRAM(S): 0.4 CAPSULE ORAL at 21:39

## 2023-01-17 RX ADMIN — Medication 325 MILLIGRAM(S): at 17:59

## 2023-01-17 RX ADMIN — HEPARIN SODIUM 18 UNIT(S)/HR: 5000 INJECTION INTRAVENOUS; SUBCUTANEOUS at 22:55

## 2023-01-17 NOTE — H&P CARDIOLOGY - HISTORY OF PRESENT ILLNESS
78M PMH HTN, Afib on coumadin (last dose 1/14), CVA, Hodgkin's Lymphoma s/p splenectomy and chemo in 1975, essential thrombocytopenia, HTN, MOE, GERD,  anemia requiring transfusions (secondary to Fe/ B12/ folate deficiencies), GERD, severe aortic stenosis presents for [facility] prior to planned TAVR on 1/19/23.    78M PMH HTN, Afib on coumadin (last dose 1/14), s/p MICRA PPM implant (Dr. Hamilton, Oct 2020), CVA x 2 with residual short term memory loss, Hodgkin's Lymphoma s/p splenectomy and chemo in 1975, essential thrombocytopenia, HTN, MOE, GERD,  anemia requiring transfusions (secondary to Fe/ B12/ folate deficiencies), GERD, laminectomy with RLE fasciotomy and skin grafting due to RLE DVT post-op, severe aortic stenosis, with c/o generalized fatigue and exertional dyspnea (onset about 50 ft),  presents for [facility] prior to planned TAVR on 1/19/23.   < from: TTE with Doppler (w/Cont) (11.15.22 @ 14:16) >  CONCLUSIONS:  1. Calcified trileaflet aortic valve with decreased  opening. Peak transaorticvalve gradient equals 55 mm Hg,  mean transaortic valve gradient equals 33 mm Hg, estimated  aortic valve area equals 0.8 sqcm (by continuity equation),  aortic valve velocity time integral equals 95 cm,  the DVI=  0.20, consistent with severe aorticstenosis.  LV SV= 73ml, LV SV index= 34ml/m2. No aortic valve  regurgitation seen.  2. Normal left ventricular systolic function. 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  The LVEF= 59%  by biplane Marshall's method.  *** Compared withechocardiogram of 7/12/2022, the aortic  stenosis is now severe.    < end of copied text >

## 2023-01-17 NOTE — ASU PATIENT PROFILE, ADULT - AS SC BRADEN SENSORY
CHIEF COMPLAINT:    HPI:85yoF with h/o HTN, DM on insulin, dementia, MI 4 yrs ago s/p stent, one kidney s/p nephrectomy in her 50's, presents for weakness and fever. D/w grandson Bertrand Paulino who reports she fell down a few days ago and landed on her bottom, daughter heard thud and seemed fine, +head trauma, no LOC, but having trouble walking since then. Fever on Monday, then today fever came back and today she has been febrile to 101 (no Tylenol), shaky, not taking her medications, poorly verbal. Her aide called out sick starting Saturday. Denies new cough (chronic), vomiting since Monday, diarrhea. Pt herself is nonverbal and does not provide hx.      PAST MEDICAL & SURGICAL HISTORY:  Stented coronary artery  Hypothyroid  DM (diabetes mellitus)  HTN (hypertension)  S/p nephrectomy      MEDICATIONS  (HOME)  insulin lispro (HumaLOG) corrective regimen sliding scale   SubCutaneous three times a day before meals  · 	pantoprazole 40 mg oral delayed release tablet: 1 tab(s) orally once a day (before a meal)  · 	amLODIPine 5 mg oral tablet: 1 tab(s) orally once a day  · 	Ceftin 500 mg oral tablet: 1 tab(s) orally 2 times a day continue for one more day   · 	HumaLOG 100 units/mL subcutaneous solution:  subcutaneous   · 	Synthroid 100 mcg (0.1 mg) oral tablet:  orally   · 	FeroSul 325 mg (65 mg elemental iron) oral tablet:  orally   · 	Januvia:  orally   · 	Aspir 81 oral delayed release tablet:  orally   · 	losartan 50 mg oral tablet:  orally   · 	calcium carbonate:  orally   · 	Plavix 75 mg oral tablet:  orally   · 	atorvastatin 20 mg oral tablet:  orally   · 	ranitidine 150 mg oral tablet:  orally   · 	Travatan Z:  to each affected eye   · 	Metoprolol Succinate ER 50 mg oral tablet, extended release: 1 tab(s) orally once a day  · 	dorzolamide-timolol 2%-0.5% preservative-free ophthalmic solution:  to each affected eye   · 	metFORMIN 500 mg oral tablet:  orally   FAMILY HISTORY:    No family history of premature coronary artery disease or sudden cardiac death    SOCIAL HISTORY:  Smoking-  Alcohol-  Ilicit Drug use-    REVIEW OF SYSTEMS:  Constitutional: [ ] fever, [ ]weight loss, [ ]fatigue Activity [ ] Bedbound,[ ] Ambulates [ ] Unassisted[ ] Cane/Walker [ ] Assistence.  Eyes: [ ] visual changes  Respiratory: [ ]shortness of breath;  [ ] cough, [ ]wheezing, [ ]chills, [ ]hemoptysis  Cardiovascular: [ ] chest pain, [ ]palpitations, [ ]dizziness,  [ ]leg swelling[ ]orthopnea [ ]PND  Gastrointestinal: [ ] abdominal pain, [ ]nausea, [ ]vomiting,  [ ]diarrhea,[ ]constipation  Genitourinary: [ ] dysuria, [ ] hematuria  Neurologic: [ ] headaches [ ] tremors[ ] weakness  Skin: [ ] itching, [ ]burning, [ ] rashes  Endocrine: [ ] heat or cold intolerance  Musculoskeletal: [ ] joint pain or swelling; [ ] muscle, back, or extremity pain  Psychiatric: [ ] depression, [ ]anxiety, [ ]mood swings, or [ ]difficulty sleeping  Hematologic: [ ] easy bruising, [ ] bleeding gums       [ x] All others negative	  [ ] Unable to obtain    Vital Signs Last 24 Hrs  T(C): 36.7 (26 Mar 2020 01:51), Max: 38.9 (25 Mar 2020 20:07)  T(F): 98.1 (26 Mar 2020 01:51), Max: 102 (25 Mar 2020 20:07)  HR: 112 (26 Mar 2020 01:51) (112 - 115)  BP: 150/96 (26 Mar 2020 01:51) (150/96 - 160/80)  BP(mean): --  RR: 17 (26 Mar 2020 01:51) (17 - 17)  SpO2: 96% (26 Mar 2020 01:51) (96% - 100%)  I&O's Summary      PHYSICAL EXAM:  General: No acute distress BMI-  HEENT: EOMI, PERRL[ ] Icteric  Neck: Supple, No JVD  Lungs: Equal air entry bilaterally; [ ] Rales [ ] Rhonchi [ ] Wheezing  Heart: Regular rate and rhythm;[ ] Murmurs-   /6 [ ] Systolic [ ] Diastolic [ ] Radiation,No rubs, or gallops  Abdomen: Nontender, bowel sounds present  Extremities: No clubbing, cyanosis, or edema[ ] Calf tenderness  Nervous system:  Alert & Oriented X3, no focal deficits  Psychiatric: Normal affect  Skin: No rashes or lesions      LABS:  03-25    137  |  104  |  19<H>  ----------------------------<  202<H>  4.0   |  25  |  1.51<H>    Ca    8.6      25 Mar 2020 22:33    TPro  6.5  /  Alb  2.6<L>  /  TBili  0.3  /  DBili  x   /  AST  34  /  ALT  20  /  AlkPhos  73  03-25    Creatinine Trend: 1.51<--                        11.1   5.11  )-----------( 243      ( 25 Mar 2020 22:33 )             34.2     PT/INR - ( 25 Mar 2020 22:33 )   PT: 11.2 sec;   INR: 0.99 ratio         PTT - ( 25 Mar 2020 22:33 )  PTT:30.7 sec    Lipid Panel:   Cardiac Enzymes: CARDIAC MARKERS ( 25 Mar 2020 22:33 )  0.015 ng/mL / x     / x     / x     / x          Serum Pro-Brain Natriuretic Peptide: 716 pg/mL (03-25-20 @ 22:33)        RADIOLOGY:    ECG [my interpretation]:    TELEMETRY:    ECHO:    STRESS TEST:    CATHETERIZATION: (4) no impairment none

## 2023-01-17 NOTE — PATIENT PROFILE ADULT - FALL HARM RISK - RISK INTERVENTIONS

## 2023-01-17 NOTE — H&P CARDIOLOGY - NSICDXPASTMEDICALHX_GEN_ALL_CORE_FT
PAST MEDICAL HISTORY:  Atrial fibrillation over 20 years    Atrial fibrillation over 20 years    BPH (benign prostatic hyperplasia)     CVA (cerebral vascular accident) 1/2018 - attributed to no AC for 11 days preop/postop spine surgery - presented to ED with slurred speech, residual ST memory loss, per pt    Exposure to toxin 9/11     GERD (gastroesophageal reflux disease)     History of short term memory loss     Hodgkin lymphoma 1975    HTN (hypertension)     MOE (obstructive sleep apnea) positive sleep study many years ago, was recommended to use CPAP, patient non-compliant    Osteoarthritis     Spinal stenosis L3-L4    Spondylolisthesis      PAST MEDICAL HISTORY:  Atrial fibrillation over 20 years    Atrial fibrillation over 20 years    BPH (benign prostatic hyperplasia)     CVA (cerebral vascular accident) 1/2018 - attributed to no AC for 11 days preop/postop spine surgery - presented to ED with slurred speech, residual ST memory loss, per pt    DVT, lower extremity     Exposure to toxin 9/11     GERD (gastroesophageal reflux disease)     History of short term memory loss     Hodgkin lymphoma 1975    HTN (hypertension)     MOE (obstructive sleep apnea) positive sleep study many years ago, was recommended to use CPAP, patient non-compliant    Osteoarthritis     Spinal stenosis L3-L4    Spondylolisthesis

## 2023-01-17 NOTE — H&P CARDIOLOGY - NSICDXPASTSURGICALHX_GEN_ALL_CORE_FT
PAST SURGICAL HISTORY:  History of cardioversion for AF - "many years ago" - unsuccessful    History of lumbar spinal fusion 1/5/2018    S/P hip replacement, right 2014    S/P splenectomy 1975    Status post left cataract extraction      PAST SURGICAL HISTORY:  History of cardioversion for AF - "many years ago" - unsuccessful    History of fasciotomy     History of lumbar spinal fusion 1/5/2018    S/P hip replacement, right 2014    S/P splenectomy 1975    Status post left cataract extraction      PAST SURGICAL HISTORY:  Cardiac pacemaker     History of cardioversion for AF - "many years ago" - unsuccessful    History of fasciotomy     History of lumbar spinal fusion 1/5/2018    S/P hip replacement, right 2014    S/P splenectomy 1975    Status post left cataract extraction

## 2023-01-17 NOTE — PATIENT PROFILE ADULT - OVER THE PAST TWO WEEKS HAVE YOU FELT DOWN, DEPRESSED OR HOPELESS?
Pt BP is 80/43 with MAP of 55 at this time. Dr. Kasi Blanco notified via perfect serve.  called floor and spoke to this RN and gave verbal order for Midodrine 10mg TID starting now. Will continue to monitor. no

## 2023-01-18 ENCOUNTER — TRANSCRIPTION ENCOUNTER (OUTPATIENT)
Age: 78
End: 2023-01-18

## 2023-01-18 PROBLEM — I82.409 ACUTE EMBOLISM AND THROMBOSIS OF UNSPECIFIED DEEP VEINS OF UNSPECIFIED LOWER EXTREMITY: Chronic | Status: ACTIVE | Noted: 2023-01-17

## 2023-01-18 PROBLEM — Z87.898 PERSONAL HISTORY OF OTHER SPECIFIED CONDITIONS: Chronic | Status: ACTIVE | Noted: 2023-01-17

## 2023-01-18 LAB
A1C WITH ESTIMATED AVERAGE GLUCOSE RESULT: 5.4 % — SIGNIFICANT CHANGE UP (ref 4–5.6)
ALBUMIN SERPL ELPH-MCNC: 3.3 G/DL — SIGNIFICANT CHANGE UP (ref 3.3–5)
ALP SERPL-CCNC: 82 U/L — SIGNIFICANT CHANGE UP (ref 40–120)
ALT FLD-CCNC: 15 U/L — SIGNIFICANT CHANGE UP (ref 10–45)
ANION GAP SERPL CALC-SCNC: 11 MMOL/L — SIGNIFICANT CHANGE UP (ref 5–17)
APTT BLD: 116.9 SEC — HIGH (ref 27.5–35.5)
APTT BLD: 150.7 SEC — CRITICAL HIGH (ref 27.5–35.5)
APTT BLD: 159.9 SEC — CRITICAL HIGH (ref 27.5–35.5)
AST SERPL-CCNC: 17 U/L — SIGNIFICANT CHANGE UP (ref 10–40)
BASOPHILS # BLD AUTO: 0.09 K/UL — SIGNIFICANT CHANGE UP (ref 0–0.2)
BASOPHILS NFR BLD AUTO: 0.7 % — SIGNIFICANT CHANGE UP (ref 0–2)
BILIRUB SERPL-MCNC: 0.2 MG/DL — SIGNIFICANT CHANGE UP (ref 0.2–1.2)
BUN SERPL-MCNC: 40 MG/DL — HIGH (ref 7–23)
CALCIUM SERPL-MCNC: 8.9 MG/DL — SIGNIFICANT CHANGE UP (ref 8.4–10.5)
CHLORIDE SERPL-SCNC: 103 MMOL/L — SIGNIFICANT CHANGE UP (ref 96–108)
CO2 SERPL-SCNC: 24 MMOL/L — SIGNIFICANT CHANGE UP (ref 22–31)
CREAT SERPL-MCNC: 1.02 MG/DL — SIGNIFICANT CHANGE UP (ref 0.5–1.3)
EGFR: 75 ML/MIN/1.73M2 — SIGNIFICANT CHANGE UP
EOSINOPHIL # BLD AUTO: 0.62 K/UL — HIGH (ref 0–0.5)
EOSINOPHIL NFR BLD AUTO: 4.7 % — SIGNIFICANT CHANGE UP (ref 0–6)
ESTIMATED AVERAGE GLUCOSE: 108 MG/DL — SIGNIFICANT CHANGE UP (ref 68–114)
GLUCOSE SERPL-MCNC: 114 MG/DL — HIGH (ref 70–99)
HCT VFR BLD CALC: 27.3 % — LOW (ref 39–50)
HGB BLD-MCNC: 8.5 G/DL — LOW (ref 13–17)
IMM GRANULOCYTES NFR BLD AUTO: 0.5 % — SIGNIFICANT CHANGE UP (ref 0–0.9)
INR BLD: 1.51 RATIO — HIGH (ref 0.88–1.16)
LYMPHOCYTES # BLD AUTO: 1.93 K/UL — SIGNIFICANT CHANGE UP (ref 1–3.3)
LYMPHOCYTES # BLD AUTO: 14.6 % — SIGNIFICANT CHANGE UP (ref 13–44)
MCHC RBC-ENTMCNC: 30.9 PG — SIGNIFICANT CHANGE UP (ref 27–34)
MCHC RBC-ENTMCNC: 31.1 GM/DL — LOW (ref 32–36)
MCV RBC AUTO: 99.3 FL — SIGNIFICANT CHANGE UP (ref 80–100)
MONOCYTES # BLD AUTO: 1.11 K/UL — HIGH (ref 0–0.9)
MONOCYTES NFR BLD AUTO: 8.4 % — SIGNIFICANT CHANGE UP (ref 2–14)
MRSA PCR RESULT.: SIGNIFICANT CHANGE UP
NEUTROPHILS # BLD AUTO: 9.44 K/UL — HIGH (ref 1.8–7.4)
NEUTROPHILS NFR BLD AUTO: 71.1 % — SIGNIFICANT CHANGE UP (ref 43–77)
NRBC # BLD: 0 /100 WBCS — SIGNIFICANT CHANGE UP (ref 0–0)
NT-PROBNP SERPL-SCNC: 2677 PG/ML — HIGH (ref 0–300)
PLATELET # BLD AUTO: 359 K/UL — SIGNIFICANT CHANGE UP (ref 150–400)
POTASSIUM SERPL-MCNC: 4.2 MMOL/L — SIGNIFICANT CHANGE UP (ref 3.5–5.3)
POTASSIUM SERPL-SCNC: 4.2 MMOL/L — SIGNIFICANT CHANGE UP (ref 3.5–5.3)
PROT SERPL-MCNC: 6.3 G/DL — SIGNIFICANT CHANGE UP (ref 6–8.3)
PROTHROM AB SERPL-ACNC: 17.6 SEC — HIGH (ref 10.5–13.4)
RBC # BLD: 2.75 M/UL — LOW (ref 4.2–5.8)
RBC # FLD: 17.7 % — HIGH (ref 10.3–14.5)
S AUREUS DNA NOSE QL NAA+PROBE: SIGNIFICANT CHANGE UP
SARS-COV-2 RNA SPEC QL NAA+PROBE: SIGNIFICANT CHANGE UP
SODIUM SERPL-SCNC: 138 MMOL/L — SIGNIFICANT CHANGE UP (ref 135–145)
T4 FREE SERPL-MCNC: 1.1 NG/DL — SIGNIFICANT CHANGE UP (ref 0.9–1.8)
TSH SERPL-MCNC: 0.89 UIU/ML — SIGNIFICANT CHANGE UP (ref 0.27–4.2)
UFH PPP CHRO-ACNC: 0.88 IU/ML — HIGH (ref 0.3–0.7)
WBC # BLD: 13.25 K/UL — HIGH (ref 3.8–10.5)
WBC # FLD AUTO: 13.25 K/UL — HIGH (ref 3.8–10.5)

## 2023-01-18 PROCEDURE — 93880 EXTRACRANIAL BILAT STUDY: CPT | Mod: 26

## 2023-01-18 PROCEDURE — 99232 SBSQ HOSP IP/OBS MODERATE 35: CPT

## 2023-01-18 PROCEDURE — 93306 TTE W/DOPPLER COMPLETE: CPT | Mod: 26

## 2023-01-18 PROCEDURE — 99231 SBSQ HOSP IP/OBS SF/LOW 25: CPT

## 2023-01-18 RX ORDER — CHLORHEXIDINE GLUCONATE 213 G/1000ML
30 SOLUTION TOPICAL ONCE
Refills: 0 | Status: COMPLETED | OUTPATIENT
Start: 2023-01-18 | End: 2023-01-19

## 2023-01-18 RX ORDER — ACETAMINOPHEN 500 MG
1000 TABLET ORAL ONCE
Refills: 0 | Status: COMPLETED | OUTPATIENT
Start: 2023-01-18 | End: 2023-01-19

## 2023-01-18 RX ORDER — MUPIROCIN 20 MG/G
1 OINTMENT TOPICAL EVERY 12 HOURS
Refills: 0 | Status: DISCONTINUED | OUTPATIENT
Start: 2023-01-18 | End: 2023-01-19

## 2023-01-18 RX ORDER — GABAPENTIN 400 MG/1
300 CAPSULE ORAL ONCE
Refills: 0 | Status: COMPLETED | OUTPATIENT
Start: 2023-01-18 | End: 2023-01-19

## 2023-01-18 RX ORDER — CEFUROXIME AXETIL 250 MG
1500 TABLET ORAL ONCE
Refills: 0 | Status: DISCONTINUED | OUTPATIENT
Start: 2023-01-18 | End: 2023-01-19

## 2023-01-18 RX ORDER — CHLORHEXIDINE GLUCONATE 213 G/1000ML
1 SOLUTION TOPICAL ONCE
Refills: 0 | Status: COMPLETED | OUTPATIENT
Start: 2023-01-18 | End: 2023-01-18

## 2023-01-18 RX ADMIN — SODIUM CHLORIDE 3 MILLILITER(S): 9 INJECTION INTRAMUSCULAR; INTRAVENOUS; SUBCUTANEOUS at 21:48

## 2023-01-18 RX ADMIN — HEPARIN SODIUM 12 UNIT(S)/HR: 5000 INJECTION INTRAVENOUS; SUBCUTANEOUS at 22:04

## 2023-01-18 RX ADMIN — MUPIROCIN 1 APPLICATION(S): 20 OINTMENT TOPICAL at 11:25

## 2023-01-18 RX ADMIN — CHLORHEXIDINE GLUCONATE 1 APPLICATION(S): 213 SOLUTION TOPICAL at 21:47

## 2023-01-18 RX ADMIN — SPIRONOLACTONE 12.5 MILLIGRAM(S): 25 TABLET, FILM COATED ORAL at 06:00

## 2023-01-18 RX ADMIN — SODIUM CHLORIDE 3 MILLILITER(S): 9 INJECTION INTRAMUSCULAR; INTRAVENOUS; SUBCUTANEOUS at 12:16

## 2023-01-18 RX ADMIN — TAMSULOSIN HYDROCHLORIDE 0.8 MILLIGRAM(S): 0.4 CAPSULE ORAL at 21:11

## 2023-01-18 RX ADMIN — Medication 1 SPRAY(S): at 21:10

## 2023-01-18 RX ADMIN — Medication 40 MILLIGRAM(S): at 06:00

## 2023-01-18 RX ADMIN — Medication 325 MILLIGRAM(S): at 11:20

## 2023-01-18 RX ADMIN — PANTOPRAZOLE SODIUM 40 MILLIGRAM(S): 20 TABLET, DELAYED RELEASE ORAL at 06:00

## 2023-01-18 RX ADMIN — LATANOPROST 1 DROP(S): 0.05 SOLUTION/ DROPS OPHTHALMIC; TOPICAL at 21:12

## 2023-01-18 RX ADMIN — MUPIROCIN 1 APPLICATION(S): 20 OINTMENT TOPICAL at 21:47

## 2023-01-18 RX ADMIN — ATORVASTATIN CALCIUM 80 MILLIGRAM(S): 80 TABLET, FILM COATED ORAL at 21:12

## 2023-01-18 RX ADMIN — Medication 81 MILLIGRAM(S): at 11:21

## 2023-01-18 RX ADMIN — SODIUM CHLORIDE 3 MILLILITER(S): 9 INJECTION INTRAMUSCULAR; INTRAVENOUS; SUBCUTANEOUS at 05:40

## 2023-01-18 RX ADMIN — Medication 500 MILLIGRAM(S): at 11:20

## 2023-01-18 RX ADMIN — ATENOLOL 25 MILLIGRAM(S): 25 TABLET ORAL at 06:00

## 2023-01-18 RX ADMIN — HEPARIN SODIUM 15 UNIT(S)/HR: 5000 INJECTION INTRAVENOUS; SUBCUTANEOUS at 11:21

## 2023-01-18 RX ADMIN — CLOPIDOGREL BISULFATE 75 MILLIGRAM(S): 75 TABLET, FILM COATED ORAL at 11:20

## 2023-01-18 NOTE — CONSULT NOTE ADULT - SUBJECTIVE AND OBJECTIVE BOX
Patient is a 78y old  Male who presents with a chief complaint of TAVR (18 Jan 2023 11:25)    Hematology/Oncology called to see patient who is under the care of Dr Goel for the treatment of anemia/thrombocytopenia    MEDICATIONS  (STANDING):  acetaminophen     Tablet .. 1000 milliGRAM(s) Oral once  ascorbic acid 500 milliGRAM(s) Oral daily  aspirin enteric coated 81 milliGRAM(s) Oral daily  atenolol  Tablet 25 milliGRAM(s) Oral daily  atorvastatin 80 milliGRAM(s) Oral at bedtime  chlorhexidine 0.12% Liquid 30 milliLiter(s) Swish and Spit once  chlorhexidine 4% Liquid 1 Application(s) Topical once  clopidogrel Tablet 75 milliGRAM(s) Oral daily  ferrous    sulfate 325 milliGRAM(s) Oral daily  fluticasone propionate 50 MICROgram(s)/spray Nasal Spray 1 Spray(s) Both Nostrils two times a day  furosemide    Tablet 40 milliGRAM(s) Oral daily  gabapentin 300 milliGRAM(s) Oral once  heparin  Infusion 1800 Unit(s)/Hr (15 mL/Hr) IV Continuous <Continuous>  latanoprost 0.005% Ophthalmic Solution 1 Drop(s) Both EYES at bedtime  mupirocin 2% Nasal 1 Application(s) Both Nostrils every 12 hours  pantoprazole    Tablet 40 milliGRAM(s) Oral before breakfast  sodium chloride 0.9% lock flush 3 milliLiter(s) IV Push every 8 hours  spironolactone 12.5 milliGRAM(s) Oral daily  tamsulosin 0.8 milliGRAM(s) Oral at bedtime    MEDICATIONS  (PRN):  cefuroxime  IVPB 1500 milliGRAM(s) IV Intermittent once PRN prophylaxis      ROS  No fever, sweats, chills  No epistaxis, HA, sore throat  No CP, SOB, cough, sputum  No n/v/d, abd pain, melena, hematochezia  No edema  No rash  No anxiety  No back pain, joint pain  No bleeding, bruising  No dysuria, hematuria    Vital Signs Last 24 Hrs  T(C): 36.6 (18 Jan 2023 11:45), Max: 36.7 (18 Jan 2023 04:59)  T(F): 97.9 (18 Jan 2023 11:45), Max: 98 (18 Jan 2023 04:59)  HR: 63 (18 Jan 2023 11:45) (60 - 93)  BP: 111/50 (18 Jan 2023 11:45) (111/50 - 153/69)  BP(mean): 83 (17 Jan 2023 19:36) (83 - 97)  RR: 18 (18 Jan 2023 11:45) (18 - 18)  SpO2: 98% (18 Jan 2023 11:45) (95% - 98%)    Parameters below as of 18 Jan 2023 11:45  Patient On (Oxygen Delivery Method): room air        PE  NAD  Awake, alert  Anicteric, MMM  RRR  CTAB  Abd soft, NT, ND  No c/c/e  No rash grossly  FROM                          8.5    13.25 )-----------( 359      ( 18 Jan 2023 05:14 )             27.3       01-18    138  |  103  |  40<H>  ----------------------------<  114<H>  4.2   |  24  |  1.02    Ca    8.9      18 Jan 2023 05:14    TPro  6.3  /  Alb  3.3  /  TBili  0.2  /  DBili  x   /  AST  17  /  ALT  15  /  AlkPhos  82  01-18

## 2023-01-18 NOTE — PROVIDER CONTACT NOTE (CRITICAL VALUE NOTIFICATION) - ASSESSMENT
Patient awake,alert and oriented x4. Skin warm and dry to touch. Respiration regular and easy. No S/S of bleeding noted.
Pt AOx4. No s/s of bleeding. VSS.

## 2023-01-18 NOTE — PROGRESS NOTE ADULT - ASSESSMENT
78M PMH HTN, Afib on coumadin (last dose 1/14), s/p MICRA PPM implant (Dr. 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  Tavr 1/19

## 2023-01-18 NOTE — PROGRESS NOTE ADULT - SUBJECTIVE AND OBJECTIVE BOX
*****Structural Heart Team*****    Subjective:    The patient is resting comfortably in a chair, eating lunch, offering no complaints. He underwent cardiac catheterization yesterday, and had a PCI done.    PAST MEDICAL & SURGICAL HISTORY:  Hodgkin lymphoma  1975    Atrial fibrillation  over 20 years    HTN (hypertension)    GERD (gastroesophageal reflux disease)    MOE (obstructive sleep apnea)  positive sleep study many years ago, was recommended to use CPAP, patient non-compliant    BPH (benign prostatic hyperplasia)    Osteoarthritis    CVA (cerebral vascular accident)  1/2018 - attributed to no AC for 11 days preop/postop spine surgery - presented to ED with slurred speech, residual ST memory loss, per pt    Spinal stenosis  L3-L4    Spondylolisthesis    Exposure to toxin  9/11     Atrial fibrillation  over 20 years    History of short term memory loss    DVT, lower extremity    S/P splenectomy  1975    S/P hip replacement, right  2014    History of cardioversion  for AF - &quot;many years ago&quot; - unsuccessful    History of lumbar spinal fusion  1/5/2018    Status post left cataract extraction    History of fasciotomy    Cardiac pacemaker          T(C): 36.7 (01-18-23 @ 04:59), Max: 36.7 (01-18-23 @ 04:59)  HR: 63 (01-18-23 @ 04:59) (60 - 93)  BP: 120/62 (01-18-23 @ 04:59) (120/62 - 160/72)  RR: 18 (01-18-23 @ 04:59) (18 - 18)  SpO2: 97% (01-18-23 @ 04:59) (95% - 100%)  Wt(kg): --  01-17 @ 07:01  -  01-18 @ 07:00  --------------------------------------------------------  IN: 596 mL / OUT: 1350 mL / NET: -754 mL    01-18 @ 07:01  -  01-18 @ 11:27  --------------------------------------------------------  IN: 240 mL / OUT: 300 mL / NET: -60 mL      MEDICATIONS  (STANDING):  acetaminophen     Tablet .. 1000 milliGRAM(s) Oral once  ascorbic acid 500 milliGRAM(s) Oral daily  aspirin enteric coated 81 milliGRAM(s) Oral daily  atenolol  Tablet 25 milliGRAM(s) Oral daily  atorvastatin 80 milliGRAM(s) Oral at bedtime  chlorhexidine 0.12% Liquid 30 milliLiter(s) Swish and Spit once  chlorhexidine 4% Liquid 1 Application(s) Topical once  clopidogrel Tablet 75 milliGRAM(s) Oral daily  ferrous    sulfate 325 milliGRAM(s) Oral daily  fluticasone propionate 50 MICROgram(s)/spray Nasal Spray 1 Spray(s) Both Nostrils two times a day  furosemide    Tablet 40 milliGRAM(s) Oral daily  gabapentin 300 milliGRAM(s) Oral once  heparin  Infusion 1800 Unit(s)/Hr (15 mL/Hr) IV Continuous <Continuous>  latanoprost 0.005% Ophthalmic Solution 1 Drop(s) Both EYES at bedtime  mupirocin 2% Nasal 1 Application(s) Both Nostrils every 12 hours  pantoprazole    Tablet 40 milliGRAM(s) Oral before breakfast  sodium chloride 0.9% lock flush 3 milliLiter(s) IV Push every 8 hours  spironolactone 12.5 milliGRAM(s) Oral daily  tamsulosin 0.8 milliGRAM(s) Oral at bedtime    MEDICATIONS  (PRN):  cefuroxime  IVPB 1500 milliGRAM(s) IV Intermittent once PRN prophylaxis      Review of Symptoms:  Constitutional: Awake, Alert, Follows commands  Respiratory: Currently denies  Cardiac: Denies CP, Denies Palpitations  Gastrointestinal: Denies Pain, Denies N/V, tolerating po intake  Vascular: Negative  Extremities: No Edema, No joint pain or swelling  Neurological: Negative  Endocrine: No heat or cold intolerance, No excessive thirst  Heme/Onc: Negative    Exam:  General: A/Ox3, ANSLEY, NAD  HEENT: Supple, No JVD, Trachea midline, no masses  Pulmonary: CTAB, = Chest Excursion, no accessory muscle use  Cor: S1S2, Irregular, II/VI PARESH  ECG: AFib with VPacing  Groin/Wound: Right wrist: Mild ecchymosis but non tender.  Gastrointestinal: Soft, NT/ND, + Bowel Sounds  Neuro: = motor and sensory B/L, No focal deficits  Vascular: 1+ pulses B/L, Trace edema  Extremities: No joint pain or swelling  Skin: Warm/Dry/Normal color, Normal turgor, no rashes                          8.5    13.25 )-----------( 359      ( 18 Jan 2023 05:14 )             27.3   01-18    138  |  103  |  40<H>  ----------------------------<  114<H>  4.2   |  24  |  1.02    Ca    8.9      18 Jan 2023 05:14    TPro  6.3  /  Alb  3.3  /  TBili  0.2  /  DBili  x   /  AST  17  /  ALT  15  /  AlkPhos  82  01-18  PT/INR - ( 18 Jan 2023 05:14 )   PT: 17.6 sec;   INR: 1.51 ratio         PTT - ( 18 Jan 2023 10:09 )  PTT:116.9 sec    Imaging Reviewed:    Echocardiogram:  < from: TTE with Doppler (w/Cont) (01.18.23 @ 08:58) >  EF (Marshall Rule): 54 %Doppler Peak Velocity (m/sec):  AoV=4.1  ------------------------------------------------------------------------  Observations:  Mitral Valve: Mitral annular calcification, otherwise  normal mitral valve. Mild mitral regurgitation.  Aortic Valve/Aorta: Calcified  aortic valve with decreased  opening. Peak transaortic valve gradient equals 67 mm Hg,  mean transaorticvalve gradient equals 40 mm Hg, estimated  aortic valve area equals 0.6 sqcm (by continuity equation),  aortic valve velocity time integral equals 103 cm,  consistent with severe aortic stenosis. Peak left  ventricular outflow tract gradient equals 3mm Hg, mean  gradient is equal to 2 mm Hg, LVOT velocity time integral  equals 21 cm.  Aortic Root: 3.3 cm.  Ascending Aorta: 3.1 cm.  LVOT diameter: 1.9 cm.  Left Atrium: Severely dilated left atrium.  LA volume index  = 58 cc/m2.  Left Ventricle: Normal left ventricular systolic function.   Endocardial visualization enhanced with intravenous  injection of Ultrasonic Enhancing Agent (Definity). Normal  left ventricular internal dimensions and wall thicknesses.  Right Heart: Right atrial enlargement. Right ventricular  enlargement with normal right ventricular systolic  function. Normal tricuspid valve. Mild tricuspid  regurgitation. Normal pulmonic valve. No pulmonic  regurgitation.  Pericardium/Pleura: Normal pericardium with no pericardial  effusion.  Hemodynamic: Estimated right atrial pressure is 8 mm Hg.  Estimated right ventricular systolic pressure equals 33 mm  Hg, assuming right atrial pressure equals 8 mm Hg,  consistent with normal pulmonary pressures.  ------------------------------------------------------------------------  Conclusions:  1. Calcified  aortic valve with decreased opening. Peak  transaortic valve gradient equals 67 mm Hg, mean  transaortic valve gradient equals 40 mm Hg, estimated  aortic valve area equals0.6 sqcm (by continuity equation),  aortic valve velocity time integral equals 103 cm,  consistent with severe aortic stenosis.  2. Severely dilated left atrium.  LA volume index = 58  cc/m2.  3. Normal left ventricular internal dimensions and wall  thicknesses.  4. Normal left ventricular systolic function.   Endocardial  visualization enhanced with intravenous injection of  Ultrasonic Enhancing Agent (Definity).  5. Right ventricular enlargement with normal right  ventricular systolic function.  *** Compared with echocardiogram of 11/15/2022, no  significant changes noted.  -------------------------------------------------    < end of copied text >            Cardiac Catheterization:  < from: Cardiac Catheterization (01.17.23 @ 12:01) >  Coronary Angiography   The coronary circulation is right dominant.      LM   Left main artery: Angiography shows no disease.      LAD   Left anterior descending artery: Angiography shows mild  atherosclerosis.    CX   Circumflex: Angiography shows mild atherosclerosis.      RCA   Mid right coronary artery: There is a 90 % stenosis. Proximal right  coronary artery: There is a 90 % stenosis.    Patient: RACHEL ANNE                MRN: [id number]  Study Date: 01/17/2023   12:01 PM      Page 1 of 5          X-Ray:   Diagnostic Flouro time:                                  Exam record  DAP:           7237.01 cGycm2  Interventional Flouro time:                              Exam fluoro  DAP:           5276.56 cGycm2  Total Flouro Time:         12.4 min.                  Exam total  DAP:            86240.60  cGycm2    Diagnostic Physician Signature:     Electronically signed by Keanu áSnchez MD on 01/17/2023 at 02:24 PM     Interventional Findings:     Interventional Details   Mid right coronary artery: The initial stenosis was 90 %. Guidewire  crossing was successful.    A successful Balloon angioplasty was performed using a 6FR JR 4.0  LAUNCHER, a GEOVANY [id number], and a 3.0 X 20  EUPHORA.      The inflation pressure was 14 atmfor the duration of 10.0 seconds.     The inflation pressure was 14 eduardo for the duration of 2.0 seconds.     A successful Drug Eluting Stent was deployed using a 3.0 X 35 ORSIRO  MISSION.    The inflation pressure was 23 eduardo for the duration of 7.0 seconds.     A successful Balloon angioplasty was performed using a 3.50 X 27  EUPHORA NC.    The inflation pressure was 25 eduardo for the duration of 8.0 seconds.     The inflation pressure was 20 eduardo for the duration of 8.0 seconds.     Following intervention there is a 1 % residual stenosis. There was  MORENA Flow 3 before the procedure and MORENA Flow 3 following the  procedure.      Proximal right coronary artery: The initial stenosis was 90 %. IVUS  was performed. Imaging shows distal reference diameter of  4.5 mm and proximal reference diameter of 5.0 mm. Guidewire crossing  was successful.    A successful Balloon angioplasty was performed using a 3.0 X 20  EUPHORA.    The inflation pressure was 18 eduardo for the duration of 28.0 seconds.     The inflation pressure was 12 eduardo for the duration of 3.0 seconds.     A successful Drug Eluting Stent was deployed using a 4.0 X 13 ORSIRO  MISSION.    The inflation pressure was 20 eduardo for the duration of 7.0 seconds.     A successful Balloon angioplasty was performed using a 4.50 X 12 MM NC  EMERGE .    The inflation pressure was 14 eduardo for the duration of 5.0 seconds.     The inflation pressure was 14 eduardo for the duration of 3.0 seconds.     Patient: RACHEL ANNE                MRN: [id number]  Study Date: 01/17/2023   12:01 PM      Page 2 of 5          A successful Balloon angioplasty was performed using a 5.00 X 12  EUPHORA NC.    The inflation pressure was 14 eduardo for the duration of 5.0 seconds.     The inflation pressure was 16 eduardo for the duration of 7.0 seconds.     The inflation pressure was 20 eduardo for the duration of 11.0 seconds.     Following intervention there is a 1 % residual stenosis. There was  MORENA Flow 3 before the procedure and MORENA Flow 3 following the  procedure.      History and Risk Factors:     < end of copied text >              Assesment/Plan:  78 male with Severe Aortic Stenosis, CAD, AFib    1.) Severe Aortic Stenosis: Patient work up for TAVR complete. He is scheduled for TAVR tomorrow. I answered all questions he had regarding the procedure.  2.) CAD: Pt is s/p PCI via Right Radial Artery. Pt placed on Clopidogrel.  3.) Chronic AFib: Patient is currently on Heparin drip. Will restart warfarin after TAVR.  4.) Ambulate as tolerated.       SAW Collazo  [id number]

## 2023-01-18 NOTE — PROVIDER CONTACT NOTE (CRITICAL VALUE NOTIFICATION) - BACKGROUND
Hx of Afib, DVT S/P Fasciotomy, Spinal Stenosis, CVA x2, BPH, Lymphoma, Micra PPM, S/P PCI right radial   -2 stents.  Placed to RCA
Pt PMHx of CVA and afib. Coumadin on hold s/p PCI. Heparin gtt started 6hrs post sheath removal based on nomogram.

## 2023-01-18 NOTE — PROGRESS NOTE ADULT - SUBJECTIVE AND OBJECTIVE BOX
Cardiac Surgery Pre-op Note:    CC: Patient is a 78y old  Male who presents with a chief complaint of                                                                                                            Surgeon:  Edwina    Procedure: (Date) (Procedure)  TAVR  23    Allergies    No Known Allergies    Intolerances        HPI:  78M PMH HTN, Afib on coumadin (last dose ), s/p MICRA PPM implant (Dr. 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      TTE with Doppler  1. Calcified trileaflet aortic valve with decreased  opening. Peak transaorticvalve gradient equals 55 mm Hg,  mean transaortic valve gradient equals 33 mm Hg, estimated  aortic valve area equals 0.8 sqcm (by continuity equation),  aortic valve velocity time integral equals 95 cm,  the DVI=  0.20, consistent with severe aorticstenosis.  LV SV= 73ml, LV SV index= 34ml/m2. No aortic valve  regurgitation seen.  2. Normal left ventricular systolic function. 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                PAST MEDICAL & SURGICAL HISTORY:  Hodgkin lymphoma        Atrial fibrillation  over 20 years      HTN (hypertension)      GERD (gastroesophageal reflux disease)      MOE (obstructive sleep apnea)  positive sleep study many years ago, was recommended to use CPAP, patient non-compliant      BPH (benign prostatic hyperplasia)      Osteoarthritis      CVA (cerebral vascular accident)  2018 - attributed to no AC for 11 days preop/postop spine surgery - presented to ED with slurred speech, residual ST memory loss, per pt      Spinal stenosis  L3-L4      Spondylolisthesis      Exposure to toxin         Atrial fibrillation  over 20 years      History of short term memory loss      DVT, lower extremity      S/P splenectomy        S/P hip replacement, right        History of cardioversion  for AF - &quot;many years ago&quot; - unsuccessful      History of lumbar spinal fusion  2018      Status post left cataract extraction      History of fasciotomy      Cardiac pacemaker          MEDICATIONS  (STANDING):  acetaminophen     Tablet .. 1000 milliGRAM(s) Oral once  ascorbic acid 500 milliGRAM(s) Oral daily  aspirin enteric coated 81 milliGRAM(s) Oral daily  atenolol  Tablet 25 milliGRAM(s) Oral daily  atorvastatin 80 milliGRAM(s) Oral at bedtime  chlorhexidine 0.12% Liquid 30 milliLiter(s) Swish and Spit once  chlorhexidine 4% Liquid 1 Application(s) Topical once  clopidogrel Tablet 75 milliGRAM(s) Oral daily  ferrous    sulfate 325 milliGRAM(s) Oral daily  fluticasone propionate 50 MICROgram(s)/spray Nasal Spray 1 Spray(s) Both Nostrils two times a day  furosemide    Tablet 40 milliGRAM(s) Oral daily  gabapentin 300 milliGRAM(s) Oral once  heparin  Infusion 1800 Unit(s)/Hr (18 mL/Hr) IV Continuous <Continuous>  latanoprost 0.005% Ophthalmic Solution 1 Drop(s) Both EYES at bedtime  mupirocin 2% Nasal 1 Application(s) Both Nostrils every 12 hours  pantoprazole    Tablet 40 milliGRAM(s) Oral before breakfast  sodium chloride 0.9% lock flush 3 milliLiter(s) IV Push every 8 hours  spironolactone 12.5 milliGRAM(s) Oral daily  tamsulosin 0.8 milliGRAM(s) Oral at bedtime    MEDICATIONS  (PRN):  cefuroxime  IVPB 1500 milliGRAM(s) IV Intermittent once PRN prophylaxis        Labs:                        8.5    13.25 )-----------( 359      ( 2023 05:14 )             27.3         138  |  103  |  40<H>  ----------------------------<  114<H>  4.2   |  24  |  1.02    Ca    8.9      2023 05:14    TPro  6.3  /  Alb  3.3  /  TBili  0.2  /  DBili  x   /  AST  17  /  ALT  15  /  AlkPhos  82      PT/INR - ( 2023 05:14 )   PT: 17.6 sec;   INR: 1.51 ratio         PTT - ( 2023 05:14 )  PTT:159.9 sec    Blood Type: ABO Interpretation: O ( @ 05:12)    HGB A1C:   Pro-BNP: Serum Pro-Brain Natriuretic Peptide: 2677 pg/mL ( @ 05:14)    Thyroid Panel:   MRSA:  / MSSA:   Urinalysis Basic - ( 2023 23:26 )    Color: Light Yellow / Appearance: Clear / S.016 / pH: x  Gluc: x / Ketone: Negative  / Bili: Negative / Urobili: Negative   Blood: x / Protein: Negative / Nitrite: Negative   Leuk Esterase: Negative / RBC: x / WBC x   Sq Epi: x / Non Sq Epi: x / Bacteria: x        CXR:  Mild pulmonary vascular congestion.    EKG:  WIDE QRS RHYTHM  NON-SPECIFIC INTRA-VENTRICULAR CONDUCTION BLOCK  ANTEROLATERAL INFARCT , AGE UNDETERMINED  ABNORMAL ECG  WHEN COMPARED WITH ECG OF 2022 10:26,  NO SIGNIFICANT CHANGE WAS FOUND      Carotid Duplex:  pending    PFT's:  performed    Echocardiogram:  see above    Cardiac catheterization:     LM   Left main artery: Angiography shows no disease.      LAD   Left anterior descending artery: Angiography shows mild  atherosclerosis.    CX   Circumflex: Angiography shows mild atherosclerosis.      RCA   Mid right coronary artery: There is a 90 % stenosis. Proximal right  coronary artery: There is a 90 % stenosis.      Conclusions:   Successful PCI of ostial and mid RA.  DAPT for 3 mths          Gen: WN/WD NAD  Neuro: AAOx3, nonfocal  Pulm: CTA B/L  CV: RRR, S1S2  Abd: Soft, NT, ND +BS  Ext: No edema, + peripheral pulses      Pt has AICD/PPM [ ] Yes  [ ] No             Brand Name:  Pre-op Beta Blocker ordered within 24 hrs of surgery ([facility]AB[facility] ONLY)?  [ ] Yes  [ ] No  If not, Why?  Type & Cross  [ x] Yes  [ ] No  NPO after Midnight x[ ] Yes  [ ] No  Pre-op ABX ordered, to be taped on chart:  [ x] Yes  [ ] No     Hibiclens/Peridex ordered [x ] Yes  [ ] No  AYLA [ x]

## 2023-01-18 NOTE — CONSULT NOTE ADULT - ASSESSMENT
Patient is a 78y old  Male who presents with a chief complaint of TAVR    anemia/thrombocytosis  --under the care of Dr Goel of [facility]  --hx of anemia d/t CKD, MARC  --Hgb 9-12 at baseline  --gets Aranesp 200mcg LD 12/23  --thrombocytopenia of unclear etiology  --pt is s/p splenectomy for tx of HL which may be contributing to thrombocytosis  --bone biopsy unrevealing   --will continue to follow while inpatient      Jyothi Xiao NP  Hematology/ Oncology  [facility]  [phone] (office)  [phone] (alt office)  Evenings and weekends please call MD on call or office

## 2023-01-19 ENCOUNTER — APPOINTMENT (OUTPATIENT)
Dept: CARDIOTHORACIC SURGERY | Facility: HOSPITAL | Age: 78
End: 2023-01-19

## 2023-01-19 DIAGNOSIS — I48.91 UNSPECIFIED ATRIAL FIBRILLATION: ICD-10-CM

## 2023-01-19 DIAGNOSIS — Z95.2 PRESENCE OF PROSTHETIC HEART VALVE: ICD-10-CM

## 2023-01-19 DIAGNOSIS — N40.0 BENIGN PROSTATIC HYPERPLASIA WITHOUT LOWER URINARY TRACT SYMPTOMS: ICD-10-CM

## 2023-01-19 DIAGNOSIS — I25.10 ATHEROSCLEROTIC HEART DISEASE OF NATIVE CORONARY ARTERY WITHOUT ANGINA PECTORIS: ICD-10-CM

## 2023-01-19 LAB
APTT BLD: 151.5 SEC — CRITICAL HIGH (ref 27.5–35.5)
APTT BLD: 81.3 SEC — HIGH (ref 27.5–35.5)
HCT VFR BLD CALC: 21.3 % — LOW (ref 39–50)
HGB BLD-MCNC: 6.7 G/DL — CRITICAL LOW (ref 13–17)
MCHC RBC-ENTMCNC: 31.5 GM/DL — LOW (ref 32–36)
MCHC RBC-ENTMCNC: 31.8 PG — SIGNIFICANT CHANGE UP (ref 27–34)
MCV RBC AUTO: 100.9 FL — HIGH (ref 80–100)
NRBC # BLD: 0 /100 WBCS — SIGNIFICANT CHANGE UP (ref 0–0)
PLATELET # BLD AUTO: 294 K/UL — SIGNIFICANT CHANGE UP (ref 150–400)
RBC # BLD: 2.11 M/UL — LOW (ref 4.2–5.8)
RBC # FLD: 18.5 % — HIGH (ref 10.3–14.5)
UFH PPP CHRO-ACNC: 0.94 IU/ML — HIGH (ref 0.3–0.7)
WBC # BLD: 18.48 K/UL — HIGH (ref 3.8–10.5)
WBC # FLD AUTO: 18.48 K/UL — HIGH (ref 3.8–10.5)

## 2023-01-19 PROCEDURE — 33361 REPLACE AORTIC VALVE PERQ: CPT | Mod: 62,Q0

## 2023-01-19 PROCEDURE — 93306 TTE W/DOPPLER COMPLETE: CPT | Mod: 26

## 2023-01-19 PROCEDURE — 93010 ELECTROCARDIOGRAM REPORT: CPT

## 2023-01-19 DEVICE — SHEATH INTRODUCER TERUMO PINNACLE CORONARY 8FR X 10CM X 0.038" MINI WIRE: Type: IMPLANTABLE DEVICE | Status: FUNCTIONAL

## 2023-01-19 DEVICE — CATH VASCULAR EXPO EXPO AMPLATZ LEFT CURVE (AL1) 0.045" X 5FR X 100CM: Type: IMPLANTABLE DEVICE | Status: FUNCTIONAL

## 2023-01-19 DEVICE — CATH VASCULAR EXPO VENTRICULAR PIGTAIL CURVE (PIG 145) 0.045" X 5FR X 10CM: Type: IMPLANTABLE DEVICE | Status: FUNCTIONAL

## 2023-01-19 DEVICE — GWIRE STR .038X180 STIFF LONG TAPR: Type: IMPLANTABLE DEVICE | Status: FUNCTIONAL

## 2023-01-19 DEVICE — WIRE GUIDE EXCHANGE J TIP 3MM X 260CM: Type: IMPLANTABLE DEVICE | Status: FUNCTIONAL

## 2023-01-19 DEVICE — CATH VASCULAR EXPO VENTRICULAR PIGTAIL CURVE (PIG) 0.045" X 5FR X 100CM: Type: IMPLANTABLE DEVICE | Status: FUNCTIONAL

## 2023-01-19 DEVICE — CATH VASCULAR EXPO FEMORAL LEFT CURVE (FL4) 0.045" X 5FR X 100CM: Type: IMPLANTABLE DEVICE | Status: FUNCTIONAL

## 2023-01-19 DEVICE — VLV HEART SAPIEN 3 ULTRA W/COMMANDER SYS 26MM: Type: IMPLANTABLE DEVICE | Status: FUNCTIONAL

## 2023-01-19 DEVICE — SHEATH INTRODUCER TERUMO PINNACLE CORONARY 6FR X 10CM X 0.038" MINI WIRE: Type: IMPLANTABLE DEVICE | Status: FUNCTIONAL

## 2023-01-19 DEVICE — GUIDEWIRE AMPLATZ EXTRA-STIFF CURVED .035" X 260CM: Type: IMPLANTABLE DEVICE | Status: FUNCTIONAL

## 2023-01-19 DEVICE — GWIRE GUID  0.035INX150CM: Type: IMPLANTABLE DEVICE | Status: FUNCTIONAL

## 2023-01-19 DEVICE — WIRE GD SAFARI2 275CM XSML CRV: Type: IMPLANTABLE DEVICE | Status: FUNCTIONAL

## 2023-01-19 DEVICE — SUT PERCLOSE PROGLIDE 6FR: Type: IMPLANTABLE DEVICE | Status: FUNCTIONAL

## 2023-01-19 DEVICE — PACER KT BLLN FLW DIR 5FR: Type: IMPLANTABLE DEVICE | Status: FUNCTIONAL

## 2023-01-19 DEVICE — GWIRE AMPLATZER .035 X 260CM: Type: IMPLANTABLE DEVICE | Status: FUNCTIONAL

## 2023-01-19 RX ORDER — FERROUS SULFATE 325(65) MG
325 TABLET ORAL DAILY
Refills: 0 | Status: DISCONTINUED | OUTPATIENT
Start: 2023-01-20 | End: 2023-01-24

## 2023-01-19 RX ORDER — HEPARIN SODIUM 5000 [USP'U]/ML
INJECTION INTRAVENOUS; SUBCUTANEOUS
Qty: 25000 | Refills: 0 | Status: DISCONTINUED | OUTPATIENT
Start: 2023-01-19 | End: 2023-01-19

## 2023-01-19 RX ORDER — ATENOLOL 25 MG/1
25 TABLET ORAL DAILY
Refills: 0 | Status: DISCONTINUED | OUTPATIENT
Start: 2023-01-19 | End: 2023-01-19

## 2023-01-19 RX ORDER — HEPARIN SODIUM 5000 [USP'U]/ML
8000 INJECTION INTRAVENOUS; SUBCUTANEOUS EVERY 6 HOURS
Refills: 0 | Status: DISCONTINUED | OUTPATIENT
Start: 2023-01-19 | End: 2023-01-19

## 2023-01-19 RX ORDER — ASPIRIN/CALCIUM CARB/MAGNESIUM 324 MG
81 TABLET ORAL DAILY
Refills: 0 | Status: DISCONTINUED | OUTPATIENT
Start: 2023-01-19 | End: 2023-01-24

## 2023-01-19 RX ORDER — TAMSULOSIN HYDROCHLORIDE 0.4 MG/1
0.8 CAPSULE ORAL AT BEDTIME
Refills: 0 | Status: DISCONTINUED | OUTPATIENT
Start: 2023-01-19 | End: 2023-01-24

## 2023-01-19 RX ORDER — ATENOLOL 25 MG/1
25 TABLET ORAL DAILY
Refills: 0 | Status: DISCONTINUED | OUTPATIENT
Start: 2023-01-20 | End: 2023-01-24

## 2023-01-19 RX ORDER — HEPARIN SODIUM 5000 [USP'U]/ML
4000 INJECTION INTRAVENOUS; SUBCUTANEOUS EVERY 6 HOURS
Refills: 0 | Status: DISCONTINUED | OUTPATIENT
Start: 2023-01-19 | End: 2023-01-19

## 2023-01-19 RX ORDER — ACETAMINOPHEN 500 MG
650 TABLET ORAL ONCE
Refills: 0 | Status: COMPLETED | OUTPATIENT
Start: 2023-01-19 | End: 2023-01-19

## 2023-01-19 RX ORDER — PANTOPRAZOLE SODIUM 20 MG/1
40 TABLET, DELAYED RELEASE ORAL
Refills: 0 | Status: DISCONTINUED | OUTPATIENT
Start: 2023-01-19 | End: 2023-01-24

## 2023-01-19 RX ORDER — LATANOPROST 0.05 MG/ML
1 SOLUTION/ DROPS OPHTHALMIC; TOPICAL AT BEDTIME
Refills: 0 | Status: DISCONTINUED | OUTPATIENT
Start: 2023-01-19 | End: 2023-01-24

## 2023-01-19 RX ORDER — CLOPIDOGREL BISULFATE 75 MG/1
75 TABLET, FILM COATED ORAL DAILY
Refills: 0 | Status: DISCONTINUED | OUTPATIENT
Start: 2023-01-19 | End: 2023-01-24

## 2023-01-19 RX ORDER — WARFARIN SODIUM 2.5 MG/1
2 TABLET ORAL ONCE
Refills: 0 | Status: COMPLETED | OUTPATIENT
Start: 2023-01-19 | End: 2023-01-19

## 2023-01-19 RX ORDER — ATORVASTATIN CALCIUM 80 MG/1
80 TABLET, FILM COATED ORAL AT BEDTIME
Refills: 0 | Status: DISCONTINUED | OUTPATIENT
Start: 2023-01-19 | End: 2023-01-24

## 2023-01-19 RX ORDER — ASCORBIC ACID 60 MG
500 TABLET,CHEWABLE ORAL DAILY
Refills: 0 | Status: DISCONTINUED | OUTPATIENT
Start: 2023-01-19 | End: 2023-01-24

## 2023-01-19 RX ORDER — SODIUM CHLORIDE 9 MG/ML
500 INJECTION INTRAMUSCULAR; INTRAVENOUS; SUBCUTANEOUS ONCE
Refills: 0 | Status: DISCONTINUED | OUTPATIENT
Start: 2023-01-19 | End: 2023-01-19

## 2023-01-19 RX ORDER — SODIUM CHLORIDE 9 MG/ML
250 INJECTION INTRAMUSCULAR; INTRAVENOUS; SUBCUTANEOUS ONCE
Refills: 0 | Status: COMPLETED | OUTPATIENT
Start: 2023-01-19 | End: 2023-01-19

## 2023-01-19 RX ORDER — FLUTICASONE PROPIONATE 50 MCG
1 SPRAY, SUSPENSION NASAL
Refills: 0 | Status: DISCONTINUED | OUTPATIENT
Start: 2023-01-19 | End: 2023-01-24

## 2023-01-19 RX ORDER — CEFUROXIME AXETIL 250 MG
1500 TABLET ORAL EVERY 8 HOURS
Refills: 0 | Status: COMPLETED | OUTPATIENT
Start: 2023-01-19 | End: 2023-01-19

## 2023-01-19 RX ORDER — HEPARIN SODIUM 5000 [USP'U]/ML
1500 INJECTION INTRAVENOUS; SUBCUTANEOUS
Qty: 25000 | Refills: 0 | Status: DISCONTINUED | OUTPATIENT
Start: 2023-01-19 | End: 2023-01-21

## 2023-01-19 RX ADMIN — Medication 1 SPRAY(S): at 18:15

## 2023-01-19 RX ADMIN — TAMSULOSIN HYDROCHLORIDE 0.8 MILLIGRAM(S): 0.4 CAPSULE ORAL at 22:42

## 2023-01-19 RX ADMIN — CHLORHEXIDINE GLUCONATE 30 MILLILITER(S): 213 SOLUTION TOPICAL at 05:40

## 2023-01-19 RX ADMIN — HEPARIN SODIUM 1800 UNIT(S)/HR: 5000 INJECTION INTRAVENOUS; SUBCUTANEOUS at 15:11

## 2023-01-19 RX ADMIN — GABAPENTIN 300 MILLIGRAM(S): 400 CAPSULE ORAL at 05:39

## 2023-01-19 RX ADMIN — SODIUM CHLORIDE 500 MILLILITER(S): 9 INJECTION INTRAMUSCULAR; INTRAVENOUS; SUBCUTANEOUS at 10:40

## 2023-01-19 RX ADMIN — Medication 650 MILLIGRAM(S): at 18:16

## 2023-01-19 RX ADMIN — Medication 500 MILLIGRAM(S): at 18:16

## 2023-01-19 RX ADMIN — SODIUM CHLORIDE 3 MILLILITER(S): 9 INJECTION INTRAMUSCULAR; INTRAVENOUS; SUBCUTANEOUS at 06:36

## 2023-01-19 RX ADMIN — LATANOPROST 1 DROP(S): 0.05 SOLUTION/ DROPS OPHTHALMIC; TOPICAL at 23:09

## 2023-01-19 RX ADMIN — SPIRONOLACTONE 12.5 MILLIGRAM(S): 25 TABLET, FILM COATED ORAL at 05:45

## 2023-01-19 RX ADMIN — Medication 1000 MILLIGRAM(S): at 06:10

## 2023-01-19 RX ADMIN — Medication 81 MILLIGRAM(S): at 15:11

## 2023-01-19 RX ADMIN — Medication 1 SPRAY(S): at 05:46

## 2023-01-19 RX ADMIN — Medication 40 MILLIGRAM(S): at 05:40

## 2023-01-19 RX ADMIN — Medication 1000 MILLIGRAM(S): at 05:40

## 2023-01-19 RX ADMIN — Medication 100 MILLIGRAM(S): at 15:53

## 2023-01-19 RX ADMIN — WARFARIN SODIUM 2 MILLIGRAM(S): 2.5 TABLET ORAL at 22:42

## 2023-01-19 RX ADMIN — PANTOPRAZOLE SODIUM 40 MILLIGRAM(S): 20 TABLET, DELAYED RELEASE ORAL at 05:40

## 2023-01-19 RX ADMIN — CLOPIDOGREL BISULFATE 75 MILLIGRAM(S): 75 TABLET, FILM COATED ORAL at 15:11

## 2023-01-19 RX ADMIN — Medication 100 MILLIGRAM(S): at 23:09

## 2023-01-19 RX ADMIN — SODIUM CHLORIDE 500 MILLILITER(S): 9 INJECTION INTRAMUSCULAR; INTRAVENOUS; SUBCUTANEOUS at 10:05

## 2023-01-19 RX ADMIN — ATORVASTATIN CALCIUM 80 MILLIGRAM(S): 80 TABLET, FILM COATED ORAL at 22:42

## 2023-01-19 RX ADMIN — ATENOLOL 25 MILLIGRAM(S): 25 TABLET ORAL at 05:40

## 2023-01-19 RX ADMIN — HEPARIN SODIUM 15 UNIT(S)/HR: 5000 INJECTION INTRAVENOUS; SUBCUTANEOUS at 22:42

## 2023-01-19 RX ADMIN — Medication 650 MILLIGRAM(S): at 18:37

## 2023-01-19 NOTE — PROGRESS NOTE ADULT - ASSESSMENT
78M PMH HTN, Afib on coumadin (last dose 1/14), s/p MICRA PPM implant (Dr. 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 DAPT: aspirin and plavix      1/19 s/p 26 mm tate valve via LFA  (14 Fr.) with 2 perclose, RFA (6Fr) closed with 1 perclose, RFV (8Fr) manual compression.  Will need triple therapy ASA/Plavix/Coumadin x 2 weeks post procedure.  On heparin gtt for bridge to coumadin.  To receive 2mg Coumadin tonight.  Flomax started for tonight.  Atenolol will resume in AM  78M PMH HTN, Afib on coumadin (last dose 1/14), s/p MICRA PPM implant (Dr. 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 DAPT: aspirin and plavix      1/19 s/p 26 mm tate valve via LFA  (14 Fr.) with 2 perclose, RFA (6Fr) closed with 1 perclose, RFV (8Fr) manual compression.  Will need triple therapy ASA/Plavix/Coumadin x 2 weeks post procedure.  On heparin gtt for bridge to coumadin.  To receive 2mg Coumadin tonight.  Flomax started for tonight.  Atenolol will resume in AM.  Continue to monitor on tele.    78M PMH HTN, Afib on coumadin (last dose 1/14), s/p MICRA PPM implant (Dr. 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      1/17  s/p LHC via RRA (off at 1555), CODY to RCA x 2. DAPT: aspirin and plavix    1/19 s/p 26 mm tate valve via LFA  (14 Fr.) with 2 perclose, RFA (6Fr) closed with 1 perclose, RFV (8Fr) manual compression.  Will need triple therapy ASA/Plavix/Coumadin x 2 weeks post procedure.  On heparin gtt for bridge to coumadin.  To receive 2mg Coumadin tonight.  Flomax started for tonight.  Atenolol will resume in AM.  Continue to monitor on tele.

## 2023-01-19 NOTE — PROGRESS NOTE ADULT - PROBLEM SELECTOR PLAN 3
s/p TAVR on 1/19     -Heparin gtt as bridge to coumadin.  2mg ordered for tonight   -Continue triple therapy ASA/Plavix/Coumadin x 2 weeks  -EKG/TTE ordered for 1/20  -Atenolol 25mg QD to start 1/20 AM

## 2023-01-19 NOTE — PROGRESS NOTE ADULT - PROBLEM SELECTOR PLAN 4
Currently on heparin bridge to coumadin.    -Continue to monitor on tele    -Coumadin 2mg tonight f/u INR in AM

## 2023-01-19 NOTE — PRE-ANESTHESIA EVALUATION ADULT - NSANTHPEFT_GEN_ALL_CORE
Gen: WNWD, NAD, frail-appearing  Neuro: AAOx4, moves all four extremities spontaneously  Resp: Unlabored breathing on room air, speaks in full sentences   CV: RRR  Ext: WWP, no edema

## 2023-01-19 NOTE — PROGRESS NOTE ADULT - PROBLEM SELECTOR PLAN 1
1/17  s/p LHC via RRA  CODY to RCA x 2.  Continue ASA/Plavix    -Continue Atorvastatin 80mg  -Heparin gtt with bridge to coumadin.  2mg for tonight   -Atenolol 25mg QD to start in AM

## 2023-01-19 NOTE — PROGRESS NOTE ADULT - SUBJECTIVE AND OBJECTIVE BOX
Subjective: " "    TELEMETRY:    VITAL SIGNS    Vital Signs Last 24 Hrs  T(C): 36.3 (23 @ 12:44), Max: 36.9 (23 @ 04:10)  T(F): 97.3 (23 @ 12:44), Max: 98.4 (23 @ 04:10)  HR: 60 (23 @ 12:44) (59 - 79)  BP: 91/52 (23 @ 12:44) (82/56 - 128/70)  RR: 18 (23 @ 12:44) (14 - 24)  SpO2: 95% (23 @ 12:44) (95% - 100%)             @ 07:01  -   @ 07:00  --------------------------------------------------------  IN: 1275 mL / OUT: 1300 mL / NET: -25 mL     @ 07:01  -   @ 15:20  --------------------------------------------------------  IN: 360 mL / OUT: 250 mL / NET: 110 mL       Daily Height in cm: 172.72 (2023 07:49)    Daily Weight in k.9 (2023 06:27)  Admit Wt: Drug Dosing Weight  Height (cm): 172.7 (2023 07:49)  Weight (kg): 98.8 (2023 07:49)  BMI (kg/m2): 33.1 (2023 07:49)  BSA (m2): 2.12 (2023 07:49)      CAPILLARY BLOOD GLUCOSE                  PHYSICAL EXAM    Neurology: alert and oriented x 3, nonfocal, no gross deficits  CV : tele:  RSR  Sternal Wound :  CDI with dressing , Stable  Lungs: clear. RR easy, unlabored   Abdomen: soft, nontender, nondistended, positive bowel sounds, bowel movement   Neg N/V/D   :  pt voiding without difficulty   Extremities:   SERRANO; edema, neg calf tenderness.   PPP bilaterally      PW:    Chest tubes:        aspirin enteric coated 81 milliGRAM(s) Oral daily  atorvastatin 80 milliGRAM(s) Oral at bedtime  cefuroxime  IVPB 1500 milliGRAM(s) IV Intermittent every 8 hours  clopidogrel Tablet 75 milliGRAM(s) Oral daily  heparin   Injectable 8000 Unit(s) IV Push every 6 hours PRN  heparin   Injectable 4000 Unit(s) IV Push every 6 hours PRN  heparin  Infusion.  Unit(s)/Hr IV Continuous <Continuous>  warfarin 2 milliGRAM(s) Oral once                         Subjective: "I feel ok "  Patient denies headache/dizziness/blurry vision.  No chest pain/sob.  No nausea/vomiting/diarrhea.  No numbness/tingling     TELEMETRY:    VITAL SIGNS    Vital Signs Last 24 Hrs  T(C): 36.3 (23 @ 12:44), Max: 36.9 (23 @ 04:10)  T(F): 97.3 (23 @ 12:44), Max: 98.4 (23 @ 04:10)  HR: 60 (23 @ 12:44) (59 - 79)  BP: 91/52 (23 @ 12:44) (82/56 - 128/70)  RR: 18 (23 @ 12:44) (14 - 24)  SpO2: 95% (23 @ 12:44) (95% - 100%)             07:01  -   @ 07:00  --------------------------------------------------------  IN: 1275 mL / OUT: 1300 mL / NET: -25 mL     @ 07:01  -   @ 15:20  --------------------------------------------------------  IN: 360 mL / OUT: 250 mL / NET: 110 mL       Daily Height in cm: 172.72 (2023 07:49)    Daily Weight in k.9 (2023 06:27)  Admit Wt: Drug Dosing Weight  Height (cm): 172.7 (2023 07:49)  Weight (kg): 98.8 (2023 07:49)  BMI (kg/m2): 33.1 (2023 07:49)  BSA (m2): 2.12 (2023 07:49)      CAPILLARY BLOOD GLUCOSE                  PHYSICAL EXAM    Neurology: alert and oriented x 3, nonfocal, no gross deficits  CV :   Lungs: CTA BL RR easy, unlabored   Abdomen: soft, nontender, nondistended, positive bowel sounds, bowel movement   Neg N/V/D   :  pt voiding without difficulty   Extremities: Bilateral groins without s/s bleeding/hematoma able to   SERRANO; Trace peripheral edema, neg calf tenderness.   PPP bilaterally              aspirin enteric coated 81 milliGRAM(s) Oral daily  atorvastatin 80 milliGRAM(s) Oral at bedtime  cefuroxime  IVPB 1500 milliGRAM(s) IV Intermittent every 8 hours  clopidogrel Tablet 75 milliGRAM(s) Oral daily  heparin   Injectable 8000 Unit(s) IV Push every 6 hours PRN  heparin   Injectable 4000 Unit(s) IV Push every 6 hours PRN  heparin  Infusion.  Unit(s)/Hr IV Continuous <Continuous>  warfarin 2 milliGRAM(s) Oral once                         Subjective: "I feel ok "  Patient denies headache/dizziness/blurry vision.  No chest pain/sob.  No nausea/vomiting/diarrhea.  No numbness/tingling     TELEMETRY: Afib / V Pace     VITAL SIGNS    Vital Signs Last 24 Hrs  T(C): 36.3 (23 @ 12:44), Max: 36.9 (23 @ 04:10)  T(F): 97.3 (23 @ 12:44), Max: 98.4 (23 @ 04:10)  HR: 60 (23 @ 12:44) (59 - 79)  BP: 91/52 (23 @ 12:44) (82/56 - 128/70)  RR: 18 (23 @ 12:44) (14 - 24)  SpO2: 95% (23 @ 12:44) (95% - 100%)             07:01  -   @ 07:00  --------------------------------------------------------  IN: 1275 mL / OUT: 1300 mL / NET: -25 mL     @ 07:01  -   @ 15:20  --------------------------------------------------------  IN: 360 mL / OUT: 250 mL / NET: 110 mL       Daily Height in cm: 172.72 (2023 07:49)    Daily Weight in k.9 (2023 06:27)  Admit Wt: Drug Dosing Weight  Height (cm): 172.7 (2023 07:49)  Weight (kg): 98.8 (2023 07:49)  BMI (kg/m2): 33.1 (2023 07:49)  BSA (m2): 2.12 (2023 07:49)      CAPILLARY BLOOD GLUCOSE         PHYSICAL EXAM    Neurology: alert and oriented x 3, nonfocal, no gross deficits  CV: Irreg/irreg +s1/s2  Lungs: CTA BL RR easy, unlabored   Abdomen: soft, nontender, nondistended, positive bowel sounds, No  bowel movement post procedure   Neg N/V/D   :  pt voiding without difficulty   Extremities: Bilateral groins without s/s bleeding/hematoma able to   SERRANO; Trace peripheral edema, neg calf tenderness.   PPP bilaterally        aspirin enteric coated 81 milliGRAM(s) Oral daily  atorvastatin 80 milliGRAM(s) Oral at bedtime  cefuroxime  IVPB 1500 milliGRAM(s) IV Intermittent every 8 hours  clopidogrel Tablet 75 milliGRAM(s) Oral daily  heparin   Injectable 8000 Unit(s) IV Push every 6 hours PRN  heparin   Injectable 4000 Unit(s) IV Push every 6 hours PRN  heparin  Infusion.  Unit(s)/Hr IV Continuous <Continuous>  warfarin 2 milliGRAM(s) Oral once                         Subjective: "I feel ok "  Patient denies headache/dizziness/blurry vision.  No chest pain/sob.  No nausea/vomiting/diarrhea.  No numbness/tingling     TELEMETRY: Afib / V Pace     VITAL SIGNS    Vital Signs Last 24 Hrs  T(C): 36.3 (23 @ 12:44), Max: 36.9 (23 @ 04:10)  T(F): 97.3 (23 @ 12:44), Max: 98.4 (23 @ 04:10)  HR: 60 (23 @ 12:44) (59 - 79)  BP: 91/52 (23 @ 12:44) (82/56 - 128/70)  RR: 18 (23 @ 12:44) (14 - 24)  SpO2: 95% (23 @ 12:44) (95% - 100%)             07:01  -   @ 07:00  --------------------------------------------------------  IN: 1275 mL / OUT: 1300 mL / NET: -25 mL     @ 07:01  -   @ 15:20  --------------------------------------------------------  IN: 360 mL / OUT: 250 mL / NET: 110 mL       Daily Height in cm: 172.72 (2023 07:49)    Daily Weight in k.9 (2023 06:27)  Admit Wt: Drug Dosing Weight  Height (cm): 172.7 (2023 07:49)  Weight (kg): 98.8 (2023 07:49)  BMI (kg/m2): 33.1 (2023 07:49)  BSA (m2): 2.12 (2023 07:49)      CAPILLARY BLOOD GLUCOSE         PHYSICAL EXAM    Neurology: alert and oriented x 3, nonfocal, no gross deficits  CV: Irreg/irreg +s1/s2  Lungs: CTA BL RR easy, unlabored   Abdomen: soft, nontender, nondistended, positive bowel sounds, No  bowel movement post procedure   Neg N/V/D   :  pt voiding without difficulty   Extremities: Bilateral groins without s/s bleeding/hematoma able to SERRANO; Trace peripheral edema, neg calf tenderness.   PPP bilaterally.  Right leg with healed fasciotomy/skin graft site        aspirin enteric coated 81 milliGRAM(s) Oral daily  atorvastatin 80 milliGRAM(s) Oral at bedtime  cefuroxime  IVPB 1500 milliGRAM(s) IV Intermittent every 8 hours  clopidogrel Tablet 75 milliGRAM(s) Oral daily  heparin   Injectable 8000 Unit(s) IV Push every 6 hours PRN  heparin   Injectable 4000 Unit(s) IV Push every 6 hours PRN  heparin  Infusion.  Unit(s)/Hr IV Continuous <Continuous>  warfarin 2 milliGRAM(s) Oral once

## 2023-01-19 NOTE — CHART NOTE - NSCHARTNOTEFT_GEN_A_CORE
HPI:  78M PMH HTN, Afib on coumadin (last dose 1/14), s/p MICRA PPM implant (Dr. 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   < from: TTE with Doppler (w/Cont) (11.15.22 @ 14:16) >  CONCLUSIONS:  1. Calcified trileaflet aortic valve with decreased  opening. Peak transaorticvalve gradient equals 55 mm Hg,  mean transaortic valve gradient equals 33 mm Hg, estimated  aortic valve area equals 0.8 sqcm (by continuity equation),  aortic valve velocity time integral equals 95 cm,  the DVI=  0.20, consistent with severe aorticstenosis.  LV SV= 73ml, LV SV index= 34ml/m2. No aortic valve  regurgitation seen.  2. Normal left ventricular systolic function. 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  The LVEF= 59%  by biplane Marshall's method.  *** Compared withechocardiogram of 7/12/2022, the aortic  stenosis is now severe.    < end of copied text >      CONSTITUTIONAL: Well groomed, no apparent distress  EYES: PERRLA and symmetric, EOMI, No conjunctival or scleral injection, non-icteric  RESP: No respiratory distress, no use of accessory muscles; CTA b/l  CV: RRR, +S1S2, no peripheral edema, b/l DP audible by doppler   LYMPH: No cervical LAD or tenderness; no axillary LAD or tenderness; no inguinal LAD or tenderness  SKIN: b/l groin sites clean, dry and intact. No bleeding/hematoma noted  PSYCH: Appropriate insight/judgment; A+O x 3, mood and affect appropriate, patient resting post procedure      A/P  78M PMH HTN, Afib on coumadin (last dose 1/14), s/p MICRA PPM implant (Dr. 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 Bland  Restart Heparin gtt (no bolus) in 6 hours (at 1500) if groin site stable   Continue triple therapy for 2 weeks  Resume asa 81mg and plavix 75 mg in 6 hours 1500  Resume home dose of Coumadin today in evening  500cc IVF administered post TAVR in PACU for BP 89/50  Zinacef x2 more doses (next dose 1500) HPI:  78M PMH HTN, Afib on coumadin (last dose 1/14), s/p MICRA PPM implant (Dr. 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   < from: TTE with Doppler (w/Cont) (11.15.22 @ 14:16) >  CONCLUSIONS:  1. Calcified trileaflet aortic valve with decreased  opening. Peak transaorticvalve gradient equals 55 mm Hg,  mean transaortic valve gradient equals 33 mm Hg, estimated  aortic valve area equals 0.8 sqcm (by continuity equation),  aortic valve velocity time integral equals 95 cm,  the DVI=  0.20, consistent with severe aorticstenosis.  LV SV= 73ml, LV SV index= 34ml/m2. No aortic valve  regurgitation seen.  2. Normal left ventricular systolic function. 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  The LVEF= 59%  by biplane Marshall's method.  *** Compared withechocardiogram of 7/12/2022, the aortic  stenosis is now severe.    < end of copied text >      CONSTITUTIONAL: Well groomed, no apparent distress  EYES: PERRLA and symmetric, EOMI, No conjunctival or scleral injection, non-icteric  RESP: No respiratory distress, no use of accessory muscles; CTA b/l  CV: RRR, +S1S2, no peripheral edema, b/l DP audible by doppler   LYMPH: No cervical LAD or tenderness; no axillary LAD or tenderness; no inguinal LAD or tenderness  SKIN: b/l groin sites clean, dry and intact. No bleeding/hematoma noted  PSYCH: Appropriate insight/judgment; A+O x 3, mood and affect appropriate, patient resting post procedure      A/P  78M PMH HTN, Afib on coumadin (last dose 1/14), s/p MICRA PPM implant (Dr. 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 Bland  Restart Heparin gtt (no bolus) in 6 hours (at 1500) if groin site stable   Triple therapy for 2 weeks- aspirin, plavix and warfarin  Resume asa 81mg and plavix 75 mg in 6 hours (1500)  Resume home dose of Coumadin today in evening (2mg )   500cc IVF administered post TAVR in PACU for BP 89/50  Zinacef 1500mg x2 more doses (next dose 1500)  toradol 15mg IV x1 at 8AM, IV tylenol x1 at 9AM for L hip pain d/t OA HPI:  78M PMH HTN, Afib on coumadin (last dose 1/14), s/p MICRA PPM implant (Dr. 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   < from: TTE with Doppler (w/Cont) (11.15.22 @ 14:16) >  CONCLUSIONS:  1. Calcified trileaflet aortic valve with decreased  opening. Peak transaorticvalve gradient equals 55 mm Hg,  mean transaortic valve gradient equals 33 mm Hg, estimated  aortic valve area equals 0.8 sqcm (by continuity equation),  aortic valve velocity time integral equals 95 cm,  the DVI=  0.20, consistent with severe aorticstenosis.  LV SV= 73ml, LV SV index= 34ml/m2. No aortic valve  regurgitation seen.  2. Normal left ventricular systolic function. 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  The LVEF= 59%  by biplane Marshall's method.  *** Compared withechocardiogram of 7/12/2022, the aortic  stenosis is now severe.    < end of copied text >      CONSTITUTIONAL: Well groomed, no apparent distress  EYES: PERRLA and symmetric, EOMI, No conjunctival or scleral injection, non-icteric  RESP: No respiratory distress, no use of accessory muscles; CTA b/l  CV: RRR, +S1S2, no peripheral edema, b/l DP audible by doppler   LYMPH: No cervical LAD or tenderness; no axillary LAD or tenderness; no inguinal LAD or tenderness  SKIN: b/l groin sites clean, dry and intact. No bleeding/hematoma noted  PSYCH: Appropriate insight/judgment; A+O x 3, mood and affect appropriate, patient resting post procedure    EKG post TAVR : V paced rhythm @ 60 bpm     A/P  78M PMH HTN, Afib on coumadin (last dose 1/14), s/p MICRA PPM implant (Dr. 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 Bland  Restart Heparin gtt (no bolus) in 6 hours (at 1500) if groin site stable   Triple therapy for 2 weeks- aspirin, plavix and warfarin  Resume asa 81mg and plavix 75 mg in 6 hours (1500)  Resume home dose of Coumadin today in evening (2mg )   500cc IVF administered post TAVR in PACU for BP 89/50  Zinacef 1500mg x2 more doses (next dose 1500)  toradol 15mg IV x1 at 8AM, IV tylenol x1 at 9AM for L hip pain d/t OA

## 2023-01-19 NOTE — PRE-ANESTHESIA EVALUATION ADULT - NSANTHPMHFT_GEN_ALL_CORE
From Cardiology H&P: "78M PMH HTN, Afib on coumadin (last dose 1/14), s/p MICRA PPM implant (Dr. 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   < from: TTE with Doppler (w/Cont) (11.15.22 @ 14:16) >  CONCLUSIONS:  1. Calcified trileaflet aortic valve with decreased  opening. Peak transaorticvalve gradient equals 55 mm Hg,  mean transaortic valve gradient equals 33 mm Hg, estimated  aortic valve area equals 0.8 sqcm (by continuity equation),  aortic valve velocity time integral equals 95 cm,  the DVI=  0.20, consistent with severe aorticstenosis.  LV SV= 73ml, LV SV index= 34ml/m2. No aortic valve  regurgitation seen.  2. Normal left ventricular systolic function. 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  The LVEF= 59%  by biplane Marshall's method.  *** Compared withechocardiogram of 7/12/2022, the aortic  stenosis is now severe."

## 2023-01-20 LAB
ALBUMIN SERPL ELPH-MCNC: 2.9 G/DL — LOW (ref 3.3–5)
ALP SERPL-CCNC: 105 U/L — SIGNIFICANT CHANGE UP (ref 40–120)
ALT FLD-CCNC: 24 U/L — SIGNIFICANT CHANGE UP (ref 10–45)
ANION GAP SERPL CALC-SCNC: 10 MMOL/L — SIGNIFICANT CHANGE UP (ref 5–17)
APTT BLD: 109.6 SEC — HIGH (ref 27.5–35.5)
APTT BLD: 137.8 SEC — CRITICAL HIGH (ref 27.5–35.5)
APTT BLD: 91.5 SEC — HIGH (ref 27.5–35.5)
AST SERPL-CCNC: 42 U/L — HIGH (ref 10–40)
BILIRUB SERPL-MCNC: 0.2 MG/DL — SIGNIFICANT CHANGE UP (ref 0.2–1.2)
BUN SERPL-MCNC: 44 MG/DL — HIGH (ref 7–23)
CALCIUM SERPL-MCNC: 8.5 MG/DL — SIGNIFICANT CHANGE UP (ref 8.4–10.5)
CHLORIDE SERPL-SCNC: 104 MMOL/L — SIGNIFICANT CHANGE UP (ref 96–108)
CO2 SERPL-SCNC: 24 MMOL/L — SIGNIFICANT CHANGE UP (ref 22–31)
CREAT SERPL-MCNC: 1.07 MG/DL — SIGNIFICANT CHANGE UP (ref 0.5–1.3)
EGFR: 71 ML/MIN/1.73M2 — SIGNIFICANT CHANGE UP
FERRITIN SERPL-MCNC: 196 NG/ML — SIGNIFICANT CHANGE UP (ref 30–400)
GLUCOSE SERPL-MCNC: 141 MG/DL — HIGH (ref 70–99)
HCT VFR BLD CALC: 20.1 % — CRITICAL LOW (ref 39–50)
HCT VFR BLD CALC: 21.4 % — LOW (ref 39–50)
HCT VFR BLD CALC: 26.5 % — LOW (ref 39–50)
HGB BLD-MCNC: 6.3 G/DL — CRITICAL LOW (ref 13–17)
HGB BLD-MCNC: 6.7 G/DL — CRITICAL LOW (ref 13–17)
HGB BLD-MCNC: 8.2 G/DL — LOW (ref 13–17)
INR BLD: 1.26 RATIO — HIGH (ref 0.88–1.16)
IRON SATN MFR SERPL: 23 UG/DL — LOW (ref 45–165)
IRON SATN MFR SERPL: 9 % — LOW (ref 16–55)
MAGNESIUM SERPL-MCNC: 2.2 MG/DL — SIGNIFICANT CHANGE UP (ref 1.6–2.6)
MCHC RBC-ENTMCNC: 29.3 PG — SIGNIFICANT CHANGE UP (ref 27–34)
MCHC RBC-ENTMCNC: 30.3 PG — SIGNIFICANT CHANGE UP (ref 27–34)
MCHC RBC-ENTMCNC: 30.9 GM/DL — LOW (ref 32–36)
MCHC RBC-ENTMCNC: 31.3 GM/DL — LOW (ref 32–36)
MCHC RBC-ENTMCNC: 31.3 GM/DL — LOW (ref 32–36)
MCHC RBC-ENTMCNC: 31.5 PG — SIGNIFICANT CHANGE UP (ref 27–34)
MCV RBC AUTO: 100.5 FL — HIGH (ref 80–100)
MCV RBC AUTO: 94.6 FL — SIGNIFICANT CHANGE UP (ref 80–100)
MCV RBC AUTO: 96.8 FL — SIGNIFICANT CHANGE UP (ref 80–100)
NRBC # BLD: 0 /100 WBCS — SIGNIFICANT CHANGE UP (ref 0–0)
PHOSPHATE SERPL-MCNC: 3.4 MG/DL — SIGNIFICANT CHANGE UP (ref 2.5–4.5)
PLATELET # BLD AUTO: 265 K/UL — SIGNIFICANT CHANGE UP (ref 150–400)
PLATELET # BLD AUTO: 280 K/UL — SIGNIFICANT CHANGE UP (ref 150–400)
PLATELET # BLD AUTO: 297 K/UL — SIGNIFICANT CHANGE UP (ref 150–400)
POTASSIUM SERPL-MCNC: 3.8 MMOL/L — SIGNIFICANT CHANGE UP (ref 3.5–5.3)
POTASSIUM SERPL-SCNC: 3.8 MMOL/L — SIGNIFICANT CHANGE UP (ref 3.5–5.3)
PROT SERPL-MCNC: 5.2 G/DL — LOW (ref 6–8.3)
PROTHROM AB SERPL-ACNC: 14.6 SEC — HIGH (ref 10.5–13.4)
RBC # BLD: 2 M/UL — LOW (ref 4.2–5.8)
RBC # BLD: 2.21 M/UL — LOW (ref 4.2–5.8)
RBC # BLD: 2.8 M/UL — LOW (ref 4.2–5.8)
RBC # FLD: 18.6 % — HIGH (ref 10.3–14.5)
RBC # FLD: 19.9 % — HIGH (ref 10.3–14.5)
RBC # FLD: 20.2 % — HIGH (ref 10.3–14.5)
SODIUM SERPL-SCNC: 138 MMOL/L — SIGNIFICANT CHANGE UP (ref 135–145)
TIBC SERPL-MCNC: 247 UG/DL — SIGNIFICANT CHANGE UP (ref 220–430)
TRANSFERRIN SERPL-MCNC: 196 MG/DL — LOW (ref 200–360)
UIBC SERPL-MCNC: 224 UG/DL — SIGNIFICANT CHANGE UP (ref 110–370)
WBC # BLD: 16.08 K/UL — HIGH (ref 3.8–10.5)
WBC # BLD: 17.24 K/UL — HIGH (ref 3.8–10.5)
WBC # BLD: 18.3 K/UL — HIGH (ref 3.8–10.5)
WBC # FLD AUTO: 16.08 K/UL — HIGH (ref 3.8–10.5)
WBC # FLD AUTO: 17.24 K/UL — HIGH (ref 3.8–10.5)
WBC # FLD AUTO: 18.3 K/UL — HIGH (ref 3.8–10.5)

## 2023-01-20 PROCEDURE — ZZZZZ: CPT

## 2023-01-20 PROCEDURE — 99232 SBSQ HOSP IP/OBS MODERATE 35: CPT

## 2023-01-20 PROCEDURE — 93010 ELECTROCARDIOGRAM REPORT: CPT

## 2023-01-20 PROCEDURE — 93306 TTE W/DOPPLER COMPLETE: CPT | Mod: 26

## 2023-01-20 RX ORDER — FUROSEMIDE 40 MG
40 TABLET ORAL ONCE
Refills: 0 | Status: COMPLETED | OUTPATIENT
Start: 2023-01-20 | End: 2023-01-20

## 2023-01-20 RX ORDER — SPIRONOLACTONE 25 MG/1
12.5 TABLET, FILM COATED ORAL DAILY
Refills: 0 | Status: DISCONTINUED | OUTPATIENT
Start: 2023-01-21 | End: 2023-01-24

## 2023-01-20 RX ORDER — OXYCODONE HYDROCHLORIDE 5 MG/1
5 TABLET ORAL EVERY 4 HOURS
Refills: 0 | Status: DISCONTINUED | OUTPATIENT
Start: 2023-01-20 | End: 2023-01-21

## 2023-01-20 RX ORDER — WARFARIN SODIUM 2.5 MG/1
3 TABLET ORAL ONCE
Refills: 0 | Status: COMPLETED | OUTPATIENT
Start: 2023-01-20 | End: 2023-01-20

## 2023-01-20 RX ORDER — FUROSEMIDE 40 MG
40 TABLET ORAL DAILY
Refills: 0 | Status: DISCONTINUED | OUTPATIENT
Start: 2023-01-21 | End: 2023-01-24

## 2023-01-20 RX ADMIN — Medication 1 SPRAY(S): at 05:39

## 2023-01-20 RX ADMIN — CLOPIDOGREL BISULFATE 75 MILLIGRAM(S): 75 TABLET, FILM COATED ORAL at 12:08

## 2023-01-20 RX ADMIN — HEPARIN SODIUM 14 UNIT(S)/HR: 5000 INJECTION INTRAVENOUS; SUBCUTANEOUS at 07:46

## 2023-01-20 RX ADMIN — ATENOLOL 25 MILLIGRAM(S): 25 TABLET ORAL at 05:38

## 2023-01-20 RX ADMIN — Medication 40 MILLIGRAM(S): at 10:08

## 2023-01-20 RX ADMIN — HEPARIN SODIUM 13 UNIT(S)/HR: 5000 INJECTION INTRAVENOUS; SUBCUTANEOUS at 12:36

## 2023-01-20 RX ADMIN — Medication 325 MILLIGRAM(S): at 12:08

## 2023-01-20 RX ADMIN — OXYCODONE HYDROCHLORIDE 5 MILLIGRAM(S): 5 TABLET ORAL at 16:28

## 2023-01-20 RX ADMIN — OXYCODONE HYDROCHLORIDE 5 MILLIGRAM(S): 5 TABLET ORAL at 21:02

## 2023-01-20 RX ADMIN — TAMSULOSIN HYDROCHLORIDE 0.8 MILLIGRAM(S): 0.4 CAPSULE ORAL at 20:33

## 2023-01-20 RX ADMIN — LATANOPROST 1 DROP(S): 0.05 SOLUTION/ DROPS OPHTHALMIC; TOPICAL at 20:38

## 2023-01-20 RX ADMIN — OXYCODONE HYDROCHLORIDE 5 MILLIGRAM(S): 5 TABLET ORAL at 20:32

## 2023-01-20 RX ADMIN — Medication 81 MILLIGRAM(S): at 12:09

## 2023-01-20 RX ADMIN — OXYCODONE HYDROCHLORIDE 5 MILLIGRAM(S): 5 TABLET ORAL at 10:00

## 2023-01-20 RX ADMIN — Medication 1 SPRAY(S): at 17:44

## 2023-01-20 RX ADMIN — Medication 500 MILLIGRAM(S): at 12:08

## 2023-01-20 RX ADMIN — OXYCODONE HYDROCHLORIDE 5 MILLIGRAM(S): 5 TABLET ORAL at 09:17

## 2023-01-20 RX ADMIN — OXYCODONE HYDROCHLORIDE 5 MILLIGRAM(S): 5 TABLET ORAL at 17:30

## 2023-01-20 RX ADMIN — ATORVASTATIN CALCIUM 80 MILLIGRAM(S): 80 TABLET, FILM COATED ORAL at 20:33

## 2023-01-20 RX ADMIN — WARFARIN SODIUM 3 MILLIGRAM(S): 2.5 TABLET ORAL at 20:33

## 2023-01-20 RX ADMIN — PANTOPRAZOLE SODIUM 40 MILLIGRAM(S): 20 TABLET, DELAYED RELEASE ORAL at 05:39

## 2023-01-20 NOTE — PROGRESS NOTE ADULT - SUBJECTIVE AND OBJECTIVE BOX
VITAL SIGNS    Telemetry:    Vital Signs Last 24 Hrs  T(C): 36.9 (23 @ 05:20), Max: 37 (23 @ 05:00)  T(F): 98.5 (23 @ 05:20), Max: 98.6 (23 @ 05:00)  HR: 90 (23 @ 05:20) (59 - 96)  BP: 116/55 (23 @ 05:20) (82/56 - 131/61)  RR: 18 (23 @ 05:20) (14 - 24)  SpO2: 92% (23 @ 05:20) (92% - 100%)             @ 07:  -   @ 07:00  --------------------------------------------------------  IN: 1124 mL / OUT: 800 mL / NET: 324 mL       Daily     Daily Weight in k.9 (2023 08:29)  Admit Wt: Drug Dosing Weight  Height (cm): 172.7 (2023 07:49)  Weight (kg): 98.8 (2023 07:49)  BMI (kg/m2): 33.1 (2023 07:49)  BSA (m2): 2.12 (2023 07:49)    Bilirubin Total, Serum: 0.2 mg/dL ( @ 04:24)    CAPILLARY BLOOD GLUCOSE              ascorbic acid 500 milliGRAM(s) Oral daily  aspirin enteric coated 81 milliGRAM(s) Oral daily  atenolol  Tablet 25 milliGRAM(s) Oral daily  atorvastatin 80 milliGRAM(s) Oral at bedtime  clopidogrel Tablet 75 milliGRAM(s) Oral daily  ferrous    sulfate 325 milliGRAM(s) Oral daily  fluticasone propionate 50 MICROgram(s)/spray Nasal Spray 1 Spray(s) Both Nostrils two times a day  heparin  Infusion 1500 Unit(s)/Hr IV Continuous <Continuous>  latanoprost 0.005% Ophthalmic Solution 1 Drop(s) Both EYES at bedtime  pantoprazole    Tablet 40 milliGRAM(s) Oral before breakfast  tamsulosin 0.8 milliGRAM(s) Oral at bedtime      PHYSICAL EXAM    Subjective: "Hi.   Neurology: alert and oriented x 3, nonfocal, no gross deficits  CV : tele:  RSR  Sternal Wound :  CDI with dressing , Stable  Lungs: clear. RR easy, unlabored   Abdomen: soft, nontender, nondistended, positive bowel sounds, bowel movement   Neg N/V/D   :  pt voiding without difficulty   Extremities:   SERRANO; edema, neg calf tenderness.   PPP bilaterally      PW:  Chest tubes:                 VITAL SIGNS    Telemetry:  afib v.paced 60-80  Vital Signs Last 24 Hrs  T(C): 36.9 (23 @ 05:20), Max: 37 (23 @ 05:00)  T(F): 98.5 (23 @ 05:20), Max: 98.6 (23 @ 05:00)  HR: 90 (23 @ 05:20) (59 - 96)  BP: 116/55 (23 @ 05:20) (82/56 - 131/61)  RR: 18 (23 @ 05:20) (14 - 24)  SpO2: 92% (23 @ 05:20) (92% - 100%)             @ 07:01  -   @ 07:00  --------------------------------------------------------  IN: 1124 mL / OUT: 800 mL / NET: 324 mL       Daily     Daily Weight in k.9 (2023 08:29)  Admit Wt: Drug Dosing Weight  Height (cm): 172.7 (2023 07:49)  Weight (kg): 98.8 (2023 07:49)  BMI (kg/m2): 33.1 (2023 07:49)  BSA (m2): 2.12 (2023 07:49)    Bilirubin Total, Serum: 0.2 mg/dL ( @ 04:24)      ascorbic acid 500 milliGRAM(s) Oral daily  aspirin enteric coated 81 milliGRAM(s) Oral daily  atenolol  Tablet 25 milliGRAM(s) Oral daily  atorvastatin 80 milliGRAM(s) Oral at bedtime  clopidogrel Tablet 75 milliGRAM(s) Oral daily  ferrous    sulfate 325 milliGRAM(s) Oral daily  fluticasone propionate 50 MICROgram(s)/spray Nasal Spray 1 Spray(s) Both Nostrils two times a day  heparin  Infusion 1500 Unit(s)/Hr IV Continuous <Continuous>  latanoprost 0.005% Ophthalmic Solution 1 Drop(s) Both EYES at bedtime  pantoprazole    Tablet 40 milliGRAM(s) Oral before breakfast  tamsulosin 0.8 milliGRAM(s) Oral at bedtime        PHYSICAL EXAM    Subjective: "I feel ok."  Neurology: alert and oriented x 3, nonfocal, no gross deficits  CV : tele:  afib v.paced 60-80  Lungs: clear. RR easy, unlabored   Abdomen: soft, nontender, nondistended, positive bowel sounds, neg bowel movement   Neg N/V/D; obese abdomen    :  pt voiding without difficulty   Extremities:   SERRANO; trace LE edema, neg calf tenderness.   PPP bilaterally; bilateral groin sites cdi dwight- neg hematoma/bleeding

## 2023-01-20 NOTE — PROGRESS NOTE ADULT - PROBLEM SELECTOR PLAN 4
Currently on heparin bridge to coumadin.    -Continue to monitor on tele    -Coumadin 2mg tonight f/u INR in AM Currently on heparin bridge to coumadin.  Continue to monitor on tele  INR today 1.2--> coumadin 3 mg this evening  atenolol 25 mg po qd for HR control  monitor and supplement electrolytes

## 2023-01-20 NOTE — PROGRESS NOTE ADULT - PROBLEM SELECTOR PLAN 3
s/p TAVR on 1/19     -Heparin gtt as bridge to coumadin.  2mg ordered for tonight   -Continue triple therapy ASA/Plavix/Coumadin x 2 weeks  -EKG/TTE ordered for 1/20  -Atenolol 25mg QD to start 1/20 AM s/p TAVR on 1/19   bilateral groin sites stable  transfuse 1 unit prbc today as per DR. Bland; repeat h/h 8/26  ekg qd   ck echo today   Atenolol 25mg QD   diuretics resumed today  discharge planning- home when INR therapeutic- no mcot pt has a micra ppm

## 2023-01-20 NOTE — PROGRESS NOTE ADULT - SUBJECTIVE AND OBJECTIVE BOX
*****Structural Heart Team*****    Subjective:    The patient is resting comfortably in a chair, offering no complaints. Overnight, he was found to be anemic, and received 2 units of PRBC's.    PAST MEDICAL & SURGICAL HISTORY:  Hodgkin lymphoma  1975    Atrial fibrillation  over 20 years    HTN (hypertension)    GERD (gastroesophageal reflux disease)    MOE (obstructive sleep apnea)  positive sleep study many years ago, was recommended to use CPAP, patient non-compliant    BPH (benign prostatic hyperplasia)    Osteoarthritis    CVA (cerebral vascular accident)  1/2018 - attributed to no AC for 11 days preop/postop spine surgery - presented to ED with slurred speech, residual ST memory loss, per pt    Spinal stenosis  L3-L4    Spondylolisthesis    Exposure to toxin  9/11     Atrial fibrillation  over 20 years    History of short term memory loss    DVT, lower extremity    S/P splenectomy  1975    S/P hip replacement, right  2014    History of cardioversion  for AF - &quot;many years ago&quot; - unsuccessful    History of lumbar spinal fusion  1/5/2018    Status post left cataract extraction    History of fasciotomy    Cardiac pacemaker          T(C): 36.7 (01-20-23 @ 08:35), Max: 37 (01-20-23 @ 05:00)  HR: 96 (01-20-23 @ 08:35) (59 - 96)  BP: 132/67 (01-20-23 @ 08:35) (82/56 - 132/67)  RR: 18 (01-20-23 @ 08:35) (14 - 24)  SpO2: 94% (01-20-23 @ 08:35) (92% - 100%)  Wt(kg): --  01-19 @ 07:01  -  01-20 @ 07:00  --------------------------------------------------------  IN: 1124 mL / OUT: 800 mL / NET: 324 mL    01-20 @ 07:01  -  01-20 @ 09:23  --------------------------------------------------------  IN: 28 mL / OUT: 500 mL / NET: -472 mL      MEDICATIONS  (STANDING):  ascorbic acid 500 milliGRAM(s) Oral daily  aspirin enteric coated 81 milliGRAM(s) Oral daily  atenolol  Tablet 25 milliGRAM(s) Oral daily  atorvastatin 80 milliGRAM(s) Oral at bedtime  clopidogrel Tablet 75 milliGRAM(s) Oral daily  ferrous    sulfate 325 milliGRAM(s) Oral daily  fluticasone propionate 50 MICROgram(s)/spray Nasal Spray 1 Spray(s) Both Nostrils two times a day  heparin  Infusion 1500 Unit(s)/Hr (14 mL/Hr) IV Continuous <Continuous>  latanoprost 0.005% Ophthalmic Solution 1 Drop(s) Both EYES at bedtime  pantoprazole    Tablet 40 milliGRAM(s) Oral before breakfast  tamsulosin 0.8 milliGRAM(s) Oral at bedtime    MEDICATIONS  (PRN):  oxyCODONE    IR 5 milliGRAM(s) Oral every 4 hours PRN Moderate Pain (4 - 6)      Review of Symptoms:  Constitutional: Awake, Alert, Follows commands  Respiratory: Currently denies  Cardiac: Denies CP, Denies Palpitations  Gastrointestinal: Denies Pain, Denies N/V, tolerating po intake  Vascular: Negative  Extremities: + Edema, No joint pain or swelling  Neurological: Negative  Endocrine: No heat or cold intolerance, No excessive thirst  Heme/Onc: Negative    Exam:  General: A/Ox3, ANSLEY, NAD  HEENT: Supple, No JVD, Trachea midline, no masses  Pulmonary: CTAB, = Chest Excursion, no accessory muscle use  Cor: S1S2, RRR, No murmur noted  ECG: VPaced  Groin/Wound: Rgroin with mild tenderness, otherwise soft, no hematoma or ecchymosis, Left groin unremarkable  Gastrointestinal: Soft, NT/ND, + Bowel Sounds  Neuro: = motor and sensory B/L, No focal deficits  Vascular: 1+ pulses B/L, 1+ Pedal edema Bilateral  Extremities: No joint pain or swelling  Skin: Warm/Dry/Normal color, Normal turgor, no rashes                          6.7    16.08 )-----------( 265      ( 20 Jan 2023 04:24 )             21.4   01-20    138  |  104  |  44<H>  ----------------------------<  141<H>  3.8   |  24  |  1.07    Ca    8.5      20 Jan 2023 04:24  Phos  3.4     01-20  Mg     2.2     01-20    TPro  5.2<L>  /  Alb  2.9<L>  /  TBili  0.2  /  DBili  x   /  AST  42<H>  /  ALT  24  /  AlkPhos  105  01-20  PT/INR - ( 20 Jan 2023 04:24 )   PT: 14.6 sec;   INR: 1.26 ratio         PTT - ( 20 Jan 2023 04:24 )  PTT:137.8 sec    Imaging Reviewed:    Echocardiogram: Post op pending            Assesment/Plan:  79 y/o male s/p TAVR with Radha Valve via Transfemoral access, Acute on Chronic Diastolic Heart Failure, CAD s/p PCI, Chronic AFib, ABLA  1.) S/P TAVR: ASA 81 mg po daily, Continue to Monitor Groins. Ambulate as tolerated. Follow up Post op TTE when complete  2.) Acute on Chronic Diastolic Heart Failure: Monitor Daily weight. Patient with pedal edema, and received 2 Units PRBC. Will give furosemide 40 mg IV x1 today. Will reorder to start his home diuretic regimen of furosemide 40 mg po daily and spironolactone 12.5 mg po daily starting tomorrow.  3.) Acute Blood Loss Anemia: Patient received 2 units of blood last night. Will repeat CBC this AM  4.) Chronic AFib: Continue Heparin drip as he is bridged back onto his warfarin.  5.) CAD s/p PCI: Continue Plavix 75 mg po daily. Follow up with Dr. Sánchez  6.) No MCOT needed due to prior PPM  7.) Discharge Plan: Patient is to follow up with Dr. Carlos in 1 week post discharge. HE should then follow up with his Cardiologist in 30 days, with a repeat Transthoracic Echo to be done at that visit.    D[place]ed [place]  [id number]

## 2023-01-20 NOTE — PROGRESS NOTE ADULT - PROBLEM SELECTOR PLAN 1
1/17  s/p LHC via RRA  CODY to RCA x 2.  Continue ASA/Plavix    -Continue Atorvastatin 80mg  -Heparin gtt with bridge to coumadin.  2mg for tonight   -Atenolol 25mg QD to start in AM 1/17  s/p LHC via RRA  CODY to RCA x 2.    Continue ASA/Plavix  Continue Atorvastatin 80mg  Atenolol 25mg QD

## 2023-01-20 NOTE — PROVIDER CONTACT NOTE (CRITICAL VALUE NOTIFICATION) - ACTION/TREATMENT ORDERED:
hold heparin for 1 hour
No intervention needed at this time
Heparin gtt on hold for 1 hour.  Will continue to monitor PTT
Decrease gtt to 16cc/hr. Do not hold gtt due to PMHx of CVAs.

## 2023-01-20 NOTE — PROGRESS NOTE ADULT - ASSESSMENT
78M PMH HTN, Afib on coumadin (last dose 1/14), s/p MICRA PPM implant (Dr. 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      1/17  s/p LHC via RRA (off at 1555), CODY to RCA x 2. DAPT: aspirin and plavix    1/19 s/p 26 mm tate valve via LFA  (14 Fr.) with 2 perclose, RFA (6Fr) closed with 1 perclose, RFV (8Fr) manual compression.  Will need triple therapy ASA/Plavix/Coumadin x 2 weeks post procedure.  On heparin gtt for bridge to coumadin.  To receive 2mg Coumadin tonight.  Flomax started for tonight.  Atenolol will resume in AM.  Continue to monitor on tele.    78M PMH HTN, Afib on coumadin (last dose 1/14), s/p MICRA PPM implant (Dr. 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      1/17  s/p LHC via RRA (off at 1555), CODY to RCA x 2. DAPT: aspirin and plavix    1/19 s/p 26 mm tate valve via LFA  (14 Fr.) with 2 perclose, RFA (6Fr) closed with 1 perclose, RFV (8Fr) manual compression.  Will need triple therapy ASA/Plavix/Coumadin x 2 weeks post procedure.  On heparin gtt for bridge to coumadin.  To receive 2mg Coumadin tonight.  Flomax started for tonight.  Atenolol will resume in AM.  Continue to monitor on tele.     1/20 VSS; afib V.paced 60-80; bilateral groin sites stable- neg hematoma/ bleeding; h/h this am 6/21- 2 units prbc given as per Dr. Bland; repeat h/h 8/26; resume diuretics as per cris heart; ac with heparin/ coum bridge for afib; INR 1.2 today--> coum 3 mg this evening; ck echo today/ ekg daily  discharge planning- home when INR therapeutic - no mcot- pt has a micra ppm

## 2023-01-21 LAB
ANION GAP SERPL CALC-SCNC: 11 MMOL/L — SIGNIFICANT CHANGE UP (ref 5–17)
APTT BLD: 76.2 SEC — HIGH (ref 27.5–35.5)
APTT BLD: 92.9 SEC — HIGH (ref 27.5–35.5)
BUN SERPL-MCNC: 30 MG/DL — HIGH (ref 7–23)
CALCIUM SERPL-MCNC: 8.7 MG/DL — SIGNIFICANT CHANGE UP (ref 8.4–10.5)
CHLORIDE SERPL-SCNC: 101 MMOL/L — SIGNIFICANT CHANGE UP (ref 96–108)
CO2 SERPL-SCNC: 25 MMOL/L — SIGNIFICANT CHANGE UP (ref 22–31)
CREAT SERPL-MCNC: 1.11 MG/DL — SIGNIFICANT CHANGE UP (ref 0.5–1.3)
EGFR: 68 ML/MIN/1.73M2 — SIGNIFICANT CHANGE UP
GLUCOSE SERPL-MCNC: 111 MG/DL — HIGH (ref 70–99)
HCT VFR BLD CALC: 23.8 % — LOW (ref 39–50)
HGB BLD-MCNC: 7.6 G/DL — LOW (ref 13–17)
INR BLD: 1.62 RATIO — HIGH (ref 0.88–1.16)
MCHC RBC-ENTMCNC: 30 PG — SIGNIFICANT CHANGE UP (ref 27–34)
MCHC RBC-ENTMCNC: 31.9 GM/DL — LOW (ref 32–36)
MCV RBC AUTO: 94.1 FL — SIGNIFICANT CHANGE UP (ref 80–100)
NRBC # BLD: 2 /100 WBCS — HIGH (ref 0–0)
PLATELET # BLD AUTO: 272 K/UL — SIGNIFICANT CHANGE UP (ref 150–400)
POTASSIUM SERPL-MCNC: 4 MMOL/L — SIGNIFICANT CHANGE UP (ref 3.5–5.3)
POTASSIUM SERPL-SCNC: 4 MMOL/L — SIGNIFICANT CHANGE UP (ref 3.5–5.3)
PROTHROM AB SERPL-ACNC: 18.7 SEC — HIGH (ref 10.5–13.4)
RBC # BLD: 2.53 M/UL — LOW (ref 4.2–5.8)
RBC # FLD: 20.2 % — HIGH (ref 10.3–14.5)
SODIUM SERPL-SCNC: 137 MMOL/L — SIGNIFICANT CHANGE UP (ref 135–145)
WBC # BLD: 15.67 K/UL — HIGH (ref 3.8–10.5)
WBC # FLD AUTO: 15.67 K/UL — HIGH (ref 3.8–10.5)

## 2023-01-21 PROCEDURE — 74176 CT ABD & PELVIS W/O CONTRAST: CPT | Mod: 26

## 2023-01-21 PROCEDURE — 99232 SBSQ HOSP IP/OBS MODERATE 35: CPT

## 2023-01-21 PROCEDURE — 93010 ELECTROCARDIOGRAM REPORT: CPT

## 2023-01-21 RX ORDER — WARFARIN SODIUM 2.5 MG/1
2 TABLET ORAL ONCE
Refills: 0 | Status: COMPLETED | OUTPATIENT
Start: 2023-01-21 | End: 2023-01-21

## 2023-01-21 RX ADMIN — OXYCODONE HYDROCHLORIDE 5 MILLIGRAM(S): 5 TABLET ORAL at 00:15

## 2023-01-21 RX ADMIN — SPIRONOLACTONE 12.5 MILLIGRAM(S): 25 TABLET, FILM COATED ORAL at 05:33

## 2023-01-21 RX ADMIN — OXYCODONE HYDROCHLORIDE 5 MILLIGRAM(S): 5 TABLET ORAL at 06:02

## 2023-01-21 RX ADMIN — CLOPIDOGREL BISULFATE 75 MILLIGRAM(S): 75 TABLET, FILM COATED ORAL at 11:44

## 2023-01-21 RX ADMIN — Medication 1 SPRAY(S): at 05:33

## 2023-01-21 RX ADMIN — Medication 40 MILLIGRAM(S): at 05:33

## 2023-01-21 RX ADMIN — Medication 500 MILLIGRAM(S): at 11:44

## 2023-01-21 RX ADMIN — TAMSULOSIN HYDROCHLORIDE 0.8 MILLIGRAM(S): 0.4 CAPSULE ORAL at 23:19

## 2023-01-21 RX ADMIN — WARFARIN SODIUM 2 MILLIGRAM(S): 2.5 TABLET ORAL at 23:19

## 2023-01-21 RX ADMIN — OXYCODONE HYDROCHLORIDE 5 MILLIGRAM(S): 5 TABLET ORAL at 05:32

## 2023-01-21 RX ADMIN — Medication 1 SPRAY(S): at 17:01

## 2023-01-21 RX ADMIN — ATORVASTATIN CALCIUM 80 MILLIGRAM(S): 80 TABLET, FILM COATED ORAL at 23:19

## 2023-01-21 RX ADMIN — OXYCODONE HYDROCHLORIDE 5 MILLIGRAM(S): 5 TABLET ORAL at 12:19

## 2023-01-21 RX ADMIN — PANTOPRAZOLE SODIUM 40 MILLIGRAM(S): 20 TABLET, DELAYED RELEASE ORAL at 06:31

## 2023-01-21 RX ADMIN — OXYCODONE HYDROCHLORIDE 5 MILLIGRAM(S): 5 TABLET ORAL at 11:49

## 2023-01-21 RX ADMIN — OXYCODONE HYDROCHLORIDE 5 MILLIGRAM(S): 5 TABLET ORAL at 00:45

## 2023-01-21 RX ADMIN — Medication 325 MILLIGRAM(S): at 11:44

## 2023-01-21 RX ADMIN — OXYCODONE HYDROCHLORIDE 5 MILLIGRAM(S): 5 TABLET ORAL at 17:30

## 2023-01-21 RX ADMIN — Medication 81 MILLIGRAM(S): at 11:44

## 2023-01-21 RX ADMIN — OXYCODONE HYDROCHLORIDE 5 MILLIGRAM(S): 5 TABLET ORAL at 17:00

## 2023-01-21 RX ADMIN — LATANOPROST 1 DROP(S): 0.05 SOLUTION/ DROPS OPHTHALMIC; TOPICAL at 22:26

## 2023-01-21 RX ADMIN — ATENOLOL 25 MILLIGRAM(S): 25 TABLET ORAL at 05:33

## 2023-01-21 NOTE — PROGRESS NOTE ADULT - NS PANP COMMENT GEN_ALL_CORE FT
Resting comfortably in bed. Reports no pain, angina, dyspnea, or other symptoms. Groins are soft, belly is soft and benign, no flank pain. That said, his CBC is drifting back down after 2u PRBC. Agree with CT A/P to evaluate for a bleed. Agree with stopping heparin for now in this context. Agree with continuing DAPT given recent stenting. OK to dose warfarin tonight unless a finding on the CT dictates otherwise.  Rocco Bland MD

## 2023-01-21 NOTE — PROGRESS NOTE ADULT - PROBLEM SELECTOR PLAN 3
s/p TAVR on 1/19   bilateral groin sites stable  transfuse 1 unit prbc today   ekg qd   ECHO low AV gradients  Atenolol 25mg QD   diuretics   discharge planning- home when INR therapeutic- no mcot pt has a micra ppm

## 2023-01-21 NOTE — PROGRESS NOTE ADULT - SUBJECTIVE AND OBJECTIVE BOX
Subjective:  "Feel ok, no pain"  Sitting up in bed      Tele:  AF /   60-80           V/S:                    T(F): 98.5 (01-21-23 @ 08:50), Max: 99.1 (01-20-23 @ 19:43)  HR: 61 (01-21-23 @ 08:50) (61 - 87)  BP: 120/60 (01-21-23 @ 08:50) (120/60 - 143/61)  RR: 17 (01-21-23 @ 08:50) (17 - 18)  SpO2: 95% (01-21-23 @ 08:50) (93% - 96%)  Wt(kg): --      LV EF:      Labs:  01-21    137  |  101  |  30<H>  ----------------------------<  111<H>  4.0   |  25  |  1.11    Ca    8.7      21 Jan 2023 05:01  Phos  3.4     01-20  Mg     2.2     01-20    TPro  5.2<L>  /  Alb  2.9<L>  /  TBili  0.2  /  DBili  x   /  AST  42<H>  /  ALT  24  /  AlkPhos  105  01-20                               7.6    15.67 )-----------( 272      ( 21 Jan 2023 05:01 )             23.8        PT/INR - ( 21 Jan 2023 05:01 )   PT: 18.7 sec;   INR: 1.62 ratio         PTT - ( 21 Jan 2023 05:01 )  PTT:76.2 sec      I&O's Detail    20 Jan 2023 07:01  -  21 Jan 2023 07:00  --------------------------------------------------------  IN:    Heparin: 291 mL    Oral Fluid: 560 mL    PRBCs (Packed Red Blood Cells): 300 mL  Total IN: 1151 mL    OUT:    Voided (mL): 2550 mL  Total OUT: 2550 mL    Total NET: -1399 mL    BOWEL MOVEMENT:  [ ] YES [x ] NO      Daily     Daily   Medications:  ascorbic acid 500 milliGRAM(s) Oral daily  aspirin enteric coated 81 milliGRAM(s) Oral daily  atenolol  Tablet 25 milliGRAM(s) Oral daily  atorvastatin 80 milliGRAM(s) Oral at bedtime  clopidogrel Tablet 75 milliGRAM(s) Oral daily  ferrous    sulfate 325 milliGRAM(s) Oral daily  fluticasone propionate 50 MICROgram(s)/spray Nasal Spray 1 Spray(s) Both Nostrils two times a day  furosemide    Tablet 40 milliGRAM(s) Oral daily  latanoprost 0.005% Ophthalmic Solution 1 Drop(s) Both EYES at bedtime  oxyCODONE    IR 5 milliGRAM(s) Oral every 4 hours PRN  pantoprazole    Tablet 40 milliGRAM(s) Oral before breakfast  spironolactone 12.5 milliGRAM(s) Oral daily  tamsulosin 0.8 milliGRAM(s) Oral at bedtime  warfarin 2 milliGRAM(s) Oral once        Physical Exam:    Neurology: alert and oriented x 3    CV : S1 S2  irreg     Lungs: clear. RR easy, unlabored     Abdomen: soft, nontender, nondistended, positive bowel sounds, neg bowel movement     :  pt voiding without difficulty     Extremities:   SERRANO; trace LE edema, neg calf tenderness.   PPP bilaterally; bilateral groin sitessoft cdi dwight-   neg hematoma/bleeding                      PAST MEDICAL & SURGICAL HISTORY:  Hodgkin lymphoma  1975      Atrial fibrillation  over 20 years      HTN (hypertension)      GERD (gastroesophageal reflux disease)      MOE (obstructive sleep apnea)  positive sleep study many years ago, was recommended to use CPAP, patient non-compliant      BPH (benign prostatic hyperplasia)      Osteoarthritis      CVA (cerebral vascular accident)  1/2018 - attributed to no AC for 11 days preop/postop spine surgery - presented to ED with slurred speech, residual ST memory loss, per pt      Spinal stenosis  L3-L4      Spondylolisthesis      Exposure to toxin  9/11       Atrial fibrillation  over 20 years      History of short term memory loss      DVT, lower extremity      S/P splenectomy  1975      S/P hip replacement, right  2014      History of cardioversion  for AF - &quot;many years ago&quot; - unsuccessful      History of lumbar spinal fusion  1/5/2018      Status post left cataract extraction      History of fasciotomy      Cardiac pacemaker

## 2023-01-21 NOTE — PROGRESS NOTE ADULT - PROBLEM SELECTOR PLAN 1
1/17  s/p LHC via RRA  CODY to RCA x 2.    Continue ASA/Plavix  Continue Atorvastatin 80mg  Atenolol 25mg QD

## 2023-01-21 NOTE — PROGRESS NOTE ADULT - ASSESSMENT
78M PMH HTN, Afib on coumadin (last dose 1/14), s/p MICRA PPM implant (Dr. 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      1/17  s/p LHC via RRA (off at 1555), CODY to RCA x 2. DAPT: aspirin and plavix    1/19 s/p 26 mm tate valve via LFA  (14 Fr.) with 2 perclose, RFA (6Fr) closed with 1 perclose, RFV (8Fr) manual compression.  Will need triple therapy ASA/Plavix/Coumadin x 2 weeks post procedure.  On heparin gtt for bridge to coumadin.  To receive 2mg Coumadin tonight.  Flomax started for tonight.  Atenolol will resume in AM.  Continue to monitor on tele.     1/20 VSS; afib V.paced 60-80; bilateral groin sites stable- neg hematoma/ bleeding; h/h this am 6/21- 2 units prbc given as per Dr. Bland; repeat h/h 8/26; resume diuretics as per cris heart; ac with heparin/ coum bridge for afib; INR 1.2 today--> coum 3 mg this evening; ck echo today/ ekg daily  1/21  CT Abd/pel dropping HCT [place] stool PRBC X 1  Coum 2 mg AF  discharge planning- home when INR therapeutic - no mcot- pt has a micra ppm

## 2023-01-21 NOTE — PROGRESS NOTE ADULT - PROBLEM SELECTOR PLAN 4
DC Hep gtt anemia  Will check abd CT RO RP bleed, groins soft  Continue to monitor on tele  INR today 1.6--> coumadin 2 mg this evening  atenolol 25 mg po qd for HR control  monitor and supplement electrolytes

## 2023-01-22 ENCOUNTER — TRANSCRIPTION ENCOUNTER (OUTPATIENT)
Age: 78
End: 2023-01-22

## 2023-01-22 LAB
ANION GAP SERPL CALC-SCNC: 12 MMOL/L — SIGNIFICANT CHANGE UP (ref 5–17)
APTT BLD: 33 SEC — SIGNIFICANT CHANGE UP (ref 27.5–35.5)
BUN SERPL-MCNC: 28 MG/DL — HIGH (ref 7–23)
CALCIUM SERPL-MCNC: 8.9 MG/DL — SIGNIFICANT CHANGE UP (ref 8.4–10.5)
CHLORIDE SERPL-SCNC: 97 MMOL/L — SIGNIFICANT CHANGE UP (ref 96–108)
CO2 SERPL-SCNC: 25 MMOL/L — SIGNIFICANT CHANGE UP (ref 22–31)
CREAT SERPL-MCNC: 1.08 MG/DL — SIGNIFICANT CHANGE UP (ref 0.5–1.3)
EGFR: 70 ML/MIN/1.73M2 — SIGNIFICANT CHANGE UP
GLUCOSE SERPL-MCNC: 100 MG/DL — HIGH (ref 70–99)
HCT VFR BLD CALC: 26 % — LOW (ref 39–50)
HGB BLD-MCNC: 8.1 G/DL — LOW (ref 13–17)
INR BLD: 1.81 RATIO — HIGH (ref 0.88–1.16)
MCHC RBC-ENTMCNC: 29.7 PG — SIGNIFICANT CHANGE UP (ref 27–34)
MCHC RBC-ENTMCNC: 31.2 GM/DL — LOW (ref 32–36)
MCV RBC AUTO: 95.2 FL — SIGNIFICANT CHANGE UP (ref 80–100)
NRBC # BLD: 1 /100 WBCS — HIGH (ref 0–0)
PLATELET # BLD AUTO: 302 K/UL — SIGNIFICANT CHANGE UP (ref 150–400)
POTASSIUM SERPL-MCNC: 3.9 MMOL/L — SIGNIFICANT CHANGE UP (ref 3.5–5.3)
POTASSIUM SERPL-SCNC: 3.9 MMOL/L — SIGNIFICANT CHANGE UP (ref 3.5–5.3)
PROTHROM AB SERPL-ACNC: 21.1 SEC — HIGH (ref 10.5–13.4)
RBC # BLD: 2.73 M/UL — LOW (ref 4.2–5.8)
RBC # FLD: 19.3 % — HIGH (ref 10.3–14.5)
SODIUM SERPL-SCNC: 134 MMOL/L — LOW (ref 135–145)
WBC # BLD: 13.51 K/UL — HIGH (ref 3.8–10.5)
WBC # FLD AUTO: 13.51 K/UL — HIGH (ref 3.8–10.5)

## 2023-01-22 PROCEDURE — 99232 SBSQ HOSP IP/OBS MODERATE 35: CPT

## 2023-01-22 RX ORDER — WARFARIN SODIUM 2.5 MG/1
2 TABLET ORAL ONCE
Refills: 0 | Status: COMPLETED | OUTPATIENT
Start: 2023-01-22 | End: 2023-01-22

## 2023-01-22 RX ORDER — ACETAMINOPHEN 500 MG
650 TABLET ORAL EVERY 6 HOURS
Refills: 0 | Status: DISCONTINUED | OUTPATIENT
Start: 2023-01-22 | End: 2023-01-24

## 2023-01-22 RX ADMIN — Medication 40 MILLIGRAM(S): at 06:01

## 2023-01-22 RX ADMIN — SPIRONOLACTONE 12.5 MILLIGRAM(S): 25 TABLET, FILM COATED ORAL at 06:02

## 2023-01-22 RX ADMIN — WARFARIN SODIUM 2 MILLIGRAM(S): 2.5 TABLET ORAL at 21:00

## 2023-01-22 RX ADMIN — Medication 81 MILLIGRAM(S): at 07:56

## 2023-01-22 RX ADMIN — CLOPIDOGREL BISULFATE 75 MILLIGRAM(S): 75 TABLET, FILM COATED ORAL at 07:56

## 2023-01-22 RX ADMIN — Medication 500 MILLIGRAM(S): at 07:57

## 2023-01-22 RX ADMIN — Medication 650 MILLIGRAM(S): at 16:34

## 2023-01-22 RX ADMIN — ATENOLOL 25 MILLIGRAM(S): 25 TABLET ORAL at 06:01

## 2023-01-22 RX ADMIN — ATORVASTATIN CALCIUM 80 MILLIGRAM(S): 80 TABLET, FILM COATED ORAL at 21:01

## 2023-01-22 RX ADMIN — PANTOPRAZOLE SODIUM 40 MILLIGRAM(S): 20 TABLET, DELAYED RELEASE ORAL at 06:15

## 2023-01-22 RX ADMIN — Medication 1 SPRAY(S): at 06:01

## 2023-01-22 RX ADMIN — Medication 650 MILLIGRAM(S): at 17:34

## 2023-01-22 RX ADMIN — Medication 325 MILLIGRAM(S): at 07:56

## 2023-01-22 RX ADMIN — TAMSULOSIN HYDROCHLORIDE 0.8 MILLIGRAM(S): 0.4 CAPSULE ORAL at 21:01

## 2023-01-22 RX ADMIN — LATANOPROST 1 DROP(S): 0.05 SOLUTION/ DROPS OPHTHALMIC; TOPICAL at 21:51

## 2023-01-22 NOTE — DISCHARGE NOTE NURSING/CASE MANAGEMENT/SOCIAL WORK - NSDCPEFALRISK_GEN_ALL_CORE
For information on Fall & Injury Prevention, visit: [email or web address] OR  [email or web address] OR  https://www.cdc.gov/steadi/patient.html

## 2023-01-22 NOTE — PROGRESS NOTE ADULT - ASSESSMENT
78M PMH HTN, Afib on coumadin (last dose 1/14), s/p MICRA PPM implant (Dr. 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      1/17  s/p LHC via RRA (off at 1555), CODY to RCA x 2. DAPT: aspirin and plavix    1/19 s/p 26 mm tate valve via LFA  (14 Fr.) with 2 perclose, RFA (6Fr) closed with 1 perclose, RFV (8Fr) manual compression.  Will need triple therapy ASA/Plavix/Coumadin x 2 weeks post procedure.  On heparin gtt for bridge to coumadin.  To receive 2mg Coumadin tonight.  Flomax started for tonight.  Atenolol will resume in AM.  Continue to monitor on tele.     1/20 VSS; afib V.paced 60-80; bilateral groin sites stable- neg hematoma/ bleeding; h/h this am 6/21- 2 units prbc given as per Dr. Bland; repeat h/h 8/26; resume diuretics as per cris heart; ac with heparin/ coum bridge for afib; INR 1.2 today--> coum 3 mg this evening; ck echo today/ ekg daily  1/21  CT Abd/pel dropping HCT [place] stool PRBC X 1  Coum 2 mg AF  discharge planning- home when INR therapeutic - no mcot- pt has a micra ppm   1/22 HCT stable   Dose coumadin AF  Observe in house another 24 hours as per Dr Bland  1/23 HCT stable Dose coumadin  AF   DC today

## 2023-01-22 NOTE — PROGRESS NOTE ADULT - PROBLEM SELECTOR PLAN 4
Will check abd CT RO RP bleed, groins soft  Continue to monitor on tele  INR today 1.7--> coumadin 2 mg this evening  atenolol 25 mg po qd for HR control  monitor and supplement electrolytes

## 2023-01-22 NOTE — PROGRESS NOTE ADULT - ASSESSMENT
78M PMH HTN, Afib on coumadin (last dose 1/14), s/p MICRA PPM implant (Dr. 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      1/17  s/p LHC via RRA (off at 1555), CODY to RCA x 2. DAPT: aspirin and plavix    1/19 s/p 26 mm tate valve via LFA  (14 Fr.) with 2 perclose, RFA (6Fr) closed with 1 perclose, RFV (8Fr) manual compression.  Will need triple therapy ASA/Plavix/Coumadin x 2 weeks post procedure.  On heparin gtt for bridge to coumadin.  To receive 2mg Coumadin tonight.  Flomax started for tonight.  Atenolol will resume in AM.  Continue to monitor on tele.     1/20 VSS; afib V.paced 60-80; bilateral groin sites stable- neg hematoma/ bleeding; h/h this am 6/21- 2 units prbc given as per Dr. Bland; repeat h/h 8/26; resume diuretics as per cris heart; ac with heparin/ coum bridge for afib; INR 1.2 today--> coum 3 mg this evening; ck echo today/ ekg daily  1/21  CT Abd/pel dropping HCT [place] stool PRBC X 1  Coum 2 mg AF  discharge planning- home when INR therapeutic - no mcot- pt has a micra ppm   1/22 HCT stable   Dose coumadin AF  Observe in house another 24 hours as per Dr Bland

## 2023-01-22 NOTE — PROGRESS NOTE ADULT - SUBJECTIVE AND OBJECTIVE BOX
Subjective:  "Hello"  OOB chair      Tele:  Afib / VPace 70-80           V/S:                    T(F): 98.2 (01-22-23 @ 04:26), Max: 98.6 (01-21-23 @ 13:18)  HR: 67 (01-22-23 @ 04:26) (67 - 67)  BP: 150/67 (01-22-23 @ 04:26) (126/54 - 150/67)  RR: 18 (01-22-23 @ 04:26) (18 - 18)  SpO2: 96% (01-22-23 @ 04:26) (96% - 96%)  Wt(kg): --      LV EF:      Labs:  01-22    134<L>  |  97  |  28<H>  ----------------------------<  100<H>  3.9   |  25  |  1.08    Ca    8.9      22 Jan 2023 05:17                                 8.1    13.51 )-----------( 302      ( 22 Jan 2023 05:17 )             26.0        PT/INR - ( 22 Jan 2023 05:17 )   PT: 21.1 sec;   INR: 1.81 ratio         PTT - ( 22 Jan 2023 05:17 )  PTT:33.0 sec  :    I&O's Detail    21 Jan 2023 07:01  -  22 Jan 2023 07:00  --------------------------------------------------------  IN:    Oral Fluid: 960 mL  Total IN: 960 mL    OUT:    Voided (mL): 1150 mL  Total OUT: 1150 mL    Total NET: -190 mL      22 Jan 2023 07:01  -  22 Jan 2023 09:10  --------------------------------------------------------  IN:    Oral Fluid: 240 mL  Total IN: 240 mL    OUT:    Voided (mL): 400 mL  Total OUT: 400 mL    Total NET: -160 mL      BOWEL MOVEMENT:  [ ] YES [x ] NO      Daily     Daily   Medications:  ascorbic acid 500 milliGRAM(s) Oral daily  aspirin enteric coated 81 milliGRAM(s) Oral daily  atenolol  Tablet 25 milliGRAM(s) Oral daily  atorvastatin 80 milliGRAM(s) Oral at bedtime  clopidogrel Tablet 75 milliGRAM(s) Oral daily  ferrous    sulfate 325 milliGRAM(s) Oral daily  fluticasone propionate 50 MICROgram(s)/spray Nasal Spray 1 Spray(s) Both Nostrils two times a day  furosemide    Tablet 40 milliGRAM(s) Oral daily  latanoprost 0.005% Ophthalmic Solution 1 Drop(s) Both EYES at bedtime  pantoprazole    Tablet 40 milliGRAM(s) Oral before breakfast  spironolactone 12.5 milliGRAM(s) Oral daily  tamsulosin 0.8 milliGRAM(s) Oral at bedtime  warfarin 2 milliGRAM(s) Oral once        Physical Exam:    Neurology: alert and oriented x 3    CV : S1 S2  irreg     Lungs: clear. RR easy, unlabored     Abdomen: soft, nontender, nondistended, positive bowel sounds, neg bowel movement     :  pt voiding without difficulty     Extremities:   SERRANO; trace LE edema, neg calf tenderness.   PPP bilaterally; bilateral groin site soft cdi dwight-   neg hematoma/bleeding        CT < from: CT Abdomen and Pelvis No Cont (01.21.23 @ 16:39) >  FINDINGS:  Diaphragm: Small nodular density along the posterior right hemidiaphragm  measuring 16 x 8 mm. This could be related to small lymph node remains  indeterminate.    Liver: Normal. No mass.  Gallbladder and bile ducts: A couple small gallstones within partially  distended gallbladder.  Pancreas: Normal. No ductal dilation.  Spleen: Status post splenectomy. Small residual splenules.  Adrenal glands: Normal. No mass.  Kidneys and ureters: Normal. No hydronephrosis.    Stomach and bowel: Scattered sigmoid diverticulosis without   diverticulitis.  Appendix: Normal appendix.    Intraperitoneal space: Unremarkable. No free air. No significant fluid  collection.  Vasculature: Unremarkable. No abdominal aortic aneurysm.  Lymph nodes: No adenopathy.    Urinary bladder: Unremarkable as visualized.  Reproductive: Unremarkable as visualized.    Bones/joints: Right hip prosthesis in place. Posterior laminectomy and  left-sided hardware fusion lower lumbar spine.  Soft tissues: Slightly asymmetrically enlarged right psoas muscle just   below  level of kidneys with subtle free fluid tracking along the posterior   pararenal  fascia. Small bilateral fat containing inguinal hernias.    IMPRESSION:  1. Slight asymmetric enlargement of the right psoas muscle just below the   level  of kidneys with adjacent mild tracking free fluid along the posterior   pararenal  space. This could be related to intramuscular hematoma within the psoas   muscle.  2. Small nodular density along the posterior right hemidiaphragm   measuring 16 x  8 mm which could be related to residual splenic tissue.    < end of copied text >             PAST MEDICAL & SURGICAL HISTORY:  Hodgkin lymphoma  1975      Atrial fibrillation  over 20 years      HTN (hypertension)      GERD (gastroesophageal reflux disease)      MOE (obstructive sleep apnea)  positive sleep study many years ago, was recommended to use CPAP, patient non-compliant      BPH (benign prostatic hyperplasia)      Osteoarthritis      CVA (cerebral vascular accident)  1/2018 - attributed to no AC for 11 days preop/postop spine surgery - presented to ED with slurred speech, residual ST memory loss, per pt      Spinal stenosis  L3-L4      Spondylolisthesis      Exposure to toxin  9/11       Atrial fibrillation  over 20 years      History of short term memory loss      DVT, lower extremity      S/P splenectomy  1975      S/P hip replacement, right  2014      History of cardioversion  for AF - &quot;many years ago&quot; - unsuccessful      History of lumbar spinal fusion  1/5/2018      Status post left cataract extraction      History of fasciotomy      Cardiac pacemaker

## 2023-01-22 NOTE — PROGRESS NOTE ADULT - PROBLEM SELECTOR PROBLEM 2
BPH (benign prostatic hyperplasia)
BPH (benign prostatic hyperplasia)
R/O Atrial fibrillation
BPH (benign prostatic hyperplasia)

## 2023-01-22 NOTE — PROGRESS NOTE ADULT - PROBLEM SELECTOR PLAN 3
s/p TAVR on 1/19   bilateral groin sites stable  S/P  1 unit prbc   ekg qd   ECHO low AV gradients  Atenolol 25mg QD   diuretics   discharge planning- home when INR therapeutic- no mcot pt has a micra ppm

## 2023-01-22 NOTE — PROGRESS NOTE ADULT - PROBLEM SELECTOR PROBLEM 1
CAD (coronary artery disease)
CAD (coronary artery disease)
R/O Severe aortic stenosis
CAD (coronary artery disease)

## 2023-01-22 NOTE — PROGRESS NOTE ADULT - PROBLEM SELECTOR PLAN 3
s/p TAVR on 1/19   bilateral groin sites stable  S/P  1 unit prbc HCT stable  ekg qd   ECHO low AV gradients  Atenolol 25mg QD   diuretics   discharge planning- home when INR therapeutic- no mcot pt has a micra ppm

## 2023-01-22 NOTE — PROGRESS NOTE ADULT - PROBLEM SELECTOR PROBLEM 3
S/P TAVR (transcatheter aortic valve replacement)

## 2023-01-22 NOTE — PROGRESS NOTE ADULT - PROBLEM SELECTOR PLAN 4
Will check abd CT RO RP bleed, groins soft  Continue to monitor on tele  INR today 1.8--> coumadin 2 mg this evening  atenolol 25 mg po qd for HR control  monitor and supplement electrolytes

## 2023-01-22 NOTE — DISCHARGE NOTE NURSING/CASE MANAGEMENT/SOCIAL WORK - PATIENT PORTAL LINK FT
You can access the FollowMyHealth Patient Portal offered by [facility] by registering at the following website: [email or web address]. By joining [email or web address]’s FollowMyHealth portal, you will also be able to view your health information using other applications (apps) compatible with our system.

## 2023-01-22 NOTE — PROGRESS NOTE ADULT - PROVIDER SPECIALTY LIST ADULT
Structural Heart
CT Surgery
CT Surgery
Intervent Cardiology
Structural Heart
CT Surgery

## 2023-01-22 NOTE — PROGRESS NOTE ADULT - SUBJECTIVE AND OBJECTIVE BOX
Subjective: "Feel ok"  OOB chair      Tele:  VPace 70s           V/S:                    T(F): 98.2 (23 @ 04:32), Max: 99 (23 @ 12:17)  HR: 90 (23 @ 04:32) (83 - 100)  BP: 135/59 (23 @ 04:32) (135/59 - 148/70)  RR: 18 (23 @ 04:32) (18 - 18)  SpO2: 94% (23 @ 04:32) (94% - 95%)  Wt(kg): --      LV EF:      Labs:      137  |  101  |  24<H>  ----------------------------<  107<H>  4.0   |  25  |  1.01    Ca    9.1      2023 06:35                                 8.5    14.00 )-----------( 325      ( 2023 06:35 )             26.9        PT/INR - ( 2023 06:35 )   PT: 20.0 sec;   INR: 1.71 ratio         PTT - ( 2023 05:17 )  PTT:33.0 sec      I&O's Detail    2023 07:01  -  2023 07:00  --------------------------------------------------------  IN:    Oral Fluid: 1340 mL  Total IN: 1340 mL    OUT:    Voided (mL): 1800 mL  Total OUT: 1800 mL    Total NET: -460 mL      2023 07:01  -  2023 10:28  --------------------------------------------------------  IN:    Oral Fluid: 240 mL  Total IN: 240 mL    OUT:  Total OUT: 0 mL    Total NET: 240 mL       BOWEL MOVEMENT:  [x ] YES [ ] NO      Daily     Daily Weight in k.2 (2023 08:00)  Medications:  acetaminophen     Tablet .. 650 milliGRAM(s) Oral every 6 hours PRN  ascorbic acid 500 milliGRAM(s) Oral daily  aspirin enteric coated 81 milliGRAM(s) Oral daily  atenolol  Tablet 25 milliGRAM(s) Oral daily  atorvastatin 80 milliGRAM(s) Oral at bedtime  clopidogrel Tablet 75 milliGRAM(s) Oral daily  ferrous    sulfate 325 milliGRAM(s) Oral daily  fluticasone propionate 50 MICROgram(s)/spray Nasal Spray 1 Spray(s) Both Nostrils two times a day  furosemide    Tablet 40 milliGRAM(s) Oral daily  latanoprost 0.005% Ophthalmic Solution 1 Drop(s) Both EYES at bedtime  pantoprazole    Tablet 40 milliGRAM(s) Oral before breakfast  spironolactone 12.5 milliGRAM(s) Oral daily  tamsulosin 0.8 milliGRAM(s) Oral at bedtime  warfarin 2 milliGRAM(s) Oral once        Physical Exam:    Physical Exam:    Neurology: alert and oriented x 3    CV : S1 S2  irreg     Lungs: clear. RR easy, unlabored     Abdomen: soft, nontender, nondistended, positive bowel sounds, neg bowel movement     :  pt voiding without difficulty     Extremities:   SERRANO; trace LE edema, neg calf tenderness.   PPP bilaterally; bilateral groin site soft cdi dwight-   neg hematoma/bleeding        CT < from: CT Abdomen and Pelvis No Cont (23 @ 16:39) >  FINDINGS:  Diaphragm: Small nodular density along the posterior right hemidiaphragm  measuring 16 x 8 mm. This could be related to small lymph node remains  indeterminate.                   PAST MEDICAL & SURGICAL HISTORY:  Hodgkin lymphoma        Atrial fibrillation  over 20 years      HTN (hypertension)      GERD (gastroesophageal reflux disease)      MOE (obstructive sleep apnea)  positive sleep study many years ago, was recommended to use CPAP, patient non-compliant      BPH (benign prostatic hyperplasia)      Osteoarthritis      CVA (cerebral vascular accident)  2018 - attributed to no AC for 11 days preop/postop spine surgery - presented to ED with slurred speech, residual ST memory loss, per pt      Spinal stenosis  L3-L4      Spondylolisthesis      Exposure to toxin         Atrial fibrillation  over 20 years      History of short term memory loss      DVT, lower extremity      S/P splenectomy        S/P hip replacement, right        History of cardioversion  for AF - &quot;many years ago&quot; - unsuccessful      History of lumbar spinal fusion  2018      Status post left cataract extraction      History of fasciotomy      Cardiac pacemaker

## 2023-01-22 NOTE — PROGRESS NOTE ADULT - PROBLEM SELECTOR PLAN 2
Continue Flomax 0.8mg
heparin gtt
Continue Flomax 0.8mg
Continue Flomax 0.8mg

## 2023-01-23 LAB
ANION GAP SERPL CALC-SCNC: 11 MMOL/L — SIGNIFICANT CHANGE UP (ref 5–17)
BASOPHILS # BLD AUTO: 0 K/UL — SIGNIFICANT CHANGE UP (ref 0–0.2)
BASOPHILS NFR BLD AUTO: 0 % — SIGNIFICANT CHANGE UP (ref 0–2)
BUN SERPL-MCNC: 24 MG/DL — HIGH (ref 7–23)
CALCIUM SERPL-MCNC: 9.1 MG/DL — SIGNIFICANT CHANGE UP (ref 8.4–10.5)
CHLORIDE SERPL-SCNC: 101 MMOL/L — SIGNIFICANT CHANGE UP (ref 96–108)
CO2 SERPL-SCNC: 25 MMOL/L — SIGNIFICANT CHANGE UP (ref 22–31)
CREAT SERPL-MCNC: 1.01 MG/DL — SIGNIFICANT CHANGE UP (ref 0.5–1.3)
EGFR: 76 ML/MIN/1.73M2 — SIGNIFICANT CHANGE UP
EOSINOPHIL # BLD AUTO: 0.25 K/UL — SIGNIFICANT CHANGE UP (ref 0–0.5)
EOSINOPHIL NFR BLD AUTO: 1.8 % — SIGNIFICANT CHANGE UP (ref 0–6)
GIANT PLATELETS BLD QL SMEAR: PRESENT — SIGNIFICANT CHANGE UP
GLUCOSE SERPL-MCNC: 107 MG/DL — HIGH (ref 70–99)
HCT VFR BLD CALC: 26.9 % — LOW (ref 39–50)
HGB BLD-MCNC: 8.5 G/DL — LOW (ref 13–17)
INR BLD: 1.71 RATIO — HIGH (ref 0.88–1.16)
LYMPHOCYTES # BLD AUTO: 1.13 K/UL — SIGNIFICANT CHANGE UP (ref 1–3.3)
LYMPHOCYTES # BLD AUTO: 8.1 % — LOW (ref 13–44)
MANUAL SMEAR VERIFICATION: SIGNIFICANT CHANGE UP
MCHC RBC-ENTMCNC: 29.9 PG — SIGNIFICANT CHANGE UP (ref 27–34)
MCHC RBC-ENTMCNC: 31.6 GM/DL — LOW (ref 32–36)
MCV RBC AUTO: 94.7 FL — SIGNIFICANT CHANGE UP (ref 80–100)
MONOCYTES # BLD AUTO: 1.51 K/UL — HIGH (ref 0–0.9)
MONOCYTES NFR BLD AUTO: 10.8 % — SIGNIFICANT CHANGE UP (ref 2–14)
NEUTROPHILS # BLD AUTO: 11.1 K/UL — HIGH (ref 1.8–7.4)
NEUTROPHILS NFR BLD AUTO: 78.4 % — HIGH (ref 43–77)
NEUTS BAND # BLD: 0.9 % — SIGNIFICANT CHANGE UP (ref 0–8)
NRBC # BLD: 2 /100 — HIGH (ref 0–0)
OB PNL STL: NEGATIVE — SIGNIFICANT CHANGE UP
PLAT MORPH BLD: NORMAL — SIGNIFICANT CHANGE UP
PLATELET # BLD AUTO: 325 K/UL — SIGNIFICANT CHANGE UP (ref 150–400)
POTASSIUM SERPL-MCNC: 4 MMOL/L — SIGNIFICANT CHANGE UP (ref 3.5–5.3)
POTASSIUM SERPL-SCNC: 4 MMOL/L — SIGNIFICANT CHANGE UP (ref 3.5–5.3)
PROTHROM AB SERPL-ACNC: 20 SEC — HIGH (ref 10.5–13.4)
RBC # BLD: 2.84 M/UL — LOW (ref 4.2–5.8)
RBC # FLD: 18.9 % — HIGH (ref 10.3–14.5)
RBC BLD AUTO: SIGNIFICANT CHANGE UP
SODIUM SERPL-SCNC: 137 MMOL/L — SIGNIFICANT CHANGE UP (ref 135–145)
WBC # BLD: 14 K/UL — HIGH (ref 3.8–10.5)
WBC # FLD AUTO: 14 K/UL — HIGH (ref 3.8–10.5)

## 2023-01-23 PROCEDURE — 99232 SBSQ HOSP IP/OBS MODERATE 35: CPT

## 2023-01-23 RX ORDER — ACETAMINOPHEN 500 MG
1000 TABLET ORAL ONCE
Refills: 0 | Status: COMPLETED | OUTPATIENT
Start: 2023-01-23 | End: 2023-01-23

## 2023-01-23 RX ORDER — WARFARIN SODIUM 2.5 MG/1
2 TABLET ORAL ONCE
Refills: 0 | Status: COMPLETED | OUTPATIENT
Start: 2023-01-23 | End: 2023-01-23

## 2023-01-23 RX ADMIN — Medication 1 SPRAY(S): at 05:02

## 2023-01-23 RX ADMIN — ATENOLOL 25 MILLIGRAM(S): 25 TABLET ORAL at 05:02

## 2023-01-23 RX ADMIN — Medication 1000 MILLIGRAM(S): at 20:50

## 2023-01-23 RX ADMIN — Medication 1 SPRAY(S): at 17:01

## 2023-01-23 RX ADMIN — Medication 40 MILLIGRAM(S): at 05:02

## 2023-01-23 RX ADMIN — Medication 81 MILLIGRAM(S): at 11:22

## 2023-01-23 RX ADMIN — Medication 400 MILLIGRAM(S): at 20:17

## 2023-01-23 RX ADMIN — Medication 650 MILLIGRAM(S): at 14:40

## 2023-01-23 RX ADMIN — ATORVASTATIN CALCIUM 80 MILLIGRAM(S): 80 TABLET, FILM COATED ORAL at 21:41

## 2023-01-23 RX ADMIN — TAMSULOSIN HYDROCHLORIDE 0.8 MILLIGRAM(S): 0.4 CAPSULE ORAL at 21:40

## 2023-01-23 RX ADMIN — LATANOPROST 1 DROP(S): 0.05 SOLUTION/ DROPS OPHTHALMIC; TOPICAL at 21:41

## 2023-01-23 RX ADMIN — PANTOPRAZOLE SODIUM 40 MILLIGRAM(S): 20 TABLET, DELAYED RELEASE ORAL at 05:02

## 2023-01-23 RX ADMIN — WARFARIN SODIUM 2 MILLIGRAM(S): 2.5 TABLET ORAL at 21:41

## 2023-01-23 RX ADMIN — Medication 325 MILLIGRAM(S): at 11:22

## 2023-01-23 RX ADMIN — Medication 500 MILLIGRAM(S): at 11:23

## 2023-01-23 RX ADMIN — SPIRONOLACTONE 12.5 MILLIGRAM(S): 25 TABLET, FILM COATED ORAL at 05:02

## 2023-01-23 RX ADMIN — CLOPIDOGREL BISULFATE 75 MILLIGRAM(S): 75 TABLET, FILM COATED ORAL at 11:22

## 2023-01-23 RX ADMIN — Medication 650 MILLIGRAM(S): at 15:10

## 2023-01-24 ENCOUNTER — TRANSCRIPTION ENCOUNTER (OUTPATIENT)
Age: 78
End: 2023-01-24

## 2023-01-24 VITALS — WEIGHT: 217.6 LBS

## 2023-01-24 LAB
ANION GAP SERPL CALC-SCNC: 10 MMOL/L — SIGNIFICANT CHANGE UP (ref 5–17)
BASOPHILS # BLD AUTO: 0.05 K/UL — SIGNIFICANT CHANGE UP (ref 0–0.2)
BASOPHILS NFR BLD AUTO: 0.4 % — SIGNIFICANT CHANGE UP (ref 0–2)
BUN SERPL-MCNC: 24 MG/DL — HIGH (ref 7–23)
CALCIUM SERPL-MCNC: 9 MG/DL — SIGNIFICANT CHANGE UP (ref 8.4–10.5)
CHLORIDE SERPL-SCNC: 102 MMOL/L — SIGNIFICANT CHANGE UP (ref 96–108)
CO2 SERPL-SCNC: 26 MMOL/L — SIGNIFICANT CHANGE UP (ref 22–31)
CREAT SERPL-MCNC: 1.01 MG/DL — SIGNIFICANT CHANGE UP (ref 0.5–1.3)
EGFR: 76 ML/MIN/1.73M2 — SIGNIFICANT CHANGE UP
EOSINOPHIL # BLD AUTO: 0.9 K/UL — HIGH (ref 0–0.5)
EOSINOPHIL NFR BLD AUTO: 7.5 % — HIGH (ref 0–6)
GLUCOSE SERPL-MCNC: 99 MG/DL — SIGNIFICANT CHANGE UP (ref 70–99)
HCT VFR BLD CALC: 26 % — LOW (ref 39–50)
HGB BLD-MCNC: 8.2 G/DL — LOW (ref 13–17)
IMM GRANULOCYTES NFR BLD AUTO: 0.7 % — SIGNIFICANT CHANGE UP (ref 0–0.9)
INR BLD: 2.03 RATIO — HIGH (ref 0.88–1.16)
LYMPHOCYTES # BLD AUTO: 1.41 K/UL — SIGNIFICANT CHANGE UP (ref 1–3.3)
LYMPHOCYTES # BLD AUTO: 11.8 % — LOW (ref 13–44)
MCHC RBC-ENTMCNC: 29.7 PG — SIGNIFICANT CHANGE UP (ref 27–34)
MCHC RBC-ENTMCNC: 31.5 GM/DL — LOW (ref 32–36)
MCV RBC AUTO: 94.2 FL — SIGNIFICANT CHANGE UP (ref 80–100)
MONOCYTES # BLD AUTO: 1.49 K/UL — HIGH (ref 0–0.9)
MONOCYTES NFR BLD AUTO: 12.5 % — SIGNIFICANT CHANGE UP (ref 2–14)
NEUTROPHILS # BLD AUTO: 8.01 K/UL — HIGH (ref 1.8–7.4)
NEUTROPHILS NFR BLD AUTO: 67.1 % — SIGNIFICANT CHANGE UP (ref 43–77)
NRBC # BLD: 1 /100 WBCS — HIGH (ref 0–0)
PLATELET # BLD AUTO: 398 K/UL — SIGNIFICANT CHANGE UP (ref 150–400)
POTASSIUM SERPL-MCNC: 4 MMOL/L — SIGNIFICANT CHANGE UP (ref 3.5–5.3)
POTASSIUM SERPL-SCNC: 4 MMOL/L — SIGNIFICANT CHANGE UP (ref 3.5–5.3)
PROTHROM AB SERPL-ACNC: 23.7 SEC — HIGH (ref 10.5–13.4)
RBC # BLD: 2.76 M/UL — LOW (ref 4.2–5.8)
RBC # FLD: 18.6 % — HIGH (ref 10.3–14.5)
SODIUM SERPL-SCNC: 138 MMOL/L — SIGNIFICANT CHANGE UP (ref 135–145)
WBC # BLD: 11.94 K/UL — HIGH (ref 3.8–10.5)
WBC # FLD AUTO: 11.94 K/UL — HIGH (ref 3.8–10.5)

## 2023-01-24 PROCEDURE — 83880 ASSAY OF NATRIURETIC PEPTIDE: CPT

## 2023-01-24 PROCEDURE — 81003 URINALYSIS AUTO W/O SCOPE: CPT

## 2023-01-24 PROCEDURE — 85027 COMPLETE CBC AUTOMATED: CPT

## 2023-01-24 PROCEDURE — C8929: CPT

## 2023-01-24 PROCEDURE — C1760: CPT

## 2023-01-24 PROCEDURE — 84443 ASSAY THYROID STIM HORMONE: CPT

## 2023-01-24 PROCEDURE — C1889: CPT

## 2023-01-24 PROCEDURE — 83735 ASSAY OF MAGNESIUM: CPT

## 2023-01-24 PROCEDURE — 86900 BLOOD TYPING SEROLOGIC ABO: CPT

## 2023-01-24 PROCEDURE — 82272 OCCULT BLD FECES 1-3 TESTS: CPT

## 2023-01-24 PROCEDURE — C1874: CPT

## 2023-01-24 PROCEDURE — C1753: CPT

## 2023-01-24 PROCEDURE — 83540 ASSAY OF IRON: CPT

## 2023-01-24 PROCEDURE — 93005 ELECTROCARDIOGRAM TRACING: CPT

## 2023-01-24 PROCEDURE — C1894: CPT

## 2023-01-24 PROCEDURE — 87641 MR-STAPH DNA AMP PROBE: CPT

## 2023-01-24 PROCEDURE — 84439 ASSAY OF FREE THYROXINE: CPT

## 2023-01-24 PROCEDURE — 94010 BREATHING CAPACITY TEST: CPT

## 2023-01-24 PROCEDURE — 74176 CT ABD & PELVIS W/O CONTRAST: CPT

## 2023-01-24 PROCEDURE — 83550 IRON BINDING TEST: CPT

## 2023-01-24 PROCEDURE — 85730 THROMBOPLASTIN TIME PARTIAL: CPT

## 2023-01-24 PROCEDURE — U0003: CPT

## 2023-01-24 PROCEDURE — 36430 TRANSFUSION BLD/BLD COMPNT: CPT

## 2023-01-24 PROCEDURE — 85520 HEPARIN ASSAY: CPT

## 2023-01-24 PROCEDURE — 86901 BLOOD TYPING SEROLOGIC RH(D): CPT

## 2023-01-24 PROCEDURE — U0005: CPT

## 2023-01-24 PROCEDURE — 36415 COLL VENOUS BLD VENIPUNCTURE: CPT

## 2023-01-24 PROCEDURE — 93306 TTE W/DOPPLER COMPLETE: CPT

## 2023-01-24 PROCEDURE — 93010 ELECTROCARDIOGRAM REPORT: CPT

## 2023-01-24 PROCEDURE — P9016: CPT

## 2023-01-24 PROCEDURE — 87640 STAPH A DNA AMP PROBE: CPT

## 2023-01-24 PROCEDURE — 80053 COMPREHEN METABOLIC PANEL: CPT

## 2023-01-24 PROCEDURE — 92978 ENDOLUMINL IVUS OCT C 1ST: CPT | Mod: RC

## 2023-01-24 PROCEDURE — C1887: CPT

## 2023-01-24 PROCEDURE — 93880 EXTRACRANIAL BILAT STUDY: CPT

## 2023-01-24 PROCEDURE — 84466 ASSAY OF TRANSFERRIN: CPT

## 2023-01-24 PROCEDURE — 71045 X-RAY EXAM CHEST 1 VIEW: CPT

## 2023-01-24 PROCEDURE — 85025 COMPLETE CBC W/AUTO DIFF WBC: CPT

## 2023-01-24 PROCEDURE — 86923 COMPATIBILITY TEST ELECTRIC: CPT

## 2023-01-24 PROCEDURE — 94640 AIRWAY INHALATION TREATMENT: CPT

## 2023-01-24 PROCEDURE — 85610 PROTHROMBIN TIME: CPT

## 2023-01-24 PROCEDURE — C1725: CPT

## 2023-01-24 PROCEDURE — C1769: CPT

## 2023-01-24 PROCEDURE — 84100 ASSAY OF PHOSPHORUS: CPT

## 2023-01-24 PROCEDURE — 83036 HEMOGLOBIN GLYCOSYLATED A1C: CPT

## 2023-01-24 PROCEDURE — 86850 RBC ANTIBODY SCREEN: CPT

## 2023-01-24 PROCEDURE — C9600: CPT | Mod: RC

## 2023-01-24 PROCEDURE — 76000 FLUOROSCOPY <1 HR PHYS/QHP: CPT

## 2023-01-24 PROCEDURE — L8699: CPT

## 2023-01-24 PROCEDURE — 80048 BASIC METABOLIC PNL TOTAL CA: CPT

## 2023-01-24 PROCEDURE — 93454 CORONARY ARTERY ANGIO S&I: CPT | Mod: 59

## 2023-01-24 PROCEDURE — 82728 ASSAY OF FERRITIN: CPT

## 2023-01-24 RX ORDER — CLOPIDOGREL BISULFATE 75 MG/1
1 TABLET, FILM COATED ORAL
Qty: 30 | Refills: 0
Start: 2023-01-24 | End: 2023-02-22

## 2023-01-24 RX ORDER — ACETAMINOPHEN 500 MG
1 TABLET ORAL
Qty: 0 | Refills: 0 | DISCHARGE

## 2023-01-24 RX ORDER — ASPIRIN/CALCIUM CARB/MAGNESIUM 324 MG
1 TABLET ORAL
Qty: 30 | Refills: 0
Start: 2023-01-24 | End: 2023-02-22

## 2023-01-24 RX ADMIN — Medication 1 SPRAY(S): at 05:07

## 2023-01-24 RX ADMIN — Medication 650 MILLIGRAM(S): at 15:31

## 2023-01-24 RX ADMIN — SPIRONOLACTONE 12.5 MILLIGRAM(S): 25 TABLET, FILM COATED ORAL at 05:07

## 2023-01-24 RX ADMIN — Medication 500 MILLIGRAM(S): at 11:50

## 2023-01-24 RX ADMIN — Medication 81 MILLIGRAM(S): at 11:50

## 2023-01-24 RX ADMIN — Medication 650 MILLIGRAM(S): at 05:06

## 2023-01-24 RX ADMIN — Medication 325 MILLIGRAM(S): at 11:50

## 2023-01-24 RX ADMIN — CLOPIDOGREL BISULFATE 75 MILLIGRAM(S): 75 TABLET, FILM COATED ORAL at 11:50

## 2023-01-24 RX ADMIN — Medication 40 MILLIGRAM(S): at 05:06

## 2023-01-24 RX ADMIN — ATENOLOL 25 MILLIGRAM(S): 25 TABLET ORAL at 05:06

## 2023-01-24 RX ADMIN — PANTOPRAZOLE SODIUM 40 MILLIGRAM(S): 20 TABLET, DELAYED RELEASE ORAL at 05:06

## 2023-01-24 NOTE — DISCHARGE NOTE PROVIDER - NSDCFUADDAPPT_GEN_ALL_CORE_FT
Follow up with Dr. Carlos on 1/31/23 @ 8:30am at [address] for you post op follow up appointment, if you are unable to keep this appointment please call the [facility] office to re-schedule at [phone].

## 2023-01-24 NOTE — DIETITIAN INITIAL EVALUATION ADULT - PERTINENT MEDS FT
MEDICATIONS  (STANDING):  ascorbic acid 500 milliGRAM(s) Oral daily  aspirin enteric coated 81 milliGRAM(s) Oral daily  atenolol  Tablet 25 milliGRAM(s) Oral daily  atorvastatin 80 milliGRAM(s) Oral at bedtime  clopidogrel Tablet 75 milliGRAM(s) Oral daily  ferrous    sulfate 325 milliGRAM(s) Oral daily  fluticasone propionate 50 MICROgram(s)/spray Nasal Spray 1 Spray(s) Both Nostrils two times a day  furosemide    Tablet 40 milliGRAM(s) Oral daily  latanoprost 0.005% Ophthalmic Solution 1 Drop(s) Both EYES at bedtime  pantoprazole    Tablet 40 milliGRAM(s) Oral before breakfast  spironolactone 12.5 milliGRAM(s) Oral daily  tamsulosin 0.8 milliGRAM(s) Oral at bedtime    MEDICATIONS  (PRN):  acetaminophen     Tablet .. 650 milliGRAM(s) Oral every 6 hours PRN Temp greater or equal to 38C (100.4F), Mild Pain (1 - 3)

## 2023-01-24 NOTE — DISCHARGE NOTE PROVIDER - NSDCPNSUBOBJ_GEN_ALL_CORE
Patient is doing well. Bilateral groins c/d/i, soft, no hematoma. H&H stable - 8/26. Plan for discharge home today without MCOT. Patient has Micra PPM.   Discharge cleared by Dr. Bland.     Vital Signs Last 24 Hrs  T(C): 36.8 (24 Jan 2023 04:45), Max: 36.8 (24 Jan 2023 04:45)  T(F): 98.2 (24 Jan 2023 04:45), Max: 98.2 (24 Jan 2023 04:45)  HR: 56 (24 Jan 2023 04:45) (56 - 86)  BP: 154/57 (24 Jan 2023 04:45) (137/61 - 154/57)  BP(mean): 89 (24 Jan 2023 04:45) (86 - 89)  RR: 18 (24 Jan 2023 04:45) (18 - 18)  SpO2: 96% (24 Jan 2023 04:45) (95% - 96%)    Parameters below as of 24 Jan 2023 04:45  Patient On (Oxygen Delivery Method): room air                          8.2    11.94 )-----------( 398      ( 24 Jan 2023 06:00 )             26.0   01-24    138  |  102  |  24<H>  ----------------------------<  99  4.0   |  26  |  1.01    Ca    9.0      24 Jan 2023 06:00    General: WN/WD NAD  Neurology: A&Ox3, nonfocal, SERRANO x 4  Head:  Normocephalic, atraumatic  ENT:  Mucosa moist, no ulcerations  Neck:  Supple, no sinuses or palpable masses  Lymphatic:  No palpable cervical, supraclavicular, axillary or inguinal adenopathy  Respiratory: CTA B/L  CV: IIR, S1S2, no murmur  Abdominal: Soft, NT, ND no palpable mass  MSK: No edema, + peripheral pulses, FROM all 4 extremity  Groin Incisions: intact, no erythema or drainage

## 2023-01-24 NOTE — DIETITIAN INITIAL EVALUATION ADULT - NSICDXPASTMEDICALHX_GEN_ALL_CORE_FT
PAST MEDICAL HISTORY:  Atrial fibrillation over 20 years    Atrial fibrillation over 20 years    BPH (benign prostatic hyperplasia)     CVA (cerebral vascular accident) 1/2018 - attributed to no AC for 11 days preop/postop spine surgery - presented to ED with slurred speech, residual ST memory loss, per pt    DVT, lower extremity     Exposure to toxin 9/11     GERD (gastroesophageal reflux disease)     History of short term memory loss     Hodgkin lymphoma 1975    HTN (hypertension)     MOE (obstructive sleep apnea) positive sleep study many years ago, was recommended to use CPAP, patient non-compliant    Osteoarthritis     Spinal stenosis L3-L4    Spondylolisthesis

## 2023-01-24 NOTE — DISCHARGE NOTE PROVIDER - NSDCMRMEDTOKEN_GEN_ALL_CORE_FT
aspirin 81 mg oral delayed release tablet: 1 tab(s) orally once a day  ATENOLOL 25 MG TABLET: 1 tab(s) orally once a day  clopidogrel 75 mg oral tablet: 1 tab(s) orally once a day  ferrous sulfate 325 mg (65 mg elemental iron) oral tablet: 1 tab(s) orally once a day  Flonase: 1 spray(s) nasal once a day  Lasix 40 mg oral tablet: 1 tab(s) orally once a day  latanoprost 0.005% ophthalmic solution: 1 drop(s) to the right eye once a day (in the evening)  omeprazole 20 mg oral delayed release capsule: 1 cap(s) orally once a day  rosuvastatin 20 mg oral tablet: 0.5 tab(s) orally once a day  spironolactone 25 mg oral tablet: 0.5 tab(s) orally once a day  tamsulosin 0.4 mg oral capsule: 2 cap(s) orally once a day (at bedtime)  Vitamin B-12: 1 dose(s) injectable once a month    Note:Gets from MD office  Vitamin C: 1 tab(s) orally once a day  warfarin 1 mg oral tablet: 2 tab(s) orally 5 times a week (M-F)  warfarin 1 mg oral tablet: 1 tab(s) orally 2 times a week (Sat and Sun)

## 2023-01-24 NOTE — DIETITIAN INITIAL EVALUATION ADULT - NSICDXPASTSURGICALHX_GEN_ALL_CORE_FT
PAST SURGICAL HISTORY:  Cardiac pacemaker     History of cardioversion for AF - "many years ago" - unsuccessful    History of fasciotomy     History of lumbar spinal fusion 1/5/2018    S/P hip replacement, right 2014    S/P splenectomy 1975    Status post left cataract extraction

## 2023-01-24 NOTE — DIETITIAN INITIAL EVALUATION ADULT - REASON FOR ADMISSION
Nonrheumatic aortic valve stenosis  .  Per chart: "78M PMH HTN, Afib on coumadin (last dose 1/14), s/p MICRA PPM implant (Dr. 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  1/17  s/p LHC via RRA."  s/p TAVR on 1/19

## 2023-01-24 NOTE — DISCHARGE NOTE PROVIDER - NSDCCPCAREPLAN_GEN_ALL_CORE_FT
PRINCIPAL DISCHARGE DIAGNOSIS  Diagnosis: S/P TAVR (transcatheter aortic valve replacement)  Assessment and Plan of Treatment: Continue with ASA 8mg /Plavix 75mg daily   Shower daily  Weigh yourself daily  Continue current prescriptions as ordered  Continue your home dose coumadin 2mg tonight   Increase activity as tolerated   No added salt; low fat; low cholesterol, low salt diet, low sugar diet   Follow up with Cardiologist in 1-2 weeks. Call to schedule appointment.  Follow up with cardiac surgeon Dr. Carlos on 1/31 @ 8:30am at [facility]  TAVR discharge instructions>Keep femoral or groin site clean,please shower daily and pat site dry.Watch site for signs of reddness or drainage, these should be reported to your doctor.You will receive a card about your valve in the mail,please carry in your wallet.

## 2023-01-24 NOTE — DIETITIAN INITIAL EVALUATION ADULT - ADD RECOMMEND
1) Will continue to monitor PO intake, weight, labs, skin, GI status and diet 2) continue vitamin C and ferrous sulfate per team

## 2023-01-24 NOTE — DIETITIAN INITIAL EVALUATION ADULT - PERTINENT LABORATORY DATA
01-24    138  |  102  |  24<H>  ----------------------------<  99  4.0   |  26  |  1.01    Ca    9.0      24 Jan 2023 06:00    A1C with Estimated Average Glucose Result: 5.4 % (01-18-23 @ 05:14)

## 2023-01-24 NOTE — DISCHARGE NOTE PROVIDER - CARE PROVIDER_API CALL
Usama Carlos)  Thoracic and Cardiac Surgery  [address] [place] [address]  Phone: [phone]  Fax: [phone]  Scheduled Appointment: 01/31/2023 08:30 AM

## 2023-01-24 NOTE — DISCHARGE NOTE PROVIDER - NSDCFUSCHEDAPPT_GEN_ALL_CORE_FT
Yumiko Mejia  [facility]  CARDIOLOGY [address]  Scheduled Appointment: 02/09/2023    [facility]  ELECTROPH [address]  Scheduled Appointment: 03/16/2023

## 2023-01-24 NOTE — DISCHARGE NOTE PROVIDER - HOSPITAL COURSE
78M PMH HTN, Afib on coumadin (last dose 1/14), s/p MICRA PPM implant (Dr. 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      1/17  s/p LHC via RRA (off at 1555), CODY to RCA x 2. DAPT: aspirin and plavix    1/19 s/p 26 mm tate valve via LFA  (14 Fr.) with 2 perclose, RFA (6Fr) closed with 1 perclose, RFV (8Fr) manual compression.  Will need triple therapy ASA/Plavix/Coumadin x 2 weeks post procedure.  On heparin gtt for bridge to coumadin.  To receive 2mg Coumadin tonight.  Flomax started for tonight.  Atenolol will resume in AM.  Continue to monitor on tele.     1/20 VSS; afib V.paced 60-80; bilateral groin sites stable- neg hematoma/ bleeding; h/h this am 6/21- 2 units prbc given as per Dr. Bland; repeat h/h 8/26; resume diuretics as per cris heart; ac with heparin/ coum bridge for afib; INR 1.2 today--> coum 3 mg this evening; ck echo today/ ekg daily  1/21  CT Abd/pel dropping HCT [place] stool PRBC X 1  Coum 2 mg AF  discharge planning- home when INR therapeutic - no mcot- pt has a micra ppm   1/22 HCT stable   Dose coumadin AF  Observe in house another 24 hours as per Dr Bland  1/23 HCT stable Dose coumadin  AF   DC today   1/24 VSS: INR 2.03 today - plan for discharge home today as per Dr. Bland.

## 2023-01-24 NOTE — DIETITIAN INITIAL EVALUATION ADULT - ORAL INTAKE PTA/DIET HISTORY
visited pt at bedside this morning. states to be eating well PTA, not following a therapeutic diet. is on coumadin. confirms NKFA.

## 2023-01-24 NOTE — DIETITIAN INITIAL EVALUATION ADULT - PERSON TAUGHT/METHOD
pls call Kira that I am unable to attend   encouraged adequate PO intake at meals. Discussed coumadin interaction with Vitamin K. Advised to eat vitamin K containing foods consistently and in moderation, types of food sources high in vitamin K. Also discussed importance of a lower sodium diet. Pt made aware that RD remains available./verbal instruction/patient instructed

## 2023-01-25 ENCOUNTER — APPOINTMENT (OUTPATIENT)
Dept: CARE COORDINATION | Facility: HOME HEALTH | Age: 78
End: 2023-01-25

## 2023-01-25 VITALS — RESPIRATION RATE: 12 BRPM

## 2023-01-25 RX ORDER — TRAMADOL HYDROCHLORIDE 50 MG/1
50 TABLET, COATED ORAL TWICE DAILY
Qty: 60 | Refills: 0 | Status: DISCONTINUED | COMMUNITY
Start: 2022-11-04 | End: 2023-01-25

## 2023-01-25 RX ORDER — ASPIRIN 81 MG
81 TABLET, DELAYED RELEASE (ENTERIC COATED) ORAL
Refills: 0 | Status: DISCONTINUED | COMMUNITY
End: 2023-01-25

## 2023-01-25 RX ORDER — DARBEPOETIN ALFA 200 UG/.4ML
200 INJECTION, SOLUTION INTRAVENOUS; SUBCUTANEOUS
Qty: 1 | Refills: 0 | Status: DISCONTINUED | COMMUNITY
Start: 2022-05-09 | End: 2023-01-25

## 2023-01-25 RX ORDER — FLUTICASONE PROPIONATE 50 UG/1
50 SPRAY, METERED NASAL DAILY
Qty: 1 | Refills: 1 | Status: ACTIVE | COMMUNITY
Start: 2023-01-25 | End: 1900-01-01

## 2023-01-25 RX ORDER — OMEPRAZOLE 40 MG/1
40 CAPSULE, DELAYED RELEASE ORAL
Qty: 30 | Refills: 1 | Status: DISCONTINUED | COMMUNITY
Start: 2021-06-22 | End: 2023-01-25

## 2023-01-25 RX ORDER — ASPIRIN ENTERIC COATED TABLETS 81 MG 81 MG/1
81 TABLET, DELAYED RELEASE ORAL DAILY
Refills: 0 | Status: ACTIVE | COMMUNITY
Start: 2023-01-25

## 2023-01-25 RX ORDER — OMEPRAZOLE 20 MG/1
20 TABLET, DELAYED RELEASE ORAL DAILY
Qty: 30 | Refills: 1 | Status: ACTIVE | COMMUNITY
Start: 2023-01-25 | End: 1900-01-01

## 2023-01-25 RX ORDER — CHLORHEXIDINE GLUCONATE 4 %
325 (65 FE) LIQUID (ML) TOPICAL DAILY
Qty: 30 | Refills: 0 | Status: ACTIVE | COMMUNITY
Start: 2023-01-25

## 2023-01-25 RX ORDER — LISINOPRIL 5 MG/1
5 TABLET ORAL DAILY
Qty: 90 | Refills: 3 | Status: DISCONTINUED | COMMUNITY
Start: 2019-02-12 | End: 2023-01-25

## 2023-01-25 NOTE — REVIEW OF SYSTEMS
[Feeling Tired] : feeling tired [Negative] : Endocrine [FreeTextEntry2] : "I did not sleep well last night."

## 2023-01-25 NOTE — REASON FOR VISIT
[Post Hospitalization] : a post hospitalization visit [Spouse] : spouse [FreeTextEntry1] : FOLLOW YOUR HEART - Transitional Care Management Program - [facility]

## 2023-01-25 NOTE — ASSESSMENT
[FreeTextEntry1] : Pt recovering well at home s/p TAVR w/ good support from wife. Reviewed all medications and dosages with pt understanding. Pt has all medications in home and is taking as prescribed. He had c/o R groin pain but states the Tylenol has helped w/ pain mgmt. Visiting RN will make home visit today to see pt. Pt is aware of scheduled & recommended follow up appointments as listed below.\par \par

## 2023-01-25 NOTE — HISTORY OF PRESENT ILLNESS
[Home] : at home, [unfilled] , at the time of the visit. [Other Location: e.g. 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 DAPT: aspirin and plavix\par  \par 1/19 s/p 26 mm tate valve via LFA  (14 Fr.) with 2 perclose, RFA (6Fr) closed\par with 1 perclose, RFV (8Fr) manual compression.  Will need triple therapy\par ASA/Plavix/Coumadin x 2 weeks post procedure.  On heparin gtt for bridge to\par coumadin.  To receive 2mg Coumadin tonight.  Flomax started for tonight.\par Atenolol will resume in AM.  Continue to monitor on tele.\par 1/20 VSS; afib V.paced 60-80; bilateral groin sites stable- neg hematoma/\par bleeding; h/h this am 6/21- 2 units prbc given as per Dr. Bland; repeat h/h\par 8/26; resume diuretics as per struc heart; ac with heparin/ coum bridge for\par afib; INR 1.2 today--> coum 3 mg this evening; ck echo today/ ekg daily\par 1/21  CT Abd/pel dropping HCT [place] stool PRBC X 1  Coum 2 mg AF\par discharge planning- home when INR therapeutic - no mcot- pt has a micra ppm\par 1/22 HCT stable   Dose coumadin AF  Observe in house another 24 hours as per Nick Bland\par 1/23 HCT stable Dose coumadin  AF   DC today\par 1/24 VSS: INR 2.03 today - plan for discharge home today as per Dr. Bland.\par 1/25- Seen by [facility] NP for a f/u visit. Emotional support and education provided. All questions answered. Pt overall is recovering well.\par

## 2023-01-25 NOTE — PHYSICAL EXAM
[Neck Appearance] : the appearance of the neck was normal [] : no respiratory distress [Respiration, Rhythm And Depth] : normal respiratory rhythm and effort [Exaggerated Use Of Accessory Muscles For Inspiration] : no accessory muscle use [FreeTextEntry1] : TAVR sites without erythema or drainage, with edges well approximated.  B/L LE swelling. [Examination Of The Chest] : the chest was normal in appearance [Chest Visual Inspection Thoracic Asymmetry] : no chest asymmetry [Diminished Respiratory Excursion] : normal chest expansion [Skin Color & Pigmentation] : normal skin color and pigmentation [Sensation] : the sensory exam was normal to light touch and pinprick [Motor Exam] : the motor exam was normal [No Focal Deficits] : no focal deficits [Oriented To Time, Place, And Person] : oriented to person, place, and time [Impaired Insight] : insight and judgment were intact [Affect] : the affect was normal [Mood] : the mood was normal

## 2023-01-26 RX ORDER — OXYCODONE AND ACETAMINOPHEN 2.5; 325 MG/1; MG/1
2.5-325 TABLET ORAL EVERY 6 HOURS
Qty: 12 | Refills: 0 | Status: ACTIVE | COMMUNITY
Start: 2023-01-26 | End: 1900-01-01

## 2023-01-27 ENCOUNTER — TRANSCRIPTION ENCOUNTER (OUTPATIENT)
Age: 78
End: 2023-01-27

## 2023-01-27 ENCOUNTER — INPATIENT (INPATIENT)
Facility: HOSPITAL | Age: 78
LOS: 6 days | Discharge: HOME HEALTH | DRG: 378 | End: 2023-02-03
Attending: INTERNAL MEDICINE | Admitting: INTERNAL MEDICINE
Payer: COMMERCIAL

## 2023-01-27 VITALS
HEART RATE: 74 BPM | SYSTOLIC BLOOD PRESSURE: 94 MMHG | RESPIRATION RATE: 18 BRPM | WEIGHT: 216.93 LBS | HEIGHT: 68 IN | DIASTOLIC BLOOD PRESSURE: 54 MMHG | TEMPERATURE: 98 F | OXYGEN SATURATION: 96 %

## 2023-01-27 DIAGNOSIS — Z98.890 OTHER SPECIFIED POSTPROCEDURAL STATES: Chronic | ICD-10-CM

## 2023-01-27 DIAGNOSIS — D64.9 ANEMIA, UNSPECIFIED: ICD-10-CM

## 2023-01-27 DIAGNOSIS — Z95.0 PRESENCE OF CARDIAC PACEMAKER: Chronic | ICD-10-CM

## 2023-01-27 DIAGNOSIS — Z96.641 PRESENCE OF RIGHT ARTIFICIAL HIP JOINT: Chronic | ICD-10-CM

## 2023-01-27 DIAGNOSIS — Z98.42 CATARACT EXTRACTION STATUS, LEFT EYE: Chronic | ICD-10-CM

## 2023-01-27 DIAGNOSIS — Z95.2 PRESENCE OF PROSTHETIC HEART VALVE: Chronic | ICD-10-CM

## 2023-01-27 DIAGNOSIS — Z90.81 ACQUIRED ABSENCE OF SPLEEN: Chronic | ICD-10-CM

## 2023-01-27 DIAGNOSIS — I25.10 ATHEROSCLEROTIC HEART DISEASE OF NATIVE CORONARY ARTERY WITHOUT ANGINA PECTORIS: ICD-10-CM

## 2023-01-27 DIAGNOSIS — Z95.2 PRESENCE OF PROSTHETIC HEART VALVE: ICD-10-CM

## 2023-01-27 DIAGNOSIS — Z98.1 ARTHRODESIS STATUS: Chronic | ICD-10-CM

## 2023-01-27 DIAGNOSIS — I25.10 ATHEROSCLEROTIC HEART DISEASE OF NATIVE CORONARY ARTERY WITHOUT ANGINA PECTORIS: Chronic | ICD-10-CM

## 2023-01-27 LAB
ALBUMIN SERPL ELPH-MCNC: 3.3 G/DL — SIGNIFICANT CHANGE UP (ref 3.3–5)
ALP SERPL-CCNC: 349 U/L — HIGH (ref 40–120)
ALT FLD-CCNC: 76 U/L — HIGH (ref 10–45)
ANION GAP SERPL CALC-SCNC: 11 MMOL/L — SIGNIFICANT CHANGE UP (ref 5–17)
ANISOCYTOSIS BLD QL: SLIGHT — SIGNIFICANT CHANGE UP
APPEARANCE UR: CLEAR — SIGNIFICANT CHANGE UP
APTT BLD: 41.3 SEC — HIGH (ref 27.5–35.5)
AST SERPL-CCNC: 40 U/L — SIGNIFICANT CHANGE UP (ref 10–40)
BACTERIA # UR AUTO: NEGATIVE — SIGNIFICANT CHANGE UP
BASE EXCESS BLDV CALC-SCNC: -3.5 MMOL/L — LOW (ref -2–3)
BASOPHILS # BLD AUTO: 0 K/UL — SIGNIFICANT CHANGE UP (ref 0–0.2)
BASOPHILS NFR BLD AUTO: 0 % — SIGNIFICANT CHANGE UP (ref 0–2)
BILIRUB SERPL-MCNC: 0.2 MG/DL — SIGNIFICANT CHANGE UP (ref 0.2–1.2)
BILIRUB UR-MCNC: NEGATIVE — SIGNIFICANT CHANGE UP
BLD GP AB SCN SERPL QL: NEGATIVE — SIGNIFICANT CHANGE UP
BUN SERPL-MCNC: 53 MG/DL — HIGH (ref 7–23)
CA-I SERPL-SCNC: 1.14 MMOL/L — LOW (ref 1.15–1.33)
CALCIUM SERPL-MCNC: 9.3 MG/DL — SIGNIFICANT CHANGE UP (ref 8.4–10.5)
CHLORIDE BLDV-SCNC: 110 MMOL/L — HIGH (ref 96–108)
CHLORIDE SERPL-SCNC: 102 MMOL/L — SIGNIFICANT CHANGE UP (ref 96–108)
CO2 BLDV-SCNC: 23 MMOL/L — SIGNIFICANT CHANGE UP (ref 22–26)
CO2 SERPL-SCNC: 25 MMOL/L — SIGNIFICANT CHANGE UP (ref 22–31)
COLOR SPEC: SIGNIFICANT CHANGE UP
CREAT SERPL-MCNC: 1.2 MG/DL — SIGNIFICANT CHANGE UP (ref 0.5–1.3)
DIFF PNL FLD: NEGATIVE — SIGNIFICANT CHANGE UP
EGFR: 62 ML/MIN/1.73M2 — SIGNIFICANT CHANGE UP
ELLIPTOCYTES BLD QL SMEAR: SLIGHT — SIGNIFICANT CHANGE UP
EOSINOPHIL # BLD AUTO: 0 K/UL — SIGNIFICANT CHANGE UP (ref 0–0.5)
EOSINOPHIL NFR BLD AUTO: 0 % — SIGNIFICANT CHANGE UP (ref 0–6)
EPI CELLS # UR: 0 /HPF — SIGNIFICANT CHANGE UP
GAS PNL BLDV: 137 MMOL/L — SIGNIFICANT CHANGE UP (ref 136–145)
GAS PNL BLDV: SIGNIFICANT CHANGE UP
GAS PNL BLDV: SIGNIFICANT CHANGE UP
GLUCOSE BLDV-MCNC: 102 MG/DL — HIGH (ref 70–99)
GLUCOSE SERPL-MCNC: 140 MG/DL — HIGH (ref 70–99)
GLUCOSE UR QL: NEGATIVE — SIGNIFICANT CHANGE UP
HCO3 BLDV-SCNC: 22 MMOL/L — SIGNIFICANT CHANGE UP (ref 22–29)
HCT VFR BLD CALC: 20.3 % — CRITICAL LOW (ref 39–50)
HCT VFR BLDA CALC: 17 % — CRITICAL LOW (ref 39–51)
HGB BLD CALC-MCNC: 5.5 G/DL — CRITICAL LOW (ref 12.6–17.4)
HGB BLD-MCNC: 6.3 G/DL — CRITICAL LOW (ref 13–17)
HYALINE CASTS # UR AUTO: 1 /LPF — SIGNIFICANT CHANGE UP (ref 0–2)
HYPOCHROMIA BLD QL: SLIGHT — SIGNIFICANT CHANGE UP
INR BLD: 3.53 RATIO — HIGH (ref 0.88–1.16)
KETONES UR-MCNC: NEGATIVE — SIGNIFICANT CHANGE UP
LACTATE BLDV-MCNC: 1.6 MMOL/L — SIGNIFICANT CHANGE UP (ref 0.5–2)
LEUKOCYTE ESTERASE UR-ACNC: NEGATIVE — SIGNIFICANT CHANGE UP
LYMPHOCYTES # BLD AUTO: 2.03 K/UL — SIGNIFICANT CHANGE UP (ref 1–3.3)
LYMPHOCYTES # BLD AUTO: 9.6 % — LOW (ref 13–44)
MACROCYTES BLD QL: SLIGHT — SIGNIFICANT CHANGE UP
MANUAL SMEAR VERIFICATION: SIGNIFICANT CHANGE UP
MCHC RBC-ENTMCNC: 29.6 PG — SIGNIFICANT CHANGE UP (ref 27–34)
MCHC RBC-ENTMCNC: 31 GM/DL — LOW (ref 32–36)
MCV RBC AUTO: 95.3 FL — SIGNIFICANT CHANGE UP (ref 80–100)
MONOCYTES # BLD AUTO: 0.74 K/UL — SIGNIFICANT CHANGE UP (ref 0–0.9)
MONOCYTES NFR BLD AUTO: 3.5 % — SIGNIFICANT CHANGE UP (ref 2–14)
MYELOCYTES NFR BLD: 1.8 % — HIGH (ref 0–0)
NEUTROPHILS # BLD AUTO: 17.97 K/UL — HIGH (ref 1.8–7.4)
NEUTROPHILS NFR BLD AUTO: 85.1 % — HIGH (ref 43–77)
NITRITE UR-MCNC: POSITIVE
OVALOCYTES BLD QL SMEAR: SLIGHT — SIGNIFICANT CHANGE UP
PCO2 BLDV: 42 MMHG — SIGNIFICANT CHANGE UP (ref 42–55)
PH BLDV: 7.33 — SIGNIFICANT CHANGE UP (ref 7.32–7.43)
PH UR: 6 — SIGNIFICANT CHANGE UP (ref 5–8)
PLAT MORPH BLD: NORMAL — SIGNIFICANT CHANGE UP
PLATELET # BLD AUTO: 433 K/UL — HIGH (ref 150–400)
PO2 BLDV: 35 MMHG — SIGNIFICANT CHANGE UP (ref 25–45)
POIKILOCYTOSIS BLD QL AUTO: SLIGHT — SIGNIFICANT CHANGE UP
POLYCHROMASIA BLD QL SMEAR: SLIGHT — SIGNIFICANT CHANGE UP
POTASSIUM BLDV-SCNC: 3.9 MMOL/L — SIGNIFICANT CHANGE UP (ref 3.5–5.1)
POTASSIUM SERPL-MCNC: 4.9 MMOL/L — SIGNIFICANT CHANGE UP (ref 3.5–5.3)
POTASSIUM SERPL-SCNC: 4.9 MMOL/L — SIGNIFICANT CHANGE UP (ref 3.5–5.3)
PROT SERPL-MCNC: 6 G/DL — SIGNIFICANT CHANGE UP (ref 6–8.3)
PROT UR-MCNC: NEGATIVE — SIGNIFICANT CHANGE UP
PROTHROM AB SERPL-ACNC: 41.5 SEC — HIGH (ref 10.5–13.4)
RBC # BLD: 2.13 M/UL — LOW (ref 4.2–5.8)
RBC # FLD: 18.9 % — HIGH (ref 10.3–14.5)
RBC BLD AUTO: ABNORMAL
RBC CASTS # UR COMP ASSIST: 1 /HPF — SIGNIFICANT CHANGE UP (ref 0–4)
RH IG SCN BLD-IMP: POSITIVE — SIGNIFICANT CHANGE UP
SAO2 % BLDV: 52.7 % — LOW (ref 67–88)
SARS-COV-2 RNA SPEC QL NAA+PROBE: SIGNIFICANT CHANGE UP
SODIUM SERPL-SCNC: 138 MMOL/L — SIGNIFICANT CHANGE UP (ref 135–145)
SP GR SPEC: 1.03 — HIGH (ref 1.01–1.02)
TARGETS BLD QL SMEAR: SLIGHT — SIGNIFICANT CHANGE UP
UROBILINOGEN FLD QL: NEGATIVE — SIGNIFICANT CHANGE UP
WBC # BLD: 21.12 K/UL — HIGH (ref 3.8–10.5)
WBC # FLD AUTO: 21.12 K/UL — HIGH (ref 3.8–10.5)
WBC UR QL: 0 /HPF — SIGNIFICANT CHANGE UP (ref 0–5)

## 2023-01-27 PROCEDURE — 99291 CRITICAL CARE FIRST HOUR: CPT

## 2023-01-27 PROCEDURE — 74177 CT ABD & PELVIS W/CONTRAST: CPT | Mod: 26,MA

## 2023-01-27 PROCEDURE — 71046 X-RAY EXAM CHEST 2 VIEWS: CPT | Mod: 26

## 2023-01-27 PROCEDURE — 71260 CT THORAX DX C+: CPT | Mod: 26,MA

## 2023-01-27 RX ORDER — CLOPIDOGREL BISULFATE 75 MG/1
75 TABLET, FILM COATED ORAL DAILY
Refills: 0 | Status: DISCONTINUED | OUTPATIENT
Start: 2023-01-28 | End: 2023-02-03

## 2023-01-27 RX ORDER — PANTOPRAZOLE SODIUM 20 MG/1
40 TABLET, DELAYED RELEASE ORAL DAILY
Refills: 0 | Status: DISCONTINUED | OUTPATIENT
Start: 2023-01-28 | End: 2023-01-28

## 2023-01-27 RX ORDER — ACETAMINOPHEN 500 MG
1000 TABLET ORAL ONCE
Refills: 0 | Status: COMPLETED | OUTPATIENT
Start: 2023-01-27 | End: 2023-01-27

## 2023-01-27 RX ORDER — TAMSULOSIN HYDROCHLORIDE 0.4 MG/1
0.8 CAPSULE ORAL AT BEDTIME
Refills: 0 | Status: DISCONTINUED | OUTPATIENT
Start: 2023-01-27 | End: 2023-02-03

## 2023-01-27 RX ORDER — LIDOCAINE 4 G/100G
1 CREAM TOPICAL ONCE
Refills: 0 | Status: COMPLETED | OUTPATIENT
Start: 2023-01-27 | End: 2023-01-27

## 2023-01-27 RX ORDER — LATANOPROST 0.05 MG/ML
1 SOLUTION/ DROPS OPHTHALMIC; TOPICAL AT BEDTIME
Refills: 0 | Status: DISCONTINUED | OUTPATIENT
Start: 2023-01-27 | End: 2023-02-03

## 2023-01-27 RX ORDER — FERROUS SULFATE 325(65) MG
325 TABLET ORAL DAILY
Refills: 0 | Status: DISCONTINUED | OUTPATIENT
Start: 2023-01-28 | End: 2023-02-03

## 2023-01-27 RX ORDER — ATENOLOL 25 MG/1
25 TABLET ORAL DAILY
Refills: 0 | Status: DISCONTINUED | OUTPATIENT
Start: 2023-01-28 | End: 2023-02-03

## 2023-01-27 RX ORDER — METHOCARBAMOL 500 MG/1
750 TABLET, FILM COATED ORAL ONCE
Refills: 0 | Status: COMPLETED | OUTPATIENT
Start: 2023-01-27 | End: 2023-01-27

## 2023-01-27 RX ORDER — ATORVASTATIN CALCIUM 80 MG/1
80 TABLET, FILM COATED ORAL AT BEDTIME
Refills: 0 | Status: DISCONTINUED | OUTPATIENT
Start: 2023-01-27 | End: 2023-02-03

## 2023-01-27 RX ORDER — ASPIRIN/CALCIUM CARB/MAGNESIUM 324 MG
81 TABLET ORAL DAILY
Refills: 0 | Status: DISCONTINUED | OUTPATIENT
Start: 2023-01-28 | End: 2023-01-31

## 2023-01-27 RX ADMIN — Medication 1000 MILLIGRAM(S): at 17:05

## 2023-01-27 RX ADMIN — Medication 400 MILLIGRAM(S): at 16:35

## 2023-01-27 RX ADMIN — METHOCARBAMOL 750 MILLIGRAM(S): 500 TABLET, FILM COATED ORAL at 17:37

## 2023-01-27 RX ADMIN — LIDOCAINE 1 PATCH: 4 CREAM TOPICAL at 17:37

## 2023-01-27 RX ADMIN — Medication 1000 MILLIGRAM(S): at 16:35

## 2023-01-27 NOTE — H&P ADULT - NSHPREVIEWOFSYSTEMS_GEN_ALL_CORE
REVIEW OF SYSTEMS:    CONSTITUTIONAL: + weakness, fevers or chills  EYES/ENT: No visual changes;  No vertigo or throat pain   NECK: No pain or stiffness  RESPIRATORY: No cough, wheezing, hemoptysis; No shortness of breath  CARDIOVASCULAR: No chest pain or palpitations  GASTROINTESTINAL: +dark stools. No blood noted in stool. No abdominal or epigastric pain. No nausea, vomiting, or hematemesis; No diarrhea or constipation.   GENITOURINARY: No dysuria, frequency or hematuria  NEUROLOGICAL: No numbness or weakness  SKIN: No itching, rashes  MSK- Right hip/buttock sharp pain radiating down leg/back of thigh

## 2023-01-27 NOTE — H&P ADULT - NSICDXPASTSURGICALHX_GEN_ALL_CORE_FT
PAST SURGICAL HISTORY:  CAD (coronary artery disease)     Cardiac pacemaker     History of cardioversion for AF - "many years ago" - unsuccessful    History of fasciotomy     History of lumbar spinal fusion 1/5/2018    S/P hip replacement, right 2014    S/P splenectomy 1975    S/P TAVR (transcatheter aortic valve replacement)     Status post left cataract extraction

## 2023-01-27 NOTE — ED CLERICAL - NS ED CLERK NOTE PRE-ARRIVAL INFORMATION; ADDITIONAL PRE-ARRIVAL INFORMATION
This patient is enrolled in the Follow Your Heart program and has undergone a cardiac surgery procedure within the last 30 days and has active care navigation. This patient can be followed up by the care navigation team within 24 hours. To arrange close follow-up or to obtain additional clinical information about this patient, please call the contact number above. Please call the cardiac surgery team once patient is registered at [phone] for consultation PRIOR to disposition decision.  The patient recently underwent a cardiac surgery procedure and the team can assist in acute medical management.

## 2023-01-27 NOTE — H&P ADULT - HISTORY OF PRESENT ILLNESS
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 Of note prior to discharge on most recent admission patient had anemia with CT Abd/Pelvis done that showed possible intramuscular hematoma within the psoas muscle.  Currently endorses fatigue/right lower extremity pain at hip/buttock that travels down his thigh and comes in waves/sharp pain but is not reproducible upon palpation.  Denies chest pain/sob.  No cough/fevers/chills. No headache/dizziness/blurry vision.  Denies blood in stool however states he has dark stools but also receives iron supplementation.  No hematuria.  No abdominal pain/nausea/vomiting

## 2023-01-27 NOTE — ED PROVIDER NOTE - ATTENDING CONTRIBUTION TO CARE
Patient is a 79 yo M with extensive medical history presenting from pcp for further evaluation and likely admission for anemia and weakness. Patient's medical history reviewed from chart and previous admission. PMH is significant for HTN, Afib on coumadin (last dose 1/14), s/p MICRA PPM implant (Dr. 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      OF note, on 1/17/23, patient had LHC via RRA (off at 1555), CODY to RCA x 2. DAPT: aspirin and plavix. During his admission on 1/19/22 patient had 2 units pRBC transfusion for anemia.     Patient reports there is no source for his anemia. He is undergoing evaluation for leukemia, was told his bone biopsy was normal. He complains of feeling very weak. He walks with a walker at baseline and feels very fatigued today. He went to his pcp who did blood work and found patient to have a hemoglobin of 6.5. He denies fevers, chills, chest pain, shortness of breath, abdominal pain, urinary symptoms. He has been getting iron transfusion and has loose but not watery dark stools. Patient is also complaining of spasms in his right leg.    VS noted  Gen. no acute distress, elderly  HEENT: EOMI, mmm  Spine: no midline C-T-L spine tenderness, no skin changes  Lungs: CTAB/L no C/ W /R   CVS: RRR   Abd; Soft non tender, non distended, no CVA tenderness bilaterally, right groin site: bruising present, no swelling, no tenderness, incision site is C/D/I  Ext: no edema  Skin: no rash  Neuro AAOx3, face symmetrical, SERRANO, non focal clear speech  a/p: weakness/ anemia - per outpatient labs, patient also had wbc of 20. He denies any infectious symptoms. Will send sepsis labs and look for infectious source given elevated white count. Patient has spasms in his right hip and leg - no tenderness on exam. Will give robaxin, tylenol, lidocaine patch. Patient will likely need repeat blood transfusion. Plan to send labs, type and screen. Patient feels so fatigued, he cannot walk. He will need to be admitted.   - Katelin VU

## 2023-01-27 NOTE — ED PROVIDER NOTE - CRITICAL CARE ATTENDING CONTRIBUTION TO CARE
I performed a history and physical exam of the patient and discussed their management with the Advanced Care Practitioner. I reviewed the ACP's note and agree with the documented findings and plan of care.    Patient is a 79 yo M with extensive medical history presenting from pcp for further evaluation and likely admission for anemia and weakness. Patient's medical history reviewed from chart and previous admission. PMH is significant for HTN, Afib on coumadin (last dose 1/14), s/p MICRA PPM implant (Dr. 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      OF note, on 1/17/23, patient had LHC via RRA (off at 1555), CODY to RCA x 2. DAPT: aspirin and plavix. During his admission on 1/19/22 patient had 2 units pRBC transfusion for anemia.     Patient reports there is no source for his anemia. He is undergoing evaluation for leukemia, was told his bone biopsy was normal. He complains of feeling very weak. He walks with a walker at baseline and feels very fatigued today. He went to his pcp who did blood work and found patient to have a hemoglobin of 6.5. He denies fevers, chills, chest pain, shortness of breath, abdominal pain, urinary symptoms. He has been getting iron transfusion and has loose but not watery dark stools. Patient is also complaining of spasms in his right leg.    VS noted  Gen. no acute distress, elderly  HEENT: EOMI, mmm  Spine: no midline C-T-L spine tenderness, no skin changes  Lungs: CTAB/L no C/ W /R   CVS: RRR   Abd; Soft non tender, non distended, no CVA tenderness bilaterally, right groin site: bruising present, no swelling, no tenderness, incision site is C/D/I  Ext: no edema  Skin: no rash  Neuro AAOx3, face symmetrical, SERRANO, non focal clear speech  a/p: weakness/ anemia - per outpatient labs, patient also had wbc of 20. He denies any infectious symptoms. Will send sepsis labs and look for infectious source given elevated white count. Patient has spasms in his right hip and leg - no tenderness on exam. Will give robaxin, tylenol, lidocaine patch. Patient will likely need repeat blood transfusion. Plan to send labs, type and screen. Patient feels so fatigued, he cannot walk. He will need to be admitted.   - Katelin VU

## 2023-01-27 NOTE — ED PROVIDER NOTE - PHYSICAL EXAMINATION
CONSTITUTIONAL: Patient is awake, alert and oriented x 3. Patient is well appearing and in no acute distress.  HEAD: NCAT  ENT: Airway patent, Nasal mucosa clear.   NECK: Supple,   LUNGS: CTA B/L, no wheezes, rhonci or rales  HEART: RRR.+S1S2   ABDOMEN: Soft, non-tender to palpation throughout all four quadrants  MSK: No edema or calf tenderness b/l, (+) pain with right straight leg raise;   SKIN: Small ecchymotic area to right groin incision site without any evidence of cellulitis;   NEURO:  No focal deficits,

## 2023-01-27 NOTE — ED ADULT NURSE NOTE - OBJECTIVE STATEMENT
Pt comes from pcp office due to low Hgb. Pt is post AVR on 1/19/23, on coumadin, no obvious bleeding from anywhere. Pt also c/o pain in the R upper leg, buttock, eased with positioning. Pt is AXOX4. No CP or SOB. VSS on arrival to ER

## 2023-01-27 NOTE — H&P ADULT - PROBLEM SELECTOR PLAN 3
1/21 TAVR done c/b anemia requiring multiple transfusions possible Psoas muscle hematoma DC'd home 1/24    Now admitted with anemia    -TTE ordered  -Repeat H&H 6,3/20  -Transfuse 2 units PRBC  -CT C/A/P /w IV contrast r/o potential source of anemia  -Hold coumadin INR 3.53 (last dose on 1/26 evening 2mg per wife)  -Continue ASA/Plavix given recent stents

## 2023-01-27 NOTE — ED PROVIDER NOTE - OBJECTIVE STATEMENT
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 Patient was discharged from [facility] on 1/24 after having had LHC via RRA (off at 1555), CODY to RCA x 2 and valve replacement. During his admission on 1/19/22 patient had 2 units pRBC transfusion for anemia. Since discharge patient states that he has been feeling well. Today he woke up and felt extreme generalized weakness. He had trouble getting around at home today due to weakness. Also sine discharge patient has been having pain to the hip which radiates down his right leg. Feels like "spasms". Took oxycodone last night for mild relief. Denies any headache, fever, chills, chest pain, cough, difficulty breathing, n/v/d.

## 2023-01-27 NOTE — H&P ADULT - ASSESSMENT
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 Of note prior to discharge on most recent admission patient had anemia with CT Abd/Pelvis done that showed possible intramuscular hematoma within the psoas muscle.  Currently endorses fatigue/right lower extremity pain at hip/buttock that travels down his thigh and comes in waves/sharp pain but is not reproducible upon palpation.  Denies chest pain/sob.  No cough/fevers/chills. No headache/dizziness/blurry vision.  Denies blood in stool however states he has dark stools but also receives iron supplementation.  No hematuria.  No abdominal pain/nausea/vomiting     Case discussed with Dr. Bland.  Patient to be admitted to his service for anemia workup.  Will obtain the following    -Maintain active T&S  -PRBC x 2   -TTE ordered  -Hold Coumadin tonight given INR 3.53  -CT C/A/P /w IV contrast r/o possible source of anema/bleeding  -Continue ASA/Plavix given recent CODY   -Admit to tele bed under Dr. 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 Of note prior to discharge on most recent admission patient had anemia with CT Abd/Pelvis done that showed possible intramuscular hematoma within the psoas muscle.  Currently endorses fatigue/right lower extremity pain at hip/buttock that travels down his thigh and comes in waves/sharp pain but is not reproducible upon palpation.  Denies chest pain/sob.  No cough/fevers/chills. No headache/dizziness/blurry vision.  Denies blood in stool however states he has dark stools but also receives iron supplementation.  No hematuria.  No abdominal pain/nausea/vomiting     Case discussed with Dr. Bland.  Patient to be admitted to his service for anemia workup.  Will obtain the following    -Maintain active T&S  -PRBC x 2   -TTE ordered  -Hold Coumadin tonight given INR 3.53  -CT C/A/P /w IV contrast r/o possible source of anema/bleeding  -Continue ASA/Plavix given recent CODY   -Will obtain GI consult   -Admit to tele bed under Dr. Bell service    PROCEDURE DATE:  01/27/2023          INTERPRETATION:  CLINICAL INFORMATION: Weakness with anemia, evaluate for   hematoma, history of Hodgkin's lymphoma status post splenectomy and   chemotherapy and essential thrombocytopenia    COMPARISON: CT abdomen and pelvis 1/21/2023, CT chest 4/28/2022    CONTRAST/COMPLICATIONS:  IV Contrast: Omnipaque 350.90 mL administered.   10 mL discarded.  Oral Contrast: NONE  Complications: None reported at time of study completion    PROCEDURE:  CT of the Chest, Abdomen and Pelvis was performed.  Noncontrast, arterial and portal venous phase imaging was performed   through the abdomen and pelvis. Venous phase imaging was performed   through the chest.  Sagittal and coronal reformats were performed.    FINDINGS:  CHEST:  LUNGS AND LARGE AIRWAYS: Patent central airways. No pulmonary nodules.  PLEURA: No pleural effusion.  VESSELS: Coronary artery and aortic calcifications.  HEART: Cardiomegaly. No pericardial effusion. Status post TAVR. Leadless   pacer in the right ventricle.  MEDIASTINUM AND TRESSA: No lymphadenopathy.  CHEST WALL AND LOWER NECK: Within normal limits.    ABDOMEN AND PELVIS:  LIVER: Within normal limits.  BILE DUCTS: Normal caliber.  GALLBLADDER: Cholelithiasis.  SPLEEN: Splenectomy with a few residual splenules.  PANCREAS: Within normal limits.  ADRENALS: Within normal limits.  KIDNEYS/URETERS: Within normal limits.    BLADDER: Within normal limits.  REPRODUCTIVE ORGANS: Prostate within normal limits.    BOWEL: No bowel obstruction. Appendix is normal.  PERITONEUM: No ascites.  VESSELS: Atherosclerotic changes. No active contrast extravasation.  RETROPERITONEUM/LYMPH NODES: 1.5 x 0.8 cm nodular density abutting the   posterior right hemidiaphragm is unchanged from 1/21/2023. Decreased   asymmetry of the right psoas muscle.  ABDOMINAL WALL: Within normal limits.  BONES: Status post right total hip arthroplasty. Status post posterior   fusion of L3/L4 with laminectomy and left pedicle screws.    IMPRESSION:  Decreased asymmetry of the right psoas muscle compared to CT   abdomen/pelvis from 1/21/2023.    No new areas of hemorrhage or active contrast extravasation.

## 2023-01-27 NOTE — H&P ADULT - PROBLEM SELECTOR PLAN 1
1/27 sent to ED with low H&H found at outpatient heme office    -Repeat H&H 6,3/20  -Transfuse 2 units PRBC  -CT C/A/P /w IV contrast r/o potential source of anemia  -Hold coumadin INR 3.53 (last dose on 1/26 evening 2mg per wife)  -Continue ASA/Plavix given recent stents 1/27 sent to ED with low H&H found at outpatient heme office    -Repeat H&H 6,3/20  -Transfuse 2 units PRBC  -CT C/A/P /w IV contrast r/o potential source of anemia  -Hold coumadin INR 3.53 (last dose on 1/26 evening 2mg per wife)  -Continue ASA/Plavix given recent stents  -Will obtain GI consult hx of AVM

## 2023-01-27 NOTE — ED PROVIDER NOTE - CLINICAL SUMMARY MEDICAL DECISION MAKING FREE TEXT BOX
Katelin VU: See my attestation for full history and physical. Patient presented with weakness/ anemia - per outpatient labs, patient also had wbc of 20. He denies any infectious symptoms. Will send sepsis labs and look for infectious source given elevated white count. Patient has spasms in his right hip and leg - no tenderness on exam. Will give robaxin, tylenol, lidocaine patch. Patient will likely need repeat blood transfusion. Plan to send labs, type and screen. Patient feels so fatigued, he cannot walk.     Hgb found to be 6.3. 2 units pRBC ordered for transfusion. Patient to be admitted.

## 2023-01-27 NOTE — ED PROVIDER NOTE - PROGRESS NOTE DETAILS
Cardiology NP at bedside. Recc transfuse 2 units of prbc and order CT angio of chest abd and pelvis. Will admit to Dr. Rocco Bland. Christina He PA-C

## 2023-01-27 NOTE — ED PROVIDER NOTE - NS ED ATTENDING STATEMENT MOD
I have seen and examined this patient and fully participated in the care of this patient as the teaching attending.  The service was shared with the MANNIE.  I reviewed and verified the documentation and independently performed the documented: I have personally provided the amount of critical care time documented below excluding time spent on separate procedures.

## 2023-01-27 NOTE — H&P ADULT - NSHPLABSRESULTS_GEN_ALL_CORE
CBC Full  -  ( 27 Jan 2023 16:49 )  WBC Count : 21.12 K/uL  RBC Count : 2.13 M/uL  Hemoglobin : 6.3 g/dL  Hematocrit : 20.3 %  Platelet Count - Automated : 433 K/uL  Mean Cell Volume : 95.3 fl  Mean Cell Hemoglobin : 29.6 pg  Mean Cell Hemoglobin Concentration : 31.0 gm/dL  Auto Neutrophil # : 17.97 K/uL  Auto Lymphocyte # : 2.03 K/uL  Auto Monocyte # : 0.74 K/uL  Auto Eosinophil # : 0.00 K/uL  Auto Basophil # : 0.00 K/uL  Auto Neutrophil % : 85.1 %  Auto Lymphocyte % : 9.6 %  Auto Monocyte % : 3.5 %  Auto Eosinophil % : 0.0 %  Auto Basophil % : 0.0 %      Comprehensive Metabolic Panel (01.27.23 @ 16:49)    Sodium, Serum: 138 mmol/L    Potassium, Serum: 4.9 mmol/L    Chloride, Serum: 102 mmol/L    Carbon Dioxide, Serum: 25 mmol/L    Anion Gap, Serum: 11 mmol/L    Blood Urea Nitrogen, Serum: 53 mg/dL    Creatinine, Serum: 1.20 mg/dL    Glucose, Serum: 140 mg/dL    Calcium, Total Serum: 9.3 mg/dL    Protein Total, Serum: 6.0 g/dL    Albumin, Serum: 3.3 g/dL    Bilirubin Total, Serum: 0.2 mg/dL    Alkaline Phosphatase, Serum: 349 U/L    Aspartate Aminotransferase (AST/SGOT): 40 U/L    Alanine Aminotransferase (ALT/SGPT): 76 U/L    eGFR: 62:     Prothrombin Time and INR, Plasma in AM (01.27.23 @ 16:49)    Prothrombin Time, Plasma: 41.5 sec    INR: 3.53:

## 2023-01-28 LAB
ALBUMIN SERPL ELPH-MCNC: 2.8 G/DL — LOW (ref 3.3–5)
ALP SERPL-CCNC: 269 U/L — HIGH (ref 40–120)
ALT FLD-CCNC: 56 U/L — HIGH (ref 10–45)
ANION GAP SERPL CALC-SCNC: 8 MMOL/L — SIGNIFICANT CHANGE UP (ref 5–17)
AST SERPL-CCNC: 30 U/L — SIGNIFICANT CHANGE UP (ref 10–40)
BASOPHILS # BLD AUTO: 0.11 K/UL — SIGNIFICANT CHANGE UP (ref 0–0.2)
BASOPHILS NFR BLD AUTO: 0.7 % — SIGNIFICANT CHANGE UP (ref 0–2)
BILIRUB SERPL-MCNC: 0.2 MG/DL — SIGNIFICANT CHANGE UP (ref 0.2–1.2)
BUN SERPL-MCNC: 46 MG/DL — HIGH (ref 7–23)
CALCIUM SERPL-MCNC: 8.8 MG/DL — SIGNIFICANT CHANGE UP (ref 8.4–10.5)
CHLORIDE SERPL-SCNC: 105 MMOL/L — SIGNIFICANT CHANGE UP (ref 96–108)
CO2 SERPL-SCNC: 26 MMOL/L — SIGNIFICANT CHANGE UP (ref 22–31)
CREAT SERPL-MCNC: 1.13 MG/DL — SIGNIFICANT CHANGE UP (ref 0.5–1.3)
EGFR: 66 ML/MIN/1.73M2 — SIGNIFICANT CHANGE UP
EOSINOPHIL # BLD AUTO: 0.66 K/UL — HIGH (ref 0–0.5)
EOSINOPHIL NFR BLD AUTO: 4 % — SIGNIFICANT CHANGE UP (ref 0–6)
GLUCOSE SERPL-MCNC: 95 MG/DL — SIGNIFICANT CHANGE UP (ref 70–99)
HCT VFR BLD CALC: 22.9 % — LOW (ref 39–50)
HGB BLD-MCNC: 7.2 G/DL — LOW (ref 13–17)
IMM GRANULOCYTES NFR BLD AUTO: 2.1 % — HIGH (ref 0–0.9)
INR BLD: 3.49 RATIO — HIGH (ref 0.88–1.16)
LYMPHOCYTES # BLD AUTO: 15.4 % — SIGNIFICANT CHANGE UP (ref 13–44)
LYMPHOCYTES # BLD AUTO: 2.57 K/UL — SIGNIFICANT CHANGE UP (ref 1–3.3)
MAGNESIUM SERPL-MCNC: 2.3 MG/DL — SIGNIFICANT CHANGE UP (ref 1.6–2.6)
MCHC RBC-ENTMCNC: 29.1 PG — SIGNIFICANT CHANGE UP (ref 27–34)
MCHC RBC-ENTMCNC: 31.4 GM/DL — LOW (ref 32–36)
MCV RBC AUTO: 92.7 FL — SIGNIFICANT CHANGE UP (ref 80–100)
MONOCYTES # BLD AUTO: 1.31 K/UL — HIGH (ref 0–0.9)
MONOCYTES NFR BLD AUTO: 7.9 % — SIGNIFICANT CHANGE UP (ref 2–14)
NEUTROPHILS # BLD AUTO: 11.65 K/UL — HIGH (ref 1.8–7.4)
NEUTROPHILS NFR BLD AUTO: 69.9 % — SIGNIFICANT CHANGE UP (ref 43–77)
NRBC # BLD: 0 /100 WBCS — SIGNIFICANT CHANGE UP (ref 0–0)
PHOSPHATE SERPL-MCNC: 4 MG/DL — SIGNIFICANT CHANGE UP (ref 2.5–4.5)
PLATELET # BLD AUTO: 382 K/UL — SIGNIFICANT CHANGE UP (ref 150–400)
POTASSIUM SERPL-MCNC: 4.5 MMOL/L — SIGNIFICANT CHANGE UP (ref 3.5–5.3)
POTASSIUM SERPL-SCNC: 4.5 MMOL/L — SIGNIFICANT CHANGE UP (ref 3.5–5.3)
PROT SERPL-MCNC: 5.2 G/DL — LOW (ref 6–8.3)
PROTHROM AB SERPL-ACNC: 41 SEC — HIGH (ref 10.5–13.4)
RBC # BLD: 2.47 M/UL — LOW (ref 4.2–5.8)
RBC # FLD: 17.2 % — HIGH (ref 10.3–14.5)
SODIUM SERPL-SCNC: 139 MMOL/L — SIGNIFICANT CHANGE UP (ref 135–145)
WBC # BLD: 16.65 K/UL — HIGH (ref 3.8–10.5)
WBC # FLD AUTO: 16.65 K/UL — HIGH (ref 3.8–10.5)

## 2023-01-28 PROCEDURE — 99232 SBSQ HOSP IP/OBS MODERATE 35: CPT

## 2023-01-28 PROCEDURE — 99222 1ST HOSP IP/OBS MODERATE 55: CPT | Mod: GC

## 2023-01-28 RX ORDER — SPIRONOLACTONE 25 MG/1
12.5 TABLET, FILM COATED ORAL DAILY
Refills: 0 | Status: DISCONTINUED | OUTPATIENT
Start: 2023-01-28 | End: 2023-02-03

## 2023-01-28 RX ORDER — PANTOPRAZOLE SODIUM 20 MG/1
40 TABLET, DELAYED RELEASE ORAL EVERY 12 HOURS
Refills: 0 | Status: DISCONTINUED | OUTPATIENT
Start: 2023-01-28 | End: 2023-02-02

## 2023-01-28 RX ORDER — ACETAMINOPHEN 500 MG
650 TABLET ORAL ONCE
Refills: 0 | Status: COMPLETED | OUTPATIENT
Start: 2023-01-28 | End: 2023-01-28

## 2023-01-28 RX ORDER — FUROSEMIDE 40 MG
40 TABLET ORAL DAILY
Refills: 0 | Status: DISCONTINUED | OUTPATIENT
Start: 2023-01-28 | End: 2023-02-03

## 2023-01-28 RX ADMIN — TAMSULOSIN HYDROCHLORIDE 0.8 MILLIGRAM(S): 0.4 CAPSULE ORAL at 01:16

## 2023-01-28 RX ADMIN — Medication 650 MILLIGRAM(S): at 01:00

## 2023-01-28 RX ADMIN — ATORVASTATIN CALCIUM 80 MILLIGRAM(S): 80 TABLET, FILM COATED ORAL at 21:46

## 2023-01-28 RX ADMIN — ATENOLOL 25 MILLIGRAM(S): 25 TABLET ORAL at 05:15

## 2023-01-28 RX ADMIN — Medication 40 MILLIGRAM(S): at 15:44

## 2023-01-28 RX ADMIN — SPIRONOLACTONE 12.5 MILLIGRAM(S): 25 TABLET, FILM COATED ORAL at 15:45

## 2023-01-28 RX ADMIN — TAMSULOSIN HYDROCHLORIDE 0.8 MILLIGRAM(S): 0.4 CAPSULE ORAL at 21:46

## 2023-01-28 RX ADMIN — CLOPIDOGREL BISULFATE 75 MILLIGRAM(S): 75 TABLET, FILM COATED ORAL at 11:13

## 2023-01-28 RX ADMIN — Medication 650 MILLIGRAM(S): at 01:29

## 2023-01-28 RX ADMIN — ATORVASTATIN CALCIUM 80 MILLIGRAM(S): 80 TABLET, FILM COATED ORAL at 01:16

## 2023-01-28 RX ADMIN — Medication 325 MILLIGRAM(S): at 11:13

## 2023-01-28 RX ADMIN — LATANOPROST 1 DROP(S): 0.05 SOLUTION/ DROPS OPHTHALMIC; TOPICAL at 21:46

## 2023-01-28 RX ADMIN — LATANOPROST 1 DROP(S): 0.05 SOLUTION/ DROPS OPHTHALMIC; TOPICAL at 01:15

## 2023-01-28 RX ADMIN — LIDOCAINE 1 PATCH: 4 CREAM TOPICAL at 05:11

## 2023-01-28 RX ADMIN — PANTOPRAZOLE SODIUM 40 MILLIGRAM(S): 20 TABLET, DELAYED RELEASE ORAL at 17:49

## 2023-01-28 RX ADMIN — Medication 81 MILLIGRAM(S): at 11:13

## 2023-01-28 NOTE — PROGRESS NOTE ADULT - PROBLEM SELECTOR PLAN 3
-1/27 CT C/A/P /w IV contrast negative for hemorrhage  -Coumadin on hold for supratherapeutic INR 3.53 (last dose on 1/26 evening 2mg per wife)  -Continue ASA/Plavix given recent stents  -Check TTE

## 2023-01-28 NOTE — PROGRESS NOTE ADULT - SUBJECTIVE AND OBJECTIVE BOX
Subjective: Pt states "Hello" denies any CP or SOB. No acute events overnight.     Telemetry: SR 60 - 70   Vital Signs Last 24 Hrs  T(C): 36.7 (01-28-23 @ 04:45), Max: 36.9 (01-27-23 @ 19:10)  T(F): 98.1 (01-28-23 @ 04:45), Max: 98.4 (01-27-23 @ 19:10)  HR: 79 (01-28-23 @ 04:45) (64 - 95)  BP: 163/54 (01-28-23 @ 04:45) (94/54 - 163/54)  RR: 18 (01-28-23 @ 04:45) (16 - 18)  SpO2: 96% (01-28-23 @ 04:45) (94% - 98%)             01-27 @ 07:01  -  01-28 @ 07:00  --------------------------------------------------------  IN: 300 mL / OUT: 550 mL / NET: -250 mL                          7.2    16.65 )-----------( 382      ( 28 Jan 2023 06:17 )             22.9     139  |  105  |  46<H>  ----------------------------<  95  4.5   |  26  |  1.13    AST  30  /  ALT  56<H>  /  AlkPhos  269<H>  01-28                PHYSICAL EXAM  Neurology: A&Ox3, NAD  CV : RRR+S1S2  Lungs: Respirations non-labored, B/L BS CTA  Abdomen: Soft, NT/ND, +BSx4Q, (-)N/V/D  : Voiding without difficulty  Extremities: B/L LE no edema, negative calf tenderness, +PP                          B/L groins soft, CDI MAR no bleeding no hematoma      MEDICATIONS  aspirin enteric coated 81 milliGRAM(s) Oral daily  atenolol  Tablet 25 milliGRAM(s) Oral daily  atorvastatin 80 milliGRAM(s) Oral at bedtime  clopidogrel Tablet 75 milliGRAM(s) Oral daily  ferrous    sulfate 325 milliGRAM(s) Oral daily  latanoprost 0.005% Ophthalmic Solution 1 Drop(s) Right EYE at bedtime  pantoprazole  Injectable 40 milliGRAM(s) IV Push daily  tamsulosin 0.8 milliGRAM(s) Oral at bedtime      Discussed with Cardiothoracic Team at AM rounds.

## 2023-01-28 NOTE — PROGRESS NOTE ADULT - PROBLEM SELECTOR PLAN 2
Patient with recent stents.  1/19/2023 -Continue ASA 81/Plavix daily  Continue atenolol 25 daily and atorvastatin 80 HS

## 2023-01-28 NOTE — PROGRESS NOTE ADULT - ASSESSMENT
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 Of note prior to discharge on most recent admission patient had anemia with CT Abd/Pelvis done that showed possible intramuscular hematoma within the psoas muscle.  Currently endorses fatigue/right lower extremity pain at hip/buttock that travels down his thigh and comes in waves/sharp pain but is not reproducible upon palpation. Patient admitted for anemia workup.     1/27 CT C/A/P with IV cont: Decreased asymmetry of the right psoas muscle compared to CT abdomen/pelvis from 1/21/2023. No new areas of hemorrhage or active contrast extravasation. Transfuse 2U PRBC for H/H 6/20.  1/28 VSS, H/H 7.2/22.9 s/p 2U PRBC. INR 3.49, Coumadin on hold. Pending TTE. 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 Of note prior to discharge on most recent admission patient had anemia with CT Abd/Pelvis done that showed possible intramuscular hematoma within the psoas muscle.  Currently endorses fatigue/right lower extremity pain at hip/buttock that travels down his thigh and comes in waves/sharp pain but is not reproducible upon palpation. Patient admitted for anemia workup.     1/27 CT C/A/P with IV cont: Decreased asymmetry of the right psoas muscle compared to CT abdomen/pelvis from 1/21/2023. No new areas of hemorrhage or active contrast extravasation. Transfuse 2U PRBC for H/H 6/20.  1/28 VSS, H/H 7.2/22.9 s/p 2U PRBC, transfuse 1U PRBC today per Dr. Bland. INR 3.49, Coumadin on hold. Pending TTE. F/U GI recs.

## 2023-01-28 NOTE — PATIENT PROFILE ADULT - FALL HARM RISK - HARM RISK INTERVENTIONS
Assistance with ambulation/Assistance OOB with selected safe patient handling equipment/Communicate Risk of Fall with Harm to all staff/Discuss with provider need for PT consult/Monitor gait and stability/Provide patient with walking aids - walker, cane, crutches/Reinforce activity limits and safety measures with patient and family/Sit up slowly, dangle for a short time, stand at bedside before walking/Tailored Fall Risk Interventions/Visual Cue: Yellow wristband and red socks/Bed in lowest position, wheels locked, appropriate side rails in place/Call bell, personal items and telephone in reach/Instruct patient to call for assistance before getting out of bed or chair/Non-slip footwear when patient is out of bed/[place] to call system/Physically safe environment - no spills, clutter or unnecessary equipment/Purposeful Proactive Rounding/Room/bathroom lighting operational, light cord in reach

## 2023-01-28 NOTE — PROGRESS NOTE ADULT - PROBLEM SELECTOR PLAN 1
GI consulted - f/u recs  Continue IV Protonix 40 daily  Daily CBC to trend H/H   1/27 s/p 2U PRBC  Coumadin on hold, daily PT/INR  Continue ASA 81/Plavix daily given recent stents GI consulted - f/u recs  Continue IV Protonix 40 BID  Daily CBC to trend H/H   1/27 s/p 2U PRBC  Coumadin on hold, daily PT/INR  Continue ASA 81/Plavix daily given recent stents

## 2023-01-28 NOTE — CONSULT NOTE ADULT - ASSESSMENT
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 GI consulted for furthre workup and management of the above.     #Acute on Chronic Anemia  Hx notable for CAD with recent stents to RCA 1/19/2023, TAVR on 1/21/2023 c/b anemia post-op 2/2 hematoma CT Abd/Pelvis done that showed possible intramuscular hematoma within the psoas muscle. Pt currently on ASA/Plavix and Warfarin. Labs on discharge notable for a hgb of 8 although pt found to have a hgb of 6.5/21 elevated INR to 3.5 and elevated BUN/Cr ratio. Suspect likely slow oozing from elevated INR from arge AVMs in the proximal and mid small bowel as noted on VCE during 2021. Currently HDS and responding to pRBC. Will plan to preform push enteroscopy on Monday if medically optimized from cardiac perspective and INR back to normal range.     Recommendations  -Ok for regular diet today.  Plan for tentative push enteroscopy on Monday if medically optimized from cardiac perspective and INR back to normal range.   -Hold Warfarin, trend daily INR   -IV PPI BID   -Trend CBC q24h and  and transfuse to maintain Hbg > 8 given pre-existing cardiovascular conditions.  -Maintain active type & screen and  access with 2 x Large bore IV    All recommendations are tentative until note is attested by attending.     Joe Corrigan, PGY-4  Gastroenterology/Hepatology Fellow  Available on [employer] Teams   [phone] (Long Range Pager)  [phone] (Short Range Pager [facility])    After 5pm, please contact the on-call GI fellow. [phone]

## 2023-01-28 NOTE — CONSULT NOTE ADULT - ATTENDING COMMENTS
78-year-old male with hypertension/A. fib on Coumadin/Micra PM/CVA x2 with residual short-term memory loss/Hodgkin's lymphoma status post splenectomy and chemotherapy/hypertension/MOE/GERD/CAD with recent stent to the RCA 1/19/2023/TAVR 1/21/2023 complicated by anemia postoperatively with hematoma requiring multiple transfusions/right lower extremity fasciotomy and skin grafting due to right lower extremity DVT postoperatively found to have a low hemoglobin 6.5, dark stools in the setting of iron use as well.  No complaints of abdominal pain, complaints of severe right groin pain    CT performed, decreased asymmetry of the right psoas muscle compared to CT abdomen pelvis from 1/21    Impression–patient with known AVMs  Recommendation  1.  EGD/push enteroscopy planned for Monday if medically optimized 2.  Trend CBC every 24 hours, transfuse to maintain hemoglobin greater than 8  3.  PPI IV twice daily  4.  Okay to resume regular diet, n.p.o. after midnight Sunday

## 2023-01-28 NOTE — CONSULT NOTE ADULT - SUBJECTIVE AND OBJECTIVE BOX
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 GI consulted for furthre workup and management of the above.     Mr. Gillis last seen by the GI service in November for anemia with etiology at that time though to be 2/2 to  iffuse mucosal bleeding in the setting of supratherapeutic INR in addition to known AVMs. Treated supportively at that time given high risk for procedure. Prior workup notable for VCE and CT colonography showing large AVMs in the proximal and mid small bowel (). Negative colonography .     As noted above, patient recently had CAD with recent stents to RCA 2023, TAVR on 2023 c/b anemia post-op 2/2 hematoma CT Abd/Pelvis done that showed possible intramuscular hematoma within the psoas muscle. Labs on discharge notable for a hgb of 8 although pt found to have a hgb of 6.5/21 associated with fatigue/weakness and right hip/lower extremity pain.Denies blood in stool however states he has dark stools but also receives iron supplementation. Denies any abdominal pain/nausea/vomiting or hematuria. Currently on ASA/Plavix in addition to Warfarin   with INR on admisssion elevated to 3.5 s/p 2 units with pt overall feeling better and less fatigued. Continue to c/o of pain in the right groin.     Allergies:  No Known Allergies    Home Medications:  ATENOLOL 25 MG TABLET: 1 tab(s) orally once a day (2023 09:43)  ferrous sulfate 325 mg (65 mg elemental iron) oral tablet: 1 tab(s) orally once a day (2023 09:43)  Flonase: 1 spray(s) nasal once a day (2023 09:43)  Lasix 40 mg oral tablet: 1 tab(s) orally once a day (2023 09:43)  latanoprost 0.005% ophthalmic solution: 1 drop(s) to the right eye once a day (in the evening) (2023 09:43)  omeprazole 20 mg oral delayed release capsule: 1 cap(s) orally once a day (2023 11:48)  rosuvastatin 20 mg oral tablet: 0.5 tab(s) orally once a day (2023 11:48)  spironolactone 25 mg oral tablet: 0.5 tab(s) orally once a day (2023 09:43)  tamsulosin 0.4 mg oral capsule: 2 cap(s) orally once a day (at bedtime) (2023 09:43)  Vitamin B-12: 1 dose(s) injectable once a month    Note:Gets from MD office (2023 09:43)  Vitamin C: 1 tab(s) orally once a day (2023 09:43)  warfarin 1 mg oral tablet: 2 tab(s) orally 5 times a week (M-F) (2023 11:48)  warfarin 1 mg oral tablet: 1 tab(s) orally 2 times a week (Sat and Sun) (2023 11:48)      Hospital Medications:  aspirin enteric coated 81 milliGRAM(s) Oral daily  atenolol  Tablet 25 milliGRAM(s) Oral daily  atorvastatin 80 milliGRAM(s) Oral at bedtime  clopidogrel Tablet 75 milliGRAM(s) Oral daily  ferrous    sulfate 325 milliGRAM(s) Oral daily  furosemide    Tablet 40 milliGRAM(s) Oral daily  latanoprost 0.005% Ophthalmic Solution 1 Drop(s) Right EYE at bedtime  oxycodone    5 mG/acetaminophen 325 mG 1 Tablet(s) Oral every 6 hours PRN  pantoprazole  Injectable 40 milliGRAM(s) IV Push daily  spironolactone 12.5 milliGRAM(s) Oral daily  tamsulosin 0.8 milliGRAM(s) Oral at bedtime      PMHX/PSHX:    Hodgkin lymphoma  Atrial fibrillation  HTN (hypertension)  GERD (gastroesophageal reflux disease)  MOE (obstructive sleep apnea)  BPH (benign prostatic hyperplasia)  Osteoarthritis  CVA (cerebral vascular accident)  Spinal stenosis  Spondylolisthesis  Exposure to toxin  Atrial fibrillation  History of short term memory loss  DVT, lower extremity  S/P splenectomy  S/P hip replacement, right  H/O Spinal surgery  History of cardioversio  History of lumbar spial fusion  Status post left cataact extraction  History of fasciotomy  Cardiac pacemaker  S/P TAVR (transcatheter aortic valve replacement)  CAD (coronary artery disease)      Family history:    Family history of myocardial infarction (Father)  Family history of stroke (Father)    Social History:   Tob: Denies  EtOH: Denies  Illicit Drugs: Denies    ROS: Complete and normal except as mentioned above    PHYSICAL EXAM:   GENERAL:  No acute distress  HEENT:  NCAT, no scleral icterus   CHEST:  no respiratory distress  HEART:  Regular rate and rhythm. +Systolic murmur   ABDOMEN:  Soft, non-tender, distended, normoactive bowel sounds.  Black stool in rectal vault.   EXTREMITIES: No edema  NEURO:  Alert and oriented x 3    Vital Signs:  Vital Signs Last 24 Hrs  T(C): 36.8 (2023 09:55), Max: 36.9 (2023 19:10)  T(F): 98.2 (2023 09:55), Max: 98.4 (2023 19:10)  HR: 61 (2023 09:55) (61 - 95)  BP: 128/58 (2023 09:55) (94/54 - 163/54)  BP(mean): 82 (2023 18:48) (75 - 82)  RR: 16 (2023 09:55) (16 - 18)  SpO2: 95% (2023 09:55) (94% - 98%)    Parameters below as of 2023 09:55  Patient On (Oxygen Delivery Method): room air      Daily Height in cm: 172.72 (2023 15:11)    Daily     LABS:                        7.2    16.65 )-----------( 382      ( 2023 06:17 )             22.9     Mean Cell Volume: 92.7 fl (-23 @ 06:17)        139  |  105  |  46<H>  ----------------------------<  95  4.5   |  26  |  1.13    Ca    8.8      2023 06:18  Phos  4.0       Mg     2.3         TPro  5.2<L>  /  Alb  2.8<L>  /  TBili  0.2  /  DBili  x   /  AST  30  /  ALT  56<H>  /  AlkPhos  269<H>      LIVER FUNCTIONS - ( 2023 06:18 )  Alb: 2.8 g/dL / Pro: 5.2 g/dL / ALK PHOS: 269 U/L / ALT: 56 U/L / AST: 30 U/L / GGT: x           PT/INR - ( 2023 06:20 )   PT: 41.0 sec;   INR: 3.49 ratio         PTT - ( 2023 16:49 )  PTT:41.3 sec  Urinalysis Basic - ( 2023 19:40 )    Color: Light Yellow / Appearance: Clear / S.028 / pH: x  Gluc: x / Ketone: Negative  / Bili: Negative / Urobili: Negative   Blood: x / Protein: Negative / Nitrite: Positive   Leuk Esterase: Negative / RBC: 1 /hpf / WBC 0 /HPF   Sq Epi: x / Non Sq Epi: 0 /hpf / Bacteria: Negative                              7.2    16.65 )-----------( 382      ( 2023 06:17 )             22.9                         6.3    21.12 )-----------( 433      ( 2023 16:49 )             20.3       Imaging:  VCE and CT colonography showing large AVMs in the proximal and mid small bowel (). Negative colonography . 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 GI consulted for furthre workup and management of the above.     Mr. Gillis last seen by the GI service in November for anemia with etiology at that time though to be 2/2 to  iffuse mucosal bleeding in the setting of supratherapeutic INR in addition to known AVMs. Treated supportively at that time given high risk for procedure. Prior workup notable for VCE and CT colonography showing large AVMs in the proximal and mid small bowel (). Negative colonography .     As noted above, patient recently had CAD with recent stents to RCA 2023, TAVR on 2023 c/b anemia post-op 2/2 hematoma CT Abd/Pelvis done that showed possible intramuscular hematoma within the psoas muscle. Labs on discharge notable for a hgb of 8 although pt found to have a hgb of 6.5/21 associated with fatigue/weakness and right hip/lower extremity pain.Denies blood in stool however states he has dark stools but also receives iron supplementation. Denies any abdominal pain/nausea/vomiting or hematuria. Currently on ASA/Plavix in addition to Warfarin   with INR on admisssion elevated to 3.5 s/p 2 units with pt overall feeling better and less fatigued. Continue to c/o of pain in the right groin.     Allergies:  No Known Allergies    Home Medications:  ATENOLOL 25 MG TABLET: 1 tab(s) orally once a day (2023 09:43)  ferrous sulfate 325 mg (65 mg elemental iron) oral tablet: 1 tab(s) orally once a day (2023 09:43)  Flonase: 1 spray(s) nasal once a day (2023 09:43)  Lasix 40 mg oral tablet: 1 tab(s) orally once a day (2023 09:43)  latanoprost 0.005% ophthalmic solution: 1 drop(s) to the right eye once a day (in the evening) (2023 09:43)  omeprazole 20 mg oral delayed release capsule: 1 cap(s) orally once a day (2023 11:48)  rosuvastatin 20 mg oral tablet: 0.5 tab(s) orally once a day (2023 11:48)  spironolactone 25 mg oral tablet: 0.5 tab(s) orally once a day (2023 09:43)  tamsulosin 0.4 mg oral capsule: 2 cap(s) orally once a day (at bedtime) (2023 09:43)  Vitamin B-12: 1 dose(s) injectable once a month    Note:Gets from MD office (2023 09:43)  Vitamin C: 1 tab(s) orally once a day (2023 09:43)  warfarin 1 mg oral tablet: 2 tab(s) orally 5 times a week (M-F) (2023 11:48)  warfarin 1 mg oral tablet: 1 tab(s) orally 2 times a week (Sat and Sun) (2023 11:48)      Hospital Medications:  aspirin enteric coated 81 milliGRAM(s) Oral daily  atenolol  Tablet 25 milliGRAM(s) Oral daily  atorvastatin 80 milliGRAM(s) Oral at bedtime  clopidogrel Tablet 75 milliGRAM(s) Oral daily  ferrous    sulfate 325 milliGRAM(s) Oral daily  furosemide    Tablet 40 milliGRAM(s) Oral daily  latanoprost 0.005% Ophthalmic Solution 1 Drop(s) Right EYE at bedtime  oxycodone    5 mG/acetaminophen 325 mG 1 Tablet(s) Oral every 6 hours PRN  pantoprazole  Injectable 40 milliGRAM(s) IV Push daily  spironolactone 12.5 milliGRAM(s) Oral daily  tamsulosin 0.8 milliGRAM(s) Oral at bedtime      PMHX/PSHX:    Hodgkin lymphoma  Atrial fibrillation  HTN (hypertension)  GERD (gastroesophageal reflux disease)  MOE (obstructive sleep apnea)  BPH (benign prostatic hyperplasia)  Osteoarthritis  CVA (cerebral vascular accident)  Spinal stenosis  Spondylolisthesis  Exposure to toxin  Atrial fibrillation  History of short term memory loss  DVT, lower extremity  S/P splenectomy  S/P hip replacement, right  H/O Spinal surgery  History of cardioversio  History of lumbar spial fusion  Status post left cataact extraction  History of fasciotomy  Cardiac pacemaker  S/P TAVR (transcatheter aortic valve replacement)  CAD (coronary artery disease)      Family history:    Family history of myocardial infarction (Father)  Family history of stroke (Father)    Social History:   Tob: Denies  EtOH: Denies  Illicit Drugs: Denies    ROS: Complete and normal except as mentioned above    PHYSICAL EXAM:   GENERAL:  No acute distress  HEENT:  NCAT, no scleral icterus   CHEST:  no respiratory distress  HEART:  Regular rate and rhythm. +Systolic murmur   ABDOMEN:  Soft, non-tender, distended, normoactive bowel sounds.  Black stool in rectal vault.   EXTREMITIES: No edema  NEURO:  Alert and oriented x 3    Vital Signs:  Vital Signs Last 24 Hrs  T(C): 36.8 (2023 09:55), Max: 36.9 (2023 19:10)  T(F): 98.2 (2023 09:55), Max: 98.4 (2023 19:10)  HR: 61 (2023 09:55) (61 - 95)  BP: 128/58 (2023 09:55) (94/54 - 163/54)  BP(mean): 82 (2023 18:48) (75 - 82)  RR: 16 (2023 09:55) (16 - 18)  SpO2: 95% (2023 09:55) (94% - 98%)    Parameters below as of 2023 09:55  Patient On (Oxygen Delivery Method): room air      Daily Height in cm: 172.72 (2023 15:11)    Daily     LABS:                        7.2    16.65 )-----------( 382      ( 2023 06:17 )             22.9     Mean Cell Volume: 92.7 fl (-23 @ 06:17)        139  |  105  |  46<H>  ----------------------------<  95  4.5   |  26  |  1.13    Ca    8.8      2023 06:18  Phos  4.0       Mg     2.3         TPro  5.2<L>  /  Alb  2.8<L>  /  TBili  0.2  /  DBili  x   /  AST  30  /  ALT  56<H>  /  AlkPhos  269<H>      LIVER FUNCTIONS - ( 2023 06:18 )  Alb: 2.8 g/dL / Pro: 5.2 g/dL / ALK PHOS: 269 U/L / ALT: 56 U/L / AST: 30 U/L / GGT: x           PT/INR - ( 2023 06:20 )   PT: 41.0 sec;   INR: 3.49 ratio         PTT - ( 2023 16:49 )  PTT:41.3 sec  Urinalysis Basic - ( 2023 19:40 )    Color: Light Yellow / Appearance: Clear / S.028 / pH: x  Gluc: x / Ketone: Negative  / Bili: Negative / Urobili: Negative   Blood: x / Protein: Negative / Nitrite: Positive   Leuk Esterase: Negative / RBC: 1 /hpf / WBC 0 /HPF   Sq Epi: x / Non Sq Epi: 0 /hpf / Bacteria: Negative                              7.2    16.65 )-----------( 382      ( 2023 06:17 )             22.9                         6.3    21.12 )-----------( 433      ( 2023 16:49 )             20.3       Imaging:  VCE and CT colonography showing large AVMs in the proximal and mid small bowel (). Negative colonography . Patient was deemed a high risk for endoscopy given severe AS    < from: CT Abdomen and Pelvis w/ IV Cont (23 @ 18:43) >  IMPRESSION:  Decreased asymmetry of the right psoas muscle compared to CT   abdomen/pelvis from 2023.    No new areas of hemorrhage or active contrast extravasation.    < end of copied text >

## 2023-01-28 NOTE — PROGRESS NOTE ADULT - ASSESSMENT
acute on chronic anemia -- noted to have hematoma last admission but no active bleeding at this time, ? related to gap in his aranesp dose  -- received dose of aranesp 200mcg in office on 1/27  -- transfuse prn  -- hgb improved after PRBC  -- outpt fu with Dr Goel after d/c    hematoma -- noted to have improved on repeat imaging    coumadin per CT surg    R inguinal pain -- may be related to the previous hematoma  -- cont current pain regimen, pain controlled    d/w pt, will follow.

## 2023-01-28 NOTE — PROGRESS NOTE ADULT - SUBJECTIVE AND OBJECTIVE BOX
Pt known to our practice, pt of Dr Goel. 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 Of note prior to discharge on most recent admission patient had anemia with CT Abd/Pelvis done that showed possible intramuscular hematoma within the psoas muscle.  Currently endorses fatigue/right lower extremity pain at hip/buttock that travels down his thigh and comes in waves/sharp pain but is not reproducible upon palpation.      He had a CT a/p done here that did not show active bleeding, improved hematoma. He cont to have R sided inguinal pain, though controlled with meds at this time.       PAST MEDICAL & SURGICAL HISTORY:  Hodgkin lymphoma  1975      Atrial fibrillation  over 20 years      HTN (hypertension)      GERD (gastroesophageal reflux disease)      MOE (obstructive sleep apnea)  positive sleep study many years ago, was recommended to use CPAP, patient non-compliant      BPH (benign prostatic hyperplasia)      Osteoarthritis      CVA (cerebral vascular accident)  1/2018 - attributed to no AC for 11 days preop/postop spine surgery - presented to ED with slurred speech, residual ST memory loss, per pt      Spinal stenosis  L3-L4      Spondylolisthesis      Exposure to toxin  9/11       Atrial fibrillation  over 20 years      History of short term memory loss      DVT, lower extremity      S/P splenectomy  1975      S/P hip replacement, right  2014      History of cardioversion  for AF - &quot;many years ago&quot; - unsuccessful      History of lumbar spinal fusion  1/5/2018      Status post left cataract extraction      History of fasciotomy      Cardiac pacemaker      S/P TAVR (transcatheter aortic valve replacement)      CAD (coronary artery disease)          FAMILY HISTORY:  Family history of stroke (Father)        Alochol: Denied  Smoking: Nonsmoker  Drug Use: Denied  Marital Status:         Allergies    No Known Allergies    Intolerances        MEDICATIONS  (STANDING):  aspirin enteric coated 81 milliGRAM(s) Oral daily  atenolol  Tablet 25 milliGRAM(s) Oral daily  atorvastatin 80 milliGRAM(s) Oral at bedtime  clopidogrel Tablet 75 milliGRAM(s) Oral daily  ferrous    sulfate 325 milliGRAM(s) Oral daily  furosemide    Tablet 40 milliGRAM(s) Oral daily  latanoprost 0.005% Ophthalmic Solution 1 Drop(s) Right EYE at bedtime  pantoprazole  Injectable 40 milliGRAM(s) IV Push every 12 hours  spironolactone 12.5 milliGRAM(s) Oral daily  tamsulosin 0.8 milliGRAM(s) Oral at bedtime    MEDICATIONS  (PRN):  oxycodone    5 mG/acetaminophen 325 mG 1 Tablet(s) Oral every 6 hours PRN Moderate Pain (4 - 6)      ROS  No fever, sweats, chills  No epistaxis, HA, sore throat  No CP, SOB, cough, sputum  No n/v/d, melena, hematochezia  No edema  No rash  No anxiety  No back pain, joint pain  No bleeding, bruising  No dysuria, hematuria    T(C): 36.7 (01-28-23 @ 14:52), Max: 36.9 (01-27-23 @ 19:10)  HR: 60 (01-28-23 @ 14:52) (60 - 95)  BP: 132/59 (01-28-23 @ 14:52) (103/71 - 163/54)  RR: 18 (01-28-23 @ 14:52) (16 - 18)  SpO2: 97% (01-28-23 @ 14:52) (94% - 98%)  Wt(kg): --    PE  NAD  Awake, alert  Anicteric  limited 2/2 covid pandemic                          7.2    16.65 )-----------( 382      ( 28 Jan 2023 06:17 )             22.9       01-28    139  |  105  |  46<H>  ----------------------------<  95  4.5   |  26  |  1.13    Ca    8.8      28 Jan 2023 06:18  Phos  4.0     01-28  Mg     2.3     01-28    TPro  5.2<L>  /  Alb  2.8<L>  /  TBili  0.2  /  DBili  x   /  AST  30  /  ALT  56<H>  /  AlkPhos  269<H>  01-28

## 2023-01-29 LAB
ANION GAP SERPL CALC-SCNC: 12 MMOL/L — SIGNIFICANT CHANGE UP (ref 5–17)
APTT BLD: 40.9 SEC — HIGH (ref 27.5–35.5)
BUN SERPL-MCNC: 37 MG/DL — HIGH (ref 7–23)
CALCIUM SERPL-MCNC: 9.1 MG/DL — SIGNIFICANT CHANGE UP (ref 8.4–10.5)
CHLORIDE SERPL-SCNC: 104 MMOL/L — SIGNIFICANT CHANGE UP (ref 96–108)
CO2 SERPL-SCNC: 23 MMOL/L — SIGNIFICANT CHANGE UP (ref 22–31)
CREAT SERPL-MCNC: 1.18 MG/DL — SIGNIFICANT CHANGE UP (ref 0.5–1.3)
CULTURE RESULTS: SIGNIFICANT CHANGE UP
EGFR: 63 ML/MIN/1.73M2 — SIGNIFICANT CHANGE UP
GLUCOSE BLDC GLUCOMTR-MCNC: 158 MG/DL — HIGH (ref 70–99)
GLUCOSE SERPL-MCNC: 100 MG/DL — HIGH (ref 70–99)
HCT VFR BLD CALC: 25.7 % — LOW (ref 39–50)
HGB BLD-MCNC: 8.2 G/DL — LOW (ref 13–17)
INR BLD: 2.71 RATIO — HIGH (ref 0.88–1.16)
MCHC RBC-ENTMCNC: 29.9 PG — SIGNIFICANT CHANGE UP (ref 27–34)
MCHC RBC-ENTMCNC: 31.9 GM/DL — LOW (ref 32–36)
MCV RBC AUTO: 93.8 FL — SIGNIFICANT CHANGE UP (ref 80–100)
NRBC # BLD: 2 /100 WBCS — HIGH (ref 0–0)
PLATELET # BLD AUTO: 397 K/UL — SIGNIFICANT CHANGE UP (ref 150–400)
POTASSIUM SERPL-MCNC: 4.7 MMOL/L — SIGNIFICANT CHANGE UP (ref 3.5–5.3)
POTASSIUM SERPL-SCNC: 4.7 MMOL/L — SIGNIFICANT CHANGE UP (ref 3.5–5.3)
PROTHROM AB SERPL-ACNC: 31.7 SEC — HIGH (ref 10.5–13.4)
RBC # BLD: 2.74 M/UL — LOW (ref 4.2–5.8)
RBC # FLD: 17.6 % — HIGH (ref 10.3–14.5)
SODIUM SERPL-SCNC: 139 MMOL/L — SIGNIFICANT CHANGE UP (ref 135–145)
SPECIMEN SOURCE: SIGNIFICANT CHANGE UP
WBC # BLD: 12.77 K/UL — HIGH (ref 3.8–10.5)
WBC # FLD AUTO: 12.77 K/UL — HIGH (ref 3.8–10.5)

## 2023-01-29 PROCEDURE — 99231 SBSQ HOSP IP/OBS SF/LOW 25: CPT

## 2023-01-29 PROCEDURE — 99232 SBSQ HOSP IP/OBS MODERATE 35: CPT | Mod: GC

## 2023-01-29 RX ADMIN — ATENOLOL 25 MILLIGRAM(S): 25 TABLET ORAL at 05:01

## 2023-01-29 RX ADMIN — Medication 81 MILLIGRAM(S): at 11:36

## 2023-01-29 RX ADMIN — Medication 40 MILLIGRAM(S): at 05:01

## 2023-01-29 RX ADMIN — PANTOPRAZOLE SODIUM 40 MILLIGRAM(S): 20 TABLET, DELAYED RELEASE ORAL at 17:59

## 2023-01-29 RX ADMIN — LATANOPROST 1 DROP(S): 0.05 SOLUTION/ DROPS OPHTHALMIC; TOPICAL at 22:23

## 2023-01-29 RX ADMIN — Medication 325 MILLIGRAM(S): at 11:36

## 2023-01-29 RX ADMIN — TAMSULOSIN HYDROCHLORIDE 0.8 MILLIGRAM(S): 0.4 CAPSULE ORAL at 22:23

## 2023-01-29 RX ADMIN — PANTOPRAZOLE SODIUM 40 MILLIGRAM(S): 20 TABLET, DELAYED RELEASE ORAL at 05:01

## 2023-01-29 RX ADMIN — ATORVASTATIN CALCIUM 80 MILLIGRAM(S): 80 TABLET, FILM COATED ORAL at 22:23

## 2023-01-29 RX ADMIN — CLOPIDOGREL BISULFATE 75 MILLIGRAM(S): 75 TABLET, FILM COATED ORAL at 11:36

## 2023-01-29 RX ADMIN — SPIRONOLACTONE 12.5 MILLIGRAM(S): 25 TABLET, FILM COATED ORAL at 05:01

## 2023-01-29 NOTE — PROGRESS NOTE ADULT - ATTENDING COMMENTS
78-year-old male with hypertension/A. fib on Coumadin/Micra PM/CVA x2 with residual short-term memory loss/Hodgkin's lymphoma status post splenectomy and chemotherapy/hypertension/MOE/GERD/CAD with recent stent to the RCA 1/19/2023/TAVR 1/21/2023 complicated by anemia postoperatively with hematoma requiring multiple transfusions/right lower extremity fasciotomy and skin grafting due to right lower extremity DVT postoperatively found to have a low hemoglobin 6.5, dark stools in the setting of iron use as well.  No complaints of abdominal pain, complaints of severe right groin pain    BUN decreased today, INR down to 2.71, Hemoglobin 8.2    CT performed, decreased asymmetry of the right psoas muscle compared to CT abdomen pelvis from 1/21    Impression–patient with known AVMs  Recommendation  1.  EGD/push enteroscopy planned for Monday if medically optimized   2.  Trend CBC every 24 hours, transfuse to maintain hemoglobin greater than 8  3.  PPI IV twice daily  4.  Okay to resume regular diet, n.p.o. after midnight Sunday

## 2023-01-29 NOTE — PROGRESS NOTE ADULT - ASSESSMENT
acute on chronic anemia -- noted to have hematoma last admission but no active bleeding at this time, ? related to gap in his aranesp dose  -- received dose of aranesp 200mcg in office on 1/27  -- transfuse prn  -- hgb improved after PRBC, better today still  -- outpt fu with Dr Goel after d/c  -- planned for double balloon enteroscopy tomorrow, f/u GI recs  -- ferritin 196 on 1/20, cont PO Fe as tolerated    hematoma -- noted to have improved on repeat imaging  -- pain control    coumadin per CT surg    R inguinal pain -- may be related to the previous hematoma  -- cont current pain regimen, pain controlled    d/w pt, will follow.

## 2023-01-29 NOTE — PROGRESS NOTE ADULT - PROBLEM SELECTOR PLAN 1
NPO at midnight   Endoscopy  1/30 1/29 Coumadin on hold INR 2.71   GI consulted - f/u recs  Continue IV Protonix 40 BID  Daily CBC to trend H/H 8.2/25.7  1/27 s/p 2U PRBC  Continue ASA 81/Plavix daily given recent stents

## 2023-01-29 NOTE — PROGRESS NOTE ADULT - SUBJECTIVE AND OBJECTIVE BOX
Pt seen, feeling well, no bleeding    MEDICATIONS  (STANDING):  aspirin enteric coated 81 milliGRAM(s) Oral daily  atenolol  Tablet 25 milliGRAM(s) Oral daily  atorvastatin 80 milliGRAM(s) Oral at bedtime  clopidogrel Tablet 75 milliGRAM(s) Oral daily  ferrous    sulfate 325 milliGRAM(s) Oral daily  furosemide    Tablet 40 milliGRAM(s) Oral daily  latanoprost 0.005% Ophthalmic Solution 1 Drop(s) Right EYE at bedtime  pantoprazole  Injectable 40 milliGRAM(s) IV Push every 12 hours  spironolactone 12.5 milliGRAM(s) Oral daily  tamsulosin 0.8 milliGRAM(s) Oral at bedtime    MEDICATIONS  (PRN):  oxycodone    5 mG/acetaminophen 325 mG 1 Tablet(s) Oral every 6 hours PRN Moderate Pain (4 - 6)      ROS  No fever, sweats, chills  No epistaxis, HA, sore throat  No CP, SOB, cough, sputum  No n/v/d, abd pain, melena, hematochezia  No edema  No rash  No anxiety  No back pain, joint pain  No bleeding, bruising  No dysuria, hematuria    Vital Signs Last 24 Hrs  T(C): 36.8 (29 Jan 2023 12:57), Max: 37 (29 Jan 2023 04:33)  T(F): 98.3 (29 Jan 2023 12:57), Max: 98.6 (29 Jan 2023 04:33)  HR: 60 (29 Jan 2023 13:23) (60 - 70)  BP: 119/60 (29 Jan 2023 13:23) (118/66 - 152/60)  BP(mean): --  RR: 18 (29 Jan 2023 12:57) (18 - 18)  SpO2: 95% (29 Jan 2023 12:57) (94% - 96%)    Parameters below as of 29 Jan 2023 12:57  Patient On (Oxygen Delivery Method): room air        PE  NAD  Awake, alert  Anicteric  limited 2/2 covid pandemic                          8.2    12.77 )-----------( 397      ( 29 Jan 2023 07:00 )             25.7       01-29    139  |  104  |  37<H>  ----------------------------<  100<H>  4.7   |  23  |  1.18    Ca    9.1      29 Jan 2023 07:00  Phos  4.0     01-28  Mg     2.3     01-28    TPro  5.2<L>  /  Alb  2.8<L>  /  TBili  0.2  /  DBili  x   /  AST  30  /  ALT  56<H>  /  AlkPhos  269<H>  01-28

## 2023-01-29 NOTE — PROGRESS NOTE ADULT - ASSESSMENT
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 GI consulted for further workup and management of the above.     #Acute on Chronic Anemia  Hx notable for CAD with recent stents to RCA 1/19/2023, TAVR on 1/21/2023 c/b anemia post-op 2/2 hematoma CT Abd/Pelvis done that showed possible intramuscular hematoma within the psoas muscle. Pt currently on ASA/Plavix and Warfarin. Labs on discharge notable for a hgb of 8 although pt found to have a hgb of 6.5/21 elevated INR to 3.5 and elevated BUN/Cr ratio. Suspect likely slow oozing from elevated INR from arge AVMs in the proximal and mid small bowel as noted on VCE during 2021. Currently HDS and responding to pRBC with increaeed hgb to 8 with down trending INT. Will plan to preform push enteroscopy on Monday if medically optimized from cardiac perspective and INR back to normal range.     Recommendations  -Ok for regular diet today.  Please keep NPO after MN with plan for push enteroscopy on Monday if medically optimized from cardiac perspective and INR back to normal range.   -Hold Warfarin, trend daily INR   -IV PPI BID   -Trend CBC q24h and  and transfuse to maintain Hbg > 8 given pre-existing cardiovascular conditions.  -Maintain active type & screen and  access with 2 x Large bore IV    All recommendations are tentative until note is attested by attending.     Joe Corrigan, PGY-4  Gastroenterology/Hepatology Fellow  Available on [employer] Teams   [phone] (Long Range Pager)  [phone] (Short Range Pager [facility])    After 5pm, please contact the on-call GI fellow. [phone]

## 2023-01-29 NOTE — PROGRESS NOTE ADULT - PROBLEM SELECTOR PLAN 3
-1/27 CT C/A/P /w IV contrast negative for hemorrhage  -Coumadin on hold for supratherapeutic INR 3.53 (last dose on 1/26 evening 2mg per wife)  1/29 Coumadin on hold INR 2.71  -Continue ASA/Plavix given recent stents  -Check TTE

## 2023-01-29 NOTE — PROGRESS NOTE ADULT - ASSESSMENT
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 Of note prior to discharge on most recent admission patient had anemia with CT Abd/Pelvis done that showed possible intramuscular hematoma within the psoas muscle.  Currently endorses fatigue/right lower extremity pain at hip/buttock that travels down his thigh and comes in waves/sharp pain but is not reproducible upon palpation. Patient admitted for anemia workup.     1/27 CT C/A/P with IV cont: Decreased asymmetry of the right psoas muscle compared to CT abdomen/pelvis from 1/21/2023. No new areas of hemorrhage or active contrast extravasation. Transfuse 2U PRBC for H/H 6/20.  1/28 VSS, H/H 7.2/22.9 s/p 2U PRBC, transfuse 1U PRBC today per Dr. Bland. INR 3.49, Coumadin on hold. Pending TTE. F/U GI recs.  1/29 VSS H&H 8,2/25.7  INR 2.71 NPO @  midnight for  Push endoscopy with GI Monday in am.

## 2023-01-29 NOTE — PROGRESS NOTE ADULT - SUBJECTIVE AND OBJECTIVE BOX
Gastroenterology Progress Note    Interval Events:   Patient overall feeling well today with no acute complaints. Still having dark stools but denies any n/v/abdominal pain.     Allergies:  No Known Allergies      Hospital Medications:  aspirin enteric coated 81 milliGRAM(s) Oral daily  atenolol  Tablet 25 milliGRAM(s) Oral daily  atorvastatin 80 milliGRAM(s) Oral at bedtime  clopidogrel Tablet 75 milliGRAM(s) Oral daily  ferrous    sulfate 325 milliGRAM(s) Oral daily  furosemide    Tablet 40 milliGRAM(s) Oral daily  latanoprost 0.005% Ophthalmic Solution 1 Drop(s) Right EYE at bedtime  oxycodone    5 mG/acetaminophen 325 mG 1 Tablet(s) Oral every 6 hours PRN  pantoprazole  Injectable 40 milliGRAM(s) IV Push every 12 hours  spironolactone 12.5 milliGRAM(s) Oral daily  tamsulosin 0.8 milliGRAM(s) Oral at bedtime      ROS: 14 point ROS negative unless otherwise state in subjective    PHYSICAL EXAM:   Vital Signs:  Vital Signs Last 24 Hrs  T(C): 37 (2023 04:33), Max: 37 (2023 04:33)  T(F): 98.6 (2023 04:33), Max: 98.6 (2023 04:33)  HR: 67 (2023 04:33) (60 - 70)  BP: 141/64 (2023 04:33) (128/58 - 152/60)  BP(mean): --  RR: 18 (2023 04:33) (16 - 18)  SpO2: 94% (2023 04:33) (94% - 97%)    Parameters below as of 2023 21:42  Patient On (Oxygen Delivery Method): room air      GENERAL:  No acute distress  HEENT:  NCAT, no scleral icterus   CHEST:  no respiratory distress  HEART:  Regular rate and rhythm. +Systolic murmur   ABDOMEN:  Soft, non-tender, distended, normoactive bowel sounds  EXTREMITIES: No edema  NEURO:  Alert and oriented x 3    LABS:                        8.2    12.77 )-----------( 397      ( 2023 07:00 )             25.7     Mean Cell Volume: 93.8 fl (-23 @ 07:00)        139  |  104  |  37<H>  ----------------------------<  100<H>  4.7   |  23  |  1.18    Ca    9.1      2023 07:00  Phos  4.0       Mg     2.3         TPro  5.2<L>  /  Alb  2.8<L>  /  TBili  0.2  /  DBili  x   /  AST  30  /  ALT  56<H>  /  AlkPhos  269<H>      LIVER FUNCTIONS - ( 2023 06:18 )  Alb: 2.8 g/dL / Pro: 5.2 g/dL / ALK PHOS: 269 U/L / ALT: 56 U/L / AST: 30 U/L / GGT: x           PT/INR - ( 2023 07:00 )   PT: 31.7 sec;   INR: 2.71 ratio         PTT - ( 2023 07:00 )  PTT:40.9 sec  Urinalysis Basic - ( 2023 19:40 )    Color: Light Yellow / Appearance: Clear / S.028 / pH: x  Gluc: x / Ketone: Negative  / Bili: Negative / Urobili: Negative   Blood: x / Protein: Negative / Nitrite: Positive   Leuk Esterase: Negative / RBC: 1 /hpf / WBC 0 /HPF   Sq Epi: x / Non Sq Epi: 0 /hpf / Bacteria: Negative    Imaging:  VCE and CT colonography showing large AVMs in the proximal and mid small bowel (). Negative colonography . Patient was deemed a high risk for endoscopy given severe AS    < from: CT Abdomen and Pelvis w/ IV Cont (23 @ 18:43) >  IMPRESSION:  Decreased asymmetry of the right psoas muscle compared to CT   abdomen/pelvis from 2023.    No new areas of hemorrhage or active contrast extravasation.    < end of copied text >

## 2023-01-29 NOTE — PROGRESS NOTE ADULT - SUBJECTIVE AND OBJECTIVE BOX
Subjective " I am feeling ok"    VITAL SIGNS    Telemetry:  AFIB 60s    Vital Signs Last 24 Hrs  T(C): 37 (01-29-23 @ 04:33), Max: 37 (01-29-23 @ 04:33)  T(F): 98.6 (01-29-23 @ 04:33), Max: 98.6 (01-29-23 @ 04:33)  HR: 67 (01-29-23 @ 04:33) (60 - 70)  BP: 141/64 (01-29-23 @ 04:33) (132/59 - 152/60)  RR: 18 (01-29-23 @ 04:33) (18 - 18)  SpO2: 94% (01-29-23 @ 04:33) (94% - 97%)           01-28 @ 07:01  -  01-29 @ 07:00  --------------------------------------------------------  IN: 537 mL / OUT: 1000 mL / NET: -463 mL    01-29 @ 07:01  -  01-29 @ 09:56  --------------------------------------------------------  IN: 0 mL / OUT: 1100 mL / NET: -1100 mL                            8.2    12.77 )-----------( 397      ( 29 Jan 2023 07:00 )             25.7     01-29    139  |  104  |  37<H>  ----------------------------<  100<H>  4.7   |  23  |  1.18    Ca    9.1      29 Jan 2023 07:00  Phos  4.0     01-28  Mg     2.3     01-28    TPro  5.2<L>  /  Alb  2.8<L>  /  TBili  0.2  /  DBili  x   /  AST  30  /  ALT  56<H>  /  AlkPhos  269<H>  01-28      MEDICATIONS  (STANDING):  aspirin enteric coated 81 milliGRAM(s) Oral daily  atenolol  Tablet 25 milliGRAM(s) Oral daily  atorvastatin 80 milliGRAM(s) Oral at bedtime  clopidogrel Tablet 75 milliGRAM(s) Oral daily  ferrous    sulfate 325 milliGRAM(s) Oral daily  furosemide    Tablet 40 milliGRAM(s) Oral daily  latanoprost 0.005% Ophthalmic Solution 1 Drop(s) Right EYE at bedtime  pantoprazole  Injectable 40 milliGRAM(s) IV Push every 12 hours  spironolactone 12.5 milliGRAM(s) Oral daily  tamsulosin 0.8 milliGRAM(s) Oral at bedtime    MEDICATIONS  (PRN):  oxycodone    5 mG/acetaminophen 325 mG 1 Tablet(s) Oral every 6 hours PRN Moderate Pain (4 - 6)        PHYSICAL EXAM  Neurology: alert and oriented x 3, nonfocal, no gross deficits    CV : S1 IRRR  Lungs: B/l CTA on room air     Abdomen:  Protuberent soft, nontender, nondistended, positive bowel sounds, last bowel movement 1/29    : voids              Extremities:  warm well perfused equal strength throughout   B/lle warm + DP no calf tenderness                                              Discussed with Cardiothoracic Team at AM rounds.

## 2023-01-30 LAB
ALBUMIN SERPL ELPH-MCNC: 3.1 G/DL — LOW (ref 3.3–5)
ALP SERPL-CCNC: 424 U/L — HIGH (ref 40–120)
ALT FLD-CCNC: 64 U/L — HIGH (ref 10–45)
ANION GAP SERPL CALC-SCNC: 9 MMOL/L — SIGNIFICANT CHANGE UP (ref 5–17)
APTT BLD: 35 SEC — SIGNIFICANT CHANGE UP (ref 27.5–35.5)
AST SERPL-CCNC: 55 U/L — HIGH (ref 10–40)
BILIRUB SERPL-MCNC: 0.5 MG/DL — SIGNIFICANT CHANGE UP (ref 0.2–1.2)
BLD GP AB SCN SERPL QL: NEGATIVE — SIGNIFICANT CHANGE UP
BUN SERPL-MCNC: 32 MG/DL — HIGH (ref 7–23)
CALCIUM SERPL-MCNC: 8.8 MG/DL — SIGNIFICANT CHANGE UP (ref 8.4–10.5)
CHLORIDE SERPL-SCNC: 102 MMOL/L — SIGNIFICANT CHANGE UP (ref 96–108)
CO2 SERPL-SCNC: 26 MMOL/L — SIGNIFICANT CHANGE UP (ref 22–31)
CREAT SERPL-MCNC: 1.3 MG/DL — SIGNIFICANT CHANGE UP (ref 0.5–1.3)
EGFR: 56 ML/MIN/1.73M2 — LOW
GLUCOSE SERPL-MCNC: 101 MG/DL — HIGH (ref 70–99)
HCT VFR BLD CALC: 26.7 % — LOW (ref 39–50)
HGB BLD-MCNC: 8.5 G/DL — LOW (ref 13–17)
INR BLD: 1.97 RATIO — HIGH (ref 0.88–1.16)
MCHC RBC-ENTMCNC: 29.8 PG — SIGNIFICANT CHANGE UP (ref 27–34)
MCHC RBC-ENTMCNC: 31.8 GM/DL — LOW (ref 32–36)
MCV RBC AUTO: 93.7 FL — SIGNIFICANT CHANGE UP (ref 80–100)
NRBC # BLD: 2 /100 WBCS — HIGH (ref 0–0)
PLATELET # BLD AUTO: 449 K/UL — HIGH (ref 150–400)
POTASSIUM SERPL-MCNC: 4.4 MMOL/L — SIGNIFICANT CHANGE UP (ref 3.5–5.3)
POTASSIUM SERPL-SCNC: 4.4 MMOL/L — SIGNIFICANT CHANGE UP (ref 3.5–5.3)
PROT SERPL-MCNC: 5.8 G/DL — LOW (ref 6–8.3)
PROTHROM AB SERPL-ACNC: 22.8 SEC — HIGH (ref 10.5–13.4)
RBC # BLD: 2.85 M/UL — LOW (ref 4.2–5.8)
RBC # FLD: 17.8 % — HIGH (ref 10.3–14.5)
RH IG SCN BLD-IMP: POSITIVE — SIGNIFICANT CHANGE UP
SODIUM SERPL-SCNC: 137 MMOL/L — SIGNIFICANT CHANGE UP (ref 135–145)
WBC # BLD: 13.35 K/UL — HIGH (ref 3.8–10.5)
WBC # FLD AUTO: 13.35 K/UL — HIGH (ref 3.8–10.5)

## 2023-01-30 PROCEDURE — 44376 SMALL BOWEL ENDOSCOPY: CPT | Mod: GC

## 2023-01-30 PROCEDURE — 99231 SBSQ HOSP IP/OBS SF/LOW 25: CPT

## 2023-01-30 RX ORDER — OCTREOTIDE ACETATE 200 UG/ML
100 INJECTION, SOLUTION INTRAVENOUS; SUBCUTANEOUS EVERY 12 HOURS
Refills: 0 | Status: DISCONTINUED | OUTPATIENT
Start: 2023-01-30 | End: 2023-02-03

## 2023-01-30 RX ADMIN — Medication 81 MILLIGRAM(S): at 16:54

## 2023-01-30 RX ADMIN — LATANOPROST 1 DROP(S): 0.05 SOLUTION/ DROPS OPHTHALMIC; TOPICAL at 22:35

## 2023-01-30 RX ADMIN — PANTOPRAZOLE SODIUM 40 MILLIGRAM(S): 20 TABLET, DELAYED RELEASE ORAL at 16:54

## 2023-01-30 RX ADMIN — CLOPIDOGREL BISULFATE 75 MILLIGRAM(S): 75 TABLET, FILM COATED ORAL at 16:54

## 2023-01-30 RX ADMIN — OCTREOTIDE ACETATE 100 MICROGRAM(S): 200 INJECTION, SOLUTION INTRAVENOUS; SUBCUTANEOUS at 22:34

## 2023-01-30 RX ADMIN — SPIRONOLACTONE 12.5 MILLIGRAM(S): 25 TABLET, FILM COATED ORAL at 05:14

## 2023-01-30 RX ADMIN — Medication 40 MILLIGRAM(S): at 05:14

## 2023-01-30 RX ADMIN — TAMSULOSIN HYDROCHLORIDE 0.8 MILLIGRAM(S): 0.4 CAPSULE ORAL at 22:34

## 2023-01-30 RX ADMIN — PANTOPRAZOLE SODIUM 40 MILLIGRAM(S): 20 TABLET, DELAYED RELEASE ORAL at 05:14

## 2023-01-30 RX ADMIN — ATORVASTATIN CALCIUM 80 MILLIGRAM(S): 80 TABLET, FILM COATED ORAL at 22:35

## 2023-01-30 RX ADMIN — Medication 325 MILLIGRAM(S): at 16:54

## 2023-01-30 RX ADMIN — ATENOLOL 25 MILLIGRAM(S): 25 TABLET ORAL at 05:14

## 2023-01-30 NOTE — PRE PROCEDURE NOTE - PRE PROCEDURE EVALUATION
Attending Physician:  Radha                     Procedure: EGD/Push Enteroscopy    Indication for Procedure: Melena   ________________________________________________________  PAST MEDICAL & SURGICAL HISTORY:  Hodgkin lymphoma  1975      Atrial fibrillation  over 20 years      HTN (hypertension)      GERD (gastroesophageal reflux disease)      MOE (obstructive sleep apnea)  positive sleep study many years ago, was recommended to use CPAP, patient non-compliant      BPH (benign prostatic hyperplasia)      Osteoarthritis      CVA (cerebral vascular accident)  1/2018 - attributed to no AC for 11 days preop/postop spine surgery - presented to ED with slurred speech, residual ST memory loss, per pt      Spinal stenosis  L3-L4      Spondylolisthesis      Exposure to toxin  9/11       Atrial fibrillation  over 20 years      History of short term memory loss      DVT, lower extremity      S/P splenectomy  1975      S/P hip replacement, right  2014      History of cardioversion  for AF - &quot;many years ago&quot; - unsuccessful      History of lumbar spinal fusion  1/5/2018      Status post left cataract extraction      History of fasciotomy      Cardiac pacemaker      S/P TAVR (transcatheter aortic valve replacement)      CAD (coronary artery disease)        ALLERGIES:  No Known Allergies    HOME MEDICATIONS:  ATENOLOL 25 MG TABLET: 1 tab(s) orally once a day  ferrous sulfate 325 mg (65 mg elemental iron) oral tablet: 1 tab(s) orally once a day  Flonase: 1 spray(s) nasal once a day  Lasix 40 mg oral tablet: 1 tab(s) orally once a day  latanoprost 0.005% ophthalmic solution: 1 drop(s) to the right eye once a day (in the evening)  omeprazole 20 mg oral delayed release capsule: 1 cap(s) orally once a day  rosuvastatin 20 mg oral tablet: 0.5 tab(s) orally once a day  spironolactone 25 mg oral tablet: 0.5 tab(s) orally once a day  tamsulosin 0.4 mg oral capsule: 2 cap(s) orally once a day (at bedtime)  Vitamin B-12: 1 dose(s) injectable once a month    Note:Gets from MD office  Vitamin C: 1 tab(s) orally once a day  warfarin 1 mg oral tablet: 2 tab(s) orally 5 times a week (M-F)  warfarin 1 mg oral tablet: 1 tab(s) orally 2 times a week (Sat and Sun)    AICD/PPM: [X] yes   [ ] no    PERTINENT LAB DATA:                        8.5    13.35 )-----------( 449      ( 30 Jan 2023 06:13 )             26.7     01-30    137  |  102  |  32<H>  ----------------------------<  101<H>  4.4   |  26  |  1.30    Ca    8.8      30 Jan 2023 06:10    TPro  5.8<L>  /  Alb  3.1<L>  /  TBili  0.5  /  DBili  x   /  AST  55<H>  /  ALT  64<H>  /  AlkPhos  424<H>  01-30    PT/INR - ( 30 Jan 2023 06:13 )   PT: 22.8 sec;   INR: 1.97 ratio         PTT - ( 30 Jan 2023 06:13 )  PTT:35.0 sec            PHYSICAL EXAMINATION:    T(C): 36.9  HR: 64  BP: 141/57  RR: 18  SpO2: 95%    Constitutional: NAD    Neck:  No JVD  Respiratory: CTAB/L  Cardiovascular: S1 and S2  Gastrointestinal: BS+, soft, NT/ND  Extremities: No peripheral edema  Neurological: A/O x 3, no focal deficits        COMMENTS:    The patient is a suitable candidate for the planned procedure unless box checked [ ]  No, explain:

## 2023-01-30 NOTE — PROGRESS NOTE ADULT - SUBJECTIVE AND OBJECTIVE BOX
VITAL SIGNS    Telemetry:    afib  60    Vital Signs Last 24 Hrs  T(C): 36.9 (01-30-23 @ 04:44), Max: 36.9 (01-30-23 @ 04:44)  T(F): 98.4 (01-30-23 @ 04:44), Max: 98.4 (01-30-23 @ 04:44)  HR: 64 (01-30-23 @ 04:44) (60 - 64)  BP: 141/57 (01-30-23 @ 04:44) (118/66 - 142/62)  RR: 18 (01-30-23 @ 04:44) (18 - 18)  SpO2: 95% (01-30-23 @ 04:44) (95% - 97%)                   Daily     Daily       Bilirubin Total, Serum: 0.5 mg/dL (01-30 @ 06:10)    CAPILLARY BLOOD GLUCOSE      POCT Blood Glucose.: 158 mg/dL (29 Jan 2023 16:                    PHYSICAL EXAM  s"   No sob  No CP"  Neurology: alert and oriented x 3, moves all extremities with no defecits  CV :  RRR    Lungs:   CTA B/L  Abdomen: soft, nontender, nondistended, positive bowel sounds  Extremities:   groins benign    no edema

## 2023-01-30 NOTE — PROGRESS NOTE ADULT - SUBJECTIVE AND OBJECTIVE BOX
Patient is a 78y old  Male who presents with a chief complaint of Anemia (30 Jan 2023 09:46)    Patients seen and examined this morning. No events overnight. Pt offers no new complaints    MEDICATIONS  (STANDING):  aspirin enteric coated 81 milliGRAM(s) Oral daily  atenolol  Tablet 25 milliGRAM(s) Oral daily  atorvastatin 80 milliGRAM(s) Oral at bedtime  clopidogrel Tablet 75 milliGRAM(s) Oral daily  ferrous    sulfate 325 milliGRAM(s) Oral daily  furosemide    Tablet 40 milliGRAM(s) Oral daily  latanoprost 0.005% Ophthalmic Solution 1 Drop(s) Right EYE at bedtime  pantoprazole  Injectable 40 milliGRAM(s) IV Push every 12 hours  spironolactone 12.5 milliGRAM(s) Oral daily  tamsulosin 0.8 milliGRAM(s) Oral at bedtime    MEDICATIONS  (PRN):  oxycodone    5 mG/acetaminophen 325 mG 1 Tablet(s) Oral every 6 hours PRN Moderate Pain (4 - 6)      ROS  No fever, sweats, chills  No epistaxis, HA, sore throat  No CP, SOB, cough, sputum  No n/v/d, abd pain, melena, hematochezia  No edema  No rash  No anxiety  No back pain, joint pain  No bleeding, bruising  No dysuria, hematuria    Vital Signs Last 24 Hrs  T(C): 36.7 (30 Jan 2023 11:56), Max: 36.9 (30 Jan 2023 04:44)  T(F): 98 (30 Jan 2023 11:56), Max: 98.4 (30 Jan 2023 04:44)  HR: 60 (30 Jan 2023 13:20) (60 - 64)  BP: 133/63 (30 Jan 2023 13:20) (104/52 - 143/57)  BP(mean): 82 (30 Jan 2023 11:17) (82 - 82)  RR: 17 (30 Jan 2023 13:20) (12 - 19)  SpO2: 96% (30 Jan 2023 13:20) (95% - 98%)    Parameters below as of 30 Jan 2023 13:20  Patient On (Oxygen Delivery Method): room air        PE  NAD  Awake, alert  Anicteric, MMM  RRR  CTAB  Abd soft, NT, ND  No c/c/e  No rash grossly  FROM                          8.5    13.35 )-----------( 449      ( 30 Jan 2023 06:13 )             26.7       01-30    137  |  102  |  32<H>  ----------------------------<  101<H>  4.4   |  26  |  1.30    Ca    8.8      30 Jan 2023 06:10    TPro  5.8<L>  /  Alb  3.1<L>  /  TBili  0.5  /  DBili  x   /  AST  55<H>  /  ALT  64<H>  /  AlkPhos  424<H>  01-30

## 2023-01-30 NOTE — PROGRESS NOTE ADULT - PROBLEM SELECTOR PLAN 1
NPO   Endoscopy  1/30    Coumadin on hold INR   1.9    Continue IV Protonix 40 BID  1/27 s/p 2U PRBC  Continue ASA 81/Plavix daily given recent stents

## 2023-01-30 NOTE — PROGRESS NOTE ADULT - ASSESSMENT
acute on chronic anemia   -- noted to have hematoma last admission but no active bleeding at this time, ? related to gap in his aranesp dose  -- received dose of aranesp 200mcg in office on 1/27  -- transfuse prn  -- hgb improved after PRBC, better today still  -- outpt fu with Dr oGel after d/c  -- planned for double balloon enteroscopy today  -- ferritin 196 on 1/20, cont PO Fe as tolerated    hematoma   -- noted to have improved on repeat imaging  -- pain control    coumadin per CT surg    R inguinal pain   -- may be related to the previous hematoma  -- cont current pain regimen, pain controlled    will follow  Jyothi Xiao NP  Hematology/ Oncology  [facility]  [phone] (office)  [phone] (alt office)  Evenings and weekends please call MD on call or office

## 2023-01-30 NOTE — PROGRESS NOTE ADULT - ASSESSMENT
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 Of note prior to discharge on most recent admission patient had anemia with CT Abd/Pelvis done that showed possible intramuscular hematoma within the psoas muscle.  Currently endorses fatigue/right lower extremity pain at hip/buttock that travels down his thigh and comes in waves/sharp pain but is not reproducible upon palpation. Patient admitted for anemia workup.     1/27 CT C/A/P with IV cont: Decreased asymmetry of the right psoas muscle compared to CT abdomen/pelvis from 1/21/2023. No new areas of hemorrhage or active contrast extravasation. Transfuse 2U PRBC for H/H 6/20.  1/28 VSS, H/H 7.2/22.9 s/p 2U PRBC, transfuse 1U PRBC today per Dr. Bland. INR 3.49, Coumadin on hold. Pending TTE. F/U GI recs.  1/29 VSS H&H 8,2/25.7  INR 2.71 NPO @  midnight for  Push endoscopy with GI Monday in am.   1/30    vss   npo for GI   procedure     hct 26

## 2023-01-30 NOTE — PRE-OP CHECKLIST - DENTURES
Coronavirus (COVID-19) Notification     Patient has tested Positive test for COVID-19 virus   COVID-19 Positive followup letter sent   See telephone encounter no

## 2023-01-30 NOTE — PROGRESS NOTE ADULT - PROBLEM SELECTOR PLAN 3
-1/27 CT C/A/P /w IV contrast negative for hemorrhage    1/29 Coumadin on hold   -Continue ASA/Plavix given recent stents

## 2023-01-31 ENCOUNTER — APPOINTMENT (OUTPATIENT)
Dept: CARDIOTHORACIC SURGERY | Facility: CLINIC | Age: 78
End: 2023-01-31

## 2023-01-31 LAB
ANION GAP SERPL CALC-SCNC: 11 MMOL/L — SIGNIFICANT CHANGE UP (ref 5–17)
APTT BLD: 32.1 SEC — SIGNIFICANT CHANGE UP (ref 27.5–35.5)
BUN SERPL-MCNC: 29 MG/DL — HIGH (ref 7–23)
CALCIUM SERPL-MCNC: 9.1 MG/DL — SIGNIFICANT CHANGE UP (ref 8.4–10.5)
CHLORIDE SERPL-SCNC: 100 MMOL/L — SIGNIFICANT CHANGE UP (ref 96–108)
CO2 SERPL-SCNC: 26 MMOL/L — SIGNIFICANT CHANGE UP (ref 22–31)
CREAT SERPL-MCNC: 1.25 MG/DL — SIGNIFICANT CHANGE UP (ref 0.5–1.3)
EGFR: 59 ML/MIN/1.73M2 — LOW
GLUCOSE SERPL-MCNC: 138 MG/DL — HIGH (ref 70–99)
HCT VFR BLD CALC: 29.6 % — LOW (ref 39–50)
HGB BLD-MCNC: 9.3 G/DL — LOW (ref 13–17)
INR BLD: 1.56 RATIO — HIGH (ref 0.88–1.16)
MCHC RBC-ENTMCNC: 29.9 PG — SIGNIFICANT CHANGE UP (ref 27–34)
MCHC RBC-ENTMCNC: 31.4 GM/DL — LOW (ref 32–36)
MCV RBC AUTO: 95.2 FL — SIGNIFICANT CHANGE UP (ref 80–100)
NRBC # BLD: 2 /100 WBCS — HIGH (ref 0–0)
PLATELET # BLD AUTO: 539 K/UL — HIGH (ref 150–400)
POTASSIUM SERPL-MCNC: 4.6 MMOL/L — SIGNIFICANT CHANGE UP (ref 3.5–5.3)
POTASSIUM SERPL-SCNC: 4.6 MMOL/L — SIGNIFICANT CHANGE UP (ref 3.5–5.3)
PROTHROM AB SERPL-ACNC: 18 SEC — HIGH (ref 10.5–13.4)
RBC # BLD: 3.11 M/UL — LOW (ref 4.2–5.8)
RBC # FLD: 18.6 % — HIGH (ref 10.3–14.5)
SODIUM SERPL-SCNC: 137 MMOL/L — SIGNIFICANT CHANGE UP (ref 135–145)
WBC # BLD: 13.52 K/UL — HIGH (ref 3.8–10.5)
WBC # FLD AUTO: 13.52 K/UL — HIGH (ref 3.8–10.5)

## 2023-01-31 PROCEDURE — 99232 SBSQ HOSP IP/OBS MODERATE 35: CPT | Mod: GC

## 2023-01-31 PROCEDURE — 93306 TTE W/DOPPLER COMPLETE: CPT | Mod: 26

## 2023-01-31 PROCEDURE — 99232 SBSQ HOSP IP/OBS MODERATE 35: CPT

## 2023-01-31 RX ORDER — WARFARIN SODIUM 2.5 MG/1
2 TABLET ORAL ONCE
Refills: 0 | Status: COMPLETED | OUTPATIENT
Start: 2023-01-31 | End: 2023-01-31

## 2023-01-31 RX ADMIN — OCTREOTIDE ACETATE 100 MICROGRAM(S): 200 INJECTION, SOLUTION INTRAVENOUS; SUBCUTANEOUS at 12:55

## 2023-01-31 RX ADMIN — PANTOPRAZOLE SODIUM 40 MILLIGRAM(S): 20 TABLET, DELAYED RELEASE ORAL at 05:11

## 2023-01-31 RX ADMIN — LATANOPROST 1 DROP(S): 0.05 SOLUTION/ DROPS OPHTHALMIC; TOPICAL at 21:48

## 2023-01-31 RX ADMIN — ATORVASTATIN CALCIUM 80 MILLIGRAM(S): 80 TABLET, FILM COATED ORAL at 21:50

## 2023-01-31 RX ADMIN — PANTOPRAZOLE SODIUM 40 MILLIGRAM(S): 20 TABLET, DELAYED RELEASE ORAL at 18:31

## 2023-01-31 RX ADMIN — TAMSULOSIN HYDROCHLORIDE 0.8 MILLIGRAM(S): 0.4 CAPSULE ORAL at 21:48

## 2023-01-31 RX ADMIN — Medication 325 MILLIGRAM(S): at 12:55

## 2023-01-31 RX ADMIN — WARFARIN SODIUM 2 MILLIGRAM(S): 2.5 TABLET ORAL at 21:48

## 2023-01-31 RX ADMIN — SPIRONOLACTONE 12.5 MILLIGRAM(S): 25 TABLET, FILM COATED ORAL at 05:11

## 2023-01-31 RX ADMIN — Medication 40 MILLIGRAM(S): at 05:11

## 2023-01-31 RX ADMIN — CLOPIDOGREL BISULFATE 75 MILLIGRAM(S): 75 TABLET, FILM COATED ORAL at 12:55

## 2023-01-31 RX ADMIN — OCTREOTIDE ACETATE 100 MICROGRAM(S): 200 INJECTION, SOLUTION INTRAVENOUS; SUBCUTANEOUS at 21:58

## 2023-01-31 NOTE — PROGRESS NOTE ADULT - PROBLEM SELECTOR PLAN 1
NPO   Endoscopy  1/30    Coumadin on hold INR   1.9    Continue IV Protonix 40 BID  1/27 s/p 2U PRBC  Continue ASA 81/Plavix daily given recent stents GI and Heme following   Endoscopy 1/30 --> transient AVM's   d/w struct heart AC recs --->  ?resume coum  Continue IV Protonix 40 BID  1/27 s/p 2U PRBC  Continue ASA 81/Plavix daily given recent stents  continue octreotide 100 mg sq bid  continue IV protonix bid   trend cbc  discharge planning- home when stable GI and Heme following   Endoscopy 1/30 --> transient AVM's   d/w struct heart AC recs --->  d/c asa and resume coumadin/ plavix as per Dr. Bland  Continue IV Protonix 40 BID  1/27 s/p 2U PRBC  Continue Plavix daily given recent stents  continue octreotide 100 mg sq bid  continue IV protonix bid   trend cbc  discharge planning- home when stable GI and Heme following   Endoscopy 1/30 --> transient AVM's   d/w struct heart AC recs --->  d/c asa and resume coumadin/ plavix as per Dr. Bland  Continue IV Protonix 40 BID  1/27 s/p 2U PRBC  Continue Plavix daily given recent stents  continue octreotide 100 mg sq bid   as per hemeonc- pt can f/u with Dr. Goel for outpatient monthly administration of octreotide   continue IV protonix bid   trend cbc  discharge planning- home when stable

## 2023-01-31 NOTE — PROGRESS NOTE ADULT - NS ATTEND AMEND GEN_ALL_CORE FT
77 y/o male admitted with worsening acute anemia. EGD performed today that showed two angiodysplasia. No interventions done due to DAPT use. Monitor HGB and transfuse to maintain > 7
bleeding is controlled. on octreotide now as per GI. AC reccs per cardiology

## 2023-01-31 NOTE — PROGRESS NOTE ADULT - ASSESSMENT
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 s/p push enteroscopy on 1/30/2023 with wwo 1 mm angiodysplastic lesions with bleeding were found in the fourth portion of the duodenum. One AVM was noted to be bleeding but stopped bleeding without any endoscopic intervention with decision was made NOT to APC given ongoing DAPT and AC us with above likely the source of occult anemia    #Acute on Chronic Anemia  #Small Bowel AVMs  Hx notable for CAD with recent stents to RCA 1/19/2023, TAVR on 1/21/2023 c/b anemia post-op 2/2 hematoma CT Abd/Pelvis done that showed possible intramuscular hematoma within the psoas muscle. Pt currently on ASA/Plavix and Warfarin. Labs on discharge notable for a hgb of 8 although pt found to have a hgb of 6.5/21 elevated INR to 3.5 and elevated BUN/Cr ratio. Suspect likely slow oozing from elevated INR from large AVMs in the proximal and mid small bowel as noted on VCE during 2021. s/p push enteroscopy on 1/30/2023 with wwo 1 mm angiodysplastic lesions with bleeding were found in the fourth portion of the duodenum. One AVM was noted to be bleeding but stopped bleeding without any endoscopic intervention with decision was made NOT to APC given ongoing DAPT and AC us with above likely the source of occult anemia. Hgb overall stable today with no further signs of overt bleeding.     Recommendations  -Diet as tolerated   - PO PPI QD for gastroprotection   - No absolute contraindications to onoging DAPT or AC use. Would consider risk/benefit of continue triple therapy in the setting on transiently  bleeding AVMs in the small bowel.  -Consider transitioning to DOAC from Warfarin if no absolute CI  - Consider Octreotide ( mg SQ BID) to help with treatment of AVMs    GI will plan to sign off at this time. Please feel free to reach out to our team with any follow up questions.  All recommendations are tentative until note is attested by attending.     Joe Corrigan, PGY-4  Gastroenterology/Hepatology Fellow  Available on [employer] Teams   [phone] (Long Range Pager)  [phone] (Short Range Pager [facility])    After 5pm, please contact the on-call GI fellow. [phone]

## 2023-01-31 NOTE — PROGRESS NOTE ADULT - NS PANP COMMENT GEN_ALL_CORE FT
Jean-Pierre is doing well and his CBC is stable. EGD results noted (AVM's), GI input appreciated. We will stop aspirin. Continue Plavix. Restart warfarin. Outpatient treatment with Octreotide is being arranged through his Hematologist.  Rocco Bland MD

## 2023-01-31 NOTE — PROGRESS NOTE ADULT - SUBJECTIVE AND OBJECTIVE BOX
Patient is a 78y old  Male who presents with a chief complaint of Anemia / Weakness/ Right LE PAIN (31 Jan 2023 10:09)    Patient seen and examined this morning. Patient noted resting comfortably in chair, offers no new complaints.     MEDICATIONS  (STANDING):  aspirin enteric coated 81 milliGRAM(s) Oral daily  atenolol  Tablet 25 milliGRAM(s) Oral daily  atorvastatin 80 milliGRAM(s) Oral at bedtime  clopidogrel Tablet 75 milliGRAM(s) Oral daily  ferrous    sulfate 325 milliGRAM(s) Oral daily  furosemide    Tablet 40 milliGRAM(s) Oral daily  latanoprost 0.005% Ophthalmic Solution 1 Drop(s) Right EYE at bedtime  octreotide  Injectable 100 MICROGram(s) SubCutaneous every 12 hours  pantoprazole  Injectable 40 milliGRAM(s) IV Push every 12 hours  spironolactone 12.5 milliGRAM(s) Oral daily  tamsulosin 0.8 milliGRAM(s) Oral at bedtime    MEDICATIONS  (PRN):  oxycodone    5 mG/acetaminophen 325 mG 1 Tablet(s) Oral every 6 hours PRN Moderate Pain (4 - 6)      ROS  No fever, sweats, chills  No epistaxis, HA, sore throat  No CP, SOB, cough, sputum  No n/v/d, abd pain, melena, hematochezia  No edema  No rash  No anxiety  No back pain, joint pain  No bleeding, bruising  No dysuria, hematuria    Vital Signs Last 24 Hrs  T(C): 36.9 (31 Jan 2023 04:32), Max: 36.9 (30 Jan 2023 11:17)  T(F): 98.4 (31 Jan 2023 04:32), Max: 98.4 (31 Jan 2023 04:32)  HR: 54 (31 Jan 2023 04:32) (54 - 64)  BP: 151/61 (31 Jan 2023 04:32) (104/52 - 151/61)  BP(mean): 82 (30 Jan 2023 11:17) (82 - 82)  RR: 18 (31 Jan 2023 04:32) (12 - 19)  SpO2: 95% (31 Jan 2023 04:32) (95% - 98%)    Parameters below as of 31 Jan 2023 04:32  Patient On (Oxygen Delivery Method): room air        PE  NAD  Awake, alert  Anicteric, MMM  RRR  CTAB  Abd soft, NT, ND  No c/c/  B/L ELIA  No rash grossly                          9.3    13.52 )-----------( 539      ( 31 Jan 2023 06:05 )             29.6       01-31    137  |  100  |  29<H>  ----------------------------<  138<H>  4.6   |  26  |  1.25    Ca    9.1      31 Jan 2023 06:05    TPro  5.8<L>  /  Alb  3.1<L>  /  TBili  0.5  /  DBili  x   /  AST  55<H>  /  ALT  64<H>  /  AlkPhos  424<H>  01-30       Patient is a 78y old  Male who presents with a chief complaint of Anemia / Weakness/ Right LE PAIN (31 Jan 2023 10:09)    Patient seen and examined this morning. Patient noted resting comfortably in chair, offers no new complaints.     MEDICATIONS  (STANDING):  aspirin enteric coated 81 milliGRAM(s) Oral daily  atenolol  Tablet 25 milliGRAM(s) Oral daily  atorvastatin 80 milliGRAM(s) Oral at bedtime  clopidogrel Tablet 75 milliGRAM(s) Oral daily  ferrous    sulfate 325 milliGRAM(s) Oral daily  furosemide    Tablet 40 milliGRAM(s) Oral daily  latanoprost 0.005% Ophthalmic Solution 1 Drop(s) Right EYE at bedtime  octreotide  Injectable 100 MICROGram(s) SubCutaneous every 12 hours  pantoprazole  Injectable 40 milliGRAM(s) IV Push every 12 hours  spironolactone 12.5 milliGRAM(s) Oral daily  tamsulosin 0.8 milliGRAM(s) Oral at bedtime    MEDICATIONS  (PRN):  oxycodone    5 mG/acetaminophen 325 mG 1 Tablet(s) Oral every 6 hours PRN Moderate Pain (4 - 6)        Vital Signs Last 24 Hrs  T(C): 36.9 (31 Jan 2023 04:32), Max: 36.9 (30 Jan 2023 11:17)  T(F): 98.4 (31 Jan 2023 04:32), Max: 98.4 (31 Jan 2023 04:32)  HR: 54 (31 Jan 2023 04:32) (54 - 64)  BP: 151/61 (31 Jan 2023 04:32) (104/52 - 151/61)  BP(mean): 82 (30 Jan 2023 11:17) (82 - 82)  RR: 18 (31 Jan 2023 04:32) (12 - 19)  SpO2: 95% (31 Jan 2023 04:32) (95% - 98%)    Parameters below as of 31 Jan 2023 04:32  Patient On (Oxygen Delivery Method): room air        PE  NAD  Awake, alert  Anicteric, MMM  RRR  CTAB  Abd soft, NT, ND  No c/c/  B/L ELIA  No rash grossly                          9.3    13.52 )-----------( 539      ( 31 Jan 2023 06:05 )             29.6       01-31    137  |  100  |  29<H>  ----------------------------<  138<H>  4.6   |  26  |  1.25    Ca    9.1      31 Jan 2023 06:05    TPro  5.8<L>  /  Alb  3.1<L>  /  TBili  0.5  /  DBili  x   /  AST  55<H>  /  ALT  64<H>  /  AlkPhos  424<H>  01-30

## 2023-01-31 NOTE — PROGRESS NOTE ADULT - ASSESSMENT
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 Of note prior to discharge on most recent admission patient had anemia with CT Abd/Pelvis done that showed possible intramuscular hematoma within the psoas muscle.  Currently endorses fatigue/right lower extremity pain at hip/buttock that travels down his thigh and comes in waves/sharp pain but is not reproducible upon palpation. Patient admitted for anemia workup.     1/27 CT C/A/P with IV cont: Decreased asymmetry of the right psoas muscle compared to CT abdomen/pelvis from 1/21/2023. No new areas of hemorrhage or active contrast extravasation. Transfuse 2U PRBC for H/H 6/20.  1/28 VSS, H/H 7.2/22.9 s/p 2U PRBC, transfuse 1U PRBC today per Dr. Bland. INR 3.49, Coumadin on hold. Pending TTE. F/U GI recs.  1/29 VSS H&H 8,2/25.7  INR 2.71 NPO @  midnight for  Push endoscopy with GI Monday in am.   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 Of note prior to discharge on most recent admission patient had anemia with CT Abd/Pelvis done that showed possible intramuscular hematoma within the psoas muscle.  Currently endorses fatigue/right lower extremity pain at hip/buttock that travels down his thigh and comes in waves/sharp pain but is not reproducible upon palpation. Patient admitted for anemia workup.     1/27 CT C/A/P with IV cont: Decreased asymmetry of the right psoas muscle compared to CT abdomen/pelvis from 1/21/2023. No new areas of hemorrhage or active contrast extravasation. Transfuse 2U PRBC for H/H 6/20.  1/28 VSS, H/H 7.2/22.9 s/p 2U PRBC, transfuse 1U PRBC today per Dr. Bland. INR 3.49, Coumadin on hold. Pending TTE. F/U GI recs.  1/29 VSS H&H 8,2/25.7  INR 2.71 NPO @  midnight for  Push endoscopy with GI Monday in am.   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 Of note prior to discharge on most recent admission patient had anemia with CT Abd/Pelvis done that showed possible intramuscular hematoma within the psoas muscle.  Currently endorses fatigue/right lower extremity pain at hip/buttock that travels down his thigh and comes in waves/sharp pain but is not reproducible upon palpation. Patient admitted for anemia workup.     1/27 CT C/A/P with IV cont: Decreased asymmetry of the right psoas muscle compared to CT abdomen/pelvis from 1/21/2023. No new areas of hemorrhage or active contrast extravasation. Transfuse 2U PRBC for H/H 6/20.  1/28 VSS, H/H 7.2/22.9 s/p 2U PRBC, transfuse 1U PRBC today per Dr. Bland. INR 3.49, Coumadin on hold. Pending TTE. F/U GI recs.  1/29 VSS H&H 8,2/25.7  INR 2.71 NPO @  midnight for  Push endoscopy with GI Monday in am.   1/30    vss   npo for GI   procedure     hct 26--transient bleeding AVM's--> pt placed on octreotide as per GI  1/31 VSS; h/h 9/29 today; d/w struct heart ac--> d/c asa and resume coumadin/ plavix as per Dr. 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 Of note prior to discharge on most recent admission patient had anemia with CT Abd/Pelvis done that showed possible intramuscular hematoma within the psoas muscle.  Currently endorses fatigue/right lower extremity pain at hip/buttock that travels down his thigh and comes in waves/sharp pain but is not reproducible upon palpation. Patient admitted for anemia workup.     1/27 CT C/A/P with IV cont: Decreased asymmetry of the right psoas muscle compared to CT abdomen/pelvis from 1/21/2023. No new areas of hemorrhage or active contrast extravasation. Transfuse 2U PRBC for H/H 6/20.  1/28 VSS, H/H 7.2/22.9 s/p 2U PRBC, transfuse 1U PRBC today per Dr. Bland. INR 3.49, Coumadin on hold. Pending TTE. F/U GI recs.  1/29 VSS H&H 8,2/25.7  INR 2.71 NPO @  midnight for  Push endoscopy with GI Monday in am.   1/30    vss   npo for GI   procedure     hct 26--transient bleeding AVM's--> pt placed on octreotide as per GI  1/31 VSS; h/h 9/29 today; d/w struct heart ac--> d/c asa and resume coumadin/ plavix as per Dr. Bland; continue octreotide as per gi recs/ d/w heme outpatient octreotide- as per hemeonc- pt can f/u with Dr. Goel for outpatient monthly administration of octreotide

## 2023-01-31 NOTE — PROGRESS NOTE ADULT - SUBJECTIVE AND OBJECTIVE BOX
Gastroenterology Progress Note    Interval Events:   Feeling well today. Still having dark stools (similar to what he had on PO Iron) although no other signs of overt bleeding. Denies any ongoing nausea, vomiting or abdominal pain.     Allergies:  No Known Allergies      Hospital Medications:  aspirin enteric coated 81 milliGRAM(s) Oral daily  atenolol  Tablet 25 milliGRAM(s) Oral daily  atorvastatin 80 milliGRAM(s) Oral at bedtime  clopidogrel Tablet 75 milliGRAM(s) Oral daily  ferrous    sulfate 325 milliGRAM(s) Oral daily  furosemide    Tablet 40 milliGRAM(s) Oral daily  latanoprost 0.005% Ophthalmic Solution 1 Drop(s) Right EYE at bedtime  octreotide  Injectable 100 MICROGram(s) SubCutaneous every 12 hours  oxycodone    5 mG/acetaminophen 325 mG 1 Tablet(s) Oral every 6 hours PRN  pantoprazole  Injectable 40 milliGRAM(s) IV Push every 12 hours  spironolactone 12.5 milliGRAM(s) Oral daily  tamsulosin 0.8 milliGRAM(s) Oral at bedtime      ROS: 14 point ROS negative unless otherwise state in subjective    PHYSICAL EXAM:   Vital Signs:  Vital Signs Last 24 Hrs  T(C): 36.9 (31 Jan 2023 04:32), Max: 36.9 (30 Jan 2023 11:17)  T(F): 98.4 (31 Jan 2023 04:32), Max: 98.4 (31 Jan 2023 04:32)  HR: 54 (31 Jan 2023 04:32) (54 - 64)  BP: 151/61 (31 Jan 2023 04:32) (104/52 - 151/61)  BP(mean): 82 (30 Jan 2023 11:17) (82 - 82)  RR: 18 (31 Jan 2023 04:32) (12 - 19)  SpO2: 95% (31 Jan 2023 04:32) (95% - 98%)    Parameters below as of 31 Jan 2023 04:32  Patient On (Oxygen Delivery Method): room air      Daily Height in cm: 172.72 (30 Jan 2023 11:17)    Daily     GENERAL: No acute distress  HEENT:  no scleral icterus  CHEST: no resp distress  HEART:  RRR  ABDOMEN:  Soft, non-tender, distended, normoactive bowel sounds  EXTREMITIES:  No  edema  NEURO:  Alert and oriented x 3    LABS:                        9.3    13.52 )-----------( 539      ( 31 Jan 2023 06:05 )             29.6     Mean Cell Volume: 95.2 fl (01-31-23 @ 06:05)    01-31    137  |  100  |  29<H>  ----------------------------<  138<H>  4.6   |  26  |  1.25    Ca    9.1      31 Jan 2023 06:05    TPro  5.8<L>  /  Alb  3.1<L>  /  TBili  0.5  /  DBili  x   /  AST  55<H>  /  ALT  64<H>  /  AlkPhos  424<H>  01-30    LIVER FUNCTIONS - ( 30 Jan 2023 06:10 )  Alb: 3.1 g/dL / Pro: 5.8 g/dL / ALK PHOS: 424 U/L / ALT: 64 U/L / AST: 55 U/L / GGT: x           PT/INR - ( 31 Jan 2023 06:09 )   PT: 18.0 sec;   INR: 1.56 ratio   PTT - ( 31 Jan 2023 06:09 )  PTT:32.1 sec    Imaging:    < from: Upper Endoscopy (01.30.23 @ 10:57) >  Findings:       The examined esophagus was normal.       Localized mild inflammation characterized by congestion (edema) was found at the pylorus.       The exam of the stomach was otherwise normal.       Two 1 mm angiodysplastic lesions with bleeding were found in the fourth portion of the        duodenum. One AVM was noted to be bleeding but stopped bleeding without any endoscopic        intervention. Decisison was made NOT to APC given ongoing DAPT and AC use.       The examined jejunum was normal.                                     Impression:          - Normal esophagus.                       - Gastritis.                       - Two bleeding angiodysplastic lesions in the duodenum.                       - Normal examined jejunum.           - No specimens collected.                       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                 - Resume regular diet today.                       - PO PPI QD                       - Consider Octreotide to help with treatment of AVMs                       - No absolute contraindications to onoging DAPT or AC use. Would consider                        risk/benefit of continue triple therapy in the setting on transiently                        bleeding AVMs in the small bowel.                       -Consider transitioning to DOAC from Warfarin if no absolute CI                                                                                          < end of copied text >

## 2023-01-31 NOTE — PROGRESS NOTE ADULT - ASSESSMENT
acute on chronic anemia   -- noted to have hematoma last admission but no active bleeding at this time, ? related to gap in his aranesp dose  -- received dose of aranesp 200mcg in office on 1/27  -- transfuse prn  -- outpt fu with Dr Goel after d/c  -- Two bleeding angiodysplastic lesions in the duodenum noted on endoscopy 1/30   -- ferritin 196 on 1/20, cont PO Fe as tolerated    hematoma   -- noted to have improved on repeat imaging  -- pain control    coumadin per CT surg    R inguinal pain   -- may be related to the previous hematoma  -- cont current pain regimen, pain controlled    will follow  Jyothi Xiao NP  Hematology/ Oncology  [facility]  [phone] (office)  [phone] (alt office)  Evenings and weekends please call MD on call or office   acute on chronic anemia   -- noted to have hematoma last admission but no active bleeding at this time, ? related to gap in his aranesp dose  -- received dose of aranesp 200mcg in office on 1/27  -- transfuse prn  -- outpt fu with Dr Goel after d/c  -- upon discharge, as per GI recs patient will continue SQ Octrotide w/ Dr. Goel   -- Two bleeding angiodysplastic lesions in the duodenum noted on endoscopy 1/30   -- ferritin 196 on 1/20, cont PO Fe as tolerated    hematoma   -- noted to have improved on repeat imaging  -- pain control    coumadin per CT surg    R inguinal pain   -- may be related to the previous hematoma  -- cont current pain regimen, pain controlled    will follow  Jyothi Xiao NP  Hematology/ Oncology  [facility]  [phone] (office)  [phone] (alt office)  Evenings and weekends please call MD on call or office   acute on chronic anemia   -- noted to have hematoma last admission but no active bleeding at this time, ? related to gap in his aranesp dose  -- received dose of aranesp 200mcg in office on 1/27  -- transfuse prn  -- outpt fu with Dr Goel after d/c  -- Two bleeding angiodysplastic lesions in the duodenum noted on endoscopy 1/30   -- ferritin 196 on 1/20, cont PO Fe as tolerated    hematoma   -- noted to have improved on repeat imaging  -- pain control    coumadin per CT surg    R inguinal pain   -- may be related to the previous hematoma  -- cont current pain regimen, pain controlled    will follow  Jyothi Xiao NP  Hematology/ Oncology  [facility]  [phone] (office)  [phone] (alt office)  Evenings and weekends please call MD on call or office

## 2023-01-31 NOTE — PROGRESS NOTE ADULT - SUBJECTIVE AND OBJECTIVE BOX
VITAL SIGNS    Telemetry:    Vital Signs Last 24 Hrs  T(C): 36.9 (01-31-23 @ 04:32), Max: 36.9 (01-30-23 @ 11:17)  T(F): 98.4 (01-31-23 @ 04:32), Max: 98.4 (01-31-23 @ 04:32)  HR: 54 (01-31-23 @ 04:32) (54 - 64)  BP: 151/61 (01-31-23 @ 04:32) (104/52 - 151/61)  RR: 18 (01-31-23 @ 04:32) (12 - 19)  SpO2: 95% (01-31-23 @ 04:32) (95% - 98%)            01-30 @ 07:01  -  01-31 @ 07:00  --------------------------------------------------------  IN: 240 mL / OUT: 790 mL / NET: -550 mL    01-31 @ 07:01  -  01-31 @ 10:10  --------------------------------------------------------  IN: 120 mL / OUT: 250 mL / NET: -130 mL       Daily Height in cm: 172.72 (30 Jan 2023 11:17)    Daily   Admit Wt: Drug Dosing Weight  Height (cm): 172.7 (30 Jan 2023 11:17)  Weight (kg): 98.4 (30 Jan 2023 11:17)  BMI (kg/m2): 33 (30 Jan 2023 11:17)  BSA (m2): 2.12 (30 Jan 2023 11:17)      CAPILLARY BLOOD GLUCOSE              aspirin enteric coated 81 milliGRAM(s) Oral daily  atenolol  Tablet 25 milliGRAM(s) Oral daily  atorvastatin 80 milliGRAM(s) Oral at bedtime  clopidogrel Tablet 75 milliGRAM(s) Oral daily  ferrous    sulfate 325 milliGRAM(s) Oral daily  furosemide    Tablet 40 milliGRAM(s) Oral daily  latanoprost 0.005% Ophthalmic Solution 1 Drop(s) Right EYE at bedtime  octreotide  Injectable 100 MICROGram(s) SubCutaneous every 12 hours  oxycodone    5 mG/acetaminophen 325 mG 1 Tablet(s) Oral every 6 hours PRN  pantoprazole  Injectable 40 milliGRAM(s) IV Push every 12 hours  spironolactone 12.5 milliGRAM(s) Oral daily  tamsulosin 0.8 milliGRAM(s) Oral at bedtime      PHYSICAL EXAM    Subjective: "Hi.   Neurology: alert and oriented x 3, nonfocal, no gross deficits  CV : tele:  RSR  Sternal Wound :  CDI with dressing , Stable  Lungs: clear. RR easy, unlabored   Abdomen: soft, nontender, nondistended, positive bowel sounds, bowel movement   Neg N/V/D   :  pt voiding without difficulty   Extremities:   SERRANO; edema, neg calf tenderness.   PPP bilaterally      PW:  Chest tubes:                 VITAL SIGNS    Telemetry:  afib 60-70  Vital Signs Last 24 Hrs  T(C): 36.9 (01-31-23 @ 04:32), Max: 36.9 (01-30-23 @ 11:17)  T(F): 98.4 (01-31-23 @ 04:32), Max: 98.4 (01-31-23 @ 04:32)  HR: 54 (01-31-23 @ 04:32) (54 - 64)  BP: 151/61 (01-31-23 @ 04:32) (104/52 - 151/61)  RR: 18 (01-31-23 @ 04:32) (12 - 19)  SpO2: 95% (01-31-23 @ 04:32) (95% - 98%)            01-30 @ 07:01  -  01-31 @ 07:00  --------------------------------------------------------  IN: 240 mL / OUT: 790 mL / NET: -550 mL    01-31 @ 07:01  -  01-31 @ 10:10  --------------------------------------------------------  IN: 120 mL / OUT: 250 mL / NET: -130 mL       Daily Height in cm: 172.72 (30 Jan 2023 11:17)    Daily   Admit Wt: Drug Dosing Weight  Height (cm): 172.7 (30 Jan 2023 11:17)  Weight (kg): 98.4 (30 Jan 2023 11:17)  BMI (kg/m2): 33 (30 Jan 2023 11:17)  BSA (m2): 2.12 (30 Jan 2023 11:17)        aspirin enteric coated 81 milliGRAM(s) Oral daily  atenolol  Tablet 25 milliGRAM(s) Oral daily  atorvastatin 80 milliGRAM(s) Oral at bedtime  clopidogrel Tablet 75 milliGRAM(s) Oral daily  ferrous    sulfate 325 milliGRAM(s) Oral daily  furosemide    Tablet 40 milliGRAM(s) Oral daily  latanoprost 0.005% Ophthalmic Solution 1 Drop(s) Right EYE at bedtime  octreotide  Injectable 100 MICROGram(s) SubCutaneous every 12 hours  oxycodone    5 mG/acetaminophen 325 mG 1 Tablet(s) Oral every 6 hours PRN  pantoprazole  Injectable 40 milliGRAM(s) IV Push every 12 hours  spironolactone 12.5 milliGRAM(s) Oral daily  tamsulosin 0.8 milliGRAM(s) Oral at bedtime        PHYSICAL EXAM    Subjective: "When can I go home?"   Neurology: alert and oriented x 3, nonfocal, no gross deficits  CV : tele:  RSR 60-70  Lungs: clear. RR easy, unlabored   Abdomen: soft, nontender, nondistended, positive bowel sounds,+ bowel movement   Neg N/V/D; obese abdomen   :  pt voiding without difficulty   Extremities:   SERRANO; neg LE edema, neg calf tenderness.   PPP bilaterally; bilateral groin sites cdi dwight- neg hematoma/ bleeding

## 2023-01-31 NOTE — PROGRESS NOTE ADULT - PROBLEM SELECTOR PLAN 3
-1/27 CT C/A/P /w IV contrast negative for hemorrhage    1/29 Coumadin on hold   -Continue ASA/Plavix given recent stents -1/27 CT C/A/P /w IV contrast negative for hemorrhage  1/29 Coumadin on hold --> d/w struct heart resuming   -Continue ASA/Plavix given recent stents 1/27 CT C/A/P /w IV contrast negative for hemorrhage  1/29 Coumadin on hold --> d/w struct heart resuming   Continue Plavix given recent stents

## 2023-02-01 LAB
ANION GAP SERPL CALC-SCNC: 10 MMOL/L — SIGNIFICANT CHANGE UP (ref 5–17)
BUN SERPL-MCNC: 25 MG/DL — HIGH (ref 7–23)
CALCIUM SERPL-MCNC: 9.5 MG/DL — SIGNIFICANT CHANGE UP (ref 8.4–10.5)
CHLORIDE SERPL-SCNC: 102 MMOL/L — SIGNIFICANT CHANGE UP (ref 96–108)
CO2 SERPL-SCNC: 27 MMOL/L — SIGNIFICANT CHANGE UP (ref 22–31)
CREAT SERPL-MCNC: 1.18 MG/DL — SIGNIFICANT CHANGE UP (ref 0.5–1.3)
CULTURE RESULTS: SIGNIFICANT CHANGE UP
CULTURE RESULTS: SIGNIFICANT CHANGE UP
EGFR: 63 ML/MIN/1.73M2 — SIGNIFICANT CHANGE UP
GLUCOSE SERPL-MCNC: 149 MG/DL — HIGH (ref 70–99)
HCT VFR BLD CALC: 33.5 % — LOW (ref 39–50)
HGB BLD-MCNC: 10.4 G/DL — LOW (ref 13–17)
INR BLD: 1.29 RATIO — HIGH (ref 0.88–1.16)
MCHC RBC-ENTMCNC: 30 PG — SIGNIFICANT CHANGE UP (ref 27–34)
MCHC RBC-ENTMCNC: 31 GM/DL — LOW (ref 32–36)
MCV RBC AUTO: 96.5 FL — SIGNIFICANT CHANGE UP (ref 80–100)
NRBC # BLD: 1 /100 WBCS — HIGH (ref 0–0)
PLATELET # BLD AUTO: 603 K/UL — HIGH (ref 150–400)
POTASSIUM SERPL-MCNC: 4.5 MMOL/L — SIGNIFICANT CHANGE UP (ref 3.5–5.3)
POTASSIUM SERPL-SCNC: 4.5 MMOL/L — SIGNIFICANT CHANGE UP (ref 3.5–5.3)
PROTHROM AB SERPL-ACNC: 15 SEC — HIGH (ref 10.5–13.4)
RBC # BLD: 3.47 M/UL — LOW (ref 4.2–5.8)
RBC # FLD: 18.5 % — HIGH (ref 10.3–14.5)
SODIUM SERPL-SCNC: 139 MMOL/L — SIGNIFICANT CHANGE UP (ref 135–145)
SPECIMEN SOURCE: SIGNIFICANT CHANGE UP
SPECIMEN SOURCE: SIGNIFICANT CHANGE UP
WBC # BLD: 15.84 K/UL — HIGH (ref 3.8–10.5)
WBC # FLD AUTO: 15.84 K/UL — HIGH (ref 3.8–10.5)

## 2023-02-01 PROCEDURE — 99232 SBSQ HOSP IP/OBS MODERATE 35: CPT

## 2023-02-01 RX ORDER — WARFARIN SODIUM 2.5 MG/1
2 TABLET ORAL ONCE
Refills: 0 | Status: COMPLETED | OUTPATIENT
Start: 2023-02-01 | End: 2023-02-01

## 2023-02-01 RX ADMIN — PANTOPRAZOLE SODIUM 40 MILLIGRAM(S): 20 TABLET, DELAYED RELEASE ORAL at 05:55

## 2023-02-01 RX ADMIN — Medication 325 MILLIGRAM(S): at 11:54

## 2023-02-01 RX ADMIN — CLOPIDOGREL BISULFATE 75 MILLIGRAM(S): 75 TABLET, FILM COATED ORAL at 11:54

## 2023-02-01 RX ADMIN — OCTREOTIDE ACETATE 100 MICROGRAM(S): 200 INJECTION, SOLUTION INTRAVENOUS; SUBCUTANEOUS at 11:54

## 2023-02-01 RX ADMIN — PANTOPRAZOLE SODIUM 40 MILLIGRAM(S): 20 TABLET, DELAYED RELEASE ORAL at 17:57

## 2023-02-01 RX ADMIN — LATANOPROST 1 DROP(S): 0.05 SOLUTION/ DROPS OPHTHALMIC; TOPICAL at 21:52

## 2023-02-01 RX ADMIN — SPIRONOLACTONE 12.5 MILLIGRAM(S): 25 TABLET, FILM COATED ORAL at 05:55

## 2023-02-01 RX ADMIN — OCTREOTIDE ACETATE 100 MICROGRAM(S): 200 INJECTION, SOLUTION INTRAVENOUS; SUBCUTANEOUS at 21:53

## 2023-02-01 RX ADMIN — ATENOLOL 25 MILLIGRAM(S): 25 TABLET ORAL at 05:55

## 2023-02-01 RX ADMIN — Medication 40 MILLIGRAM(S): at 05:55

## 2023-02-01 RX ADMIN — ATORVASTATIN CALCIUM 80 MILLIGRAM(S): 80 TABLET, FILM COATED ORAL at 21:53

## 2023-02-01 RX ADMIN — WARFARIN SODIUM 2 MILLIGRAM(S): 2.5 TABLET ORAL at 21:53

## 2023-02-01 RX ADMIN — TAMSULOSIN HYDROCHLORIDE 0.8 MILLIGRAM(S): 0.4 CAPSULE ORAL at 21:53

## 2023-02-01 NOTE — PROGRESS NOTE ADULT - PROBLEM SELECTOR PLAN 2
Patient with recent stents.  1/19/2023 -Continue ASA 81/Plavix daily  Continue atenolol 25 daily and atorvastatin 80 HS Patient with recent stents.  1/19/2023 -Continue Plavix daily  Continue atenolol 25 daily and atorvastatin 80 HS

## 2023-02-01 NOTE — PROGRESS NOTE ADULT - SUBJECTIVE AND OBJECTIVE BOX
VITAL SIGNS    Telemetry:    Vital Signs Last 24 Hrs  T(C): 36.7 (23 @ 04:52), Max: 36.7 (23 @ 11:49)  T(F): 98 (23 @ 04:52), Max: 98.1 (23 @ 20:40)  HR: 78 (23 @ 04:52) (61 - 78)  BP: 150/59 (23 @ 04:52) (128/59 - 150/59)  RR: 18 (23 @ 04:52) (18 - 18)  SpO2: 97% (23 @ 04:52) (94% - 97%)             @ 07:01  -   @ 07:00  --------------------------------------------------------  IN: 600 mL / OUT: 250 mL / NET: 350 mL       Daily     Daily Weight in k.4 (2023 05:59)  Admit Wt: Drug Dosing Weight  Height (cm): 172.7 (2023 11:17)  Weight (kg): 98.4 (2023 11:17)  BMI (kg/m2): 33 (2023 11:17)  BSA (m2): 2.12 (2023 11:17)      CAPILLARY BLOOD GLUCOSE              atenolol  Tablet 25 milliGRAM(s) Oral daily  atorvastatin 80 milliGRAM(s) Oral at bedtime  clopidogrel Tablet 75 milliGRAM(s) Oral daily  ferrous    sulfate 325 milliGRAM(s) Oral daily  furosemide    Tablet 40 milliGRAM(s) Oral daily  latanoprost 0.005% Ophthalmic Solution 1 Drop(s) Right EYE at bedtime  octreotide  Injectable 100 MICROGram(s) SubCutaneous every 12 hours  oxycodone    5 mG/acetaminophen 325 mG 1 Tablet(s) Oral every 6 hours PRN  pantoprazole  Injectable 40 milliGRAM(s) IV Push every 12 hours  spironolactone 12.5 milliGRAM(s) Oral daily  tamsulosin 0.8 milliGRAM(s) Oral at bedtime      PHYSICAL EXAM    Subjective: "Hi.   Neurology: alert and oriented x 3, nonfocal, no gross deficits  CV : tele:  RSR  Sternal Wound :  CDI with dressing , Stable  Lungs: clear. RR easy, unlabored   Abdomen: soft, nontender, nondistended, positive bowel sounds, bowel movement   Neg N/V/D   :  pt voiding without difficulty   Extremities:   SERRANO; edema, neg calf tenderness.   PPP bilaterally      PW:  Chest tubes:                 VITAL SIGNS    Telemetry:  afib 60's   Vital Signs Last 24 Hrs  T(C): 36.7 (23 @ 04:52), Max: 36.7 (23 @ 11:49)  T(F): 98 (23 @ 04:52), Max: 98.1 (23 @ 20:40)  HR: 78 (23 @ 04:52) (61 - 78)  BP: 150/59 (23 @ 04:52) (128/59 - 150/59)  RR: 18 (23 @ 04:52) (18 - 18)  SpO2: 97% (23 @ 04:52) (94% - 97%)             @ 07:01  -   @ 07:00  --------------------------------------------------------  IN: 600 mL / OUT: 250 mL / NET: 350 mL       Daily     Daily Weight in k.4 (2023 05:59)  Admit Wt: Drug Dosing Weight  Height (cm): 172.7 (2023 11:17)  Weight (kg): 98.4 (2023 11:17)  BMI (kg/m2): 33 (2023 11:17)  BSA (m2): 2.12 (2023 11:17)         atenolol  Tablet 25 milliGRAM(s) Oral daily  atorvastatin 80 milliGRAM(s) Oral at bedtime  clopidogrel Tablet 75 milliGRAM(s) Oral daily  ferrous    sulfate 325 milliGRAM(s) Oral daily  furosemide    Tablet 40 milliGRAM(s) Oral daily  latanoprost 0.005% Ophthalmic Solution 1 Drop(s) Right EYE at bedtime  octreotide  Injectable 100 MICROGram(s) SubCutaneous every 12 hours  oxycodone    5 mG/acetaminophen 325 mG 1 Tablet(s) Oral every 6 hours PRN  pantoprazole  Injectable 40 milliGRAM(s) IV Push every 12 hours  spironolactone 12.5 milliGRAM(s) Oral daily  tamsulosin 0.8 milliGRAM(s) Oral at bedtime        PHYSICAL EXAM    Subjective: "I feel ok."   Neurology: alert and oriented x 3, nonfocal, no gross deficits  CV : tele:  RSR 60-70  Lungs: clear. RR easy, unlabored   Abdomen: soft, nontender, nondistended, positive bowel sounds,+ bowel movement   Neg N/V/D; obese abdomen   :  pt voiding without difficulty   Extremities:   SERRANO; neg LE edema, neg calf tenderness.   PPP bilaterally; bilateral groin sites cdi dwight- neg hematoma/ bleeding

## 2023-02-01 NOTE — PROGRESS NOTE ADULT - ASSESSMENT
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 Of note prior to discharge on most recent admission patient had anemia with CT Abd/Pelvis done that showed possible intramuscular hematoma within the psoas muscle.  Currently endorses fatigue/right lower extremity pain at hip/buttock that travels down his thigh and comes in waves/sharp pain but is not reproducible upon palpation. Patient admitted for anemia workup.     1/27 CT C/A/P with IV cont: Decreased asymmetry of the right psoas muscle compared to CT abdomen/pelvis from 1/21/2023. No new areas of hemorrhage or active contrast extravasation. Transfuse 2U PRBC for H/H 6/20.  1/28 VSS, H/H 7.2/22.9 s/p 2U PRBC, transfuse 1U PRBC today per Dr. Bland. INR 3.49, Coumadin on hold. Pending TTE. F/U GI recs.  1/29 VSS H&H 8,2/25.7  INR 2.71 NPO @  midnight for  Push endoscopy with GI Monday in am.   1/30    vss   npo for GI   procedure     hct 26--transient bleeding AVM's--> pt placed on octreotide as per GI  1/31 VSS; h/h 9/29 today; d/w struct heart ac--> d/c asa and resume coumadin/ plavix as per Dr. Bland; continue octreotide as per gi recs/ d/w heme outpatient octreotide- as per hemeonc- pt can f/u with Dr. 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 Of note prior to discharge on most recent admission patient had anemia with CT Abd/Pelvis done that showed possible intramuscular hematoma within the psoas muscle.  Currently endorses fatigue/right lower extremity pain at hip/buttock that travels down his thigh and comes in waves/sharp pain but is not reproducible upon palpation. Patient admitted for anemia workup.     1/27 CT C/A/P with IV cont: Decreased asymmetry of the right psoas muscle compared to CT abdomen/pelvis from 1/21/2023. No new areas of hemorrhage or active contrast extravasation. Transfuse 2U PRBC for H/H 6/20.  1/28 VSS, H/H 7.2/22.9 s/p 2U PRBC, transfuse 1U PRBC today per Dr. Bland. INR 3.49, Coumadin on hold. Pending TTE. F/U GI recs.  1/29 VSS H&H 8,2/25.7  INR 2.71 NPO @  midnight for  Push endoscopy with GI Monday in am.   1/30    vss   npo for GI   procedure     hct 26--transient bleeding AVM's--> pt placed on octreotide as per GI  1/31 VSS; h/h 9/29 today; d/w struct heart ac--> d/c asa and resume coumadin/ plavix as per Dr. Bland; continue octreotide as per gi recs/ d/w heme outpatient octreotide- as per hemeon- pt can f/u with Dr. Goel for outpatient monthly administration of octreotide   2/1 VSS; Afib 60's; coumadin resumed 1/31; observe cbc while on coumadin; h/h stable 10/33; if no s/s of bleeding; ? discharge home thur/ fri if stable

## 2023-02-01 NOTE — PROGRESS NOTE ADULT - PROBLEM SELECTOR PLAN 3
1/27 CT C/A/P /w IV contrast negative for hemorrhage  1/29 Coumadin on hold --> d/w struct heart resuming   Continue Plavix given recent stents 1/27 CT C/A/P /w IV contrast negative for hemorrhage  1/31 coumadin resumed   Continue Plavix given recent stents

## 2023-02-01 NOTE — PROGRESS NOTE ADULT - PROBLEM SELECTOR PLAN 1
GI and Heme following   Endoscopy 1/30 --> transient AVM's   d/w struct heart AC recs --->  d/c asa and resume coumadin/ plavix as per Dr. Bland  Continue IV Protonix 40 BID  1/27 s/p 2U PRBC  Continue Plavix daily given recent stents  continue octreotide 100 mg sq bid   as per hemeonc- pt can f/u with Dr. Goel for outpatient monthly administration of octreotide   continue IV protonix bid   trend cbc  discharge planning- home when stable GI and Heme following   Endoscopy 1/30 --> transient AVM's   d/w struct heart AC recs ---> continue coumadin/ plavix as per Dr. Bland/ asa d/c 1/31   Continue IV Protonix 40 BID  1/27 s/p 2U PRBC  Continue Plavix daily given recent stents  continue octreotide 100 mg sq bid   as per hemeonc- pt can f/u with Dr. Goel for outpatient monthly administration of octreotide   continue IV protonix bid   trend cbc  discharge planning- home when stable ? thur/fri

## 2023-02-02 LAB
ANION GAP SERPL CALC-SCNC: 10 MMOL/L — SIGNIFICANT CHANGE UP (ref 5–17)
BUN SERPL-MCNC: 27 MG/DL — HIGH (ref 7–23)
CALCIUM SERPL-MCNC: 9 MG/DL — SIGNIFICANT CHANGE UP (ref 8.4–10.5)
CHLORIDE SERPL-SCNC: 103 MMOL/L — SIGNIFICANT CHANGE UP (ref 96–108)
CO2 SERPL-SCNC: 25 MMOL/L — SIGNIFICANT CHANGE UP (ref 22–31)
CREAT SERPL-MCNC: 1.33 MG/DL — HIGH (ref 0.5–1.3)
EGFR: 54 ML/MIN/1.73M2 — LOW
GLUCOSE SERPL-MCNC: 124 MG/DL — HIGH (ref 70–99)
HCT VFR BLD CALC: 31.5 % — LOW (ref 39–50)
HGB BLD-MCNC: 9.9 G/DL — LOW (ref 13–17)
INR BLD: 1.77 RATIO — HIGH (ref 0.88–1.16)
MCHC RBC-ENTMCNC: 29.9 PG — SIGNIFICANT CHANGE UP (ref 27–34)
MCHC RBC-ENTMCNC: 31.4 GM/DL — LOW (ref 32–36)
MCV RBC AUTO: 95.2 FL — SIGNIFICANT CHANGE UP (ref 80–100)
NRBC # BLD: 1 /100 WBCS — HIGH (ref 0–0)
PLATELET # BLD AUTO: 600 K/UL — HIGH (ref 150–400)
POTASSIUM SERPL-MCNC: 3.9 MMOL/L — SIGNIFICANT CHANGE UP (ref 3.5–5.3)
POTASSIUM SERPL-SCNC: 3.9 MMOL/L — SIGNIFICANT CHANGE UP (ref 3.5–5.3)
PROTHROM AB SERPL-ACNC: 20.6 SEC — HIGH (ref 10.5–13.4)
RBC # BLD: 3.31 M/UL — LOW (ref 4.2–5.8)
RBC # FLD: 18.1 % — HIGH (ref 10.3–14.5)
SODIUM SERPL-SCNC: 138 MMOL/L — SIGNIFICANT CHANGE UP (ref 135–145)
WBC # BLD: 14.09 K/UL — HIGH (ref 3.8–10.5)
WBC # FLD AUTO: 14.09 K/UL — HIGH (ref 3.8–10.5)

## 2023-02-02 PROCEDURE — 99232 SBSQ HOSP IP/OBS MODERATE 35: CPT

## 2023-02-02 RX ORDER — WARFARIN SODIUM 2.5 MG/1
2 TABLET ORAL ONCE
Refills: 0 | Status: COMPLETED | OUTPATIENT
Start: 2023-02-02 | End: 2023-02-02

## 2023-02-02 RX ORDER — PANTOPRAZOLE SODIUM 20 MG/1
40 TABLET, DELAYED RELEASE ORAL
Refills: 0 | Status: DISCONTINUED | OUTPATIENT
Start: 2023-02-02 | End: 2023-02-03

## 2023-02-02 RX ADMIN — SPIRONOLACTONE 12.5 MILLIGRAM(S): 25 TABLET, FILM COATED ORAL at 05:46

## 2023-02-02 RX ADMIN — ATORVASTATIN CALCIUM 80 MILLIGRAM(S): 80 TABLET, FILM COATED ORAL at 21:24

## 2023-02-02 RX ADMIN — OCTREOTIDE ACETATE 100 MICROGRAM(S): 200 INJECTION, SOLUTION INTRAVENOUS; SUBCUTANEOUS at 08:24

## 2023-02-02 RX ADMIN — PANTOPRAZOLE SODIUM 40 MILLIGRAM(S): 20 TABLET, DELAYED RELEASE ORAL at 05:46

## 2023-02-02 RX ADMIN — Medication 40 MILLIGRAM(S): at 05:46

## 2023-02-02 RX ADMIN — LATANOPROST 1 DROP(S): 0.05 SOLUTION/ DROPS OPHTHALMIC; TOPICAL at 21:24

## 2023-02-02 RX ADMIN — CLOPIDOGREL BISULFATE 75 MILLIGRAM(S): 75 TABLET, FILM COATED ORAL at 11:32

## 2023-02-02 RX ADMIN — Medication 325 MILLIGRAM(S): at 11:32

## 2023-02-02 RX ADMIN — WARFARIN SODIUM 2 MILLIGRAM(S): 2.5 TABLET ORAL at 21:24

## 2023-02-02 RX ADMIN — OCTREOTIDE ACETATE 100 MICROGRAM(S): 200 INJECTION, SOLUTION INTRAVENOUS; SUBCUTANEOUS at 21:24

## 2023-02-02 RX ADMIN — TAMSULOSIN HYDROCHLORIDE 0.8 MILLIGRAM(S): 0.4 CAPSULE ORAL at 21:24

## 2023-02-02 RX ADMIN — ATENOLOL 25 MILLIGRAM(S): 25 TABLET ORAL at 05:46

## 2023-02-02 NOTE — PROGRESS NOTE ADULT - SUBJECTIVE AND OBJECTIVE BOX
Subjective: "Hello"  OOB chair    Tele:    AFib 50-80                            T(C): 36.7 (02-02-23 @ 11:25), Max: 36.9 (02-01-23 @ 20:25)  HR: 60 (02-02-23 @ 11:25) (60 - 63)  BP: 116/55 (02-02-23 @ 11:25) (116/55 - 134/64)  RR: 18 (02-02-23 @ 11:25) (18 - 18)  SpO2: 95% (02-02-23 @ 11:25) (95% - 97%)        02-02    138  |  103  |  27<H>  ----------------------------<  124<H>  3.9   |  25  |  1.33<H>    Ca    9.0      02 Feb 2023 05:33                                 9.9    14.09 )-----------( 600      ( 02 Feb 2023 05:33 )             31.5        PT/INR - ( 02 Feb 2023 05:33 )   PT: 20.6 sec;   INR: 1.77 ratio            Assessment    Neurology: alert and oriented x 3    CV : S1 S2 RRR    Lungs: clear. RR easy, unlabored     Abdomen: soft, nontender, nondistended, positive bowel sounds,+ bowel movement     Neg N/V/D; obese abdomen     :  pt voiding without difficulty     Extremities:   SERRANO; neg LE edema, neg calf tenderness.   PPP bilaterally; bilateral groin sites cdi dwight-   neg hematoma/ bleeding         MEDICATIONS  (STANDING):  atenolol  Tablet 25 milliGRAM(s) Oral daily  atorvastatin 80 milliGRAM(s) Oral at bedtime  clopidogrel Tablet 75 milliGRAM(s) Oral daily  ferrous    sulfate 325 milliGRAM(s) Oral daily  furosemide    Tablet 40 milliGRAM(s) Oral daily  latanoprost 0.005% Ophthalmic Solution 1 Drop(s) Right EYE at bedtime  octreotide  Injectable 100 MICROGram(s) SubCutaneous every 12 hours  pantoprazole    Tablet 40 milliGRAM(s) Oral before breakfast  spironolactone 12.5 milliGRAM(s) Oral daily  tamsulosin 0.8 milliGRAM(s) Oral at bedtime  warfarin 2 milliGRAM(s) Oral once       PAST MEDICAL & SURGICAL HISTORY:  Hodgkin lymphoma  1975      Atrial fibrillation  over 20 years      HTN (hypertension)      GERD (gastroesophageal reflux disease)      MOE (obstructive sleep apnea)  positive sleep study many years ago, was recommended to use CPAP, patient non-compliant      BPH (benign prostatic hyperplasia)      Osteoarthritis      CVA (cerebral vascular accident)  1/2018 - attributed to no AC for 11 days preop/postop spine surgery - presented to ED with slurred speech, residual ST memory loss, per pt      Spinal stenosis  L3-L4      Spondylolisthesis      Exposure to toxin  9/11       Atrial fibrillation  over 20 years      History of short term memory loss      DVT, lower extremity      S/P splenectomy  1975      S/P hip replacement, right  2014      History of cardioversion  for AF - &quot;many years ago&quot; - unsuccessful      History of lumbar spinal fusion  1/5/2018      Status post left cataract extraction      History of fasciotomy      Cardiac pacemaker      S/P TAVR (transcatheter aortic valve replacement)      CAD (coronary artery disease)

## 2023-02-02 NOTE — PROGRESS NOTE ADULT - PROBLEM SELECTOR PLAN 2
Patient with recent stents.  1/19/2023 -Continue Plavix daily  Continue atenolol 25 daily and atorvastatin 80 HS

## 2023-02-02 NOTE — PROGRESS NOTE ADULT - PROBLEM SELECTOR PLAN 1
GI and Heme following   Endoscopy 1/30 --> transient AVM's   d/w struct heart AC recs ---> continue coumadin/ plavix as per Dr. Bland/ asa d/c 1/31   Continue IV Protonix 40 BID  1/27 s/p 2U PRBC  Continue Plavix daily given recent stents  continue octreotide 100 mg sq bid   as per hemeonc- pt can f/u with Dr. Goel for outpatient monthly administration of octreotide   continue  protonix bid   trend cbc  discharge planning- home when stable ? thur/fri

## 2023-02-02 NOTE — PROGRESS NOTE ADULT - ASSESSMENT
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 Of note prior to discharge on most recent admission patient had anemia with CT Abd/Pelvis done that showed possible intramuscular hematoma within the psoas muscle.  Currently endorses fatigue/right lower extremity pain at hip/buttock that travels down his thigh and comes in waves/sharp pain but is not reproducible upon palpation. Patient admitted for anemia workup.     1/27 CT C/A/P with IV cont: Decreased asymmetry of the right psoas muscle compared to CT abdomen/pelvis from 1/21/2023. No new areas of hemorrhage or active contrast extravasation. Transfuse 2U PRBC for H/H 6/20.  1/28 VSS, H/H 7.2/22.9 s/p 2U PRBC, transfuse 1U PRBC today per Dr. Bland. INR 3.49, Coumadin on hold. Pending TTE. F/U GI recs.  1/29 VSS H&H 8,2/25.7  INR 2.71 NPO @  midnight for  Push endoscopy with GI Monday in am.   1/30    vss   npo for GI   procedure     hct 26--transient bleeding AVM's--> pt placed on octreotide as per GI  1/31 VSS; h/h 9/29 today; d/w struct heart ac--> d/c asa and resume coumadin/ plavix as per Dr. Bland; continue octreotide as per gi recs/ d/w heme outpatient octreotide- as per hemeon- pt can f/u with Dr. Goel for outpatient monthly administration of octreotide   2/1 VSS; Afib 60's; coumadin resumed 1/31; observe cbc while on coumadin; h/h stable 10/33; if no s/s of bleeding;   2/2 Dose coumadin AF Hct stable  Home when INR ^ 2.2

## 2023-02-03 ENCOUNTER — TRANSCRIPTION ENCOUNTER (OUTPATIENT)
Age: 78
End: 2023-02-03

## 2023-02-03 VITALS
DIASTOLIC BLOOD PRESSURE: 63 MMHG | OXYGEN SATURATION: 95 % | SYSTOLIC BLOOD PRESSURE: 120 MMHG | RESPIRATION RATE: 18 BRPM | TEMPERATURE: 98 F | HEART RATE: 62 BPM

## 2023-02-03 LAB
ANION GAP SERPL CALC-SCNC: 14 MMOL/L — SIGNIFICANT CHANGE UP (ref 5–17)
BUN SERPL-MCNC: 25 MG/DL — HIGH (ref 7–23)
CALCIUM SERPL-MCNC: 9.4 MG/DL — SIGNIFICANT CHANGE UP (ref 8.4–10.5)
CHLORIDE SERPL-SCNC: 98 MMOL/L — SIGNIFICANT CHANGE UP (ref 96–108)
CO2 SERPL-SCNC: 25 MMOL/L — SIGNIFICANT CHANGE UP (ref 22–31)
CREAT SERPL-MCNC: 1.27 MG/DL — SIGNIFICANT CHANGE UP (ref 0.5–1.3)
EGFR: 57 ML/MIN/1.73M2 — LOW
GLUCOSE SERPL-MCNC: 122 MG/DL — HIGH (ref 70–99)
HCT VFR BLD CALC: 35.9 % — LOW (ref 39–50)
HGB BLD-MCNC: 10.9 G/DL — LOW (ref 13–17)
INR BLD: 2.81 RATIO — HIGH (ref 0.88–1.16)
MCHC RBC-ENTMCNC: 29.3 PG — SIGNIFICANT CHANGE UP (ref 27–34)
MCHC RBC-ENTMCNC: 30.4 GM/DL — LOW (ref 32–36)
MCV RBC AUTO: 96.5 FL — SIGNIFICANT CHANGE UP (ref 80–100)
NRBC # BLD: 0 /100 WBCS — SIGNIFICANT CHANGE UP (ref 0–0)
OB PNL STL: NEGATIVE — SIGNIFICANT CHANGE UP
PLATELET # BLD AUTO: 709 K/UL — HIGH (ref 150–400)
POTASSIUM SERPL-MCNC: 4.2 MMOL/L — SIGNIFICANT CHANGE UP (ref 3.5–5.3)
POTASSIUM SERPL-SCNC: 4.2 MMOL/L — SIGNIFICANT CHANGE UP (ref 3.5–5.3)
PROTHROM AB SERPL-ACNC: 32.9 SEC — HIGH (ref 10.5–13.4)
RBC # BLD: 3.72 M/UL — LOW (ref 4.2–5.8)
RBC # FLD: 18 % — HIGH (ref 10.3–14.5)
SODIUM SERPL-SCNC: 137 MMOL/L — SIGNIFICANT CHANGE UP (ref 135–145)
WBC # BLD: 13.59 K/UL — HIGH (ref 3.8–10.5)
WBC # FLD AUTO: 13.59 K/UL — HIGH (ref 3.8–10.5)

## 2023-02-03 PROCEDURE — 84132 ASSAY OF SERUM POTASSIUM: CPT

## 2023-02-03 PROCEDURE — 85025 COMPLETE CBC W/AUTO DIFF WBC: CPT

## 2023-02-03 PROCEDURE — 93306 TTE W/DOPPLER COMPLETE: CPT

## 2023-02-03 PROCEDURE — 86850 RBC ANTIBODY SCREEN: CPT

## 2023-02-03 PROCEDURE — 93005 ELECTROCARDIOGRAM TRACING: CPT

## 2023-02-03 PROCEDURE — 80048 BASIC METABOLIC PNL TOTAL CA: CPT

## 2023-02-03 PROCEDURE — 82962 GLUCOSE BLOOD TEST: CPT

## 2023-02-03 PROCEDURE — 99232 SBSQ HOSP IP/OBS MODERATE 35: CPT

## 2023-02-03 PROCEDURE — 96374 THER/PROPH/DIAG INJ IV PUSH: CPT

## 2023-02-03 PROCEDURE — U0005: CPT

## 2023-02-03 PROCEDURE — 82272 OCCULT BLD FECES 1-3 TESTS: CPT

## 2023-02-03 PROCEDURE — 86923 COMPATIBILITY TEST ELECTRIC: CPT

## 2023-02-03 PROCEDURE — 85014 HEMATOCRIT: CPT

## 2023-02-03 PROCEDURE — 83605 ASSAY OF LACTIC ACID: CPT

## 2023-02-03 PROCEDURE — 82435 ASSAY OF BLOOD CHLORIDE: CPT

## 2023-02-03 PROCEDURE — 85018 HEMOGLOBIN: CPT

## 2023-02-03 PROCEDURE — 36415 COLL VENOUS BLD VENIPUNCTURE: CPT

## 2023-02-03 PROCEDURE — 99285 EMERGENCY DEPT VISIT HI MDM: CPT | Mod: 25

## 2023-02-03 PROCEDURE — 86900 BLOOD TYPING SEROLOGIC ABO: CPT

## 2023-02-03 PROCEDURE — 84100 ASSAY OF PHOSPHORUS: CPT

## 2023-02-03 PROCEDURE — 71260 CT THORAX DX C+: CPT | Mod: MA

## 2023-02-03 PROCEDURE — 85027 COMPLETE CBC AUTOMATED: CPT

## 2023-02-03 PROCEDURE — 85730 THROMBOPLASTIN TIME PARTIAL: CPT

## 2023-02-03 PROCEDURE — 36430 TRANSFUSION BLD/BLD COMPNT: CPT

## 2023-02-03 PROCEDURE — 83735 ASSAY OF MAGNESIUM: CPT

## 2023-02-03 PROCEDURE — 74177 CT ABD & PELVIS W/CONTRAST: CPT | Mod: MA

## 2023-02-03 PROCEDURE — 82947 ASSAY GLUCOSE BLOOD QUANT: CPT

## 2023-02-03 PROCEDURE — U0003: CPT

## 2023-02-03 PROCEDURE — 87040 BLOOD CULTURE FOR BACTERIA: CPT

## 2023-02-03 PROCEDURE — 87086 URINE CULTURE/COLONY COUNT: CPT

## 2023-02-03 PROCEDURE — 82330 ASSAY OF CALCIUM: CPT

## 2023-02-03 PROCEDURE — 86901 BLOOD TYPING SEROLOGIC RH(D): CPT

## 2023-02-03 PROCEDURE — 81001 URINALYSIS AUTO W/SCOPE: CPT

## 2023-02-03 PROCEDURE — 82803 BLOOD GASES ANY COMBINATION: CPT

## 2023-02-03 PROCEDURE — 84295 ASSAY OF SERUM SODIUM: CPT

## 2023-02-03 PROCEDURE — 85610 PROTHROMBIN TIME: CPT

## 2023-02-03 PROCEDURE — 80053 COMPREHEN METABOLIC PANEL: CPT

## 2023-02-03 PROCEDURE — 71046 X-RAY EXAM CHEST 2 VIEWS: CPT

## 2023-02-03 PROCEDURE — P9016: CPT

## 2023-02-03 RX ORDER — OCTREOTIDE ACETATE 200 UG/ML
0.04 INJECTION, SOLUTION INTRAVENOUS; SUBCUTANEOUS
Qty: 0 | Refills: 0 | DISCHARGE
Start: 2023-02-03

## 2023-02-03 RX ADMIN — OCTREOTIDE ACETATE 100 MICROGRAM(S): 200 INJECTION, SOLUTION INTRAVENOUS; SUBCUTANEOUS at 11:28

## 2023-02-03 RX ADMIN — CLOPIDOGREL BISULFATE 75 MILLIGRAM(S): 75 TABLET, FILM COATED ORAL at 11:26

## 2023-02-03 RX ADMIN — Medication 325 MILLIGRAM(S): at 11:26

## 2023-02-03 RX ADMIN — SPIRONOLACTONE 12.5 MILLIGRAM(S): 25 TABLET, FILM COATED ORAL at 06:05

## 2023-02-03 RX ADMIN — ATENOLOL 25 MILLIGRAM(S): 25 TABLET ORAL at 06:05

## 2023-02-03 RX ADMIN — PANTOPRAZOLE SODIUM 40 MILLIGRAM(S): 20 TABLET, DELAYED RELEASE ORAL at 06:11

## 2023-02-03 RX ADMIN — Medication 40 MILLIGRAM(S): at 06:05

## 2023-02-03 NOTE — DISCHARGE NOTE PROVIDER - NSDCFUSCHEDAPPT_GEN_ALL_CORE_FT
Yumiko Mejia  [facility]  CARDIOLOGY [address]  Scheduled Appointment: 02/09/2023    [facility]  ELECTROPH [address]  Scheduled Appointment: 03/16/2023

## 2023-02-03 NOTE — PROGRESS NOTE ADULT - ASSESSMENT
acute on chronic anemia   -- noted to have hematoma last admission but no active bleeding at this time, ? related to gap in his aranesp dose  -- received dose of aranesp 200mcg in office on 1/27. He is due for next dose on 2/10/2023.  -- outpt f/u with Dr Goel after d/c to receive monthly octreotide  -- Two bleeding angiodysplastic lesions in the duodenum noted on endoscopy 1/30   -- ferritin 196 on 1/20, cont PO Fe as tolerated    hematoma   -- noted to have improved on repeat imaging  -- pain control    coumadin per CT surg    Discharge planning. Patient to f/u with Dr. Goel at [facility].    Naveen Lowe PA-C  Hematology/Oncology  [facility]  [phone] (office)

## 2023-02-03 NOTE — PROGRESS NOTE ADULT - REASON FOR ADMISSION
Anemia / Weakness/ Right LE PAIN
Anemia
Anemia / Weakness/ Right LE PAIN

## 2023-02-03 NOTE — DISCHARGE NOTE PROVIDER - NSDCFUADDINST_GEN_ALL_CORE_FT
TAVR discharge instructions>Keep femoral or groin site clean,please shower daily and pat site dry.Watch site for signs of reddness or drainage, these should be reported to your doctor.You will receive a card about your valve in the mail,please carry in your wallet.

## 2023-02-03 NOTE — DISCHARGE NOTE PROVIDER - PROVIDER TOKENS
PROVIDER:[TOKEN:[[id number]],FOLLOWUP:[1-3 days]],PROVIDER:[TOKEN:[[id number]],FOLLOWUP:[1 week]]

## 2023-02-03 NOTE — DISCHARGE NOTE NURSING/CASE MANAGEMENT/SOCIAL WORK - PATIENT PORTAL LINK FT
You can access the FollowMyHealth Patient Portal offered by [facility] by registering at the following website: [email or web address]. By joining [employer]’s FollowMyHealth portal, you will also be able to view your health information using other applications (apps) compatible with our system.

## 2023-02-03 NOTE — DISCHARGE NOTE PROVIDER - NSDCMRMEDTOKEN_GEN_ALL_CORE_FT
ATENOLOL 25 MG TABLET: 1 tab(s) orally once a day  clopidogrel 75 mg oral tablet: 1 tab(s) orally once a day  ferrous sulfate 325 mg (65 mg elemental iron) oral tablet: 1 tab(s) orally once a day  Flonase: 1 spray(s) nasal once a day  Lasix 40 mg oral tablet: 1 tab(s) orally once a day  latanoprost 0.005% ophthalmic solution: 1 drop(s) to the right eye once a day (in the evening)  octreotide 2500 mcg/mL subcutaneous solution: monthly as per Dr White  omeprazole 20 mg oral delayed release capsule: 1 cap(s) orally once a day  rosuvastatin 20 mg oral tablet: 0.5 tab(s) orally once a day  spironolactone 25 mg oral tablet: 0.5 tab(s) orally once a day  tamsulosin 0.4 mg oral capsule: 2 cap(s) orally once a day (at bedtime)  Vitamin B-12: 1 dose(s) injectable once a month    Note:Gets from MD office  Vitamin C: 1 tab(s) orally once a day  warfarin 1 mg oral tablet: 2 tab(s) orally 5 times a week (M-F)  warfarin 1 mg oral tablet: 1 tab(s) orally 2 times a week (Sat and Sun)

## 2023-02-03 NOTE — DISCHARGE NOTE PROVIDER - HOSPITAL COURSE
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 Of note prior to discharge on most recent admission patient had anemia with CT Abd/Pelvis done that showed possible intramuscular hematoma within the psoas muscle.  Currently endorses fatigue/right lower extremity pain at hip/buttock that travels down his thigh and comes in waves/sharp pain but is not reproducible upon palpation. Patient admitted for anemia workup.     1/27 CT C/A/P with IV cont: Decreased asymmetry of the right psoas muscle compared to CT abdomen/pelvis from 1/21/2023. No new areas of hemorrhage or active contrast extravasation. Transfuse 2U PRBC for H/H 6/20.  1/28 VSS, H/H 7.2/22.9 s/p 2U PRBC, transfuse 1U PRBC today per Dr. Bland. INR 3.49, Coumadin on hold. Pending TTE. F/U GI recs.  1/29 VSS H&H 8,2/25.7  INR 2.71 NPO @  midnight for  Push endoscopy with GI Monday in am.   1/30    vss   npo for GI   procedure     hct 26--transient bleeding AVM's--> pt placed on octreotide as per GI  1/31 VSS; h/h 9/29 today; d/w struct heart ac--> d/c asa and resume coumadin/ plavix as per Dr. Bland; continue octreotide as per gi recs/ d/w heme outpatient octreotide- as per hemeon- pt can f/u with Dr. Goel for outpatient monthly administration of octreotide   2/1 VSS; Afib 60's; coumadin resumed 1/31; observe cbc while on coumadin; h/h stable 10/33; if no s/s of bleeding;   2/2 Dose coumadin AF Hct stable  Home when INR ^ 2.2  2/3 Dose coumadin  Home on sandostatin today

## 2023-02-03 NOTE — PROGRESS NOTE ADULT - PROVIDER SPECIALTY LIST ADULT
Heme/Onc
Heme/Onc
CT Surgery
CT Surgery
Heme/Onc
Heme/Onc
Gastroenterology
Gastroenterology
Heme/Onc
CT Surgery

## 2023-02-03 NOTE — DISCHARGE NOTE NURSING/CASE MANAGEMENT/SOCIAL WORK - NSDCPEFALRISK_GEN_ALL_CORE
For information on Fall & Injury Prevention, visit: [email or web address] OR  [email or web address] OR  https://www.cdc.gov/steadi/patient.html

## 2023-02-03 NOTE — DISCHARGE NOTE PROVIDER - NSDCFUADDAPPT_GEN_ALL_CORE_FT
Follow up Dr White this week for Sandostain long acting  RX to be given in his office    octreotide as per gi recs/ d/w heme outpatient octreotide- as per hemeonc- pt can f/u with Dr. Goel for outpatient monthly administration of octreotide

## 2023-02-03 NOTE — PROGRESS NOTE ADULT - SUBJECTIVE AND OBJECTIVE BOX
Patient is a 78y old  Male who presents with a chief complaint of Anemia / Weakness/ Right LE PAIN (03 Feb 2023 10:21)    Patient seen and examined at bedside. Feeling well, discharge planning today.    MEDICATIONS  (STANDING):  atenolol  Tablet 25 milliGRAM(s) Oral daily  atorvastatin 80 milliGRAM(s) Oral at bedtime  clopidogrel Tablet 75 milliGRAM(s) Oral daily  ferrous    sulfate 325 milliGRAM(s) Oral daily  furosemide    Tablet 40 milliGRAM(s) Oral daily  latanoprost 0.005% Ophthalmic Solution 1 Drop(s) Right EYE at bedtime  octreotide  Injectable 100 MICROGram(s) SubCutaneous every 12 hours  pantoprazole    Tablet 40 milliGRAM(s) Oral before breakfast  spironolactone 12.5 milliGRAM(s) Oral daily  tamsulosin 0.8 milliGRAM(s) Oral at bedtime    MEDICATIONS  (PRN):  oxycodone    5 mG/acetaminophen 325 mG 1 Tablet(s) Oral every 6 hours PRN Moderate Pain (4 - 6)          Vital Signs Last 24 Hrs  T(C): 36.9 (03 Feb 2023 11:28), Max: 36.9 (03 Feb 2023 11:28)  T(F): 98.4 (03 Feb 2023 11:28), Max: 98.4 (03 Feb 2023 11:28)  HR: 62 (03 Feb 2023 11:28) (62 - 68)  BP: 120/63 (03 Feb 2023 11:28) (120/63 - 155/67)  BP(mean): --  RR: 18 (03 Feb 2023 11:28) (18 - 18)  SpO2: 95% (03 Feb 2023 11:28) (95% - 98%)    Parameters below as of 03 Feb 2023 11:28  Patient On (Oxygen Delivery Method): room air    PE  NAD  Awake, alert  Anicteric, MMM  No c/c/e  No rash grossly  FROM                          10.9   13.59 )-----------( 709      ( 03 Feb 2023 09:05 )             35.9       02-03    137  |  98  |  25<H>  ----------------------------<  122<H>  4.2   |  25  |  1.27    Ca    9.4      03 Feb 2023 09:03

## 2023-02-03 NOTE — DISCHARGE NOTE PROVIDER - NSDCPNSUBOBJ_GEN_ALL_CORE
Subjective:  "Hello"  OOB chair    Tele:            VPace 60s                    T(C): 36.8 (02-03-23 @ 05:17), Max: 36.8 (02-03-23 @ 05:17)  HR: 68 (02-03-23 @ 05:17) (60 - 68)  BP: 155/67 (02-03-23 @ 05:17) (116/55 - 155/67)  RR: 18 (02-03-23 @ 05:17) (18 - 18)  SpO2: 98% (02-03-23 @ 05:17) (95% - 98%)      02-03    137  |  98  |  25<H>  ----------------------------<  122<H>  4.2   |  25  |  1.27    Ca    9.4      03 Feb 2023 09:03                                 10.9   13.59 )-----------( 709      ( 03 Feb 2023 09:05 )             35.9        PT/INR - ( 03 Feb 2023 09:05 )   PT: 32.9 sec;   INR: 2.81 ratio             Assessment  Neurology: alert and oriented x 3    CV : S1 S2 RRR    Lungs: clear. RR easy, unlabored     Abdomen: soft, nontender, nondistended, positive bowel sounds,+ bowel movement     Neg N/V/D; obese abdomen     :  pt voiding without difficulty     Extremities:   SERRANO; neg LE edema, neg calf tenderness.   PPP bilaterally; bilateral groin sites cdi dwight-   neg hematoma/ bleeding           MEDICATIONS  (STANDING):  atenolol  Tablet 25 milliGRAM(s) Oral daily  atorvastatin 80 milliGRAM(s) Oral at bedtime  clopidogrel Tablet 75 milliGRAM(s) Oral daily  ferrous    sulfate 325 milliGRAM(s) Oral daily  furosemide    Tablet 40 milliGRAM(s) Oral daily  latanoprost 0.005% Ophthalmic Solution 1 Drop(s) Right EYE at bedtime  octreotide  Injectable 100 MICROGram(s) SubCutaneous every 12 hours  pantoprazole    Tablet 40 milliGRAM(s) Oral before breakfast  spironolactone 12.5 milliGRAM(s) Oral daily  tamsulosin 0.8 milliGRAM(s) Oral at bedtime       PAST MEDICAL & SURGICAL HISTORY:  Hodgkin lymphoma  1975      Atrial fibrillation  over 20 years      HTN (hypertension)      GERD (gastroesophageal reflux disease)      MOE (obstructive sleep apnea)  positive sleep study many years ago, was recommended to use CPAP, patient non-compliant      BPH (benign prostatic hyperplasia)      Osteoarthritis      CVA (cerebral vascular accident)  1/2018 - attributed to no AC for 11 days preop/postop spine surgery - presented to ED with slurred speech, residual ST memory loss, per pt      Spinal stenosis  L3-L4      Spondylolisthesis      Exposure to toxin  9/11       Atrial fibrillation  over 20 years      History of short term memory loss      DVT, lower extremity      S/P splenectomy  1975      S/P hip replacement, right  2014      History of cardioversion  for AF - &quot;many years ago&quot; - unsuccessful      History of lumbar spinal fusion  1/5/2018      Status post left cataract extraction      History of fasciotomy      Cardiac pacemaker      S/P TAVR (transcatheter aortic valve replacement)      CAD (coronary artery disease)

## 2023-02-03 NOTE — DISCHARGE NOTE PROVIDER - CARE PROVIDER_API CALL
DANIEL CAMPOS  Hematology/Oncology  Phone: [phone]  Fax: [phone]  Follow Up Time: 1-3 days    Rocco Bland)  Interventional Cardiology  [address] [place] [address]  Phone: [phone]  Fax: [phone]  Follow Up Time: 1 week

## 2023-02-09 ENCOUNTER — APPOINTMENT (OUTPATIENT)
Dept: CARDIOLOGY | Facility: CLINIC | Age: 78
End: 2023-02-09
Payer: MEDICARE

## 2023-02-09 ENCOUNTER — APPOINTMENT (OUTPATIENT)
Dept: CARDIOTHORACIC SURGERY | Facility: CLINIC | Age: 78
End: 2023-02-09

## 2023-02-09 ENCOUNTER — NON-APPOINTMENT (OUTPATIENT)
Age: 78
End: 2023-02-09

## 2023-02-09 VITALS
BODY MASS INDEX: 31.98 KG/M2 | SYSTOLIC BLOOD PRESSURE: 120 MMHG | HEART RATE: 70 BPM | HEIGHT: 68 IN | DIASTOLIC BLOOD PRESSURE: 67 MMHG | WEIGHT: 211 LBS | OXYGEN SATURATION: 98 %

## 2023-02-09 PROCEDURE — 99214 OFFICE O/P EST MOD 30 MIN: CPT | Mod: 25

## 2023-02-09 PROCEDURE — 93000 ELECTROCARDIOGRAM COMPLETE: CPT

## 2023-02-09 NOTE — DISCUSSION/SUMMARY
[FreeTextEntry1] : The patient is a 78-year-old gentleman A-fib, hypertension, hyperlipidemia, s/p back surgery with RLE fasciotomy and skin graft, s/p COVID, PPM , AS, anemia s/p TAVR with AV malformation who is feeling much better. \par #1 HFpEF- c/w lasix 1-2x day, spironolactone 12.5mg, keep leg elevated when sitting\par #2 AS- s/p TAVR\par #2 HTN- remain off lisinopril 5mg \par #3 Afib- s/p PPM for tachybrady, c/w atenolol 25mg,no bleeding on warfarin- monitored by PCP, INR 2-3.0\par #4 Lipids- c/w simvastatin\par #5 BPH- on tamsulosin\par #6 GI-  on iron, s/p transfusion and aranesp,BM negative, AV malformation,f/u Dr. Martins, Hb 10\par #7 Ortho- left hip pain limits ambulation\par #8 General- mild COVID 2020, s/p Moderna vaccines, recommend booster [EKG obtained to assist in diagnosis and management of assessed problem(s)] : EKG obtained to assist in diagnosis and management of assessed problem(s)

## 2023-02-09 NOTE — REVIEW OF SYSTEMS
[SOB] : shortness of breath [Dyspnea on exertion] : dyspnea during exertion [Negative] : Heme/Lymph [Weight Loss (___ Lbs)] : [unfilled] ~Ulb weight loss [Weight Gain (___ Lbs)] : no recent weight gain [Chest Discomfort] : no chest discomfort [Lower Ext Edema] : no extremity edema [Leg Claudication] : no intermittent leg claudication [Orthopnea] : no orthopnea [PND] : no PND [Syncope] : no syncope

## 2023-02-09 NOTE — HISTORY OF PRESENT ILLNESS
[FreeTextEntry1] : Jean-Pierre is s/p TAVR. Dx with AVM. Medication would be very expensive. Working on approval. Lost 14 pounds. [facility]. Off aspirin for a month while on plavix and warfarin. Breathing is markedly improved. HIp still painful.

## 2023-02-14 NOTE — CHART NOTE - NSCHARTNOTEFT_GEN_A_CORE
This patient has anemia secondary to anemia of chronic disease and secondary to angiodysplastic lesions in the duodenum which have been noted on endoscopy. This patient has chronic (permanent) atrial fibrillation.

## 2023-02-15 ENCOUNTER — FORM ENCOUNTER (OUTPATIENT)
Age: 78
End: 2023-02-15

## 2023-02-17 NOTE — DIETITIAN INITIAL EVALUATION ADULT. - NS FNS CHANGE IN WEIGHT
"Assessment & Plan   It band syndrome, right  Patient presents with lateral right thigh pain after a fall 8 months ago. History and exam are consistent with right IT band syndrome that has not improved with OTC medicines. Repetitive motions at work are likely not making this better. Start PT. Recommended changes to work to allow for more breaks and changes of position. Follow-up in 6 weeks for recheck.   - Physical Therapy Referral; Future    Subjective   Jose is a 42 year old, presenting for the following health issues:  Work Comp    History of Present Illness       Reason for visit:  Pain in my right leg  Symptom onset:  More than a month  Symptoms include:  Pain on my leg  Symptom intensity:  Severe  Symptom progression:  Worsening  Had these symptoms before:  No  What makes it worse:  Standing in one spot or too long  What makes it better:  Rubbing my leg    He eats 0-1 servings of fruits and vegetables daily.He consumes 1 sweetened beverage(s) daily.He exercises with enough effort to increase his heart rate 10 to 19 minutes per day.  He exercises with enough effort to increase his heart rate 3 or less days per week.   He is taking medications regularly.    Today's PHQ-9         PHQ-9 Total Score: 4    PHQ-9 Q9 Thoughts of better off dead/self-harm past 2 weeks :   Not at all    How difficult have these problems made it for you to do your work, take care of things at home, or get along with other people: Not difficult at all     Slipped June 2022, fell on left knee, and pulled his right thigh muscles  Pain comes and goes  Burning sensation down right leg, comes and goes  Muscle tightness is constant  8/10 pain at its worst, but goes away after a few seconds (after rubbing leg)    Review of Systems   See HPI       Objective    BP (!) 144/72 (BP Location: Right arm, Patient Position: Sitting, Cuff Size: Adult Large)   Pulse 79   Temp 98.8  F (37.1  C) (Tympanic)   Resp 18   Ht 1.6 m (5' 3\")   Wt 91.9 kg (202 " lb 9.6 oz)   SpO2 100%   BMI 35.89 kg/m    Body mass index is 35.89 kg/m .  Physical Exam   Constitutional: healthy, alert, and no distress  Head: Normocephalic. Atraumatic  Eyes: No conjunctival injection, sclera anicteric  Respiratory: No resp distress.  Musculoskeletal: normal ROM of hips and legs, no tenderness to palpation of IT band, quadriceps muscle, and quadriceps tendon. 5/5 hip and leg strength.  extremities normal- no gross deformities noted, and normal muscle tone  Neurologic: Gait normal. CN 2-12 grossly intact  Psychiatric: mentation appears normal and affect normal/bright    DIAMOND Downey Student  [facility] Medicine  February 17, 2023     Physician Attestation   I, Austin Ko PA-C, was present with the medical/YOHANA student who participated in the service and in the documentation of the note.  I have verified the history and personally performed the physical exam and medical decision making.  I agree with the assessment and plan of care as documented in the note.     LLOYD LambertC             Loss

## 2023-02-23 ENCOUNTER — TRANSCRIPTION ENCOUNTER (OUTPATIENT)
Age: 78
End: 2023-02-23

## 2023-03-07 NOTE — ED ADULT TRIAGE NOTE - HISTORY OF COVID-19 VACCINATION
Yes Abdomen soft, non-tender and non-distended, no rebound, no guarding and no masses. no hepatosplenomegaly.

## 2023-03-08 ENCOUNTER — APPOINTMENT (OUTPATIENT)
Dept: ORTHOPEDIC SURGERY | Facility: CLINIC | Age: 78
End: 2023-03-08
Payer: MEDICARE

## 2023-03-08 VITALS — WEIGHT: 225 LBS | HEIGHT: 68 IN | BODY MASS INDEX: 34.1 KG/M2

## 2023-03-08 DIAGNOSIS — M16.12 UNILATERAL PRIMARY OSTEOARTHRITIS, LEFT HIP: ICD-10-CM

## 2023-03-08 DIAGNOSIS — M16.11 UNILATERAL PRIMARY OSTEOARTHRITIS, RIGHT HIP: ICD-10-CM

## 2023-03-08 PROCEDURE — 73502 X-RAY EXAM HIP UNI 2-3 VIEWS: CPT

## 2023-03-08 PROCEDURE — 99204 OFFICE O/P NEW MOD 45 MIN: CPT

## 2023-03-09 ENCOUNTER — APPOINTMENT (OUTPATIENT)
Dept: GASTROENTEROLOGY | Facility: CLINIC | Age: 78
End: 2023-03-09
Payer: MEDICARE

## 2023-03-09 VITALS
BODY MASS INDEX: 33.34 KG/M2 | DIASTOLIC BLOOD PRESSURE: 68 MMHG | WEIGHT: 220 LBS | SYSTOLIC BLOOD PRESSURE: 118 MMHG | HEART RATE: 63 BPM | OXYGEN SATURATION: 95 % | HEIGHT: 68 IN | TEMPERATURE: 97.8 F

## 2023-03-09 PROCEDURE — 99214 OFFICE O/P EST MOD 30 MIN: CPT

## 2023-03-16 ENCOUNTER — NON-APPOINTMENT (OUTPATIENT)
Age: 78
End: 2023-03-16

## 2023-03-16 ENCOUNTER — APPOINTMENT (OUTPATIENT)
Dept: ELECTROPHYSIOLOGY | Facility: CLINIC | Age: 78
End: 2023-03-16
Payer: MEDICARE

## 2023-03-16 PROCEDURE — 93296 REM INTERROG EVL PM/IDS: CPT

## 2023-03-16 PROCEDURE — 93294 REM INTERROG EVL PM/LDLS PM: CPT

## 2023-03-21 ENCOUNTER — INPATIENT (INPATIENT)
Facility: HOSPITAL | Age: 78
LOS: 7 days | Discharge: HOME | DRG: 377 | End: 2023-03-29
Attending: STUDENT IN AN ORGANIZED HEALTH CARE EDUCATION/TRAINING PROGRAM | Admitting: HOSPITALIST
Payer: COMMERCIAL

## 2023-03-21 VITALS
HEIGHT: 68 IN | RESPIRATION RATE: 20 BRPM | HEART RATE: 94 BPM | OXYGEN SATURATION: 95 % | SYSTOLIC BLOOD PRESSURE: 150 MMHG | WEIGHT: 216.93 LBS | DIASTOLIC BLOOD PRESSURE: 68 MMHG | TEMPERATURE: 98 F

## 2023-03-21 DIAGNOSIS — Z98.890 OTHER SPECIFIED POSTPROCEDURAL STATES: Chronic | ICD-10-CM

## 2023-03-21 DIAGNOSIS — Z90.81 ACQUIRED ABSENCE OF SPLEEN: Chronic | ICD-10-CM

## 2023-03-21 DIAGNOSIS — D64.9 ANEMIA, UNSPECIFIED: ICD-10-CM

## 2023-03-21 DIAGNOSIS — Z98.42 CATARACT EXTRACTION STATUS, LEFT EYE: Chronic | ICD-10-CM

## 2023-03-21 DIAGNOSIS — I25.10 ATHEROSCLEROTIC HEART DISEASE OF NATIVE CORONARY ARTERY WITHOUT ANGINA PECTORIS: Chronic | ICD-10-CM

## 2023-03-21 DIAGNOSIS — Z95.2 PRESENCE OF PROSTHETIC HEART VALVE: Chronic | ICD-10-CM

## 2023-03-21 DIAGNOSIS — Z96.641 PRESENCE OF RIGHT ARTIFICIAL HIP JOINT: Chronic | ICD-10-CM

## 2023-03-21 DIAGNOSIS — Z95.0 PRESENCE OF CARDIAC PACEMAKER: Chronic | ICD-10-CM

## 2023-03-21 DIAGNOSIS — Z98.1 ARTHRODESIS STATUS: Chronic | ICD-10-CM

## 2023-03-21 LAB
ALBUMIN SERPL ELPH-MCNC: 3.9 G/DL — SIGNIFICANT CHANGE UP (ref 3.3–5)
ALP SERPL-CCNC: 114 U/L — SIGNIFICANT CHANGE UP (ref 40–120)
ALT FLD-CCNC: 16 U/L — SIGNIFICANT CHANGE UP (ref 10–45)
ANION GAP SERPL CALC-SCNC: 10 MMOL/L — SIGNIFICANT CHANGE UP (ref 5–17)
APTT BLD: 46.3 SEC — HIGH (ref 27.5–35.5)
AST SERPL-CCNC: 17 U/L — SIGNIFICANT CHANGE UP (ref 10–40)
BASOPHILS # BLD AUTO: 0.07 K/UL — SIGNIFICANT CHANGE UP (ref 0–0.2)
BASOPHILS NFR BLD AUTO: 0.5 % — SIGNIFICANT CHANGE UP (ref 0–2)
BILIRUB SERPL-MCNC: 0.1 MG/DL — LOW (ref 0.2–1.2)
BLD GP AB SCN SERPL QL: NEGATIVE — SIGNIFICANT CHANGE UP
BUN SERPL-MCNC: 25 MG/DL — HIGH (ref 7–23)
CALCIUM SERPL-MCNC: 9.4 MG/DL — SIGNIFICANT CHANGE UP (ref 8.4–10.5)
CHLORIDE SERPL-SCNC: 102 MMOL/L — SIGNIFICANT CHANGE UP (ref 96–108)
CO2 SERPL-SCNC: 27 MMOL/L — SIGNIFICANT CHANGE UP (ref 22–31)
CREAT SERPL-MCNC: 0.97 MG/DL — SIGNIFICANT CHANGE UP (ref 0.5–1.3)
EGFR: 79 ML/MIN/1.73M2 — SIGNIFICANT CHANGE UP
EOSINOPHIL # BLD AUTO: 0.67 K/UL — HIGH (ref 0–0.5)
EOSINOPHIL NFR BLD AUTO: 5.2 % — SIGNIFICANT CHANGE UP (ref 0–6)
FLUAV AG NPH QL: SIGNIFICANT CHANGE UP
FLUBV AG NPH QL: SIGNIFICANT CHANGE UP
GLUCOSE SERPL-MCNC: 126 MG/DL — HIGH (ref 70–99)
HCT VFR BLD CALC: 27.6 % — LOW (ref 39–50)
HGB BLD-MCNC: 8.3 G/DL — LOW (ref 13–17)
IMM GRANULOCYTES NFR BLD AUTO: 0.6 % — SIGNIFICANT CHANGE UP (ref 0–0.9)
INR BLD: 2.58 RATIO — HIGH (ref 0.88–1.16)
LYMPHOCYTES # BLD AUTO: 1.51 K/UL — SIGNIFICANT CHANGE UP (ref 1–3.3)
LYMPHOCYTES # BLD AUTO: 11.8 % — LOW (ref 13–44)
MAGNESIUM SERPL-MCNC: 2.1 MG/DL — SIGNIFICANT CHANGE UP (ref 1.6–2.6)
MCHC RBC-ENTMCNC: 30.1 GM/DL — LOW (ref 32–36)
MCHC RBC-ENTMCNC: 30.9 PG — SIGNIFICANT CHANGE UP (ref 27–34)
MCV RBC AUTO: 102.6 FL — HIGH (ref 80–100)
MONOCYTES # BLD AUTO: 1.2 K/UL — HIGH (ref 0–0.9)
MONOCYTES NFR BLD AUTO: 9.4 % — SIGNIFICANT CHANGE UP (ref 2–14)
NEUTROPHILS # BLD AUTO: 9.28 K/UL — HIGH (ref 1.8–7.4)
NEUTROPHILS NFR BLD AUTO: 72.5 % — SIGNIFICANT CHANGE UP (ref 43–77)
NRBC # BLD: 0 /100 WBCS — SIGNIFICANT CHANGE UP (ref 0–0)
PLATELET # BLD AUTO: 495 K/UL — HIGH (ref 150–400)
POTASSIUM SERPL-MCNC: 4.9 MMOL/L — SIGNIFICANT CHANGE UP (ref 3.5–5.3)
POTASSIUM SERPL-SCNC: 4.9 MMOL/L — SIGNIFICANT CHANGE UP (ref 3.5–5.3)
PROT SERPL-MCNC: 7 G/DL — SIGNIFICANT CHANGE UP (ref 6–8.3)
PROTHROM AB SERPL-ACNC: 30.2 SEC — HIGH (ref 10.5–13.4)
RBC # BLD: 2.69 M/UL — LOW (ref 4.2–5.8)
RBC # FLD: 19.6 % — HIGH (ref 10.3–14.5)
RH IG SCN BLD-IMP: POSITIVE — SIGNIFICANT CHANGE UP
RSV RNA NPH QL NAA+NON-PROBE: SIGNIFICANT CHANGE UP
SARS-COV-2 RNA SPEC QL NAA+PROBE: SIGNIFICANT CHANGE UP
SODIUM SERPL-SCNC: 139 MMOL/L — SIGNIFICANT CHANGE UP (ref 135–145)
TROPONIN T, HIGH SENSITIVITY RESULT: 29 NG/L — SIGNIFICANT CHANGE UP (ref 0–51)
WBC # BLD: 12.81 K/UL — HIGH (ref 3.8–10.5)
WBC # FLD AUTO: 12.81 K/UL — HIGH (ref 3.8–10.5)

## 2023-03-21 PROCEDURE — 99285 EMERGENCY DEPT VISIT HI MDM: CPT

## 2023-03-21 PROCEDURE — 71045 X-RAY EXAM CHEST 1 VIEW: CPT | Mod: 26

## 2023-03-21 NOTE — ED PROVIDER NOTE - OBJECTIVE STATEMENT
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 Pts baseline hgb 11's has slowly been dropping to 8's. Advised he come in for transfusion. Today pt endorsed intermittent lightheadedness, not currently experiencing. Pt with multiple admissions for anemia requiring transfusion, most recently on 01/27-02/03. During hospitalization pt was found to have possible intramuscular hematoma within the psoas muscle.  endoscopy with GI Monday in am. Had endoscopy 1/30 transient bleeding AVM's was started on octreotide as per GI. Was cleared by Cards and GI to resum coumadin and plavix upon discharge. 77 yo M with a PMH of CAD recent stents to RCA 1/19/2023, TAVR on 1/21/2023 c/b anemia requiring multiple transfusions, chronic anemia (secondary to Fe/ B12/ folate deficiencies), HTN, Afib on coumadin, PPM, CVA x 2 with residual short term memory loss, Hodgkin's Lymphoma s/p splenectomy and chemo, essential thrombocytopenia, MOE, GERD, laminectomy with RLE fasciotomy and skin grafting due to RLE DVT post-op, presents for low hgb/hct from [facility] group- pt follows with [facility]. Pts baseline hgb 11's has slowly been dropping to 8's. Advised he come in for transfusion. Today pt endorsed intermittent lightheadedness, not currently experiencing. Admits to black stool, has hx of this 2/2 iron infusions.   Pt with multiple admissions for anemia requiring transfusions, most recently on 01/27-02/03. During hospitalization pt underwent endoscopy with GI on 1/30 which revealed transient bleeding AVM's, was started on octreotide as per GI. Was cleared by Cards and GI to resume coumadin and plavix upon discharge. Was to continue octreotide tx outpt as well.   Denies nausea, vomiting, fever, chills, chest pain, SOB, cough, abd pain, diarrhea, headache, changes in speech/va, weakness, syncope.

## 2023-03-21 NOTE — ED PROVIDER NOTE - RAPID ASSESSMENT
78-year-old male remote history of Hodgkin's lymphoma when he was a child follows up with hematology at [facility] [place] sent in for low hemoglobin.  Patient has had difficulty with anemia before in the past thought to be solely of chronic disease however did have one episode where they found an active bleed in his small intestine.  Patient was called today after his hemoglobin over the last 3 weeks has dropped from 11-8 endorse some fatigue and was told to come to the emergency department.  Patient always has dark stools secondary to being on iron and iron infusions.  Patient denies pain fevers chills.  Patient is well-appearing in triage.  Patient was rapidly assessed via a telemedicine and/or role of Quick Triage Doctor; a limited history, physical exam and assessment was performed. The patient will be seen and further evaluated in the main emergency department. The remainder of care and evaluation will be conducted by the primary emergency medicine team. Receiving team will follow up on labs, imaging and serially reassess patient as indicated. All further decisions regarding patient care, evaluation and disposition are at the discretion of the receiving primary emergency department team.

## 2023-03-21 NOTE — ED PROVIDER NOTE - NS ED ATTENDING STATEMENT MOD
This was a shared visit with the MANNIE. I reviewed and verified the documentation and independently performed the documented:

## 2023-03-21 NOTE — ED PROVIDER NOTE - PHYSICAL EXAMINATION
CONSTITUTIONAL: Well appearing and in no apparent distress.  ENT: Airway patent, moist mucous membranes.   EYES: Pupils equal, round and reactive to light. EOMI. Conjunctiva pale appearing.   CARDIAC: Normal rate, regular rhythm.  Heart sounds S1, S2.    RESPIRATORY: Breath sounds clear and equal bilaterally.   GASTROINTESTINAL: Abdomen soft, non-tender, not distended.  MUSCULOSKELETAL: Spine appears normal.  NEUROLOGICAL: Alert and oriented x3, no focal deficits, no motor or sensory deficits. 5/5 muscle strength throughout.  SKIN: Skin pale in color, warm, dry and intact.   PSYCHIATRIC: Normal mood and affect.

## 2023-03-21 NOTE — ED PROVIDER NOTE - ATTENDING APP SHARED VISIT CONTRIBUTION OF CARE
Patient here with wife at the bedside, sent in for hemoglobin drop from 11-8 over the course of several weeks, has no abdominal pain, noted dark stools, patient had similar symptoms in the past.    On exam, patient is well-appearing, nontoxic, nontender abdomen.    MDM: Anemia likely due to GI bleed loss, subacute in nature, patient is on anticoagulation, patient is high risk for further GI bleed, anemia and the requirement for blood transfusion.  At this time would hold blood transfusion unless hemoglobin is less than 8 and/or patient has acute GI bleed with hemodynamic compromise.  Would place patient inpatient for serial serial H&H's, GI consult, and consideration for endoscopy if hemoglobin continues to trend downwards, patient is high risk for stopping anticoagulation as patient has had previous strokes when off of it.  Blood transfusion consent signed and in chart ready in case patient's hemoglobin drops.

## 2023-03-22 DIAGNOSIS — D50.0 IRON DEFICIENCY ANEMIA SECONDARY TO BLOOD LOSS (CHRONIC): ICD-10-CM

## 2023-03-22 DIAGNOSIS — I25.10 ATHEROSCLEROTIC HEART DISEASE OF NATIVE CORONARY ARTERY WITHOUT ANGINA PECTORIS: ICD-10-CM

## 2023-03-22 DIAGNOSIS — I10 ESSENTIAL (PRIMARY) HYPERTENSION: ICD-10-CM

## 2023-03-22 DIAGNOSIS — Z00.00 ENCOUNTER FOR GENERAL ADULT MEDICAL EXAMINATION WITHOUT ABNORMAL FINDINGS: ICD-10-CM

## 2023-03-22 DIAGNOSIS — I50.9 HEART FAILURE, UNSPECIFIED: ICD-10-CM

## 2023-03-22 DIAGNOSIS — R60.0 LOCALIZED EDEMA: ICD-10-CM

## 2023-03-22 DIAGNOSIS — D64.9 ANEMIA, UNSPECIFIED: ICD-10-CM

## 2023-03-22 DIAGNOSIS — N40.0 BENIGN PROSTATIC HYPERPLASIA WITHOUT LOWER URINARY TRACT SYMPTOMS: ICD-10-CM

## 2023-03-22 DIAGNOSIS — I48.91 UNSPECIFIED ATRIAL FIBRILLATION: ICD-10-CM

## 2023-03-22 DIAGNOSIS — R71.0 PRECIPITOUS DROP IN HEMATOCRIT: ICD-10-CM

## 2023-03-22 LAB
ANION GAP SERPL CALC-SCNC: 10 MMOL/L — SIGNIFICANT CHANGE UP (ref 5–17)
APTT BLD: 42.1 SEC — HIGH (ref 27.5–35.5)
BASOPHILS # BLD AUTO: 0.09 K/UL — SIGNIFICANT CHANGE UP (ref 0–0.2)
BASOPHILS NFR BLD AUTO: 0.7 % — SIGNIFICANT CHANGE UP (ref 0–2)
BUN SERPL-MCNC: 21 MG/DL — SIGNIFICANT CHANGE UP (ref 7–23)
CALCIUM SERPL-MCNC: 9.1 MG/DL — SIGNIFICANT CHANGE UP (ref 8.4–10.5)
CHLORIDE SERPL-SCNC: 104 MMOL/L — SIGNIFICANT CHANGE UP (ref 96–108)
CO2 SERPL-SCNC: 26 MMOL/L — SIGNIFICANT CHANGE UP (ref 22–31)
CREAT SERPL-MCNC: 0.89 MG/DL — SIGNIFICANT CHANGE UP (ref 0.5–1.3)
EGFR: 87 ML/MIN/1.73M2 — SIGNIFICANT CHANGE UP
EOSINOPHIL # BLD AUTO: 0.72 K/UL — HIGH (ref 0–0.5)
EOSINOPHIL NFR BLD AUTO: 5.3 % — SIGNIFICANT CHANGE UP (ref 0–6)
FERRITIN SERPL-MCNC: 186 NG/ML — SIGNIFICANT CHANGE UP (ref 30–400)
FOLATE SERPL-MCNC: 16.9 NG/ML — SIGNIFICANT CHANGE UP
GLUCOSE SERPL-MCNC: 96 MG/DL — SIGNIFICANT CHANGE UP (ref 70–99)
HCT VFR BLD CALC: 27.2 % — LOW (ref 39–50)
HGB BLD-MCNC: 8.2 G/DL — LOW (ref 13–17)
IMM GRANULOCYTES NFR BLD AUTO: 0.5 % — SIGNIFICANT CHANGE UP (ref 0–0.9)
INR BLD: 2.41 RATIO — HIGH (ref 0.88–1.16)
LYMPHOCYTES # BLD AUTO: 1.66 K/UL — SIGNIFICANT CHANGE UP (ref 1–3.3)
LYMPHOCYTES # BLD AUTO: 12.2 % — LOW (ref 13–44)
MAGNESIUM SERPL-MCNC: 2.1 MG/DL — SIGNIFICANT CHANGE UP (ref 1.6–2.6)
MCHC RBC-ENTMCNC: 30.1 GM/DL — LOW (ref 32–36)
MCHC RBC-ENTMCNC: 30.5 PG — SIGNIFICANT CHANGE UP (ref 27–34)
MCV RBC AUTO: 101.1 FL — HIGH (ref 80–100)
MONOCYTES # BLD AUTO: 1.31 K/UL — HIGH (ref 0–0.9)
MONOCYTES NFR BLD AUTO: 9.6 % — SIGNIFICANT CHANGE UP (ref 2–14)
NEUTROPHILS # BLD AUTO: 9.74 K/UL — HIGH (ref 1.8–7.4)
NEUTROPHILS NFR BLD AUTO: 71.7 % — SIGNIFICANT CHANGE UP (ref 43–77)
NRBC # BLD: 0 /100 WBCS — SIGNIFICANT CHANGE UP (ref 0–0)
PHOSPHATE SERPL-MCNC: 3 MG/DL — SIGNIFICANT CHANGE UP (ref 2.5–4.5)
PLATELET # BLD AUTO: 483 K/UL — HIGH (ref 150–400)
POTASSIUM SERPL-MCNC: 4.3 MMOL/L — SIGNIFICANT CHANGE UP (ref 3.5–5.3)
POTASSIUM SERPL-SCNC: 4.3 MMOL/L — SIGNIFICANT CHANGE UP (ref 3.5–5.3)
PROTHROM AB SERPL-ACNC: 28.2 SEC — HIGH (ref 10.5–13.4)
RBC # BLD: 2.69 M/UL — LOW (ref 4.2–5.8)
RBC # BLD: 2.69 M/UL — LOW (ref 4.2–5.8)
RBC # FLD: 19.5 % — HIGH (ref 10.3–14.5)
RETICS #: 107.6 K/UL — SIGNIFICANT CHANGE UP (ref 25–125)
RETICS/RBC NFR: 4 % — HIGH (ref 0.5–2.5)
SODIUM SERPL-SCNC: 140 MMOL/L — SIGNIFICANT CHANGE UP (ref 135–145)
TRANSFERRIN SERPL-MCNC: 242 MG/DL — SIGNIFICANT CHANGE UP (ref 200–360)
TSH SERPL-MCNC: 0.91 UIU/ML — SIGNIFICANT CHANGE UP (ref 0.27–4.2)
VIT B12 SERPL-MCNC: 950 PG/ML — SIGNIFICANT CHANGE UP (ref 232–1245)
WBC # BLD: 13.59 K/UL — HIGH (ref 3.8–10.5)
WBC # FLD AUTO: 13.59 K/UL — HIGH (ref 3.8–10.5)

## 2023-03-22 PROCEDURE — 93306 TTE W/DOPPLER COMPLETE: CPT | Mod: 26

## 2023-03-22 PROCEDURE — 93356 MYOCRD STRAIN IMG SPCKL TRCK: CPT

## 2023-03-22 PROCEDURE — 99232 SBSQ HOSP IP/OBS MODERATE 35: CPT | Mod: 25

## 2023-03-22 PROCEDURE — 99223 1ST HOSP IP/OBS HIGH 75: CPT

## 2023-03-22 PROCEDURE — 93970 EXTREMITY STUDY: CPT | Mod: 26

## 2023-03-22 RX ORDER — FLUTICASONE PROPIONATE 50 MCG
1 SPRAY, SUSPENSION NASAL
Refills: 0 | Status: DISCONTINUED | OUTPATIENT
Start: 2023-03-22 | End: 2023-03-29

## 2023-03-22 RX ORDER — PANTOPRAZOLE SODIUM 20 MG/1
40 TABLET, DELAYED RELEASE ORAL
Refills: 0 | Status: DISCONTINUED | OUTPATIENT
Start: 2023-03-22 | End: 2023-03-29

## 2023-03-22 RX ORDER — LANOLIN ALCOHOL/MO/W.PET/CERES
3 CREAM (GRAM) TOPICAL AT BEDTIME
Refills: 0 | Status: DISCONTINUED | OUTPATIENT
Start: 2023-03-22 | End: 2023-03-29

## 2023-03-22 RX ORDER — ASPIRIN/CALCIUM CARB/MAGNESIUM 324 MG
81 TABLET ORAL DAILY
Refills: 0 | Status: DISCONTINUED | OUTPATIENT
Start: 2023-03-22 | End: 2023-03-29

## 2023-03-22 RX ORDER — ASCORBIC ACID 60 MG
500 TABLET,CHEWABLE ORAL DAILY
Refills: 0 | Status: DISCONTINUED | OUTPATIENT
Start: 2023-03-22 | End: 2023-03-29

## 2023-03-22 RX ORDER — PREGABALIN 225 MG/1
1000 CAPSULE ORAL DAILY
Refills: 0 | Status: DISCONTINUED | OUTPATIENT
Start: 2023-03-22 | End: 2023-03-29

## 2023-03-22 RX ORDER — FERROUS SULFATE 325(65) MG
325 TABLET ORAL DAILY
Refills: 0 | Status: DISCONTINUED | OUTPATIENT
Start: 2023-03-22 | End: 2023-03-29

## 2023-03-22 RX ORDER — ONDANSETRON 8 MG/1
4 TABLET, FILM COATED ORAL EVERY 8 HOURS
Refills: 0 | Status: DISCONTINUED | OUTPATIENT
Start: 2023-03-22 | End: 2023-03-29

## 2023-03-22 RX ORDER — FUROSEMIDE 40 MG
40 TABLET ORAL DAILY
Refills: 0 | Status: DISCONTINUED | OUTPATIENT
Start: 2023-03-22 | End: 2023-03-29

## 2023-03-22 RX ORDER — ATORVASTATIN CALCIUM 80 MG/1
10 TABLET, FILM COATED ORAL AT BEDTIME
Refills: 0 | Status: DISCONTINUED | OUTPATIENT
Start: 2023-03-22 | End: 2023-03-29

## 2023-03-22 RX ORDER — LATANOPROST 0.05 MG/ML
1 SOLUTION/ DROPS OPHTHALMIC; TOPICAL AT BEDTIME
Refills: 0 | Status: DISCONTINUED | OUTPATIENT
Start: 2023-03-22 | End: 2023-03-29

## 2023-03-22 RX ORDER — SPIRONOLACTONE 25 MG/1
12.5 TABLET, FILM COATED ORAL DAILY
Refills: 0 | Status: DISCONTINUED | OUTPATIENT
Start: 2023-03-22 | End: 2023-03-29

## 2023-03-22 RX ORDER — TAMSULOSIN HYDROCHLORIDE 0.4 MG/1
0.8 CAPSULE ORAL AT BEDTIME
Refills: 0 | Status: DISCONTINUED | OUTPATIENT
Start: 2023-03-22 | End: 2023-03-29

## 2023-03-22 RX ORDER — ACETAMINOPHEN 500 MG
650 TABLET ORAL EVERY 6 HOURS
Refills: 0 | Status: DISCONTINUED | OUTPATIENT
Start: 2023-03-22 | End: 2023-03-29

## 2023-03-22 RX ORDER — ATENOLOL 25 MG/1
25 TABLET ORAL DAILY
Refills: 0 | Status: DISCONTINUED | OUTPATIENT
Start: 2023-03-22 | End: 2023-03-29

## 2023-03-22 RX ADMIN — Medication 325 MILLIGRAM(S): at 12:23

## 2023-03-22 RX ADMIN — ATORVASTATIN CALCIUM 10 MILLIGRAM(S): 80 TABLET, FILM COATED ORAL at 22:51

## 2023-03-22 RX ADMIN — PANTOPRAZOLE SODIUM 40 MILLIGRAM(S): 20 TABLET, DELAYED RELEASE ORAL at 17:06

## 2023-03-22 RX ADMIN — SPIRONOLACTONE 12.5 MILLIGRAM(S): 25 TABLET, FILM COATED ORAL at 12:22

## 2023-03-22 RX ADMIN — Medication 40 MILLIGRAM(S): at 05:33

## 2023-03-22 RX ADMIN — PANTOPRAZOLE SODIUM 40 MILLIGRAM(S): 20 TABLET, DELAYED RELEASE ORAL at 05:28

## 2023-03-22 RX ADMIN — PREGABALIN 1000 MICROGRAM(S): 225 CAPSULE ORAL at 12:23

## 2023-03-22 RX ADMIN — Medication 500 MILLIGRAM(S): at 12:23

## 2023-03-22 RX ADMIN — Medication 650 MILLIGRAM(S): at 20:30

## 2023-03-22 RX ADMIN — Medication 650 MILLIGRAM(S): at 19:30

## 2023-03-22 RX ADMIN — TAMSULOSIN HYDROCHLORIDE 0.8 MILLIGRAM(S): 0.4 CAPSULE ORAL at 22:51

## 2023-03-22 RX ADMIN — Medication 81 MILLIGRAM(S): at 12:23

## 2023-03-22 RX ADMIN — Medication 1 SPRAY(S): at 05:34

## 2023-03-22 RX ADMIN — LATANOPROST 1 DROP(S): 0.05 SOLUTION/ DROPS OPHTHALMIC; TOPICAL at 22:51

## 2023-03-22 RX ADMIN — ATENOLOL 25 MILLIGRAM(S): 25 TABLET ORAL at 05:33

## 2023-03-22 NOTE — H&P ADULT - NSHPLABSRESULTS_GEN_ALL_CORE
LABS:                        8.3    12.81 )-----------( 495      ( 21 Mar 2023 19:01 )             27.6     03-21    139  |  102  |  25<H>  ----------------------------<  126<H>  4.9   |  27  |  0.97    Ca    9.4      21 Mar 2023 19:01  Mg     2.1     03-21    TPro  7.0  /  Alb  3.9  /  TBili  0.1<L>  /  DBili  x   /  AST  17  /  ALT  16  /  AlkPhos  114  03-21    PT/INR - ( 21 Mar 2023 19:01 )   PT: 30.2 sec;   INR: 2.58 ratio         PTT - ( 21 Mar 2023 19:01 )  PTT:46.3 sec      EKG w/ V-paced rhythm

## 2023-03-22 NOTE — PROGRESS NOTE ADULT - SUBJECTIVE AND OBJECTIVE BOX
PROGRESS NOTE:     Patient is a 78y old  Male who presents with a chief complaint of Anemia (22 Mar 2023 00:55)      SUBJECTIVE / OVERNIGHT EVENTS:    ADDITIONAL REVIEW OF SYSTEMS:    MEDICATIONS  (STANDING):  ascorbic acid 500 milliGRAM(s) Oral daily  aspirin enteric coated 81 milliGRAM(s) Oral daily  atenolol  Tablet 25 milliGRAM(s) Oral daily  atorvastatin 10 milliGRAM(s) Oral at bedtime  cyanocobalamin 1000 MICROGram(s) Oral daily  ferrous    sulfate 325 milliGRAM(s) Oral daily  fluticasone propionate 50 MICROgram(s)/spray Nasal Spray 1 Spray(s) Both Nostrils <User Schedule>  furosemide    Tablet 40 milliGRAM(s) Oral daily  latanoprost 0.005% Ophthalmic Solution 1 Drop(s) Right EYE at bedtime  pantoprazole  Injectable 40 milliGRAM(s) IV Push two times a day  spironolactone 12.5 milliGRAM(s) Oral daily  tamsulosin 0.8 milliGRAM(s) Oral at bedtime    MEDICATIONS  (PRN):  acetaminophen     Tablet .. 650 milliGRAM(s) Oral every 6 hours PRN Temp greater or equal to 38C (100.4F), Mild Pain (1 - 3)  aluminum hydroxide/magnesium hydroxide/simethicone Suspension 30 milliLiter(s) Oral every 4 hours PRN Dyspepsia  melatonin 3 milliGRAM(s) Oral at bedtime PRN Insomnia  ondansetron Injectable 4 milliGRAM(s) IV Push every 8 hours PRN Nausea and/or Vomiting      CAPILLARY BLOOD GLUCOSE        I&O's Summary      PHYSICAL EXAM:  Vital Signs Last 24 Hrs  T(C): 36.7 (22 Mar 2023 05:23), Max: 36.7 (21 Mar 2023 17:03)  T(F): 98 (22 Mar 2023 05:23), Max: 98.1 (21 Mar 2023 17:03)  HR: 86 (22 Mar 2023 05:23) (69 - 94)  BP: 136/50 (22 Mar 2023 05:23) (129/48 - 150/68)  BP(mean): 74 (22 Mar 2023 05:23) (74 - 74)  RR: 16 (22 Mar 2023 05:23) (16 - 20)  SpO2: 95% (22 Mar 2023 05:23) (95% - 97%)    Parameters below as of 22 Mar 2023 05:23  Patient On (Oxygen Delivery Method): room air        GENERAL: No acute distress, well-developed  HEAD:  Atraumatic, Normocephalic  EYES: EOMI, PERRLA, conjunctiva and sclera clear  NECK: Supple, no lymphadenopathy, no JVD  CHEST/LUNG: CTAB; No wheezes, rales, or rhonchi  HEART: Regular rate and rhythm; No murmurs, rubs, or gallops  ABDOMEN: Soft, non-tender, non-distended; normal bowel sounds, no organomegaly  EXTREMITIES:  2+ peripheral pulses b/l, No clubbing, cyanosis, or edema  NEUROLOGY: A&O x 3, no focal deficits  SKIN: No rashes or lesions    LABS:                        8.2    13.59 )-----------( 483      ( 22 Mar 2023 05:24 )             27.2     03-22    140  |  104  |  21  ----------------------------<  96  4.3   |  26  |  0.89    Ca    9.1      22 Mar 2023 05:24  Phos  3.0     03-22  Mg     2.1     03-22    TPro  7.0  /  Alb  3.9  /  TBili  0.1<L>  /  DBili  x   /  AST  17  /  ALT  16  /  AlkPhos  114  03-21    PT/INR - ( 22 Mar 2023 05:24 )   PT: 28.2 sec;   INR: 2.41 ratio         PTT - ( 22 Mar 2023 05:24 )  PTT:42.1 sec            RADIOLOGY & ADDITIONAL TESTS:  Results Reviewed:   Imaging Personally Reviewed:  Electrocardiogram Personally Reviewed:    COORDINATION OF CARE:  Care Discussed with Consultants/Other Providers [Y/N]:  Prior or Outpatient Records Reviewed [Y/N]:   PROGRESS NOTE:     Patient is a 78y old  Male who presents with a chief complaint of Anemia (22 Mar 2023 00:55)      SUBJECTIVE / OVERNIGHT EVENTS:  No events overnight. Patient feels well this AM. All ROS negative.     ADDITIONAL REVIEW OF SYSTEMS:    MEDICATIONS  (STANDING):  ascorbic acid 500 milliGRAM(s) Oral daily  aspirin enteric coated 81 milliGRAM(s) Oral daily  atenolol  Tablet 25 milliGRAM(s) Oral daily  atorvastatin 10 milliGRAM(s) Oral at bedtime  cyanocobalamin 1000 MICROGram(s) Oral daily  ferrous    sulfate 325 milliGRAM(s) Oral daily  fluticasone propionate 50 MICROgram(s)/spray Nasal Spray 1 Spray(s) Both Nostrils <User Schedule>  furosemide    Tablet 40 milliGRAM(s) Oral daily  latanoprost 0.005% Ophthalmic Solution 1 Drop(s) Right EYE at bedtime  pantoprazole  Injectable 40 milliGRAM(s) IV Push two times a day  spironolactone 12.5 milliGRAM(s) Oral daily  tamsulosin 0.8 milliGRAM(s) Oral at bedtime    MEDICATIONS  (PRN):  acetaminophen     Tablet .. 650 milliGRAM(s) Oral every 6 hours PRN Temp greater or equal to 38C (100.4F), Mild Pain (1 - 3)  aluminum hydroxide/magnesium hydroxide/simethicone Suspension 30 milliLiter(s) Oral every 4 hours PRN Dyspepsia  melatonin 3 milliGRAM(s) Oral at bedtime PRN Insomnia  ondansetron Injectable 4 milliGRAM(s) IV Push every 8 hours PRN Nausea and/or Vomiting      CAPILLARY BLOOD GLUCOSE        I&O's Summary      PHYSICAL EXAM:  Vital Signs Last 24 Hrs  T(C): 36.7 (22 Mar 2023 05:23), Max: 36.7 (21 Mar 2023 17:03)  T(F): 98 (22 Mar 2023 05:23), Max: 98.1 (21 Mar 2023 17:03)  HR: 86 (22 Mar 2023 05:23) (69 - 94)  BP: 136/50 (22 Mar 2023 05:23) (129/48 - 150/68)  BP(mean): 74 (22 Mar 2023 05:23) (74 - 74)  RR: 16 (22 Mar 2023 05:23) (16 - 20)  SpO2: 95% (22 Mar 2023 05:23) (95% - 97%)    Parameters below as of 22 Mar 2023 05:23  Patient On (Oxygen Delivery Method): room air        GENERAL: No acute distress, well-developed  HEAD:  Atraumatic, Normocephalic  EYES: EOMI, PERRLA, conjunctiva and sclera clear  NECK: Supple, no lymphadenopathy, no JVD  CHEST/LUNG: CTAB; No wheezes, rales, or rhonchi  HEART: Regular rate and rhythm; No murmurs, rubs, or gallops  ABDOMEN: Soft, non-tender, non-distended; normal bowel sounds, no organomegaly  EXTREMITIES:  2+ peripheral pulses b/l, No clubbing, cyanosis, or edema  NEUROLOGY: A&O x 3, no focal deficits  SKIN: No rashes or lesions    LABS:                        8.2    13.59 )-----------( 483      ( 22 Mar 2023 05:24 )             27.2     03-22    140  |  104  |  21  ----------------------------<  96  4.3   |  26  |  0.89    Ca    9.1      22 Mar 2023 05:24  Phos  3.0     03-22  Mg     2.1     03-22    TPro  7.0  /  Alb  3.9  /  TBili  0.1<L>  /  DBili  x   /  AST  17  /  ALT  16  /  AlkPhos  114  03-21    PT/INR - ( 22 Mar 2023 05:24 )   PT: 28.2 sec;   INR: 2.41 ratio         PTT - ( 22 Mar 2023 05:24 )  PTT:42.1 sec            RADIOLOGY & ADDITIONAL TESTS:  Results Reviewed:   Imaging Personally Reviewed:  Electrocardiogram Personally Reviewed:    COORDINATION OF CARE:  Care Discussed with Consultants/Other Providers [Y/N]:  Prior or Outpatient Records Reviewed [Y/N]:

## 2023-03-22 NOTE — H&P ADULT - ATTENDING COMMENTS
78 M PW: intermittent lightheadedness and referred to ED for low Hb by hematologist for GI eval     On arrival to ED VS: Afebrile, HR: 94, BP: 150/68, saturating 95% on RA  Significant labs: WBC 12.81, Hb 8.3 (macrocytic), PLT 495k, INR 2.58, PT 30.2, PTT 46.3  CXR personally reviewed: blunting of b/l costophrenic angles, no acute infiltrates  EKG: paced rhythm w PVCs+     Admitted for further eval of anemia     #Anemia   -Check iron studies, b12, folate, TSH   -IV PPI BID, GI consult   -Heme f/u in AM   -Maintain active type and screen, transfuse for Hb <8 given CAD history     #Afib   -INR in ED therapeutic   -Check INR in AM, if pending procedure start heparin gtt     #Persistent Leukocytosis  #Thrombocytosis  -Would obtain collateral from outpatient hematologist re w/u     Resume home meds   Rest as above

## 2023-03-22 NOTE — H&P ADULT - HISTORY OF PRESENT ILLNESS
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 Pt previously w/ Hg 10.9 02/2023, now with Hg of 8.3 on admission. Pt endorsed intermittent lightheadedness earlier in the day that has resolved. Patient admits to black stools, for which he has been receiving iron infusions.   	  Per chart review, pt with multiple admissions for anemia requiring transfusions, most recently on 01/27-02/03. At that time pt underwent endoscopy with GI on 1/30 which revealed transient bleeding AVM's, was started on octreotide. Pt resumed coumadin and plavix upon discharge and was sent home on octreotide.   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 Pt previously w/ Hg 10.9 02/2023, now with Hg of 8.3 on admission. Pt endorsed intermittent lightheadedness earlier in the day that has resolved. Patient has a history of dark stools due to PO iron, last BM yesterday, no black/tarry stools, no hematochezia, no hematemesis. Pt currently on PO iron and on IV Iron infusion, s/p 2 infusions in the past two weeks.   	  Per chart review, pt with multiple admissions for anemia requiring transfusions, most recently on 01/27-02/03. At that time pt underwent endoscopy with GI on 1/30 which revealed transient bleeding AVM's. Pt was resumed on warfarin on discharge. Pt completed 30 days of DAPT after which was continued only on ASA. Pt was prescribed octreotide on discharge, but was unable to obtain in due to insurance denial.    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 Pt previously w/ Hg 10.9 02/2023, now with Hg of 8.3 on admission. Pt endorsed intermittent lightheadedness earlier in the day that has resolved. Patient has a history of dark stools due to PO iron, last BM yesterday, no black/tarry stools, no hematochezia, no hematemesis. Pt currently on PO iron and on IV Iron infusion, s/p 2 infusions in the past two weeks. Pt also denies F/C/N/V, CP, SOB, cough, abd pain.   	  Per chart review, pt with multiple admissions for anemia requiring transfusions, most recently on 01/27-02/03. At that time pt underwent endoscopy with GI on 1/30 which revealed transient bleeding AVM's. Pt was resumed on warfarin on discharge. Pt completed 30 days of DAPT after which was continued only on ASA. Pt was prescribed octreotide on discharge, but was unable to obtain in due to insurance denial.

## 2023-03-22 NOTE — PATIENT PROFILE ADULT - FALL HARM RISK - HARM RISK INTERVENTIONS
Assistance with ambulation/Assistance OOB with selected safe patient handling equipment/Communicate Risk of Fall with Harm to all staff/Discuss with provider need for PT consult/Monitor gait and stability/Provide patient with walking aids - walker, cane, crutches/Reinforce activity limits and safety measures with patient and family/Sit up slowly, dangle for a short time, stand at bedside before walking/Tailored Fall Risk Interventions/Visual Cue: Yellow wristband and red socks/Bed in lowest position, wheels locked, appropriate side rails in place/Call bell, personal items and telephone in reach/Instruct patient to call for assistance before getting out of bed or chair/Non-slip footwear when patient is out of bed/[place] to call system/Physically safe environment - no spills, clutter or unnecessary equipment/Purposeful Proactive Rounding/Room/bathroom lighting operational, light cord in reach

## 2023-03-22 NOTE — H&P ADULT - PROBLEM SELECTOR PLAN 4
Lower extremity pitting edema, on lasix 40  TTE 01/2023 w/ normal EF, no ventricular dysfunction  -> Obtain LE Duplex  -> Repeat TTE  -> c/w lasix 40

## 2023-03-22 NOTE — H&P ADULT - ASSESSMENT
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

## 2023-03-22 NOTE — CONSULT NOTE ADULT - ASSESSMENT
78 year old male with anemia due to iron deficiency, CKD and bleeding AVMs, thrombocytosis and B12 deficiency, followed by Dr. Goel and Dr. Bermeo at [facility]. He is s/p TAVR procedure 1/19/2023. Presenting with low hemoglobin.     Anemia  - Due to iron and B12 deficiency, CKD, bleeding AVMs  - S/p IV Venofer x 2 on 3/16  - S/p B12 IM injection on 2/6  - Was planned to start Sandostatin to treat anemia due to AVM however, Auth is denied by his insurance  - Hgb steadily declining due to AVM of small intestines dx during recent hospitalization  - GI consulted   - Please transfuse PRBCs for hgb < 7.0    Thrombocytosis  - Unclear etiology ­likely reactive  - H/o Hodgkin's disease s/p splenectomy and chemotherapy in 1975    Valvular disease  - Follows w/ cardiology, on warfarin  - S/p TAVR procedure 1/19/2023.     Naveen Whatley PA-C  Hematology/Oncology  [facility]  [phone] (office)

## 2023-03-22 NOTE — PROGRESS NOTE ADULT - PROBLEM SELECTOR PLAN 4
Lower extremity pitting edema, on lasix 40  TTE 01/2023 w/ normal EF, no ventricular dysfunction  -> Obtain LE Duplex  -> Repeat TTE  -> c/w lasix 40 Lower extremity pitting edema, on lasix 40  TTE 01/2023 w/ normal EF, no ventricular dysfunction  LE Duplex negative for DVT   Repeat TTE>> dilated RV with right ventricular systolic dysfunction (NEW FROM PRIOR), markedly dilated RA, EF 61%, normal LV systolic function   -> c/w lasix 40

## 2023-03-22 NOTE — H&P ADULT - NSHPPHYSICALEXAM_GEN_ALL_CORE
VITALS:   T(C): 36.7 (03-21-23 @ 22:32), Max: 36.7 (03-21-23 @ 17:03)  HR: 69 (03-21-23 @ 22:32) (69 - 94)  BP: 139/59 (03-21-23 @ 22:32) (139/59 - 150/68)  RR: 16 (03-21-23 @ 22:32) (16 - 20)  SpO2: 97% (03-21-23 @ 22:32) (95% - 97%)    GENERAL: NAD, lying in bed comfortably  HEAD:  Atraumatic, Normocephalic  EYES: EOMI, PERRLA, conjunctiva and sclera clear  ENT: Moist mucous membranes  NECK: Supple, No JVD  CHEST/LUNG: Clear to auscultation bilaterally; No rales, rhonchi, wheezing, or rubs. Unlabored respirations  HEART: +  Regular rate and rhythm;   ABDOMEN: BSx4; Soft, nontender, nondistended  EXTREMITIES: 2+ Pitting edema to shins. Noted RLE defects from prior fasciotomy and other surical interventions.  2+ Peripheral Pulses, brisk capillary refill. No clubbing, cyanosis   NERVOUS SYSTEM:  A&Ox3, no focal deficits   SKIN: No rashes or lesions  Psych: Normal speech, normal behavior, normal affect

## 2023-03-22 NOTE — PROGRESS NOTE ADULT - PROBLEM SELECTOR PLAN 2
Pt w/ A.fib, CHADSVASC 7, on coumadin   INR 2.57, pt on 2mg M-F and 1mg S/Sun  Pt high risk for stroke, but given therapeutic range, will hold tonight's dose pending further anemia eval  -> c/w atenolol Pt w/ A.fib, CHADSVASC 7, on coumadin   INR 2.57, pt on 2mg M-F and 1mg S/Sun  Pt high risk for stroke, but given therapeutic range, will hold warfarin pending further anemia eval >> restart tomorrow pending GI plan  -> c/w atenolol

## 2023-03-22 NOTE — PROGRESS NOTE ADULT - PROBLEM SELECTOR PLAN 7
Diet: clear liquid pending GI eval  DVT: therapeutic on coumadin  Dispo: TBD Diet: clear liquid pending GI eval  DVT: therapeutic on coumadin>> on hold in setting of possible GI intervention, will restart tomorrow pending GI plan  Dispo: [id number]

## 2023-03-22 NOTE — CONSULT NOTE ADULT - SUBJECTIVE AND OBJECTIVE BOX
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 Pt previously w/ Hg 10.9 02/2023, now with Hg of 8.3 on admission. Pt endorsed intermittent lightheadedness earlier in the day that has resolved. Patient has a history of dark stools due to PO iron, last BM yesterday, no black/tarry stools, no hematochezia, no hematemesis. Pt currently on PO iron and on IV Iron infusion, s/p 2 infusions in the past two weeks. Pt also denies F/C/N/V, CP, SOB, cough, abd pain.     Per chart review, pt with multiple admissions for anemia requiring transfusions, most recently on 01/27-02/03. At that time pt underwent endoscopy with GI on 1/30 which revealed transient bleeding AVM's. Pt was resumed on warfarin on discharge. Pt completed 30 days of DAPT after which was continued only on ASA. Pt was prescribed octreotide on discharge, but was unable to obtain in due to insurance denial.    78M w/ CAD s/p stent (1/19/2023) on ASA, TAVR (1/21/2023) c/b anemia requiring multiple transfusions, AF on coumadin, PPM, CVA x2 with residual short term memory loss, Hodgkin's Lymphoma s/p splenectomy and chemo with persistent leukocytosis and thrombocytosis, chronic anemia from Fe/B12/folate deficiency, HTN, MOE, GERD, laminectomy with RLE fasciotomy and skin grafting due to RLE DVT post-op in 2019, presents for low hgb/hct from [facility] hematology group. Patient notes 2-3 episodes of transient lightheadedness associated with position change but denied any palpitations, LOC, chest pain, abdominal discomfort. He chronically has dark brown stools from iron supplementation and reports no changes in bowel habits or BM consistency. He denies any other recent trauma or source of bleed. Chart review showed pt discharged early 2/2023 with Hgb 10s; pt reports that outpatient labs showed slow drop to 9, then 8.      Allergies:  No Known Allergies      Home Medications:    Hospital Medications:  acetaminophen     Tablet .. 650 milliGRAM(s) Oral every 6 hours PRN  aluminum hydroxide/magnesium hydroxide/simethicone Suspension 30 milliLiter(s) Oral every 4 hours PRN  ascorbic acid 500 milliGRAM(s) Oral daily  aspirin enteric coated 81 milliGRAM(s) Oral daily  atenolol  Tablet 25 milliGRAM(s) Oral daily  atorvastatin 10 milliGRAM(s) Oral at bedtime  cyanocobalamin 1000 MICROGram(s) Oral daily  ferrous    sulfate 325 milliGRAM(s) Oral daily  fluticasone propionate 50 MICROgram(s)/spray Nasal Spray 1 Spray(s) Both Nostrils <User Schedule>  furosemide    Tablet 40 milliGRAM(s) Oral daily  latanoprost 0.005% Ophthalmic Solution 1 Drop(s) Right EYE at bedtime  melatonin 3 milliGRAM(s) Oral at bedtime PRN  ondansetron Injectable 4 milliGRAM(s) IV Push every 8 hours PRN  pantoprazole  Injectable 40 milliGRAM(s) IV Push two times a day  spironolactone 12.5 milliGRAM(s) Oral daily  tamsulosin 0.8 milliGRAM(s) Oral at bedtime      PMHX/PSHX:  Hodgkin lymphoma    Atrial fibrillation    HTN (hypertension)    GERD (gastroesophageal reflux disease)    MOE (obstructive sleep apnea)    BPH (benign prostatic hyperplasia)    Osteoarthritis    CVA (cerebral vascular accident)    Spinal stenosis    Spondylolisthesis    Exposure to toxin    Atrial fibrillation    History of short term memory loss    DVT, lower extremity    S/P splenectomy    S/P hip replacement, right    H/O Spinal surgery    History of cardioversion    History of lumbar spinal fusion    Status post left cataract extraction    History of fasciotomy    Cardiac pacemaker    S/P TAVR (transcatheter aortic valve replacement)    CAD (coronary artery disease)        Family history:  Family history of myocardial infarction (Father)    Family history of stroke (Father)        Denies family history of colon cancer/polyps, stomach cancer/polyps, pancreatic cancer/masses, liver cancer/disease, ovarian cancer and endometrial cancer.    Social History:   Tob: Denies  EtOH: Denies  Illicit Drugs: Denies    ROS:     General:  No wt loss, fevers, chills, night sweats, fatigue  Eyes:  Good vision, no reported pain  ENT:  No sore throat, pain, runny nose, dysphagia  CV:  No pain, palpitations, hypo/hypertension  Pulm:  No dyspnea, cough, tachypnea, wheezing  GI:  see HPI  :  No pain, bleeding, incontinence, nocturia  Muscle:  No pain, weakness  Neuro:  No weakness, tingling, memory problems  Psych:  No fatigue, insomnia, mood problems, depression  Endocrine:  No polyuria, polydipsia, cold/heat intolerance  Heme:  No petechiae, ecchymosis, easy bruisability  Skin:  No rash, tattoos, scars, edema    PHYSICAL EXAM:     GENERAL:  No acute distress  HEENT:  NCAT, no scleral icterus   CHEST:  no respiratory distress  HEART:  Regular rate and rhythm  ABDOMEN:  Soft, non-tender, non-distended, normoactive bowel sounds,  no masses  EXTREMITIES: No edema  SKIN:  No rash/erythema/ecchymoses/petechiae/wounds/abscess/warm/dry  NEURO:  Alert and oriented x 3, no asterixis    Vital Signs:  Vital Signs Last 24 Hrs  T(C): 36.7 (22 Mar 2023 07:40), Max: 36.7 (21 Mar 2023 17:03)  T(F): 98.1 (22 Mar 2023 07:40), Max: 98.1 (21 Mar 2023 17:03)  HR: 60 (22 Mar 2023 07:40) (60 - 94)  BP: 133/63 (22 Mar 2023 07:40) (129/48 - 150/68)  BP(mean): 74 (22 Mar 2023 05:23) (74 - 74)  RR: 18 (22 Mar 2023 07:40) (16 - 20)  SpO2: 93% (22 Mar 2023 07:40) (93% - 97%)    Parameters below as of 22 Mar 2023 07:40  Patient On (Oxygen Delivery Method): room air      Daily Height in cm: 172.72 (21 Mar 2023 17:03)    Daily     LABS:                        8.2    13.59 )-----------( 483      ( 22 Mar 2023 05:24 )             27.2     Mean Cell Volume: 101.1 fl (03-22-23 @ 05:24)    03-22    140  |  104  |  21  ----------------------------<  96  4.3   |  26  |  0.89    Ca    9.1      22 Mar 2023 05:24  Phos  3.0     03-22  Mg     2.1     03-22    TPro  7.0  /  Alb  3.9  /  TBili  0.1<L>  /  DBili  x   /  AST  17  /  ALT  16  /  AlkPhos  114  03-21    LIVER FUNCTIONS - ( 21 Mar 2023 19:01 )  Alb: 3.9 g/dL / Pro: 7.0 g/dL / ALK PHOS: 114 U/L / ALT: 16 U/L / AST: 17 U/L / GGT: x           PT/INR - ( 22 Mar 2023 05:24 )   PT: 28.2 sec;   INR: 2.41 ratio         PTT - ( 22 Mar 2023 05:24 )  PTT:42.1 sec                        8.2    13.59 )-----------( 483      ( 22 Mar 2023 05:24 )             27.2                         8.3    12.81 )-----------( 495      ( 21 Mar 2023 19:01 )             27.6       Imaging:      < from: Upper Endoscopy (01.30.23 @ 10:57) >    Findings:       The examined esophagus was normal.       Localized mild inflammation characterized by congestion (edema) was found at the pylorus.       The exam of the stomach was otherwise normal.       Two 1 mm angiodysplastic lesions with bleeding were found in the fourth portion of the        duodenum. One AVM was noted to be bleeding but stopped bleeding without any endoscopic        intervention. Decisison was made NOT to APC given ongoing DAPT and AC use.       The examined jejunum was normal.                                     Impression:          - Normal esophagus.                       - Gastritis.                       - Two bleeding angiodysplastic lesions in the duodenum.                       - Normal examined jejunum.           - No specimens collected.                       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                 - Resume regular diet today.                       - PO PPI QD                       - Consider Octreotide to help with treatment of AVMs                       - No absolute contraindications to onoging DAPT or AC use. Would consider                        risk/benefit of continue triple therapy in the setting on transiently                        bleeding AVMs in the small bowel.                       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 Pt previously w/ Hg 10.9 02/2023, now with Hg of 8.3 on admission. Pt endorsed intermittent lightheadedness earlier in the day that has resolved. Patient has a history of dark stools due to PO iron, last BM yesterday, no black/tarry stools, no hematochezia, no hematemesis. Pt currently on PO iron and on IV Iron infusion, s/p 2 infusions in the past two weeks. Pt also denies F/C/N/V, CP, SOB, cough, abd pain.     Per chart review, pt with multiple admissions for anemia requiring transfusions, most recently on 01/27-02/03. At that time pt underwent endoscopy with GI on 1/30 which revealed transient bleeding AVM's. Pt was resumed on warfarin on discharge. Pt completed 30 days of DAPT after which was continued only on ASA. Pt was prescribed octreotide on discharge, but was unable to obtain in due to insurance denial.    78M w/ CAD s/p stent (1/19/2023) on ASA, TAVR (1/21/2023) c/b anemia requiring multiple transfusions, AF on coumadin, PPM, CVA x2 with residual short term memory loss, Hodgkin's Lymphoma s/p splenectomy and chemo with persistent leukocytosis and thrombocytosis, chronic anemia from Fe/B12/folate deficiency, HTN, MOE, GERD, laminectomy with RLE fasciotomy and skin grafting due to RLE DVT post-op in 2019, presents for low hgb/hct from [facility] hematology group. Patient notes 2-3 episodes of transient lightheadedness associated with position change but otherwise denied any lethargy, weakness, palpitations, LOC, chest pain, abdominal discomfort. He chronically has dark brown stools from iron supplementation and reports no changes in bowel habits or BM consistency. He denies any other recent trauma or source of bleed. Chart review showed pt discharged early 2/2023 with Hgb 10.9; pt reports that outpatient labs showed consistent downtrend from Hgb 11 since discharge. Pt was originally to start monthly octreotide with o/p hemeonc but was unable to obtain insurance auth.    Allergies:  No Known Allergies    Home Medications:    Hospital Medications:  acetaminophen     Tablet .. 650 milliGRAM(s) Oral every 6 hours PRN  aluminum hydroxide/magnesium hydroxide/simethicone Suspension 30 milliLiter(s) Oral every 4 hours PRN  ascorbic acid 500 milliGRAM(s) Oral daily  aspirin enteric coated 81 milliGRAM(s) Oral daily  atenolol  Tablet 25 milliGRAM(s) Oral daily  atorvastatin 10 milliGRAM(s) Oral at bedtime  cyanocobalamin 1000 MICROGram(s) Oral daily  ferrous    sulfate 325 milliGRAM(s) Oral daily  fluticasone propionate 50 MICROgram(s)/spray Nasal Spray 1 Spray(s) Both Nostrils <User Schedule>  furosemide    Tablet 40 milliGRAM(s) Oral daily  latanoprost 0.005% Ophthalmic Solution 1 Drop(s) Right EYE at bedtime  melatonin 3 milliGRAM(s) Oral at bedtime PRN  ondansetron Injectable 4 milliGRAM(s) IV Push every 8 hours PRN  pantoprazole  Injectable 40 milliGRAM(s) IV Push two times a day  spironolactone 12.5 milliGRAM(s) Oral daily  tamsulosin 0.8 milliGRAM(s) Oral at bedtime      PMHX/PSHX:  Hodgkin lymphoma    Atrial fibrillation    HTN (hypertension)    GERD (gastroesophageal reflux disease)    MOE (obstructive sleep apnea)    BPH (benign prostatic hyperplasia)    Osteoarthritis    CVA (cerebral vascular accident)    Spinal stenosis    Spondylolisthesis    Exposure to toxin    Atrial fibrillation    History of short term memory loss    DVT, lower extremity    S/P splenectomy    S/P hip replacement, right    H/O Spinal surgery    History of cardioversion    History of lumbar spinal fusion    Status post left cataract extraction    History of fasciotomy    Cardiac pacemaker    S/P TAVR (transcatheter aortic valve replacement)    CAD (coronary artery disease)        Family history:  Family history of myocardial infarction (Father)    Family history of stroke (Father)    ROS:     General:  No wt loss, fevers, chills, night sweats, fatigue  Eyes:  Good vision, no reported pain  ENT:  No sore throat, pain, runny nose, dysphagia  CV:  No pain, palpitations, hypo/hypertension  Pulm:  No dyspnea, cough, tachypnea, wheezing  GI:  see HPI  :  No pain, bleeding, incontinence, nocturia  Muscle:  No pain, weakness  Neuro:  No weakness, tingling, memory problems  Psych:  No fatigue, insomnia, mood problems, depression  Endocrine:  No polyuria, polydipsia, cold/heat intolerance  Heme:  No petechiae, ecchymosis, easy bruisability  Skin:  No rash, tattoos, scars, edema    PHYSICAL EXAM:     GENERAL:  No acute distress  HEENT:  NCAT, no scleral icterus   CHEST:  no respiratory distress  HEART:  Regular rate and rhythm  ABDOMEN:  Soft, non-tender, non-distended, normoactive bowel sounds,  no masses  EXTREMITIES: No edema  SKIN:  No rash/erythema/ecchymoses/petechiae/wounds/abscess/warm/dry  NEURO:  Alert and oriented x 3, no asterixis    Vital Signs:  Vital Signs Last 24 Hrs  T(C): 36.7 (22 Mar 2023 07:40), Max: 36.7 (21 Mar 2023 17:03)  T(F): 98.1 (22 Mar 2023 07:40), Max: 98.1 (21 Mar 2023 17:03)  HR: 60 (22 Mar 2023 07:40) (60 - 94)  BP: 133/63 (22 Mar 2023 07:40) (129/48 - 150/68)  BP(mean): 74 (22 Mar 2023 05:23) (74 - 74)  RR: 18 (22 Mar 2023 07:40) (16 - 20)  SpO2: 93% (22 Mar 2023 07:40) (93% - 97%)    Parameters below as of 22 Mar 2023 07:40  Patient On (Oxygen Delivery Method): room air      Daily Height in cm: 172.72 (21 Mar 2023 17:03)    Daily     LABS:                        8.2    13.59 )-----------( 483      ( 22 Mar 2023 05:24 )             27.2     Mean Cell Volume: 101.1 fl (03-22-23 @ 05:24)    03-22    140  |  104  |  21  ----------------------------<  96  4.3   |  26  |  0.89    Ca    9.1      22 Mar 2023 05:24  Phos  3.0     03-22  Mg     2.1     03-22    TPro  7.0  /  Alb  3.9  /  TBili  0.1<L>  /  DBili  x   /  AST  17  /  ALT  16  /  AlkPhos  114  03-21    LIVER FUNCTIONS - ( 21 Mar 2023 19:01 )  Alb: 3.9 g/dL / Pro: 7.0 g/dL / ALK PHOS: 114 U/L / ALT: 16 U/L / AST: 17 U/L / GGT: x           PT/INR - ( 22 Mar 2023 05:24 )   PT: 28.2 sec;   INR: 2.41 ratio         PTT - ( 22 Mar 2023 05:24 )  PTT:42.1 sec                        8.2    13.59 )-----------( 483      ( 22 Mar 2023 05:24 )             27.2                         8.3    12.81 )-----------( 495      ( 21 Mar 2023 19:01 )             27.6       Imaging:      < from: Upper Endoscopy (01.30.23 @ 10:57) >    Findings:       The examined esophagus was normal.       Localized mild inflammation characterized by congestion (edema) was found at the pylorus.       The exam of the stomach was otherwise normal.       Two 1 mm angiodysplastic lesions with bleeding were found in the fourth portion of the        duodenum. One AVM was noted to be bleeding but stopped bleeding without any endoscopic        intervention. Decisison was made NOT to APC given ongoing DAPT and AC use.       The examined jejunum was normal.                                     Impression:          - Normal esophagus.                       - Gastritis.                       - Two bleeding angiodysplastic lesions in the duodenum.                       - Normal examined jejunum.           - No specimens collected.                       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                 - Resume regular diet today.                       - PO PPI QD                       - Consider Octreotide to help with treatment of AVMs                       - No absolute contraindications to onoging DAPT or AC use. Would consider                        risk/benefit of continue triple therapy in the setting on transiently                        bleeding AVMs in the small bowel.                       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 Pt previously w/ Hg 10.9 02/2023, now with Hg of 8.3 on admission. Pt endorsed intermittent lightheadedness earlier in the day that has resolved. Patient has a history of dark stools due to PO iron, last BM yesterday, no black/tarry stools, no hematochezia, no hematemesis. Pt currently on PO iron and on IV Iron infusion, s/p 2 infusions in the past two weeks. Pt also denies F/C/N/V, CP, SOB, cough, abd pain.     Per chart review, pt with multiple admissions for anemia requiring transfusions, most recently on 01/27-02/03. At that time pt underwent endoscopy with GI on 1/30 which revealed transient bleeding AVM's. Pt was resumed on warfarin on discharge. Pt completed 30 days of DAPT after which was continued only on ASA. Pt was prescribed octreotide on discharge, but was unable to obtain in due to insurance denial.    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 Patient notes 2-3 episodes of transient lightheadedness associated with position change but otherwise denied any lethargy, weakness, palpitations, LOC, chest pain, abdominal discomfort. He chronically has dark brown stools from iron supplementation and reports no changes in bowel habits or BM consistency. He denies any other recent trauma or source of bleed. Chart review showed pt discharged early 2/2023 with Hgb 10.9; pt reports that outpatient labs showed consistent downtrend from Hgb 11 since discharge. Pt was originally to start monthly octreotide with o/p hemeonc but was unable to obtain insurance auth.    Allergies:  No Known Allergies    Home Medications:    Hospital Medications:  acetaminophen     Tablet .. 650 milliGRAM(s) Oral every 6 hours PRN  aluminum hydroxide/magnesium hydroxide/simethicone Suspension 30 milliLiter(s) Oral every 4 hours PRN  ascorbic acid 500 milliGRAM(s) Oral daily  aspirin enteric coated 81 milliGRAM(s) Oral daily  atenolol  Tablet 25 milliGRAM(s) Oral daily  atorvastatin 10 milliGRAM(s) Oral at bedtime  cyanocobalamin 1000 MICROGram(s) Oral daily  ferrous    sulfate 325 milliGRAM(s) Oral daily  fluticasone propionate 50 MICROgram(s)/spray Nasal Spray 1 Spray(s) Both Nostrils <User Schedule>  furosemide    Tablet 40 milliGRAM(s) Oral daily  latanoprost 0.005% Ophthalmic Solution 1 Drop(s) Right EYE at bedtime  melatonin 3 milliGRAM(s) Oral at bedtime PRN  ondansetron Injectable 4 milliGRAM(s) IV Push every 8 hours PRN  pantoprazole  Injectable 40 milliGRAM(s) IV Push two times a day  spironolactone 12.5 milliGRAM(s) Oral daily  tamsulosin 0.8 milliGRAM(s) Oral at bedtime      PMHX/PSHX:  Hodgkin lymphoma    Atrial fibrillation    HTN (hypertension)    GERD (gastroesophageal reflux disease)    MOE (obstructive sleep apnea)    BPH (benign prostatic hyperplasia)    Osteoarthritis    CVA (cerebral vascular accident)    Spinal stenosis    Spondylolisthesis    Exposure to toxin    Atrial fibrillation    History of short term memory loss    DVT, lower extremity    S/P splenectomy    S/P hip replacement, right    H/O Spinal surgery    History of cardioversion    History of lumbar spinal fusion    Status post left cataract extraction    History of fasciotomy    Cardiac pacemaker    S/P TAVR (transcatheter aortic valve replacement)    CAD (coronary artery disease)        Family history:  Family history of myocardial infarction (Father)    Family history of stroke (Father)    ROS:     General:  No wt loss, fevers, chills, night sweats, fatigue  Eyes:  Good vision, no reported pain  ENT:  No sore throat, pain, runny nose, dysphagia  CV:  No pain, palpitations, hypo/hypertension  Pulm:  No dyspnea, cough, tachypnea, wheezing  GI:  see HPI  :  No pain, bleeding, incontinence, nocturia  Muscle:  No pain, weakness  Neuro:  No weakness, tingling, memory problems  Psych:  No fatigue, insomnia, mood problems, depression  Endocrine:  No polyuria, polydipsia, cold/heat intolerance  Heme:  No petechiae, ecchymosis, easy bruisability  Skin:  No rash, tattoos, scars, edema    PHYSICAL EXAM:     GENERAL:  No acute distress  HEENT:  NCAT, no scleral icterus   CHEST:  no respiratory distress  HEART:  Regular rate and rhythm  ABDOMEN:  Soft, non-tender, non-distended, normoactive bowel sounds,  no masses  EXTREMITIES: No edema  SKIN:  No rash/erythema/ecchymoses/petechiae/wounds/abscess/warm/dry  NEURO:  Alert and oriented x 3, no asterixis    Vital Signs:  Vital Signs Last 24 Hrs  T(C): 36.7 (22 Mar 2023 07:40), Max: 36.7 (21 Mar 2023 17:03)  T(F): 98.1 (22 Mar 2023 07:40), Max: 98.1 (21 Mar 2023 17:03)  HR: 60 (22 Mar 2023 07:40) (60 - 94)  BP: 133/63 (22 Mar 2023 07:40) (129/48 - 150/68)  BP(mean): 74 (22 Mar 2023 05:23) (74 - 74)  RR: 18 (22 Mar 2023 07:40) (16 - 20)  SpO2: 93% (22 Mar 2023 07:40) (93% - 97%)    Parameters below as of 22 Mar 2023 07:40  Patient On (Oxygen Delivery Method): room air      Daily Height in cm: 172.72 (21 Mar 2023 17:03)    Daily     LABS:                        8.2    13.59 )-----------( 483      ( 22 Mar 2023 05:24 )             27.2     Mean Cell Volume: 101.1 fl (03-22-23 @ 05:24)    03-22    140  |  104  |  21  ----------------------------<  96  4.3   |  26  |  0.89    Ca    9.1      22 Mar 2023 05:24  Phos  3.0     03-22  Mg     2.1     03-22    TPro  7.0  /  Alb  3.9  /  TBili  0.1<L>  /  DBili  x   /  AST  17  /  ALT  16  /  AlkPhos  114  03-21    LIVER FUNCTIONS - ( 21 Mar 2023 19:01 )  Alb: 3.9 g/dL / Pro: 7.0 g/dL / ALK PHOS: 114 U/L / ALT: 16 U/L / AST: 17 U/L / GGT: x           PT/INR - ( 22 Mar 2023 05:24 )   PT: 28.2 sec;   INR: 2.41 ratio         PTT - ( 22 Mar 2023 05:24 )  PTT:42.1 sec                        8.2    13.59 )-----------( 483      ( 22 Mar 2023 05:24 )             27.2                         8.3    12.81 )-----------( 495      ( 21 Mar 2023 19:01 )             27.6       Imaging:      < from: Upper Endoscopy (01.30.23 @ 10:57) >    Findings:       The examined esophagus was normal.       Localized mild inflammation characterized by congestion (edema) was found at the pylorus.       The exam of the stomach was otherwise normal.       Two 1 mm angiodysplastic lesions with bleeding were found in the fourth portion of the        duodenum. One AVM was noted to be bleeding but stopped bleeding without any endoscopic        intervention. Decisison was made NOT to APC given ongoing DAPT and AC use.       The examined jejunum was normal.                                     Impression:          - Normal esophagus.                       - Gastritis.                       - Two bleeding angiodysplastic lesions in the duodenum.                       - Normal examined jejunum.           - No specimens collected.                       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                 - Resume regular diet today.                       - PO PPI QD                       - Consider Octreotide to help with treatment of AVMs                       - No absolute contraindications to onoging DAPT or AC use. Would consider                        risk/benefit of continue triple therapy in the setting on transiently                        bleeding AVMs in the small bowel.                       -Consider transitioning to DOAC from Warfarin if no absolute CI    < end of copied text >

## 2023-03-22 NOTE — CONSULT NOTE ADULT - ASSESSMENT
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 S/p B12 IM 2/6, IV Venofer x 2 on 3/16.    #Acute on chronic macrocytic anemia  Patient was known to be iron, folate, and b12 deficient on supplementation. 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 In the interim, patient was taken off plavix and continuing only on aspirin and coumadin with continuing downtrend in Hgb. Previous plan was to have patient started on octreotide to assist in healing of AVM but unable to obtain insurance auth. Pt reports no new symptoms from discharge, continuing to have dark stools from oral iron supplementation. Anemia is multifactorial from iron/b12 deficiency, CKD, as well as likely continued slow bleed from small bowel AVMs.    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 S/p B12 IM 2/6, IV Venofer x 2 on 3/16.    #Acute on chronic macrocytic anemia  Patient was known to be iron, folate, and b12 deficient on supplementation. 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 In the interim, patient was taken off plavix and continuing only on aspirin and coumadin with continuing downtrend in Hgb. Previous plan was to have patient started on octreotide to assist in healing of AVM but unable to obtain insurance auth. Pt reports no new symptoms from discharge, continuing to have dark stools from oral iron supplementation. Anemia is multifactorial from iron/b12 deficiency, CKD, as well as likely continued slow bleed from small bowel AVMs.    Recommendations:  - can continue with PPI      Please feel free to reach out to our team with any follow up questions.  All recommendations are tentative until note is attested by attending.          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 S/p B12 IM 2/6, IV Venofer x 2 on 3/16.    #Acute on chronic macrocytic anemia  Patient was known to be iron, folate, and b12 deficient on supplementation. 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 In the interim, patient was taken off plavix and continuing only on aspirin and coumadin with continuing downtrend in Hgb. Previous plan was to have patient started on octreotide to assist in healing of AVM but unable to obtain insurance auth. Pt reports no new symptoms from discharge, continuing to have dark stools from oral iron supplementation. Anemia is multifactorial from iron/b12 deficiency, CKD, as well as likely continued slow bleed from small bowel AVMs versus other occult sources of blood loss.    Recommendations:  - can continue with PPI  - f/u iron panel, b12, folate levels; continue with supplementation  - consider octreotide 100mg bid  -     Please feel free to reach out to our team with any follow up questions.  All recommendations are tentative until note is attested by attending.     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 S/p B12 IM 2/6, IV Venofer x 2 on 3/16.    #Acute on chronic macrocytic anemia  Patient was known to be iron, folate, and b12 deficient on supplementation. 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 In the interim, patient was taken off plavix and continuing only on aspirin and coumadin with continuing downtrend in Hgb. Previous plan was to have patient started on octreotide to assist in healing of AVM but unable to obtain insurance auth. Pt reports no new symptoms from discharge, continuing to have dark stools from oral iron supplementation. Anemia is multifactorial from iron/b12 deficiency, CKD, as well as likely continued slow bleed from small bowel AVMs versus other occult sources of blood loss.    Recommendations:  - plan for capsule endoscopy tomorrow: NPO after midnight w/ sips of water/meds  - depending on capsule results, possible push endoscopy/colonoscopy for Friday or Monday  - please obtain PPM interrogation report from within the past year  - can continue with PPI  - continue with Fe, B12, folate supplementation  - would not restart octreotide unless possibility of obtaining it outpatient  - Monitor CBC daily, maintain active T&S, transfuse to goal per primary.    Please feel free to reach out to our team with any follow up questions.  All recommendations are tentative until note is attested by attending.     Arash Frazier MD  Internal Medicine, PGY1    Available on Microsoft Teams  Pager  ([facility]) [phone] ([facility])

## 2023-03-22 NOTE — H&P ADULT - PROBLEM SELECTOR PLAN 3
S/p stents to RCA Jan 2023, currently on ASA  -> c/w ASA   -> anemia workup as above  -> c/w rosuvastatin

## 2023-03-22 NOTE — PROGRESS NOTE ADULT - PROBLEM SELECTOR PLAN 1
Pt w/ chronic anemia multifactorial 2/2 MARC, Folate, B12 deficiency vs chronic GI loses such as known AVM. Low concern for acute GI bleed given FOBT negative. Pt on IV iron infusions, next one was planned for thursday.   GI workup 1/30 w/ AVM and pt was planned to be discharged on octreotide, however could not obtain due to insurance  Pt currently on coumadin and ASA; plavix was stopped in February  - GI consult for further AVM evaluation  - Maintain T&S, transfuse for <8  - obtain iron, B12, folate labs  - c/w home vitamin + iron supplementation  - PPI BID  - hold off starting octreotide pending GI recs  - heme consult AM Pt w/ chronic anemia multifactorial 2/2 MARC, Folate, B12 deficiency vs chronic GI loses such as known AVM. Low concern for acute GI bleed given FOBT negative. Pt on IV iron infusions, next one was planned for thursday.   #thrombocytosis   #eosinophila   GI workup 1/30 w/ AVM and pt was planned to be discharged on octreotide, however could not obtain due to insurance  Pt currently on coumadin and ASA; plavix was stopped in February  - GI following, appreciate recs   - Maintain T&S, transfuse for <8  - obtain iron, B12, folate labs  - c/w home vitamin + iron supplementation  - PPI BID  - hold off starting octreotide pending GI recs  - heme consulted, appreciate recs Pt w/ chronic anemia multifactorial 2/2 MARC, Folate, B12 deficiency vs chronic GI loses such as known AVM. Low concern for acute GI bleed given FOBT negative. Pt on IV iron infusions, next one was planned for thursday.   #thrombocytosis   #eosinophila   GI workup 1/30 w/ AVM and pt was planned to be discharged on octreotide, however could not obtain due to insurance  Pt currently on coumadin and ASA; plavix was stopped in February  - GI following, appreciate recs   - Maintain T&S, transfuse for <8  - obtain iron, B12, folate labs  - c/w home vitamin + iron supplementation  - PPI BID  - hold off starting octreotide pending GI recs  - heme consulted, appreciate recs>> for eosinophilia, nothing to do for now, can follow up OP

## 2023-03-22 NOTE — H&P ADULT - PROBLEM SELECTOR PLAN 1
Chief Complaint   Patient presents with   • Follow-up     10 days for back pain- slowly improving, would like refill on tizanidine       SUBJECTIVE:  Belle Marroquin is a 59 year old female presenting for follow-up of her back pain. She also has some SI joint difficulties. She does feels though she is about 80% better overall. She does still continue to take things very slowly. She is doing stretching exercises that were given to her. She does also occasionally use BenGay. She has been using the muscle relaxant quite frequently. She currently denies any difficulties with urination or bowels. No other system complaints at this time. She does state that her back is a little bit tired but she does not have the pain at all like she did prior to her most recent visit.    PAST MEDICAL HISTORY:    History of tobacco use                                        History of pulmonary embolus (PE)               1992    Leg pain                                                      Tubular adenoma of colon                                        Comment: small colon polyp; next scope 2020    Patient Active Problem List   Diagnosis   • Cataracts, bilateral   • Atrial premature beats   • Paroxysmal SVT (supraventricular tachycardia) (CMS/HCC)   • Tobacco use disorder   • Vitreous hemorrhage (CMS/HCC)   • Myopia   • Lens replaced by other means   • Astigmatism, unspecified   • Hypermetropia   • Diastolic dysfunction, left ventricle   • Pseudophakia of left eye   • Posterior subcapsular cataract, right   • Nuclear sclerosis   • Cortical cataract of right eye   • Family history of breast cancer       Past Surgical History:   Procedure Laterality Date   • BREAST LUMPECTOMY  1982    right   • CATARACT EXTRACTION, BILATERAL     •  SECTION, LOW TRANSVERSE     • COLONOSCOPY  8.11.15    next colonoscopy 2020   • DENTAL SURGERY     • DEXA BONE DENSITY AXIAL SKELETON  2001   • FOOT SURGERY      bilateral feet        Current Outpatient Prescriptions   Medication Sig Dispense Refill   • tiZANidine (ZANAFLEX) 4 MG tablet Take 1 tablet by mouth every 8 hours as needed (muscle pain). 30 tablet 1   • aspirin 81 MG tablet Take 1 tablet by mouth daily. 100 tablet 3   • ibuprofen (MOTRIN) 200 MG tablet Take 200 mg by mouth every 6 hours as needed for Pain (takes 2 tabs Q 6 hours).       No current facility-administered medications for this visit.        ALLERGIES:  No Known Allergies    Social History     Social History   • Marital status:      Spouse name: N/A   • Number of children: N/A   • Years of education: N/A     Occupational History   • retired      Social History Main Topics   • Smoking status: Former Smoker     Years: 0.50     Types: Cigarettes   • Smokeless tobacco: Never Used   • Alcohol use Yes      Comment: Rarely. 3-5 PER YEAR.   • Drug use: No   • Sexual activity: Not Currently     Partners: Male      Comment:      Other Topics Concern   • None     Social History Narrative    Patient does stay as active as possible. She likes to garden. She is not working outside the home at this time.        Family History   Problem Relation Age of Onset   • Breast cancer Mother 63   • Hypertension Sister    • Hypertension Sister    • Breast cancer Sister 39   • Breast cancer Paternal Grandmother         OBJECTIVE:  Vital Signs:   Visit Vitals  BP 90/56   Pulse 60   Temp 97.8 °F (36.6 °C) (Temporal Artery)   Resp 16   Ht 5' 5\" (1.651 m)   Wt 64.9 kg   SpO2 98%   BMI 23.81 kg/m²     General: Patient appears well. No acute distress noted  HEENT: Conjunctivae are neither pale nor injected. Mucous membranes are moist.  Lungs: Respirations are even and unlabored.  Extremities: No cyanosis, clubbing, or edema.  Neurologic: Alert and oriented times 3. Reflexes +2 to lower extremities.  Back: Patient does not have any tenderness with palpation to the lower lumbar area or to the paraspinal regions along the SI joint area. No  skin abnormality is noted. Patient has good strength to bilateral lower extremities with flexion extension against resistance.    ASSESSMENT AND PLAN:    Belle was seen today for follow-up.    Diagnoses and all orders for this visit:    Low back strain, subsequent encounter    SI (sacroiliac) joint inflammation (CMS/HCC)    Other orders  -     tiZANidine (ZANAFLEX) 4 MG tablet; Take 1 tablet by mouth every 8 hours as needed (muscle pain).       Patient will continue to take things somewhat easy if possible. 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 She will continue with stretches at least another month is also may have refill of the muscle relaxant. If she does not continue to have improvement we discussed the possibility of physical therapy. All of her questions are answered. Patient does state understanding and is in agreement plan.    Pt w/ chronic anemia multifactorial 2/2 MARC, Folate, B12 deficiency vs chronic GI loses such as known AVM. Low concern for acute GI bleed given FOBT negative. Pt on IV iron infusions, next one was planned for thursday.   GI workup 1/30 w/ AVM and pt was planned to be discharged on octreotide, however could not obtain due to insurance  Pt currently on coumadin and ASA; plavix was stopped in February  - GI consult for further AVM evaluation  - Maintain T&S, transfuse for <8  - obtain iron, B12, folate labs  - c/w home vitamin + iron supplementation Pt w/ chronic anemia multifactorial 2/2 MARC, Folate, B12 deficiency vs chronic GI loses such as known AVM. Low concern for acute GI bleed given FOBT negative. Pt on IV iron infusions, next one was planned for thursday.   GI workup 1/30 w/ AVM and pt was planned to be discharged on octreotide, however could not obtain due to insurance  Pt currently on coumadin and ASA; plavix was stopped in February  - GI consult for further AVM evaluation  - Maintain T&S, transfuse for <8  - obtain iron, B12, folate labs  - c/w home vitamin + iron supplementation  - PPI BID  - hold off starting octreotide pending GI recs  - heme consult AM

## 2023-03-22 NOTE — PROGRESS NOTE ADULT - ASSESSMENT
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 hematology group, found to have Hg of 8.3 on admission with previous Hg 10.9 02/2023. 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

## 2023-03-22 NOTE — CONSULT NOTE ADULT - ATTENDING COMMENTS
79 yo M pmh CAD s/p CODY to RCA, s/p TAVR 1/2023 s/p plavix x 30 days (now off), afib, on asa + coumadin, recurrent transfusions for chronic anemia and recent EGD for suspected GIB with non bleeding avms in d4 who presents for low hgb as an outpatient.  Referred in for hgb of 8s by hematology.  No sign of GIB.  Concnern that avms may be causing obscure GIB.  only had push enteroscopy of late.  could not get octreotide outpatient to treat AVMs medically.      Given stability will plan for VCE first prior to endoscopic evaluation  Patient and family amenable

## 2023-03-22 NOTE — CONSULT NOTE ADULT - SUBJECTIVE AND OBJECTIVE BOX
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 Pt previously w/ Hg 10.9 02/2023, now with Hg of 8.3 on admission. Pt endorsed intermittent lightheadedness earlier in the day that has resolved. Patient has a history of dark stools due to PO iron, last BM yesterday, no black/tarry stools, no hematochezia, no hematemesis. Pt currently on PO iron and on IV Iron infusion, s/p 2 infusions in the past two weeks. Pt also denies F/C/N/V, CP, SOB, cough, abd pain.   	  Per chart review, pt with multiple admissions for anemia requiring transfusions, most recently on 01/27-02/03. At that time pt underwent endoscopy with GI on 1/30 which revealed transient bleeding AVM's. Pt was resumed on warfarin on discharge. Pt completed 30 days of DAPT after which was continued only on ASA. Pt was prescribed octreotide on discharge, but was unable to obtain in due to insurance denial.    78 year old male with anemia due to iron deficiency, CKD and bleeding AVMs, thrombocytosis and B12 deficiency, followed by Dr. Goel and Dr. Bermeo at [facility]. He is s/p TAVR procedure 1/19/2023. Presenting with low hemoglobin. S/p IV Venofer x 2 on 3/16.    PAST MEDICAL & SURGICAL HISTORY:  Hodgkin lymphoma  1975      Atrial fibrillation  over 20 years      HTN (hypertension)      GERD (gastroesophageal reflux disease)      MOE (obstructive sleep apnea)  positive sleep study many years ago, was recommended to use CPAP, patient non-compliant      BPH (benign prostatic hyperplasia)      Osteoarthritis      CVA (cerebral vascular accident)  1/2018 - attributed to no AC for 11 days preop/postop spine surgery - presented to ED with slurred speech, residual ST memory loss, per pt      Spinal stenosis  L3-L4      Spondylolisthesis      Exposure to toxin  9/11       Atrial fibrillation  over 20 years      History of short term memory loss      DVT, lower extremity      S/P splenectomy  1975      S/P hip replacement, right  2014      History of cardioversion  for AF - &quot;many years ago&quot; - unsuccessful      History of lumbar spinal fusion  1/5/2018      Status post left cataract extraction      History of fasciotomy      Cardiac pacemaker      S/P TAVR (transcatheter aortic valve replacement)      CAD (coronary artery disease)          FAMILY HISTORY:  Family history of stroke (Father)        Alcohol Denied  Smoking: Nonsmoker  Drug Use: Denied  Marital Status:         Allergies    No Known Allergies    Intolerances        MEDICATIONS  (STANDING):  ascorbic acid 500 milliGRAM(s) Oral daily  aspirin enteric coated 81 milliGRAM(s) Oral daily  atenolol  Tablet 25 milliGRAM(s) Oral daily  atorvastatin 10 milliGRAM(s) Oral at bedtime  cyanocobalamin 1000 MICROGram(s) Oral daily  ferrous    sulfate 325 milliGRAM(s) Oral daily  fluticasone propionate 50 MICROgram(s)/spray Nasal Spray 1 Spray(s) Both Nostrils <User Schedule>  furosemide    Tablet 40 milliGRAM(s) Oral daily  latanoprost 0.005% Ophthalmic Solution 1 Drop(s) Right EYE at bedtime  pantoprazole  Injectable 40 milliGRAM(s) IV Push two times a day  spironolactone 12.5 milliGRAM(s) Oral daily  tamsulosin 0.8 milliGRAM(s) Oral at bedtime    MEDICATIONS  (PRN):  acetaminophen     Tablet .. 650 milliGRAM(s) Oral every 6 hours PRN Temp greater or equal to 38C (100.4F), Mild Pain (1 - 3)  aluminum hydroxide/magnesium hydroxide/simethicone Suspension 30 milliLiter(s) Oral every 4 hours PRN Dyspepsia  melatonin 3 milliGRAM(s) Oral at bedtime PRN Insomnia  ondansetron Injectable 4 milliGRAM(s) IV Push every 8 hours PRN Nausea and/or Vomiting      ROS  No fever, sweats, chills  No epistaxis, HA, sore throat  No CP, SOB, cough, sputum  No n/v/d, abd pain, melena, hematochezia  No edema  No rash  No anxiety  No back pain, joint pain  No bleeding, bruising  No dysuria, hematuria    T(C): 36.7 (03-22-23 @ 11:20), Max: 36.7 (03-21-23 @ 17:03)  HR: 60 (03-22-23 @ 11:20) (60 - 94)  BP: 135/60 (03-22-23 @ 11:20) (129/48 - 150/68)  RR: 18 (03-22-23 @ 11:20) (16 - 20)  SpO2: 95% (03-22-23 @ 11:20) (93% - 97%)  Wt(kg): --    PE  NAD  Awake, alert  Anicteric, MMM  RRR  CTAB  Abd soft, NT, ND  No c/c/e  No rash grossly  FROM                          8.2    13.59 )-----------( 483      ( 22 Mar 2023 05:24 )             27.2       03-22    140  |  104  |  21  ----------------------------<  96  4.3   |  26  |  0.89    Ca    9.1      22 Mar 2023 05:24  Phos  3.0     03-22  Mg     2.1     03-22    TPro  7.0  /  Alb  3.9  /  TBili  0.1<L>  /  DBili  x   /  AST  17  /  ALT  16  /  AlkPhos  114  03-21

## 2023-03-22 NOTE — PROGRESS NOTE ADULT - ATTENDING COMMENTS
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

## 2023-03-22 NOTE — H&P ADULT - PROBLEM SELECTOR PLAN 2
Pt w/ A.fib, CHADSVASC 7, on coumadin   INR 2.57, pt on 2mg M-F and 1mg S/Sun  Pt high risk for stroke, but given therapeutic range, will hold tonight's dose pending further anemia eval  -> c/w atenolol

## 2023-03-23 ENCOUNTER — APPOINTMENT (OUTPATIENT)
Dept: ORTHOPEDIC SURGERY | Facility: CLINIC | Age: 78
End: 2023-03-23
Payer: MEDICARE

## 2023-03-23 LAB
ALBUMIN SERPL ELPH-MCNC: 3.6 G/DL — SIGNIFICANT CHANGE UP (ref 3.3–5)
ALP SERPL-CCNC: 94 U/L — SIGNIFICANT CHANGE UP (ref 40–120)
ALT FLD-CCNC: 11 U/L — SIGNIFICANT CHANGE UP (ref 10–45)
ANION GAP SERPL CALC-SCNC: 11 MMOL/L — SIGNIFICANT CHANGE UP (ref 5–17)
APTT BLD: 156.7 SEC — CRITICAL HIGH (ref 27.5–35.5)
APTT BLD: 38.9 SEC — HIGH (ref 27.5–35.5)
APTT BLD: 84.6 SEC — HIGH (ref 27.5–35.5)
AST SERPL-CCNC: 13 U/L — SIGNIFICANT CHANGE UP (ref 10–40)
BILIRUB SERPL-MCNC: 0.3 MG/DL — SIGNIFICANT CHANGE UP (ref 0.2–1.2)
BUN SERPL-MCNC: 18 MG/DL — SIGNIFICANT CHANGE UP (ref 7–23)
CALCIUM SERPL-MCNC: 9.2 MG/DL — SIGNIFICANT CHANGE UP (ref 8.4–10.5)
CHLORIDE SERPL-SCNC: 102 MMOL/L — SIGNIFICANT CHANGE UP (ref 96–108)
CO2 SERPL-SCNC: 27 MMOL/L — SIGNIFICANT CHANGE UP (ref 22–31)
CREAT SERPL-MCNC: 0.94 MG/DL — SIGNIFICANT CHANGE UP (ref 0.5–1.3)
EGFR: 82 ML/MIN/1.73M2 — SIGNIFICANT CHANGE UP
GLUCOSE SERPL-MCNC: 80 MG/DL — SIGNIFICANT CHANGE UP (ref 70–99)
HAPTOGLOB SERPL-MCNC: 292 MG/DL — HIGH (ref 34–200)
HCT VFR BLD CALC: 27.5 % — LOW (ref 39–50)
HCT VFR BLD CALC: 27.7 % — LOW (ref 39–50)
HCT VFR BLD CALC: 27.8 % — LOW (ref 39–50)
HGB BLD-MCNC: 8.3 G/DL — LOW (ref 13–17)
HGB BLD-MCNC: 8.5 G/DL — LOW (ref 13–17)
HGB BLD-MCNC: 8.5 G/DL — LOW (ref 13–17)
INR BLD: 1.99 RATIO — HIGH (ref 0.88–1.16)
IRON SATN MFR SERPL: 25 UG/DL — LOW (ref 45–165)
IRON SATN MFR SERPL: 9 % — LOW (ref 16–55)
LDH SERPL L TO P-CCNC: 223 U/L — SIGNIFICANT CHANGE UP (ref 50–242)
MAGNESIUM SERPL-MCNC: 2.1 MG/DL — SIGNIFICANT CHANGE UP (ref 1.6–2.6)
MCHC RBC-ENTMCNC: 30 GM/DL — LOW (ref 32–36)
MCHC RBC-ENTMCNC: 30.6 GM/DL — LOW (ref 32–36)
MCHC RBC-ENTMCNC: 30.6 PG — SIGNIFICANT CHANGE UP (ref 27–34)
MCHC RBC-ENTMCNC: 30.7 PG — SIGNIFICANT CHANGE UP (ref 27–34)
MCHC RBC-ENTMCNC: 30.9 GM/DL — LOW (ref 32–36)
MCHC RBC-ENTMCNC: 30.9 PG — SIGNIFICANT CHANGE UP (ref 27–34)
MCV RBC AUTO: 100 FL — SIGNIFICANT CHANGE UP (ref 80–100)
MCV RBC AUTO: 100 FL — SIGNIFICANT CHANGE UP (ref 80–100)
MCV RBC AUTO: 102.6 FL — HIGH (ref 80–100)
NRBC # BLD: 0 /100 WBCS — SIGNIFICANT CHANGE UP (ref 0–0)
PHOSPHATE SERPL-MCNC: 3.3 MG/DL — SIGNIFICANT CHANGE UP (ref 2.5–4.5)
PLATELET # BLD AUTO: 485 K/UL — HIGH (ref 150–400)
PLATELET # BLD AUTO: 497 K/UL — HIGH (ref 150–400)
PLATELET # BLD AUTO: 504 K/UL — HIGH (ref 150–400)
POTASSIUM SERPL-MCNC: 4.1 MMOL/L — SIGNIFICANT CHANGE UP (ref 3.5–5.3)
POTASSIUM SERPL-SCNC: 4.1 MMOL/L — SIGNIFICANT CHANGE UP (ref 3.5–5.3)
PROT SERPL-MCNC: 6.5 G/DL — SIGNIFICANT CHANGE UP (ref 6–8.3)
PROTHROM AB SERPL-ACNC: 23.2 SEC — HIGH (ref 10.5–13.4)
RBC # BLD: 2.7 M/UL — LOW (ref 4.2–5.8)
RBC # BLD: 2.75 M/UL — LOW (ref 4.2–5.8)
RBC # BLD: 2.78 M/UL — LOW (ref 4.2–5.8)
RBC # FLD: 19.3 % — HIGH (ref 10.3–14.5)
RBC # FLD: 19.3 % — HIGH (ref 10.3–14.5)
RBC # FLD: 19.4 % — HIGH (ref 10.3–14.5)
SODIUM SERPL-SCNC: 140 MMOL/L — SIGNIFICANT CHANGE UP (ref 135–145)
TIBC SERPL-MCNC: 281 UG/DL — SIGNIFICANT CHANGE UP (ref 220–430)
UIBC SERPL-MCNC: 255 UG/DL — SIGNIFICANT CHANGE UP (ref 110–370)
WBC # BLD: 11.25 K/UL — HIGH (ref 3.8–10.5)
WBC # BLD: 12.1 K/UL — HIGH (ref 3.8–10.5)
WBC # BLD: 12.62 K/UL — HIGH (ref 3.8–10.5)
WBC # FLD AUTO: 11.25 K/UL — HIGH (ref 3.8–10.5)
WBC # FLD AUTO: 12.1 K/UL — HIGH (ref 3.8–10.5)
WBC # FLD AUTO: 12.62 K/UL — HIGH (ref 3.8–10.5)

## 2023-03-23 PROCEDURE — 99232 SBSQ HOSP IP/OBS MODERATE 35: CPT

## 2023-03-23 PROCEDURE — 99231 SBSQ HOSP IP/OBS SF/LOW 25: CPT

## 2023-03-23 RX ORDER — HEPARIN SODIUM 5000 [USP'U]/ML
INJECTION INTRAVENOUS; SUBCUTANEOUS
Qty: 25000 | Refills: 0 | Status: DISCONTINUED | OUTPATIENT
Start: 2023-03-23 | End: 2023-03-23

## 2023-03-23 RX ORDER — HEPARIN SODIUM 5000 [USP'U]/ML
4000 INJECTION INTRAVENOUS; SUBCUTANEOUS EVERY 6 HOURS
Refills: 0 | Status: DISCONTINUED | OUTPATIENT
Start: 2023-03-23 | End: 2023-03-23

## 2023-03-23 RX ORDER — HEPARIN SODIUM 5000 [USP'U]/ML
1300 INJECTION INTRAVENOUS; SUBCUTANEOUS
Qty: 25000 | Refills: 0 | Status: DISCONTINUED | OUTPATIENT
Start: 2023-03-23 | End: 2023-03-24

## 2023-03-23 RX ORDER — HEPARIN SODIUM 5000 [USP'U]/ML
8000 INJECTION INTRAVENOUS; SUBCUTANEOUS EVERY 6 HOURS
Refills: 0 | Status: DISCONTINUED | OUTPATIENT
Start: 2023-03-23 | End: 2023-03-23

## 2023-03-23 RX ADMIN — HEPARIN SODIUM 1300 UNIT(S)/HR: 5000 INJECTION INTRAVENOUS; SUBCUTANEOUS at 19:21

## 2023-03-23 RX ADMIN — Medication 650 MILLIGRAM(S): at 18:40

## 2023-03-23 RX ADMIN — PANTOPRAZOLE SODIUM 40 MILLIGRAM(S): 20 TABLET, DELAYED RELEASE ORAL at 17:02

## 2023-03-23 RX ADMIN — Medication 325 MILLIGRAM(S): at 13:30

## 2023-03-23 RX ADMIN — PREGABALIN 1000 MICROGRAM(S): 225 CAPSULE ORAL at 13:28

## 2023-03-23 RX ADMIN — Medication 500 MILLIGRAM(S): at 13:28

## 2023-03-23 RX ADMIN — HEPARIN SODIUM 1300 UNIT(S)/HR: 5000 INJECTION INTRAVENOUS; SUBCUTANEOUS at 23:51

## 2023-03-23 RX ADMIN — PANTOPRAZOLE SODIUM 40 MILLIGRAM(S): 20 TABLET, DELAYED RELEASE ORAL at 06:11

## 2023-03-23 RX ADMIN — Medication 81 MILLIGRAM(S): at 13:28

## 2023-03-23 RX ADMIN — Medication 40 MILLIGRAM(S): at 06:12

## 2023-03-23 RX ADMIN — Medication 1 SPRAY(S): at 06:11

## 2023-03-23 RX ADMIN — ATORVASTATIN CALCIUM 10 MILLIGRAM(S): 80 TABLET, FILM COATED ORAL at 21:10

## 2023-03-23 RX ADMIN — LATANOPROST 1 DROP(S): 0.05 SOLUTION/ DROPS OPHTHALMIC; TOPICAL at 21:10

## 2023-03-23 RX ADMIN — Medication 650 MILLIGRAM(S): at 17:40

## 2023-03-23 RX ADMIN — SPIRONOLACTONE 12.5 MILLIGRAM(S): 25 TABLET, FILM COATED ORAL at 06:12

## 2023-03-23 RX ADMIN — HEPARIN SODIUM 1300 UNIT(S)/HR: 5000 INJECTION INTRAVENOUS; SUBCUTANEOUS at 19:19

## 2023-03-23 RX ADMIN — HEPARIN SODIUM 1300 UNIT(S)/HR: 5000 INJECTION INTRAVENOUS; SUBCUTANEOUS at 16:41

## 2023-03-23 RX ADMIN — ATENOLOL 25 MILLIGRAM(S): 25 TABLET ORAL at 06:11

## 2023-03-23 RX ADMIN — HEPARIN SODIUM 1300 UNIT(S)/HR: 5000 INJECTION INTRAVENOUS; SUBCUTANEOUS at 23:48

## 2023-03-23 RX ADMIN — HEPARIN SODIUM 1800 UNIT(S)/HR: 5000 INJECTION INTRAVENOUS; SUBCUTANEOUS at 08:55

## 2023-03-23 RX ADMIN — TAMSULOSIN HYDROCHLORIDE 0.8 MILLIGRAM(S): 0.4 CAPSULE ORAL at 21:10

## 2023-03-23 NOTE — PROGRESS NOTE ADULT - PROBLEM SELECTOR PLAN 4
Lower extremity pitting edema, on lasix 40  TTE 01/2023 w/ normal EF, no ventricular dysfunction  LE Duplex negative for DVT   Repeat TTE>> dilated RV with right ventricular systolic dysfunction (NEW FROM PRIOR), markedly dilated RA, EF 61%, normal LV systolic function   -> c/w lasix 40

## 2023-03-23 NOTE — PROGRESS NOTE ADULT - SUBJECTIVE AND OBJECTIVE BOX
PROGRESS NOTE:     Patient is a 78y old  Male who presents with a chief complaint of Anemia (22 Mar 2023 13:36)      SUBJECTIVE / OVERNIGHT EVENTS:    ADDITIONAL REVIEW OF SYSTEMS:    MEDICATIONS  (STANDING):  ascorbic acid 500 milliGRAM(s) Oral daily  aspirin enteric coated 81 milliGRAM(s) Oral daily  atenolol  Tablet 25 milliGRAM(s) Oral daily  atorvastatin 10 milliGRAM(s) Oral at bedtime  cyanocobalamin 1000 MICROGram(s) Oral daily  ferrous    sulfate 325 milliGRAM(s) Oral daily  fluticasone propionate 50 MICROgram(s)/spray Nasal Spray 1 Spray(s) Both Nostrils <User Schedule>  furosemide    Tablet 40 milliGRAM(s) Oral daily  heparin  Infusion.  Unit(s)/Hr (18 mL/Hr) IV Continuous <Continuous>  latanoprost 0.005% Ophthalmic Solution 1 Drop(s) Right EYE at bedtime  pantoprazole  Injectable 40 milliGRAM(s) IV Push two times a day  spironolactone 12.5 milliGRAM(s) Oral daily  tamsulosin 0.8 milliGRAM(s) Oral at bedtime    MEDICATIONS  (PRN):  acetaminophen     Tablet .. 650 milliGRAM(s) Oral every 6 hours PRN Temp greater or equal to 38C (100.4F), Mild Pain (1 - 3)  aluminum hydroxide/magnesium hydroxide/simethicone Suspension 30 milliLiter(s) Oral every 4 hours PRN Dyspepsia  heparin   Injectable 8000 Unit(s) IV Push every 6 hours PRN For aPTT less than 40  heparin   Injectable 4000 Unit(s) IV Push every 6 hours PRN For aPTT between 40 - 57  melatonin 3 milliGRAM(s) Oral at bedtime PRN Insomnia  ondansetron Injectable 4 milliGRAM(s) IV Push every 8 hours PRN Nausea and/or Vomiting      CAPILLARY BLOOD GLUCOSE        I&O's Summary    22 Mar 2023 07:01  -  23 Mar 2023 07:00  --------------------------------------------------------  IN: 0 mL / OUT: 1500 mL / NET: -1500 mL        PHYSICAL EXAM:  Vital Signs Last 24 Hrs  T(C): 36.6 (23 Mar 2023 05:25), Max: 36.7 (22 Mar 2023 11:20)  T(F): 97.9 (23 Mar 2023 05:25), Max: 98.1 (22 Mar 2023 11:20)  HR: 64 (23 Mar 2023 05:25) (60 - 64)  BP: 139/66 (23 Mar 2023 05:25) (135/60 - 139/66)  BP(mean): --  RR: 16 (23 Mar 2023 05:25) (16 - 18)  SpO2: 96% (23 Mar 2023 05:25) (95% - 96%)    Parameters below as of 23 Mar 2023 05:25  Patient On (Oxygen Delivery Method): room air        GENERAL: No acute distress, well-developed  HEAD:  Atraumatic, Normocephalic  EYES: EOMI, PERRLA, conjunctiva and sclera clear  NECK: Supple, no lymphadenopathy, no JVD  CHEST/LUNG: CTAB; No wheezes, rales, or rhonchi  HEART: Regular rate and rhythm; No murmurs, rubs, or gallops  ABDOMEN: Soft, non-tender, non-distended; normal bowel sounds, no organomegaly  EXTREMITIES:  2+ peripheral pulses b/l, No clubbing, cyanosis, or edema  NEUROLOGY: A&O x 3, no focal deficits  SKIN: No rashes or lesions    LABS:                        8.3    11.25 )-----------( 497      ( 23 Mar 2023 07:00 )             27.7     03-23    140  |  102  |  18  ----------------------------<  80  4.1   |  27  |  0.94    Ca    9.2      23 Mar 2023 07:00  Phos  3.3     03-23  Mg     2.1     03-23    TPro  6.5  /  Alb  3.6  /  TBili  0.3  /  DBili  x   /  AST  13  /  ALT  11  /  AlkPhos  94  03-23    PT/INR - ( 23 Mar 2023 07:00 )   PT: 23.2 sec;   INR: 1.99 ratio         PTT - ( 23 Mar 2023 07:00 )  PTT:38.9 sec            RADIOLOGY & ADDITIONAL TESTS:  Results Reviewed:   Imaging Personally Reviewed:  Electrocardiogram Personally Reviewed:    COORDINATION OF CARE:  Care Discussed with Consultants/Other Providers [Y/N]:  Prior or Outpatient Records Reviewed [Y/N]:   PROGRESS NOTE:     Patient is a 78y old  Male who presents with a chief complaint of Anemia (22 Mar 2023 13:36)      SUBJECTIVE / OVERNIGHT EVENTS:  NAD.     ADDITIONAL REVIEW OF SYSTEMS:    MEDICATIONS  (STANDING):  ascorbic acid 500 milliGRAM(s) Oral daily  aspirin enteric coated 81 milliGRAM(s) Oral daily  atenolol  Tablet 25 milliGRAM(s) Oral daily  atorvastatin 10 milliGRAM(s) Oral at bedtime  cyanocobalamin 1000 MICROGram(s) Oral daily  ferrous    sulfate 325 milliGRAM(s) Oral daily  fluticasone propionate 50 MICROgram(s)/spray Nasal Spray 1 Spray(s) Both Nostrils <User Schedule>  furosemide    Tablet 40 milliGRAM(s) Oral daily  heparin  Infusion.  Unit(s)/Hr (18 mL/Hr) IV Continuous <Continuous>  latanoprost 0.005% Ophthalmic Solution 1 Drop(s) Right EYE at bedtime  pantoprazole  Injectable 40 milliGRAM(s) IV Push two times a day  spironolactone 12.5 milliGRAM(s) Oral daily  tamsulosin 0.8 milliGRAM(s) Oral at bedtime    MEDICATIONS  (PRN):  acetaminophen     Tablet .. 650 milliGRAM(s) Oral every 6 hours PRN Temp greater or equal to 38C (100.4F), Mild Pain (1 - 3)  aluminum hydroxide/magnesium hydroxide/simethicone Suspension 30 milliLiter(s) Oral every 4 hours PRN Dyspepsia  heparin   Injectable 8000 Unit(s) IV Push every 6 hours PRN For aPTT less than 40  heparin   Injectable 4000 Unit(s) IV Push every 6 hours PRN For aPTT between 40 - 57  melatonin 3 milliGRAM(s) Oral at bedtime PRN Insomnia  ondansetron Injectable 4 milliGRAM(s) IV Push every 8 hours PRN Nausea and/or Vomiting      CAPILLARY BLOOD GLUCOSE        I&O's Summary    22 Mar 2023 07:01  -  23 Mar 2023 07:00  --------------------------------------------------------  IN: 0 mL / OUT: 1500 mL / NET: -1500 mL        PHYSICAL EXAM:  Vital Signs Last 24 Hrs  T(C): 36.6 (23 Mar 2023 05:25), Max: 36.7 (22 Mar 2023 11:20)  T(F): 97.9 (23 Mar 2023 05:25), Max: 98.1 (22 Mar 2023 11:20)  HR: 64 (23 Mar 2023 05:25) (60 - 64)  BP: 139/66 (23 Mar 2023 05:25) (135/60 - 139/66)  BP(mean): --  RR: 16 (23 Mar 2023 05:25) (16 - 18)  SpO2: 96% (23 Mar 2023 05:25) (95% - 96%)    Parameters below as of 23 Mar 2023 05:25  Patient On (Oxygen Delivery Method): room air        GENERAL: No acute distress, well-developed  HEAD:  Atraumatic, Normocephalic  EYES: EOMI, PERRLA, conjunctiva and sclera clear  NECK: Supple, no lymphadenopathy, no JVD  CHEST/LUNG: CTAB; No wheezes, rales, or rhonchi  HEART: Regular rate and rhythm; No murmurs, rubs, or gallops  ABDOMEN: Soft, non-tender, non-distended; normal bowel sounds, no organomegaly  EXTREMITIES:  2+ peripheral pulses b/l, No clubbing, cyanosis, or edema  NEUROLOGY: A&O x 3, no focal deficits  SKIN: No rashes or lesions    LABS:                        8.3    11.25 )-----------( 497      ( 23 Mar 2023 07:00 )             27.7     03-23    140  |  102  |  18  ----------------------------<  80  4.1   |  27  |  0.94    Ca    9.2      23 Mar 2023 07:00  Phos  3.3     03-23  Mg     2.1     03-23    TPro  6.5  /  Alb  3.6  /  TBili  0.3  /  DBili  x   /  AST  13  /  ALT  11  /  AlkPhos  94  03-23    PT/INR - ( 23 Mar 2023 07:00 )   PT: 23.2 sec;   INR: 1.99 ratio         PTT - ( 23 Mar 2023 07:00 )  PTT:38.9 sec            RADIOLOGY & ADDITIONAL TESTS:  Results Reviewed:   Imaging Personally Reviewed:  Electrocardiogram Personally Reviewed:    COORDINATION OF CARE:  Care Discussed with Consultants/Other Providers [Y/N]:  Prior or Outpatient Records Reviewed [Y/N]:

## 2023-03-23 NOTE — PROGRESS NOTE ADULT - ASSESSMENT
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

## 2023-03-23 NOTE — PROGRESS NOTE ADULT - PROBLEM SELECTOR PLAN 1
Pt w/ chronic anemia multifactorial 2/2 MARC, Folate, B12 deficiency vs chronic GI loses such as known AVM. Low concern for acute GI bleed given FOBT negative. Pt on IV iron infusions, next one was planned for thursday.   #thrombocytosis   #eosinophila   GI workup 1/30 w/ AVM and pt was planned to be discharged on octreotide, however could not obtain due to insurance  Pt currently on coumadin and ASA; plavix was stopped in February  - GI following, appreciate recs   - Maintain T&S, transfuse for <8  - obtain iron, B12, folate labs  - c/w home vitamin + iron supplementation  - PPI BID  - hold off starting octreotide pending GI recs  - heme consulted, appreciate recs>> for eosinophilia, nothing to do for now, can follow up OP

## 2023-03-23 NOTE — PROGRESS NOTE ADULT - PROBLEM SELECTOR PLAN 2
Pt w/ A.fib, CHADSVASC 7, on coumadin   INR 2.57, pt on 2mg M-F and 1mg S/Sun  Pt high risk for stroke, but given therapeutic range, will hold warfarin pending further anemia eval >> restart tomorrow pending GI plan  -> c/w atenolol Pt w/ A.fib, CHADSVASC 7, on coumadin   INR 2.57, pt on 2mg M-F and 1mg S/Sun  Pt high risk for stroke, but given therapeutic range, will hold warfarin pending further anemia eval >>on heparin gtt   -> c/w atenolol

## 2023-03-23 NOTE — PROGRESS NOTE ADULT - NS ATTEND AMEND GEN_ALL_CORE FT
Patient with a remote history of Hodgkin's disease s/p splenectomy and chemotherapy in 1975, sent in for anemia.  His anemia is multifactorial due to CKD, iron deficiency, bleeding from AVM.    Planned for VCE today, possible push endoscopy/colonoscopy for Friday or Monday depending on capsule results.

## 2023-03-23 NOTE — PROGRESS NOTE ADULT - ASSESSMENT
78 year old male with anemia due to iron deficiency, CKD and bleeding AVMs, thrombocytosis and B12 deficiency, followed by Dr. Goel and Dr. Bermeo at [facility]. He is s/p TAVR procedure 1/19/2023. Presenting with low hemoglobin.     Anemia  - Due to iron and B12 deficiency, CKD, bleeding AVMs  -checking LDH, hapto  - S/p IV Venofer x 2 on 3/16  - S/p B12 IM injection on 2/6  - Was planned to start Sandostatin to treat anemia due to AVM however, Auth is denied by his insurance  - Hgb steadily declining due to AVM of small intestines dx during recent hospitalization  - GI consulted, capsule studies pending, possible endoscopy 3/24   - Please transfuse PRBCs for hgb < 7.0    Thrombocytosis  - Unclear etiology ­likely reactive  - H/o Hodgkin's disease s/p splenectomy and chemotherapy in 1975    Valvular disease  - Follows w/ cardiology, on warfarin  - S/p TAVR procedure 1/19/2023.     Jyothi Xiao NP  Hematology/ Oncology  [facility]  [phone] (office)  [phone] (alt office)  Evenings and weekends please call MD on call or office

## 2023-03-23 NOTE — PROGRESS NOTE ADULT - SUBJECTIVE AND OBJECTIVE BOX
Gastroenterology Progress Note    Interval Events:   Video capsule endoscopy today. BM last evening normal. No abdominal discomfort.     Allergies:  No Known Allergies    Hospital Medications:  acetaminophen     Tablet .. 650 milliGRAM(s) Oral every 6 hours PRN  aluminum hydroxide/magnesium hydroxide/simethicone Suspension 30 milliLiter(s) Oral every 4 hours PRN  ascorbic acid 500 milliGRAM(s) Oral daily  aspirin enteric coated 81 milliGRAM(s) Oral daily  atenolol  Tablet 25 milliGRAM(s) Oral daily  atorvastatin 10 milliGRAM(s) Oral at bedtime  cyanocobalamin 1000 MICROGram(s) Oral daily  ferrous    sulfate 325 milliGRAM(s) Oral daily  fluticasone propionate 50 MICROgram(s)/spray Nasal Spray 1 Spray(s) Both Nostrils <User Schedule>  furosemide    Tablet 40 milliGRAM(s) Oral daily  heparin   Injectable 8000 Unit(s) IV Push every 6 hours PRN  heparin   Injectable 4000 Unit(s) IV Push every 6 hours PRN  heparin  Infusion.  Unit(s)/Hr IV Continuous <Continuous>  latanoprost 0.005% Ophthalmic Solution 1 Drop(s) Right EYE at bedtime  melatonin 3 milliGRAM(s) Oral at bedtime PRN  ondansetron Injectable 4 milliGRAM(s) IV Push every 8 hours PRN  pantoprazole  Injectable 40 milliGRAM(s) IV Push two times a day  spironolactone 12.5 milliGRAM(s) Oral daily  tamsulosin 0.8 milliGRAM(s) Oral at bedtime      ROS: 14 point ROS negative unless otherwise state in subjective    PHYSICAL EXAM:   Vital Signs:  Vital Signs Last 24 Hrs  T(C): 36.6 (23 Mar 2023 05:25), Max: 36.7 (22 Mar 2023 11:20)  T(F): 97.9 (23 Mar 2023 05:25), Max: 98.1 (22 Mar 2023 11:20)  HR: 64 (23 Mar 2023 05:25) (60 - 64)  BP: 139/66 (23 Mar 2023 05:25) (135/60 - 139/66)  BP(mean): --  RR: 16 (23 Mar 2023 05:25) (16 - 18)  SpO2: 96% (23 Mar 2023 05:25) (95% - 96%)    Parameters below as of 23 Mar 2023 05:25  Patient On (Oxygen Delivery Method): room air      Daily     Daily     GENERAL:  No acute distress  HEENT:  NCAT, no scleral icterus  CHEST: no resp distress  HEART:  RRR  ABDOMEN:  Soft, non-tender, non-distended, no masses  EXTREMITIES:  No cyanosis, clubbing, or edema  SKIN:  No rash/erythema/ecchymoses/petechiae/wounds/abscess/warm/dry  NEURO:  Alert and oriented x 3, no asterixis, no tremor    LABS:                        8.3    11.25 )-----------( 497      ( 23 Mar 2023 07:00 )             27.7     Mean Cell Volume: 102.6 fl (03-23-23 @ 07:00)    03-23    140  |  102  |  18  ----------------------------<  80  4.1   |  27  |  0.94    Ca    9.2      23 Mar 2023 07:00  Phos  3.3     03-23  Mg     2.1     03-23    TPro  6.5  /  Alb  3.6  /  TBili  0.3  /  DBili  x   /  AST  13  /  ALT  11  /  AlkPhos  94  03-23    LIVER FUNCTIONS - ( 23 Mar 2023 07:00 )  Alb: 3.6 g/dL / Pro: 6.5 g/dL / ALK PHOS: 94 U/L / ALT: 11 U/L / AST: 13 U/L / GGT: x           PT/INR - ( 23 Mar 2023 07:00 )   PT: 23.2 sec;   INR: 1.99 ratio         PTT - ( 23 Mar 2023 07:00 )  PTT:38.9 sec          Imaging:

## 2023-03-23 NOTE — PROGRESS NOTE ADULT - ATTENDING COMMENTS
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    #anemia  #afib  #CAD  # LE edema  #BPH  #HTN  #eosinophilia     - patient seen and examined doing well, no complaints  - reports dark stools, but likely due to Fe supplementation  - anemia likely multifactorial in etiology due to AVM and MARC, folate, b12 def, CKD  - heme and GI following, transfuse if hgb <7.0, c/w PPI  - patient did not obtain octreotide outpatient due to insurance authorization  - VCE today, possible push endoscopy/colonscopy   - heparin started due to subtherapeutic INR  - obtaining PPM interrogation as well   - trace LE pitting edema R>L, LE duplex negative for DVT  - rest of plan as above .

## 2023-03-23 NOTE — PROGRESS NOTE ADULT - ASSESSMENT
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 S/p B12 IM 2/6, IV Venofer x 2 on 3/16.    #Acute on chronic macrocytic anemia  Patient was known to be iron, folate, and b12 deficient on supplementation. 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 In the interim, patient was taken off plavix and continuing only on aspirin and coumadin with continuing downtrend in Hgb. Previous plan was to have patient started on octreotide to assist in healing of AVM but unable to obtain insurance auth. Pt reports no new symptoms from discharge, continuing to have dark stools from oral iron supplementation. Anemia is multifactorial from iron/b12 deficiency, CKD, as well as likely continued slow bleed from small bowel AVMs versus other occult sources of blood loss.    Recommendations:  - VCE today  - depending on capsule results, possible push endoscopy/colonoscopy for Friday or Monday  - please obtain PPM interrogation report from within the past year  - can continue with PPI  - continue with Fe, B12, folate supplementation  - would not restart octreotide unless possibility of obtaining it outpatient  - Monitor CBC daily, maintain active T&S, transfuse to goal per primary.    Please feel free to reach out to our team with any follow up questions.  All recommendations are tentative until note is attested by attending.     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 S/p B12 IM 2/6, IV Venofer x 2 on 3/16.    #Acute on chronic macrocytic anemia  Patient was known to be iron, folate, and b12 deficient on supplementation. 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 In the interim, patient was taken off plavix and continuing only on aspirin and coumadin with continuing downtrend in Hgb. Previous plan was to have patient started on octreotide to assist in healing of AVM but unable to obtain insurance auth. Pt reports no new symptoms from discharge, continuing to have dark stools from oral iron supplementation. Anemia is multifactorial from iron/b12 deficiency, CKD, as well as likely continued slow bleed from small bowel AVMs versus other occult sources of blood loss.    Recommendations:  - VCE today  - depending on capsule results, possible push endoscopy/colonoscopy for Friday or Monday  - appreciate PPM interrogation in chart  - can continue with PPI  - continue with Fe, B12, folate supplementation  - would not restart octreotide unless possibility of obtaining it outpatient  - Monitor CBC daily, maintain active T&S, transfuse to goal per primary.    Please feel free to reach out to our team with any follow up questions.  All recommendations are tentative until note is attested by attending.     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 S/p B12 IM 2/6, IV Venofer x 2 on 3/16.    #Acute on chronic macrocytic anemia  Patient was known to be iron, folate, and b12 deficient on supplementation. 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 In the interim, patient was taken off plavix and continuing only on aspirin and coumadin with continuing downtrend in Hgb. Previous plan was to have patient started on octreotide to assist in healing of AVM but unable to obtain insurance auth. Pt reports no new symptoms from discharge, continuing to have dark stools from oral iron supplementation. Anemia is multifactorial from iron/b12 deficiency, CKD, as well as likely continued slow bleed from small bowel AVMs versus other occult sources of blood loss.    Recommendations:  - VCE today  - depending on capsule results, possible push endoscopy/colonoscopy for Friday or Monday  - NPO after midnight for possible endoscopy  - appreciate PPM interrogation in chart  - can continue with PPI  - continue with Fe, B12, folate supplementation  - would not restart octreotide unless possibility of obtaining it outpatient  - Monitor CBC daily, maintain active T&S, transfuse to goal per primary.    Please feel free to reach out to our team with any follow up questions.  All recommendations are tentative until note is attested by attending.     Arash Frazier MD  Internal Medicine, PGY1    Available on [employer] Teams  Pager  ([facility]) [phone] ([facility])

## 2023-03-23 NOTE — PROGRESS NOTE ADULT - SUBJECTIVE AND OBJECTIVE BOX
Patient is a 78y old  Male who presents with a chief complaint of Anemia (23 Mar 2023 09:27)    Patient seen and examined at bedside this evening. Patient sitting comfortably in chair, family at bedside. No complaints offered. Appears in good spirits.      MEDICATIONS  (STANDING):  ascorbic acid 500 milliGRAM(s) Oral daily  aspirin enteric coated 81 milliGRAM(s) Oral daily  atenolol  Tablet 25 milliGRAM(s) Oral daily  atorvastatin 10 milliGRAM(s) Oral at bedtime  cyanocobalamin 1000 MICROGram(s) Oral daily  ferrous    sulfate 325 milliGRAM(s) Oral daily  fluticasone propionate 50 MICROgram(s)/spray Nasal Spray 1 Spray(s) Both Nostrils <User Schedule>  furosemide    Tablet 40 milliGRAM(s) Oral daily  heparin  Infusion. 1300 Unit(s)/Hr (13 mL/Hr) IV Continuous <Continuous>  latanoprost 0.005% Ophthalmic Solution 1 Drop(s) Right EYE at bedtime  pantoprazole  Injectable 40 milliGRAM(s) IV Push two times a day  spironolactone 12.5 milliGRAM(s) Oral daily  tamsulosin 0.8 milliGRAM(s) Oral at bedtime    MEDICATIONS  (PRN):  acetaminophen     Tablet .. 650 milliGRAM(s) Oral every 6 hours PRN Temp greater or equal to 38C (100.4F), Mild Pain (1 - 3)  aluminum hydroxide/magnesium hydroxide/simethicone Suspension 30 milliLiter(s) Oral every 4 hours PRN Dyspepsia  melatonin 3 milliGRAM(s) Oral at bedtime PRN Insomnia  ondansetron Injectable 4 milliGRAM(s) IV Push every 8 hours PRN Nausea and/or Vomiting      ROS  No fever, sweats, chills  No epistaxis, HA, sore throat  No CP, SOB, cough, sputum  No n/v/d, abd pain, melena, hematochezia  No edema  No rash  No anxiety  No back pain, joint pain  No bleeding, bruising  No dysuria, hematuria    Vital Signs Last 24 Hrs  T(C): 36.7 (23 Mar 2023 13:25), Max: 36.7 (23 Mar 2023 13:25)  T(F): 98 (23 Mar 2023 13:25), Max: 98 (23 Mar 2023 13:25)  HR: 66 (23 Mar 2023 13:25) (64 - 66)  BP: 129/73 (23 Mar 2023 13:25) (129/73 - 139/66)  BP(mean): --  RR: 16 (23 Mar 2023 13:25) (16 - 16)  SpO2: 97% (23 Mar 2023 13:25) (96% - 97%)    Parameters below as of 23 Mar 2023 13:25  Patient On (Oxygen Delivery Method): room air        PE  NAD  Awake, alert  Anicteric, MMM  RRR  CTAB  Abd soft, NT, ND  No c/c/e  No rash grossly  FROM                          8.5    12.10 )-----------( 485      ( 23 Mar 2023 14:59 )             27.8       03-23    140  |  102  |  18  ----------------------------<  80  4.1   |  27  |  0.94    Ca    9.2      23 Mar 2023 07:00  Phos  3.3     03-23  Mg     2.1     03-23    TPro  6.5  /  Alb  3.6  /  TBili  0.3  /  DBili  x   /  AST  13  /  ALT  11  /  AlkPhos  94  03-23

## 2023-03-23 NOTE — PROGRESS NOTE ADULT - PROBLEM SELECTOR PLAN 7
Diet: clear liquid pending GI eval  DVT: therapeutic on coumadin>> on hold in setting of possible GI intervention, will restart tomorrow pending GI plan  Dispo: [facility]

## 2023-03-24 ENCOUNTER — TRANSCRIPTION ENCOUNTER (OUTPATIENT)
Age: 78
End: 2023-03-24

## 2023-03-24 ENCOUNTER — RESULT REVIEW (OUTPATIENT)
Age: 78
End: 2023-03-24

## 2023-03-24 LAB
APTT BLD: 34.1 SEC — SIGNIFICANT CHANGE UP (ref 27.5–35.5)
APTT BLD: 98.1 SEC — HIGH (ref 27.5–35.5)
HCT VFR BLD CALC: 25.9 % — LOW (ref 39–50)
HGB BLD-MCNC: 8 G/DL — LOW (ref 13–17)
MCHC RBC-ENTMCNC: 30.8 PG — SIGNIFICANT CHANGE UP (ref 27–34)
MCHC RBC-ENTMCNC: 30.9 GM/DL — LOW (ref 32–36)
MCV RBC AUTO: 99.6 FL — SIGNIFICANT CHANGE UP (ref 80–100)
NRBC # BLD: 0 /100 WBCS — SIGNIFICANT CHANGE UP (ref 0–0)
PLATELET # BLD AUTO: 481 K/UL — HIGH (ref 150–400)
RBC # BLD: 2.6 M/UL — LOW (ref 4.2–5.8)
RBC # FLD: 19 % — HIGH (ref 10.3–14.5)
WBC # BLD: 11.05 K/UL — HIGH (ref 3.8–10.5)
WBC # FLD AUTO: 11.05 K/UL — HIGH (ref 3.8–10.5)

## 2023-03-24 PROCEDURE — 91110 GI TRC IMG INTRAL ESOPH-ILE: CPT | Mod: 26

## 2023-03-24 PROCEDURE — 44366 SMALL BOWEL ENDOSCOPY: CPT

## 2023-03-24 PROCEDURE — 88305 TISSUE EXAM BY PATHOLOGIST: CPT | Mod: 26

## 2023-03-24 PROCEDURE — 99233 SBSQ HOSP IP/OBS HIGH 50: CPT

## 2023-03-24 RX ORDER — HEPARIN SODIUM 5000 [USP'U]/ML
1300 INJECTION INTRAVENOUS; SUBCUTANEOUS
Qty: 25000 | Refills: 0 | Status: DISCONTINUED | OUTPATIENT
Start: 2023-03-24 | End: 2023-03-26

## 2023-03-24 RX ORDER — LIDOCAINE HCL 20 MG/ML
8 VIAL (ML) INJECTION ONCE
Refills: 0 | Status: DISCONTINUED | OUTPATIENT
Start: 2023-03-24 | End: 2023-03-29

## 2023-03-24 RX ADMIN — HEPARIN SODIUM 1300 UNIT(S)/HR: 5000 INJECTION INTRAVENOUS; SUBCUTANEOUS at 07:02

## 2023-03-24 RX ADMIN — Medication 40 MILLIGRAM(S): at 05:42

## 2023-03-24 RX ADMIN — SPIRONOLACTONE 12.5 MILLIGRAM(S): 25 TABLET, FILM COATED ORAL at 05:43

## 2023-03-24 RX ADMIN — Medication 81 MILLIGRAM(S): at 21:16

## 2023-03-24 RX ADMIN — TAMSULOSIN HYDROCHLORIDE 0.8 MILLIGRAM(S): 0.4 CAPSULE ORAL at 21:16

## 2023-03-24 RX ADMIN — Medication 325 MILLIGRAM(S): at 12:08

## 2023-03-24 RX ADMIN — ATENOLOL 25 MILLIGRAM(S): 25 TABLET ORAL at 05:42

## 2023-03-24 RX ADMIN — HEPARIN SODIUM 1300 UNIT(S)/HR: 5000 INJECTION INTRAVENOUS; SUBCUTANEOUS at 20:11

## 2023-03-24 RX ADMIN — HEPARIN SODIUM 1300 UNIT(S)/HR: 5000 INJECTION INTRAVENOUS; SUBCUTANEOUS at 07:22

## 2023-03-24 RX ADMIN — Medication 500 MILLIGRAM(S): at 12:09

## 2023-03-24 RX ADMIN — PANTOPRAZOLE SODIUM 40 MILLIGRAM(S): 20 TABLET, DELAYED RELEASE ORAL at 21:16

## 2023-03-24 RX ADMIN — PREGABALIN 1000 MICROGRAM(S): 225 CAPSULE ORAL at 12:09

## 2023-03-24 RX ADMIN — PANTOPRAZOLE SODIUM 40 MILLIGRAM(S): 20 TABLET, DELAYED RELEASE ORAL at 05:43

## 2023-03-24 RX ADMIN — LATANOPROST 1 DROP(S): 0.05 SOLUTION/ DROPS OPHTHALMIC; TOPICAL at 21:17

## 2023-03-24 RX ADMIN — Medication 1 SPRAY(S): at 05:44

## 2023-03-24 RX ADMIN — ATORVASTATIN CALCIUM 10 MILLIGRAM(S): 80 TABLET, FILM COATED ORAL at 21:16

## 2023-03-24 NOTE — DISCHARGE NOTE PROVIDER - CARE PROVIDER_API CALL
[place] [address]  Phone: [phone]  Fax: [phone]  Established Patient  Follow Up Time: 1 week

## 2023-03-24 NOTE — PROGRESS NOTE ADULT - ATTENDING COMMENTS
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

## 2023-03-24 NOTE — PROGRESS NOTE ADULT - SUBJECTIVE AND OBJECTIVE BOX
PROGRESS NOTE:     Patient is a 78y old  Male who presents with a chief complaint of Anemia (23 Mar 2023 18:14)      SUBJECTIVE / OVERNIGHT EVENTS:    ADDITIONAL REVIEW OF SYSTEMS:    MEDICATIONS  (STANDING):  ascorbic acid 500 milliGRAM(s) Oral daily  aspirin enteric coated 81 milliGRAM(s) Oral daily  atenolol  Tablet 25 milliGRAM(s) Oral daily  atorvastatin 10 milliGRAM(s) Oral at bedtime  cyanocobalamin 1000 MICROGram(s) Oral daily  ferrous    sulfate 325 milliGRAM(s) Oral daily  fluticasone propionate 50 MICROgram(s)/spray Nasal Spray 1 Spray(s) Both Nostrils <User Schedule>  furosemide    Tablet 40 milliGRAM(s) Oral daily  heparin  Infusion. 1300 Unit(s)/Hr (13 mL/Hr) IV Continuous <Continuous>  latanoprost 0.005% Ophthalmic Solution 1 Drop(s) Right EYE at bedtime  pantoprazole  Injectable 40 milliGRAM(s) IV Push two times a day  spironolactone 12.5 milliGRAM(s) Oral daily  tamsulosin 0.8 milliGRAM(s) Oral at bedtime    MEDICATIONS  (PRN):  acetaminophen     Tablet .. 650 milliGRAM(s) Oral every 6 hours PRN Temp greater or equal to 38C (100.4F), Mild Pain (1 - 3)  aluminum hydroxide/magnesium hydroxide/simethicone Suspension 30 milliLiter(s) Oral every 4 hours PRN Dyspepsia  melatonin 3 milliGRAM(s) Oral at bedtime PRN Insomnia  ondansetron Injectable 4 milliGRAM(s) IV Push every 8 hours PRN Nausea and/or Vomiting      CAPILLARY BLOOD GLUCOSE        I&O's Summary      PHYSICAL EXAM:  Vital Signs Last 24 Hrs  T(C): 36.7 (24 Mar 2023 05:30), Max: 36.8 (23 Mar 2023 23:01)  T(F): 98 (24 Mar 2023 05:30), Max: 98.2 (23 Mar 2023 23:01)  HR: 61 (24 Mar 2023 05:30) (61 - 66)  BP: 120/56 (24 Mar 2023 05:30) (118/55 - 129/73)  BP(mean): --  RR: 16 (24 Mar 2023 05:30) (16 - 16)  SpO2: 94% (24 Mar 2023 05:30) (92% - 97%)    Parameters below as of 24 Mar 2023 05:30  Patient On (Oxygen Delivery Method): room air        GENERAL: No acute distress, well-developed  HEAD:  Atraumatic, Normocephalic  EYES: EOMI, PERRLA, conjunctiva and sclera clear  NECK: Supple, no lymphadenopathy, no JVD  CHEST/LUNG: CTAB; No wheezes, rales, or rhonchi  HEART: Regular rate and rhythm; No murmurs, rubs, or gallops  ABDOMEN: Soft, non-tender, non-distended; normal bowel sounds, no organomegaly  EXTREMITIES:  2+ peripheral pulses b/l, No clubbing, cyanosis, or edema  NEUROLOGY: A&O x 3, no focal deficits  SKIN: No rashes or lesions    LABS:                        8.0    11.05 )-----------( 481      ( 24 Mar 2023 06:47 )             25.9     03-23    140  |  102  |  18  ----------------------------<  80  4.1   |  27  |  0.94    Ca    9.2      23 Mar 2023 07:00  Phos  3.3     03-23  Mg     2.1     03-23    TPro  6.5  /  Alb  3.6  /  TBili  0.3  /  DBili  x   /  AST  13  /  ALT  11  /  AlkPhos  94  03-23    PT/INR - ( 23 Mar 2023 07:00 )   PT: 23.2 sec;   INR: 1.99 ratio         PTT - ( 24 Mar 2023 06:47 )  PTT:98.1 sec            RADIOLOGY & ADDITIONAL TESTS:  Results Reviewed:   Imaging Personally Reviewed:  Electrocardiogram Personally Reviewed:    COORDINATION OF CARE:  Care Discussed with Consultants/Other Providers [Y/N]:  Prior or Outpatient Records Reviewed [Y/N]:   PROGRESS NOTE:     Patient is a 78y old  Male who presents with a chief complaint of Anemia (23 Mar 2023 18:14)      SUBJECTIVE / OVERNIGHT EVENTS:  No events overnight. Patient examined at bedside- feels well. All ROS negative.     ADDITIONAL REVIEW OF SYSTEMS:    MEDICATIONS  (STANDING):  ascorbic acid 500 milliGRAM(s) Oral daily  aspirin enteric coated 81 milliGRAM(s) Oral daily  atenolol  Tablet 25 milliGRAM(s) Oral daily  atorvastatin 10 milliGRAM(s) Oral at bedtime  cyanocobalamin 1000 MICROGram(s) Oral daily  ferrous    sulfate 325 milliGRAM(s) Oral daily  fluticasone propionate 50 MICROgram(s)/spray Nasal Spray 1 Spray(s) Both Nostrils <User Schedule>  furosemide    Tablet 40 milliGRAM(s) Oral daily  heparin  Infusion. 1300 Unit(s)/Hr (13 mL/Hr) IV Continuous <Continuous>  latanoprost 0.005% Ophthalmic Solution 1 Drop(s) Right EYE at bedtime  pantoprazole  Injectable 40 milliGRAM(s) IV Push two times a day  spironolactone 12.5 milliGRAM(s) Oral daily  tamsulosin 0.8 milliGRAM(s) Oral at bedtime    MEDICATIONS  (PRN):  acetaminophen     Tablet .. 650 milliGRAM(s) Oral every 6 hours PRN Temp greater or equal to 38C (100.4F), Mild Pain (1 - 3)  aluminum hydroxide/magnesium hydroxide/simethicone Suspension 30 milliLiter(s) Oral every 4 hours PRN Dyspepsia  melatonin 3 milliGRAM(s) Oral at bedtime PRN Insomnia  ondansetron Injectable 4 milliGRAM(s) IV Push every 8 hours PRN Nausea and/or Vomiting      CAPILLARY BLOOD GLUCOSE        I&O's Summary      PHYSICAL EXAM:  Vital Signs Last 24 Hrs  T(C): 36.7 (24 Mar 2023 05:30), Max: 36.8 (23 Mar 2023 23:01)  T(F): 98 (24 Mar 2023 05:30), Max: 98.2 (23 Mar 2023 23:01)  HR: 61 (24 Mar 2023 05:30) (61 - 66)  BP: 120/56 (24 Mar 2023 05:30) (118/55 - 129/73)  BP(mean): --  RR: 16 (24 Mar 2023 05:30) (16 - 16)  SpO2: 94% (24 Mar 2023 05:30) (92% - 97%)    Parameters below as of 24 Mar 2023 05:30  Patient On (Oxygen Delivery Method): room air        GENERAL: No acute distress, well-developed  HEAD:  Atraumatic, Normocephalic  EYES: EOMI, PERRLA, conjunctiva and sclera clear  NECK: Supple, no lymphadenopathy, no JVD  CHEST/LUNG: CTAB; No wheezes, rales, or rhonchi  HEART: Regular rate and rhythm; No murmurs, rubs, or gallops  ABDOMEN: Soft, non-tender, non-distended; normal bowel sounds, no organomegaly  EXTREMITIES:  2+ peripheral pulses b/l, No clubbing, cyanosis, or edema  NEUROLOGY: A&O x 3, no focal deficits  SKIN: No rashes or lesions    LABS:                        8.0    11.05 )-----------( 481      ( 24 Mar 2023 06:47 )             25.9     03-23    140  |  102  |  18  ----------------------------<  80  4.1   |  27  |  0.94    Ca    9.2      23 Mar 2023 07:00  Phos  3.3     03-23  Mg     2.1     03-23    TPro  6.5  /  Alb  3.6  /  TBili  0.3  /  DBili  x   /  AST  13  /  ALT  11  /  AlkPhos  94  03-23    PT/INR - ( 23 Mar 2023 07:00 )   PT: 23.2 sec;   INR: 1.99 ratio         PTT - ( 24 Mar 2023 06:47 )  PTT:98.1 sec            RADIOLOGY & ADDITIONAL TESTS:  Results Reviewed:   Imaging Personally Reviewed:  Electrocardiogram Personally Reviewed:    COORDINATION OF CARE:  Care Discussed with Consultants/Other Providers [Y/N]:  Prior or Outpatient Records Reviewed [Y/N]:

## 2023-03-24 NOTE — DISCHARGE NOTE PROVIDER - NSDCMRMEDTOKEN_GEN_ALL_CORE_FT
acetaminophen 500 mg oral tablet: 1 tab(s) orally once a day (at bedtime)  Aspir 81 oral delayed release tablet: 1 tab(s) orally once a day  ATENOLOL 25 MG TABLET: 1 tab(s) orally once a day  ferrous sulfate 325 mg (65 mg elemental iron) oral tablet: 1 tab(s) orally once a day  Flonase: 1 spray(s) nasal once a day  Lasix 40 mg oral tablet: 1 tab(s) orally once a day  latanoprost 0.005% ophthalmic solution: 1 drop(s) to the right eye once a day (in the evening)  omeprazole 20 mg oral delayed release capsule: 1 cap(s) orally once a day  rosuvastatin 20 mg oral tablet: 0.5 tab(s) orally once a day  spironolactone 25 mg oral tablet: 0.5 tab(s) orally once a day  tamsulosin 0.4 mg oral capsule: 2 cap(s) orally once a day (at bedtime)  Vitamin B-12: 1 dose(s) injectable once a month    Note:Gets from MD office  Vitamin C: 1 tab(s) orally once a day  warfarin 1 mg oral tablet: 2 tab(s) orally 5 times a week (M-F)  warfarin 1 mg oral tablet: 1 tab(s) orally 2 times a week (Sat and Sun)   acetaminophen 500 mg oral tablet: 1 tab(s) orally once a day (at bedtime)  Aspir 81 oral delayed release tablet: 1 tab(s) orally once a day  ATENOLOL 25 MG TABLET: 1 tab(s) orally once a day  ferrous sulfate 325 mg (65 mg elemental iron) oral tablet: 1 tab(s) orally once a day  Flonase: 1 spray(s) nasal once a day  Lasix 40 mg oral tablet: 1 tab(s) orally once a day  latanoprost 0.005% ophthalmic solution: 1 drop(s) to the right eye once a day (in the evening)  omeprazole 20 mg oral delayed release capsule: 1 cap(s) orally once a day  rosuvastatin 20 mg oral tablet: 0.5 tab(s) orally once a day  spironolactone 25 mg oral tablet: 0.5 tab(s) orally once a day  tamsulosin 0.4 mg oral capsule: 2 cap(s) orally once a day (at bedtime)  Vitamin B-12: 1 dose(s) injectable once a month    Note:Gets from MD office  Vitamin C: 1 tab(s) orally once a day  warfarin 1 mg oral tablet: 2 tab(s) orally Monday through Friday as above  warfarin 1 mg oral tablet: 1 tab(s) orally Saturday and Sunday as above

## 2023-03-24 NOTE — DISCHARGE NOTE PROVIDER - NSDCCPCAREPLAN_GEN_ALL_CORE_FT
PRINCIPAL DISCHARGE DIAGNOSIS  Diagnosis: Anemia  Assessment and Plan of Treatment: You came in to the hospital with a hemoglobin of 8.3, likely in the setting of a GI bleed. You underwent and EGD that showed ***. You were started on ***. Please continue to take *** as directed and follow up with the gastroenterology team within 4 weeks.      SECONDARY DISCHARGE DIAGNOSES  Diagnosis: Atrial fibrillation  Assessment and Plan of Treatment: While you were in the hospital, your COUMADIN was stopped and you were placed on a heparin drip. After your procedure, you were restarted on coumadin and your INR was in the therapeutic goal range upon discharge. Please continue to take your medication as directed and follow up with your primary care provider for weekly INR checks,     PRINCIPAL DISCHARGE DIAGNOSIS  Diagnosis: Anemia  Assessment and Plan of Treatment: You came in to the hospital with a hemoglobin of 8.3, likely in the setting of a GI bleed. You underwent and enteroscopy that showed gastric erosions and AMVs but no active bleed. A biopsy showed chronic inflammation and no evidence of H pylori.  Please continue to take your PPI and follow up with the gastroenterology team within 4 weeks.      SECONDARY DISCHARGE DIAGNOSES  Diagnosis: Atrial fibrillation  Assessment and Plan of Treatment: While you were in the hospital, your COUMADIN was stopped and you were placed on a heparin drip. After your procedure, you were restarted on coumadin and your INR was in the therapeutic goal range upon discharge. Please continue to take your medication as directed and follow up with your primary care provider for weekly INR checks,     PRINCIPAL DISCHARGE DIAGNOSIS  Diagnosis: Anemia  Assessment and Plan of Treatment: You came in to the hospital with a hemoglobin of 8.3, likely in the setting of a GI bleed. You underwent and enteroscopy that showed gastric erosions and AMVs but no active bleed. A biopsy showed chronic inflammation and no evidence of H pylori.  Please continue to take your PPI and follow up with the gastroenterology team within 4 weeks.      SECONDARY DISCHARGE DIAGNOSES  Diagnosis: Atrial fibrillation  Assessment and Plan of Treatment: While you were in the hospital, your COUMADIN was stopped and you were placed on a heparin drip. After your procedure, you were restarted on coumadin and your INR was in the therapeutic goal range upon discharge. Please continue to take your medication as directed and follow up with your primary care provider in 1 week for INR check.

## 2023-03-24 NOTE — PROGRESS NOTE ADULT - PROBLEM SELECTOR PLAN 1
Pt w/ chronic anemia multifactorial 2/2 MARC, Folate, B12 deficiency vs chronic GI loses such as known AVM. Low concern for acute GI bleed given FOBT negative. Pt on IV iron infusions, next one was planned for thursday.   #thrombocytosis   #eosinophila   GI workup 1/30 w/ AVM and pt was planned to be discharged on octreotide, however could not obtain due to insurance  Pt currently on coumadin and ASA; plavix was stopped in February  - GI following, appreciate recs   - Maintain T&S, transfuse for <8  - obtain iron, B12, folate labs  - c/w home vitamin + iron supplementation  - PPI BID  - hold off starting octreotide pending GI recs  - heme consulted, appreciate recs>> for eosinophilia, nothing to do for now, can follow up OP Pt w/ chronic anemia multifactorial 2/2 MARC, Folate, B12 deficiency vs chronic GI loses such as known AVM. Low concern for acute GI bleed given FOBT negative. Pt on IV iron infusions, next one was planned for thursday.   #thrombocytosis   #eosinophila   GI workup 1/30 w/ AVM and pt was planned to be discharged on octreotide, however could not obtain due to insurance  Pt currently on coumadin and ASA; plavix was stopped in February  - GI following, appreciate recs > s/p capsule endoscopy with gastric erosions, AVM>>plan for endoscopy today  - Maintain T&S, transfuse for <8  - obtain iron, B12, folate labs  - c/w home vitamin + iron supplementation  - PPI BID  - hold off starting octreotide per GI recs  - heme consulted, appreciate recs>> for eosinophilia, nothing to do for now, can follow up OP Pt w/ chronic anemia multifactorial 2/2 MARC, Folate, B12 deficiency vs chronic GI loses such as known AVM. Low concern for acute GI bleed given FOBT negative. Pt on IV iron infusions, next one was planned for thursday.   #thrombocytosis   #eosinophila   GI workup 1/30 w/ AVM and pt was planned to be discharged on octreotide, however could not obtain due to insurance  Pt currently on coumadin and ASA; plavix was stopped in February  - GI following, appreciate recs > s/p capsule endoscopy with gastric erosions, AVM>>plan for EGD today  - Maintain T&S, transfuse for <8  - obtain iron, B12, folate labs  - c/w home vitamin + iron supplementation  - PPI BID  - hold off starting octreotide per GI recs  - heme consulted, appreciate recs>> for eosinophilia, nothing to do for now, can follow up OP

## 2023-03-24 NOTE — DISCHARGE NOTE PROVIDER - NSDCFUSCHEDAPPT_GEN_ALL_CORE_FT
Shaheen Rutherford  [facility] Physician [facility]  ORTHOSURG [address] [facility]  Scheduled Appointment: 03/30/2023    Yumiko Mejia  [facility]  CARDIOLOGY [address]  Scheduled Appointment: 05/09/2023    [facility]  ELECTROPH [address]  Scheduled Appointment: 06/15/2023

## 2023-03-24 NOTE — DISCHARGE NOTE PROVIDER - NSFOLLOWUPCLINICS_GEN_ALL_ED_FT
Gastroenterology at [facility]  Gastroenterology  [address] [place], [place] [id number]  Phone: [phone]  Fax:   Follow Up Time: 1 month

## 2023-03-24 NOTE — DISCHARGE NOTE PROVIDER - NSDCCPTREATMENT_GEN_ALL_CORE_FT
PRINCIPAL PROCEDURE  Procedure: Push enteroscopy  Findings and Treatment:   < end of copied text >  Impression:          - Gastric and duodenal erythema. Bx taken of stomach to r/o HP.                       - One non-bleeding angiodysplastic lesion in the duodenum. Treated with argon                        plasma coagulation (APC).         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  Recommendation:      - Return patient to hospital [facility] for ongoing care.                       - Can resume diet as tolerated.                       - Okay to restart anticoagulation.                       - Monitor CBC daily.< from: Enteroscopy (03.24.23 @ 16:08) >

## 2023-03-24 NOTE — PROGRESS NOTE ADULT - PROBLEM SELECTOR PLAN 7
Diet: clear liquid pending GI eval  DVT: therapeutic on coumadin>> on hold in setting of possible GI intervention, will restart tomorrow pending GI plan  Dispo: [facility] Diet: clear liquid pending GI eval  DVT: heparin gtt>> on hold for endoscopy   Dispo: TBD Diet: clear liquid pending GI eval  DVT: heparin gtt>> on hold for EGD  Dispo: TBD

## 2023-03-24 NOTE — PROGRESS NOTE ADULT - PROBLEM SELECTOR PLAN 2
Pt w/ A.fib, CHADSVASC 7, on coumadin   INR 2.57, pt on 2mg M-F and 1mg S/Sun  Pt high risk for stroke, but given therapeutic range, will hold warfarin pending further anemia eval >>on heparin gtt   -> c/w atenolol Pt w/ A.fib, CHADSVASC 7, on coumadin   INR 2.57, pt on 2mg M-F and 1mg S/Sun  Pt high risk for stroke, but given therapeutic range, will hold warfarin pending further anemia eval >>on heparin gtt (on hold for endoscopy)  -> c/w atenolol Pt w/ A.fib, CHADSVASC 7, on coumadin   INR 2.57, pt on 2mg M-F and 1mg S/Sun  Pt high risk for stroke, but given therapeutic range, will hold warfarin pending further anemia eval >>on heparin gtt (on hold for EGD)  -> c/w atenolol

## 2023-03-24 NOTE — DISCHARGE NOTE PROVIDER - HOSPITAL COURSE
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 Pt previously w/ Hg 10.9 02/2023, now with Hg of 8.3 on admission. Pt endorsed intermittent lightheadedness earlier in the day that has resolved. Patient has a history of dark stools due to PO iron, last BM yesterday, no black/tarry stools, no hematochezia, no hematemesis. Pt currently on PO iron and on IV Iron infusion, s/p 2 infusions in the past two weeks. Pt also denies F/C/N/V, CP, SOB, cough, abd pain.   	  Per chart review, pt with multiple admissions for anemia requiring transfusions, most recently on 01/27-02/03. At that time pt underwent endoscopy with GI on 1/30 which revealed transient bleeding AVM's. Pt was resumed on warfarin on discharge. Pt completed 30 days of DAPT after which was continued only on ASA. Pt was prescribed octreotide on discharge, but was unable to obtain in due to insurance denial.    Evaluated by GI - underwent capsule endoscopy showing gastric erosions and multiple AVMs. Underwent EGD showing ***. Started on ****. Deferred starting octreotide this admission because patient would not be able to obtain outside of the hospital. Warfarin held on admission in setting of severe anemia secondary to GIB. Started on heparin gtt because INR subtherapeutic. Bridged from heparin gtt to warfarin with therapeutic INR of *** upon discharge. Course complicated by thrombocytosis and eosinophilia- evaluated by heme group- likely reactive and can follow up outpatient for monitoring and further workup.     Clinically stable and ready for discharge.     PROVIDER FOLLOW UPS:  1. PCP   2. Gastroenterology   3. 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 Pt previously w/ Hg 10.9 02/2023, now with Hg of 8.3 on admission. Pt endorsed intermittent lightheadedness earlier in the day that has resolved. Patient has a history of dark stools due to PO iron, last BM yesterday, no black/tarry stools, no hematochezia, no hematemesis. Pt currently on PO iron and on IV Iron infusion, s/p 2 infusions in the past two weeks. Pt also denies F/C/N/V, CP, SOB, cough, abd pain.   	  Per chart review, pt with multiple admissions for anemia requiring transfusions, most recently on 01/27-02/03. At that time pt underwent endoscopy with GI on 1/30 which revealed transient bleeding AVM's. Pt was resumed on warfarin on discharge. Pt completed 30 days of DAPT after which was continued only on ASA. Pt was prescribed octreotide on discharge, but was unable to obtain in due to insurance denial.    Evaluated by GI - underwent capsule endoscopy showing gastric erosions and multiple AVMs. Underwent enteroscopy showing gastric and duodenal erythema. Bx taken of stomach to r/o HP. One non-bleeding angiodysplastic lesion in the duodenum. Treated with argon plasma coagulation (APC), no active bleeding, tatoo placed at distal most portion of exam. Biopsy showed gastric antral mucosa with chronic inactive gastritis, gastric oxyntic mucosa with minimal chronic inflammation, no morphologic evidence of Helicobacter microorganisms. Deferred starting octreotide this admission because patient would not be able to obtain outside of the hospital. Warfarin held on admission in setting of severe anemia secondary to GIB. Started on heparin gtt because INR subtherapeutic. Bridged from heparin gtt to warfarin with therapeutic INR of 2.31 upon discharge. Course complicated by thrombocytosis and eosinophilia- evaluated by heme group- likely reactive and can follow up outpatient for monitoring and further workup.     Clinically stable and ready for discharge.     PROVIDER FOLLOW UPS:  1. PCP   2. Gastroenterology   3. Hematology     MEDICATION CHANGES:  None

## 2023-03-25 LAB
ALBUMIN SERPL ELPH-MCNC: 3.8 G/DL — SIGNIFICANT CHANGE UP (ref 3.3–5)
ALP SERPL-CCNC: 142 U/L — HIGH (ref 40–120)
ALT FLD-CCNC: 16 U/L — SIGNIFICANT CHANGE UP (ref 10–45)
ANION GAP SERPL CALC-SCNC: 13 MMOL/L — SIGNIFICANT CHANGE UP (ref 5–17)
APTT BLD: 62.8 SEC — HIGH (ref 27.5–35.5)
APTT BLD: 68.6 SEC — HIGH (ref 27.5–35.5)
AST SERPL-CCNC: 25 U/L — SIGNIFICANT CHANGE UP (ref 10–40)
BILIRUB SERPL-MCNC: 0.4 MG/DL — SIGNIFICANT CHANGE UP (ref 0.2–1.2)
BUN SERPL-MCNC: 23 MG/DL — SIGNIFICANT CHANGE UP (ref 7–23)
CALCIUM SERPL-MCNC: 9.2 MG/DL — SIGNIFICANT CHANGE UP (ref 8.4–10.5)
CHLORIDE SERPL-SCNC: 100 MMOL/L — SIGNIFICANT CHANGE UP (ref 96–108)
CO2 SERPL-SCNC: 27 MMOL/L — SIGNIFICANT CHANGE UP (ref 22–31)
CREAT SERPL-MCNC: 1.09 MG/DL — SIGNIFICANT CHANGE UP (ref 0.5–1.3)
EGFR: 69 ML/MIN/1.73M2 — SIGNIFICANT CHANGE UP
GLUCOSE SERPL-MCNC: 100 MG/DL — HIGH (ref 70–99)
HCT VFR BLD CALC: 27.3 % — LOW (ref 39–50)
HCT VFR BLD CALC: 39.2 % — SIGNIFICANT CHANGE UP (ref 39–50)
HGB BLD-MCNC: 12.5 G/DL — LOW (ref 13–17)
HGB BLD-MCNC: 8.3 G/DL — LOW (ref 13–17)
INR BLD: 1.56 RATIO — HIGH (ref 0.88–1.16)
MAGNESIUM SERPL-MCNC: 2 MG/DL — SIGNIFICANT CHANGE UP (ref 1.6–2.6)
MCHC RBC-ENTMCNC: 30.1 PG — SIGNIFICANT CHANGE UP (ref 27–34)
MCHC RBC-ENTMCNC: 30.4 GM/DL — LOW (ref 32–36)
MCHC RBC-ENTMCNC: 30.9 PG — SIGNIFICANT CHANGE UP (ref 27–34)
MCHC RBC-ENTMCNC: 31.9 GM/DL — LOW (ref 32–36)
MCV RBC AUTO: 97 FL — SIGNIFICANT CHANGE UP (ref 80–100)
MCV RBC AUTO: 98.9 FL — SIGNIFICANT CHANGE UP (ref 80–100)
NRBC # BLD: 0 /100 WBCS — SIGNIFICANT CHANGE UP (ref 0–0)
NRBC # BLD: 0 /100 WBCS — SIGNIFICANT CHANGE UP (ref 0–0)
PLATELET # BLD AUTO: 353 K/UL — SIGNIFICANT CHANGE UP (ref 150–400)
PLATELET # BLD AUTO: 502 K/UL — HIGH (ref 150–400)
POTASSIUM SERPL-MCNC: 4 MMOL/L — SIGNIFICANT CHANGE UP (ref 3.5–5.3)
POTASSIUM SERPL-SCNC: 4 MMOL/L — SIGNIFICANT CHANGE UP (ref 3.5–5.3)
PROT SERPL-MCNC: 6.9 G/DL — SIGNIFICANT CHANGE UP (ref 6–8.3)
PROTHROM AB SERPL-ACNC: 18.2 SEC — HIGH (ref 10.5–13.4)
RBC # BLD: 2.76 M/UL — LOW (ref 4.2–5.8)
RBC # BLD: 4.04 M/UL — LOW (ref 4.2–5.8)
RBC # FLD: 18.6 % — HIGH (ref 10.3–14.5)
RBC # FLD: 19 % — HIGH (ref 10.3–14.5)
SODIUM SERPL-SCNC: 140 MMOL/L — SIGNIFICANT CHANGE UP (ref 135–145)
WBC # BLD: 12.18 K/UL — HIGH (ref 3.8–10.5)
WBC # BLD: 7.37 K/UL — SIGNIFICANT CHANGE UP (ref 3.8–10.5)
WBC # FLD AUTO: 12.18 K/UL — HIGH (ref 3.8–10.5)
WBC # FLD AUTO: 7.37 K/UL — SIGNIFICANT CHANGE UP (ref 3.8–10.5)

## 2023-03-25 PROCEDURE — 99233 SBSQ HOSP IP/OBS HIGH 50: CPT

## 2023-03-25 RX ORDER — WARFARIN SODIUM 2.5 MG/1
2 TABLET ORAL ONCE
Refills: 0 | Status: COMPLETED | OUTPATIENT
Start: 2023-03-25 | End: 2023-03-25

## 2023-03-25 RX ADMIN — SPIRONOLACTONE 12.5 MILLIGRAM(S): 25 TABLET, FILM COATED ORAL at 06:00

## 2023-03-25 RX ADMIN — WARFARIN SODIUM 2 MILLIGRAM(S): 2.5 TABLET ORAL at 22:48

## 2023-03-25 RX ADMIN — ATENOLOL 25 MILLIGRAM(S): 25 TABLET ORAL at 06:00

## 2023-03-25 RX ADMIN — Medication 1 SPRAY(S): at 06:00

## 2023-03-25 RX ADMIN — PANTOPRAZOLE SODIUM 40 MILLIGRAM(S): 20 TABLET, DELAYED RELEASE ORAL at 06:00

## 2023-03-25 RX ADMIN — Medication 325 MILLIGRAM(S): at 12:18

## 2023-03-25 RX ADMIN — PANTOPRAZOLE SODIUM 40 MILLIGRAM(S): 20 TABLET, DELAYED RELEASE ORAL at 17:11

## 2023-03-25 RX ADMIN — HEPARIN SODIUM 13 UNIT(S)/HR: 5000 INJECTION INTRAVENOUS; SUBCUTANEOUS at 10:21

## 2023-03-25 RX ADMIN — TAMSULOSIN HYDROCHLORIDE 0.8 MILLIGRAM(S): 0.4 CAPSULE ORAL at 22:48

## 2023-03-25 RX ADMIN — Medication 40 MILLIGRAM(S): at 06:00

## 2023-03-25 RX ADMIN — ATORVASTATIN CALCIUM 10 MILLIGRAM(S): 80 TABLET, FILM COATED ORAL at 22:48

## 2023-03-25 RX ADMIN — HEPARIN SODIUM 13 UNIT(S)/HR: 5000 INJECTION INTRAVENOUS; SUBCUTANEOUS at 16:01

## 2023-03-25 RX ADMIN — PREGABALIN 1000 MICROGRAM(S): 225 CAPSULE ORAL at 12:18

## 2023-03-25 RX ADMIN — Medication 81 MILLIGRAM(S): at 12:18

## 2023-03-25 RX ADMIN — Medication 500 MILLIGRAM(S): at 12:18

## 2023-03-25 RX ADMIN — LATANOPROST 1 DROP(S): 0.05 SOLUTION/ DROPS OPHTHALMIC; TOPICAL at 22:49

## 2023-03-25 NOTE — PROGRESS NOTE ADULT - SUBJECTIVE AND OBJECTIVE BOX
Pt seen, feeling better, s/p scope yesterday that found a nonbleeding angiodysplasic lesion in the duodenum, s/p APC, and no active bleeding noted. He was restarted on hep gtt to coumadin. No bleeding.     MEDICATIONS  (STANDING):  ascorbic acid 500 milliGRAM(s) Oral daily  aspirin enteric coated 81 milliGRAM(s) Oral daily  atenolol  Tablet 25 milliGRAM(s) Oral daily  atorvastatin 10 milliGRAM(s) Oral at bedtime  cyanocobalamin 1000 MICROGram(s) Oral daily  ferrous    sulfate 325 milliGRAM(s) Oral daily  fluticasone propionate 50 MICROgram(s)/spray Nasal Spray 1 Spray(s) Both Nostrils <User Schedule>  furosemide    Tablet 40 milliGRAM(s) Oral daily  heparin  Infusion. 1300 Unit(s)/Hr (13 mL/Hr) IV Continuous <Continuous>  latanoprost 0.005% Ophthalmic Solution 1 Drop(s) Right EYE at bedtime  lidocaine 2% Injection for Nebulization 8 milliLiter(s) Nebulizer once  pantoprazole  Injectable 40 milliGRAM(s) IV Push two times a day  spironolactone 12.5 milliGRAM(s) Oral daily  tamsulosin 0.8 milliGRAM(s) Oral at bedtime  warfarin 2 milliGRAM(s) Oral once    MEDICATIONS  (PRN):  acetaminophen     Tablet .. 650 milliGRAM(s) Oral every 6 hours PRN Temp greater or equal to 38C (100.4F), Mild Pain (1 - 3)  aluminum hydroxide/magnesium hydroxide/simethicone Suspension 30 milliLiter(s) Oral every 4 hours PRN Dyspepsia  melatonin 3 milliGRAM(s) Oral at bedtime PRN Insomnia  ondansetron Injectable 4 milliGRAM(s) IV Push every 8 hours PRN Nausea and/or Vomiting      ROS  No fever, sweats, chills  No epistaxis, HA, sore throat  No CP, SOB, cough, sputum  No n/v/d, abd pain, melena, hematochezia  No edema  No rash  No anxiety  No back pain, joint pain  No bleeding, bruising  No dysuria, hematuria    Vital Signs Last 24 Hrs  T(C): 36.7 (25 Mar 2023 08:23), Max: 36.7 (24 Mar 2023 21:30)  T(F): 98.1 (25 Mar 2023 08:23), Max: 98.1 (25 Mar 2023 08:23)  HR: 64 (25 Mar 2023 08:23) (59 - 64)  BP: 131/70 (25 Mar 2023 08:23) (115/68 - 156/70)  BP(mean): 74 (24 Mar 2023 16:12) (74 - 74)  RR: 16 (25 Mar 2023 08:23) (12 - 21)  SpO2: 93% (25 Mar 2023 08:23) (93% - 97%)    Parameters below as of 25 Mar 2023 08:23  Patient On (Oxygen Delivery Method): room air        PE  NAD  Awake, alert  Anicteric  limited 2/2 covid pandemic                          12.5   7.37  )-----------( 353      ( 25 Mar 2023 08:21 )             39.2       03-25    140  |  100  |  23  ----------------------------<  100<H>  4.0   |  27  |  1.09    Ca    9.2      25 Mar 2023 08:21  Mg     2.0     03-25    TPro  6.9  /  Alb  3.8  /  TBili  0.4  /  DBili  x   /  AST  25  /  ALT  16  /  AlkPhos  142<H>  03-25

## 2023-03-25 NOTE — CHART NOTE - NSCHARTNOTEFT_GEN_A_CORE
Patient has a [employer] [id number] Micro VE TCP pacemaker- last interrogated in 12/2022 and 3/2023 with no events. Documentation in patient chart.
Risks of video capsule endoscopy explained, including risk of retained capsule, potentially requiring surgery for removal.  Patient understands and agrees to risks.    Capsule ID: [id number]    Capsule swallowed at: 8:15 am    NPO now.  Resume Clear liquid diet at:  10:15 am  Resume regular consistency diet at: 12:15 pm    Equipment can be removed by nursing staff at: 9 PM by the nurse    GI fellow will retrieve equipment in the morning.    NO MRI UNTIL CAPSULE CLEARANCE CONFIRMED. CAPSULE IS NOT MRI COMPATIBLE.     GI will continue to follow.     Rozina Patel, PGY-4  Gastroenterology/Hepatology Fellow  Available on Microsoft Teams  [phone] (Short Range Pager)  [phone] (Long Range Pager)    After 5pm, please contact the on-call GI fellow. [phone]
Chart note written given technical difficulties with [facility] downtime overnight and today.    Chart reviewed. S/p push enteroscopy (3/24) with findings listed below. Per chart review Hgb stable ~8.    Impression:          - Gastric and duodenal erythema. Bx taken of stomach to r/o HP.                       - One non-bleeding angiodysplastic lesion in the duodenum. Treated with argon                        plasma coagulation (APC).         - No active bleeding                       - Tattoo placed at distal most portion of exam                       - Anemia likely due to angioectasia in the small intestine.    Recs:  -trend Hgb daily; transfuse if Hgb <8  -if no signs of GIB can resume blood thinners    Riddhi Aleman MD  GI Fellow, PGY-5  Available via [employer] Teams    NON-URGENT CONSULTS:  Please email [email or web address]  [email or web address]  AT NIGHT AND ON WEEKENDS:  Contact on-call GI fellow via answering service ([phone]) from 5pm-8am and on weekends/holidays  MONDAY-FRIDAY 8AM-5PM:  Pager# [phone] ([facility]) or [phone] ([facility])  GI Phone# [phone] ([facility])

## 2023-03-25 NOTE — PROGRESS NOTE ADULT - ATTENDING COMMENTS
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    #anemia  #afib  #CAD  # LE edema  #BPH  #HTN  #eosinophilia     - patient seen and examined doing well, no complaints  - reports dark stools, but likely due to Fe supplementation  - anemia likely multifactorial in etiology due to AVM and MARC, folate, b12 def, CKD  - heme and GI following, transfuse if hgb <7.0, c/w PPI  - patient did not obtain octreotide outpatient due to insurance authorization  push endoscopy/colonoscopy   - heparin started due to subtherapeutic INR, monitoring for PTT and bleeding  - transition to Coumadin  - trace LE pitting edema R>L, LE duplex negative for DVT 77 yo M with a PMH of CAD recent stents to RCA 1/19/2023, TAVR on 1/21/2023 c/b anemia requiring multiple transfusions, chronic anemia (secondary to Fe/B12/folate on PO iron and iron infusions q weekly), HTN, Afib on coumadin, PPM, 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    #anemia  #afib  #CAD  # LE edema  #BPH  #HTN  #eosinophilia     - patient seen and examined doing well, no complaints  - reports dark stools, but likely due to Fe supplementation  - anemia likely multifactorial in etiology due to AVM and MARC, folate, b12 def, CKD  - heme and GI following, transfuse if hgb <7.0, c/w PPI  - patient did not obtain octreotide outpatient due to insurance authorization  push endoscopy/colonoscopy   - heparin started due to subtherapeutic INR, monitoring for PTT and bleeding  - bridge to coumadin given high CHADSVASc score  - trace LE pitting edema R>L, LE duplex negative for DVT

## 2023-03-25 NOTE — PROGRESS NOTE ADULT - PROBLEM SELECTOR PLAN 1
Pt w/ chronic anemia multifactorial 2/2 MARC, Folate, B12 deficiency vs chronic GI loses such as known AVM. Low concern for acute GI bleed given FOBT negative. Pt on IV iron infusions, next one was planned for thursday.   #thrombocytosis   #eosinophila   GI workup 1/30 w/ AVM and pt was planned to be discharged on octreotide, however could not obtain due to insurance  Pt currently on coumadin and ASA; plavix was stopped in February  - GI following, appreciate recs > s/p capsule endoscopy with gastric erosions, AVM>>EGD 3/24 showing angioectasia small intestine and 1 lesion on duodenum cauterized  - Maintain T&S, transfuse for <8  - obtain iron, B12, folate labs  - c/w home vitamin + iron supplementation  - PPI BID  - hold off starting octreotide per GI recs  - heme consulted, appreciate recs>> for eosinophilia, nothing to do for now, can follow up OP

## 2023-03-25 NOTE — PROGRESS NOTE ADULT - ASSESSMENT
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

## 2023-03-25 NOTE — PROGRESS NOTE ADULT - ASSESSMENT
78 year old male with anemia due to iron deficiency, CKD and bleeding AVMs, thrombocytosis and B12 deficiency, followed by Dr. Goel and Dr. Bermeo at [facility]. He is s/p TAVR procedure 1/19/2023. Presenting with low hemoglobin.     Anemia  - Due to iron and B12 deficiency, CKD, bleeding AVMs  - S/p IV Venofer x 2 on 3/16  - S/p B12 IM injection on 2/6  - noted to have angiodysplastic lesion in duodenum on scope on 3/24, now s/p APC  - hgb improved today    Valvular disease  - Follows w/ cardiology, on warfarin  - S/p TAVR procedure 1/19/2023  - now back on hep gtt bridge to coumadin    D/w pt, will follow.

## 2023-03-25 NOTE — PROGRESS NOTE ADULT - SUBJECTIVE AND OBJECTIVE BOX
PROGRESS NOTE:     Patient is a 78y old  Male who presents with a chief complaint of Anemia (23 Mar 2023 18:14)      SUBJECTIVE / OVERNIGHT EVENTS:  No events overnight. Patient examined at bedside- feels well. EGD done yesterday, found 1 lesion and cauterized, All ROS negative.     ADDITIONAL REVIEW OF SYSTEMS:    MEDICATIONS  (STANDING):  ascorbic acid 500 milliGRAM(s) Oral daily  aspirin enteric coated 81 milliGRAM(s) Oral daily  atenolol  Tablet 25 milliGRAM(s) Oral daily  atorvastatin 10 milliGRAM(s) Oral at bedtime  cyanocobalamin 1000 MICROGram(s) Oral daily  ferrous    sulfate 325 milliGRAM(s) Oral daily  fluticasone propionate 50 MICROgram(s)/spray Nasal Spray 1 Spray(s) Both Nostrils <User Schedule>  furosemide    Tablet 40 milliGRAM(s) Oral daily  heparin  Infusion. 1300 Unit(s)/Hr (13 mL/Hr) IV Continuous <Continuous>  latanoprost 0.005% Ophthalmic Solution 1 Drop(s) Right EYE at bedtime  pantoprazole  Injectable 40 milliGRAM(s) IV Push two times a day  spironolactone 12.5 milliGRAM(s) Oral daily  tamsulosin 0.8 milliGRAM(s) Oral at bedtime    MEDICATIONS  (PRN):  acetaminophen     Tablet .. 650 milliGRAM(s) Oral every 6 hours PRN Temp greater or equal to 38C (100.4F), Mild Pain (1 - 3)  aluminum hydroxide/magnesium hydroxide/simethicone Suspension 30 milliLiter(s) Oral every 4 hours PRN Dyspepsia  melatonin 3 milliGRAM(s) Oral at bedtime PRN Insomnia  ondansetron Injectable 4 milliGRAM(s) IV Push every 8 hours PRN Nausea and/or Vomiting      CAPILLARY BLOOD GLUCOSE        I&O's Summary      PHYSICAL EXAM:  Vital Signs Last 24 Hrs  T(C): 36.7 (24 Mar 2023 05:30), Max: 36.8 (23 Mar 2023 23:01)  T(F): 98 (24 Mar 2023 05:30), Max: 98.2 (23 Mar 2023 23:01)  HR: 61 (24 Mar 2023 05:30) (61 - 66)  BP: 120/56 (24 Mar 2023 05:30) (118/55 - 129/73)  BP(mean): --  RR: 16 (24 Mar 2023 05:30) (16 - 16)  SpO2: 94% (24 Mar 2023 05:30) (92% - 97%)    Parameters below as of 24 Mar 2023 05:30  Patient On (Oxygen Delivery Method): room air        GENERAL: No acute distress, well-developed  HEAD:  Atraumatic, Normocephalic  EYES: EOMI, PERRLA, conjunctiva and sclera clear  NECK: Supple, no lymphadenopathy, no JVD  CHEST/LUNG: CTAB; No wheezes, rales, or rhonchi  HEART: Regular rate and rhythm; No murmurs, rubs, or gallops  ABDOMEN: Soft, non-tender, non-distended; normal bowel sounds, no organomegaly  EXTREMITIES:  2+ peripheral pulses b/l, No clubbing, cyanosis, or edema  NEUROLOGY: A&O x 3, no focal deficits  SKIN: No rashes or lesions    LABS:                        12.5   7.37  )-----------( 353      ( 25 Mar 2023 08:21 )             39.2     03-25    140  |  100  |  23  ----------------------------<  100<H>  4.0   |  27  |  1.09    Ca    9.2      25 Mar 2023 08:21  Mg     2.0     03-25    TPro  6.9  /  Alb  3.8  /  TBili  0.4  /  DBili  x   /  AST  25  /  ALT  16  /  AlkPhos  142<H>  03-25          RADIOLOGY & ADDITIONAL TESTS:  Results Reviewed:   Imaging Personally Reviewed:  Electrocardiogram Personally Reviewed:    COORDINATION OF CARE:  Care Discussed with Consultants/Other Providers [Y/N]:  Prior or Outpatient Records Reviewed [Y/N]:

## 2023-03-25 NOTE — PROGRESS NOTE ADULT - PROBLEM SELECTOR PLAN 2
Pt w/ A.fib, CHADSVASC 7, on coumadin   INR 2.57, pt on 2mg M-F and 1mg S/Sun  Pt high risk for stroke, but given therapeutic range, will hold warfarin pending further anemia eval >>on heparin gtt (on hold for EGD)  -> c/w atenolol  -start bridge to warfarin, home regimen is 2mg M-F and 1mg weekends, ordered for warfarin 2mg starting 3/25

## 2023-03-26 LAB
ANION GAP SERPL CALC-SCNC: 10 MMOL/L — SIGNIFICANT CHANGE UP (ref 5–17)
APTT BLD: 103.6 SEC — HIGH (ref 27.5–35.5)
APTT BLD: 74.4 SEC — HIGH (ref 27.5–35.5)
APTT BLD: 86.8 SEC — HIGH (ref 27.5–35.5)
BUN SERPL-MCNC: 26 MG/DL — HIGH (ref 7–23)
CALCIUM SERPL-MCNC: 9 MG/DL — SIGNIFICANT CHANGE UP (ref 8.4–10.5)
CHLORIDE SERPL-SCNC: 100 MMOL/L — SIGNIFICANT CHANGE UP (ref 96–108)
CO2 SERPL-SCNC: 29 MMOL/L — SIGNIFICANT CHANGE UP (ref 22–31)
CREAT SERPL-MCNC: 1.21 MG/DL — SIGNIFICANT CHANGE UP (ref 0.5–1.3)
EGFR: 61 ML/MIN/1.73M2 — SIGNIFICANT CHANGE UP
GLUCOSE SERPL-MCNC: 116 MG/DL — HIGH (ref 70–99)
HCT VFR BLD CALC: 27 % — LOW (ref 39–50)
HGB BLD-MCNC: 8.4 G/DL — LOW (ref 13–17)
INR BLD: 1.38 RATIO — HIGH (ref 0.88–1.16)
MAGNESIUM SERPL-MCNC: 2.1 MG/DL — SIGNIFICANT CHANGE UP (ref 1.6–2.6)
MCHC RBC-ENTMCNC: 30.7 PG — SIGNIFICANT CHANGE UP (ref 27–34)
MCHC RBC-ENTMCNC: 31.1 GM/DL — LOW (ref 32–36)
MCV RBC AUTO: 98.5 FL — SIGNIFICANT CHANGE UP (ref 80–100)
NRBC # BLD: 0 /100 WBCS — SIGNIFICANT CHANGE UP (ref 0–0)
PHOSPHATE SERPL-MCNC: 3.2 MG/DL — SIGNIFICANT CHANGE UP (ref 2.5–4.5)
PLATELET # BLD AUTO: 474 K/UL — HIGH (ref 150–400)
POTASSIUM SERPL-MCNC: 3.7 MMOL/L — SIGNIFICANT CHANGE UP (ref 3.5–5.3)
POTASSIUM SERPL-SCNC: 3.7 MMOL/L — SIGNIFICANT CHANGE UP (ref 3.5–5.3)
PROTHROM AB SERPL-ACNC: 15.9 SEC — HIGH (ref 10.5–13.4)
RBC # BLD: 2.74 M/UL — LOW (ref 4.2–5.8)
RBC # FLD: 18.6 % — HIGH (ref 10.3–14.5)
SODIUM SERPL-SCNC: 139 MMOL/L — SIGNIFICANT CHANGE UP (ref 135–145)
WBC # BLD: 10.84 K/UL — HIGH (ref 3.8–10.5)
WBC # FLD AUTO: 10.84 K/UL — HIGH (ref 3.8–10.5)

## 2023-03-26 PROCEDURE — 99233 SBSQ HOSP IP/OBS HIGH 50: CPT

## 2023-03-26 RX ORDER — HEPARIN SODIUM 5000 [USP'U]/ML
1100 INJECTION INTRAVENOUS; SUBCUTANEOUS
Qty: 25000 | Refills: 0 | Status: DISCONTINUED | OUTPATIENT
Start: 2023-03-26 | End: 2023-03-29

## 2023-03-26 RX ORDER — WARFARIN SODIUM 2.5 MG/1
2 TABLET ORAL ONCE
Refills: 0 | Status: COMPLETED | OUTPATIENT
Start: 2023-03-26 | End: 2023-03-26

## 2023-03-26 RX ADMIN — Medication 40 MILLIGRAM(S): at 05:28

## 2023-03-26 RX ADMIN — PANTOPRAZOLE SODIUM 40 MILLIGRAM(S): 20 TABLET, DELAYED RELEASE ORAL at 18:23

## 2023-03-26 RX ADMIN — SPIRONOLACTONE 12.5 MILLIGRAM(S): 25 TABLET, FILM COATED ORAL at 05:28

## 2023-03-26 RX ADMIN — HEPARIN SODIUM 1100 UNIT(S)/HR: 5000 INJECTION INTRAVENOUS; SUBCUTANEOUS at 09:37

## 2023-03-26 RX ADMIN — LATANOPROST 1 DROP(S): 0.05 SOLUTION/ DROPS OPHTHALMIC; TOPICAL at 22:10

## 2023-03-26 RX ADMIN — Medication 325 MILLIGRAM(S): at 12:36

## 2023-03-26 RX ADMIN — ATORVASTATIN CALCIUM 10 MILLIGRAM(S): 80 TABLET, FILM COATED ORAL at 22:10

## 2023-03-26 RX ADMIN — Medication 500 MILLIGRAM(S): at 12:35

## 2023-03-26 RX ADMIN — Medication 81 MILLIGRAM(S): at 12:35

## 2023-03-26 RX ADMIN — Medication 1 SPRAY(S): at 05:28

## 2023-03-26 RX ADMIN — ATENOLOL 25 MILLIGRAM(S): 25 TABLET ORAL at 05:27

## 2023-03-26 RX ADMIN — PANTOPRAZOLE SODIUM 40 MILLIGRAM(S): 20 TABLET, DELAYED RELEASE ORAL at 05:28

## 2023-03-26 RX ADMIN — WARFARIN SODIUM 2 MILLIGRAM(S): 2.5 TABLET ORAL at 22:10

## 2023-03-26 RX ADMIN — TAMSULOSIN HYDROCHLORIDE 0.8 MILLIGRAM(S): 0.4 CAPSULE ORAL at 22:10

## 2023-03-26 RX ADMIN — PREGABALIN 1000 MICROGRAM(S): 225 CAPSULE ORAL at 12:36

## 2023-03-26 NOTE — ASSESSMENT
[FreeTextEntry1] : Impression:\par \par #1.  Normocytic anemia, multifactoral, some contribution by duodenal AVM, jejunal AVMs resolved.\par Last VCE May 2021 (post PPM)\par \par #2.  Aortic stenosis s/p TAVR Jan 2023\par \par #3.  CAD s/p stent Jan 2023 on DAPT\par \par #4.  PPM Oct 2020\par \par Plan:\par \par #1.  Observe CBC\par \par #2.  If drop, video capsule endoscopy (discuss risk with pacemaker in place) vs. push enteroscopy for endoscopic therapy\par \par #3.  Follow-up in GI office in 6 months.

## 2023-03-26 NOTE — PROGRESS NOTE ADULT - ASSESSMENT
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

## 2023-03-26 NOTE — PHYSICAL EXAM
[Alert] : alert [Sclera] : the sclera and conjunctiva were normal [Normal Affect] : the affect was normal [de-identified] : Facemask in place [de-identified] : Soft, nondistended, nontender, bowel sounds present [de-identified] : No confusion

## 2023-03-26 NOTE — HISTORY OF PRESENT ILLNESS
[FreeTextEntry1] : Pt follows up for iron def normocytic anemia and small bowel angioectasias.\par \par s/p cardiac stent and TAVR earlier this year\par Dyspnea resolved post TAVR.\par Had hematoma and bleeding angioectasia 4th portion duodenum on push enteroscopy\par No endoscopic treatment given anticoagulation and dual antiplatelet agent therapy.\par \par Stable black solid stool on iron, no hematemesis, melena, hematochezia.\par No chest pain, dizziness.\par No abdominal pain, nausea, vomiting, jaundice, weight loss.\par \par Laboratory data\par Hospital discharge hemoglobin was Hb 10\par Most recent 11.4 with MCV 90's\par

## 2023-03-26 NOTE — PROGRESS NOTE ADULT - ATTENDING COMMENTS
77 yo M with a PMH of CAD recent stents to RCA 1/19/2023, TAVR on 1/21/2023 c/b anemia requiring multiple transfusions, chronic anemia (secondary to Fe/B12/folate on PO iron and iron infusions q weekly), HTN, Afib on coumadin, PPM, CVA x 2 with residual short term memory loss, Hodgkin's Lymphoma s/p splenectomy and chemo, essential thrombocytopenia, MOE, GERD, laminectomy with RLE fasciotomy and skin grafting due to RLE DVT post-op in 2019, presents for low hgb/hct from [facility] hematology group, found to have Hg of 8.3 on admission with previous Hg 10.9 02/2023.    #anemia  #afib  #CAD  # LE edema  #BPH  #HTN  #eosinophilia     - patient seen and examined doing well, no complaints  - anemia likely multifactorial in etiology due to AVM and MARC, folate, b12 def, CKD  - heme and GI following, transfuse if hgb <7.0, c/w PPI  - patient did not obtain octreotide outpatient due to insurance authorization  push endoscopy/colonoscopy   - heparin started due to subtherapeutic INR, monitoring for PTT and bleeding  - bridge to coumadin given high CHADSVASc score  - trace LE pitting edema R>L, LE duplex negative for DVT

## 2023-03-26 NOTE — CONSULT LETTER
[Dear  ___] : Dear  [unfilled], [Consult Letter:] : I had the pleasure of evaluating your patient, [unfilled]. [Please see my note below.] : Please see my note below. [Consult Closing:] : Thank you very much for allowing me to participate in the care of this patient.  If you have any questions, please do not hesitate to contact me. [Sincerely,] : Sincerely, [DrDuy  ___] : Dr. BEYER [FreeTextEntry3] : Jimi Martins MD, MPH, [place], [place]\par Chief of Clinical Quality in Gastroenterology, [facility]\par Associate Chief of Gastroenterology, [facility]\par Interim Director of Endoscopy, [facility]\par Director of Endoscopic Ultrasound, [facility]\par [address], [address]\par [place], [address]\par 24 hours [phone]\par \par

## 2023-03-26 NOTE — PROVIDER CONTACT NOTE (OTHER) - SITUATION
Patient currently on heparin drip infusing @13ml/hr. Aptt results @ 0533 this .6.heparin rate should be decreased according to nomogram to 11ml/hr but I am unable to change rate due to downtime.

## 2023-03-26 NOTE — PROGRESS NOTE ADULT - NS ATTEST RISK PROBLEM GEN_ALL_CORE FT
patient with anemia and hx of GIB, heparin started due to subtherapeutic INR, monitoring for PTT and bleeding

## 2023-03-26 NOTE — PROVIDER CONTACT NOTE (OTHER) - ASSESSMENT
Patient is A&Ox4  and able to express his needs. Patient denies pain or discomfort at this time. No visible s/s of bleeding noted VS B/P136/68 T97.8 R61 P94

## 2023-03-26 NOTE — PROGRESS NOTE ADULT - SUBJECTIVE AND OBJECTIVE BOX
INTERVAL HPI/OVERNIGHT EVENTS:    SUBJECTIVE: Patient seen and examined at bedside.    OBJECTIVE:    VITAL SIGNS:  ICU Vital Signs Last 24 Hrs  T(C): 36.8 (26 Mar 2023 05:40), Max: 36.8 (26 Mar 2023 05:40)  T(F): 98.2 (26 Mar 2023 05:40), Max: 98.2 (26 Mar 2023 05:40)  HR: 69 (26 Mar 2023 05:40) (64 - 69)  BP: 151/74 (26 Mar 2023 05:40) (115/61 - 151/74)  BP(mean): --  ABP: --  ABP(mean): --  RR: 17 (26 Mar 2023 05:40) (16 - 18)  SpO2: 94% (26 Mar 2023 05:40) (93% - 97%)    O2 Parameters below as of 26 Mar 2023 05:40  Patient On (Oxygen Delivery Method): room air              03-25 @ 07:01 - 03-26 @ 07:00  --------------------------------------------------------  IN: 0 mL / OUT: 100 mL / NET: -100 mL    03-26 @ 07:01  -  03-26 @ 07:54  --------------------------------------------------------  IN: 0 mL / OUT: 500 mL / NET: -500 mL      CAPILLARY BLOOD GLUCOSE          PHYSICAL EXAM:    General: NAD  HEENT: NC/AT; PERRL, clear conjunctiva  Neck: supple  Respiratory: CTA b/l  Cardiovascular: +S1/S2; RRR  Abdomen: soft, NT/ND; +BS x4  Extremities:  no LE edema  Vascular: WWP, 2+ peripheral pulses b/l;  Skin: normal color and turgor; no rash  Neurological: A&Ox3, move all extremities. CN II-XII intact    MEDICATIONS:  MEDICATIONS  (STANDING):  ascorbic acid 500 milliGRAM(s) Oral daily  aspirin enteric coated 81 milliGRAM(s) Oral daily  atenolol  Tablet 25 milliGRAM(s) Oral daily  atorvastatin 10 milliGRAM(s) Oral at bedtime  cyanocobalamin 1000 MICROGram(s) Oral daily  ferrous    sulfate 325 milliGRAM(s) Oral daily  fluticasone propionate 50 MICROgram(s)/spray Nasal Spray 1 Spray(s) Both Nostrils <User Schedule>  furosemide    Tablet 40 milliGRAM(s) Oral daily  heparin  Infusion. 1100 Unit(s)/Hr (11 mL/Hr) IV Continuous <Continuous>  latanoprost 0.005% Ophthalmic Solution 1 Drop(s) Right EYE at bedtime  lidocaine 2% Injection for Nebulization 8 milliLiter(s) Nebulizer once  pantoprazole  Injectable 40 milliGRAM(s) IV Push two times a day  spironolactone 12.5 milliGRAM(s) Oral daily  tamsulosin 0.8 milliGRAM(s) Oral at bedtime    MEDICATIONS  (PRN):  acetaminophen     Tablet .. 650 milliGRAM(s) Oral every 6 hours PRN Temp greater or equal to 38C (100.4F), Mild Pain (1 - 3)  aluminum hydroxide/magnesium hydroxide/simethicone Suspension 30 milliLiter(s) Oral every 4 hours PRN Dyspepsia  melatonin 3 milliGRAM(s) Oral at bedtime PRN Insomnia  ondansetron Injectable 4 milliGRAM(s) IV Push every 8 hours PRN Nausea and/or Vomiting      ALLERGIES:  Allergies    No Known Allergies    Intolerances        LABS:                        8.4    10.84 )-----------( 474      ( 26 Mar 2023 05:33 )             27.0     03-26    139  |  100  |  26<H>  ----------------------------<  116<H>  3.7   |  29  |  1.21    Ca    9.0      26 Mar 2023 05:33  Phos  3.2     03-26  Mg     2.1     03-26    TPro  6.9  /  Alb  3.8  /  TBili  0.4  /  DBili  x   /  AST  25  /  ALT  16  /  AlkPhos  142<H>  03-25    PT/INR - ( 25 Mar 2023 08:21 )   PT: 18.2 sec;   INR: 1.56 ratio         PTT - ( 26 Mar 2023 05:33 )  PTT:103.6 sec      RADIOLOGY & ADDITIONAL TESTS: Reviewed. INTERVAL HPI/OVERNIGHT EVENTS: STONEY OVN     SUBJECTIVE: Patient seen and examined at bedside. Patient feeling well this AM with no evidence of bleeding.     OBJECTIVE:    VITAL SIGNS:  ICU Vital Signs Last 24 Hrs  T(C): 36.8 (26 Mar 2023 05:40), Max: 36.8 (26 Mar 2023 05:40)  T(F): 98.2 (26 Mar 2023 05:40), Max: 98.2 (26 Mar 2023 05:40)  HR: 69 (26 Mar 2023 05:40) (64 - 69)  BP: 151/74 (26 Mar 2023 05:40) (115/61 - 151/74)  BP(mean): --  ABP: --  ABP(mean): --  RR: 17 (26 Mar 2023 05:40) (16 - 18)  SpO2: 94% (26 Mar 2023 05:40) (93% - 97%)    O2 Parameters below as of 26 Mar 2023 05:40  Patient On (Oxygen Delivery Method): room air              03-25 @ 07:01 - 03-26 @ 07:00  --------------------------------------------------------  IN: 0 mL / OUT: 100 mL / NET: -100 mL    03-26 @ 07:01  -  03-26 @ 07:54  --------------------------------------------------------  IN: 0 mL / OUT: 500 mL / NET: -500 mL      CAPILLARY BLOOD GLUCOSE          PHYSICAL EXAM:    General: NAD  HEENT: NC/AT; PERRL, clear conjunctiva  Neck: supple  Respiratory: CTA b/l  Cardiovascular: +S1/S2; RRR  Abdomen: soft, NT/ND; +BS x4  Extremities:  no LE edema  Vascular: WWP, 2+ peripheral pulses b/l;  Skin: normal color and turgor; no rash  Neurological: A&Ox3, move all extremities. CN II-XII intact    MEDICATIONS:  MEDICATIONS  (STANDING):  ascorbic acid 500 milliGRAM(s) Oral daily  aspirin enteric coated 81 milliGRAM(s) Oral daily  atenolol  Tablet 25 milliGRAM(s) Oral daily  atorvastatin 10 milliGRAM(s) Oral at bedtime  cyanocobalamin 1000 MICROGram(s) Oral daily  ferrous    sulfate 325 milliGRAM(s) Oral daily  fluticasone propionate 50 MICROgram(s)/spray Nasal Spray 1 Spray(s) Both Nostrils <User Schedule>  furosemide    Tablet 40 milliGRAM(s) Oral daily  heparin  Infusion. 1100 Unit(s)/Hr (11 mL/Hr) IV Continuous <Continuous>  latanoprost 0.005% Ophthalmic Solution 1 Drop(s) Right EYE at bedtime  lidocaine 2% Injection for Nebulization 8 milliLiter(s) Nebulizer once  pantoprazole  Injectable 40 milliGRAM(s) IV Push two times a day  spironolactone 12.5 milliGRAM(s) Oral daily  tamsulosin 0.8 milliGRAM(s) Oral at bedtime    MEDICATIONS  (PRN):  acetaminophen     Tablet .. 650 milliGRAM(s) Oral every 6 hours PRN Temp greater or equal to 38C (100.4F), Mild Pain (1 - 3)  aluminum hydroxide/magnesium hydroxide/simethicone Suspension 30 milliLiter(s) Oral every 4 hours PRN Dyspepsia  melatonin 3 milliGRAM(s) Oral at bedtime PRN Insomnia  ondansetron Injectable 4 milliGRAM(s) IV Push every 8 hours PRN Nausea and/or Vomiting      ALLERGIES:  Allergies    No Known Allergies    Intolerances        LABS:                        8.4    10.84 )-----------( 474      ( 26 Mar 2023 05:33 )             27.0     03-26    139  |  100  |  26<H>  ----------------------------<  116<H>  3.7   |  29  |  1.21    Ca    9.0      26 Mar 2023 05:33  Phos  3.2     03-26  Mg     2.1     03-26    TPro  6.9  /  Alb  3.8  /  TBili  0.4  /  DBili  x   /  AST  25  /  ALT  16  /  AlkPhos  142<H>  03-25    PT/INR - ( 25 Mar 2023 08:21 )   PT: 18.2 sec;   INR: 1.56 ratio         PTT - ( 26 Mar 2023 05:33 )  PTT:103.6 sec      RADIOLOGY & ADDITIONAL TESTS: Reviewed.

## 2023-03-27 LAB
ANION GAP SERPL CALC-SCNC: 11 MMOL/L — SIGNIFICANT CHANGE UP (ref 5–17)
APTT BLD: 58.2 SEC — HIGH (ref 27.5–35.5)
APTT BLD: 64.9 SEC — HIGH (ref 27.5–35.5)
BUN SERPL-MCNC: 27 MG/DL — HIGH (ref 7–23)
CALCIUM SERPL-MCNC: 9.2 MG/DL — SIGNIFICANT CHANGE UP (ref 8.4–10.5)
CHLORIDE SERPL-SCNC: 101 MMOL/L — SIGNIFICANT CHANGE UP (ref 96–108)
CO2 SERPL-SCNC: 29 MMOL/L — SIGNIFICANT CHANGE UP (ref 22–31)
CREAT SERPL-MCNC: 1.31 MG/DL — HIGH (ref 0.5–1.3)
EGFR: 55 ML/MIN/1.73M2 — LOW
GLUCOSE SERPL-MCNC: 95 MG/DL — SIGNIFICANT CHANGE UP (ref 70–99)
HCT VFR BLD CALC: 28.2 % — LOW (ref 39–50)
HGB BLD-MCNC: 8.8 G/DL — LOW (ref 13–17)
INR BLD: 1.36 RATIO — HIGH (ref 0.88–1.16)
MAGNESIUM SERPL-MCNC: 2.1 MG/DL — SIGNIFICANT CHANGE UP (ref 1.6–2.6)
MCHC RBC-ENTMCNC: 30.3 PG — SIGNIFICANT CHANGE UP (ref 27–34)
MCHC RBC-ENTMCNC: 31.2 GM/DL — LOW (ref 32–36)
MCV RBC AUTO: 97.2 FL — SIGNIFICANT CHANGE UP (ref 80–100)
NRBC # BLD: 0 /100 WBCS — SIGNIFICANT CHANGE UP (ref 0–0)
PHOSPHATE SERPL-MCNC: 3.2 MG/DL — SIGNIFICANT CHANGE UP (ref 2.5–4.5)
PLATELET # BLD AUTO: 508 K/UL — HIGH (ref 150–400)
POTASSIUM SERPL-MCNC: 3.7 MMOL/L — SIGNIFICANT CHANGE UP (ref 3.5–5.3)
POTASSIUM SERPL-SCNC: 3.7 MMOL/L — SIGNIFICANT CHANGE UP (ref 3.5–5.3)
PROTHROM AB SERPL-ACNC: 15.8 SEC — HIGH (ref 10.5–13.4)
RBC # BLD: 2.9 M/UL — LOW (ref 4.2–5.8)
RBC # FLD: 18.4 % — HIGH (ref 10.3–14.5)
SODIUM SERPL-SCNC: 141 MMOL/L — SIGNIFICANT CHANGE UP (ref 135–145)
WBC # BLD: 10.42 K/UL — SIGNIFICANT CHANGE UP (ref 3.8–10.5)
WBC # FLD AUTO: 10.42 K/UL — SIGNIFICANT CHANGE UP (ref 3.8–10.5)

## 2023-03-27 PROCEDURE — 99233 SBSQ HOSP IP/OBS HIGH 50: CPT

## 2023-03-27 RX ORDER — WARFARIN SODIUM 2.5 MG/1
5 TABLET ORAL ONCE
Refills: 0 | Status: COMPLETED | OUTPATIENT
Start: 2023-03-27 | End: 2023-03-27

## 2023-03-27 RX ADMIN — ATORVASTATIN CALCIUM 10 MILLIGRAM(S): 80 TABLET, FILM COATED ORAL at 21:47

## 2023-03-27 RX ADMIN — ATENOLOL 25 MILLIGRAM(S): 25 TABLET ORAL at 06:17

## 2023-03-27 RX ADMIN — Medication 500 MILLIGRAM(S): at 11:11

## 2023-03-27 RX ADMIN — Medication 325 MILLIGRAM(S): at 11:11

## 2023-03-27 RX ADMIN — Medication 1 SPRAY(S): at 06:16

## 2023-03-27 RX ADMIN — PREGABALIN 1000 MICROGRAM(S): 225 CAPSULE ORAL at 11:11

## 2023-03-27 RX ADMIN — PANTOPRAZOLE SODIUM 40 MILLIGRAM(S): 20 TABLET, DELAYED RELEASE ORAL at 17:34

## 2023-03-27 RX ADMIN — HEPARIN SODIUM 11 UNIT(S)/HR: 5000 INJECTION INTRAVENOUS; SUBCUTANEOUS at 08:25

## 2023-03-27 RX ADMIN — SPIRONOLACTONE 12.5 MILLIGRAM(S): 25 TABLET, FILM COATED ORAL at 06:16

## 2023-03-27 RX ADMIN — LATANOPROST 1 DROP(S): 0.05 SOLUTION/ DROPS OPHTHALMIC; TOPICAL at 21:47

## 2023-03-27 RX ADMIN — Medication 81 MILLIGRAM(S): at 11:11

## 2023-03-27 RX ADMIN — WARFARIN SODIUM 5 MILLIGRAM(S): 2.5 TABLET ORAL at 21:47

## 2023-03-27 RX ADMIN — PANTOPRAZOLE SODIUM 40 MILLIGRAM(S): 20 TABLET, DELAYED RELEASE ORAL at 06:18

## 2023-03-27 RX ADMIN — Medication 40 MILLIGRAM(S): at 06:17

## 2023-03-27 RX ADMIN — TAMSULOSIN HYDROCHLORIDE 0.8 MILLIGRAM(S): 0.4 CAPSULE ORAL at 21:47

## 2023-03-27 NOTE — PROGRESS NOTE ADULT - ATTENDING COMMENTS
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

## 2023-03-27 NOTE — PROGRESS NOTE ADULT - PROBLEM SELECTOR PLAN 2
Pt w/ A.fib, CHADSVASC 7, on coumadin   INR 2.57, pt on 2mg M-F and 1mg S/Sun  Pt high risk for stroke, but given therapeutic range, will hold warfarin pending further anemia eval >>on heparin gtt (on hold for EGD)  -> c/w atenolol  -start bridge to warfarin, home regimen is 2mg M-F and 1mg weekends, ordered for warfarin 2mg starting 3/25 Pt w/ A.fib, CHADSVASC 7, on coumadin   pt on 2mg M-F and 1mg S/Sun  Pt high risk for stroke, but given therapeutic range, will hold warfarin pending further anemia eval >>on heparin gtt (on hold for EGD)  -> c/w atenolol  INR 1.36 today  -start bridge to warfarin, home regimen is 2mg M-F and 1mg weekends, ordered for warfarin 2mg starting 3/25>> increased to warfarin 5 3/27

## 2023-03-27 NOTE — PROGRESS NOTE ADULT - ASSESSMENT
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

## 2023-03-28 LAB
ANION GAP SERPL CALC-SCNC: 13 MMOL/L — SIGNIFICANT CHANGE UP (ref 5–17)
APTT BLD: 59.4 SEC — HIGH (ref 27.5–35.5)
BUN SERPL-MCNC: 25 MG/DL — HIGH (ref 7–23)
CALCIUM SERPL-MCNC: 9.3 MG/DL — SIGNIFICANT CHANGE UP (ref 8.4–10.5)
CHLORIDE SERPL-SCNC: 99 MMOL/L — SIGNIFICANT CHANGE UP (ref 96–108)
CO2 SERPL-SCNC: 28 MMOL/L — SIGNIFICANT CHANGE UP (ref 22–31)
CREAT SERPL-MCNC: 1.11 MG/DL — SIGNIFICANT CHANGE UP (ref 0.5–1.3)
EGFR: 68 ML/MIN/1.73M2 — SIGNIFICANT CHANGE UP
GLUCOSE SERPL-MCNC: 106 MG/DL — HIGH (ref 70–99)
HCT VFR BLD CALC: 29.9 % — LOW (ref 39–50)
HGB BLD-MCNC: 9.2 G/DL — LOW (ref 13–17)
INR BLD: 1.66 RATIO — HIGH (ref 0.88–1.16)
MAGNESIUM SERPL-MCNC: 2.1 MG/DL — SIGNIFICANT CHANGE UP (ref 1.6–2.6)
MCHC RBC-ENTMCNC: 30.6 PG — SIGNIFICANT CHANGE UP (ref 27–34)
MCHC RBC-ENTMCNC: 30.8 GM/DL — LOW (ref 32–36)
MCV RBC AUTO: 99.3 FL — SIGNIFICANT CHANGE UP (ref 80–100)
NRBC # BLD: 0 /100 WBCS — SIGNIFICANT CHANGE UP (ref 0–0)
PHOSPHATE SERPL-MCNC: 3.1 MG/DL — SIGNIFICANT CHANGE UP (ref 2.5–4.5)
PLATELET # BLD AUTO: 511 K/UL — HIGH (ref 150–400)
POTASSIUM SERPL-MCNC: 4 MMOL/L — SIGNIFICANT CHANGE UP (ref 3.5–5.3)
POTASSIUM SERPL-SCNC: 4 MMOL/L — SIGNIFICANT CHANGE UP (ref 3.5–5.3)
PROTHROM AB SERPL-ACNC: 19.3 SEC — HIGH (ref 10.5–13.4)
RBC # BLD: 3.01 M/UL — LOW (ref 4.2–5.8)
RBC # FLD: 18.5 % — HIGH (ref 10.3–14.5)
SODIUM SERPL-SCNC: 140 MMOL/L — SIGNIFICANT CHANGE UP (ref 135–145)
SURGICAL PATHOLOGY STUDY: SIGNIFICANT CHANGE UP
WBC # BLD: 12.17 K/UL — HIGH (ref 3.8–10.5)
WBC # FLD AUTO: 12.17 K/UL — HIGH (ref 3.8–10.5)

## 2023-03-28 PROCEDURE — 99232 SBSQ HOSP IP/OBS MODERATE 35: CPT

## 2023-03-28 RX ORDER — WARFARIN SODIUM 2.5 MG/1
5 TABLET ORAL ONCE
Refills: 0 | Status: COMPLETED | OUTPATIENT
Start: 2023-03-28 | End: 2023-03-28

## 2023-03-28 RX ADMIN — Medication 81 MILLIGRAM(S): at 11:24

## 2023-03-28 RX ADMIN — PREGABALIN 1000 MICROGRAM(S): 225 CAPSULE ORAL at 11:24

## 2023-03-28 RX ADMIN — WARFARIN SODIUM 5 MILLIGRAM(S): 2.5 TABLET ORAL at 22:48

## 2023-03-28 RX ADMIN — ATORVASTATIN CALCIUM 10 MILLIGRAM(S): 80 TABLET, FILM COATED ORAL at 22:48

## 2023-03-28 RX ADMIN — SPIRONOLACTONE 12.5 MILLIGRAM(S): 25 TABLET, FILM COATED ORAL at 07:28

## 2023-03-28 RX ADMIN — TAMSULOSIN HYDROCHLORIDE 0.8 MILLIGRAM(S): 0.4 CAPSULE ORAL at 22:48

## 2023-03-28 RX ADMIN — LATANOPROST 1 DROP(S): 0.05 SOLUTION/ DROPS OPHTHALMIC; TOPICAL at 22:47

## 2023-03-28 RX ADMIN — HEPARIN SODIUM 11 UNIT(S)/HR: 5000 INJECTION INTRAVENOUS; SUBCUTANEOUS at 07:00

## 2023-03-28 RX ADMIN — HEPARIN SODIUM 11 UNIT(S)/HR: 5000 INJECTION INTRAVENOUS; SUBCUTANEOUS at 08:51

## 2023-03-28 RX ADMIN — Medication 325 MILLIGRAM(S): at 11:24

## 2023-03-28 RX ADMIN — PANTOPRAZOLE SODIUM 40 MILLIGRAM(S): 20 TABLET, DELAYED RELEASE ORAL at 07:28

## 2023-03-28 RX ADMIN — Medication 500 MILLIGRAM(S): at 11:24

## 2023-03-28 RX ADMIN — PANTOPRAZOLE SODIUM 40 MILLIGRAM(S): 20 TABLET, DELAYED RELEASE ORAL at 18:08

## 2023-03-28 RX ADMIN — Medication 40 MILLIGRAM(S): at 07:28

## 2023-03-28 RX ADMIN — Medication 1 SPRAY(S): at 07:28

## 2023-03-28 RX ADMIN — ATENOLOL 25 MILLIGRAM(S): 25 TABLET ORAL at 07:28

## 2023-03-28 NOTE — PROGRESS NOTE ADULT - ASSESSMENT
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

## 2023-03-28 NOTE — PHYSICAL THERAPY INITIAL EVALUATION ADULT - NSPTDISCHREC_GEN_A_CORE
pt is at functional baseline, Pt is cleared for discharge home from PT standpoint with no skilled PT needs./No skilled PT needs

## 2023-03-28 NOTE — PHYSICAL THERAPY INITIAL EVALUATION ADULT - ADDITIONAL COMMENTS
Pt lives in a private home with wife there are 4 steps to enter, 4 steps to bedroom. Pt performed ADL/IADLs independently. Ambulates with RW. Owns DME: RW, cane

## 2023-03-28 NOTE — PROGRESS NOTE ADULT - PROBLEM SELECTOR PLAN 2
Pt w/ A.fib, CHADSVASC 7, on coumadin   pt on 2mg M-F and 1mg S/Sun  Pt high risk for stroke, but given therapeutic range, will hold warfarin pending further anemia eval >>on heparin gtt (on hold for EGD)  -> c/w atenolol  INR 1.36 today  -start bridge to warfarin, home regimen is 2mg M-F and 1mg weekends, ordered for warfarin 2mg starting 3/25>> increased to warfarin 5 3/27 Pt w/ A.fib, CHADSVASC 7, on coumadin   pt on 2mg M-F and 1mg S/Sun  Pt high risk for stroke, but given therapeutic range, will hold warfarin pending further anemia eval >>on heparin gtt (on hold for EGD)  -> c/w atenolol  INR 1.66 today  -start bridge to warfarin, home regimen is 2mg M-F and 1mg weekends, ordered for warfarin 2mg starting 3/25>> c/w warfarin 5 3/28

## 2023-03-28 NOTE — PHYSICAL THERAPY INITIAL EVALUATION ADULT - PERTINENT HX OF CURRENT PROBLEM, REHAB EVAL
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 hematology group, found to have Hg of 8.3 on admission with previous Hg 10.9 02/2023. 3/22 Dopplers negative for DVT. EGD 3/24. CXR blunting of b/l costophrenic angles, no acute infiltrates

## 2023-03-28 NOTE — PROGRESS NOTE ADULT - ATTENDING COMMENTS
[FreeTextEntry1] : See assessment. 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

## 2023-03-28 NOTE — PROGRESS NOTE ADULT - SUBJECTIVE AND OBJECTIVE BOX
PROGRESS NOTE:     Patient is a 78y old  Male who presents with a chief complaint of Anemia (27 Mar 2023 07:51)      SUBJECTIVE / OVERNIGHT EVENTS:    ADDITIONAL REVIEW OF SYSTEMS:    MEDICATIONS  (STANDING):  ascorbic acid 500 milliGRAM(s) Oral daily  aspirin enteric coated 81 milliGRAM(s) Oral daily  atenolol  Tablet 25 milliGRAM(s) Oral daily  atorvastatin 10 milliGRAM(s) Oral at bedtime  cyanocobalamin 1000 MICROGram(s) Oral daily  ferrous    sulfate 325 milliGRAM(s) Oral daily  fluticasone propionate 50 MICROgram(s)/spray Nasal Spray 1 Spray(s) Both Nostrils <User Schedule>  furosemide    Tablet 40 milliGRAM(s) Oral daily  heparin  Infusion. 1100 Unit(s)/Hr (11 mL/Hr) IV Continuous <Continuous>  latanoprost 0.005% Ophthalmic Solution 1 Drop(s) Right EYE at bedtime  lidocaine 2% Injection for Nebulization 8 milliLiter(s) Nebulizer once  pantoprazole  Injectable 40 milliGRAM(s) IV Push two times a day  spironolactone 12.5 milliGRAM(s) Oral daily  tamsulosin 0.8 milliGRAM(s) Oral at bedtime    MEDICATIONS  (PRN):  acetaminophen     Tablet .. 650 milliGRAM(s) Oral every 6 hours PRN Temp greater or equal to 38C (100.4F), Mild Pain (1 - 3)  aluminum hydroxide/magnesium hydroxide/simethicone Suspension 30 milliLiter(s) Oral every 4 hours PRN Dyspepsia  melatonin 3 milliGRAM(s) Oral at bedtime PRN Insomnia  ondansetron Injectable 4 milliGRAM(s) IV Push every 8 hours PRN Nausea and/or Vomiting      CAPILLARY BLOOD GLUCOSE        I&O's Summary      PHYSICAL EXAM:  Vital Signs Last 24 Hrs  T(C): 36.8 (28 Mar 2023 06:27), Max: 36.8 (27 Mar 2023 16:47)  T(F): 98.2 (28 Mar 2023 06:27), Max: 98.3 (28 Mar 2023 00:48)  HR: 69 (28 Mar 2023 06:27) (63 - 75)  BP: 131/54 (28 Mar 2023 06:27) (119/63 - 144/57)  BP(mean): --  RR: 18 (28 Mar 2023 06:27) (18 - 18)  SpO2: 94% (28 Mar 2023 06:27) (93% - 95%)    Parameters below as of 28 Mar 2023 06:27  Patient On (Oxygen Delivery Method): room air        GENERAL: No acute distress, well-developed  HEAD:  Atraumatic, Normocephalic  EYES: EOMI, PERRLA, conjunctiva and sclera clear  NECK: Supple, no lymphadenopathy, no JVD  CHEST/LUNG: CTAB; No wheezes, rales, or rhonchi  HEART: Regular rate and rhythm; No murmurs, rubs, or gallops  ABDOMEN: Soft, non-tender, non-distended; normal bowel sounds, no organomegaly  EXTREMITIES:  2+ peripheral pulses b/l, No clubbing, cyanosis, or edema  NEUROLOGY: A&O x 3, no focal deficits  SKIN: No rashes or lesions    LABS:                        9.2    12.17 )-----------( 511      ( 28 Mar 2023 07:08 )             29.9     03-28    140  |  99  |  25<H>  ----------------------------<  106<H>  4.0   |  28  |  1.11    Ca    9.3      28 Mar 2023 07:08  Phos  3.1     03-28  Mg     2.1     03-28      PT/INR - ( 28 Mar 2023 07:08 )   PT: 19.3 sec;   INR: 1.66 ratio         PTT - ( 28 Mar 2023 07:08 )  PTT:59.4 sec            RADIOLOGY & ADDITIONAL TESTS:  Results Reviewed:   Imaging Personally Reviewed:  Electrocardiogram Personally Reviewed:    COORDINATION OF CARE:  Care Discussed with Consultants/Other Providers [Y/N]:  Prior or Outpatient Records Reviewed [Y/N]:   PROGRESS NOTE:     Patient is a 78y old  Male who presents with a chief complaint of Anemia (27 Mar 2023 07:51)      SUBJECTIVE / OVERNIGHT EVENTS:  No events overnight. Patient examined at bedside. Feels well this AM. All ROS negative.     ADDITIONAL REVIEW OF SYSTEMS:    MEDICATIONS  (STANDING):  ascorbic acid 500 milliGRAM(s) Oral daily  aspirin enteric coated 81 milliGRAM(s) Oral daily  atenolol  Tablet 25 milliGRAM(s) Oral daily  atorvastatin 10 milliGRAM(s) Oral at bedtime  cyanocobalamin 1000 MICROGram(s) Oral daily  ferrous    sulfate 325 milliGRAM(s) Oral daily  fluticasone propionate 50 MICROgram(s)/spray Nasal Spray 1 Spray(s) Both Nostrils <User Schedule>  furosemide    Tablet 40 milliGRAM(s) Oral daily  heparin  Infusion. 1100 Unit(s)/Hr (11 mL/Hr) IV Continuous <Continuous>  latanoprost 0.005% Ophthalmic Solution 1 Drop(s) Right EYE at bedtime  lidocaine 2% Injection for Nebulization 8 milliLiter(s) Nebulizer once  pantoprazole  Injectable 40 milliGRAM(s) IV Push two times a day  spironolactone 12.5 milliGRAM(s) Oral daily  tamsulosin 0.8 milliGRAM(s) Oral at bedtime    MEDICATIONS  (PRN):  acetaminophen     Tablet .. 650 milliGRAM(s) Oral every 6 hours PRN Temp greater or equal to 38C (100.4F), Mild Pain (1 - 3)  aluminum hydroxide/magnesium hydroxide/simethicone Suspension 30 milliLiter(s) Oral every 4 hours PRN Dyspepsia  melatonin 3 milliGRAM(s) Oral at bedtime PRN Insomnia  ondansetron Injectable 4 milliGRAM(s) IV Push every 8 hours PRN Nausea and/or Vomiting      CAPILLARY BLOOD GLUCOSE        I&O's Summary      PHYSICAL EXAM:  Vital Signs Last 24 Hrs  T(C): 36.8 (28 Mar 2023 06:27), Max: 36.8 (27 Mar 2023 16:47)  T(F): 98.2 (28 Mar 2023 06:27), Max: 98.3 (28 Mar 2023 00:48)  HR: 69 (28 Mar 2023 06:27) (63 - 75)  BP: 131/54 (28 Mar 2023 06:27) (119/63 - 144/57)  BP(mean): --  RR: 18 (28 Mar 2023 06:27) (18 - 18)  SpO2: 94% (28 Mar 2023 06:27) (93% - 95%)    Parameters below as of 28 Mar 2023 06:27  Patient On (Oxygen Delivery Method): room air        GENERAL: No acute distress, well-developed  HEAD:  Atraumatic, Normocephalic  EYES: EOMI, PERRLA, conjunctiva and sclera clear  NECK: Supple, no lymphadenopathy, no JVD  CHEST/LUNG: CTAB; No wheezes, rales, or rhonchi  HEART: Regular rate and rhythm; No murmurs, rubs, or gallops  ABDOMEN: Soft, non-tender, non-distended; normal bowel sounds, no organomegaly  EXTREMITIES:  2+ peripheral pulses b/l, No clubbing, cyanosis, or edema  NEUROLOGY: A&O x 3, no focal deficits  SKIN: No rashes or lesions    LABS:                        9.2    12.17 )-----------( 511      ( 28 Mar 2023 07:08 )             29.9     03-28    140  |  99  |  25<H>  ----------------------------<  106<H>  4.0   |  28  |  1.11    Ca    9.3      28 Mar 2023 07:08  Phos  3.1     03-28  Mg     2.1     03-28      PT/INR - ( 28 Mar 2023 07:08 )   PT: 19.3 sec;   INR: 1.66 ratio         PTT - ( 28 Mar 2023 07:08 )  PTT:59.4 sec            RADIOLOGY & ADDITIONAL TESTS:  Results Reviewed:   Imaging Personally Reviewed:  Electrocardiogram Personally Reviewed:    COORDINATION OF CARE:  Care Discussed with Consultants/Other Providers [Y/N]:  Prior or Outpatient Records Reviewed [Y/N]:

## 2023-03-29 ENCOUNTER — TRANSCRIPTION ENCOUNTER (OUTPATIENT)
Age: 78
End: 2023-03-29

## 2023-03-29 VITALS
RESPIRATION RATE: 18 BRPM | HEART RATE: 59 BPM | SYSTOLIC BLOOD PRESSURE: 106 MMHG | DIASTOLIC BLOOD PRESSURE: 63 MMHG | TEMPERATURE: 98 F | OXYGEN SATURATION: 93 %

## 2023-03-29 LAB
ANION GAP SERPL CALC-SCNC: 11 MMOL/L — SIGNIFICANT CHANGE UP (ref 5–17)
APTT BLD: 38.1 SEC — HIGH (ref 27.5–35.5)
BUN SERPL-MCNC: 30 MG/DL — HIGH (ref 7–23)
CALCIUM SERPL-MCNC: 9.4 MG/DL — SIGNIFICANT CHANGE UP (ref 8.4–10.5)
CHLORIDE SERPL-SCNC: 100 MMOL/L — SIGNIFICANT CHANGE UP (ref 96–108)
CO2 SERPL-SCNC: 28 MMOL/L — SIGNIFICANT CHANGE UP (ref 22–31)
CREAT SERPL-MCNC: 1.25 MG/DL — SIGNIFICANT CHANGE UP (ref 0.5–1.3)
EGFR: 59 ML/MIN/1.73M2 — LOW
GLUCOSE SERPL-MCNC: 105 MG/DL — HIGH (ref 70–99)
HCT VFR BLD CALC: 28.8 % — LOW (ref 39–50)
HGB BLD-MCNC: 9.1 G/DL — LOW (ref 13–17)
INR BLD: 2.31 RATIO — HIGH (ref 0.88–1.16)
MAGNESIUM SERPL-MCNC: 2.2 MG/DL — SIGNIFICANT CHANGE UP (ref 1.6–2.6)
MCHC RBC-ENTMCNC: 30.6 PG — SIGNIFICANT CHANGE UP (ref 27–34)
MCHC RBC-ENTMCNC: 31.6 GM/DL — LOW (ref 32–36)
MCV RBC AUTO: 97 FL — SIGNIFICANT CHANGE UP (ref 80–100)
NRBC # BLD: 0 /100 WBCS — SIGNIFICANT CHANGE UP (ref 0–0)
PHOSPHATE SERPL-MCNC: 3.6 MG/DL — SIGNIFICANT CHANGE UP (ref 2.5–4.5)
PLATELET # BLD AUTO: 490 K/UL — HIGH (ref 150–400)
POTASSIUM SERPL-MCNC: 4.3 MMOL/L — SIGNIFICANT CHANGE UP (ref 3.5–5.3)
POTASSIUM SERPL-SCNC: 4.3 MMOL/L — SIGNIFICANT CHANGE UP (ref 3.5–5.3)
PROTHROM AB SERPL-ACNC: 26.8 SEC — HIGH (ref 10.5–13.4)
RBC # BLD: 2.97 M/UL — LOW (ref 4.2–5.8)
RBC # FLD: 18.2 % — HIGH (ref 10.3–14.5)
SODIUM SERPL-SCNC: 139 MMOL/L — SIGNIFICANT CHANGE UP (ref 135–145)
WBC # BLD: 10.9 K/UL — HIGH (ref 3.8–10.5)
WBC # FLD AUTO: 10.9 K/UL — HIGH (ref 3.8–10.5)

## 2023-03-29 PROCEDURE — 88305 TISSUE EXAM BY PATHOLOGIST: CPT

## 2023-03-29 PROCEDURE — 85027 COMPLETE CBC AUTOMATED: CPT

## 2023-03-29 PROCEDURE — 83550 IRON BINDING TEST: CPT

## 2023-03-29 PROCEDURE — 82607 VITAMIN B-12: CPT

## 2023-03-29 PROCEDURE — 84100 ASSAY OF PHOSPHORUS: CPT

## 2023-03-29 PROCEDURE — 93356 MYOCRD STRAIN IMG SPCKL TRCK: CPT

## 2023-03-29 PROCEDURE — 97162 PT EVAL MOD COMPLEX 30 MIN: CPT

## 2023-03-29 PROCEDURE — 71045 X-RAY EXAM CHEST 1 VIEW: CPT

## 2023-03-29 PROCEDURE — 93306 TTE W/DOPPLER COMPLETE: CPT

## 2023-03-29 PROCEDURE — 99239 HOSP IP/OBS DSCHRG MGMT >30: CPT

## 2023-03-29 PROCEDURE — 80048 BASIC METABOLIC PNL TOTAL CA: CPT

## 2023-03-29 PROCEDURE — 82746 ASSAY OF FOLIC ACID SERUM: CPT

## 2023-03-29 PROCEDURE — 85025 COMPLETE CBC W/AUTO DIFF WBC: CPT

## 2023-03-29 PROCEDURE — 85730 THROMBOPLASTIN TIME PARTIAL: CPT

## 2023-03-29 PROCEDURE — 86900 BLOOD TYPING SEROLOGIC ABO: CPT

## 2023-03-29 PROCEDURE — 86923 COMPATIBILITY TEST ELECTRIC: CPT

## 2023-03-29 PROCEDURE — 82728 ASSAY OF FERRITIN: CPT

## 2023-03-29 PROCEDURE — 86850 RBC ANTIBODY SCREEN: CPT

## 2023-03-29 PROCEDURE — 87637 SARSCOV2&INF A&B&RSV AMP PRB: CPT

## 2023-03-29 PROCEDURE — 86901 BLOOD TYPING SEROLOGIC RH(D): CPT

## 2023-03-29 PROCEDURE — 84466 ASSAY OF TRANSFERRIN: CPT

## 2023-03-29 PROCEDURE — 85045 AUTOMATED RETICULOCYTE COUNT: CPT

## 2023-03-29 PROCEDURE — 83540 ASSAY OF IRON: CPT

## 2023-03-29 PROCEDURE — 83735 ASSAY OF MAGNESIUM: CPT

## 2023-03-29 PROCEDURE — 36415 COLL VENOUS BLD VENIPUNCTURE: CPT

## 2023-03-29 PROCEDURE — 85610 PROTHROMBIN TIME: CPT

## 2023-03-29 PROCEDURE — 83010 ASSAY OF HAPTOGLOBIN QUANT: CPT

## 2023-03-29 PROCEDURE — 99285 EMERGENCY DEPT VISIT HI MDM: CPT | Mod: 25

## 2023-03-29 PROCEDURE — 84443 ASSAY THYROID STIM HORMONE: CPT

## 2023-03-29 PROCEDURE — 80053 COMPREHEN METABOLIC PANEL: CPT

## 2023-03-29 PROCEDURE — 84484 ASSAY OF TROPONIN QUANT: CPT

## 2023-03-29 PROCEDURE — 94640 AIRWAY INHALATION TREATMENT: CPT

## 2023-03-29 PROCEDURE — 93970 EXTREMITY STUDY: CPT

## 2023-03-29 PROCEDURE — 83615 LACTATE (LD) (LDH) ENZYME: CPT

## 2023-03-29 RX ORDER — WARFARIN SODIUM 2.5 MG/1
2 TABLET ORAL
Qty: 43 | Refills: 0
Start: 2023-03-29 | End: 2023-04-27

## 2023-03-29 RX ORDER — WARFARIN SODIUM 2.5 MG/1
2 TABLET ORAL
Qty: 0 | Refills: 0 | DISCHARGE

## 2023-03-29 RX ORDER — WARFARIN SODIUM 2.5 MG/1
1 TABLET ORAL
Qty: 0 | Refills: 0 | DISCHARGE

## 2023-03-29 RX ORDER — WARFARIN SODIUM 2.5 MG/1
1 TABLET ORAL
Qty: 9 | Refills: 0
Start: 2023-03-29 | End: 2023-04-27

## 2023-03-29 RX ADMIN — Medication 650 MILLIGRAM(S): at 09:17

## 2023-03-29 RX ADMIN — PREGABALIN 1000 MICROGRAM(S): 225 CAPSULE ORAL at 12:21

## 2023-03-29 RX ADMIN — Medication 40 MILLIGRAM(S): at 06:27

## 2023-03-29 RX ADMIN — Medication 500 MILLIGRAM(S): at 12:21

## 2023-03-29 RX ADMIN — SPIRONOLACTONE 12.5 MILLIGRAM(S): 25 TABLET, FILM COATED ORAL at 06:26

## 2023-03-29 RX ADMIN — Medication 81 MILLIGRAM(S): at 12:21

## 2023-03-29 RX ADMIN — Medication 1 SPRAY(S): at 06:28

## 2023-03-29 RX ADMIN — Medication 325 MILLIGRAM(S): at 12:21

## 2023-03-29 RX ADMIN — ATENOLOL 25 MILLIGRAM(S): 25 TABLET ORAL at 06:26

## 2023-03-29 RX ADMIN — PANTOPRAZOLE SODIUM 40 MILLIGRAM(S): 20 TABLET, DELAYED RELEASE ORAL at 06:26

## 2023-03-29 NOTE — PROGRESS NOTE ADULT - ATTENDING COMMENTS
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 INR at goal today  - trace LE pitting edema R>L, LE duplex negative for DVT  - DC home    D/W HS3

## 2023-03-29 NOTE — PROGRESS NOTE ADULT - PROBLEM SELECTOR PLAN 2
Pt w/ A.fib, CHADSVASC 7, on coumadin   pt on 2mg M-F and 1mg S/Sun  Pt high risk for stroke, but given therapeutic range, will hold warfarin pending further anemia eval >>on heparin gtt (on hold for EGD)  -> c/w atenolol  INR 1.66 today  -start bridge to warfarin, home regimen is 2mg M-F and 1mg weekends, ordered for warfarin 2mg starting 3/25>> c/w warfarin 5 3/28 Pt w/ A.fib, CHADSVASC 7, on coumadin   pt on 2mg M-F and 1mg S/Sun  Pt high risk for stroke, but given therapeutic range, will hold warfarin pending further anemia eval >>on heparin gtt (on hold for EGD)  -> c/w atenolol  INR 2.31 today  -start bridge to warfarin, home regimen is 2mg M-F and 1mg weekends, ordered for warfarin 2mg starting 3/25>> c/w warfarin 5 3/28  - dc heparin

## 2023-03-29 NOTE — DISCHARGE NOTE NURSING/CASE MANAGEMENT/SOCIAL WORK - NSDCPEFALRISK_GEN_ALL_CORE
For information on Fall & Injury Prevention, visit: [email or web address] OR  [email or web address] OR  https://www.cdc.gov/steadi/patient.html

## 2023-03-29 NOTE — DISCHARGE NOTE NURSING/CASE MANAGEMENT/SOCIAL WORK - PATIENT PORTAL LINK FT
You can access the FollowMyHealth Patient Portal offered by [facility] by registering at the following website: [email or web address]. By joining [employer]’s FollowMyHealth portal, you will also be able to view your health information using other applications (apps) compatible with our system.

## 2023-03-29 NOTE — PROGRESS NOTE ADULT - PROBLEM SELECTOR PROBLEM 7
Healthcare maintenance

## 2023-03-29 NOTE — PROGRESS NOTE ADULT - PROBLEM SELECTOR PLAN 6
-150s  c/w home spironolactone, atenolol

## 2023-03-29 NOTE — PROGRESS NOTE ADULT - PROBLEM SELECTOR PLAN 7
Diet: clear liquid pending GI eval  DVT: heparin gtt and bridge to warfarin  Dispo: TBD Diet: DASH  DVT: coumadin  Dispo: Today  No skilled PT needs

## 2023-03-29 NOTE — PROGRESS NOTE ADULT - SUBJECTIVE AND OBJECTIVE BOX
PROGRESS NOTE:     Patient is a 78y old  Male who presents with a chief complaint of Anemia (28 Mar 2023 08:07)      SUBJECTIVE / OVERNIGHT EVENTS:    ADDITIONAL REVIEW OF SYSTEMS:    MEDICATIONS  (STANDING):  ascorbic acid 500 milliGRAM(s) Oral daily  aspirin enteric coated 81 milliGRAM(s) Oral daily  atenolol  Tablet 25 milliGRAM(s) Oral daily  atorvastatin 10 milliGRAM(s) Oral at bedtime  cyanocobalamin 1000 MICROGram(s) Oral daily  ferrous    sulfate 325 milliGRAM(s) Oral daily  fluticasone propionate 50 MICROgram(s)/spray Nasal Spray 1 Spray(s) Both Nostrils <User Schedule>  furosemide    Tablet 40 milliGRAM(s) Oral daily  heparin  Infusion. 1100 Unit(s)/Hr (11 mL/Hr) IV Continuous <Continuous>  latanoprost 0.005% Ophthalmic Solution 1 Drop(s) Right EYE at bedtime  lidocaine 2% Injection for Nebulization 8 milliLiter(s) Nebulizer once  pantoprazole  Injectable 40 milliGRAM(s) IV Push two times a day  spironolactone 12.5 milliGRAM(s) Oral daily  tamsulosin 0.8 milliGRAM(s) Oral at bedtime    MEDICATIONS  (PRN):  acetaminophen     Tablet .. 650 milliGRAM(s) Oral every 6 hours PRN Temp greater or equal to 38C (100.4F), Mild Pain (1 - 3)  aluminum hydroxide/magnesium hydroxide/simethicone Suspension 30 milliLiter(s) Oral every 4 hours PRN Dyspepsia  melatonin 3 milliGRAM(s) Oral at bedtime PRN Insomnia  ondansetron Injectable 4 milliGRAM(s) IV Push every 8 hours PRN Nausea and/or Vomiting      CAPILLARY BLOOD GLUCOSE        I&O's Summary      PHYSICAL EXAM:  Vital Signs Last 24 Hrs  T(C): 37 (29 Mar 2023 05:23), Max: 37 (29 Mar 2023 05:23)  T(F): 98.6 (29 Mar 2023 05:23), Max: 98.6 (29 Mar 2023 05:23)  HR: 66 (29 Mar 2023 05:23) (62 - 66)  BP: 135/61 (29 Mar 2023 05:23) (126/74 - 135/61)  BP(mean): --  RR: 18 (29 Mar 2023 05:23) (18 - 18)  SpO2: 93% (29 Mar 2023 05:23) (93% - 93%)    Parameters below as of 29 Mar 2023 05:23  Patient On (Oxygen Delivery Method): room air        GENERAL: No acute distress, well-developed  HEAD:  Atraumatic, Normocephalic  EYES: EOMI, PERRLA, conjunctiva and sclera clear  NECK: Supple, no lymphadenopathy, no JVD  CHEST/LUNG: CTAB; No wheezes, rales, or rhonchi  HEART: Regular rate and rhythm; No murmurs, rubs, or gallops  ABDOMEN: Soft, non-tender, non-distended; normal bowel sounds, no organomegaly  EXTREMITIES:  2+ peripheral pulses b/l, No clubbing, cyanosis, or edema  NEUROLOGY: A&O x 3, no focal deficits  SKIN: No rashes or lesions    LABS:                        9.1    10.90 )-----------( 490      ( 29 Mar 2023 06:42 )             28.8     03-28    140  |  99  |  25<H>  ----------------------------<  106<H>  4.0   |  28  |  1.11    Ca    9.3      28 Mar 2023 07:08  Phos  3.1     03-28  Mg     2.1     03-28      PT/INR - ( 29 Mar 2023 06:42 )   PT: 26.8 sec;   INR: 2.31 ratio         PTT - ( 29 Mar 2023 06:42 )  PTT:38.1 sec            RADIOLOGY & ADDITIONAL TESTS:  Results Reviewed:   Imaging Personally Reviewed:  Electrocardiogram Personally Reviewed:    COORDINATION OF CARE:  Care Discussed with Consultants/Other Providers [Y/N]:  Prior or Outpatient Records Reviewed [Y/N]:   PROGRESS NOTE:     Patient is a 78y old  Male who presents with a chief complaint of Anemia (28 Mar 2023 08:07)      SUBJECTIVE / OVERNIGHT EVENTS:  No events overnight. Patient examined at bedside this AM. Feels well- All ROS negative.     ADDITIONAL REVIEW OF SYSTEMS:    MEDICATIONS  (STANDING):  ascorbic acid 500 milliGRAM(s) Oral daily  aspirin enteric coated 81 milliGRAM(s) Oral daily  atenolol  Tablet 25 milliGRAM(s) Oral daily  atorvastatin 10 milliGRAM(s) Oral at bedtime  cyanocobalamin 1000 MICROGram(s) Oral daily  ferrous    sulfate 325 milliGRAM(s) Oral daily  fluticasone propionate 50 MICROgram(s)/spray Nasal Spray 1 Spray(s) Both Nostrils <User Schedule>  furosemide    Tablet 40 milliGRAM(s) Oral daily  heparin  Infusion. 1100 Unit(s)/Hr (11 mL/Hr) IV Continuous <Continuous>  latanoprost 0.005% Ophthalmic Solution 1 Drop(s) Right EYE at bedtime  lidocaine 2% Injection for Nebulization 8 milliLiter(s) Nebulizer once  pantoprazole  Injectable 40 milliGRAM(s) IV Push two times a day  spironolactone 12.5 milliGRAM(s) Oral daily  tamsulosin 0.8 milliGRAM(s) Oral at bedtime    MEDICATIONS  (PRN):  acetaminophen     Tablet .. 650 milliGRAM(s) Oral every 6 hours PRN Temp greater or equal to 38C (100.4F), Mild Pain (1 - 3)  aluminum hydroxide/magnesium hydroxide/simethicone Suspension 30 milliLiter(s) Oral every 4 hours PRN Dyspepsia  melatonin 3 milliGRAM(s) Oral at bedtime PRN Insomnia  ondansetron Injectable 4 milliGRAM(s) IV Push every 8 hours PRN Nausea and/or Vomiting      CAPILLARY BLOOD GLUCOSE        I&O's Summary      PHYSICAL EXAM:  Vital Signs Last 24 Hrs  T(C): 37 (29 Mar 2023 05:23), Max: 37 (29 Mar 2023 05:23)  T(F): 98.6 (29 Mar 2023 05:23), Max: 98.6 (29 Mar 2023 05:23)  HR: 66 (29 Mar 2023 05:23) (62 - 66)  BP: 135/61 (29 Mar 2023 05:23) (126/74 - 135/61)  BP(mean): --  RR: 18 (29 Mar 2023 05:23) (18 - 18)  SpO2: 93% (29 Mar 2023 05:23) (93% - 93%)    Parameters below as of 29 Mar 2023 05:23  Patient On (Oxygen Delivery Method): room air        GENERAL: No acute distress, well-developed  HEAD:  Atraumatic, Normocephalic  EYES: EOMI, PERRLA, conjunctiva and sclera clear  NECK: Supple, no lymphadenopathy, no JVD  CHEST/LUNG: CTAB; No wheezes, rales, or rhonchi  HEART: Regular rate and rhythm; No murmurs, rubs, or gallops  ABDOMEN: Soft, non-tender, non-distended; normal bowel sounds, no organomegaly  EXTREMITIES:  2+ peripheral pulses b/l, No clubbing, cyanosis, or edema  NEUROLOGY: A&O x 3, no focal deficits  SKIN: No rashes or lesions    LABS:                        9.1    10.90 )-----------( 490      ( 29 Mar 2023 06:42 )             28.8     03-28    140  |  99  |  25<H>  ----------------------------<  106<H>  4.0   |  28  |  1.11    Ca    9.3      28 Mar 2023 07:08  Phos  3.1     03-28  Mg     2.1     03-28      PT/INR - ( 29 Mar 2023 06:42 )   PT: 26.8 sec;   INR: 2.31 ratio         PTT - ( 29 Mar 2023 06:42 )  PTT:38.1 sec            RADIOLOGY & ADDITIONAL TESTS:  Results Reviewed:   Imaging Personally Reviewed:  Electrocardiogram Personally Reviewed:    COORDINATION OF CARE:  Care Discussed with Consultants/Other Providers [Y/N]:  Prior or Outpatient Records Reviewed [Y/N]:

## 2023-03-29 NOTE — PROGRESS NOTE ADULT - PROBLEM/PLAN-7
DISPLAY PLAN FREE TEXT
25
DISPLAY PLAN FREE TEXT

## 2023-03-29 NOTE — PROGRESS NOTE ADULT - PROVIDER SPECIALTY LIST ADULT
Gastroenterology
Heme/Onc
Heme/Onc
Internal Medicine

## 2023-03-29 NOTE — PROGRESS NOTE ADULT - ASSESSMENT
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

## 2023-03-30 ENCOUNTER — APPOINTMENT (OUTPATIENT)
Dept: ORTHOPEDIC SURGERY | Facility: CLINIC | Age: 78
End: 2023-03-30
Payer: MEDICARE

## 2023-03-30 ENCOUNTER — NON-APPOINTMENT (OUTPATIENT)
Age: 78
End: 2023-03-30

## 2023-03-30 VITALS — WEIGHT: 217 LBS | HEIGHT: 68 IN | BODY MASS INDEX: 32.89 KG/M2

## 2023-03-30 DIAGNOSIS — M16.12 UNILATERAL PRIMARY OSTEOARTHRITIS, LEFT HIP: ICD-10-CM

## 2023-03-30 PROCEDURE — 99215 OFFICE O/P EST HI 40 MIN: CPT

## 2023-03-31 NOTE — ED PROVIDER NOTE - ENMT, MLM
Admission
Airway patent, Nasal mucosa clear. Mouth with normal mucosa. Throat has no vesicles, no oropharyngeal exudates and uvula is midline.

## 2023-04-03 NOTE — CDI QUERY NOTE - NSCDIOTHERTXTBX_GEN_ALL_CORE_HH
Clinical documentation and/or evidence in the medical record indicates that this patient has atrial fibrillation.  In order to ensure accurate coding and accuracy of the clinical record, the documentation in this patient’s medical record requires additional clarification.      Please include more specific documentation as to the type of atrial fibrillation in your progress note and/or discharge summary.  Please clarify if the patient was found to have one of the following:    •	Chronic atrial fibrilllation  •	Paroxysmal atrial fibrilllation  •	Permanent atrial fibrilllation  •	Persistent atrial fibrilllation  •	Other (specify)  •	Not applicable     Also, please clarify the indication for PO Lasix and Spironolactone:    - Chronic HFpEF  - LE edema without CHF  - Other (please specify)  - Not applicable    Supporting documentation and/or clinical evidence:  > pt has a PMH of afib on Coumadin, previous ablations that were unsuccessful, CAD s/p PCI 1/19/23, AS s/p TAVR 1/21/23, MICRA PPM 10/2020  > EKG this admission notes V-paced rhythm  > On review of [facility], documentation from CT Surgery 1/2023 notes pt s/p TAVR w/Radha valve, acute on chronic HFpEF, CAD s/p PCI, chronic afib. 2/14/2023 documentation by CT Surgery also noted pt has chronic (permanent) afib  > Outpatient Cardiology notes document the pt has HFpEF on Lasix and Spironolactone  > 3/22 TTE EF 61%, normal LV systolic function, severe right atrial enlargement, RV enlargement with decreased RV systolic function  > Medicine documenting LE edema: on Lasix 40, TTE 1/2023 with normal EF, no ventricular dysfunction, repeat TTE w/RV systolic dysfunction (new from prior) markedly dilated RA  > Pt has been receiving PO Lasix and spironolactone and Coumadin       For reference, please see attached:  [facility] Provider Atrial Fibrillation Clinical Paper  Present on Admission:  Was this the condition present on admission? If so, please document in the chart “(the condition) was present on admission”.

## 2023-04-11 ENCOUNTER — APPOINTMENT (OUTPATIENT)
Dept: GASTROENTEROLOGY | Facility: CLINIC | Age: 78
End: 2023-04-11
Payer: MEDICARE

## 2023-04-11 VITALS
SYSTOLIC BLOOD PRESSURE: 159 MMHG | WEIGHT: 217 LBS | HEART RATE: 72 BPM | HEIGHT: 68 IN | DIASTOLIC BLOOD PRESSURE: 69 MMHG | OXYGEN SATURATION: 96 % | BODY MASS INDEX: 32.89 KG/M2 | TEMPERATURE: 97.8 F

## 2023-04-11 DIAGNOSIS — D64.9 ANEMIA, UNSPECIFIED: ICD-10-CM

## 2023-04-11 PROCEDURE — 99214 OFFICE O/P EST MOD 30 MIN: CPT

## 2023-04-22 PROBLEM — D64.9 ANEMIA: Status: ACTIVE | Noted: 2021-06-22

## 2023-04-22 NOTE — CONSULT LETTER
[Dear  ___] : Dear  [unfilled], [Consult Letter:] : I had the pleasure of evaluating your patient, [unfilled]. [Please see my note below.] : Please see my note below. [Consult Closing:] : Thank you very much for allowing me to participate in the care of this patient.  If you have any questions, please do not hesitate to contact me. [Sincerely,] : Sincerely, [DrDuy  ___] : Dr. BEYER [FreeTextEntry3] : Jimi Martins MD, MPH, [place], [place]\par Chief of Clinical Quality in Gastroenterology, [facility]\par Associate Chief of Gastroenterology, [facility]\par Interim Director of Endoscopy, [facility]\par Director of Endoscopic Ultrasound, [facility]\par [address], [address]\par [facility] [place] [facility], [address]\par 24 hours [phone]\par \par

## 2023-04-22 NOTE — HISTORY OF PRESENT ILLNESS
[FreeTextEntry1] : Hospitalized with fatigue and blood loss anemia Hb 8.6\par \par Capsule endoscopy demonstrated duodenal angioectasias\par \par Push enteroscopy:  duodenal angioectasia, treated with argon plasma coagulation\par \par discharge Hb 9.1 on 3/29\par 3/31:  9.9\par 4/5:  9.5\par \par Patient energy has improved.\par No fevers, chills, sweats, abdominal pain, nausea, vomiting, constipation, diarrhea, melena, hematochezia, jaundice, weight loss since hospitalization.\par \par Push enteroscopy on 3/24/2023\par      The esophagus was normal.\par      Localized mildly erythematous mucosa without bleeding was found in the gastric antrum.\par      The stomach was otherwise normal. Biopsies were taken with a cold forceps for histology in \par      antrum, body, incisura\par      Localized erythematous mucosa without active bleeding and with no stigmata of bleeding was \par      found scattered throughout the duodenum\par      One angiodysplastic lesion with no bleeding was found in the fourth portion of the duodenum. \par      Coagulation for bleeding prevention using argon plasma at 1.9 liters/minute and 20 tate was \par      successful.\par      The exam of the duodenum was otherwise normal.\par      There was no evidence of significant pathology in the proximal jejunum.\par      An area in the proximal jejunum was tattooed with an injection of 0.5 mL of Eli ink at \par      extent of exam\par                                                                                                     \par Impression:          - Gastric and duodenal erythema. Bx taken of stomach to r/o HP.\par                      - One non-bleeding angiodysplastic lesion in the duodenum. Treated with argon \par                      plasma coagulation (APC).\par                      - No active bleeding\par                      - Tatoo placed at distal most portion of exam\par                      - Anemia likely due to angioectasia in the small intestine.\par

## 2023-04-22 NOTE — REVIEW OF SYSTEMS
[Fever] : no fever [Chest Pain] : no chest pain [Abdominal Pain] : no abdominal pain [FreeTextEntry5] : Has PPM [FreeTextEntry7] : Denies melena or blood in stools  [FreeTextEntry9] : Has pending hip surgery  [de-identified] : Receives Iron infusions, takes oral Iron supplements

## 2023-04-22 NOTE — PHYSICAL EXAM
[Alert] : alert [No Acute Distress] : no acute distress [Sclera] : the sclera and conjunctiva were normal [No Respiratory Distress] : no respiratory distress [Auscultation Breath Sounds / Voice Sounds] : lungs were clear to auscultation bilaterally [Normal S1, S2] : normal S1 and S2 [Bowel Sounds] : normal bowel sounds [Wheelchair] : Patient in wheelchair [Normal Color / Pigmentation] : normal skin color and pigmentation [No Focal Deficits] : no focal deficits [Normal Affect] : the affect was normal [Normal Mood] : the mood was normal [de-identified] : Face Mask in place  [de-identified] : Distended girth [de-identified] : sitting in wheelchair

## 2023-04-22 NOTE — ASSESSMENT
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  S/p B12 IM 2/6, IV Venofer x 2 on 3/16.\par \par #Acute on chronic macrocytic anemia\par Patient was known to be iron, folate, and b12 deficient on supplementation. 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 In the interim, patient was taken off plavix and continuing only on aspirin and coumadin with continuing downtrend in Hgb. Previous plan was to have patient started on octreotide to assist in healing of AVM but unable to obtain insurance authorization. \par \par Recommendations:\par \par #1.  Follow CBC periodically with primary care doctor.\par \par #2.  Watch for recurrent GI bleed manifesting as melena or hematochezia.\par \par #3.  We will not utilize long-acting octreotide due to difficulty obtaining health insurance authorization\par \par #4.  Would consider repeat video capsule endoscopy for recurrent significant anemia or recurrent overt GI bleeding.\par \par #5.  Follow-up in office in 6 months.

## 2023-04-25 ENCOUNTER — OUTPATIENT (OUTPATIENT)
Dept: OUTPATIENT SERVICES | Facility: HOSPITAL | Age: 78
LOS: 1 days | End: 2023-04-25
Payer: COMMERCIAL

## 2023-04-25 VITALS
WEIGHT: 216.93 LBS | TEMPERATURE: 98 F | DIASTOLIC BLOOD PRESSURE: 62 MMHG | HEIGHT: 68 IN | SYSTOLIC BLOOD PRESSURE: 126 MMHG | RESPIRATION RATE: 18 BRPM | OXYGEN SATURATION: 97 % | HEART RATE: 61 BPM

## 2023-04-25 DIAGNOSIS — Z98.890 OTHER SPECIFIED POSTPROCEDURAL STATES: Chronic | ICD-10-CM

## 2023-04-25 DIAGNOSIS — M16.12 UNILATERAL PRIMARY OSTEOARTHRITIS, LEFT HIP: ICD-10-CM

## 2023-04-25 DIAGNOSIS — Z96.641 PRESENCE OF RIGHT ARTIFICIAL HIP JOINT: Chronic | ICD-10-CM

## 2023-04-25 DIAGNOSIS — Z95.0 PRESENCE OF CARDIAC PACEMAKER: ICD-10-CM

## 2023-04-25 DIAGNOSIS — Z98.1 ARTHRODESIS STATUS: Chronic | ICD-10-CM

## 2023-04-25 DIAGNOSIS — I25.10 ATHEROSCLEROTIC HEART DISEASE OF NATIVE CORONARY ARTERY WITHOUT ANGINA PECTORIS: Chronic | ICD-10-CM

## 2023-04-25 DIAGNOSIS — I48.91 UNSPECIFIED ATRIAL FIBRILLATION: ICD-10-CM

## 2023-04-25 DIAGNOSIS — Z98.42 CATARACT EXTRACTION STATUS, LEFT EYE: Chronic | ICD-10-CM

## 2023-04-25 DIAGNOSIS — Z95.2 PRESENCE OF PROSTHETIC HEART VALVE: Chronic | ICD-10-CM

## 2023-04-25 DIAGNOSIS — Z95.0 PRESENCE OF CARDIAC PACEMAKER: Chronic | ICD-10-CM

## 2023-04-25 DIAGNOSIS — G47.33 OBSTRUCTIVE SLEEP APNEA (ADULT) (PEDIATRIC): ICD-10-CM

## 2023-04-25 DIAGNOSIS — D64.9 ANEMIA, UNSPECIFIED: ICD-10-CM

## 2023-04-25 DIAGNOSIS — Z90.81 ACQUIRED ABSENCE OF SPLEEN: Chronic | ICD-10-CM

## 2023-04-25 DIAGNOSIS — Z01.818 ENCOUNTER FOR OTHER PREPROCEDURAL EXAMINATION: ICD-10-CM

## 2023-04-25 LAB
ANION GAP SERPL CALC-SCNC: 11 MMOL/L — SIGNIFICANT CHANGE UP (ref 5–17)
BLD GP AB SCN SERPL QL: NEGATIVE — SIGNIFICANT CHANGE UP
BUN SERPL-MCNC: 27 MG/DL — HIGH (ref 7–23)
CALCIUM SERPL-MCNC: 9.7 MG/DL — SIGNIFICANT CHANGE UP (ref 8.4–10.5)
CHLORIDE SERPL-SCNC: 101 MMOL/L — SIGNIFICANT CHANGE UP (ref 96–108)
CO2 SERPL-SCNC: 30 MMOL/L — SIGNIFICANT CHANGE UP (ref 22–31)
CREAT SERPL-MCNC: 1.06 MG/DL — SIGNIFICANT CHANGE UP (ref 0.5–1.3)
EGFR: 71 ML/MIN/1.73M2 — SIGNIFICANT CHANGE UP
GLUCOSE SERPL-MCNC: 116 MG/DL — HIGH (ref 70–99)
HCT VFR BLD CALC: 32.8 % — LOW (ref 39–50)
HGB BLD-MCNC: 10 G/DL — LOW (ref 13–17)
INR BLD: 2.27 RATIO — HIGH (ref 0.88–1.16)
MCHC RBC-ENTMCNC: 30.5 GM/DL — LOW (ref 32–36)
MCHC RBC-ENTMCNC: 30.7 PG — SIGNIFICANT CHANGE UP (ref 27–34)
MCV RBC AUTO: 100.6 FL — HIGH (ref 80–100)
NRBC # BLD: 0 /100 WBCS — SIGNIFICANT CHANGE UP (ref 0–0)
PLATELET # BLD AUTO: 378 K/UL — SIGNIFICANT CHANGE UP (ref 150–400)
POTASSIUM SERPL-MCNC: 5.1 MMOL/L — SIGNIFICANT CHANGE UP (ref 3.5–5.3)
POTASSIUM SERPL-SCNC: 5.1 MMOL/L — SIGNIFICANT CHANGE UP (ref 3.5–5.3)
PROTHROM AB SERPL-ACNC: 26.5 SEC — HIGH (ref 10.5–13.4)
RBC # BLD: 3.26 M/UL — LOW (ref 4.2–5.8)
RBC # FLD: 17.3 % — HIGH (ref 10.3–14.5)
RH IG SCN BLD-IMP: POSITIVE — SIGNIFICANT CHANGE UP
SODIUM SERPL-SCNC: 142 MMOL/L — SIGNIFICANT CHANGE UP (ref 135–145)
WBC # BLD: 8.93 K/UL — SIGNIFICANT CHANGE UP (ref 3.8–10.5)
WBC # FLD AUTO: 8.93 K/UL — SIGNIFICANT CHANGE UP (ref 3.8–10.5)

## 2023-04-25 PROCEDURE — 83036 HEMOGLOBIN GLYCOSYLATED A1C: CPT

## 2023-04-25 PROCEDURE — 80048 BASIC METABOLIC PNL TOTAL CA: CPT

## 2023-04-25 PROCEDURE — 86900 BLOOD TYPING SEROLOGIC ABO: CPT

## 2023-04-25 PROCEDURE — 86850 RBC ANTIBODY SCREEN: CPT

## 2023-04-25 PROCEDURE — 87641 MR-STAPH DNA AMP PROBE: CPT

## 2023-04-25 PROCEDURE — 86901 BLOOD TYPING SEROLOGIC RH(D): CPT

## 2023-04-25 PROCEDURE — 87640 STAPH A DNA AMP PROBE: CPT

## 2023-04-25 PROCEDURE — G0463: CPT

## 2023-04-25 PROCEDURE — 85027 COMPLETE CBC AUTOMATED: CPT

## 2023-04-25 PROCEDURE — 85610 PROTHROMBIN TIME: CPT

## 2023-04-25 RX ORDER — ASPIRIN/CALCIUM CARB/MAGNESIUM 324 MG
1 TABLET ORAL
Qty: 0 | Refills: 0 | DISCHARGE

## 2023-04-25 RX ORDER — ROSUVASTATIN CALCIUM 5 MG/1
0.5 TABLET ORAL
Qty: 0 | Refills: 0 | DISCHARGE

## 2023-04-25 RX ORDER — ASCORBIC ACID 60 MG
1 TABLET,CHEWABLE ORAL
Qty: 0 | Refills: 0 | DISCHARGE

## 2023-04-25 RX ORDER — FLUTICASONE PROPIONATE 50 MCG
1 SPRAY, SUSPENSION NASAL
Qty: 0 | Refills: 0 | DISCHARGE

## 2023-04-25 RX ORDER — OMEPRAZOLE 10 MG/1
1 CAPSULE, DELAYED RELEASE ORAL
Qty: 0 | Refills: 0 | DISCHARGE

## 2023-04-25 RX ORDER — PREGABALIN 225 MG/1
1 CAPSULE ORAL
Qty: 0 | Refills: 0 | DISCHARGE

## 2023-04-25 RX ORDER — SODIUM CHLORIDE 9 MG/ML
3 INJECTION INTRAMUSCULAR; INTRAVENOUS; SUBCUTANEOUS EVERY 8 HOURS
Refills: 0 | Status: DISCONTINUED | OUTPATIENT
Start: 2023-05-19 | End: 2023-05-23

## 2023-04-25 RX ORDER — ATENOLOL 25 MG/1
1 TABLET ORAL
Qty: 0 | Refills: 2 | DISCHARGE

## 2023-04-25 NOTE — H&P PST ADULT - PROBLEM SELECTOR PLAN 3
[employer] pacemaker implanted 10/9/20.    Per patient pacemaker was last interrogated 2 months ago. **Obtain interrogation report from Dr. Mejia office.

## 2023-04-25 NOTE — H&P PST ADULT - HISTORY OF PRESENT ILLNESS
Reason For Visit  RACHEL ANNE is a 78 year old male being seen for an initial visit for. s/p right THR, left hip pain.     History of Present Illness    History of Present Illness   Patient presents to the office in a wheelchair complaining of s/p right THR (Dr. Rodgers) and left hip pain, insidious onset, progressively worsening. Reports left hip pain which has limited his mobility. He has used a cane for ambulating assistance, but reports now he has to utilize a walker to get around at home. He indicates the pain to the left groin.  Worsening with prolonged walking.  Some relief with rest,   Patient denies any fever, chills, calf tenderness or redness, trauma, swelling, erythema, hematomas, numbness or tingling sensation, instability or buckling.  PSHx prior stroke (Had short term memory loss from 1st stroke, no associated weakness after 2nd stroke), spine surgery w/ Dr. 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 Sosa. He is currently taking Warfarin. Patient denies any fever, chills, calf tenderness or redness, trauma, swelling, erythema, hematomas, or buckling.    Reason For Visit  RACHEL ANNE is a 78 year old male being seen for an initial visit for. s/p right THR, left hip pain.     History of Present Illness    History of Present Illness   Patient presents to the office in a wheelchair complaining of s/p right THR (Dr. Rodgers) and left hip pain, insidious onset, progressively worsening.   Reports left hip pain which has limited his mobility. He has used a cane for ambulating assistance, but reports now he has to utilize a walker to get around at home. He indicates the pain to the left groin.  Worsening with prolonged walking.  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 s/p right THR, now Left hip OA and worsening pain. presenting to PST for scheduled  Left total hip replacement by  on 5/20/23.     Left hip pain limiting patient mobility, uses cane and walker at home and wheelchair for distance. Pain is described at sharp 8/10 with movement.  During exam pain is currently a 3/10. Acceptable level of pain is 2.  Pain is relieved with Tylenol and rest periods. Patient denies any chest pain, sob, palpitations, fever, chills,  numbness or tingling sensation    Goal of surgery is to walk again without need of assistance of medical devices. "Hopefully be able to drive again"    Denies Hx Covid Infection. Vaccinated and boosted with moderna vacccine.         Dr. Yumiko Mejia  ( card's)    Dr. Estes  (Hematologist )

## 2023-04-25 NOTE — H&P PST ADULT - RX
Diagnosed over 20 years ago. No longer using CPAP. Stable Followed  by Pulrojelio Blair [phone] Diagnosed over 20 years ago. No longer using CPAP. Stable Followed  by Pulmonologist  Dr. Jaiden Blair [phone] Last visit in January 2023

## 2023-04-25 NOTE — H&P PST ADULT - PROBLEM SELECTOR PLAN 5
78M w/ CAD s/p stent (1/19/2023) on ASA, TAVR (1/21/2023) c/b anemia requiring multiple transfusions, currently receiving IV Venofer weekly.     Scheduled appointment with Hematologist  Dr. Bermeo on 5/10/23.  Obtain last note from hematologist.

## 2023-04-25 NOTE — H&P PST ADULT - PROBLEM SELECTOR PLAN 2
CAD s/p stent (1/19/2023) on ASA, TAVR (1/21/2023), atrial fibrillation on coumadin, pacemaker in place,     Afib on coumadin.  Planned for Coumadin- lovenox bridge per cardiologist/hematologist.    Scheduled appointment with Cardiologist Dr. Mejia on 5/9 and Dr. Bermeo on 5/10/23. ** Obtain notes fr  *If on Lovenox last done AM prior to surgery (5/19/23)  Hold Aspirin as directed by Cardiologist. CAD s/p stent (1/19/2023) on ASA, TAVR (1/21/2023), atrial fibrillation on coumadin, pacemaker in place,     Afib on coumadin.  Planned for Coumadin- lovenox bridge per cardiologist/hematologist.    Scheduled appointment with Cardiologist Dr. Mejia on 5/9 and Dr. Bermeo on 5/10/23. ** Obtain notes fr  *If on Lovenox last done AM prior to surgery (5/19/23)  Hold Aspirin as directed by Cardiologist.  *PT/INR on DOS

## 2023-04-25 NOTE — H&P PST ADULT - OTHER CARE PROVIDERS
Dr. Yumiko Mejia [phone]  ( card's)  Next visit on 5/9..    Dr. Estes [phone] (Hematologist ) Next visit on 5/10

## 2023-04-25 NOTE — H&P PST ADULT - PROBLEM SELECTOR PLAN 4
Diagnosed with sleep apnea 20 years ago. No longer using CPAP.   Last seen by pulmonologist Dr. Jaiden Blair in January 2023.

## 2023-04-25 NOTE — H&P PST ADULT - ASSESSMENT
DASI score:  DASI activity:  Loose teeth or denture:. Partial upper dentures.    DASI score:3.0  DASI activity: Perform's ADL's with limitations 2/2 pain; uses assistive devices walker/ cane for mobility and wheelchair for distance.   Loose teeth or denture:. Partial upper dentures.

## 2023-04-25 NOTE — H&P PST ADULT - PROBLEM SELECTOR PLAN 1
Scheduled for left total hip replacement by Dr. Rutherford on 5/20/23.    Preoperative instructions and chlorhexidine given to patient. Ensure clear drink and instructions also given to patient.   Labs: cbc, bmp, T/S, PT/INR, A1C, MRSA,MSSA swab done in PST

## 2023-04-26 LAB
A1C WITH ESTIMATED AVERAGE GLUCOSE RESULT: 5.7 % — HIGH (ref 4–5.6)
ESTIMATED AVERAGE GLUCOSE: 117 MG/DL — HIGH (ref 68–114)
MRSA PCR RESULT.: SIGNIFICANT CHANGE UP
S AUREUS DNA NOSE QL NAA+PROBE: SIGNIFICANT CHANGE UP

## 2023-04-28 NOTE — DISCHARGE NOTE ADULT - NS AS DC STROKE DX
X-ray of the neck does show mild to moderate degenerative changes.  No acute changes.  As we discussed I might consider physical therapy
Ischemic Stroke...

## 2023-05-08 PROBLEM — Z95.0 CARDIAC PACEMAKER IN SITU: Status: ACTIVE | Noted: 2023-03-26

## 2023-05-09 ENCOUNTER — NON-APPOINTMENT (OUTPATIENT)
Age: 78
End: 2023-05-09

## 2023-05-09 ENCOUNTER — APPOINTMENT (OUTPATIENT)
Dept: CARDIOLOGY | Facility: CLINIC | Age: 78
End: 2023-05-09
Payer: MEDICARE

## 2023-05-09 VITALS
DIASTOLIC BLOOD PRESSURE: 74 MMHG | BODY MASS INDEX: 32.89 KG/M2 | SYSTOLIC BLOOD PRESSURE: 146 MMHG | WEIGHT: 217 LBS | HEART RATE: 64 BPM | OXYGEN SATURATION: 96 % | HEIGHT: 68 IN

## 2023-05-09 DIAGNOSIS — Z95.0 PRESENCE OF CARDIAC PACEMAKER: ICD-10-CM

## 2023-05-09 PROCEDURE — 99214 OFFICE O/P EST MOD 30 MIN: CPT | Mod: 25

## 2023-05-09 PROCEDURE — 93000 ELECTROCARDIOGRAM COMPLETE: CPT

## 2023-05-09 NOTE — HISTORY OF PRESENT ILLNESS
[FreeTextEntry1] : Jean-Pierre is having left hip replacement 5/20/23 . He has AVM and coumadin clinic at [facility] is not following him.

## 2023-05-09 NOTE — DISCUSSION/SUMMARY
[FreeTextEntry1] : The patient is a 78-year-old gentleman A-fib, HTN, HLD, s/p back surgery with RLE fasciotomy and skin graft, s/p COVID, PPM , AS, anemia s/p TAVR with AV malformation awaiting left THR.\par #1 HFpEF- c/w lasix 1-2x day, spironolactone 12.5mg, keep leg elevated when sitting\par #2 AS- s/p TAVR\par #2 HTN- remain off lisinopril 5mg \par #3 Afib- s/p PPM for tachybrady good battery life, c/w atenolol 25mg,no bleeding on warfarin- monitored by hematology, INR 2-3.0\par #4 Lipids- c/w simvastatin\par #5 BPH- on tamsulosin\par #6 GI-  on iron, s/p transfusion and aranesp,BM negative, AV malformation,f/u tomorrow\par #7 Ortho- left hip pain limits ambulation\par There are no cardiac contraindications to left hip replacement surgery [EKG obtained to assist in diagnosis and management of assessed problem(s)] : EKG obtained to assist in diagnosis and management of assessed problem(s)

## 2023-05-09 NOTE — REVIEW OF SYSTEMS
[Weight Loss (___ Lbs)] : [unfilled] ~Ulb weight loss [SOB] : shortness of breath [Dyspnea on exertion] : dyspnea during exertion [Negative] : Heme/Lymph [Weight Gain (___ Lbs)] : no recent weight gain [Chest Discomfort] : no chest discomfort [Lower Ext Edema] : no extremity edema [Leg Claudication] : no intermittent leg claudication [Orthopnea] : no orthopnea [PND] : no PND [Syncope] : no syncope

## 2023-05-19 ENCOUNTER — TRANSCRIPTION ENCOUNTER (OUTPATIENT)
Age: 78
End: 2023-05-19

## 2023-05-19 ENCOUNTER — INPATIENT (INPATIENT)
Facility: HOSPITAL | Age: 78
LOS: 3 days | Discharge: HOME HEALTH | DRG: 470 | End: 2023-05-23
Attending: ORTHOPAEDIC SURGERY | Admitting: ORTHOPAEDIC SURGERY
Payer: COMMERCIAL

## 2023-05-19 VITALS
DIASTOLIC BLOOD PRESSURE: 72 MMHG | SYSTOLIC BLOOD PRESSURE: 147 MMHG | RESPIRATION RATE: 18 BRPM | OXYGEN SATURATION: 94 % | HEART RATE: 67 BPM | TEMPERATURE: 98 F

## 2023-05-19 DIAGNOSIS — M16.12 UNILATERAL PRIMARY OSTEOARTHRITIS, LEFT HIP: ICD-10-CM

## 2023-05-19 DIAGNOSIS — Z95.2 PRESENCE OF PROSTHETIC HEART VALVE: Chronic | ICD-10-CM

## 2023-05-19 DIAGNOSIS — Z95.0 PRESENCE OF CARDIAC PACEMAKER: Chronic | ICD-10-CM

## 2023-05-19 DIAGNOSIS — Z98.42 CATARACT EXTRACTION STATUS, LEFT EYE: Chronic | ICD-10-CM

## 2023-05-19 DIAGNOSIS — Z98.1 ARTHRODESIS STATUS: Chronic | ICD-10-CM

## 2023-05-19 DIAGNOSIS — Z96.641 PRESENCE OF RIGHT ARTIFICIAL HIP JOINT: Chronic | ICD-10-CM

## 2023-05-19 DIAGNOSIS — I25.10 ATHEROSCLEROTIC HEART DISEASE OF NATIVE CORONARY ARTERY WITHOUT ANGINA PECTORIS: Chronic | ICD-10-CM

## 2023-05-19 DIAGNOSIS — Z90.81 ACQUIRED ABSENCE OF SPLEEN: Chronic | ICD-10-CM

## 2023-05-19 DIAGNOSIS — Z98.890 OTHER SPECIFIED POSTPROCEDURAL STATES: Chronic | ICD-10-CM

## 2023-05-19 DIAGNOSIS — I50.32 CHRONIC DIASTOLIC (CONGESTIVE) HEART FAILURE: ICD-10-CM

## 2023-05-19 DIAGNOSIS — I48.20 CHRONIC ATRIAL FIBRILLATION, UNSPECIFIED: ICD-10-CM

## 2023-05-19 DIAGNOSIS — I63.9 CEREBRAL INFARCTION, UNSPECIFIED: ICD-10-CM

## 2023-05-19 LAB
ANION GAP SERPL CALC-SCNC: 15 MMOL/L — SIGNIFICANT CHANGE UP (ref 5–17)
APPEARANCE UR: CLEAR — SIGNIFICANT CHANGE UP
BILIRUB UR-MCNC: NEGATIVE — SIGNIFICANT CHANGE UP
BLD GP AB SCN SERPL QL: NEGATIVE — SIGNIFICANT CHANGE UP
BUN SERPL-MCNC: 29 MG/DL — HIGH (ref 7–23)
CALCIUM SERPL-MCNC: 9.9 MG/DL — SIGNIFICANT CHANGE UP (ref 8.4–10.5)
CHLORIDE SERPL-SCNC: 103 MMOL/L — SIGNIFICANT CHANGE UP (ref 96–108)
CO2 SERPL-SCNC: 25 MMOL/L — SIGNIFICANT CHANGE UP (ref 22–31)
COLOR SPEC: SIGNIFICANT CHANGE UP
CREAT SERPL-MCNC: 1.08 MG/DL — SIGNIFICANT CHANGE UP (ref 0.5–1.3)
DIFF PNL FLD: NEGATIVE — SIGNIFICANT CHANGE UP
EGFR: 70 ML/MIN/1.73M2 — SIGNIFICANT CHANGE UP
GLUCOSE SERPL-MCNC: 123 MG/DL — HIGH (ref 70–99)
GLUCOSE UR QL: NEGATIVE — SIGNIFICANT CHANGE UP
HCT VFR BLD CALC: 32.3 % — LOW (ref 39–50)
HGB BLD-MCNC: 9.8 G/DL — LOW (ref 13–17)
INR BLD: 1.09 RATIO — SIGNIFICANT CHANGE UP (ref 0.88–1.16)
KETONES UR-MCNC: NEGATIVE — SIGNIFICANT CHANGE UP
LEUKOCYTE ESTERASE UR-ACNC: NEGATIVE — SIGNIFICANT CHANGE UP
MCHC RBC-ENTMCNC: 30.3 GM/DL — LOW (ref 32–36)
MCHC RBC-ENTMCNC: 30.9 PG — SIGNIFICANT CHANGE UP (ref 27–34)
MCV RBC AUTO: 101.9 FL — HIGH (ref 80–100)
NITRITE UR-MCNC: NEGATIVE — SIGNIFICANT CHANGE UP
NRBC # BLD: 0 /100 WBCS — SIGNIFICANT CHANGE UP (ref 0–0)
PH UR: 6 — SIGNIFICANT CHANGE UP (ref 5–8)
PLATELET # BLD AUTO: 388 K/UL — SIGNIFICANT CHANGE UP (ref 150–400)
POTASSIUM SERPL-MCNC: 4.3 MMOL/L — SIGNIFICANT CHANGE UP (ref 3.5–5.3)
POTASSIUM SERPL-SCNC: 4.3 MMOL/L — SIGNIFICANT CHANGE UP (ref 3.5–5.3)
PROT UR-MCNC: NEGATIVE — SIGNIFICANT CHANGE UP
PROTHROM AB SERPL-ACNC: 12.7 SEC — SIGNIFICANT CHANGE UP (ref 10.5–13.4)
RBC # BLD: 3.17 M/UL — LOW (ref 4.2–5.8)
RBC # FLD: 16.5 % — HIGH (ref 10.3–14.5)
RH IG SCN BLD-IMP: POSITIVE — SIGNIFICANT CHANGE UP
SODIUM SERPL-SCNC: 143 MMOL/L — SIGNIFICANT CHANGE UP (ref 135–145)
SP GR SPEC: 1.02 — SIGNIFICANT CHANGE UP (ref 1.01–1.02)
UROBILINOGEN FLD QL: NEGATIVE — SIGNIFICANT CHANGE UP
WBC # BLD: 10.12 K/UL — SIGNIFICANT CHANGE UP (ref 3.8–10.5)
WBC # FLD AUTO: 10.12 K/UL — SIGNIFICANT CHANGE UP (ref 3.8–10.5)

## 2023-05-19 PROCEDURE — 71045 X-RAY EXAM CHEST 1 VIEW: CPT | Mod: 26

## 2023-05-19 PROCEDURE — 93010 ELECTROCARDIOGRAM REPORT: CPT

## 2023-05-19 PROCEDURE — 99223 1ST HOSP IP/OBS HIGH 75: CPT

## 2023-05-19 RX ORDER — FUROSEMIDE 40 MG
40 TABLET ORAL DAILY
Refills: 0 | Status: DISCONTINUED | OUTPATIENT
Start: 2023-05-19 | End: 2023-05-23

## 2023-05-19 RX ORDER — ACETAMINOPHEN 500 MG
650 TABLET ORAL EVERY 6 HOURS
Refills: 0 | Status: DISCONTINUED | OUTPATIENT
Start: 2023-05-19 | End: 2023-05-20

## 2023-05-19 RX ORDER — FERROUS SULFATE 325(65) MG
325 TABLET ORAL DAILY
Refills: 0 | Status: DISCONTINUED | OUTPATIENT
Start: 2023-05-19 | End: 2023-05-23

## 2023-05-19 RX ORDER — OXYCODONE HYDROCHLORIDE 5 MG/1
2.5 TABLET ORAL EVERY 6 HOURS
Refills: 0 | Status: DISCONTINUED | OUTPATIENT
Start: 2023-05-19 | End: 2023-05-20

## 2023-05-19 RX ORDER — LATANOPROST 0.05 MG/ML
1 SOLUTION/ DROPS OPHTHALMIC; TOPICAL AT BEDTIME
Refills: 0 | Status: DISCONTINUED | OUTPATIENT
Start: 2023-05-19 | End: 2023-05-23

## 2023-05-19 RX ORDER — ONDANSETRON 8 MG/1
4 TABLET, FILM COATED ORAL EVERY 6 HOURS
Refills: 0 | Status: DISCONTINUED | OUTPATIENT
Start: 2023-05-19 | End: 2023-05-23

## 2023-05-19 RX ORDER — TRAMADOL HYDROCHLORIDE 50 MG/1
25 TABLET ORAL EVERY 8 HOURS
Refills: 0 | Status: DISCONTINUED | OUTPATIENT
Start: 2023-05-19 | End: 2023-05-20

## 2023-05-19 RX ORDER — TAMSULOSIN HYDROCHLORIDE 0.4 MG/1
0.8 CAPSULE ORAL AT BEDTIME
Refills: 0 | Status: DISCONTINUED | OUTPATIENT
Start: 2023-05-19 | End: 2023-05-23

## 2023-05-19 RX ORDER — ATENOLOL 25 MG/1
25 TABLET ORAL DAILY
Refills: 0 | Status: DISCONTINUED | OUTPATIENT
Start: 2023-05-19 | End: 2023-05-19

## 2023-05-19 RX ORDER — ACETAMINOPHEN 500 MG
1000 TABLET ORAL ONCE
Refills: 0 | Status: DISCONTINUED | OUTPATIENT
Start: 2023-05-19 | End: 2023-05-20

## 2023-05-19 RX ORDER — FLUTICASONE PROPIONATE 50 MCG
1 SPRAY, SUSPENSION NASAL
Refills: 0 | Status: DISCONTINUED | OUTPATIENT
Start: 2023-05-19 | End: 2023-05-23

## 2023-05-19 RX ORDER — TRAMADOL HYDROCHLORIDE 50 MG/1
50 TABLET ORAL ONCE
Refills: 0 | Status: DISCONTINUED | OUTPATIENT
Start: 2023-05-19 | End: 2023-05-19

## 2023-05-19 RX ORDER — SPIRONOLACTONE 25 MG/1
12.5 TABLET, FILM COATED ORAL DAILY
Refills: 0 | Status: DISCONTINUED | OUTPATIENT
Start: 2023-05-19 | End: 2023-05-23

## 2023-05-19 RX ORDER — TRAMADOL HYDROCHLORIDE 50 MG/1
50 TABLET ORAL EVERY 8 HOURS
Refills: 0 | Status: DISCONTINUED | OUTPATIENT
Start: 2023-05-19 | End: 2023-05-20

## 2023-05-19 RX ORDER — ATORVASTATIN CALCIUM 80 MG/1
40 TABLET, FILM COATED ORAL AT BEDTIME
Refills: 0 | Status: DISCONTINUED | OUTPATIENT
Start: 2023-05-19 | End: 2023-05-23

## 2023-05-19 RX ORDER — ATENOLOL 25 MG/1
25 TABLET ORAL AT BEDTIME
Refills: 0 | Status: DISCONTINUED | OUTPATIENT
Start: 2023-05-19 | End: 2023-05-23

## 2023-05-19 RX ORDER — PANTOPRAZOLE SODIUM 20 MG/1
40 TABLET, DELAYED RELEASE ORAL ONCE
Refills: 0 | Status: COMPLETED | OUTPATIENT
Start: 2023-05-19 | End: 2023-05-20

## 2023-05-19 RX ORDER — SODIUM CHLORIDE 9 MG/ML
1000 INJECTION INTRAMUSCULAR; INTRAVENOUS; SUBCUTANEOUS
Refills: 0 | Status: DISCONTINUED | OUTPATIENT
Start: 2023-05-20 | End: 2023-05-20

## 2023-05-19 RX ADMIN — LATANOPROST 1 DROP(S): 0.05 SOLUTION/ DROPS OPHTHALMIC; TOPICAL at 21:18

## 2023-05-19 RX ADMIN — Medication 325 MILLIGRAM(S): at 16:51

## 2023-05-19 RX ADMIN — ATORVASTATIN CALCIUM 40 MILLIGRAM(S): 80 TABLET, FILM COATED ORAL at 21:17

## 2023-05-19 RX ADMIN — ATENOLOL 25 MILLIGRAM(S): 25 TABLET ORAL at 21:17

## 2023-05-19 RX ADMIN — SODIUM CHLORIDE 3 MILLILITER(S): 9 INJECTION INTRAMUSCULAR; INTRAVENOUS; SUBCUTANEOUS at 21:00

## 2023-05-19 NOTE — PATIENT PROFILE ADULT - FALL HARM RISK - HARM RISK INTERVENTIONS

## 2023-05-19 NOTE — CONSULT NOTE ADULT - ASSESSMENT
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 s/p right THR, now with Left hip OA and worsening pain, scheduled for Left total hip replacement. Medicine was consulted for co-management

## 2023-05-19 NOTE — CONSULT NOTE ADULT - SUBJECTIVE AND OBJECTIVE BOX
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 s/p right THR, now Left hip OA and worsening pain, scheduled for Left total hip replacement by  on 5/20/23.    As per cardiology and hematology, patient has stopped taking coumadin and started on lovenox 100mg BID on 5/14, with the last dose on 5/19 AM. Pt has been taking all of his other medications including lasix 40mg daily and feels like his LE edema is at baseline. Apart from limited ambulation due to L hip pain, pt denies chest pain, dyspnea, orthopnea, palpitation, dizziness, lightheadedness.       Review of Systems:   CONSTITUTIONAL: No fever.  EYES: No eye pain or discharge.  ENMT:  No sinus or throat pain  NECK: No pain or stiffness  RESPIRATORY: No cough, wheezing, chills or hemoptysis; No shortness of breath  CARDIOVASCULAR: No chest pain, palpitations, dizziness, or leg swelling  GASTROINTESTINAL: No abdominal or epigastric pain. No nausea, vomiting, or hematemesis; No diarrhea or constipation. No melena or hematochezia.  GENITOURINARY: No dysuria or incontinence  NEUROLOGICAL: No headaches, memory loss, loss of strength, numbness, or tremors  SKIN: No rashes.  LYMPH NODES: No enlarged glands  ENDOCRINE: No heat or cold intolerance; No hair loss  MUSCULOSKELETAL: No joint pain or swelling; No muscle, back, or extremity pain  PSYCHIATRIC: No depression, anxiety, mood swings, or difficulty sleeping  HEME/LYMPH: No easy bruising, or bleeding gums  ALLEGY AND IMMUNOLOGIC: No hives or eczema    PAST MEDICAL & SURGICAL HISTORY:  Hodgkin lymphoma 1975  Atrial fibrillation over 20 years  HTN (hypertension)  GERD (gastroesophageal reflux disease)  MOE (obstructive sleep apnea) - positive sleep study many years ago, was recommended to use CPAP, patient non-compliant  BPH (benign prostatic hyperplasia  Osteoarthritis  CVA (cerebral vascular accident) - 1/2018 - attributed to no AC for 11 days preop/postop spine surgery - presented to ED with slurred speech, residual ST memory loss, per pt  Spinal stenosis L3-L4  Spondylolisthesis  Exposure to toxin - 9/11   History of short term memory loss  DVT, lower extremity  S/P splenectomy 1975  S/P hip replacement, right 2014  History of cardioversion for AF - "many years ago" - unsuccessful  History of lumbar spinal fusion 1/5/2018  Status post left cataract extraction  History of fasciotomy  Cardiac pacemaker  S/P TAVR (transcatheter aortic valve replacement)  CAD (coronary artery disease)    Social Hx:  - denies use of tobacco, EtOH or illicit drugs    FAMILY HISTORY:  Family history of stroke (Father)      Allergies    No Known Allergies    IMEDICATIONS  (STANDING):    MEDICATIONS  (PRN):      I&O's Summary      PHYSICAL EXAM:  Vital Signs Last 24 Hrs  T(C): 36.6 (19 May 2023 14:20), Max: 36.6 (19 May 2023 14:20)  T(F): 97.8 (19 May 2023 14:20), Max: 97.8 (19 May 2023 14:20)  HR: 67 (19 May 2023 14:20) (67 - 67)  BP: 147/72 (19 May 2023 14:20) (147/72 - 147/72)  BP(mean): --  RR: 18 (19 May 2023 14:20) (18 - 18)  SpO2: 94% (19 May 2023 14:20) (94% - 94%)    Parameters below as of 19 May 2023 14:20  Patient On (Oxygen Delivery Method): room air      GENERAL: NAD, well-developed  HEAD:  Atraumatic, Normocephalic  EYES: EOMI, PERRLA, conjunctiva and sclera clear  NECK: Supple, No JVD  CHEST/LUNG: Clear to auscultation bilaterally; No wheeze  HEART: Regular rate and rhythm; No murmurs, rubs, or gallops  ABDOMEN: Soft, Nontender, Nondistended; Bowel sounds present  EXTREMITIES:  RLE s/p skin graft; bilateral LEs without pitting edema  PSYCH: AAOx3  NEUROLOGY: non-focal  SKIN: No rashes or lesions    LABS:                    RADIOLOGY & ADDITIONAL TESTS:    Imaging Personally Reviewed:    EKG Personally Reviewed:    Consultant(s) Notes Reviewed:      Care Discussed with Consultants/Other Providers:

## 2023-05-19 NOTE — CONSULT NOTE ADULT - PROBLEM SELECTOR RECOMMENDATION 3
TTE 1/2023 with normal LV function  - bilateral LEs without significant edema  - pt himself denies dyspnea, chest pain, palpitation, orthopnea  - no signs/symptoms of acute exacerbation at this time  - continue lasix 40mg daily and spironolactone 25mg daily

## 2023-05-19 NOTE — CONSULT NOTE ADULT - PROBLEM SELECTOR RECOMMENDATION 2
CAD s/p stent (1/19/2023) on ASA, TAVR (1/21/2023), atrial fibrillation on coumadin, pacemaker in place,   - started on lovenox bridge on 5/14 and the last dose on 5/19 AM  - s/p PPM interrogation in the office, no events  - continue atenolol 25mg daily  - resume systemic AC post-op as per orthopedics  - home regimen: coumadin 2mg daily except Sat/Sun 1mg

## 2023-05-19 NOTE — CONSULT NOTE ADULT - PROBLEM SELECTOR RECOMMENDATION 9
Limiting his daily activities due to the pain  - Scheduled for left total hip replacement by Dr. Rutherford on 5/20/23.  - anticoagulation management as below  - cleared by cardiology and hematology   - no further workups indicated at this time

## 2023-05-19 NOTE — CHART NOTE - NSCHARTNOTEFT_GEN_A_CORE
77 y/o M w/ extensive PMHx admitted for medical optimization and observation   Scheduled for L MICHAEL on 5/20 with Dr. Rutherford     Spoke to Hospitalist Christa Moctezuma MD  Pt is CLEARED from a Cardiology, Medicine, and Hematology perspective  Dr. Yumiko Mejia [phone]  ( card's)      Dr. Estes [phone] (hematology)    Medicine will act as the primary liaison between Orthopedics and the patient's various consulting physicians    Post-op:  Hematology recs IN CHART  Suggest IVH or Therapeutic Lovenox post-op within 24hrs of surgery (until patient properly bridged with Coumadin)  [ home regimen : alternates between 1-2 mg Coumadin QHS)      ***See Above  Dimitrios SPRING  Orthopedics  B: [phone]

## 2023-05-20 ENCOUNTER — APPOINTMENT (OUTPATIENT)
Dept: ORTHOPEDIC SURGERY | Facility: HOSPITAL | Age: 78
End: 2023-05-20

## 2023-05-20 LAB
ANION GAP SERPL CALC-SCNC: 13 MMOL/L — SIGNIFICANT CHANGE UP (ref 5–17)
APTT BLD: 34.5 SEC — SIGNIFICANT CHANGE UP (ref 27.5–35.5)
BLD GP AB SCN SERPL QL: NEGATIVE — SIGNIFICANT CHANGE UP
BUN SERPL-MCNC: 23 MG/DL — SIGNIFICANT CHANGE UP (ref 7–23)
CALCIUM SERPL-MCNC: 9.3 MG/DL — SIGNIFICANT CHANGE UP (ref 8.4–10.5)
CHLORIDE SERPL-SCNC: 104 MMOL/L — SIGNIFICANT CHANGE UP (ref 96–108)
CO2 SERPL-SCNC: 24 MMOL/L — SIGNIFICANT CHANGE UP (ref 22–31)
CREAT SERPL-MCNC: 0.94 MG/DL — SIGNIFICANT CHANGE UP (ref 0.5–1.3)
EGFR: 82 ML/MIN/1.73M2 — SIGNIFICANT CHANGE UP
GLUCOSE SERPL-MCNC: 109 MG/DL — HIGH (ref 70–99)
HCT VFR BLD CALC: 29.4 % — LOW (ref 39–50)
HGB BLD-MCNC: 9.3 G/DL — LOW (ref 13–17)
INR BLD: 1.02 RATIO — SIGNIFICANT CHANGE UP (ref 0.88–1.16)
MCHC RBC-ENTMCNC: 31.1 PG — SIGNIFICANT CHANGE UP (ref 27–34)
MCHC RBC-ENTMCNC: 31.6 GM/DL — LOW (ref 32–36)
MCV RBC AUTO: 98.3 FL — SIGNIFICANT CHANGE UP (ref 80–100)
NRBC # BLD: 0 /100 WBCS — SIGNIFICANT CHANGE UP (ref 0–0)
PLATELET # BLD AUTO: 347 K/UL — SIGNIFICANT CHANGE UP (ref 150–400)
POTASSIUM SERPL-MCNC: 4.3 MMOL/L — SIGNIFICANT CHANGE UP (ref 3.5–5.3)
POTASSIUM SERPL-SCNC: 4.3 MMOL/L — SIGNIFICANT CHANGE UP (ref 3.5–5.3)
PROTHROM AB SERPL-ACNC: 11.8 SEC — SIGNIFICANT CHANGE UP (ref 10.5–13.4)
RBC # BLD: 2.99 M/UL — LOW (ref 4.2–5.8)
RBC # FLD: 16.4 % — HIGH (ref 10.3–14.5)
RH IG SCN BLD-IMP: POSITIVE — SIGNIFICANT CHANGE UP
SARS-COV-2 RNA SPEC QL NAA+PROBE: SIGNIFICANT CHANGE UP
SODIUM SERPL-SCNC: 141 MMOL/L — SIGNIFICANT CHANGE UP (ref 135–145)
WBC # BLD: 10.33 K/UL — SIGNIFICANT CHANGE UP (ref 3.8–10.5)
WBC # FLD AUTO: 10.33 K/UL — SIGNIFICANT CHANGE UP (ref 3.8–10.5)

## 2023-05-20 PROCEDURE — 99233 SBSQ HOSP IP/OBS HIGH 50: CPT

## 2023-05-20 PROCEDURE — 72170 X-RAY EXAM OF PELVIS: CPT | Mod: 26

## 2023-05-20 PROCEDURE — 27130 TOTAL HIP ARTHROPLASTY: CPT | Mod: 82,LT

## 2023-05-20 PROCEDURE — 27130 TOTAL HIP ARTHROPLASTY: CPT | Mod: LT

## 2023-05-20 DEVICE — LINER ACET TRIDENT X3 0 DEG 36MM E: Type: IMPLANTABLE DEVICE | Site: LEFT | Status: FUNCTIONAL

## 2023-05-20 DEVICE — SHELL ACET TRIDENT II E 52MM: Type: IMPLANTABLE DEVICE | Site: LEFT | Status: FUNCTIONAL

## 2023-05-20 DEVICE — STEM ACC V40 127 DEG SZ 5 35X108MM 127 DEG: Type: IMPLANTABLE DEVICE | Site: LEFT | Status: FUNCTIONAL

## 2023-05-20 DEVICE — HEAD CER V40 36MM: Type: IMPLANTABLE DEVICE | Site: LEFT | Status: FUNCTIONAL

## 2023-05-20 RX ORDER — SODIUM CHLORIDE 9 MG/ML
500 INJECTION INTRAMUSCULAR; INTRAVENOUS; SUBCUTANEOUS ONCE
Refills: 0 | Status: COMPLETED | OUTPATIENT
Start: 2023-05-20 | End: 2023-05-21

## 2023-05-20 RX ORDER — ONDANSETRON 8 MG/1
4 TABLET, FILM COATED ORAL EVERY 6 HOURS
Refills: 0 | Status: DISCONTINUED | OUTPATIENT
Start: 2023-05-20 | End: 2023-05-20

## 2023-05-20 RX ORDER — SENNA PLUS 8.6 MG/1
2 TABLET ORAL AT BEDTIME
Refills: 0 | Status: DISCONTINUED | OUTPATIENT
Start: 2023-05-20 | End: 2023-05-22

## 2023-05-20 RX ORDER — SODIUM CHLORIDE 9 MG/ML
500 INJECTION INTRAMUSCULAR; INTRAVENOUS; SUBCUTANEOUS ONCE
Refills: 0 | Status: COMPLETED | OUTPATIENT
Start: 2023-05-20 | End: 2023-05-20

## 2023-05-20 RX ORDER — MAGNESIUM HYDROXIDE 400 MG/1
30 TABLET, CHEWABLE ORAL DAILY
Refills: 0 | Status: DISCONTINUED | OUTPATIENT
Start: 2023-05-20 | End: 2023-05-22

## 2023-05-20 RX ORDER — CEPHALEXIN 500 MG
500 CAPSULE ORAL EVERY 12 HOURS
Refills: 0 | Status: DISCONTINUED | OUTPATIENT
Start: 2023-05-21 | End: 2023-05-23

## 2023-05-20 RX ORDER — DEXAMETHASONE 0.5 MG/5ML
8 ELIXIR ORAL ONCE
Refills: 0 | Status: COMPLETED | OUTPATIENT
Start: 2023-05-21 | End: 2023-05-21

## 2023-05-20 RX ORDER — POLYETHYLENE GLYCOL 3350 17 G/17G
17 POWDER, FOR SOLUTION ORAL AT BEDTIME
Refills: 0 | Status: DISCONTINUED | OUTPATIENT
Start: 2023-05-20 | End: 2023-05-22

## 2023-05-20 RX ORDER — TRAMADOL HYDROCHLORIDE 50 MG/1
50 TABLET ORAL EVERY 6 HOURS
Refills: 0 | Status: DISCONTINUED | OUTPATIENT
Start: 2023-05-20 | End: 2023-05-23

## 2023-05-20 RX ORDER — ACETAMINOPHEN 500 MG
1000 TABLET ORAL ONCE
Refills: 0 | Status: COMPLETED | OUTPATIENT
Start: 2023-05-21 | End: 2023-05-21

## 2023-05-20 RX ORDER — ACETAMINOPHEN 500 MG
1000 TABLET ORAL ONCE
Refills: 0 | Status: COMPLETED | OUTPATIENT
Start: 2023-05-20 | End: 2023-05-20

## 2023-05-20 RX ORDER — OXYCODONE HYDROCHLORIDE 5 MG/1
10 TABLET ORAL EVERY 4 HOURS
Refills: 0 | Status: DISCONTINUED | OUTPATIENT
Start: 2023-05-20 | End: 2023-05-23

## 2023-05-20 RX ORDER — CEFAZOLIN SODIUM 1 G
2000 VIAL (EA) INJECTION EVERY 8 HOURS
Refills: 0 | Status: COMPLETED | OUTPATIENT
Start: 2023-05-20 | End: 2023-05-21

## 2023-05-20 RX ORDER — PANTOPRAZOLE SODIUM 20 MG/1
40 TABLET, DELAYED RELEASE ORAL
Refills: 0 | Status: DISCONTINUED | OUTPATIENT
Start: 2023-05-20 | End: 2023-05-23

## 2023-05-20 RX ORDER — OXYCODONE HYDROCHLORIDE 5 MG/1
5 TABLET ORAL EVERY 4 HOURS
Refills: 0 | Status: DISCONTINUED | OUTPATIENT
Start: 2023-05-20 | End: 2023-05-23

## 2023-05-20 RX ORDER — ACETAMINOPHEN 500 MG
975 TABLET ORAL EVERY 8 HOURS
Refills: 0 | Status: DISCONTINUED | OUTPATIENT
Start: 2023-05-20 | End: 2023-05-23

## 2023-05-20 RX ORDER — WARFARIN SODIUM 2.5 MG/1
2 TABLET ORAL ONCE
Refills: 0 | Status: COMPLETED | OUTPATIENT
Start: 2023-05-20 | End: 2023-05-20

## 2023-05-20 RX ORDER — ENOXAPARIN SODIUM 100 MG/ML
100 INJECTION SUBCUTANEOUS EVERY 12 HOURS
Refills: 0 | Status: DISCONTINUED | OUTPATIENT
Start: 2023-05-20 | End: 2023-05-23

## 2023-05-20 RX ORDER — ONDANSETRON 8 MG/1
4 TABLET, FILM COATED ORAL ONCE
Refills: 0 | Status: DISCONTINUED | OUTPATIENT
Start: 2023-05-20 | End: 2023-05-20

## 2023-05-20 RX ADMIN — SENNA PLUS 2 TABLET(S): 8.6 TABLET ORAL at 22:57

## 2023-05-20 RX ADMIN — SODIUM CHLORIDE 3 MILLILITER(S): 9 INJECTION INTRAMUSCULAR; INTRAVENOUS; SUBCUTANEOUS at 22:29

## 2023-05-20 RX ADMIN — POLYETHYLENE GLYCOL 3350 17 GRAM(S): 17 POWDER, FOR SOLUTION ORAL at 22:41

## 2023-05-20 RX ADMIN — ENOXAPARIN SODIUM 100 MILLIGRAM(S): 100 INJECTION SUBCUTANEOUS at 22:56

## 2023-05-20 RX ADMIN — Medication 100 MILLIGRAM(S): at 22:42

## 2023-05-20 RX ADMIN — SODIUM CHLORIDE 500 MILLILITER(S): 9 INJECTION INTRAMUSCULAR; INTRAVENOUS; SUBCUTANEOUS at 15:06

## 2023-05-20 RX ADMIN — Medication 400 MILLIGRAM(S): at 23:43

## 2023-05-20 RX ADMIN — SODIUM CHLORIDE 3 MILLILITER(S): 9 INJECTION INTRAMUSCULAR; INTRAVENOUS; SUBCUTANEOUS at 06:17

## 2023-05-20 RX ADMIN — WARFARIN SODIUM 2 MILLIGRAM(S): 2.5 TABLET ORAL at 22:56

## 2023-05-20 RX ADMIN — PANTOPRAZOLE SODIUM 40 MILLIGRAM(S): 20 TABLET, DELAYED RELEASE ORAL at 05:46

## 2023-05-20 RX ADMIN — SODIUM CHLORIDE 500 MILLILITER(S): 9 INJECTION INTRAMUSCULAR; INTRAVENOUS; SUBCUTANEOUS at 18:54

## 2023-05-20 RX ADMIN — Medication 40 MILLIGRAM(S): at 05:45

## 2023-05-20 RX ADMIN — ATORVASTATIN CALCIUM 40 MILLIGRAM(S): 80 TABLET, FILM COATED ORAL at 22:41

## 2023-05-20 RX ADMIN — SODIUM CHLORIDE 100 MILLILITER(S): 9 INJECTION INTRAMUSCULAR; INTRAVENOUS; SUBCUTANEOUS at 06:17

## 2023-05-20 RX ADMIN — Medication 1000 MILLIGRAM(S): at 16:16

## 2023-05-20 RX ADMIN — ATENOLOL 25 MILLIGRAM(S): 25 TABLET ORAL at 22:40

## 2023-05-20 RX ADMIN — SPIRONOLACTONE 12.5 MILLIGRAM(S): 25 TABLET, FILM COATED ORAL at 05:45

## 2023-05-20 RX ADMIN — TRAMADOL HYDROCHLORIDE 50 MILLIGRAM(S): 50 TABLET ORAL at 10:20

## 2023-05-20 RX ADMIN — LATANOPROST 1 DROP(S): 0.05 SOLUTION/ DROPS OPHTHALMIC; TOPICAL at 23:39

## 2023-05-20 RX ADMIN — Medication 400 MILLIGRAM(S): at 15:46

## 2023-05-20 RX ADMIN — TRAMADOL HYDROCHLORIDE 50 MILLIGRAM(S): 50 TABLET ORAL at 10:41

## 2023-05-20 RX ADMIN — TAMSULOSIN HYDROCHLORIDE 0.8 MILLIGRAM(S): 0.4 CAPSULE ORAL at 22:40

## 2023-05-20 NOTE — PROGRESS NOTE ADULT - PROBLEM SELECTOR PLAN 3
TTE 1/2023 with normal LV function  - bilateral LEs without significant edema  - pt himself denies dyspnea, chest pain, palpitation, orthopnea  - no signs/symptoms of acute exacerbation at this time  - continue lasix 40mg daily and spironolactone 25mg daily.

## 2023-05-20 NOTE — BRIEF OPERATIVE NOTE - NSICDXBRIEFPREOP_GEN_ALL_CORE_FT
PRE-OP DIAGNOSIS:  Primary localized osteoarthritis of left hip 20-May-2023 11:47:35  Weston Serrato

## 2023-05-20 NOTE — PRE-ANESTHESIA EVALUATION ADULT - NSANTHPMHFT_GEN_ALL_CORE
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 s/p right THR    As per cardiology and hematology, patient has stopped taking coumadin and started on lovenox 100mg BID on 5/14, with the last dose on 5/19 AM. Pt has been taking all of his other medications including lasix 40mg daily and feels like his LE edema is at baseline

## 2023-05-20 NOTE — PRE-ANESTHESIA EVALUATION ADULT - NSANTHPEFT_GEN_ALL_CORE
Gen: alert and oriented x3, obese appearing man, lying on left side decubitus position due to lower back pain  JVP: Normal  Neck: thick neck circumference due to body habitus  Lung: clear   CV: S1 S2   Abd: soft  Ext: No edema  neuro: CN 2-12 grossly intact, no gross motor or sensory deficits appreciated  Skin: normal

## 2023-05-20 NOTE — PHYSICAL THERAPY INITIAL EVALUATION ADULT - PERTINENT HX OF CURRENT PROBLEM, REHAB EVAL
Pt is a 79 y/o male admitted to 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 Pt is s/p right THR, now Left hip OA and worsening pain.  Left hip pain limiting patient mobility, uses cane and walker at home and wheelchair for distance. Pain is described at sharp 8/10 with movement. Pt is now s/p L MICHAEL (anterior) on 5/20/23

## 2023-05-20 NOTE — PHYSICAL THERAPY INITIAL EVALUATION ADULT - GAIT DEVIATIONS NOTED, PT EVAL
decreased khanh/decreased velocity of limb motion/decreased step length/decreased weight-shifting ability

## 2023-05-20 NOTE — PRE-ANESTHESIA EVALUATION ADULT - RX
Diagnosed over 20 years ago. No longer using CPAP. Stable Followed  by Pulmonologist  Dr. Jaiden Blair [phone] Last visit in January 2023

## 2023-05-20 NOTE — BRIEF OPERATIVE NOTE - NSICDXBRIEFPROCEDURE_GEN_ALL_CORE_FT
PROCEDURES:  Total replacement of left hip joint by anterior approach 20-May-2023 11:47:51  Weston Serrato

## 2023-05-20 NOTE — PHYSICAL THERAPY INITIAL EVALUATION ADULT - ADDITIONAL COMMENTS
Pt lives in a private house with spouse with 4 steps to enter and 4 steps inside. Pt was Ind with all ADLs and amb with RW in the home and SC on the stairs.

## 2023-05-20 NOTE — PROGRESS NOTE ADULT - SUBJECTIVE AND OBJECTIVE BOX
Patient is a 78y old  Male who presents with a chief complaint of Left hip pain, arthritis  Patient is to undergo Left total hip arthroplasty today  Patient resting without complaints.  No chest pain, SOB, N/V.    T(C): 37.1 (05-20-23 @ 06:15), Max: 37.1 (05-20-23 @ 06:15)  HR: 67 (05-20-23 @ 06:15) (63 - 67)  BP: 131/56 (05-20-23 @ 06:15) (131/51 - 147/72)  RR: 18 (05-20-23 @ 06:15) (18 - 18)  SpO2: 96% (05-20-23 @ 06:15) (94% - 96%)    Exam:  Alert and Oriented, No Acute Distress  Cards: +S1/S2, RRR  Pulm: CTAB  Lower Extremities:  Calves Soft, Non-tender bilaterally  +PF/DF/EHL/FHL  +DP Pulse                    9.3    10.33 )-----------( 347      ( 20 May 2023 06:16 )             29.4    05-20    141  |  104  |  23  ----------------------------<  109<H>  4.3   |  24  |  0.94  Ca    9.3      20 May 2023 06:17

## 2023-05-20 NOTE — PROGRESS NOTE ADULT - PROBLEM SELECTOR PLAN 1
- Scheduled for left total hip replacement by Dr. Rutherford on 5/20/23  - post-op care by primary team  - anticoagulation management as below  - cleared by cardiology and hematology   - no further workups indicated at this time.

## 2023-05-20 NOTE — BRIEF OPERATIVE NOTE - NSICDXBRIEFPOSTOP_GEN_ALL_CORE_FT
POST-OP DIAGNOSIS:  Primary localized osteoarthritis of left hip 20-May-2023 11:47:34  Weston Serrato

## 2023-05-20 NOTE — PROGRESS NOTE ADULT - PROBLEM SELECTOR PLAN 2
CAD s/p stent (1/19/2023) on ASA, TAVR (1/21/2023), atrial fibrillation on coumadin, pacemaker in place,   - started on lovenox bridge on 5/14 and the last dose on 5/19 AM  - s/p PPM interrogation in the office, no events  - continue atenolol 25mg daily  - given high [id number] score of at least 6, will favor resuming the systemic AC tonight if no contraindication from primary team with lovenox bridge  - will continue lovenox 100mg  - home regimen: coumadin 2mg daily except Sat/Sun 1mg.

## 2023-05-20 NOTE — PROGRESS NOTE ADULT - SUBJECTIVE AND OBJECTIVE BOX
Christa Moctezuma MD  Division of Hospital Medicine  Please contact via MS Teams (prefer message first)  Office: [phone]    Patient is a 78y old  Male who presents with a chief complaint of Left hip pain, arthritis (20 May 2023 08:08)      SUBJECTIVE / OVERNIGHT EVENTS:  no acute events overnight, vss, afebrile  pt scheduled for L THR today  pt has no other complaints at this time    ROS:  14 point ROS negative in detail except stated as above    MEDICATIONS  (STANDING):  atenolol  Tablet 25 milliGRAM(s) Oral at bedtime  atorvastatin 40 milliGRAM(s) Oral at bedtime  ceFAZolin   IVPB 2000 milliGRAM(s) IV Intermittent every 8 hours  ferrous    sulfate 325 milliGRAM(s) Oral daily  fluticasone propionate 50 MICROgram(s)/spray Nasal Spray 1 Spray(s) Both Nostrils <User Schedule>  furosemide    Tablet 40 milliGRAM(s) Oral daily  latanoprost 0.005% Ophthalmic Solution 1 Drop(s) Right EYE at bedtime  pantoprazole    Tablet 40 milliGRAM(s) Oral before breakfast  polyethylene glycol 3350 17 Gram(s) Oral at bedtime  senna 2 Tablet(s) Oral at bedtime  sodium chloride 0.9% Bolus 500 milliLiter(s) IV Bolus once  sodium chloride 0.9% Bolus 500 milliLiter(s) IV Bolus once  sodium chloride 0.9% lock flush 3 milliLiter(s) IV Push every 8 hours  spironolactone 12.5 milliGRAM(s) Oral daily  tamsulosin 0.8 milliGRAM(s) Oral at bedtime  warfarin 2 milliGRAM(s) Oral once    MEDICATIONS  (PRN):  acetaminophen   IVPB .. 1000 milliGRAM(s) IV Intermittent once PRN Severe Pain (7 - 10)  aluminum hydroxide/magnesium hydroxide/simethicone Suspension 30 milliLiter(s) Oral four times a day PRN Indigestion  magnesium hydroxide Suspension 30 milliLiter(s) Oral daily PRN Constipation  ondansetron Injectable 4 milliGRAM(s) IV Push every 6 hours PRN Nausea and/or Vomiting  ondansetron Injectable 4 milliGRAM(s) IV Push once PRN Nausea and/or Vomiting  oxyCODONE    IR 10 milliGRAM(s) Oral every 4 hours PRN Severe Pain (7 - 10)  oxyCODONE    IR 5 milliGRAM(s) Oral every 4 hours PRN Moderate Pain (4 - 6)  traMADol 50 milliGRAM(s) Oral every 6 hours PRN Mild Pain (1 - 3)      CAPILLARY BLOOD GLUCOSE        I&O's Summary    19 May 2023 07:01  -  20 May 2023 07:00  --------------------------------------------------------  IN: 400 mL / OUT: 800 mL / NET: -400 mL        PHYSICAL EXAM:  Vital Signs Last 24 Hrs  T(C): 36.7 (20 May 2023 14:50), Max: 37.1 (20 May 2023 06:15)  T(F): 98.1 (20 May 2023 14:50), Max: 98.7 (20 May 2023 06:15)  HR: 90 (20 May 2023 14:50) (63 - 99)  BP: 129/61 (20 May 2023 14:50) (129/61 - 171/69)  BP(mean): 88 (20 May 2023 14:50) (88 - 88)  RR: 16 (20 May 2023 14:50) (16 - 18)  SpO2: 98% (20 May 2023 14:50) (94% - 98%)    Parameters below as of 20 May 2023 14:50  Patient On (Oxygen Delivery Method): nasal cannula  O2 Flow (L/min): 2    GENERAL: NAD, well-developed  HEAD:  Atraumatic, Normocephalic  EYES: EOMI, PERRLA, conjunctiva and sclera clear  NECK: Supple, No JVD  CHEST/LUNG: Clear to auscultation bilaterally; No wheeze  HEART: Regular rate and rhythm; No murmurs, rubs, or gallops  ABDOMEN: Soft, Nontender, Nondistended; Bowel sounds present  EXTREMITIES:  2+ Peripheral Pulses, No clubbing, cyanosis, or edema  NEUROLOGY: AAOx3; non-focal  SKIN: No rashes or lesions    LABS:                        9.3    10.33 )-----------( 347      ( 20 May 2023 06:16 )             29.4     05-20    141  |  104  |  23  ----------------------------<  109<H>  4.3   |  24  |  0.94    Ca    9.3      20 May 2023 06:17      PT/INR - ( 20 May 2023 06:16 )   PT: 11.8 sec;   INR: 1.02 ratio         PTT - ( 20 May 2023 06:16 )  PTT:34.5 sec      Urinalysis Basic - ( 19 May 2023 22:38 )    Color: Light Yellow / Appearance: Clear / S.016 / pH: x  Gluc: x / Ketone: Negative  / Bili: Negative / Urobili: Negative   Blood: x / Protein: Negative / Nitrite: Negative   Leuk Esterase: Negative / RBC: x / WBC x   Sq Epi: x / Non Sq Epi: x / Bacteria: x        RADIOLOGY & ADDITIONAL TESTS:    Imaging Personally Reviewed:    Consultant(s) Notes Reviewed:      Care Discussed with Consultants/Other Providers:

## 2023-05-20 NOTE — PROGRESS NOTE ADULT - SUBJECTIVE AND OBJECTIVE BOX
Ortho Post Op Check    Pt comfortable without complaints, pain controlled  Denies CP, SOB, N/V, numbness/tingling     Vital Signs Last 24 Hrs  T(C): 36.7 (05-20-23 @ 14:50), Max: 36.9 (05-20-23 @ 11:18)  T(F): 98.1 (05-20-23 @ 14:50), Max: 98.1 (05-20-23 @ 14:50)  HR: 75 (05-20-23 @ 17:00) (62 - 99)  BP: 116/55 (05-20-23 @ 17:00) (116/55 - 171/69)  BP(mean): 80 (05-20-23 @ 17:00) (80 - 92)  RR: 16 (05-20-23 @ 17:00) (16 - 18)  SpO2: 95% (05-20-23 @ 17:00) (94% - 100%)    AVSS  General: Pt Alert and oriented, NAD    LLE  DSG C/D/I  Pulses: Foot WWP; DP pulse 2+; Cap refill < 2 sec  Sensation: SILT and symmetric to contralateral extremity  Motor: EHL/FHL/TA/GS 5/5 and symmetric to contralateral extremity            A/P: 78yMale s/p L MICHAEL POD 0    - FU Cdiff panel, contact isolation until result   - Stable  - Pain Control  - DVT ppx: therapeutic Lovenox to warfarin bridge   - Post op abx: Ancef 2g Q8H x 2 doses, then keflex for 2 weeks  - WBAT  - PT: pending PT eval

## 2023-05-21 DIAGNOSIS — D72.829 ELEVATED WHITE BLOOD CELL COUNT, UNSPECIFIED: ICD-10-CM

## 2023-05-21 LAB
ANION GAP SERPL CALC-SCNC: 12 MMOL/L — SIGNIFICANT CHANGE UP (ref 5–17)
APTT BLD: 91.7 SEC — HIGH (ref 27.5–35.5)
BUN SERPL-MCNC: 22 MG/DL — SIGNIFICANT CHANGE UP (ref 7–23)
CALCIUM SERPL-MCNC: 8.6 MG/DL — SIGNIFICANT CHANGE UP (ref 8.4–10.5)
CHLORIDE SERPL-SCNC: 104 MMOL/L — SIGNIFICANT CHANGE UP (ref 96–108)
CO2 SERPL-SCNC: 22 MMOL/L — SIGNIFICANT CHANGE UP (ref 22–31)
CREAT SERPL-MCNC: 1.02 MG/DL — SIGNIFICANT CHANGE UP (ref 0.5–1.3)
EGFR: 75 ML/MIN/1.73M2 — SIGNIFICANT CHANGE UP
GLUCOSE SERPL-MCNC: 127 MG/DL — HIGH (ref 70–99)
HCT VFR BLD CALC: 30.2 % — LOW (ref 39–50)
HGB BLD-MCNC: 9.5 G/DL — LOW (ref 13–17)
INR BLD: 1.19 RATIO — HIGH (ref 0.88–1.16)
MCHC RBC-ENTMCNC: 31.4 PG — SIGNIFICANT CHANGE UP (ref 27–34)
MCHC RBC-ENTMCNC: 31.5 GM/DL — LOW (ref 32–36)
MCV RBC AUTO: 99.7 FL — SIGNIFICANT CHANGE UP (ref 80–100)
NRBC # BLD: 0 /100 WBCS — SIGNIFICANT CHANGE UP (ref 0–0)
PLATELET # BLD AUTO: 307 K/UL — SIGNIFICANT CHANGE UP (ref 150–400)
POTASSIUM SERPL-MCNC: 4.7 MMOL/L — SIGNIFICANT CHANGE UP (ref 3.5–5.3)
POTASSIUM SERPL-SCNC: 4.7 MMOL/L — SIGNIFICANT CHANGE UP (ref 3.5–5.3)
PROTHROM AB SERPL-ACNC: 13.7 SEC — HIGH (ref 10.5–13.4)
RBC # BLD: 3.03 M/UL — LOW (ref 4.2–5.8)
RBC # FLD: 16.7 % — HIGH (ref 10.3–14.5)
SODIUM SERPL-SCNC: 138 MMOL/L — SIGNIFICANT CHANGE UP (ref 135–145)
WBC # BLD: 17.8 K/UL — HIGH (ref 3.8–10.5)
WBC # FLD AUTO: 17.8 K/UL — HIGH (ref 3.8–10.5)

## 2023-05-21 PROCEDURE — 99233 SBSQ HOSP IP/OBS HIGH 50: CPT

## 2023-05-21 RX ORDER — WARFARIN SODIUM 2.5 MG/1
2 TABLET ORAL ONCE
Refills: 0 | Status: COMPLETED | OUTPATIENT
Start: 2023-05-21 | End: 2023-05-21

## 2023-05-21 RX ADMIN — SODIUM CHLORIDE 3 MILLILITER(S): 9 INJECTION INTRAMUSCULAR; INTRAVENOUS; SUBCUTANEOUS at 14:30

## 2023-05-21 RX ADMIN — Medication 100 MILLIGRAM(S): at 05:11

## 2023-05-21 RX ADMIN — PANTOPRAZOLE SODIUM 40 MILLIGRAM(S): 20 TABLET, DELAYED RELEASE ORAL at 06:01

## 2023-05-21 RX ADMIN — Medication 975 MILLIGRAM(S): at 17:32

## 2023-05-21 RX ADMIN — Medication 500 MILLIGRAM(S): at 17:33

## 2023-05-21 RX ADMIN — Medication 40 MILLIGRAM(S): at 05:11

## 2023-05-21 RX ADMIN — SODIUM CHLORIDE 500 MILLILITER(S): 9 INJECTION INTRAMUSCULAR; INTRAVENOUS; SUBCUTANEOUS at 06:00

## 2023-05-21 RX ADMIN — ATENOLOL 25 MILLIGRAM(S): 25 TABLET ORAL at 21:32

## 2023-05-21 RX ADMIN — SPIRONOLACTONE 12.5 MILLIGRAM(S): 25 TABLET, FILM COATED ORAL at 05:11

## 2023-05-21 RX ADMIN — Medication 975 MILLIGRAM(S): at 18:32

## 2023-05-21 RX ADMIN — WARFARIN SODIUM 2 MILLIGRAM(S): 2.5 TABLET ORAL at 21:34

## 2023-05-21 RX ADMIN — Medication 101.6 MILLIGRAM(S): at 06:00

## 2023-05-21 RX ADMIN — Medication 975 MILLIGRAM(S): at 23:34

## 2023-05-21 RX ADMIN — Medication 1000 MILLIGRAM(S): at 08:50

## 2023-05-21 RX ADMIN — Medication 1000 MILLIGRAM(S): at 00:04

## 2023-05-21 RX ADMIN — Medication 325 MILLIGRAM(S): at 13:13

## 2023-05-21 RX ADMIN — Medication 1 SPRAY(S): at 13:13

## 2023-05-21 RX ADMIN — ATORVASTATIN CALCIUM 40 MILLIGRAM(S): 80 TABLET, FILM COATED ORAL at 21:33

## 2023-05-21 RX ADMIN — TAMSULOSIN HYDROCHLORIDE 0.8 MILLIGRAM(S): 0.4 CAPSULE ORAL at 21:42

## 2023-05-21 RX ADMIN — Medication 400 MILLIGRAM(S): at 08:33

## 2023-05-21 RX ADMIN — ENOXAPARIN SODIUM 100 MILLIGRAM(S): 100 INJECTION SUBCUTANEOUS at 21:32

## 2023-05-21 RX ADMIN — SODIUM CHLORIDE 3 MILLILITER(S): 9 INJECTION INTRAMUSCULAR; INTRAVENOUS; SUBCUTANEOUS at 05:07

## 2023-05-21 RX ADMIN — LATANOPROST 1 DROP(S): 0.05 SOLUTION/ DROPS OPHTHALMIC; TOPICAL at 21:34

## 2023-05-21 RX ADMIN — ENOXAPARIN SODIUM 100 MILLIGRAM(S): 100 INJECTION SUBCUTANEOUS at 08:33

## 2023-05-21 NOTE — OCCUPATIONAL THERAPY INITIAL EVALUATION ADULT - PERTINENT HX OF CURRENT PROBLEM, REHAB EVAL
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 s/p right THR, now Left hip OA and worsening pain. presenting to PST for scheduled  Left total hip replacement by  on 5/20/23. Left hip pain limiting patient mobility, uses cane and walker at home and wheelchair for distance. Pain is described at sharp 8/10 with movement.  During exam pain is currently a 3/10. Acceptable level of pain is 2.  Pain is relieved with Tylenol and rest periods. Patient denies any chest pain, sob, palpitations, fever, chills,  numbness or tingling sensation Goal of surgery is to walk again without need of assistance of medical devices. "Hopefully be able to drive again" Denies Hx Covid Infection. Vaccinated and boosted with moderna vacccine. Pt s/p L THR, anterior

## 2023-05-21 NOTE — PROGRESS NOTE ADULT - PROBLEM SELECTOR PLAN 2
CAD s/p stent (1/19/2023) on ASA, TAVR (1/21/2023), atrial fibrillation on coumadin, pacemaker in place,   - started on lovenox bridge on 5/14 and the last dose on 5/19 AM  - s/p PPM interrogation in the office, no events  - continue atenolol 25mg daily  - given high [id number] score of at least 6, resumed systemic AC 5/20 evening after discussion with the primary team   - continue lovenox 100mg BID with coumadin 2mg  - goal INR 2-3  - home regimen: coumadin 2mg daily except Sat/Sun 1mg.

## 2023-05-21 NOTE — PROGRESS NOTE ADULT - PROBLEM SELECTOR PLAN 1
wbc 17 this morning post-op  - as per wife, he runs usually high in 10-12 at baseline  - no localizing symptoms for infection at this time: denies cough, abdominal pain, diarrhea, fever/chills  - pt has remained hemodynamically stable and non-toxic appearing  - suspect leukocytosis as reactive post-op  - would recommend monitor off of abx at this time  - send blood culture to complete the infectious workups

## 2023-05-21 NOTE — OCCUPATIONAL THERAPY INITIAL EVALUATION ADULT - LIVES WITH, PROFILE
Pt lives with wife in private home, 4 steps to enter, +4 to bedroom, tub in bathroom. Pt I in ADLs and ambulates with RW, uses sock aide and reacher for LB dressing/spouse

## 2023-05-21 NOTE — OCCUPATIONAL THERAPY INITIAL EVALUATION ADULT - LEVEL OF INDEPENDENCE: TUB, REHAB EVAL
pt unable to perform at this time 2* decreased ROM/pain LLE, unable to clear tub (states he will sponge bathe until he can safely transfer in/out of tub)

## 2023-05-21 NOTE — PROGRESS NOTE ADULT - SUBJECTIVE AND OBJECTIVE BOX
ORTHO PROGRESS NOTE     Patient is status post left total hip replacement (anterior approach), POD #1   Pt seen and examined at bedside, denies SOB, CP, Dizziness. N/V/D/HA.    No significant overnight events. Pain well controlled. Patient ambulated with PT, cleared for home with home PT.   Reports a formed BM this AM, denies further episodes of diarrhea. Stool culture cancelled as sample was formed.     Vital Signs Last 24 Hrs  T(C): 36.8 (21 May 2023 08:32), Max: 36.9 (20 May 2023 10:33)  T(F): 98.2 (21 May 2023 08:32), Max: 98.4 (20 May 2023 10:33)  HR: 96 (21 May 2023 08:32) (60 - 99)  BP: 137/65 (21 May 2023 08:32) (109/63 - 171/69)  BP(mean): 108 (20 May 2023 17:15) (80 - 108)  RR: 18 (21 May 2023 08:32) (16 - 18)  SpO2: 95% (21 May 2023 08:32) (94% - 100%)    Parameters below as of 21 May 2023 08:32  Patient On (Oxygen Delivery Method): room air        Exam:   Gen: No apparent distress  LLE: Aquacel and ABD dressing clean dry and intact to left anterior hip.   BLE: motor intact EHL/FHL/TA/GS.  Sensation is grossly intact to light touch in the distal extremities.    Compartments are soft bilaterally.  Extremities are warm .  DP 2+ BLE     Labs:  CBC Full  -  ( 21 May 2023 07:12 )  WBC Count : 17.80 K/uL  RBC Count : 3.03 M/uL  Hemoglobin : 9.5 g/dL  Hematocrit : 30.2 %  Platelet Count - Automated : 307 K/uL  Mean Cell Volume : 99.7 fl  Mean Cell Hemoglobin : 31.4 pg  Mean Cell Hemoglobin Concentration : 31.5 gm/dL    INR: 1.19      05-21    138  |  104  |  22  ----------------------------<  127<H>  4.7   |  22  |  1.02    Ca    8.6      21 May 2023 07:12

## 2023-05-21 NOTE — PROGRESS NOTE ADULT - SUBJECTIVE AND OBJECTIVE BOX
Christa Moctezuma MD  Division of Hospital Medicine  Please contact via MS Teams (prefer message first)  Office: [phone]    Patient is a 78y old  Male who presents with a chief complaint of Left total hip replacement (21 May 2023 09:17)      SUBJECTIVE / OVERNIGHT EVENTS:  no acute events overnight, vss, afebrile  s/p L THR, pt endorses some soreness in the hip but otherwise fine  worked with PT and it went well  denies fever/chills, chest pain, palpitation, diarrhea, nausea/vomiting, abdominal pain, cough    ROS:  14 point ROS negative in detail except stated as above    MEDICATIONS  (STANDING):  acetaminophen     Tablet .. 975 milliGRAM(s) Oral every 8 hours  atenolol  Tablet 25 milliGRAM(s) Oral at bedtime  atorvastatin 40 milliGRAM(s) Oral at bedtime  cephalexin 500 milliGRAM(s) Oral every 12 hours  enoxaparin Injectable 100 milliGRAM(s) SubCutaneous every 12 hours  ferrous    sulfate 325 milliGRAM(s) Oral daily  fluticasone propionate 50 MICROgram(s)/spray Nasal Spray 1 Spray(s) Both Nostrils <User Schedule>  furosemide    Tablet 40 milliGRAM(s) Oral daily  latanoprost 0.005% Ophthalmic Solution 1 Drop(s) Right EYE at bedtime  pantoprazole    Tablet 40 milliGRAM(s) Oral before breakfast  polyethylene glycol 3350 17 Gram(s) Oral at bedtime  senna 2 Tablet(s) Oral at bedtime  sodium chloride 0.9% lock flush 3 milliLiter(s) IV Push every 8 hours  spironolactone 12.5 milliGRAM(s) Oral daily  tamsulosin 0.8 milliGRAM(s) Oral at bedtime  warfarin 2 milliGRAM(s) Oral once    MEDICATIONS  (PRN):  aluminum hydroxide/magnesium hydroxide/simethicone Suspension 30 milliLiter(s) Oral four times a day PRN Indigestion  magnesium hydroxide Suspension 30 milliLiter(s) Oral daily PRN Constipation  ondansetron Injectable 4 milliGRAM(s) IV Push every 6 hours PRN Nausea and/or Vomiting  oxyCODONE    IR 5 milliGRAM(s) Oral every 4 hours PRN Moderate Pain (4 - 6)  oxyCODONE    IR 10 milliGRAM(s) Oral every 4 hours PRN Severe Pain (7 - 10)  traMADol 50 milliGRAM(s) Oral every 6 hours PRN Mild Pain (1 - 3)      CAPILLARY BLOOD GLUCOSE        I&O's Summary    20 May 2023 07:01  -  21 May 2023 07:00  --------------------------------------------------------  IN: 1000 mL / OUT: 700 mL / NET: 300 mL    21 May 2023 07:01  -  21 May 2023 11:22  --------------------------------------------------------  IN: 120 mL / OUT: 0 mL / NET: 120 mL        PHYSICAL EXAM:  Vital Signs Last 24 Hrs  T(C): 36.8 (21 May 2023 08:32), Max: 36.8 (20 May 2023 17:55)  T(F): 98.2 (21 May 2023 08:32), Max: 98.3 (20 May 2023 17:55)  HR: 96 (21 May 2023 10:10) (60 - 96)  BP: 137/65 (21 May 2023 08:32) (109/63 - 159/75)  BP(mean): 108 (20 May 2023 17:15) (80 - 108)  RR: 18 (21 May 2023 08:32) (16 - 18)  SpO2: 95% (21 May 2023 10:10) (94% - 100%)    Parameters below as of 21 May 2023 08:32  Patient On (Oxygen Delivery Method): room air      GENERAL: NAD, well-developed  HEAD:  Atraumatic, Normocephalic  EYES: EOMI, PERRLA, conjunctiva and sclera clear  NECK: Supple, No JVD  CHEST/LUNG: Clear to auscultation bilaterally; No wheeze  HEART: Regular rate and rhythm; No murmurs, rubs, or gallops  ABDOMEN: Soft, Nontender, Nondistended; Bowel sounds present  EXTREMITIES:  2+ Peripheral Pulses, No clubbing, cyanosis, or edema  NEUROLOGY: AAOx3; non-focal  SKIN: No rashes or lesions    LABS:  personally reviewed                        9.5    17.80 )-----------( 307      ( 21 May 2023 07:12 )             30.2     05-    138  |  104  |  22  ----------------------------<  127<H>  4.7   |  22  |  1.02    Ca    8.6      21 May 2023 07:12      PT/INR - ( 21 May 2023 07:14 )   PT: 13.7 sec;   INR: 1.19 ratio         PTT - ( 21 May 2023 07:14 )  PTT:91.7 sec      Urinalysis Basic - ( 19 May 2023 22:38 )    Color: Light Yellow / Appearance: Clear / S.016 / pH: x  Gluc: x / Ketone: Negative  / Bili: Negative / Urobili: Negative   Blood: x / Protein: Negative / Nitrite: Negative   Leuk Esterase: Negative / RBC: x / WBC x   Sq Epi: x / Non Sq Epi: x / Bacteria: x        RADIOLOGY & ADDITIONAL TESTS:    Imaging Personally Reviewed:    Consultant(s) Notes Reviewed:      Care Discussed with Consultants/Other Providers:

## 2023-05-22 ENCOUNTER — TRANSCRIPTION ENCOUNTER (OUTPATIENT)
Age: 78
End: 2023-05-22

## 2023-05-22 LAB
APTT BLD: 34.8 SEC — SIGNIFICANT CHANGE UP (ref 27.5–35.5)
C DIFF GDH STL QL: NEGATIVE — SIGNIFICANT CHANGE UP
C DIFF GDH STL QL: SIGNIFICANT CHANGE UP
INR BLD: 1.41 RATIO — HIGH (ref 0.88–1.16)
PROTHROM AB SERPL-ACNC: 16.3 SEC — HIGH (ref 10.5–13.4)

## 2023-05-22 PROCEDURE — 99233 SBSQ HOSP IP/OBS HIGH 50: CPT

## 2023-05-22 RX ORDER — WARFARIN SODIUM 2.5 MG/1
2 TABLET ORAL ONCE
Refills: 0 | Status: COMPLETED | OUTPATIENT
Start: 2023-05-22 | End: 2023-05-22

## 2023-05-22 RX ORDER — WARFARIN SODIUM 2.5 MG/1
4 TABLET ORAL ONCE
Refills: 0 | Status: DISCONTINUED | OUTPATIENT
Start: 2023-05-22 | End: 2023-05-22

## 2023-05-22 RX ADMIN — Medication 975 MILLIGRAM(S): at 00:30

## 2023-05-22 RX ADMIN — LATANOPROST 1 DROP(S): 0.05 SOLUTION/ DROPS OPHTHALMIC; TOPICAL at 22:45

## 2023-05-22 RX ADMIN — ATORVASTATIN CALCIUM 40 MILLIGRAM(S): 80 TABLET, FILM COATED ORAL at 22:44

## 2023-05-22 RX ADMIN — PANTOPRAZOLE SODIUM 40 MILLIGRAM(S): 20 TABLET, DELAYED RELEASE ORAL at 05:40

## 2023-05-22 RX ADMIN — Medication 975 MILLIGRAM(S): at 23:18

## 2023-05-22 RX ADMIN — SPIRONOLACTONE 12.5 MILLIGRAM(S): 25 TABLET, FILM COATED ORAL at 05:40

## 2023-05-22 RX ADMIN — ENOXAPARIN SODIUM 100 MILLIGRAM(S): 100 INJECTION SUBCUTANEOUS at 20:04

## 2023-05-22 RX ADMIN — Medication 975 MILLIGRAM(S): at 17:40

## 2023-05-22 RX ADMIN — SODIUM CHLORIDE 3 MILLILITER(S): 9 INJECTION INTRAMUSCULAR; INTRAVENOUS; SUBCUTANEOUS at 14:10

## 2023-05-22 RX ADMIN — ENOXAPARIN SODIUM 100 MILLIGRAM(S): 100 INJECTION SUBCUTANEOUS at 08:38

## 2023-05-22 RX ADMIN — Medication 40 MILLIGRAM(S): at 05:40

## 2023-05-22 RX ADMIN — Medication 500 MILLIGRAM(S): at 17:55

## 2023-05-22 RX ADMIN — Medication 500 MILLIGRAM(S): at 05:39

## 2023-05-22 RX ADMIN — Medication 325 MILLIGRAM(S): at 11:42

## 2023-05-22 RX ADMIN — Medication 975 MILLIGRAM(S): at 16:42

## 2023-05-22 RX ADMIN — SODIUM CHLORIDE 3 MILLILITER(S): 9 INJECTION INTRAMUSCULAR; INTRAVENOUS; SUBCUTANEOUS at 22:30

## 2023-05-22 RX ADMIN — Medication 975 MILLIGRAM(S): at 08:37

## 2023-05-22 RX ADMIN — ATENOLOL 25 MILLIGRAM(S): 25 TABLET ORAL at 22:45

## 2023-05-22 RX ADMIN — Medication 975 MILLIGRAM(S): at 09:25

## 2023-05-22 RX ADMIN — WARFARIN SODIUM 2 MILLIGRAM(S): 2.5 TABLET ORAL at 22:49

## 2023-05-22 NOTE — PROGRESS NOTE ADULT - SUBJECTIVE AND OBJECTIVE BOX
[facility] Division of Hospital Medicine  Cody Packer DO  Pager (TRAVNO, 3B-0H): [phone]  Other Times:  [phone]    Patient is a 78y old  Male who presents with a chief complaint of Left arthritic hip pain/ Total hip replacement (22 May 2023 07:58)    SUBJECTIVE / OVERNIGHT EVENTS: Patient endorses new diarrhea since yesterday. L hip with some pain and bruising.    REVIEW OF SYSTEMS:    CONSTITUTIONAL: No weakness, fevers or chills  EYES/ENT: No visual changes;  No vertigo or throat pain   NECK: No pain or stiffness  RESPIRATORY: No cough, wheezing, hemoptysis; No shortness of breath  CARDIOVASCULAR: No chest pain or palpitations  GASTROINTESTINAL: No abdominal or epigastric pain. No nausea, vomiting, or hematemesis; Endorses diarrhea  GENITOURINARY: No dysuria, frequency or hematuria  NEUROLOGICAL: No numbness or weakness  SKIN: No itching, burning, rashes, or lesions  MSK: Endorses L hip pain and bruising, no back pain  HEME: No easy bleeding, no easy bruising  All other review of systems is negative unless indicated above.    MEDICATIONS  (STANDING):  acetaminophen     Tablet .. 975 milliGRAM(s) Oral every 8 hours  atenolol  Tablet 25 milliGRAM(s) Oral at bedtime  atorvastatin 40 milliGRAM(s) Oral at bedtime  cephalexin 500 milliGRAM(s) Oral every 12 hours  enoxaparin Injectable 100 milliGRAM(s) SubCutaneous every 12 hours  ferrous    sulfate 325 milliGRAM(s) Oral daily  fluticasone propionate 50 MICROgram(s)/spray Nasal Spray 1 Spray(s) Both Nostrils <User Schedule>  furosemide    Tablet 40 milliGRAM(s) Oral daily  latanoprost 0.005% Ophthalmic Solution 1 Drop(s) Right EYE at bedtime  pantoprazole    Tablet 40 milliGRAM(s) Oral before breakfast  polyethylene glycol 3350 17 Gram(s) Oral at bedtime  senna 2 Tablet(s) Oral at bedtime  sodium chloride 0.9% lock flush 3 milliLiter(s) IV Push every 8 hours  spironolactone 12.5 milliGRAM(s) Oral daily  tamsulosin 0.8 milliGRAM(s) Oral at bedtime    MEDICATIONS  (PRN):  aluminum hydroxide/magnesium hydroxide/simethicone Suspension 30 milliLiter(s) Oral four times a day PRN Indigestion  bisacodyl Suppository 10 milliGRAM(s) Rectal once PRN Constipation  magnesium hydroxide Suspension 30 milliLiter(s) Oral daily PRN Constipation  ondansetron Injectable 4 milliGRAM(s) IV Push every 6 hours PRN Nausea and/or Vomiting  oxyCODONE    IR 5 milliGRAM(s) Oral every 4 hours PRN Moderate Pain (4 - 6)  oxyCODONE    IR 10 milliGRAM(s) Oral every 4 hours PRN Severe Pain (7 - 10)  traMADol 50 milliGRAM(s) Oral every 6 hours PRN Mild Pain (1 - 3)      CAPILLARY BLOOD GLUCOSE        I&O's Summary    21 May 2023 07:01  -  22 May 2023 07:00  --------------------------------------------------------  IN: 780 mL / OUT: 950 mL / NET: -170 mL        PHYSICAL EXAM:  Vital Signs Last 24 Hrs  T(C): 36.6 (22 May 2023 13:09), Max: 36.8 (21 May 2023 20:18)  T(F): 97.9 (22 May 2023 13:09), Max: 98.3 (21 May 2023 20:18)  HR: 88 (22 May 2023 13:09) (70 - 98)  BP: 148/66 (22 May 2023 13:09) (142/61 - 168/81)  BP(mean): --  RR: 18 (22 May 2023 13:09) (16 - 18)  SpO2: 94% (22 May 2023 13:09) (94% - 98%)    Parameters below as of 22 May 2023 13:09  Patient On (Oxygen Delivery Method): room air    CONSTITUTIONAL: NAD, well-developed, well-groomed  EYES: EOMI, conjunctiva and sclera clear  ENMT: Moist oral mucosa, no pharyngeal injection or exudates  RESPIRATORY: Normal respiratory effort; lungs are clear to auscultation bilaterally  CARDIOVASCULAR: Regular rate and rhythm, normal S1 and S2, no murmur/rub/gallop  ABDOMEN: Soft, nondistended, nontender to palpation  MUSCULOSKELETAL: L hip with ecchymosis, bandage clean/dry/intact  PSYCH: A+O to person, place, and time; affect appropriate  NEUROLOGY: Nonfocal, no gross sensory deficits   SKIN: No rashes; no palpable lesions    LABS:                        9.5    17.80 )-----------( 307      ( 21 May 2023 07:12 )             30.2     05-21    138  |  104  |  22  ----------------------------<  127<H>  4.7   |  22  |  1.02    Ca    8.6      21 May 2023 07:12      PT/INR - ( 22 May 2023 11:26 )   PT: 16.3 sec;   INR: 1.41 ratio         PTT - ( 22 May 2023 11:26 )  PTT:34.8 sec [facility] Division of Hospital Medicine  Cody Packer DO  Pager (TR[facility]ON, 9B-0Y): [phone]  Other Times:  [phone]    Patient is a 78y old  Male who presents with a chief complaint of Left arthritic hip pain/ Total hip replacement (22 May 2023 07:58)    SUBJECTIVE / OVERNIGHT EVENTS: Patient endorses new diarrhea since yesterday. L hip with some pain and bruising.    REVIEW OF SYSTEMS:    CONSTITUTIONAL: No weakness, fevers or chills  EYES/ENT: No visual changes;  No vertigo or throat pain   NECK: No pain or stiffness  RESPIRATORY: No cough, wheezing, hemoptysis; No shortness of breath  CARDIOVASCULAR: No chest pain or palpitations  GASTROINTESTINAL: No abdominal or epigastric pain. No nausea, vomiting, or hematemesis; Endorses diarrhea  GENITOURINARY: No dysuria, frequency or hematuria  NEUROLOGICAL: No numbness or weakness  SKIN: No itching, burning, rashes, or lesions  MSK: Endorses L hip pain and bruising, no back pain  HEME: No easy bleeding, no easy bruising  All other review of systems is negative unless indicated above.    MEDICATIONS  (STANDING):  acetaminophen     Tablet .. 975 milliGRAM(s) Oral every 8 hours  atenolol  Tablet 25 milliGRAM(s) Oral at bedtime  atorvastatin 40 milliGRAM(s) Oral at bedtime  cephalexin 500 milliGRAM(s) Oral every 12 hours  enoxaparin Injectable 100 milliGRAM(s) SubCutaneous every 12 hours  ferrous    sulfate 325 milliGRAM(s) Oral daily  fluticasone propionate 50 MICROgram(s)/spray Nasal Spray 1 Spray(s) Both Nostrils <User Schedule>  furosemide    Tablet 40 milliGRAM(s) Oral daily  latanoprost 0.005% Ophthalmic Solution 1 Drop(s) Right EYE at bedtime  pantoprazole    Tablet 40 milliGRAM(s) Oral before breakfast  polyethylene glycol 3350 17 Gram(s) Oral at bedtime  senna 2 Tablet(s) Oral at bedtime  sodium chloride 0.9% lock flush 3 milliLiter(s) IV Push every 8 hours  spironolactone 12.5 milliGRAM(s) Oral daily  tamsulosin 0.8 milliGRAM(s) Oral at bedtime    MEDICATIONS  (PRN):  aluminum hydroxide/magnesium hydroxide/simethicone Suspension 30 milliLiter(s) Oral four times a day PRN Indigestion  bisacodyl Suppository 10 milliGRAM(s) Rectal once PRN Constipation  magnesium hydroxide Suspension 30 milliLiter(s) Oral daily PRN Constipation  ondansetron Injectable 4 milliGRAM(s) IV Push every 6 hours PRN Nausea and/or Vomiting  oxyCODONE    IR 5 milliGRAM(s) Oral every 4 hours PRN Moderate Pain (4 - 6)  oxyCODONE    IR 10 milliGRAM(s) Oral every 4 hours PRN Severe Pain (7 - 10)  traMADol 50 milliGRAM(s) Oral every 6 hours PRN Mild Pain (1 - 3)      CAPILLARY BLOOD GLUCOSE        I&O's Summary    21 May 2023 07:01  -  22 May 2023 07:00  --------------------------------------------------------  IN: 780 mL / OUT: 950 mL / NET: -170 mL        PHYSICAL EXAM:  Vital Signs Last 24 Hrs  T(C): 36.6 (22 May 2023 13:09), Max: 36.8 (21 May 2023 20:18)  T(F): 97.9 (22 May 2023 13:09), Max: 98.3 (21 May 2023 20:18)  HR: 88 (22 May 2023 13:09) (70 - 98)  BP: 148/66 (22 May 2023 13:09) (142/61 - 168/81)  BP(mean): --  RR: 18 (22 May 2023 13:09) (16 - 18)  SpO2: 94% (22 May 2023 13:09) (94% - 98%)    Parameters below as of 22 May 2023 13:09  Patient On (Oxygen Delivery Method): room air    CONSTITUTIONAL: NAD, well-developed, well-groomed  EYES: EOMI, conjunctiva and sclera clear  ENMT: Moist oral mucosa, no pharyngeal injection or exudates  RESPIRATORY: Normal respiratory effort; lungs are clear to auscultation bilaterally  CARDIOVASCULAR: Regular rate and rhythm, normal S1 and S2, no murmur/rub/gallop  ABDOMEN: Soft, nondistended, nontender to palpation  MUSCULOSKELETAL: L hip with ecchymosis, bandage clean/dry/intact  PSYCH: A+O to person, place, and time; affect appropriate  NEUROLOGY: Nonfocal, no gross sensory deficits   SKIN: No rashes; large well healed RLE surgical wound    LABS:                        9.5    17.80 )-----------( 307      ( 21 May 2023 07:12 )             30.2     05-21    138  |  104  |  22  ----------------------------<  127<H>  4.7   |  22  |  1.02    Ca    8.6      21 May 2023 07:12      PT/INR - ( 22 May 2023 11:26 )   PT: 16.3 sec;   INR: 1.41 ratio         PTT - ( 22 May 2023 11:26 )  PTT:34.8 sec

## 2023-05-22 NOTE — DISCHARGE NOTE PROVIDER - NSDCFUADDAPPT_GEN_ALL_CORE_FT
Follow up with Primary physician on 5/24 for INR lab draw for recommendations on when to discontinue Lovenox.  Follow up with Primary physician on 5/24 for INR lab draw for recommendations

## 2023-05-22 NOTE — PROGRESS NOTE ADULT - PROBLEM SELECTOR PLAN 2
CAD s/p stent (1/19/2023) on ASA, TAVR (1/21/2023), atrial fibrillation on coumadin, pacemaker in place,   - started on lovenox bridge on 5/14 and the last dose on 5/19 AM  - s/p PPM interrogation in the office, no events  - continue atenolol 25mg daily  - given high [id number] score of at least 6, resumed systemic AC 5/20 evening after discussion with the primary team   - continue lovenox 100mg BID with coumadin 2mg on discharge and have INR check on Wednesday to ensure he is therapeutic and Lovenox can be stopped  - goal INR 2-3  - home regimen: coumadin 2mg daily except Sat/Sun 1mg.

## 2023-05-22 NOTE — PROGRESS NOTE ADULT - PROBLEM SELECTOR PLAN 1
wbc 17 post-op  - as per wife, he runs usually high in 10-12 at baseline  - no localizing symptoms for infection at this time: denies cough, abdominal pain, diarrhea, fever/chills  - pt has remained hemodynamically stable and non-toxic appearing  - suspect leukocytosis as reactive post-op and after receiving steroids  - would recommend monitor off of abx at this time

## 2023-05-22 NOTE — DISCHARGE NOTE PROVIDER - HOSPITAL COURSE
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 s/p right THR, now Left hip OA and worsening pain. presenting to PST for scheduled  Left total hip replacement by  on 5/20/23.     Left hip pain limiting patient mobility, uses cane and walker at home and wheelchair for distance. Pain is described at sharp 8/10 with movement.  During exam pain is currently a 3/10. Acceptable level of pain is 2.  Pain is relieved with Tylenol and rest periods. Patient denies any chest pain, sob, palpitations, fever, chills,  numbness or tingling sensation    Goal of surgery: is to walk again without need of assistance of medical devices. "Hopefully be able to drive again"    Allergies/Medications:   Allergies:        Allergies:  	No Known Allergies:      Past Medical, Past Surgical, and Family History:  PAST MEDICAL HISTORY:  Atrial fibrillation over 20 years  Atrial fibrillation over 20 years  BPH (benign prostatic hyperplasia)   CVA (cerebral vascular accident) 1/2018 - attributed to no AC for 11 days preop/postop spine surgery - presented to ED with slurred speech, residual ST memory loss, per pt  DVT, lower extremity   Exposure to toxin 9/11   GERD (gastroesophageal reflux disease)   History of short term memory loss   Hodgkin lymphoma 1975  HTN (hypertension)   MOE (obstructive sleep apnea) positive sleep study many years ago, was recommended to use CPAP, patient non-compliant  Osteoarthritis   Spinal stenosis L3-L4  Spondylolisthesis.     PAST SURGICAL HISTORY:  CAD (coronary artery disease)   Cardiac pacemaker   History of cardioversion for AF - "many years ago" - unsuccessful  History of fasciotomy   History of lumbar spinal fusion 1/5/2018  S/P hip replacement, right 2014  S/P splenectomy 1975  S/P TAVR (transcatheter aortic valve replacement)   Status post left cataract extraction.    5/19/23: Patient presents to the hospital for elective surgery, underwent a left total hip replacement-  -tolerated procedure without complications.  Patient was evaluated by Physical therapy whom recommended home with home PT for discharge plan.  Patient is stable for discharge when cleared by PT.   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 s/p right THR, now Left hip OA and worsening pain, scheduled for Left total hip replacement by  on 5/20/23.    As per cardiology and hematology, patient has stopped taking coumadin and started on lovenox 100mg BID on 5/14, with the last dose on 5/19 AM. Pt has been taking all of his other medications including lasix 40mg daily and feels like his LE edema is at baseline. Apart from limited ambulation due to L hip pain, pt denies chest pain, dyspnea, orthopnea, palpitation, dizziness, lightheadedness.     Goal of surgery: is to walk again without need of assistance of medical devices. "Hopefully be able to drive again"       Past Medical, Past Surgical, and Family History:  PAST MEDICAL HISTORY:  Atrial fibrillation over 20 years  Atrial fibrillation over 20 years  BPH (benign prostatic hyperplasia)   CVA (cerebral vascular accident) 1/2018 - attributed to no AC for 11 days preop/postop spine surgery - presented to ED with slurred speech, residual ST memory loss, per pt  DVT, lower extremity   Exposure to toxin 9/11   GERD (gastroesophageal reflux disease)   History of short term memory loss   Hodgkin lymphoma 1975  HTN (hypertension)   MOE (obstructive sleep apnea) positive sleep study many years ago, was recommended to use CPAP, patient non-compliant  Osteoarthritis   Spinal stenosis L3-L4  Spondylolisthesis.     PAST SURGICAL HISTORY:  CAD (coronary artery disease)   Cardiac pacemaker   History of cardioversion for AF - "many years ago" - unsuccessful  History of fasciotomy   History of lumbar spinal fusion 1/5/2018  S/P hip replacement, right 2014  S/P splenectomy 1975  S/P TAVR (transcatheter aortic valve replacement)   Status post left cataract extraction.    5/19/23: Patient presents to the hospital for elective surgery. Patient optimized and medically cleared by medicine and hematology for planned procedure.   Patient underwent a left total hip replacement with anterior approach. -tolerated procedure without complications.  Patient with multiple episodes of diarrhea during and following surgery. Stool cultured. XXXXXX    Patient with history of chronic afib.   ·  Plan: CAD s/p stent (1/19/2023) on ASA, TAVR (1/21/2023), atrial fibrillation on coumadin, pacemaker in place,   - started on lovenox bridge on 5/14 and the last dose on 5/19 AM  - s/p PPM interrogation in the office, no events  - continue atenolol 25mg daily  - given high [id number] score of at least 6, resumed systemic AC 5/20 evening after discussion with the primary team   - continue lovenox 100mg BID with coumadin 2mg on discharge and have INR check on Wednesday to ensure he is therapeutic and Lovenox can be stopped  - goal INR 2-3  - home regimen: coumadin 2mg daily except Sat/Sun 1mg.    Patient was evaluated by Physical therapy whom recommended home with home PT for discharge plan.    Discharge and Orthopedic Care instructions were delineated in the Discharge Plan and reviewed with the patient. All medications were delineated in the medication reconciliation tool and key points were reviewed with the patient. They were deemed stable from an Orthopedic & medical standpoint for discharge on XXXXX.   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 s/p right THR, now Left hip OA and worsening pain, scheduled for Left total hip replacement by Dr. Rutherford on 5/20/23.    As per cardiology and hematology, patient has stopped taking coumadin and started on Lovenox 100mg BID on 5/14, with the last dose on 5/19 AM. Pt has been taking all of his other medications including lasix 40mg daily and feels like his LE edema is at baseline. Apart from limited ambulation due to L hip pain, pt denies chest pain, dyspnea, orthopnea, palpitation, dizziness, lightheadedness.     Goal of surgery: is to walk again without need of assistance of medical devices. "Hopefully be able to drive again"       Past Medical, Past Surgical, and Family History:  PAST MEDICAL HISTORY:  Atrial fibrillation over 20 years  Atrial fibrillation over 20 years  BPH (benign prostatic hyperplasia)   CVA (cerebral vascular accident) 1/2018 - attributed to no AC for 11 days preop/postop spine surgery - presented to ED with slurred speech, residual ST memory loss, per pt  DVT, lower extremity   Exposure to toxin 9/11   GERD (gastroesophageal reflux disease)   History of short term memory loss   Hodgkin lymphoma 1975  HTN (hypertension)   MOE (obstructive sleep apnea) positive sleep study many years ago, was recommended to use CPAP, patient non-compliant  Osteoarthritis   Spinal stenosis L3-L4  Spondylolisthesis.     PAST SURGICAL HISTORY:  CAD (coronary artery disease)   Cardiac pacemaker   History of cardioversion for AF - "many years ago" - unsuccessful  History of fasciotomy   History of lumbar spinal fusion 1/5/2018  S/P hip replacement, right 2014  S/P splenectomy 1975  S/P TAVR (transcatheter aortic valve replacement)   Status post left cataract extraction.    5/19/23: Patient presents to the hospital for elective surgery. Patient optimized and medically cleared by medicine and hematology for planned procedure.   Patient underwent a left total hip replacement with anterior approach. -tolerated procedure without complications.  Patient with multiple episodes of diarrhea during and following surgery. Stool cultured. Norovirus    Patient with history of chronic Afib.   ·  Plan: CAD s/p stent (1/19/2023) on ASA, TAVR (1/21/2023), atrial fibrillation on coumadin, pacemaker in place,   - started on Lovenox bridge on 5/14 and the last dose on 5/19 AM  - s/p PPM interrogation in the office, no events  - continue atenolol 25mg daily  - given high [id number] score of at least 6, resumed systemic AC 5/20 evening after discussion with the primary team   - coumadin 2mg on discharge and have INR check on Wednesday to ensure he is therapeutic  inr-1.99 5/23 am  - goal INR 2-3  - home regimen: coumadin 2mg daily except Sat/Sun 1mg.    Patient was evaluated by Physical therapy whom recommended home with home PT for discharge plan.    Discharge and Orthopedic Care instructions were delineated in the Discharge Plan and reviewed with the patient. All medications were delineated in the medication reconciliation tool and key points were reviewed with the patient. They were deemed stable from an Orthopedic & medical standpoint for discharge on 5/23/23

## 2023-05-22 NOTE — DISCHARGE NOTE PROVIDER - NSDCCPCAREPLAN_GEN_ALL_CORE_FT
PRINCIPAL DISCHARGE DIAGNOSIS  Diagnosis: Primary localized osteoarthritis of left hip  Assessment and Plan of Treatment:

## 2023-05-22 NOTE — DISCHARGE NOTE PROVIDER - NSDCMRMEDTOKEN_GEN_ALL_CORE_FT
acetaminophen 500 mg oral tablet: 1 tab(s) orally once a day (at bedtime)  Aspir 81 oral delayed release tablet: 1 tab(s) orally once a day  ATENOLOL 25 MG TABLET: 1 tab(s) orally once a day  calcium (as calcium citrate) 250 mg oral tablet: 1 orally once a day  ferrous sulfate 325 mg (65 mg elemental iron) oral tablet: 1 tab(s) orally once a day  Flonase: 1 spray(s) nasal once a day  Lasix 40 mg oral tablet: 1 tab(s) orally once a day  latanoprost 0.005% ophthalmic solution: 1 drop(s) to the right eye once a day (in the evening)  omeprazole 20 mg oral delayed release capsule: 1 cap(s) orally once a day  rosuvastatin 20 mg oral tablet: 0.5 tab(s) orally once a day  spironolactone 25 mg oral tablet: 0.5 tab(s) orally once a day  tamsulosin 0.4 mg oral capsule: 2 cap(s) orally once a day (at bedtime)  Vitamin B-12: 1 dose(s) injectable once a month    Note:Gets from MD office  Vitamin C: 1 tab(s) orally once a day  warfarin 1 mg oral tablet: 2 tab(s) orally Monday through Friday as above  warfarin 1 mg oral tablet: 1 tab(s) orally Saturday and Sunday as above   acetaminophen 325 mg oral tablet: 3 tab(s) orally every 8 hours continue for 5 days the as needed  Aspir 81 oral delayed release tablet: 1 tab(s) orally once a day  ATENOLOL 25 MG TABLET: 1 tab(s) orally once a day  calcium (as calcium citrate) 250 mg oral tablet: 1 orally once a day  Flonase: 1 spray(s) nasal once a day  furosemide 40 mg oral tablet: 1 tab(s) orally once a day  latanoprost 0.005% ophthalmic solution: 1 drop(s) to each affected eye once a day (at bedtime)  Narcan 4 mg/0.1 mL nasal spray: 1 spray(s) intranasally every 2 minutes alternate nostrils  omeprazole 20 mg oral delayed release capsule: 1 cap(s) orally once a day  oxyCODONE 5 mg oral tablet: 1 tab(s) orally every 4 hours as needed for Moderate Pain (4 - 6) , 2tabs orally every 4 hours as needed for severe pain (7-10) MDD: 6  rosuvastatin 20 mg oral tablet: 0.5 tab(s) orally once a day  Slow Release Iron (as elemental iron) 45 mg oral tablet, extended release: 1 tab(s) orally once a day  spironolactone 25 mg oral tablet: 0.5 tab(s) orally once a day  tamsulosin 0.4 mg oral capsule: 2 cap(s) orally once a day (at bedtime)  traMADol 50 mg oral tablet: 1 tab(s) orally every 6 hours x 7 days as needed for Mild Pain (1 - 3) MDD: 4  Vitamin B-12: 1 dose(s) injectable once a month    Note:Gets from MD office  Vitamin C: 1 tab(s) orally once a day  warfarin 1 mg oral tablet: 2 tab(s) orally Monday through Friday as above  warfarin 1 mg oral tablet: 1 tab(s) orally Saturday and Sunday as above

## 2023-05-22 NOTE — DISCHARGE NOTE PROVIDER - CARE PROVIDER_API CALL
Shaheen Rutherford)  Orthopaedic Surgery  [address] [place] [address]  Phone: [phone]  Fax: [phone]  Follow Up Time:

## 2023-05-22 NOTE — DISCHARGE NOTE PROVIDER - NSDCFUADDINST_GEN_ALL_CORE_FT
Please call  (  ) office within next few days to schedule a follow up appointment about 14 days after surgery.   Dressing will be changed during office visit.   Out of bed, ambulate, weight bearing as tolerated-   Physical therapy to assist with exercise and help increase endurance.   Please contact Doctors office regarding arrangements for out patient Physical therapy.  Please call Dr. Rutherford' s office within next few days to schedule a follow up appointment about 14 days after surgery.   Recommend follow up with medical MD, within next 4 weeks.   Dressing will be changed during office visit.   Patient may shower, limit direct water to dressing, if wet pat dry.   Out of bed, ambulate, weight bearing as tolerated-   Physical therapy to assist with exercise and help increase endurance.   Please contact Doctors office regarding arrangements for out patient Physical therapy.

## 2023-05-22 NOTE — PROGRESS NOTE ADULT - PROBLEM SELECTOR PLAN 3
- Scheduled for left total hip replacement by Dr. Rutherford on 5/20/23  - post-op care by primary team  - cleared by cardiology and hematology   - no further workup indicated at this time.

## 2023-05-23 ENCOUNTER — TRANSCRIPTION ENCOUNTER (OUTPATIENT)
Age: 78
End: 2023-05-23

## 2023-05-23 VITALS — OXYGEN SATURATION: 97 % | HEART RATE: 66 BPM

## 2023-05-23 LAB
APTT BLD: 40.7 SEC — HIGH (ref 27.5–35.5)
GI PCR PANEL: DETECTED
INR BLD: 1.99 RATIO — HIGH (ref 0.88–1.16)
NOROVIRUS GI+II RNA STL QL NAA+NON-PROBE: DETECTED
PROTHROM AB SERPL-ACNC: 23.2 SEC — HIGH (ref 10.5–13.4)

## 2023-05-23 PROCEDURE — 86850 RBC ANTIBODY SCREEN: CPT

## 2023-05-23 PROCEDURE — 86900 BLOOD TYPING SEROLOGIC ABO: CPT

## 2023-05-23 PROCEDURE — 94660 CPAP INITIATION&MGMT: CPT

## 2023-05-23 PROCEDURE — 97530 THERAPEUTIC ACTIVITIES: CPT

## 2023-05-23 PROCEDURE — 85730 THROMBOPLASTIN TIME PARTIAL: CPT

## 2023-05-23 PROCEDURE — 36415 COLL VENOUS BLD VENIPUNCTURE: CPT

## 2023-05-23 PROCEDURE — 99233 SBSQ HOSP IP/OBS HIGH 50: CPT

## 2023-05-23 PROCEDURE — 97161 PT EVAL LOW COMPLEX 20 MIN: CPT

## 2023-05-23 PROCEDURE — 81003 URINALYSIS AUTO W/O SCOPE: CPT

## 2023-05-23 PROCEDURE — 86923 COMPATIBILITY TEST ELECTRIC: CPT

## 2023-05-23 PROCEDURE — 86901 BLOOD TYPING SEROLOGIC RH(D): CPT

## 2023-05-23 PROCEDURE — 71045 X-RAY EXAM CHEST 1 VIEW: CPT

## 2023-05-23 PROCEDURE — 93005 ELECTROCARDIOGRAM TRACING: CPT

## 2023-05-23 PROCEDURE — C1776: CPT

## 2023-05-23 PROCEDURE — 97165 OT EVAL LOW COMPLEX 30 MIN: CPT

## 2023-05-23 PROCEDURE — 97116 GAIT TRAINING THERAPY: CPT

## 2023-05-23 PROCEDURE — 85610 PROTHROMBIN TIME: CPT

## 2023-05-23 PROCEDURE — 76000 FLUOROSCOPY <1 HR PHYS/QHP: CPT

## 2023-05-23 PROCEDURE — 87635 SARS-COV-2 COVID-19 AMP PRB: CPT

## 2023-05-23 PROCEDURE — 87507 IADNA-DNA/RNA PROBE TQ 12-25: CPT

## 2023-05-23 PROCEDURE — 72170 X-RAY EXAM OF PELVIS: CPT

## 2023-05-23 PROCEDURE — 87449 NOS EACH ORGANISM AG IA: CPT

## 2023-05-23 PROCEDURE — 85027 COMPLETE CBC AUTOMATED: CPT

## 2023-05-23 PROCEDURE — 87324 CLOSTRIDIUM AG IA: CPT

## 2023-05-23 PROCEDURE — 80048 BASIC METABOLIC PNL TOTAL CA: CPT

## 2023-05-23 PROCEDURE — P9016: CPT

## 2023-05-23 PROCEDURE — 94640 AIRWAY INHALATION TREATMENT: CPT

## 2023-05-23 RX ORDER — OXYCODONE HYDROCHLORIDE 5 MG/1
1 TABLET ORAL
Qty: 40 | Refills: 0
Start: 2023-05-23

## 2023-05-23 RX ORDER — SPIRONOLACTONE 25 MG/1
0.5 TABLET, FILM COATED ORAL
Qty: 0 | Refills: 0 | DISCHARGE
Start: 2023-05-23

## 2023-05-23 RX ORDER — WARFARIN SODIUM 2.5 MG/1
2 TABLET ORAL ONCE
Refills: 0 | Status: DISCONTINUED | OUTPATIENT
Start: 2023-05-23 | End: 2023-05-23

## 2023-05-23 RX ORDER — LOPERAMIDE HCL 2 MG
2 TABLET ORAL ONCE
Refills: 0 | Status: COMPLETED | OUTPATIENT
Start: 2023-05-23 | End: 2023-05-23

## 2023-05-23 RX ORDER — ACETAMINOPHEN 500 MG
3 TABLET ORAL
Qty: 0 | Refills: 0 | DISCHARGE
Start: 2023-05-23

## 2023-05-23 RX ORDER — LATANOPROST 0.05 MG/ML
1 SOLUTION/ DROPS OPHTHALMIC; TOPICAL
Qty: 0 | Refills: 0 | DISCHARGE
Start: 2023-05-23

## 2023-05-23 RX ORDER — TRAMADOL HYDROCHLORIDE 50 MG/1
1 TABLET ORAL
Qty: 28 | Refills: 0
Start: 2023-05-23 | End: 2023-05-29

## 2023-05-23 RX ORDER — NALOXONE HYDROCHLORIDE 4 MG/.1ML
1 SPRAY NASAL
Qty: 1 | Refills: 0
Start: 2023-05-23

## 2023-05-23 RX ORDER — FERROUS SULFATE 325(65) MG
1 TABLET ORAL
Qty: 0 | Refills: 0 | DISCHARGE

## 2023-05-23 RX ORDER — FUROSEMIDE 40 MG
1 TABLET ORAL
Qty: 0 | Refills: 0 | DISCHARGE

## 2023-05-23 RX ORDER — SPIRONOLACTONE 25 MG/1
0.5 TABLET, FILM COATED ORAL
Qty: 0 | Refills: 0 | DISCHARGE

## 2023-05-23 RX ORDER — TAMSULOSIN HYDROCHLORIDE 0.4 MG/1
2 CAPSULE ORAL
Qty: 0 | Refills: 0 | DISCHARGE
Start: 2023-05-23

## 2023-05-23 RX ORDER — ACETAMINOPHEN 500 MG
1 TABLET ORAL
Qty: 0 | Refills: 0 | DISCHARGE

## 2023-05-23 RX ORDER — LATANOPROST 0.05 MG/ML
1 SOLUTION/ DROPS OPHTHALMIC; TOPICAL
Qty: 0 | Refills: 0 | DISCHARGE

## 2023-05-23 RX ORDER — FUROSEMIDE 40 MG
1 TABLET ORAL
Qty: 0 | Refills: 0 | DISCHARGE
Start: 2023-05-23

## 2023-05-23 RX ADMIN — Medication 975 MILLIGRAM(S): at 09:00

## 2023-05-23 RX ADMIN — PANTOPRAZOLE SODIUM 40 MILLIGRAM(S): 20 TABLET, DELAYED RELEASE ORAL at 05:47

## 2023-05-23 RX ADMIN — TRAMADOL HYDROCHLORIDE 50 MILLIGRAM(S): 50 TABLET ORAL at 13:38

## 2023-05-23 RX ADMIN — Medication 975 MILLIGRAM(S): at 15:54

## 2023-05-23 RX ADMIN — Medication 975 MILLIGRAM(S): at 08:23

## 2023-05-23 RX ADMIN — Medication 2 MILLIGRAM(S): at 03:04

## 2023-05-23 RX ADMIN — Medication 1 SPRAY(S): at 11:38

## 2023-05-23 RX ADMIN — TAMSULOSIN HYDROCHLORIDE 0.8 MILLIGRAM(S): 0.4 CAPSULE ORAL at 00:36

## 2023-05-23 RX ADMIN — SPIRONOLACTONE 12.5 MILLIGRAM(S): 25 TABLET, FILM COATED ORAL at 05:44

## 2023-05-23 RX ADMIN — Medication 40 MILLIGRAM(S): at 05:40

## 2023-05-23 RX ADMIN — Medication 975 MILLIGRAM(S): at 00:18

## 2023-05-23 RX ADMIN — ENOXAPARIN SODIUM 100 MILLIGRAM(S): 100 INJECTION SUBCUTANEOUS at 08:23

## 2023-05-23 RX ADMIN — Medication 975 MILLIGRAM(S): at 16:30

## 2023-05-23 RX ADMIN — Medication 500 MILLIGRAM(S): at 05:46

## 2023-05-23 RX ADMIN — SODIUM CHLORIDE 3 MILLILITER(S): 9 INJECTION INTRAMUSCULAR; INTRAVENOUS; SUBCUTANEOUS at 14:16

## 2023-05-23 RX ADMIN — Medication 325 MILLIGRAM(S): at 11:34

## 2023-05-23 RX ADMIN — TRAMADOL HYDROCHLORIDE 50 MILLIGRAM(S): 50 TABLET ORAL at 14:38

## 2023-05-23 NOTE — DISCHARGE NOTE NURSING/CASE MANAGEMENT/SOCIAL WORK - NSDCPEFALRISK_GEN_ALL_CORE
For information on Fall & Injury Prevention, visit: [email or web address] OR  [email or web address] OR  https://www.cdc.gov/steadi/patient.html

## 2023-05-23 NOTE — PROVIDER CONTACT NOTE (CRITICAL VALUE NOTIFICATION) - DATE AND TIME:
23-May-2023 06:47 23-May-2023 03:34 [FreeTextEntry1] : General: Patient is awake and alert and in no acute distress . oriented to person, place, and time. well developed, well nourished, cooperative. \par Skin: The skin is intact, warm, pink, and dry over the area examined.  \par Eyes: normal conjunctiva, normal eyelids and pupils were equal and round. \par ENT: normal ears, normal nose and normal lips.\par Cardiovascular: There is brisk capillary refill in the digits of the affected extremity. They are symmetric pulses in the bilateral upper and lower extremities, positive peripheral pulses, brisk capillary refill, but no peripheral edema.\par Respiratory: The patient is in no apparent respiratory distress. They're taking full deep breaths without use of accessory muscles or evidence of audible wheezes or stridor without the use of a stethoscope, normal respiratory effort. \par Neurological: 5/5 motor strength in the main muscle groups of bilateral lower extremities, sensory intact in bilateral lower extremities. \par \par Musculoskeletal:. \par No obvious abnormalities in the upper or lower extremity. Full range of motion of the wrists, elbows, shoulders, ankles, knees, and hips. Full range of motion without tenderness of the neck. \par \par Back examination reveals that the patient is well centered with head and shoulders aligned with pelvis. The right shoulder is slightly higher. Flank asymmetry with the right being more concave. Alfred forward bending test demonstrates a right thoracic paraspinal prominence. \par \par No tenderness along spinous processes or para spinal musculature. Walks with coordination and balance. Able to squat, jump, heel and toe walk without difficulty. Full active range of motion of the back with flexion, extension, rotation, and lateral bending without discomfort or stiffness. \par \par Muscle strength is 5/5. Patellar and Achilles reflexes are +2 B/L. 2+ DP pulses B/L. No limb length discrepancy noted.\par

## 2023-05-23 NOTE — PROGRESS NOTE ADULT - PROBLEM SELECTOR PLAN 1
wbc 17 post-op  - as per wife, he runs usually high in 10-12 at baseline  - no localizing symptoms for infection at this time: denies cough, abdominal pain, diarrhea, fever/chills  - pt has remained hemodynamically stable and non-toxic appearing  - suspect leukocytosis as reactive post-op and after receiving steroids  - would recommend monitor off of abx at this time CAD s/p stent (1/19/2023) on ASA, TAVR (1/21/2023), atrial fibrillation on coumadin, pacemaker in place,   - started on lovenox bridge on 5/14 and the last dose on 5/19 AM  - s/p PPM interrogation in the office, no events  - continue atenolol 25mg daily  - given high [id number] score of at least 6, resumed systemic AC 5/20 evening after discussion with the primary team   - continue with coumadin on discharge  - goal INR 2-3  - home regimen: coumadin 2mg daily except Sat/Sun 1mg.

## 2023-05-23 NOTE — PROVIDER CONTACT NOTE (OTHER) - ACTION/TREATMENT ORDERED:
ER tonight for IV fluids.    PO imodium given MD ordered Immodium

## 2023-05-23 NOTE — PROGRESS NOTE ADULT - PROBLEM SELECTOR PLAN 5
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 -Patient with diarrhea +norovirus on GI PCR  -Continue with supportive care, no abx needed

## 2023-05-23 NOTE — DISCHARGE NOTE NURSING/CASE MANAGEMENT/SOCIAL WORK - NSDCFUADDAPPT_GEN_ALL_CORE_FT
Follow up with Primary physician on 5/24 for INR lab draw for recommendations on when to discontinue Lovenox.

## 2023-05-23 NOTE — DISCHARGE NOTE NURSING/CASE MANAGEMENT/SOCIAL WORK - NSSCTYPOFSERV_GEN_ALL_CORE
Verified Results  CBC WITH AUTOMATED DIFFERENTIAL [id number] 12:01AM GOLDY HODGES   [May 19, 2018 9:45AM GOLDY HODGES]  Blood work results were all normal with the exception of the vitamin D I have sent the prescription for vitamin D to take 1 once a week 1 year supply, your female hormones appear to be normal as well. As soon as you make the appointment with the gynecologist let us know so we can fax all these results to her office     Test Name Result Flag Reference   WHITE BLOOD COUNT 5.2 K/mcL  4.2-11.0   RED CELL COUNT 4.68 mil/mcL  4.00-5.20   HEMOGLOBIN 14.1 g/dl  12.0-15.5   HEMATOCRIT 42.9 %  36.0-46.5   MEAN CORPUSCULAR VOLUME 91.7 fL  78.0-100.0   MEAN CORPUSCULAR HEMOGLOBIN 30.1 pg  26.0-34.0   MEAN CORPUSCULAR HGB CONC 32.9 g/dl  32.0-36.5   RDW-CV 12.2 %  11.0-15.0   PLATELET COUNT 208 K/mcL  140-450   WISAM% 54 %     LYM% 34 %     MON% 11 %     EOS% 1 %     BASO% 0 %     WISAM ABS 2.8 K/mcL  1.8-7.7   LYM ABS 1.7 K/mcL  1.0-4.8   MON ABS 0.6 K/mcL  0.3-0.9   EOS ABS 0.1 K/mcL  0.1-0.5   BASO ABS 0.0 K/mcL  0.0-0.3   DIFF TYPE      AUTOMATED DIFFERENTIAL   NRBC 0 /100 WBC  0   PERCENT IMMATURE GRANULOCYTES 0 %     ABSOLUTE IMMATURE GRANULOCYTES 0.0 K/mcL  0-0.2     COMP METABOLIC PNL [id number] 12:01AM GOLDY HODGES   [May 19, 2018 9:45AM GOLDY HODGES]  Blood work results were all normal with the exception of the vitamin D I have sent the prescription for vitamin D to take 1 once a week 1 year supply, your female hormones appear to be normal as well. As soon as you make the appointment with the gynecologist let us know so we can fax all these results to her office     Test Name Result Flag Reference   SODIUM 141 mmol/L  135-145   POTASSIUM 4.3 mmol/L  3.4-5.1   CHLORIDE 109 mmol/L H    CARBON DIOXIDE 22 mmol/L  21-32   ANION GAP 14 mmol/L  10-20   GLUCOSE 92 mg/dl  65-99   BUN 17 mg/dl  6-20   CREATININE 0.70 mg/dl  0.51-0.95   GFR EST.AFRICAN AMER >90     eGFR results = or >90 mL/min/1.73m2  = Normal kidney function.   GFR EST.NONAFRI AMER >90     eGFR results = or >90 mL/min/1.73m2 = Normal kidney function.   BUN/CREATININE RATIO 24  7-25   BILIRUBIN TOTAL 0.6 mg/dl  0.2-1.0   GOT/AST 10 Units/L  <38   ALKALINE PHOSPHATASE 44 Units/L L    Low Alkaline Phosphatase results may indicate hypophosphatasia, malnutrition, hypothyroidism, or other disease states. Correlate with clinical symptoms.   ALBUMIN 4.3 g/dl  3.6-5.1   TOTAL PROTEIN 7.2 g/dl  6.4-8.2   GLOBULIN (CALCULATED) 2.9 g/dl  2.0-4.0   A/G RATIO 1.5  1.0-2.4   CALCIUM 9.7 mg/dl  8.4-10.2   GPT/ALT 16 Units/L  <79   FASTING STATUS 0 hrs       TSH [id number] 12:01AM GOLDY HODGES   [May 19, 2018 9:45AM GOLDY HODGES]  Blood work results were all normal with the exception of the vitamin D I have sent the prescription for vitamin D to take 1 once a week 1 year supply, your female hormones appear to be normal as well. As soon as you make the appointment with the gynecologist let us know so we can fax all these results to her office     Test Name Result Flag Reference   TSH 1.095 mcUnits/mL  0.350-5.000     T4, FREE [id number] 12:01AM GOLDY HODGES   [May 19, 2018 9:45AM GOLDY HODGES]  Blood work results were all normal with the exception of the vitamin D I have sent the prescription for vitamin D to take 1 once a week 1 year supply, your female hormones appear to be normal as well. As soon as you make the appointment with the gynecologist let us know so we can fax all these results to her office     Test Name Result Flag Reference   FREE T4 0.9 ng/dl  0.8-1.5     VITAMIN D,25 HYDROXY [id number] 12:01AM GOLDY HODGES   [May 19, 2018 9:45AM GOLDY HODGES]  Blood work results were all normal with the exception of the vitamin D I have sent the prescription for vitamin D to take 1 once a week 1 year supply, your female hormones appear to be normal as well. As soon as you make the appointment with the gynecologist let us know so we can fax  all these results to her office     Test Name Result Flag Reference   VIT D,25 HYDROXY 24.5 ng/ml L 30.0-100.0   <20  ng/mL=Vitamin D deficiency  20-29  ng/mL=Vitamin D insufficiency   ng/mL=Optimal Vitamin D  >150 ng/mL=Possible toxicity     FSH [id number] 12:01AM GOLDY HODGES   [May 19, 2018 9:45AM GOLDY HODGES]  Blood work results were all normal with the exception of the vitamin D I have sent the prescription for vitamin D to take 1 once a week 1 year supply, your female hormones appear to be normal as well. As soon as you make the appointment with the gynecologist let us know so we can fax all these results to her office     Test Name Result Flag Reference   FSH 4.3 mUnits/mL     Follicular Phase   2.5 to 10.2 mUnits/mL    Midcycle Peak      3.4 to 33.4 mUnits/mL    Luteal Phase       1.5 to 9.1 mUnits/mL    Pregnant           0.0 to 0.2 mUnits/mL    Postmenopausal     23.0 to 116.3 mUnits/mL     LH [id number] 12:01AM GOLDY HODGES   [May 19, 2018 9:45AM GOLDY HODGES]  Blood work results were all normal with the exception of the vitamin D I have sent the prescription for vitamin D to take 1 once a week 1 year supply, your female hormones appear to be normal as well. As soon as you make the appointment with the gynecologist let us know so we can fax all these results to her office     Test Name Result Flag Reference   LH 2.0 mUnits/mL     REFERENCE RANGE  Follicular phase 1.9 - 12.5 mUnits/mL  Midcycle peak    8.7 - 76.3 mUnits/mL  Luteal phase     0.5 - 16.9 mUnits/mL  Pregnant         <0.1 - 1.5 mUnits/mL  Post menopausal  15.9 - 54.0 mUnits/mL  Contraceptives   0.7 - 5.6 mUnits/mL     Children:        <0.1 - 6.0 mUnits/mL     LIPID PNL [id number] 12:01AM GOLDY HODGES   [May 19, 2018 9:45AM GOLDY HODGES]  Blood work results were all normal with the exception of the vitamin D I have sent the prescription for vitamin D to take 1 once a week 1 year supply, your female hormones appear to be  normal as well. As soon as you make the appointment with the gynecologist let us know so we can fax all these results to her office     Test Name Result Flag Reference   FASTING STATUS 0 hrs     CHOLESTEROL 193 mg/dl  <200   Desirable            <200  Borderline High      200 to 239  High                 >=240   HDL CHOLESTEROL 60 mg/dl  >49   Low            <40  Borderline Low 40 to 49  Near Optimal   50 to 59  Optimal        >=60   TRIGLYCERIDES 50 mg/dl  <150   Normal                   <150  Borderline High          150 to 199  High                     200 to 499  Very High                >=500   LDL CHOLESTEROL (CALCULATED) 123 mg/dl  <130   OPTIMAL               <100  NEAR OPTIMAL          100-129  BORDERLINE HIGH       130-159  HIGH                  160-189  VERY HIGH             >=190   NON-HDL CHOLESTEROL 133 mg/dl     Therapeutic Target:  CHD and risk equivalents <130  Multiple risk factors    <160  0 to 1 risk factors      <190   CHOLESTEROL/HDL RATIO 3.2  <4.5       Message   Blood work results were all normal with the exception of the vitamin D I have sent the prescription for vitamin D to take 1 once a week 1 year supply, your female hormones appear to be normal as well.  As soon as you make the appointment with the gynecologist let us know so we can fax all these results to her office      Home P/T

## 2023-05-23 NOTE — PROGRESS NOTE ADULT - SUBJECTIVE AND OBJECTIVE BOX
Patient seen and examined at bedside. GI PCR positive, has diarrhea. Doing well from surgery    Vital Signs Last 24 Hrs  T(C): 36.7 (23 May 2023 04:55), Max: 36.8 (22 May 2023 16:55)  T(F): 98.1 (23 May 2023 04:55), Max: 98.3 (22 May 2023 21:22)  HR: 63 (23 May 2023 05:01) (63 - 98)  BP: 144/70 (23 May 2023 04:55) (129/66 - 168/81)  BP(mean): --  RR: 18 (23 May 2023 04:55) (18 - 18)  SpO2: 100% (23 May 2023 05:01) (94% - 100%)    Parameters below as of 23 May 2023 04:55  Patient On (Oxygen Delivery Method): room air                          9.5    17.80 )-----------( 307      ( 21 May 2023 07:12 )             30.2   05-21    138  |  104  |  22  ----------------------------<  127<H>  4.7   |  22  |  1.02    Ca    8.6      21 May 2023 07:12        PHYSICAL EXAM:    Gen: NAD     Left Lower Extremity:  Dressing clean dry intact  +EHL/FHL/TA/GS  SILT L3-S1  +DP/PT Pulses  Compartments soft  No calf TTP B/L          A/P:  Pt is a  78 yr old male s/p left total hip replacement   - Do not give Imodium; GI PCR positive   - Pain control/ Analgesia  - DVT ppx Ther Lovenox and coumadin, dosing to therapeutic range.   - PT/OT - WBAT/OOB  - Incentive Spirometer  - GI prophylaxis: Protonix  - notify Ortho for questions   - Dispo to home with home PT

## 2023-05-23 NOTE — PROGRESS NOTE ADULT - PROBLEM SELECTOR PLAN 4
TTE 1/2023 with normal LV function  - bilateral LEs without significant edema  - pt himself denies dyspnea, chest pain, palpitation, orthopnea  - no signs/symptoms of acute exacerbation at this time  - continue lasix 40mg daily and spironolactone 25mg daily. 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

## 2023-05-23 NOTE — PROGRESS NOTE ADULT - PROBLEM SELECTOR PLAN 2
CAD s/p stent (1/19/2023) on ASA, TAVR (1/21/2023), atrial fibrillation on coumadin, pacemaker in place,   - started on lovenox bridge on 5/14 and the last dose on 5/19 AM  - s/p PPM interrogation in the office, no events  - continue atenolol 25mg daily  - given high [id number] score of at least 6, resumed systemic AC 5/20 evening after discussion with the primary team   - continue lovenox 100mg BID with coumadin 2mg on discharge and have INR check on Wednesday to ensure he is therapeutic and Lovenox can be stopped  - goal INR 2-3  - home regimen: coumadin 2mg daily except Sat/Sun 1mg. CAD s/p stent (1/19/2023) on ASA, TAVR (1/21/2023), atrial fibrillation on coumadin, pacemaker in place,   - started on lovenox bridge on 5/14 and the last dose on 5/19 AM  - s/p PPM interrogation in the office, no events  - continue atenolol 25mg daily  - given high [id number] score of at least 6, resumed systemic AC 5/20 evening after discussion with the primary team   - continue with coumadin on discharge  - goal INR 2-3  - home regimen: coumadin 2mg daily except Sat/Sun 1mg. - Scheduled for left total hip replacement by Dr. Rutherford on 5/20/23  - post-op care by primary team  - cleared by cardiology and hematology   - no further workup indicated at this time.

## 2023-05-23 NOTE — PROGRESS NOTE ADULT - ASSESSMENT
77 y/o M  is to undergo Left total hip arthroplasty today, NPO  Myra Nugent PA-C  Orthopaedic Surgery  Team pager [phone]  [facility] [phone]  [phone]  
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 s/p right THR, now with Left hip OA and worsening pain, scheduled for Left total hip replacement. Medicine was consulted for co-management
A/P:  Pt is a  78 yr old male s/p left total hip replacement   - Pain control/ Analgesia  - DVT ppx Ther Lovenox and coumadin, dosing to therapeutic range.   - PT/OT - wbat/oob    - Incentive Spirometer  - continue to monitor BM - currently asymptomatic, formed BM this morning.   - GI prophylaxis: Protonix  - notify Ortho for questions   - Dispo to home with home PT.     Sona Arnett PA-C  Orthopedic Surgery  Team Pager #[phone]
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 s/p right THR, now with Left hip OA and worsening pain, scheduled for Left total hip replacement. Medicine was consulted for co-management

## 2023-05-23 NOTE — PROGRESS NOTE ADULT - PROVIDER SPECIALTY LIST ADULT
Orthopedics
Internal Medicine
Hospitalist

## 2023-05-23 NOTE — PROGRESS NOTE ADULT - SUBJECTIVE AND OBJECTIVE BOX
[facility] Division of Hospital Medicine  Cody Alexeiemisendy   Pager (DYLAN-OSEAS, 8A-5P): [phone]  Other Times:  [phone]    Patient is a 78y old  Male who presents with a chief complaint of Comanagement (22 May 2023 13:38)      SUBJECTIVE / OVERNIGHT EVENTS:  ADDITIONAL REVIEW OF SYSTEMS:    MEDICATIONS  (STANDING):  acetaminophen     Tablet .. 975 milliGRAM(s) Oral every 8 hours  atenolol  Tablet 25 milliGRAM(s) Oral at bedtime  atorvastatin 40 milliGRAM(s) Oral at bedtime  cephalexin 500 milliGRAM(s) Oral every 12 hours  enoxaparin Injectable 100 milliGRAM(s) SubCutaneous every 12 hours  ferrous    sulfate 325 milliGRAM(s) Oral daily  fluticasone propionate 50 MICROgram(s)/spray Nasal Spray 1 Spray(s) Both Nostrils <User Schedule>  furosemide    Tablet 40 milliGRAM(s) Oral daily  latanoprost 0.005% Ophthalmic Solution 1 Drop(s) Right EYE at bedtime  pantoprazole    Tablet 40 milliGRAM(s) Oral before breakfast  sodium chloride 0.9% lock flush 3 milliLiter(s) IV Push every 8 hours  spironolactone 12.5 milliGRAM(s) Oral daily  tamsulosin 0.8 milliGRAM(s) Oral at bedtime    MEDICATIONS  (PRN):  ondansetron Injectable 4 milliGRAM(s) IV Push every 6 hours PRN Nausea and/or Vomiting  oxyCODONE    IR 5 milliGRAM(s) Oral every 4 hours PRN Moderate Pain (4 - 6)  oxyCODONE    IR 10 milliGRAM(s) Oral every 4 hours PRN Severe Pain (7 - 10)  traMADol 50 milliGRAM(s) Oral every 6 hours PRN Mild Pain (1 - 3)      CAPILLARY BLOOD GLUCOSE        I&O's Summary    22 May 2023 07:01  -  23 May 2023 07:00  --------------------------------------------------------  IN: 960 mL / OUT: 0 mL / NET: 960 mL        PHYSICAL EXAM:  Vital Signs Last 24 Hrs  T(C): 36.7 (23 May 2023 04:55), Max: 36.8 (22 May 2023 16:55)  T(F): 98.1 (23 May 2023 04:55), Max: 98.3 (22 May 2023 21:22)  HR: 63 (23 May 2023 05:01) (63 - 90)  BP: 144/70 (23 May 2023 04:55) (129/66 - 148/66)  BP(mean): --  RR: 18 (23 May 2023 04:55) (18 - 18)  SpO2: 100% (23 May 2023 05:01) (94% - 100%)    Parameters below as of 23 May 2023 04:55  Patient On (Oxygen Delivery Method): room air      CONSTITUTIONAL: NAD, well-developed, well-groomed  EYES: PERRLA; conjunctiva and sclera clear  ENMT: Moist oral mucosa, no pharyngeal injection or exudates; normal dentition  NECK: Supple, no palpable masses; no thyromegaly  RESPIRATORY: Normal respiratory effort; lungs are clear to auscultation bilaterally  CARDIOVASCULAR: Regular rate and rhythm, normal S1 and S2, no murmur/rub/gallop; No lower extremity edema; Peripheral pulses are 2+ bilaterally  ABDOMEN: Nontender to palpation, normoactive bowel sounds, no rebound/guarding; No hepatosplenomegaly  MUSCULOSKELETAL:  Normal gait; no clubbing or cyanosis of digits; no joint swelling or tenderness to palpation  PSYCH: A+O to person, place, and time; affect appropriate  NEUROLOGY: CN 2-12 are intact and symmetric; no gross sensory deficits   SKIN: No rashes; no palpable lesions    LABS:          PT/INR - ( 23 May 2023 06:59 )   PT: 23.2 sec;   INR: 1.99 ratio         PTT - ( 23 May 2023 06:59 )  PTT:40.7 sec            RADIOLOGY & ADDITIONAL TESTS:  Results Reviewed:   Imaging Personally Reviewed:  Electrocardiogram Personally Reviewed:    COORDINATION OF CARE:  Care Discussed with Consultants/Other Providers [Y/N]:  Prior or Outpatient Records Reviewed [Y/N]:   [facility] Division of Hospital Medicine  Cody Packer DO  Pager (TRAVON, 6J-5H): [phone]  Other Times:  [phone]    Patient is a 78y old  Male who presents with a chief complaint of Comanagement (22 May 2023 13:38)    SUBJECTIVE / OVERNIGHT EVENTS: Patient continued to have diarrhea overnight. GI PCR came back positive for Norovirus. Patient seen and examined at bedside this morning, diarrhea has improved. Still with some L hip pain and bruising.    REVIEW OF SYSTEMS:    CONSTITUTIONAL: No weakness, fevers or chills  EYES/ENT: No visual changes;  No vertigo or throat pain   NECK: No pain or stiffness  RESPIRATORY: No cough, wheezing, hemoptysis; No shortness of breath  CARDIOVASCULAR: No chest pain or palpitations  GASTROINTESTINAL: No abdominal or epigastric pain. No nausea, vomiting, or hematemesis; Endorses diarrhea  GENITOURINARY: No dysuria, frequency or hematuria  NEUROLOGICAL: No numbness or weakness  SKIN: No itching, burning, rashes, or lesions  MSK: Endorses L hip pain and bruising, no back pain  HEME: No easy bleeding, no easy bruising  All other review of systems is negative unless indicated above.    MEDICATIONS  (STANDING):  acetaminophen     Tablet .. 975 milliGRAM(s) Oral every 8 hours  atenolol  Tablet 25 milliGRAM(s) Oral at bedtime  atorvastatin 40 milliGRAM(s) Oral at bedtime  cephalexin 500 milliGRAM(s) Oral every 12 hours  enoxaparin Injectable 100 milliGRAM(s) SubCutaneous every 12 hours  ferrous    sulfate 325 milliGRAM(s) Oral daily  fluticasone propionate 50 MICROgram(s)/spray Nasal Spray 1 Spray(s) Both Nostrils <User Schedule>  furosemide    Tablet 40 milliGRAM(s) Oral daily  latanoprost 0.005% Ophthalmic Solution 1 Drop(s) Right EYE at bedtime  pantoprazole    Tablet 40 milliGRAM(s) Oral before breakfast  sodium chloride 0.9% lock flush 3 milliLiter(s) IV Push every 8 hours  spironolactone 12.5 milliGRAM(s) Oral daily  tamsulosin 0.8 milliGRAM(s) Oral at bedtime    MEDICATIONS  (PRN):  ondansetron Injectable 4 milliGRAM(s) IV Push every 6 hours PRN Nausea and/or Vomiting  oxyCODONE    IR 5 milliGRAM(s) Oral every 4 hours PRN Moderate Pain (4 - 6)  oxyCODONE    IR 10 milliGRAM(s) Oral every 4 hours PRN Severe Pain (7 - 10)  traMADol 50 milliGRAM(s) Oral every 6 hours PRN Mild Pain (1 - 3)      CAPILLARY BLOOD GLUCOSE        I&O's Summary    22 May 2023 07:01  -  23 May 2023 07:00  --------------------------------------------------------  IN: 960 mL / OUT: 0 mL / NET: 960 mL        PHYSICAL EXAM:  Vital Signs Last 24 Hrs  T(C): 36.7 (23 May 2023 04:55), Max: 36.8 (22 May 2023 16:55)  T(F): 98.1 (23 May 2023 04:55), Max: 98.3 (22 May 2023 21:22)  HR: 63 (23 May 2023 05:01) (63 - 90)  BP: 144/70 (23 May 2023 04:55) (129/66 - 148/66)  BP(mean): --  RR: 18 (23 May 2023 04:55) (18 - 18)  SpO2: 100% (23 May 2023 05:01) (94% - 100%)    Parameters below as of 23 May 2023 04:55  Patient On (Oxygen Delivery Method): room air    CONSTITUTIONAL: NAD, well-developed, well-groomed  EYES: EOMI, conjunctiva and sclera clear  ENMT: Moist oral mucosa, no pharyngeal injection or exudates  RESPIRATORY: Normal respiratory effort; lungs are clear to auscultation bilaterally  CARDIOVASCULAR: Regular rate and rhythm, normal S1 and S2, no murmur/rub/gallop  ABDOMEN: Soft, nondistended, nontender to palpation  MUSCULOSKELETAL: L hip with ecchymosis, bandage clean/dry/intact  PSYCH: A+O to person, place, and time; affect appropriate  NEUROLOGY: Nonfocal, no gross sensory deficits   SKIN: No rashes; large well healed RLE surgical wound    PT/INR - ( 23 May 2023 06:59 )   PT: 23.2 sec;   INR: 1.99 ratio         PTT - ( 23 May 2023 06:59 )  PTT:40.7 sec

## 2023-05-23 NOTE — PROGRESS NOTE ADULT - PROBLEM SELECTOR PLAN 3
- Scheduled for left total hip replacement by Dr. Rutherford on 5/20/23  - post-op care by primary team  - cleared by cardiology and hematology   - no further workup indicated at this time. TTE 1/2023 with normal LV function  - bilateral LEs without significant edema  - pt himself denies dyspnea, chest pain, palpitation, orthopnea  - no signs/symptoms of acute exacerbation at this time  - continue lasix 40mg daily and spironolactone 25mg daily.

## 2023-05-23 NOTE — DISCHARGE NOTE NURSING/CASE MANAGEMENT/SOCIAL WORK - PATIENT PORTAL LINK FT
You can access the FollowMyHealth Patient Portal offered by [facility] by registering at the following website: [email or web address]. By joining [employer]’s FollowMyHealth portal, you will also be able to view your health information using other applications (apps) compatible with our system.

## 2023-05-24 DIAGNOSIS — A08.11 ACUTE GASTROENTEROPATHY DUE TO NORWALK AGENT: ICD-10-CM

## 2023-06-02 ENCOUNTER — RX RENEWAL (OUTPATIENT)
Age: 78
End: 2023-06-02

## 2023-06-02 ENCOUNTER — APPOINTMENT (OUTPATIENT)
Dept: ORTHOPEDIC SURGERY | Facility: CLINIC | Age: 78
End: 2023-06-02
Payer: MEDICARE

## 2023-06-02 VITALS
DIASTOLIC BLOOD PRESSURE: 65 MMHG | HEIGHT: 68 IN | WEIGHT: 210 LBS | HEART RATE: 67 BPM | SYSTOLIC BLOOD PRESSURE: 136 MMHG | BODY MASS INDEX: 31.83 KG/M2

## 2023-06-02 PROCEDURE — 73502 X-RAY EXAM HIP UNI 2-3 VIEWS: CPT | Mod: LT

## 2023-06-02 PROCEDURE — 99024 POSTOP FOLLOW-UP VISIT: CPT

## 2023-06-13 ENCOUNTER — RX RENEWAL (OUTPATIENT)
Age: 78
End: 2023-06-13

## 2023-06-13 RX ORDER — FUROSEMIDE 40 MG/1
40 TABLET ORAL TWICE DAILY
Qty: 90 | Refills: 3 | Status: ACTIVE | COMMUNITY
Start: 2019-02-12 | End: 1900-01-01

## 2023-06-15 ENCOUNTER — APPOINTMENT (OUTPATIENT)
Dept: ELECTROPHYSIOLOGY | Facility: CLINIC | Age: 78
End: 2023-06-15
Payer: MEDICARE

## 2023-06-15 ENCOUNTER — NON-APPOINTMENT (OUTPATIENT)
Age: 78
End: 2023-06-15

## 2023-06-15 ENCOUNTER — EMERGENCY (EMERGENCY)
Facility: HOSPITAL | Age: 78
LOS: 1 days | Discharge: HOME | End: 2023-06-15
Attending: EMERGENCY MEDICINE
Payer: COMMERCIAL

## 2023-06-15 VITALS
DIASTOLIC BLOOD PRESSURE: 33 MMHG | RESPIRATION RATE: 17 BRPM | OXYGEN SATURATION: 97 % | HEART RATE: 62 BPM | SYSTOLIC BLOOD PRESSURE: 114 MMHG | TEMPERATURE: 98 F

## 2023-06-15 VITALS
WEIGHT: 210.1 LBS | OXYGEN SATURATION: 95 % | TEMPERATURE: 98 F | HEART RATE: 70 BPM | DIASTOLIC BLOOD PRESSURE: 58 MMHG | RESPIRATION RATE: 18 BRPM | SYSTOLIC BLOOD PRESSURE: 140 MMHG | HEIGHT: 68 IN

## 2023-06-15 DIAGNOSIS — Z95.2 PRESENCE OF PROSTHETIC HEART VALVE: Chronic | ICD-10-CM

## 2023-06-15 DIAGNOSIS — Z98.42 CATARACT EXTRACTION STATUS, LEFT EYE: Chronic | ICD-10-CM

## 2023-06-15 DIAGNOSIS — Z95.0 PRESENCE OF CARDIAC PACEMAKER: Chronic | ICD-10-CM

## 2023-06-15 DIAGNOSIS — I25.10 ATHEROSCLEROTIC HEART DISEASE OF NATIVE CORONARY ARTERY WITHOUT ANGINA PECTORIS: Chronic | ICD-10-CM

## 2023-06-15 DIAGNOSIS — Z98.1 ARTHRODESIS STATUS: Chronic | ICD-10-CM

## 2023-06-15 DIAGNOSIS — Z90.81 ACQUIRED ABSENCE OF SPLEEN: Chronic | ICD-10-CM

## 2023-06-15 DIAGNOSIS — Z96.641 PRESENCE OF RIGHT ARTIFICIAL HIP JOINT: Chronic | ICD-10-CM

## 2023-06-15 DIAGNOSIS — Z98.890 OTHER SPECIFIED POSTPROCEDURAL STATES: Chronic | ICD-10-CM

## 2023-06-15 LAB
ALBUMIN SERPL ELPH-MCNC: 3.8 G/DL — SIGNIFICANT CHANGE UP (ref 3.3–5)
ALP SERPL-CCNC: 148 U/L — HIGH (ref 40–120)
ALT FLD-CCNC: 23 U/L — SIGNIFICANT CHANGE UP (ref 10–45)
ANION GAP SERPL CALC-SCNC: 13 MMOL/L — SIGNIFICANT CHANGE UP (ref 5–17)
APTT BLD: 35.4 SEC — SIGNIFICANT CHANGE UP (ref 27.5–35.5)
AST SERPL-CCNC: 24 U/L — SIGNIFICANT CHANGE UP (ref 10–40)
BASOPHILS # BLD AUTO: 0.05 K/UL — SIGNIFICANT CHANGE UP (ref 0–0.2)
BASOPHILS NFR BLD AUTO: 0.5 % — SIGNIFICANT CHANGE UP (ref 0–2)
BILIRUB SERPL-MCNC: 0.3 MG/DL — SIGNIFICANT CHANGE UP (ref 0.2–1.2)
BUN SERPL-MCNC: 26 MG/DL — HIGH (ref 7–23)
CALCIUM SERPL-MCNC: 10.1 MG/DL — SIGNIFICANT CHANGE UP (ref 8.4–10.5)
CHLORIDE SERPL-SCNC: 102 MMOL/L — SIGNIFICANT CHANGE UP (ref 96–108)
CO2 SERPL-SCNC: 24 MMOL/L — SIGNIFICANT CHANGE UP (ref 22–31)
CREAT SERPL-MCNC: 1.04 MG/DL — SIGNIFICANT CHANGE UP (ref 0.5–1.3)
EGFR: 73 ML/MIN/1.73M2 — SIGNIFICANT CHANGE UP
EOSINOPHIL # BLD AUTO: 0.44 K/UL — SIGNIFICANT CHANGE UP (ref 0–0.5)
EOSINOPHIL NFR BLD AUTO: 4.3 % — SIGNIFICANT CHANGE UP (ref 0–6)
GLUCOSE SERPL-MCNC: 99 MG/DL — SIGNIFICANT CHANGE UP (ref 70–99)
HCT VFR BLD CALC: 28.6 % — LOW (ref 39–50)
HGB BLD-MCNC: 8.8 G/DL — LOW (ref 13–17)
IMM GRANULOCYTES NFR BLD AUTO: 0.9 % — SIGNIFICANT CHANGE UP (ref 0–0.9)
INR BLD: 2.02 RATIO — HIGH (ref 0.88–1.16)
LYMPHOCYTES # BLD AUTO: 1.3 K/UL — SIGNIFICANT CHANGE UP (ref 1–3.3)
LYMPHOCYTES # BLD AUTO: 12.8 % — LOW (ref 13–44)
MCHC RBC-ENTMCNC: 30.7 PG — SIGNIFICANT CHANGE UP (ref 27–34)
MCHC RBC-ENTMCNC: 30.8 GM/DL — LOW (ref 32–36)
MCV RBC AUTO: 99.7 FL — SIGNIFICANT CHANGE UP (ref 80–100)
MONOCYTES # BLD AUTO: 1.13 K/UL — HIGH (ref 0–0.9)
MONOCYTES NFR BLD AUTO: 11.1 % — SIGNIFICANT CHANGE UP (ref 2–14)
NEUTROPHILS # BLD AUTO: 7.17 K/UL — SIGNIFICANT CHANGE UP (ref 1.8–7.4)
NEUTROPHILS NFR BLD AUTO: 70.4 % — SIGNIFICANT CHANGE UP (ref 43–77)
NRBC # BLD: 0 /100 WBCS — SIGNIFICANT CHANGE UP (ref 0–0)
OB PNL STL: POSITIVE
PLATELET # BLD AUTO: 691 K/UL — HIGH (ref 150–400)
POTASSIUM SERPL-MCNC: 4.3 MMOL/L — SIGNIFICANT CHANGE UP (ref 3.5–5.3)
POTASSIUM SERPL-SCNC: 4.3 MMOL/L — SIGNIFICANT CHANGE UP (ref 3.5–5.3)
PROT SERPL-MCNC: 7.5 G/DL — SIGNIFICANT CHANGE UP (ref 6–8.3)
PROTHROM AB SERPL-ACNC: 23.6 SEC — HIGH (ref 10.5–13.4)
RBC # BLD: 2.87 M/UL — LOW (ref 4.2–5.8)
RBC # FLD: 17.2 % — HIGH (ref 10.3–14.5)
SODIUM SERPL-SCNC: 139 MMOL/L — SIGNIFICANT CHANGE UP (ref 135–145)
WBC # BLD: 10.18 K/UL — SIGNIFICANT CHANGE UP (ref 3.8–10.5)
WBC # FLD AUTO: 10.18 K/UL — SIGNIFICANT CHANGE UP (ref 3.8–10.5)

## 2023-06-15 PROCEDURE — 93294 REM INTERROG EVL PM/LDLS PM: CPT

## 2023-06-15 PROCEDURE — 85730 THROMBOPLASTIN TIME PARTIAL: CPT

## 2023-06-15 PROCEDURE — 93296 REM INTERROG EVL PM/IDS: CPT

## 2023-06-15 PROCEDURE — 86901 BLOOD TYPING SEROLOGIC RH(D): CPT

## 2023-06-15 PROCEDURE — 80053 COMPREHEN METABOLIC PANEL: CPT

## 2023-06-15 PROCEDURE — 82272 OCCULT BLD FECES 1-3 TESTS: CPT

## 2023-06-15 PROCEDURE — 99285 EMERGENCY DEPT VISIT HI MDM: CPT

## 2023-06-15 PROCEDURE — 85610 PROTHROMBIN TIME: CPT

## 2023-06-15 PROCEDURE — 86900 BLOOD TYPING SEROLOGIC ABO: CPT

## 2023-06-15 PROCEDURE — 99284 EMERGENCY DEPT VISIT MOD MDM: CPT

## 2023-06-15 PROCEDURE — 86850 RBC ANTIBODY SCREEN: CPT

## 2023-06-15 PROCEDURE — 85025 COMPLETE CBC W/AUTO DIFF WBC: CPT

## 2023-06-15 NOTE — ED PROVIDER NOTE - NSICDXPASTMEDICALHX_GEN_ALL_CORE_FT
PAST MEDICAL HISTORY:  Atrial fibrillation over 20 years    Atrial fibrillation over 20 years    BPH (benign prostatic hyperplasia)     CVA (cerebral vascular accident) 1/2018 - attributed to no AC for 11 days preop/postop spine surgery - presented to ED with slurred speech, residual ST memory loss, per pt    DVT, lower extremity     Exposure to toxin 9/11     GERD (gastroesophageal reflux disease)     History of short term memory loss     Hodgkin lymphoma 1975    HTN (hypertension)     MOE (obstructive sleep apnea) positive sleep study many years ago, was recommended to use CPAP, patient non-compliant    Osteoarthritis     Spinal stenosis L3-L4    Spondylolisthesis     
PAIN

## 2023-06-15 NOTE — ED PROVIDER NOTE - OBJECTIVE STATEMENT
79 y/o male w/ PMH anemia w/ prior transfusions, AFib on warfarin (did not take since last night 2/2 INR is 1.4), BPH, CAD, AVM, DVT found to have a HGB from 10.3 to 8.6 from yesterday's bloodwork. Admits to 1 day history of intermittent lightheadedness, but currently asymptomatic at time of exam. Denies fevers, chills, nausea, vomiting, current dizziness, chest pain, SOB, abdominal pain, dysuria, hematuria.     Cardiologist: Yumiko Soto   Hematologist: Alexandre Bermeo DO   GI: Jmii Martins MD  PCP: Esau Adam MD 79 y/o male w/ PMH anemia w/ prior transfusions, AFib on warfarin (did not take since last night 2/2 INR is 1.4), BPH, CAD, AVM, DVT found to have a HGB from 10.3 to 8.6 from yesterday's bloodwork. Admits to 1 day history of intermittent lightheadedness, but currently asymptomatic at time of exam. Denies fevers, chills, nausea, vomiting, current dizziness, chest pain, SOB, abdominal pain, dysuria, hematuria.   Cardiologist: Yumiko Soto   Hematologist: Alexandre Bermeo DO   GI: Jimi Martins MD  PCP: Esau Adam MD      Attending note.  Patient was seen in room #54.  Agree with above.  Patient had lab work done at 4 PM yesterday and received phone call that hemoglobin had dropped to 8.6.  Patient has a history of AVM with prior transfusions.  Patient denies any change in his usual health or condition.  Patient had hip surgery 3 weeks ago and hemoglobin at that time was 10.  He denies any abdominal pain, chest pain, shortness of breath, dyspnea on exertion, lightheadedness or dizziness.  He denies any bleeding from any sites.  He has chronically black stool due to iron pills.

## 2023-06-15 NOTE — ED PROVIDER NOTE - NSFOLLOWUPINSTRUCTIONS_ED_ALL_ED_FT
Anemia    Anemia is a condition in which the concentration of red blood cells or hemoglobin in the blood is below normal. Hemoglobin is a substance in red blood cells that carries oxygen to the tissues of the body. Anemia results in not enough oxygen reaching these tissues which can cause symptoms such as weakness, dizziness/lightheadedness, shortness of breath, chest pain, paleness, or nausea. The cause of your anemia may or may not be determined immediately. If your hemoglobin was dangerously low, you may have received a blood transfusion. Usually reactions to transfusions occur immediately but monitor yourself for any fevers, rash, or shortness of breath.    SEEK IMMEDIATE MEDICAL CARE IF YOU HAVE ANY OF THE FOLLOWING SYMPTOMS: extreme weakness/chest pain/shortness of breath, black or bloody stools, vomiting blood, fainting, fever, or any signs of dehydration.     The results of any blood tests and imaging studies completed during your visit today were discussed and explained to you and a copy provided with your discharge instructions. Please follow up with your primary care doctor and GI doctor within 48 hours.

## 2023-06-15 NOTE — ED PROVIDER NOTE - PROGRESS NOTE DETAILS
Kevin VU: Pt is stable and ready for discharge. Strict return precautions given. All questions answered. Denies rectal bleeding. Will follow up closely w/ GI doctor.

## 2023-06-15 NOTE — ED PROVIDER NOTE - CARE PROVIDER_API CALL
Jimi Martins.  Gastroenterology  Division of Gastroenterology - [facility], [address] [place] [address]  Phone: [phone]  Fax: [phone]  Follow Up Time: 1-3 Days

## 2023-06-15 NOTE — ED PROVIDER NOTE - PHYSICAL EXAMINATION
GENERAL: NAD  HEENT:  Atraumatic  CHEST/LUNG: Chest rise equal bilaterally  HEART: Regular rate and rhythm  ABDOMEN: Soft, Nontender, Nondistended  EXTREMITIES:  Extremities warm  PSYCH: A&Ox3  SKIN: No obvious rashes or lesions  NEUROLOGY: strength and sensation intact in all extremities. Ambulatory without difficulty. GENERAL: NAD  HEENT:  Atraumatic  CHEST/LUNG: Chest rise equal bilaterally  HEART: Regular rate and rhythm  ABDOMEN: Soft, Nontender, Nondistended  EXTREMITIES:  Extremities warm  PSYCH: A&Ox3  SKIN: No obvious rashes or lesions  NEUROLOGY: strength and sensation intact in all extremities. Ambulatory without difficulty.      Attending note.  Patient is alert and in no acute distress.  There is no pallor or jaundice.  He has moist mucous membranes.  Lungs are clear and equal bilaterally.  Heart is regular rate and rhythm.  Abdomen is obese, soft and nontender.  There is evidence of prior fasciotomy in the right lower extremity secondary to DVT per the patient.  There is trace pitting edema of the ankles.  Neurologic exam is grossly intact.  There is no petechia or ecchymosis.

## 2023-06-15 NOTE — ED ADULT NURSE NOTE - NSFALLHARMRISKINTERV_ED_ALL_ED

## 2023-06-15 NOTE — ED PROVIDER NOTE - CLINICAL SUMMARY MEDICAL DECISION MAKING FREE TEXT BOX
79 y/o male w/ PMH anemia w/ prior transfusions, AFib on warfarin (did not take since last night 2/2 INR is 1.4), BPH, CAD, AVM, DVT found to have a HGB from 10.3 to 8.6 from yesterday's bloodwork. Admits to 1 day history of intermittent lightheadedness, but currently asymptomatic at time of exam. Denies fevers, chills, nausea, vomiting, current dizziness, chest pain, SOB, abdominal pain, dysuria, hematuria.     Pt is asymptomatic. Will screen for anemia, coags, T&S, and transfuse PRN.     Cardiologist: Yumiko Soto   Hematologist: Alexandre Bermeo DO   GI: Jimi Martins MD  PCP: Esau Adam MD 79 y/o male w/ PMH anemia w/ prior transfusions, AFib on warfarin (did not take since last night 2/2 INR is 1.4), BPH, CAD, AVM, DVT found to have a HGB from 10.3 to 8.6 from yesterday's bloodwork. Admits to 1 day history of intermittent lightheadedness, but currently asymptomatic at time of exam. Denies fevers, chills, nausea, vomiting, current dizziness, chest pain, SOB, abdominal pain, dysuria, hematuria.     Pt is asymptomatic. Will screen for anemia, coags, T&S, and transfuse PRN.     Cardiologist: Yumiko Soto   Hematologist: Alexandre Bermeo DO   GI: Jimi Martins MD  PCP: Esau Adam MD      Attending note.  Patient with reported drop in hemoglobin from 10-8.6 without new symptoms.  Repeat H&H.  Patient also takes warfarin for his atrial fibrillation.  Check INR.

## 2023-06-15 NOTE — ED ADULT NURSE NOTE - OBJECTIVE STATEMENT
78 year old  male w/ PMH anemia w/ prior transfusions, AFib on warfarin (did not take since last night 2/2 INR is 1.4), BPH, CAD, AVM, DVT found to have a HGB from 10.3 to 8.6 from yesterday's blood work. Admits to 1 day history of intermittent lightheadedness, but currently asymptomatic at time of exam. Denies fevers, chills, nausea, vomiting, current dizziness, chest pain, SOB, abdominal pain, dysuria, hematuria. Pt also gets Iron infusions as per Pt VS stabel IVL placed and bloods sent as ordered [facility]

## 2023-06-29 NOTE — HISTORY OF PRESENT ILLNESS
IRAIDA/ANDERSON Discharge Plan  Informed patient is ready for discharge. Patient to be picked up by w/c van at 15:00. Patient/interested person has been counseled for post hospitalization care. Patient agrees and understands goals and plan. Initial implementation of the patient’s discharge plan has been arranged, including any devices/equipment needed for discharge. Discharge plan communicated to MD, RN, LAMINE, ANDERSON, IRAIDA and Receiving Facility/Agency.    Patient’s discharge destination is Rehabilitation/Skilled Care.    Selected Continued Care - Admitted Since 6/24/2023     Destination  Coordination complete.    Service Provider Selected Services Address Phone Fax    [facility] [address], [facility] [phone]                 [FreeTextEntry1] : Jean-Pierre s/p hospitalization for Hb 7. received transfusion, aranesp and felt better. Bone marrow biopsy possible Vit B12 deficiency and mild CKD. Feels better now. Frustrated without complete answer to the severity of his anemia. Appt with Dr. Jimi Martins today.

## 2023-07-17 ENCOUNTER — RX RENEWAL (OUTPATIENT)
Age: 78
End: 2023-07-17

## 2023-07-23 NOTE — BRIEF OPERATIVE NOTE - CONDITION POST OP
Do not take marijuana or other unknown drugs.  Take potassium as prescribed.  Follow up in clinic with primary care provider in 2-3 days to recheck potassium level.  Return to emergency department for any worsening or further problems.   Stable

## 2023-07-27 ENCOUNTER — APPOINTMENT (OUTPATIENT)
Dept: GASTROENTEROLOGY | Facility: CLINIC | Age: 78
End: 2023-07-27
Payer: MEDICARE

## 2023-07-27 VITALS
TEMPERATURE: 97.6 F | OXYGEN SATURATION: 99 % | SYSTOLIC BLOOD PRESSURE: 153 MMHG | HEIGHT: 66 IN | BODY MASS INDEX: 35.36 KG/M2 | DIASTOLIC BLOOD PRESSURE: 69 MMHG | HEART RATE: 62 BPM | WEIGHT: 220 LBS

## 2023-07-27 PROCEDURE — 99213 OFFICE O/P EST LOW 20 MIN: CPT

## 2023-08-05 NOTE — HISTORY OF PRESENT ILLNESS
[FreeTextEntry1] : Sent to [facility] ED for anemia Hb 8.8 in June 2023 6/28:  Hb 9.1  Was 3 weeks post hip surgery at that time. Asymptomatic, without melena, hematochezia, hematemesis.  No abd pain. Transient dizziness.  No chest pain or dyspnea.

## 2023-08-05 NOTE — PHYSICAL EXAM
[Alert] : alert [Sclera] : the sclera and conjunctiva were normal [Normal Affect] : the affect was normal [de-identified] : Soft, nondistended, nontender, bowel sounds present [de-identified] : No confusion

## 2023-08-05 NOTE — ASSESSMENT
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 S/p B12 IM 2/6, IV Venofer x 2 on 3/16.    #Acute on chronic macrocytic anemia  Patient was known to be iron, folate, and b12 deficient on supplementation. 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 In the interim, patient was taken off plavix and continuing only on aspirin and coumadin with continuing downtrend in Hgb. Previous plan was to have patient started on octreotide to assist in healing of AVM but unable to obtain insurance authorization.  Anemia worsened after hip surgery, and no overt GI bleeding.  Plan:  #1.  Continue clinical observation and periodic Hb checks by other providers  #2.  Continue iron supplementation

## 2023-08-09 NOTE — PHYSICAL THERAPY INITIAL EVALUATION ADULT - PLANNED THERAPY INTERVENTIONS, PT EVAL
strengthening/bed mobility training/GOAL: Pt will negotiate 8 steps with unilateral handrail in a reciprocal pattern independently in 2 weeks/transfer training/gait training
Performed

## 2023-08-16 ENCOUNTER — NON-APPOINTMENT (OUTPATIENT)
Age: 78
End: 2023-08-16

## 2023-09-02 NOTE — ED PROVIDER NOTE - PATIENT PORTAL LINK FT
Patent You can access the FollowMyHealth Patient Portal offered by [facility] by registering at the following website: [email or web address]. By joining [employer]’s FollowMyHealth portal, you will also be able to view your health information using other applications (apps) compatible with our system.

## 2023-09-08 NOTE — PATIENT PROFILE ADULT - FUNCTIONAL ASSESSMENT - BASIC MOBILITY 4.
Detail Level: Detailed Quality 226: Preventive Care And Screening: Tobacco Use: Screening And Cessation Intervention: Patient screened for tobacco use and is an ex/non-smoker Quality 431: Preventive Care And Screening: Unhealthy Alcohol Use - Screening: Patient not identified as an unhealthy alcohol user when screened for unhealthy alcohol use using a systematic screening method 2 = A lot of assistance Quality 130: Documentation Of Current Medications In The Medical Record: Current Medications Documented

## 2023-09-14 ENCOUNTER — APPOINTMENT (OUTPATIENT)
Dept: ELECTROPHYSIOLOGY | Facility: CLINIC | Age: 78
End: 2023-09-14
Payer: MEDICARE

## 2023-09-14 ENCOUNTER — NON-APPOINTMENT (OUTPATIENT)
Age: 78
End: 2023-09-14

## 2023-09-14 PROCEDURE — 93294 REM INTERROG EVL PM/LDLS PM: CPT

## 2023-09-14 PROCEDURE — 93296 REM INTERROG EVL PM/IDS: CPT

## 2023-09-15 ENCOUNTER — APPOINTMENT (OUTPATIENT)
Dept: ORTHOPEDIC SURGERY | Facility: CLINIC | Age: 78
End: 2023-09-15
Payer: MEDICARE

## 2023-09-15 DIAGNOSIS — Z98.890 OTHER SPECIFIED POSTPROCEDURAL STATES: ICD-10-CM

## 2023-09-15 PROCEDURE — 72170 X-RAY EXAM OF PELVIS: CPT

## 2023-09-15 PROCEDURE — 99214 OFFICE O/P EST MOD 30 MIN: CPT

## 2023-09-22 PROBLEM — Z98.890 S/P LUMBAR SPINE OPERATION: Status: ACTIVE | Noted: 2018-01-16

## 2023-10-13 ENCOUNTER — APPOINTMENT (OUTPATIENT)
Dept: ORTHOPEDIC SURGERY | Facility: CLINIC | Age: 78
End: 2023-10-13
Payer: MEDICARE

## 2023-10-13 VITALS — WEIGHT: 220 LBS | HEIGHT: 66 IN | BODY MASS INDEX: 35.36 KG/M2

## 2023-10-13 PROCEDURE — 73562 X-RAY EXAM OF KNEE 3: CPT | Mod: LT

## 2023-10-13 PROCEDURE — 99213 OFFICE O/P EST LOW 20 MIN: CPT

## 2023-10-19 ENCOUNTER — APPOINTMENT (OUTPATIENT)
Dept: ORTHOPEDIC SURGERY | Facility: CLINIC | Age: 78
End: 2023-10-19

## 2023-10-27 ENCOUNTER — APPOINTMENT (OUTPATIENT)
Dept: ORTHOPEDIC SURGERY | Facility: CLINIC | Age: 78
End: 2023-10-27
Payer: MEDICARE

## 2023-10-27 VITALS — HEIGHT: 66 IN | WEIGHT: 220 LBS | BODY MASS INDEX: 35.36 KG/M2

## 2023-10-27 PROCEDURE — 20610 DRAIN/INJ JOINT/BURSA W/O US: CPT | Mod: LT

## 2023-10-27 PROCEDURE — 99213 OFFICE O/P EST LOW 20 MIN: CPT | Mod: 25

## 2023-11-02 NOTE — PRE-OP CHECKLIST - DENTURES
no

## 2023-11-03 ENCOUNTER — APPOINTMENT (OUTPATIENT)
Dept: ORTHOPEDIC SURGERY | Facility: CLINIC | Age: 78
End: 2023-11-03
Payer: MEDICARE

## 2023-11-03 VITALS — WEIGHT: 220 LBS | BODY MASS INDEX: 35.36 KG/M2 | HEIGHT: 66 IN

## 2023-11-03 PROCEDURE — 20610 DRAIN/INJ JOINT/BURSA W/O US: CPT | Mod: LT

## 2023-11-03 PROCEDURE — 99024 POSTOP FOLLOW-UP VISIT: CPT

## 2023-11-07 ENCOUNTER — APPOINTMENT (OUTPATIENT)
Dept: CARDIOLOGY | Facility: CLINIC | Age: 78
End: 2023-11-07
Payer: MEDICARE

## 2023-11-07 VITALS
WEIGHT: 220 LBS | HEIGHT: 66 IN | DIASTOLIC BLOOD PRESSURE: 71 MMHG | BODY MASS INDEX: 35.36 KG/M2 | SYSTOLIC BLOOD PRESSURE: 153 MMHG | OXYGEN SATURATION: 95 % | HEART RATE: 67 BPM

## 2023-11-07 PROCEDURE — 93000 ELECTROCARDIOGRAM COMPLETE: CPT

## 2023-11-07 PROCEDURE — 99213 OFFICE O/P EST LOW 20 MIN: CPT | Mod: 25

## 2023-11-07 RX ORDER — CLOPIDOGREL BISULFATE 75 MG/1
75 TABLET, FILM COATED ORAL DAILY
Qty: 30 | Refills: 0 | Status: DISCONTINUED | COMMUNITY
Start: 2023-01-25 | End: 2023-11-07

## 2023-11-09 ENCOUNTER — APPOINTMENT (OUTPATIENT)
Dept: ORTHOPEDIC SURGERY | Facility: CLINIC | Age: 78
End: 2023-11-09
Payer: MEDICARE

## 2023-11-09 VITALS — BODY MASS INDEX: 35.36 KG/M2 | HEIGHT: 66 IN | WEIGHT: 220 LBS

## 2023-11-09 PROCEDURE — 99024 POSTOP FOLLOW-UP VISIT: CPT

## 2023-11-09 PROCEDURE — 20610 DRAIN/INJ JOINT/BURSA W/O US: CPT | Mod: LT

## 2023-11-10 NOTE — PROGRESS NOTE ADULT - SUBJECTIVE AND OBJECTIVE BOX
PROGRESS NOTE:     Patient is a 78y old  Male who presents with a chief complaint of Anemia (26 Mar 2023 07:53)      SUBJECTIVE / OVERNIGHT EVENTS:    ADDITIONAL REVIEW OF SYSTEMS:    MEDICATIONS  (STANDING):  ascorbic acid 500 milliGRAM(s) Oral daily  aspirin enteric coated 81 milliGRAM(s) Oral daily  atenolol  Tablet 25 milliGRAM(s) Oral daily  atorvastatin 10 milliGRAM(s) Oral at bedtime  cyanocobalamin 1000 MICROGram(s) Oral daily  ferrous    sulfate 325 milliGRAM(s) Oral daily  fluticasone propionate 50 MICROgram(s)/spray Nasal Spray 1 Spray(s) Both Nostrils <User Schedule>  furosemide    Tablet 40 milliGRAM(s) Oral daily  heparin  Infusion. 1100 Unit(s)/Hr (11 mL/Hr) IV Continuous <Continuous>  latanoprost 0.005% Ophthalmic Solution 1 Drop(s) Right EYE at bedtime  lidocaine 2% Injection for Nebulization 8 milliLiter(s) Nebulizer once  pantoprazole  Injectable 40 milliGRAM(s) IV Push two times a day  spironolactone 12.5 milliGRAM(s) Oral daily  tamsulosin 0.8 milliGRAM(s) Oral at bedtime    MEDICATIONS  (PRN):  acetaminophen     Tablet .. 650 milliGRAM(s) Oral every 6 hours PRN Temp greater or equal to 38C (100.4F), Mild Pain (1 - 3)  aluminum hydroxide/magnesium hydroxide/simethicone Suspension 30 milliLiter(s) Oral every 4 hours PRN Dyspepsia  melatonin 3 milliGRAM(s) Oral at bedtime PRN Insomnia  ondansetron Injectable 4 milliGRAM(s) IV Push every 8 hours PRN Nausea and/or Vomiting      CAPILLARY BLOOD GLUCOSE        I&O's Summary    26 Mar 2023 07:01  -  27 Mar 2023 07:00  --------------------------------------------------------  IN: 0 mL / OUT: 1500 mL / NET: -1500 mL        PHYSICAL EXAM:  Vital Signs Last 24 Hrs  T(C): 36.8 (26 Mar 2023 22:32), Max: 36.8 (26 Mar 2023 22:32)  T(F): 98.2 (26 Mar 2023 22:32), Max: 98.2 (26 Mar 2023 22:32)  HR: 72 (27 Mar 2023 06:15) (60 - 72)  BP: 151/71 (27 Mar 2023 06:15) (112/57 - 151/71)  BP(mean): --  RR: 18 (26 Mar 2023 22:32) (18 - 18)  SpO2: 94% (26 Mar 2023 22:32) (94% - 94%)    Parameters below as of 26 Mar 2023 22:32  Patient On (Oxygen Delivery Method): room air        GENERAL: No acute distress, well-developed  HEAD:  Atraumatic, Normocephalic  EYES: EOMI, PERRLA, conjunctiva and sclera clear  NECK: Supple, no lymphadenopathy, no JVD  CHEST/LUNG: CTAB; No wheezes, rales, or rhonchi  HEART: Regular rate and rhythm; No murmurs, rubs, or gallops  ABDOMEN: Soft, non-tender, non-distended; normal bowel sounds, no organomegaly  EXTREMITIES:  2+ peripheral pulses b/l, No clubbing, cyanosis, or edema  NEUROLOGY: A&O x 3, no focal deficits  SKIN: No rashes or lesions    LABS:                        8.4    10.84 )-----------( 474      ( 26 Mar 2023 05:33 )             27.0     03-26    139  |  100  |  26<H>  ----------------------------<  116<H>  3.7   |  29  |  1.21    Ca    9.0      26 Mar 2023 05:33  Phos  3.2     03-26  Mg     2.1     03-26    TPro  6.9  /  Alb  3.8  /  TBili  0.4  /  DBili  x   /  AST  25  /  ALT  16  /  AlkPhos  142<H>  03-25    PT/INR - ( 26 Mar 2023 12:58 )   PT: 15.9 sec;   INR: 1.38 ratio         PTT - ( 26 Mar 2023 20:05 )  PTT:74.4 sec            RADIOLOGY & ADDITIONAL TESTS:  Results Reviewed:   Imaging Personally Reviewed:  Electrocardiogram Personally Reviewed:    COORDINATION OF CARE:  Care Discussed with Consultants/Other Providers [Y/N]:  Prior or Outpatient Records Reviewed [Y/N]:   PROGRESS NOTE:     Patient is a 78y old  Male who presents with a chief complaint of Anemia (26 Mar 2023 07:53)      SUBJECTIVE / OVERNIGHT EVENTS:  No events overnight. Patient examined at bedside. Feels well. All ROS negative.     ADDITIONAL REVIEW OF SYSTEMS:    MEDICATIONS  (STANDING):  ascorbic acid 500 milliGRAM(s) Oral daily  aspirin enteric coated 81 milliGRAM(s) Oral daily  atenolol  Tablet 25 milliGRAM(s) Oral daily  atorvastatin 10 milliGRAM(s) Oral at bedtime  cyanocobalamin 1000 MICROGram(s) Oral daily  ferrous    sulfate 325 milliGRAM(s) Oral daily  fluticasone propionate 50 MICROgram(s)/spray Nasal Spray 1 Spray(s) Both Nostrils <User Schedule>  furosemide    Tablet 40 milliGRAM(s) Oral daily  heparin  Infusion. 1100 Unit(s)/Hr (11 mL/Hr) IV Continuous <Continuous>  latanoprost 0.005% Ophthalmic Solution 1 Drop(s) Right EYE at bedtime  lidocaine 2% Injection for Nebulization 8 milliLiter(s) Nebulizer once  pantoprazole  Injectable 40 milliGRAM(s) IV Push two times a day  spironolactone 12.5 milliGRAM(s) Oral daily  tamsulosin 0.8 milliGRAM(s) Oral at bedtime    MEDICATIONS  (PRN):  acetaminophen     Tablet .. 650 milliGRAM(s) Oral every 6 hours PRN Temp greater or equal to 38C (100.4F), Mild Pain (1 - 3)  aluminum hydroxide/magnesium hydroxide/simethicone Suspension 30 milliLiter(s) Oral every 4 hours PRN Dyspepsia  melatonin 3 milliGRAM(s) Oral at bedtime PRN Insomnia  ondansetron Injectable 4 milliGRAM(s) IV Push every 8 hours PRN Nausea and/or Vomiting      CAPILLARY BLOOD GLUCOSE        I&O's Summary    26 Mar 2023 07:01  -  27 Mar 2023 07:00  --------------------------------------------------------  IN: 0 mL / OUT: 1500 mL / NET: -1500 mL        PHYSICAL EXAM:  Vital Signs Last 24 Hrs  T(C): 36.8 (26 Mar 2023 22:32), Max: 36.8 (26 Mar 2023 22:32)  T(F): 98.2 (26 Mar 2023 22:32), Max: 98.2 (26 Mar 2023 22:32)  HR: 72 (27 Mar 2023 06:15) (60 - 72)  BP: 151/71 (27 Mar 2023 06:15) (112/57 - 151/71)  BP(mean): --  RR: 18 (26 Mar 2023 22:32) (18 - 18)  SpO2: 94% (26 Mar 2023 22:32) (94% - 94%)    Parameters below as of 26 Mar 2023 22:32  Patient On (Oxygen Delivery Method): room air        GENERAL: No acute distress, well-developed  HEAD:  Atraumatic, Normocephalic  EYES: EOMI, PERRLA, conjunctiva and sclera clear  NECK: Supple, no lymphadenopathy, no JVD  CHEST/LUNG: CTAB; No wheezes, rales, or rhonchi  HEART: Regular rate and rhythm; No murmurs, rubs, or gallops  ABDOMEN: Soft, non-tender, non-distended; normal bowel sounds, no organomegaly  EXTREMITIES:  2+ peripheral pulses b/l, No clubbing, cyanosis, or edema  NEUROLOGY: A&O x 3, no focal deficits  SKIN: No rashes or lesions    LABS:                        8.4    10.84 )-----------( 474      ( 26 Mar 2023 05:33 )             27.0     03-26    139  |  100  |  26<H>  ----------------------------<  116<H>  3.7   |  29  |  1.21    Ca    9.0      26 Mar 2023 05:33  Phos  3.2     03-26  Mg     2.1     03-26    TPro  6.9  /  Alb  3.8  /  TBili  0.4  /  DBili  x   /  AST  25  /  ALT  16  /  AlkPhos  142<H>  03-25    PT/INR - ( 26 Mar 2023 12:58 )   PT: 15.9 sec;   INR: 1.38 ratio         PTT - ( 26 Mar 2023 20:05 )  PTT:74.4 sec            RADIOLOGY & ADDITIONAL TESTS:  Results Reviewed:   Imaging Personally Reviewed:  Electrocardiogram Personally Reviewed:    COORDINATION OF CARE:  Care Discussed with Consultants/Other Providers [Y/N]:  Prior or Outpatient Records Reviewed [Y/N]:   No difficulties

## 2023-11-17 ENCOUNTER — APPOINTMENT (OUTPATIENT)
Dept: ORTHOPEDIC SURGERY | Facility: CLINIC | Age: 78
End: 2023-11-17
Payer: MEDICARE

## 2023-11-17 VITALS — WEIGHT: 220 LBS | HEIGHT: 66 IN | BODY MASS INDEX: 35.36 KG/M2

## 2023-11-17 PROCEDURE — 20610 DRAIN/INJ JOINT/BURSA W/O US: CPT | Mod: LT

## 2023-11-17 PROCEDURE — 99024 POSTOP FOLLOW-UP VISIT: CPT

## 2023-11-20 ENCOUNTER — APPOINTMENT (OUTPATIENT)
Dept: ORTHOPEDIC SURGERY | Facility: CLINIC | Age: 78
End: 2023-11-20

## 2023-11-28 ENCOUNTER — APPOINTMENT (OUTPATIENT)
Dept: ORTHOPEDIC SURGERY | Facility: CLINIC | Age: 78
End: 2023-11-28
Payer: MEDICARE

## 2023-11-28 PROCEDURE — 99214 OFFICE O/P EST MOD 30 MIN: CPT

## 2023-11-28 PROCEDURE — 73030 X-RAY EXAM OF SHOULDER: CPT | Mod: RT

## 2023-11-28 PROCEDURE — 73050 X-RAY EXAM OF SHOULDERS: CPT | Mod: RT

## 2023-12-10 ENCOUNTER — NON-APPOINTMENT (OUTPATIENT)
Age: 78
End: 2023-12-10

## 2023-12-11 ENCOUNTER — APPOINTMENT (OUTPATIENT)
Dept: ORTHOPEDIC SURGERY | Facility: CLINIC | Age: 78
End: 2023-12-11
Payer: MEDICARE

## 2023-12-11 VITALS — WEIGHT: 220 LBS | BODY MASS INDEX: 35.36 KG/M2 | HEIGHT: 66 IN

## 2023-12-11 PROCEDURE — 99213 OFFICE O/P EST LOW 20 MIN: CPT

## 2023-12-11 PROCEDURE — 73050 X-RAY EXAM OF SHOULDERS: CPT | Mod: RT

## 2023-12-11 PROCEDURE — 73000 X-RAY EXAM OF COLLAR BONE: CPT | Mod: RT

## 2023-12-14 ENCOUNTER — NON-APPOINTMENT (OUTPATIENT)
Age: 78
End: 2023-12-14

## 2023-12-14 ENCOUNTER — APPOINTMENT (OUTPATIENT)
Dept: ELECTROPHYSIOLOGY | Facility: CLINIC | Age: 78
End: 2023-12-14
Payer: MEDICARE

## 2023-12-14 PROCEDURE — 93294 REM INTERROG EVL PM/LDLS PM: CPT

## 2023-12-14 PROCEDURE — 93296 REM INTERROG EVL PM/IDS: CPT

## 2023-12-15 ENCOUNTER — APPOINTMENT (OUTPATIENT)
Dept: ORTHOPEDIC SURGERY | Facility: CLINIC | Age: 78
End: 2023-12-15
Payer: MEDICARE

## 2023-12-15 VITALS — BODY MASS INDEX: 35.36 KG/M2 | HEIGHT: 66 IN | WEIGHT: 220 LBS

## 2023-12-15 DIAGNOSIS — Z96.642 PRESENCE OF LEFT ARTIFICIAL HIP JOINT: ICD-10-CM

## 2023-12-15 PROCEDURE — 99214 OFFICE O/P EST MOD 30 MIN: CPT

## 2023-12-15 PROCEDURE — 72170 X-RAY EXAM OF PELVIS: CPT

## 2023-12-21 PROBLEM — Z96.642 STATUS POST LEFT HIP REPLACEMENT: Status: ACTIVE | Noted: 2023-06-02

## 2024-01-07 NOTE — DISCUSSION/SUMMARY
[de-identified] : Discussed options, Advised to spend at least 1 more week in sling, then week after  50-60% of week in sling Start PT in 2 weeks f/u 4 weeks  ----------------------------------------------------------------------------  All relevant imaging studies pertinent to today's visit, including x-rays, MRI's and/or other advanced imaging studies (CT/etc) were independently interpreted and reviewed with the patient as needed. Implications of the studies together with the patient's clinical picture were discussed to formulate a working diagnosis and management options were detailed.  The patient was advised of the diagnosis.  The natural history of the pathology was explained in full. All questions were answered.  The risks and benefits of conservative and interventional treatment alternatives were explained to the patient

## 2024-01-07 NOTE — HISTORY OF PRESENT ILLNESS
[de-identified] : 79 yo RHD M here for right shoulder pain since 11/23/23. fell after turning quickly. landed directly onto his shoulder. pain is around sc joint. [facility] and was told he had a fx. taking oxy 5 and tylenol for pain.  [] : no

## 2024-01-07 NOTE — REASON FOR VISIT
[FreeTextEntry2] : follow up right shoulder, over the weekend stopped using sling which caused pain in the right shoulder

## 2024-01-07 NOTE — IMAGING
[de-identified] :  ----------------------------------------------------------------------------  Left shoulder exam:   Inspection: + swelling/ ecchymosis anterior/ superior shoulder ROM:     FF: limited by pain    ER: 45   Tenderness:    (mild) Anterior/Biceps:     (-) Posterior    (mild) Lateral    (-) Trapezius    (-) Scapula    (++) AC joint Strength:   not assessed    Distal: 5/5 Neuro: In tact to light touch throughout Vascularity: Extremity warm and well perfused   [FreeTextEntry1] : comminuted distal clavicle fx  with elevation of clavicle shaft

## 2024-01-08 ENCOUNTER — RESULT CHARGE (OUTPATIENT)
Age: 79
End: 2024-01-08

## 2024-01-08 ENCOUNTER — APPOINTMENT (OUTPATIENT)
Dept: ORTHOPEDIC SURGERY | Facility: CLINIC | Age: 79
End: 2024-01-08
Payer: MEDICARE

## 2024-01-08 VITALS — BODY MASS INDEX: 35.36 KG/M2 | WEIGHT: 220 LBS | HEIGHT: 66 IN

## 2024-01-08 PROCEDURE — 99213 OFFICE O/P EST LOW 20 MIN: CPT

## 2024-01-08 PROCEDURE — 73000 X-RAY EXAM OF COLLAR BONE: CPT | Mod: RT

## 2024-01-08 PROCEDURE — 73050 X-RAY EXAM OF SHOULDERS: CPT

## 2024-01-25 ENCOUNTER — APPOINTMENT (OUTPATIENT)
Dept: GASTROENTEROLOGY | Facility: CLINIC | Age: 79
End: 2024-01-25
Payer: MEDICARE

## 2024-01-25 VITALS
HEIGHT: 66 IN | WEIGHT: 234 LBS | DIASTOLIC BLOOD PRESSURE: 68 MMHG | OXYGEN SATURATION: 98 % | SYSTOLIC BLOOD PRESSURE: 139 MMHG | HEART RATE: 80 BPM | BODY MASS INDEX: 37.61 KG/M2

## 2024-01-25 DIAGNOSIS — D50.9 IRON DEFICIENCY ANEMIA, UNSPECIFIED: ICD-10-CM

## 2024-01-25 DIAGNOSIS — K55.20 ANGIODYSPLASIA OF COLON W/OUT HEMORRHAGE: ICD-10-CM

## 2024-01-25 PROCEDURE — 99214 OFFICE O/P EST MOD 30 MIN: CPT

## 2024-02-17 PROBLEM — K55.20 ANGIODYSPLASIA OF SMALL INTESTINE: Status: ACTIVE | Noted: 2021-12-27

## 2024-02-17 PROBLEM — D50.9 IRON DEFICIENCY ANEMIA: Status: ACTIVE | Noted: 2021-12-27

## 2024-02-17 NOTE — ASSESSMENT
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 S/p B12 IM 2/6, IV Venofer x 2 on 3/16.  #Acute on chronic macrocytic anemia  Patient was known to be iron, folate, and b12 deficient on supplementation. 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 In the interim, patient was taken off plavix and continuing only on aspirin and coumadin with continuing downtrend in Hgb. Previous plan was to have patient started on octreotide to assist in healing of AVM but unable to obtain insurance authorization. Anemia worsened after hip surgery, and no overt GI bleeding.  Plan:  #1. Continue clinical observation and periodic Hb checks by other providers  #2. Continue iron supplementation.  #3.  Follow-up with me if there is concern for GI bleeding or worsening anemia despite iron supplementation.  Would consider endoscopic workup, including consideration for video capsule endoscopy, push enteroscopy, single balloon enteroscopy.

## 2024-02-17 NOTE — CONSULT LETTER
[Dear  ___] : Dear  [unfilled], [Consult Letter:] : I had the pleasure of evaluating your patient, [unfilled]. [Please see my note below.] : Please see my note below. [Consult Closing:] : Thank you very much for allowing me to participate in the care of this patient.  If you have any questions, please do not hesitate to contact me. [Sincerely,] : Sincerely, [FreeTextEntry3] : Jimi Martins MD, MPH, MANUELA, SHERINEG Chief of Clinical Quality in Gastroenterology, [facility] Associate Chief of Gastroenterology, [facility] Interim Director of Endoscopy, [address], [address] [facility], [id number] 24 hours [phone]

## 2024-02-17 NOTE — HISTORY OF PRESENT ILLNESS
[FreeTextEntry1] : Patient follows up for duodenal angiodysplasia and iron deficiency anemia. Accompanied to office visit by wife.  Since last visit in the summer 2023, patient has no overt GI bleeding.  No abdominal pain, melena, hematochezia, hematemesis.  No chest pain, dyspnea on exertion, dizziness.  He is receiving iron supplementation.  He has not had need for blood transfusions.  Hemoglobin checks as outpatient have been stable to improving over time.  Outside lab work reviewed, hemoglobin greater than 10.

## 2024-02-17 NOTE — PHYSICAL EXAM
[Alert] : alert [Sclera] : the sclera and conjunctiva were normal [de-identified] : Soft, nondistended, nontender, bowel sounds present [Normal Affect] : the affect was normal [de-identified] : No jaundice [de-identified] : No confusion

## 2024-02-17 NOTE — ED ADULT NURSE NOTE - OBJECTIVE STATEMENT
79 y/o AxOx4 M arrived from home sent from PCP for low H and H. PMH hodgkin lymphoma, anemia (gets iron infusions), aortic valve replacement in January, PPM. Pt endorses hemoglobin drop from 11 to 8 over the past 3 wks, happened in the past 1x and found active bleed in small intestine. Pt endorses some dizziness at this time. Pt on CM. Pt endorses dark tarry stools at baseline from iron infusions.
9 y/o M, PMH of imperforate anus s/p repair (approx 10 years ago w/ Dr. Rubio), ?narrowing of colon (seen on barium enema but unsure if testing was accurate per mom), and urethral narrowing undergoing workup with urology. Mom states pt started having abdominal pain and 5-6 episodes of NBNB emesis. Mom also notes abdomen appeared distended and firm at this time. Since having vomiting, mom notes abd appears smaller and less firm. Pt also notes pain has improved after vomiting. Denies any fevers, LOC, chest pain, SOB, diarrhea. Mom notes pt took his regular bowel regimen with benefiber, miralax but also had an enema today. No sick contacts or recent travel.    Med hx: imperforate anus s/p repair (approx 10 years ago w/ Dr. Rubio), ?narrowing of colon (seen on barium enema but unsure if testing was accurate per mom), and urethral narrowing undergoing workup with urology  Medications: benefiber qD, miralax qD  Allergies: denies  Surgical hx: imperforate anus repair

## 2024-02-20 ENCOUNTER — APPOINTMENT (OUTPATIENT)
Dept: ORTHOPEDIC SURGERY | Facility: CLINIC | Age: 79
End: 2024-02-20

## 2024-02-22 ENCOUNTER — APPOINTMENT (OUTPATIENT)
Dept: ORTHOPEDIC SURGERY | Facility: CLINIC | Age: 79
End: 2024-02-22
Payer: MEDICARE

## 2024-02-22 VITALS — WEIGHT: 234 LBS | BODY MASS INDEX: 37.61 KG/M2 | HEIGHT: 66 IN

## 2024-02-22 PROCEDURE — 73050 X-RAY EXAM OF SHOULDERS: CPT | Mod: RT

## 2024-02-22 PROCEDURE — 99213 OFFICE O/P EST LOW 20 MIN: CPT | Mod: 25

## 2024-02-22 PROCEDURE — 20610 DRAIN/INJ JOINT/BURSA W/O US: CPT | Mod: LT

## 2024-02-22 PROCEDURE — 73000 X-RAY EXAM OF COLLAR BONE: CPT | Mod: RT

## 2024-02-22 NOTE — HISTORY OF PRESENT ILLNESS
[Sudden] : sudden [4] : 4 [Sharp] : sharp [Frequent] : frequent [Meds] : meds [de-identified] : 79 yo RHD M here for right shoulder pain since 11/23/23. fell after turning quickly. landed directly onto his shoulder. pain is around sc joint. [facility] and was told he had a fx. taking oxy 5 and tylenol for pain.  [] : no [de-identified] : physical therapy

## 2024-02-22 NOTE — IMAGING
[Right] : right shoulder [Fracture] : Fracture [de-identified] : ----------------------------------------------------------------------------  Left shoulder exam:   Inspection: + swelling/ ecchymosis anterior/ superior shoulder ROM:     FF: 130    ER: 45   Tenderness:    (mild) Anterior/Biceps:     (-) Posterior    (mild) Lateral    (-) Trapezius    (-) Scapula    (++) AC joint Strength:   not assessed    Distal: 5/5 Neuro: In tact to light touch throughout Vascularity: Extremity warm and well perfused   [The fracture is in acceptable alignment. There is progression in healing seen] : The fracture is in acceptable alignment. There is progression in healing seen [FreeTextEntry1] : clavicle fracture fully healed.

## 2024-02-22 NOTE — DISCUSSION/SUMMARY
[de-identified] : Continue PT for shoulder and knee LT knee csi done today- tolerated well Discussed TKA- would like to hold off for now Follow up 2-3 months  ----------------------------------------------------------------------------  All relevant imaging studies pertinent to today's visit, including x-rays, MRI's and/or other advanced imaging studies (CT/etc) were independently interpreted and reviewed with the patient as needed. Implications of the studies together with the patient's clinical picture were discussed to formulate a working diagnosis and management options were detailed.  The patient was advised of the diagnosis.  The natural history of the pathology was explained in full. All questions were answered.  The risks and benefits of conservative and interventional treatment alternatives were explained to the patient   ----------------------------------------------------------------------------  Large joint corticosteroid injection given: Left knee  Patient indicated for injection after trial of rest, OTC medications including aspirin, Ibuprofen, Aleve etc or prescription NSAIDS, and/or exercises at home and/ or physical therapy without satisfactory response.  Patient has symptoms including pain, swelling, and/or decreased mobility in the joint. The risks, benefits, and alternatives to corticosteroid injection were explained in full to the patient, including but not limited to infection, sepsis, bleeding, scarring, skin discoloration, temporary increase in pain, syncopal episode, failure to resolve symptoms, allergic reaction, symptom recurrence, and elevation of blood sugar in diabetics. Patient understood the risks. All questions were answered. After discussion of options, patient requested an injection.   Oral informed consent was obtained and sterile technique was utilized for the procedure including the preparation of the solutions used for the injection and betadine followed by alcohol prep to the injection site. Anesthesia was given with ethyl chloride sprayed topically. The injection was delivered. Patient tolerated the procedure well.   Post Procedure Instructions: Patient was advised to call if redness, pain, or fever occur and apply ice for 15 min on and 15 min off later today  Medications delivered: Kenalog 10 mg, Lidocaine: 4 cc

## 2024-02-29 NOTE — DISCHARGE NOTE PROVIDER - DISCHARGE SERVICE FOR PATIENT
on the discharge service for the patient. I have reviewed and made amendments to the documentation where necessary.
1 or 2

## 2024-03-14 ENCOUNTER — NON-APPOINTMENT (OUTPATIENT)
Age: 79
End: 2024-03-14

## 2024-03-14 ENCOUNTER — APPOINTMENT (OUTPATIENT)
Dept: ELECTROPHYSIOLOGY | Facility: CLINIC | Age: 79
End: 2024-03-14
Payer: MEDICARE

## 2024-03-14 PROCEDURE — 93296 REM INTERROG EVL PM/IDS: CPT

## 2024-03-14 PROCEDURE — 93294 REM INTERROG EVL PM/LDLS PM: CPT

## 2024-03-29 NOTE — PRE-OP CHECKLIST - WARM FLUIDS/WARM BLANKETS
Minimize use of narcotics and sedatives (zanaflex).  Resume lisinopril on Monday.  Stay well hydrated.   
no

## 2024-04-19 ENCOUNTER — NON-APPOINTMENT (OUTPATIENT)
Age: 79
End: 2024-04-19

## 2024-04-22 ENCOUNTER — APPOINTMENT (OUTPATIENT)
Dept: ORTHOPEDIC SURGERY | Facility: CLINIC | Age: 79
End: 2024-04-22
Payer: MEDICARE

## 2024-04-22 VITALS — HEIGHT: 66 IN | BODY MASS INDEX: 37.61 KG/M2 | WEIGHT: 234 LBS

## 2024-04-22 DIAGNOSIS — S42.031A DISPLACED FRACTURE OF LATERAL END OF RIGHT CLAVICLE, INITIAL ENCOUNTER FOR CLOSED FRACTURE: ICD-10-CM

## 2024-04-22 PROCEDURE — 99213 OFFICE O/P EST LOW 20 MIN: CPT

## 2024-04-22 NOTE — IMAGING
[Right] : right shoulder [The fracture is in acceptable alignment. There is progression in healing seen] : The fracture is in acceptable alignment. There is progression in healing seen [Fracture] : Fracture [de-identified] : ----------------------------------------------------------------------------  Left shoulder exam:   Inspection: + swelling/ ecchymosis anterior/ superior shoulder ROM:     FF: 130    ER: 45   Tenderness:    (mild) Anterior/Biceps:     (-) Posterior    (mild) Lateral    (-) Trapezius    (-) Scapula    (++) AC joint Strength:   not assessed    Distal: 5/5 Neuro: In tact to light touch throughout Vascularity: Extremity warm and well perfused    Left Knee Exam:                             General: no distress, no ligamentous laxity Skin: no significant pertinent finding Inspection:  Effusion: (min)  Malalignment: (-)  Swelling: (-)  Quad atrophy: (-)  J-sign: (-) ROM:  0 - 115 degrees of flexion. Tenderness:  MJLT: (+)  LJLT: (-)  Medial patellar facet tenderness: -  Lateral patellar facet tenderness: -  Crepitus: (+)  Patellar grind tenderness: (-)  Patellar tendon: (+) Stability:  Lachman: (-)  Varus/Valgus instability: (-)  Posterior drawer: (-) Additional tests:  McMurrays test: (-)  Patellar apprehension: (-) Strength: 5/5 Q/H/TA/GS/EHL Neuro: In tact to light touch throughout, DTR's wnl Vascularity: Extremity warm and well perfused Gait: antalgic     [FreeTextEntry1] : clavicle fracture fully healed.

## 2024-04-22 NOTE — REASON FOR VISIT
[FreeTextEntry2] : follow up right shoulder/left knee. Shoulder pain and ROM continues to improve with PT. Left knee pain has returned shortly after receiving CSI last visit. Asking about HA inj.

## 2024-04-22 NOTE — DISCUSSION/SUMMARY
[de-identified] : Discussed CSI, HA, TKA,  submit for visco 3 L knee HEP f/u for LORENZO   ----------------------------------------------------------------------------    All relevant imaging studies pertinent to today's visit, including x-rays, MRI's and/or other advanced imaging studies (CT/etc) were independently interpreted and reviewed with the patient as needed. Implications of the studies together with the patient's clinical picture were discussed to formulate a working diagnosis and management options were detailed.   The patient and/or guardian was advised of the diagnosis. The natural history of the pathology was explained in full. All questions were answered. The risks and benefits of conservative and interventional treatment alternatives were explained to the patient  The patient and/or guardian was advised if any advanced diagnostic/imaging study (MRI/CT/etc) is ordered to evaluate potential pathology in the affected area(s), they should follow up in the office to review the results of the study and determine further management that may be indicated.    Progress note completed by Esau Arias PA-C working as a scribe for Dr Hurtado

## 2024-05-02 ENCOUNTER — APPOINTMENT (OUTPATIENT)
Dept: ORTHOPEDIC SURGERY | Facility: CLINIC | Age: 79
End: 2024-05-02
Payer: MEDICARE

## 2024-05-02 PROCEDURE — 20610 DRAIN/INJ JOINT/BURSA W/O US: CPT | Mod: LT

## 2024-05-02 PROCEDURE — 99024 POSTOP FOLLOW-UP VISIT: CPT

## 2024-05-02 NOTE — REASON FOR VISIT
[FreeTextEntry2] : follow up right shoulder/ #1 visco 3 left knee. Shoulder pain and ROM continues to improve with PT. Left knee pain has returned shortly after receiving CSI last visit. Asking about HA inj.

## 2024-05-02 NOTE — DISCUSSION/SUMMARY
[Medication Risks Reviewed] : Medication risks reviewed [de-identified] : Plan for L knee visco 3 injection #1 today Medication injected: Visco3 dose: 25 mg/2.5ml injection intraarticularly in each noted joint  f/u 1 week ----------------------------------------------------------------------------    All relevant imaging studies pertinent to today's visit, including x-rays, MRI's and/or other advanced imaging studies (CT/etc) were independently interpreted and reviewed with the patient as needed. Implications of the studies together with the patient's clinical picture were discussed to formulate a working diagnosis and management options were detailed.   The patient and/or guardian was advised of the diagnosis. The natural history of the pathology was explained in full. All questions were answered. The risks and benefits of conservative and interventional treatment alternatives were explained to the patient  The patient and/or guardian was advised if any advanced diagnostic/imaging study (MRI/CT/etc) is ordered to evaluate potential pathology in the affected area(s), they should follow up in the office to review the results of the study and determine further management that may be indicated.      ----------------------------------------------------------------------------  Large joint injection given: Hyaluronic acid/viscosupplementation to Left knee  Viscosupplementation injection indications at this time include- X-ray evidence of osteoarthritis on this or prior visits, and patient having tried OTC's including aspirin, Ibuprofen, Aleve etc or prescription NSAIDS, and/or exercises at home and/ or physical therapy without satisfactory response.  The risks, benefits, and alternatives to viscosupplementation injection were explained in full to the patient. Risks outlined include but are not limited to infection, sepsis, bleeding, scarring, skin discoloration, temporary increase in pain, syncopal episode, failure to resolve symptoms, allergic reaction, and symptom recurrence.  Patient understood the risks. All questions were answered. After discussion of options, the patient requested viscosupplementation.   An injection of Hyaluronic acid of appropriate formulation was injected into the joint(s) after verbal consent, using sterile technique. The patient tolerated the procedure well and there were no complications.  Instructed patient to apply ice to the injection site. Signs and symptoms of infection reviewed and patient advised to call immediately for redness, fevers, and/or chills.

## 2024-05-02 NOTE — IMAGING
[Right] : right shoulder [The fracture is in acceptable alignment. There is progression in healing seen] : The fracture is in acceptable alignment. There is progression in healing seen [Fracture] : Fracture [de-identified] : ----------------------------------------------------------------------------  Left shoulder exam:   Inspection: + swelling/ ecchymosis anterior/ superior shoulder ROM:     FF: 130    ER: 45   Tenderness:    (mild) Anterior/Biceps:     (-) Posterior    (mild) Lateral    (-) Trapezius    (-) Scapula    (++) AC joint Strength:   not assessed    Distal: 5/5 Neuro: In tact to light touch throughout Vascularity: Extremity warm and well perfused    Left Knee Exam:                             General: no distress, no ligamentous laxity Skin: no significant pertinent finding Inspection:  Effusion: (min)  Malalignment: (-)  Swelling: (-)  Quad atrophy: (-)  J-sign: (-) ROM:  0 - 115 degrees of flexion. Tenderness:  MJLT: (+)  LJLT: (-)  Medial patellar facet tenderness: -  Lateral patellar facet tenderness: -  Crepitus: (+)  Patellar grind tenderness: (-)  Patellar tendon: (+) Stability:  Lachman: (-)  Varus/Valgus instability: (-)  Posterior drawer: (-) Additional tests:  McMurrays test: (-)  Patellar apprehension: (-) Strength: 5/5 Q/H/TA/GS/EHL Neuro: In tact to light touch throughout, DTR's wnl Vascularity: Extremity warm and well perfused Gait: antalgic     [FreeTextEntry1] : clavicle fracture fully healed.

## 2024-05-02 NOTE — HISTORY OF PRESENT ILLNESS
[Sudden] : sudden [4] : 4 [Sharp] : sharp [Frequent] : frequent [Meds] : meds [de-identified] : 79 yo RHD M here for right shoulder pain since 11/23/23. fell after turning quickly. landed directly onto his shoulder. pain is around sc joint. [facility] and was told he had a fx. taking oxy 5 and tylenol for pain.  [] : no [de-identified] : physical therapy

## 2024-05-13 ENCOUNTER — APPOINTMENT (OUTPATIENT)
Dept: ORTHOPEDIC SURGERY | Facility: CLINIC | Age: 79
End: 2024-05-13
Payer: MEDICARE

## 2024-05-13 VITALS — BODY MASS INDEX: 37.61 KG/M2 | WEIGHT: 234 LBS | HEIGHT: 66 IN

## 2024-05-13 PROCEDURE — 99024 POSTOP FOLLOW-UP VISIT: CPT

## 2024-05-13 PROCEDURE — 20610 DRAIN/INJ JOINT/BURSA W/O US: CPT | Mod: LT

## 2024-05-13 NOTE — DISCUSSION/SUMMARY
[Medication Risks Reviewed] : Medication risks reviewed [de-identified] : Plan for L knee visco 3 injection #1 today Medication injected: Visco3 dose: 25 mg/2.5ml injection intraarticularly in each noted joint  f/u 1 week ----------------------------------------------------------------------------    All relevant imaging studies pertinent to today's visit, including x-rays, MRI's and/or other advanced imaging studies (CT/etc) were independently interpreted and reviewed with the patient as needed. Implications of the studies together with the patient's clinical picture were discussed to formulate a working diagnosis and management options were detailed.   The patient and/or guardian was advised of the diagnosis. The natural history of the pathology was explained in full. All questions were answered. The risks and benefits of conservative and interventional treatment alternatives were explained to the patient  The patient and/or guardian was advised if any advanced diagnostic/imaging study (MRI/CT/etc) is ordered to evaluate potential pathology in the affected area(s), they should follow up in the office to review the results of the study and determine further management that may be indicated.      ----------------------------------------------------------------------------  Large joint injection given: Hyaluronic acid/viscosupplementation to Left knee  Viscosupplementation injection indications at this time include- X-ray evidence of osteoarthritis on this or prior visits, and patient having tried OTC's including aspirin, Ibuprofen, Aleve etc or prescription NSAIDS, and/or exercises at home and/ or physical therapy without satisfactory response.  The risks, benefits, and alternatives to viscosupplementation injection were explained in full to the patient. Risks outlined include but are not limited to infection, sepsis, bleeding, scarring, skin discoloration, temporary increase in pain, syncopal episode, failure to resolve symptoms, allergic reaction, and symptom recurrence.  Patient understood the risks. All questions were answered. After discussion of options, the patient requested viscosupplementation.   An injection of Hyaluronic acid of appropriate formulation was injected into the joint(s) after verbal consent, using sterile technique. The patient tolerated the procedure well and there were no complications.  Instructed patient to apply ice to the injection site. Signs and symptoms of infection reviewed and patient advised to call immediately for redness, fevers, and/or chills.    Progress note completed by Esau Arias PA-C working as a scribe for Dr Hurtado

## 2024-05-13 NOTE — IMAGING
[Right] : right shoulder [The fracture is in acceptable alignment. There is progression in healing seen] : The fracture is in acceptable alignment. There is progression in healing seen [Fracture] : Fracture [de-identified] : ----------------------------------------------------------------------------  Left shoulder exam:   Inspection: + swelling/ ecchymosis anterior/ superior shoulder ROM:     FF: 130    ER: 45   Tenderness:    (mild) Anterior/Biceps:     (-) Posterior    (mild) Lateral    (-) Trapezius    (-) Scapula    (++) AC joint Strength:   not assessed    Distal: 5/5 Neuro: In tact to light touch throughout Vascularity: Extremity warm and well perfused    Left Knee Exam:                             General: no distress, no ligamentous laxity Skin: no significant pertinent finding Inspection:  Effusion: (min)  Malalignment: (-)  Swelling: (-)  Quad atrophy: (-)  J-sign: (-) ROM:  0 - 115 degrees of flexion. Tenderness:  MJLT: (+)  LJLT: (-)  Medial patellar facet tenderness: -  Lateral patellar facet tenderness: -  Crepitus: (+)  Patellar grind tenderness: (-)  Patellar tendon: (+) Stability:  Lachman: (-)  Varus/Valgus instability: (-)  Posterior drawer: (-) Additional tests:  McMurrays test: (-)  Patellar apprehension: (-) Strength: 5/5 Q/H/TA/GS/EHL Neuro: In tact to light touch throughout, DTR's wnl Vascularity: Extremity warm and well perfused Gait: antalgic     [FreeTextEntry1] : clavicle fracture fully healed.

## 2024-05-13 NOTE — HISTORY OF PRESENT ILLNESS
[Sudden] : sudden [4] : 4 [Sharp] : sharp [Frequent] : frequent [Meds] : meds [2] : 2 [Other:____] : [unfilled] [de-identified] : 79 yo RHD M here for right shoulder pain since 11/23/23. fell after turning quickly. landed directly onto his shoulder. pain is around sc joint. [facility] and was told he had a fx. taking oxy 5 and tylenol for pain.  [] : no [de-identified] : physical therapy [de-identified] : 5/2/24

## 2024-05-19 NOTE — REVIEW OF SYSTEMS
[Weight Loss (___ Lbs)] : [unfilled] ~Ulb weight loss [SOB] : shortness of breath [Dyspnea on exertion] : dyspnea during exertion [Negative] : Heme/Lymph

## 2024-05-20 ENCOUNTER — APPOINTMENT (OUTPATIENT)
Dept: ORTHOPEDIC SURGERY | Facility: CLINIC | Age: 79
End: 2024-05-20
Payer: MEDICARE

## 2024-05-20 VITALS — WEIGHT: 234 LBS | HEIGHT: 66 IN | BODY MASS INDEX: 37.61 KG/M2

## 2024-05-20 PROCEDURE — 99024 POSTOP FOLLOW-UP VISIT: CPT

## 2024-05-20 PROCEDURE — 20610 DRAIN/INJ JOINT/BURSA W/O US: CPT | Mod: LT

## 2024-05-20 NOTE — DISCUSSION/SUMMARY
[Medication Risks Reviewed] : Medication risks reviewed [de-identified] : Plan for L knee visco 3 injection #3 today Medication injected: Visco3 dose: 25 mg/2.5ml injection intraarticularly in each noted joint  f/u 4-6 wks   ----------------------------------------------------------------------------    All relevant imaging studies pertinent to today's visit, including x-rays, MRI's and/or other advanced imaging studies (CT/etc) were independently interpreted and reviewed with the patient as needed. Implications of the studies together with the patient's clinical picture were discussed to formulate a working diagnosis and management options were detailed.   The patient and/or guardian was advised of the diagnosis. The natural history of the pathology was explained in full. All questions were answered. The risks and benefits of conservative and interventional treatment alternatives were explained to the patient  The patient and/or guardian was advised if any advanced diagnostic/imaging study (MRI/CT/etc) is ordered to evaluate potential pathology in the affected area(s), they should follow up in the office to review the results of the study and determine further management that may be indicated.      ----------------------------------------------------------------------------  Large joint injection given: Hyaluronic acid/viscosupplementation to Left knee  Viscosupplementation injection indications at this time include- X-ray evidence of osteoarthritis on this or prior visits, and patient having tried OTC's including aspirin, Ibuprofen, Aleve etc or prescription NSAIDS, and/or exercises at home and/ or physical therapy without satisfactory response.  The risks, benefits, and alternatives to viscosupplementation injection were explained in full to the patient. Risks outlined include but are not limited to infection, sepsis, bleeding, scarring, skin discoloration, temporary increase in pain, syncopal episode, failure to resolve symptoms, allergic reaction, and symptom recurrence.  Patient understood the risks. All questions were answered. After discussion of options, the patient requested viscosupplementation.   An injection of Hyaluronic acid of appropriate formulation was injected into the joint(s) after verbal consent, using sterile technique. The patient tolerated the procedure well and there were no complications.  Instructed patient to apply ice to the injection site. Signs and symptoms of infection reviewed and patient advised to call immediately for redness, fevers, and/or chills.    Progress note completed by Esau Arias PA-C working as a scribe for Dr Hurtado

## 2024-05-20 NOTE — HISTORY OF PRESENT ILLNESS
[Sudden] : sudden [4] : 4 [Sharp] : sharp [Frequent] : frequent [Meds] : meds [3] : 3 [Other:____] : [unfilled] [de-identified] : 79 yo RHD M here for right shoulder pain since 11/23/23. fell after turning quickly. landed directly onto his shoulder. pain is around sc joint. [facility] and was told he had a fx. taking oxy 5 and tylenol for pain.  [] : no [de-identified] : physical therapy [de-identified] : 5/13/24

## 2024-05-20 NOTE — IMAGING
[Right] : right shoulder [The fracture is in acceptable alignment. There is progression in healing seen] : The fracture is in acceptable alignment. There is progression in healing seen [Fracture] : Fracture [de-identified] : ----------------------------------------------------------------------------  Left shoulder exam:   Inspection: + swelling/ ecchymosis anterior/ superior shoulder ROM:     FF: 130    ER: 45   Tenderness:    (mild) Anterior/Biceps:     (-) Posterior    (mild) Lateral    (-) Trapezius    (-) Scapula    (++) AC joint Strength:   not assessed    Distal: 5/5 Neuro: In tact to light touch throughout Vascularity: Extremity warm and well perfused    Left Knee Exam:                             General: no distress, no ligamentous laxity Skin: no significant pertinent finding Inspection:  Effusion: (min)  Malalignment: (-)  Swelling: (-)  Quad atrophy: (-)  J-sign: (-) ROM:  0 - 115 degrees of flexion. Tenderness:  MJLT: (+)  LJLT: (-)  Medial patellar facet tenderness: -  Lateral patellar facet tenderness: -  Crepitus: (+)  Patellar grind tenderness: (-)  Patellar tendon: (+) Stability:  Lachman: (-)  Varus/Valgus instability: (-)  Posterior drawer: (-) Additional tests:  McMurrays test: (-)  Patellar apprehension: (-) Strength: 5/5 Q/H/TA/GS/EHL Neuro: In tact to light touch throughout, DTR's wnl Vascularity: Extremity warm and well perfused Gait: antalgic     [FreeTextEntry1] : clavicle fracture fully healed.

## 2024-05-21 ENCOUNTER — NON-APPOINTMENT (OUTPATIENT)
Age: 79
End: 2024-05-21

## 2024-05-21 ENCOUNTER — APPOINTMENT (OUTPATIENT)
Dept: ELECTROPHYSIOLOGY | Facility: CLINIC | Age: 79
End: 2024-05-21
Payer: MEDICARE

## 2024-05-21 ENCOUNTER — APPOINTMENT (OUTPATIENT)
Dept: CARDIOLOGY | Facility: CLINIC | Age: 79
End: 2024-05-21
Payer: MEDICARE

## 2024-05-21 VITALS
DIASTOLIC BLOOD PRESSURE: 70 MMHG | HEIGHT: 66 IN | BODY MASS INDEX: 36.96 KG/M2 | SYSTOLIC BLOOD PRESSURE: 137 MMHG | WEIGHT: 230 LBS

## 2024-05-21 DIAGNOSIS — I25.10 ATHEROSCLEROTIC HEART DISEASE OF NATIVE CORONARY ARTERY W/OUT ANGINA PECTORIS: ICD-10-CM

## 2024-05-21 DIAGNOSIS — I77.89 OTHER SPECIFIED DISORDERS OF ARTERIES AND ARTERIOLES: ICD-10-CM

## 2024-05-21 DIAGNOSIS — I35.0 NONRHEUMATIC AORTIC (VALVE) STENOSIS: ICD-10-CM

## 2024-05-21 DIAGNOSIS — I48.91 UNSPECIFIED ATRIAL FIBRILLATION: ICD-10-CM

## 2024-05-21 PROCEDURE — G2211 COMPLEX E/M VISIT ADD ON: CPT

## 2024-05-21 PROCEDURE — 99214 OFFICE O/P EST MOD 30 MIN: CPT

## 2024-05-21 PROCEDURE — 93279 PRGRMG DEV EVAL PM/LDLS PM: CPT

## 2024-05-21 PROCEDURE — 93000 ELECTROCARDIOGRAM COMPLETE: CPT

## 2024-05-22 ENCOUNTER — NON-APPOINTMENT (OUTPATIENT)
Age: 79
End: 2024-05-22

## 2024-05-22 PROBLEM — I35.0 AORTIC STENOSIS: Status: ACTIVE | Noted: 2019-06-25

## 2024-05-22 PROBLEM — I25.10 CAD IN NATIVE ARTERY: Status: ACTIVE | Noted: 2023-03-26

## 2024-05-22 PROBLEM — I77.89 AV MALFORMATION, ACQUIRED: Status: ACTIVE | Noted: 2023-02-09

## 2024-05-22 PROBLEM — I48.91 AFIB: Status: ACTIVE | Noted: 2017-01-27

## 2024-05-22 NOTE — DISCUSSION/SUMMARY
[FreeTextEntry1] : The patient is an 79-year-old gentleman A-fib, HTN, HLD, s/p back surgery with RLE fasciotomy and skin graft, s/p COVID, PPM , AS, anemia s/p TAVR with AV malformation s/p THR with knee pain limiting ambulation. #1 HFpEF- c/w lasix 1-2x day, spironolactone 12.5mg #2 AS- s/p TAVR #2 HTN- remain off lisinopril 5mg  #3 Afib- s/p PPM for tachybrady good battery life, c/w atenolol 25mg,no bleeding on warfarin- monitored by hematology, INR 2-3.0 #4 HLD- c/w simvastatin #5 BPH- on tamsulosin #6 GI-  on iron, s/p transfusion and aranesp, BM negative, AV malformation,f/u regularly #7 Ortho- s/p THR, knee pain limited PT. [EKG obtained to assist in diagnosis and management of assessed problem(s)] : EKG obtained to assist in diagnosis and management of assessed problem(s)

## 2024-05-22 NOTE — REVIEW OF SYSTEMS
[Weight Gain (___ Lbs)] : no recent weight gain [Chest Discomfort] : no chest discomfort [Lower Ext Edema] : no extremity edema [Leg Claudication] : no intermittent leg claudication [Orthopnea] : no orthopnea [PND] : no PND [Syncope] : no syncope

## 2024-05-22 NOTE — REASON FOR VISIT
[Arrhythmia/ECG Abnorrmalities] : arrhythmia/ECG abnormalities [Structural Heart and Valve Disease] : structural heart and valve disease [Spouse] : spouse

## 2024-05-22 NOTE — HISTORY OF PRESENT ILLNESS
[FreeTextEntry1] : Jean-Pierre is doing better with anemia. Stabilized on current infusion. Admits not walking much. Knee pain and does not want surgery. Otherwise doing well.

## 2024-05-24 ENCOUNTER — NON-APPOINTMENT (OUTPATIENT)
Age: 79
End: 2024-05-24

## 2024-05-25 NOTE — PHYSICAL THERAPY INITIAL EVALUATION ADULT - ORIENTATION, REHAB EVAL
oriented to person, place, time and situation
No

## 2024-05-28 ENCOUNTER — RX RENEWAL (OUTPATIENT)
Age: 79
End: 2024-05-28

## 2024-05-28 RX ORDER — ATENOLOL 25 MG/1
25 TABLET ORAL
Qty: 90 | Refills: 3 | Status: ACTIVE | COMMUNITY
Start: 2021-05-05 | End: 1900-01-01

## 2024-06-25 ENCOUNTER — APPOINTMENT (OUTPATIENT)
Dept: ORTHOPEDIC SURGERY | Facility: CLINIC | Age: 79
End: 2024-06-25
Payer: MEDICARE

## 2024-06-25 VITALS — BODY MASS INDEX: 36.96 KG/M2 | HEIGHT: 66 IN | WEIGHT: 230 LBS

## 2024-06-25 DIAGNOSIS — M17.12 UNILATERAL PRIMARY OSTEOARTHRITIS, LEFT KNEE: ICD-10-CM

## 2024-06-25 PROCEDURE — 99213 OFFICE O/P EST LOW 20 MIN: CPT

## 2024-06-25 NOTE — ED PROVIDER NOTE - CHIEF COMPLAINT
History Of Present Illness  Allison is a 85 year old female who presented to the emergency room today with complaints of worsening left upper extremity weakness dizziness and headache.  She has a past medical history of TIA hypertension GERD hyperlipidemia and arthritis.  The onset was Saturday.  There were no aggravating factors.  There were no alleviating factors.  Patient recently had an injection to her cervical spine for neck issues and thought it was related to that so she did not come to the emergency room until today.  She had no other associated symptoms including nausea vomiting constipation diarrhea shortness of breath chest pain lightheadedness urinary symptoms fevers chills or sweats.    At today's assessment she is alert and oriented x 3.  Her family is at the bedside.  Her left hand has 3 out of 5 strength and she says it is much better than it was when she came in.  There is no deficit in the left lower extremity.  Her headache has almost resolved.  She has no more dizziness.  She had no other associated symptoms including as mentioned above at the time assessment.      Past Medical History  Past Medical History:   Diagnosis Date    Arthritis     knees, hip, back     Bilateral ovarian cysts 10/2016    detected per CT when being evaluated for diverticulitis    Breast cancer  (CMD) 1991    left breast cancer- tx with surgery and chemo    Diverticulitis 10/2016    Essential (primary) hypertension     Gallstones     Gastroesophageal reflux disease     H/O mammogram     2023 [place]    Heartburn     /reflux    High cholesterol     Hyperlipidemia     TIA (transient ischemic attack) 06/18/2019    Wears dentures     upper and lower        Surgical History  Past Surgical History:   Procedure Laterality Date    Appendectomy  09/26/2017    robot appe per [facility]    Arthroscopy of joint unlisted Left ~08/2012    left knee scope, cartilege    Arthroscopy of joint unlisted Right ~4/2013    right knee scope    Breast  The patient is a 78y Male complaining of  surgery Left 1991    left mastectomy     section, classic  x2    1962, 1964    Cholecystectomy  1966    Eye surgery  approx 2012    bilateral cataract surgery     Mammo screening bilateral  2012    Removal gallbladder      Robotic assisted hysterectomy  2017    [facility] izzy Bateman        Social History  Social History     Tobacco Use    Smoking status: Former     Passive exposure: Never    Smokeless tobacco: Never    Tobacco comments:     35+ years ago   Vaping Use    Vaping status: never used   Substance Use Topics    Alcohol use: No    Drug use: No       Family History    Family History   Problem Relation Age of Onset    Cancer, Colon Mother     Heart disease Father     Vision Loss Brother     Diabetes Sister     Cancer Sister         unknown type    Dementia/Alzheimers Sister         Alzheimers    Vision Loss Brother     Cancer, Breast Neg Hx     Cancer, Ovarian Neg Hx     Cancer, Endometrial Neg Hx         Allergies  ALLERGIES:  Meloxicam    Medications  Medications Prior to Admission   Medication Sig Dispense Refill    omeprazole (PriLOSEC) 40 MG capsule TAKE ONE CAPSULE BY MOUTH ONE TIME DAILY 90 capsule 0    amLODIPine (NORVASC) 5 MG tablet TAKE ONE TABLET BY MOUTH ONE TIME DAILY 30 tablet 0    metoPROLOL tartrate (LOPRESSOR) 50 MG tablet Take 1 tablet by mouth in the morning and 1 tablet at noon and 1 tablet in the evening. 270 tablet 1    sucralfate (CARAFATE) 1 g tablet Take 1 tablet by mouth 4 times daily. 120 tablet 1    torsemide (DEMADEX) 10 MG tablet Take 1 tablet by mouth daily. 30 tablet 3    atorvastatin (LIPITOR) 10 MG tablet Take 1 tablet by mouth daily. 90 tablet 3    aspirin (ECOTRIN) 81 MG EC tablet Take 81 mg by mouth daily.         Review of Systems  Review of Systems   All other systems reviewed and are negative.        Last Recorded Vitals  Blood pressure (!) 151/78, pulse 68, temperature 97.5 °F (36.4 °C), temperature source Oral, resp. rate 18, height 4' 9\" (1.448  m), weight 50.3 kg (110 lb 14.3 oz), SpO2 98%.    Physical Exam  Vitals reviewed.   Constitutional:       General: She is awake.      Appearance: Normal appearance. She is well-developed, well-groomed and normal weight.   HENT:      Head: Normocephalic and atraumatic.      Nose: Nose normal.      Mouth/Throat:      Mouth: Mucous membranes are moist.   Eyes:      General: Lids are normal.   Cardiovascular:      Rate and Rhythm: Normal rate and regular rhythm.      Pulses: Normal pulses.      Heart sounds: Normal heart sounds.   Pulmonary:      Effort: Pulmonary effort is normal.      Breath sounds: Normal breath sounds.   Abdominal:      General: Abdomen is flat. Bowel sounds are normal.      Palpations: Abdomen is soft.   Musculoskeletal:      Right lower leg: No edema.      Left lower leg: No edema.      Comments: Left upper extremity 3 out of 5 strength   Skin:     General: Skin is warm and dry.   Neurological:      Mental Status: She is alert and oriented to person, place, and time.   Psychiatric:         Attention and Perception: Attention and perception normal.         Mood and Affect: Mood and affect normal.         Speech: Speech normal.         Behavior: Behavior normal. Behavior is cooperative.         Thought Content: Thought content normal.         Cognition and Memory: Cognition and memory normal.         Judgment: Judgment normal.          Imaging      Labs     Recent Results (from the past 24 hour(s))   Urinalysis & Reflex Microscopy With Culture If Indicated    Collection Time: 06/24/24  2:42 PM   Result Value Ref Range    COLOR, URINALYSIS Colorless     APPEARANCE, URINALYSIS Clear     GLUCOSE, URINALYSIS Negative Negative mg/dL    BILIRUBIN, URINALYSIS Negative Negative    KETONES, URINALYSIS Negative Negative mg/dL    SPECIFIC GRAVITY, URINALYSIS 1.005 1.005 - 1.030    OCCULT BLOOD, URINALYSIS Negative Negative    PH, URINALYSIS 5.0 5.0 - 7.0    PROTEIN, URINALYSIS Negative Negative mg/dL     UROBILINOGEN, URINALYSIS 0.2 0.2, 1.0 mg/dL    NITRITE, URINALYSIS Negative Negative    LEUKOCYTE ESTERASE, URINALYSIS Moderate (A) Negative    SQUAMOUS EPITHELIAL, URINALYSIS 1 to 5 None Seen, 1 to 5 /hpf    ERYTHROCYTES, URINALYSIS 1 to 2 None Seen, 1 to 2 /hpf    LEUKOCYTES, URINALYSIS 11 to 25 (A) None Seen, 1 to 5 /hpf    BACTERIA, URINALYSIS None Seen None Seen /hpf    HYALINE CASTS, URINALYSIS 1 to 5 None Seen, 1 to 5 /lpf   Electrocardiogram 12-Lead    Collection Time: 06/24/24  2:43 PM   Result Value Ref Range    Ventricular Rate EKG/Min (BPM) 75     Atrial Rate (BPM) 75     [place]-Interval (MSEC) 138     QRS-Interval (MSEC) 76     QT-Interval (MSEC) 372     QTc 415     P Axis (Degrees) 71     R Axis (Degrees) -63     T Axis (Degrees) 67     REPORT TEXT       Normal sinus rhythm  Left anterior fascicular block  Nonspecific ST abnormality  Abnormal ECG  When compared with ECG of  13-JUN-2022 20:38,  Criteria for  Septal infarct  are no longer  present  Non-specific change in ST segment in  Anterior leads  T wave inversion no longer evident in  Anterior leads     Comprehensive Metabolic Panel    Collection Time: 06/24/24  3:08 PM   Result Value Ref Range    Fasting Status      Sodium 133 (L) 135 - 145 mmol/L    Potassium 4.0 3.4 - 5.1 mmol/L    Chloride 103 97 - 110 mmol/L    Carbon Dioxide 25 21 - 32 mmol/L    Anion Gap 9 7 - 19 mmol/L    Glucose 86 70 - 99 mg/dL    BUN 20 6 - 20 mg/dL    Creatinine 1.38 (H) 0.51 - 0.95 mg/dL    Glomerular Filtration Rate 37 (L) >=60    BUN/Cr 14 7 - 25    Calcium 8.9 8.4 - 10.2 mg/dL    Bilirubin, Total 0.9 0.2 - 1.0 mg/dL    GOT/AST 19 <=37 Units/L    GPT/ALT 16 <64 Units/L    Alkaline Phosphatase 115 45 - 117 Units/L    Albumin 3.7 3.6 - 5.1 g/dL    Protein, Total 7.5 6.4 - 8.2 g/dL    Globulin 3.8 2.0 - 4.0 g/dL    A/G Ratio 1.0 1.0 - 2.4   TROPONIN I, HIGH SENSITIVITY    Collection Time: 06/24/24  3:08 PM   Result Value Ref Range    Troponin I, High Sensitivity 13 <52 ng/L    Prothrombin Time (INR/PT)    Collection Time: 06/24/24  3:08 PM   Result Value Ref Range    Protime- PT 11.7 9.7 - 11.8 sec    INR 1.1     Partial Thromboplastin Time (PTT)    Collection Time: 06/24/24  3:08 PM   Result Value Ref Range    PTT 30 22 - 32 sec   CBC with Automated Differential (performable only)    Collection Time: 06/24/24  3:08 PM   Result Value Ref Range    WBC 25.9 (H) 4.2 - 11.0 K/mcL    RBC 5.71 (H) 4.00 - 5.20 mil/mcL    HGB 12.7 12.0 - 15.5 g/dL    HCT 42.9 36.0 - 46.5 %    MCV 75.1 (L) 78.0 - 100.0 fl    MCH 22.2 (L) 26.0 - 34.0 pg    MCHC 29.6 (L) 32.0 - 36.5 g/dL    RDW-CV 19.2 (H) 11.0 - 15.0 %    RDW-SD 49.6 39.0 - 50.0 fL     (H) 140 - 450 K/mcL    NRBC 0 <=0 /100 WBC    Neutrophil, Percent 84 %    Lymphocytes, Percent 9 %    Mono, Percent 4 %    Eosinophils, Percent 1 %    Basophils, Percent 1 %    Immature Granulocytes 1 %    Absolute Neutrophils 21.8 (H) 1.8 - 7.7 K/mcL    Absolute Lymphocytes 2.2 1.0 - 4.0 K/mcL    Absolute Monocytes 1.1 (H) 0.3 - 0.9 K/mcL    Absolute Eosinophils  0.3 0.0 - 0.5 K/mcL    Absolute Basophils 0.3 0.0 - 0.3 K/mcL    Absolute Immature Granulocytes 0.3 (H) 0.0 - 0.2 K/mcL   ECG    Collection Time: 06/24/24  4:01 PM   Result Value Ref Range    Ventricular Rate EKG/Min (BPM) 72     Atrial Rate (BPM) 72     CO-Interval (MSEC) 134     QRS-Interval (MSEC) 86     QT-Interval (MSEC) 384     QTc 420     P Axis (Degrees) 54     R Axis (Degrees) -57     T Axis (Degrees) 57     REPORT TEXT       Normal sinus rhythm  Left anterior fascicular block  Moderate voltage criteria for LVH, may be normal variant  (  R in aVL  ,  Ponce product  )  Abnormal ECG  When compared with ECG of  24-JUN-2024 14:43,  No significant change was found            ASSESSMENT/PLAN: Allison is a 85 year old female who presented to the emergency room today with complaints of worsening left upper extremity weakness dizziness and headache.  She has a past medical history of TIA  hypertension GERD hyperlipidemia and arthritis.     Acute CVA with associated left-sided weakness headache and blurred vision  Neuro on consult  CT head as above  MRI/MRA head and neck pending  Permissive hypertension  Lipid panel pending  TSH pending  OT PT ST  No TNK>> symptoms started on Saturday  A1c 5.1 May 2024    Acute mild hyponatremia possibly due to poor p.o. intake  Encourage p.o. intake    Acute on chronic leukocytosis (2016)with a history of myelofibrosis   Await urine culture  Blood culture pending  Chest x-ray as above  Afebrile    Acute on chronic thrombocytosis (2016) with a history of myelofibrosis  Platelet 522    Hypertension  Metoprolol on hold  Amlodipine on hold  Demadex on hold    Hyperlipidemia  Lipitor ordered    CAD  Lipitor as ordered  Metoprolol on hold    GERD  Carafate as ordered  Protonix as ordered    CKD 3  Monitor renal indices  Avoid nephrotoxins    History of TIA    S/p left mastectomy 1991 and chemotherapy     Smoking Cessation  Counseling given: Not Answered  Tobacco comments: 35+ years ago      DVT:   Lovenox 40 milligrams subcutaneous daily per neuro    DISPO: Home 24 to 48 hours pending clinical course    Code Status    Code Status: Prior    Primary Care Physician  Karon Gold CNP        All patient studies and labs have been reviewed.  All patient's questions and concerns were addressed.          The case was reviewed with Dr. Batista. A substantive portion of the medical decision making and plan of care was performed by Dr. Batista          The timing of the documentation may not reflect the actual time of the patient encounter. The note was created using [employer] voice recognition system. Attempts to review and correct the note have been made but irregularities potentially may still exist.        Nasra Lockhart CNP  [place] [id number] [phone]

## 2024-07-01 ENCOUNTER — RX RENEWAL (OUTPATIENT)
Age: 79
End: 2024-07-01

## 2024-07-02 NOTE — H&P PST ADULT - URINARY CATHETER
Patient needs medications sent to [facility].     Patient is also requesting Dr. Cormier prescribe these medications.       Medication: Levothyroxine     Dose/Frequency: 75 mcg     Quantity:     Pharmacy: [facility]    Office:   [] PCP/Provider - Patient Requesting  Dr. Jones take over prescribing  [] Speciality/Provider -     Does the patient have enough for 3 days?   [x] Yes   [] No - Send as HP to POD    Medication: Lisinopril-hydrochlorothiazide    Dose/Frequency: 10-12.5mg per tab     Quantity:     Pharmacy: [facility]    Office:   [x] PCP/Provider - Patient Requesting  Dr. Jones take over prescribing  [] Speciality/Provider -     Does the patient have enough for 3 days?   [x] Yes   [] No - Send as HP to POD       no

## 2024-07-08 ENCOUNTER — RX RENEWAL (OUTPATIENT)
Age: 79
End: 2024-07-08

## 2024-07-28 PROBLEM — Z85.71 HISTORY OF HODGKIN'S LYMPHOMA: Status: RESOLVED | Noted: 2017-01-27 | Resolved: 2024-07-28

## 2024-08-02 ENCOUNTER — NON-APPOINTMENT (OUTPATIENT)
Age: 79
End: 2024-08-02

## 2024-08-20 ENCOUNTER — APPOINTMENT (OUTPATIENT)
Dept: ELECTROPHYSIOLOGY | Facility: CLINIC | Age: 79
End: 2024-08-20
Payer: MEDICARE

## 2024-08-20 ENCOUNTER — NON-APPOINTMENT (OUTPATIENT)
Age: 79
End: 2024-08-20

## 2024-08-20 PROCEDURE — 93294 REM INTERROG EVL PM/LDLS PM: CPT

## 2024-08-20 PROCEDURE — 93296 REM INTERROG EVL PM/IDS: CPT

## 2024-08-30 NOTE — PRE-OP CHECKLIST - LATEX ALLERGY
Have Your Skin Lesions Been Treated?: not been treated Is This A New Presentation, Or A Follow-Up?: Growth How Severe Is Your Skin Lesion?: moderate no

## 2024-09-04 ENCOUNTER — APPOINTMENT (OUTPATIENT)
Dept: VASCULAR SURGERY | Facility: CLINIC | Age: 79
End: 2024-09-04
Payer: MEDICARE

## 2024-09-04 VITALS
BODY MASS INDEX: 35.61 KG/M2 | HEIGHT: 68 IN | HEART RATE: 79 BPM | WEIGHT: 235 LBS | TEMPERATURE: 97.9 F | SYSTOLIC BLOOD PRESSURE: 155 MMHG | DIASTOLIC BLOOD PRESSURE: 75 MMHG

## 2024-09-04 PROCEDURE — 99203 OFFICE O/P NEW LOW 30 MIN: CPT

## 2024-09-04 NOTE — ASSESSMENT
[FreeTextEntry1] : Patient s/p back surgery complicated by acute limb ischemia requiring right leg thrombectomy, RLE fasciotomy  and skin graft in 2018 w/ Dr Cerrato.   A/P:  Patient with palpable pulses on exam. No concerns for PVD Right leg symptoms at baseline.  No left lower extremity symptoms  Continue AC for afib  Follow up as needed

## 2024-09-04 NOTE — PHYSICAL EXAM
[2+] : left 2+ [1+] : left 1+ [No Rash or Lesion] : No rash or lesion [Alert] : alert [Ankle Swelling (On Exam)] : not present [Varicose Veins Of Lower Extremities] : not present [] : not present [de-identified] : NAD [FreeTextEntry1] : Healed RLE fasciotomy

## 2024-09-04 NOTE — HISTORY OF PRESENT ILLNESS
[FreeTextEntry1] : 77-year-old gentleman A-fib, hypertension, hyperlipidemia, PPM , AS, anemia s/p bone marrow biopsy  Patient s/p back surgery complicated by acute limb ischemia requiring right leg thrombectomy, RLE fasciotomy  and skin graft in 2018 w/ Dr Cerrato. Denies lower extremity pain. Right leg shin/foot numbness at baseline. No claudication or rest pain. Incision healed.

## 2024-09-04 NOTE — CONSULT LETTER
[Dear  ___] : Dear  [unfilled], [Consult Letter:] : I had the pleasure of evaluating your patient, [unfilled]. [Please see my note below.] : Please see my note below. [Consult Closing:] : Thank you very much for allowing me to participate in the care of this patient.  If you have any questions, please do not hesitate to contact me. [Sincerely,] : Sincerely, [FreeTextEntry3] : Denise Cerrato MD, FSVS, FACS Associate Chief, Vascular & Endovascular Surgery , Vascular Surgery Fellowship & Residency  Associate Professor of Surgery, [employer] at [facility]  Division of Vascular & Endovascular Surgery [facility] [address], [address] [place] [id number] Tel: [phone]

## 2024-10-13 NOTE — ED ADULT NURSE NOTE - NS ED NOTE  TALK SOMEONE YN
[FreeTextEntry1] : Continue Breo for few months then re-evaluate need.  Continue Airspura PRN Follow-up in few months or sooner on a as needed basis. All discussed with patient.
[FreeTextEntry1] : Continue Breo for few months then re-evaluate need.  Continue Airspura PRN Follow-up in few months or sooner on a as needed basis. All discussed with patient.
No

## 2024-11-25 ENCOUNTER — NON-APPOINTMENT (OUTPATIENT)
Age: 79
End: 2024-11-25

## 2024-11-26 ENCOUNTER — NON-APPOINTMENT (OUTPATIENT)
Age: 79
End: 2024-11-26

## 2024-11-26 ENCOUNTER — APPOINTMENT (OUTPATIENT)
Dept: CARDIOLOGY | Facility: CLINIC | Age: 79
End: 2024-11-26
Payer: MEDICARE

## 2024-11-26 ENCOUNTER — APPOINTMENT (OUTPATIENT)
Dept: ELECTROPHYSIOLOGY | Facility: CLINIC | Age: 79
End: 2024-11-26
Payer: MEDICARE

## 2024-11-26 VITALS
BODY MASS INDEX: 35.73 KG/M2 | HEART RATE: 64 BPM | WEIGHT: 235 LBS | SYSTOLIC BLOOD PRESSURE: 147 MMHG | DIASTOLIC BLOOD PRESSURE: 70 MMHG | OXYGEN SATURATION: 96 %

## 2024-11-26 DIAGNOSIS — I48.91 UNSPECIFIED ATRIAL FIBRILLATION: ICD-10-CM

## 2024-11-26 DIAGNOSIS — I77.89 OTHER SPECIFIED DISORDERS OF ARTERIES AND ARTERIOLES: ICD-10-CM

## 2024-11-26 DIAGNOSIS — I25.10 ATHEROSCLEROTIC HEART DISEASE OF NATIVE CORONARY ARTERY W/OUT ANGINA PECTORIS: ICD-10-CM

## 2024-11-26 DIAGNOSIS — D64.9 ANEMIA, UNSPECIFIED: ICD-10-CM

## 2024-11-26 PROCEDURE — 99214 OFFICE O/P EST MOD 30 MIN: CPT

## 2024-11-26 PROCEDURE — G2211 COMPLEX E/M VISIT ADD ON: CPT

## 2024-11-26 PROCEDURE — 93000 ELECTROCARDIOGRAM COMPLETE: CPT

## 2024-11-26 PROCEDURE — 93279 PRGRMG DEV EVAL PM/LDLS PM: CPT

## 2024-12-02 NOTE — H&P CARDIOLOGY - MS EXT PE MLT D E PC
Received refill request for  ROSUVASTATIN CALCIUM 5 MG TAB   Request is approved  because refill protocol Met approval criteria  LOV and or labs were verified to be correct  Refill sent to pharmacy   no clubbing/no pedal edema/normal/no cyanosis

## 2024-12-05 NOTE — DISCHARGE NOTE NURSING/CASE MANAGEMENT/SOCIAL WORK - CAREGIVER PHONE NUMBER
Subjective   History of Present Illness  Mr. Camarena is an 87 yr old male with history of permanent atrial fibrillation (s/p Watchman device), CAD, CHF, diabetes, COPD, hypertension, hyperlipidemia, chronic urinary retention requiring in/out self caths, who presents to the emergency department with complaints of lightheadedness and generalized weakness over the past 2 to 3 days.  The patient was at his urologist office today for his chronic urinary retention and was noted to be lightheaded and somewhat hypotensive (92/60) he was sent to the emergency department for further evaluation.  The patient notes that he has had some recent nosebleeds and has seen ear nose and throat for that.  His most recent nosebleed was 4 days ago.  The patient also notes that he has had unintentional weight loss of about 40 pounds over the past 3 to 4 months.  He states that he feels like he has had relatively good appetite.  There has been no recent vomiting or diarrhea.  He believes he has his usual urine output.  He does note that he takes metoprolol 100 mg for rate control and states he took that this morning.  He denies any chest pain or shortness of breath.  He notes that he has mostly been lightheaded on standing and that after standing for a moment or 2, his symptoms improved.  His symptoms were somewhat worse this morning.      Review of Systems   Constitutional:  Positive for fatigue and unexpected weight change. Negative for appetite change and fever.   HENT:  Positive for nosebleeds. Negative for sore throat.    Respiratory:  Negative for cough and shortness of breath.    Cardiovascular:  Negative for chest pain, palpitations and leg swelling.   Gastrointestinal:  Negative for abdominal pain, blood in stool, diarrhea, nausea and vomiting.   Genitourinary:  Negative for dysuria and hematuria.        Chronic urinary retention.  Patient self caths.   Musculoskeletal:  Negative for back pain.   Skin:  Negative for pallor.    Neurological:  Positive for weakness (Generalized) and light-headedness. Negative for speech difficulty and headaches.   Hematological:  Bruises/bleeds easily.   Psychiatric/Behavioral:  Negative for confusion.        Past Medical History:   Diagnosis Date    Arrhythmia     Atrial fibrillation     Chronic kidney disease     COPD (chronic obstructive pulmonary disease)     Coronary artery disease     Diabetes mellitus     doesnt check sugar    E. coli sepsis 06/23/2022    Enlarged prostate without lower urinary tract symptoms (luts) 06/20/2016    Full dentures     GERD (gastroesophageal reflux disease)     Gout     Hearing aid worn     bilat prn    History of colonoscopy 09/12/2012    History of radiation therapy 02/24/2023    SBRT LLL lung    [place] (hard of hearing)     hearing aids prn    Hyperlipidemia     Hypertension     Kidney stone     surgery x1    Lung cancer     STEVEN on CPAP     compliant with machine    PAF (paroxysmal atrial fibrillation)     Prostatism     Urinary incontinence     Urinary tract infection     Wears glasses     readers       Allergies   Allergen Reactions    Sglt2 Inhibitors Other (See Comments)     Recurrent UTI    Xarelto [Rivaroxaban] GI Bleeding     GI bleed    Penicillins Hives     Has tolerated cefepime, ceftriaxone, cefazolin, cefdinir, cefuroxime, cephalexin       Past Surgical History:   Procedure Laterality Date    ATRIAL APPENDAGE EXCLUSION LEFT WITH TRANSESOPHAGEAL ECHOCARDIOGRAM N/A 11/28/2023    Procedure: Atrial Appendage Occlusion;  Surgeon: Anthony Mcdaniel MD;  Location:  [facility];  Service: Cardiovascular;  Laterality: N/A;    CARDIAC CATHETERIZATION Left 09/29/2022    Procedure: Left Heart Cath;  Surgeon: Titus Oliveros IV, MD;  Location:  [facility];  Service: Cardiovascular;  Laterality: Left;    CARDIOVERSION      CATARACT EXTRACTION Bilateral     COLONOSCOPY      CYSTOSCOPY      ENDOSCOPY      possible    ENDOSCOPY N/A  [phone] 2024    Procedure: ESOPHAGOGASTRODUODENOSCOPY;  Surgeon: Anthony Arambula MD;  Location:  [facility];  Service: Gastroenterology;  Laterality: N/A;  Esophagus dilated to 18mm with 12mm-mm to 15mm, then 15mm to 18mm balloon.    ENDOSCOPY N/A 10/31/2024    Procedure: ESOPHAGOGASTRODUODENOSCOPY WITH BIOPSY;  Surgeon: Carlos Dior MD;  Location:  [facility];  Service: Gastroenterology;  Laterality: N/A;    ERCP N/A 2024    Procedure: ENDOSCOPIC RETROGRADE CHOLANGIOPANCREATOGRAPHY;  Surgeon: Anthony Arambula MD;  Location:  [facility];  Service: Gastroenterology;  Laterality: N/A;  Sphincterotomy made to ampula of common bile duct (CBD), CBD swept with 9mm-12mm balloon - sludge, stones and purulent appearing drainage extracted. 10x7 [place] biliary stent depolyed into CBD. ERCP scope removed with balloon intact.    ERCP N/A 10/31/2024    Procedure: ENDOSCOPIC RETROGRADE CHOLANGIOPANCREATOGRAPHY;  Surgeon: Carlos Dior MD;  Location:  [facility];  Service: Gastroenterology;  Laterality: N/A;    KIDNEY STONE SURGERY      x1       Family History   Problem Relation Age of Onset    Alzheimer's disease Mother     COPD Father     Lung cancer Father     Cancer Father     Kidney disease Father     No Known Problems Daughter     No Known Problems Daughter     No Known Problems Daughter        Social History     Socioeconomic History    Marital status:    Tobacco Use    Smoking status: Former     Current packs/day: 0.00     Average packs/day: 1 pack/day for 15.0 years (15.0 ttl pk-yrs)     Types: Cigarettes     Start date: 1947     Quit date: 1960     Years since quittin.6     Passive exposure: Past    Smokeless tobacco: Former     Types: Chew     Quit date:     Tobacco comments:     started at 13 yo.   Vaping Use    Vaping status: Never Used    Passive vaping exposure: Yes   Substance and Sexual Activity    Alcohol use: No    Drug use: Never     Sexual activity: Defer     Partners: Female           Objective   Physical Exam  Constitutional:       General: He is not in acute distress.     Appearance: He is not ill-appearing, toxic-appearing or diaphoretic.   HENT:      Head: Normocephalic.      Nose: Nose normal.      Mouth/Throat:      Mouth: Mucous membranes are moist.   Eyes:      General: No scleral icterus.     Conjunctiva/sclera: Conjunctivae normal.      Pupils: Pupils are equal, round, and reactive to light.   Cardiovascular:      Rate and Rhythm: Tachycardia present. Rhythm irregular.      Pulses: Normal pulses.      Comments: Irregularly irregular rhythm with a rate of about 110  Pulmonary:      Effort: Pulmonary effort is normal.      Breath sounds: Normal breath sounds. No wheezing, rhonchi or rales.   Chest:      Chest wall: No tenderness.   Abdominal:      Palpations: Abdomen is soft.      Tenderness: There is no abdominal tenderness. There is no guarding.   Musculoskeletal:      Cervical back: Neck supple.      Right lower leg: No edema.      Left lower leg: No edema.   Skin:     Coloration: Skin is not jaundiced or pale.   Neurological:      General: No focal deficit present.      Mental Status: He is alert and oriented to person, place, and time.      Comments: Normal speech.  Normal facial symmetry.  Normal mentation.  Equal  bilaterally.  No drift.  Normal leg strength.  Normal finger-to-nose and normal heel-to-shin.   Psychiatric:         Mood and Affect: Mood normal.         Procedures           ED Course      In summary, 87-year-old male with history of permanent atrial fibrillation (s/p Watchman device), CAD, CHF, diabetes, COPD, hypertension, hyperlipidemia, chronic urinary retention requiring in/out self caths, who presents to the emergency department with complaints of lightheadedness and generalized weakness over the past 2 to 3 days.   Patient was at his urologist office today and was noted to be somewhat hypotensive (92/60)  and was lightheaded.  He was sent to the emergency department for further evaluation.  Patient notes that he has had some recent unintentional weight loss of about 40 pounds over the past 3 to 4 months.  No recent vomiting or diarrhea.  He has had some recent nosebleeds for which she has been seen by ENT.  Normal appetite.    MDM: Differential includes orthostasis, dehydration, renal failure, electrolyte abnormality, infection, etc.    Basic labs and UA obtained.  EKG and CXR ordered.  Orthostatics ordered.  Pt given a liter of saline.    14:22 EST  White count is normal at 9.69.  No anemia.    Glucose is 106.  Creatinine is normal at 1.19.  Normal chemistries.    HS Troponin is 36 and this appears to be chronic when compared to priors.    Normal procalcitonin at 0.05.      EKG read by me shows atrial fibrillation with a rate of 106.    Chest x-ray:  Improved left lower lobe infiltrate. No new abnormality.     The patient mentioned that he had had unintentional weight loss of 30 to 40 pounds over the past 3 to 4 months.  I reviewed patient's prior records and I see that he has had CTs of the head, chest and abdomen within the past year with no evidence of malignancy.      The RN alerted me that she was unsuccessful at getting a cath UA.  I spoke with the pt about this and he indicates that he has trouble at times at self cathing.  He notes he had a cath UA at the urologist's office today.  I reviewed the lab results on that urine and unfortunately, it was a dipstick UA with no microscopy.  It appeared cloudy and had large leukocyte esterase, nitrite negative.  I attempted a cath UA myself but was unable to pass the catheter.  The pt, who has self cathed for at least 2 years, was also unable to pass a catheter.  I had the RN do a bladder scan and she states it showed 0ml.  The pt states he normally uses a more rigid cathether and has some in the car and would like to try placing the more rigid catheter.  His daughter  has some of the more rigid catheters in her car and will bring them in.      The pt has been intermittently hypotensive.  I think he may be a bit dry and also may have some lightheadedness from his chronic a-fib with RVR.  His rate has been in the 120s here.  He took metoprolol 100mg this morning and I don't think his BP can handle any more beta blockers at the moment.  I gave him a single dose of diltiazem 10mg IV and his rate slowed into the 90s.      His daughter notes he lives alone and is concerned about his lightheadedness, etc.  Gwendolyn notes that he has recurrent UTIs and had been septic in the past.  He is followed by Dr. Og with ID.  I spoke with Dr. Araujo about the pt and he feels that it would be reasonable to bring him in for hydration, rate control, urology consult, etc.  I will plan to give him a dose of IV abx after blood cultures obtained.                                                                                     Medical Decision Making      Final diagnoses:   Orthostasis   Light headedness   Chronic atrial fibrillation with rapid ventricular response   Acute on chronic urinary retention       ED Disposition  ED Disposition       ED Disposition   Decision to Admit    Condition   --    Comment   --               No follow-up provider specified.       Medication List      No changes were made to your prescriptions during this visit.            Delfino Forbes PA  12/05/24 1400       Delfino Forbes PA  12/05/24 [id number]

## 2024-12-16 NOTE — PATIENT PROFILE ADULT - HAVE YOU EXPERIENCED VIOLENCE OR A TRAUMATIC EVENT?
-- DO NOT REPLY / DO NOT REPLY ALL --  -- This inbox is not monitored. If this was sent to the wrong provider or department, reroute message to [facility] Reroute pool. --  -- Message is from [facility]agement [facility] [employer] ([facility]) --    General Patient Message: patient's INR for 12/16 is 1.2   Caller Information       Contact Date/Time Type Contact Phone/Fax    12/16/2024 02:10 PM CST Phone (Incoming) Abdiel Akhtar (Self) [phone] (M)            Alternative phone number:     Can a detailed message be left? No  Patient has been advised the message will be addressed within 2-3 business days.                
Noted - managed in different encounter  
no

## 2024-12-20 ENCOUNTER — APPOINTMENT (OUTPATIENT)
Dept: ORTHOPEDIC SURGERY | Facility: CLINIC | Age: 79
End: 2024-12-20
Payer: MEDICARE

## 2024-12-20 VITALS — HEIGHT: 68 IN | BODY MASS INDEX: 34.86 KG/M2 | WEIGHT: 230 LBS

## 2024-12-20 DIAGNOSIS — Z96.642 PRESENCE OF LEFT ARTIFICIAL HIP JOINT: ICD-10-CM

## 2024-12-20 PROCEDURE — 72170 X-RAY EXAM OF PELVIS: CPT

## 2024-12-20 PROCEDURE — 99214 OFFICE O/P EST MOD 30 MIN: CPT

## 2025-02-28 ENCOUNTER — APPOINTMENT (OUTPATIENT)
Dept: ELECTROPHYSIOLOGY | Facility: CLINIC | Age: 80
End: 2025-02-28

## 2025-02-28 PROCEDURE — 93294 REM INTERROG EVL PM/LDLS PM: CPT

## 2025-02-28 PROCEDURE — 93296 REM INTERROG EVL PM/IDS: CPT

## 2025-03-12 NOTE — PATIENT PROFILE ADULT - NSPRONUTRITIONRISK_GEN_A_NUR
No indicators present I have personally seen and examined the patient. I have collaborated with and supervised the

## 2025-03-16 NOTE — PHYSICAL THERAPY INITIAL EVALUATION ADULT - PERSONAL SAFETY AND JUDGMENT, REHAB EVAL
Problem: Adult Inpatient Plan of Care  Goal: Plan of Care Review  Outcome: Progressing  Goal: Patient-Specific Goal (Individualized)  Outcome: Progressing  Goal: Absence of Hospital-Acquired Illness or Injury  Outcome: Progressing  Goal: Optimal Comfort and Wellbeing  Outcome: Progressing  Goal: Readiness for Transition of Care  Outcome: Progressing     Problem: Skin Injury Risk Increased  Goal: Skin Health and Integrity  Outcome: Progressing      intact

## 2025-03-31 ENCOUNTER — RX RENEWAL (OUTPATIENT)
Age: 80
End: 2025-03-31

## 2025-03-31 NOTE — PROVIDER CONTACT NOTE (CRITICAL VALUE NOTIFICATION) - RECOMMENDATIONS
The patient's daughter Jaymie is calling to obtain the results of MRI.  # [phone].   Adjust rate.

## 2025-05-15 ENCOUNTER — NON-APPOINTMENT (OUTPATIENT)
Age: 80
End: 2025-05-15

## 2025-05-22 ENCOUNTER — APPOINTMENT (OUTPATIENT)
Dept: ELECTROPHYSIOLOGY | Facility: CLINIC | Age: 80
End: 2025-05-22

## 2025-05-22 ENCOUNTER — NON-APPOINTMENT (OUTPATIENT)
Age: 80
End: 2025-05-22

## 2025-05-22 ENCOUNTER — APPOINTMENT (OUTPATIENT)
Dept: CARDIOLOGY | Facility: CLINIC | Age: 80
End: 2025-05-22
Payer: MEDICARE

## 2025-05-22 VITALS
SYSTOLIC BLOOD PRESSURE: 150 MMHG | WEIGHT: 232 LBS | HEART RATE: 78 BPM | OXYGEN SATURATION: 96 % | DIASTOLIC BLOOD PRESSURE: 75 MMHG | BODY MASS INDEX: 35.28 KG/M2

## 2025-05-22 DIAGNOSIS — K55.20 ANGIODYSPLASIA OF COLON W/OUT HEMORRHAGE: ICD-10-CM

## 2025-05-22 DIAGNOSIS — I48.91 UNSPECIFIED ATRIAL FIBRILLATION: ICD-10-CM

## 2025-05-22 DIAGNOSIS — I35.0 NONRHEUMATIC AORTIC (VALVE) STENOSIS: ICD-10-CM

## 2025-05-22 PROCEDURE — 93000 ELECTROCARDIOGRAM COMPLETE: CPT

## 2025-05-22 PROCEDURE — G2211 COMPLEX E/M VISIT ADD ON: CPT

## 2025-05-22 PROCEDURE — 99214 OFFICE O/P EST MOD 30 MIN: CPT

## 2025-05-22 PROCEDURE — 93279 PRGRMG DEV EVAL PM/LDLS PM: CPT

## 2025-06-25 ENCOUNTER — RX RENEWAL (OUTPATIENT)
Age: 80
End: 2025-06-25

## 2025-08-08 NOTE — H&P ADULT - PROBLEM SELECTOR PLAN 1
I reviewed the resident/fellow's documentation and discussed the patient with the resident/fellow. I agree with the resident/fellow's medical decision making as documented in the note.    Annamarie Hoyos MD       Likely multifactorial i/s/o possible GI bleed, iron deficiency, B12 deficiency, CKD  Patient noted to have pallor, dyspnea at home  Hgb 7.2 to 7 in ED  Patient endorses dark stools for past week, on warfarin for Afib  BM Bx 4/6/2022 showed early/mild evidence of dysplastic changes although B12/folate deficiency not ruled out  Send Iron studies  Send B12, folate  Active T+S  Transfuse Hgb>7  GI c/s  Appreciate heme/onc recs     4/6/2022 which showed early/mild evidence of dysplastic changes although B12 and folate deficiency or other causes of macrocytosis could not be ruled out. Additionial workup showed chronic kidney disease and B12 deficiency (he received a B12 injection on 4/20/2022). He was scheduled to begin Aranesp injections in the office on 4/29/2022 (pending auth). Labs done on 4/20 in the office (Hgb was 8.6) revealed a low normal ferritin level of 38 with normal Fe and TIBC. He was prescribed PO iron in the office once daily (which would explain dark stools). The patient has also had EGD, colonoscopy and capsule endoscopy in the recent past, which were negative Likely multifactorial i/s/o possible GI bleed, iron deficiency, B12 deficiency, CKD  Patient noted to have pallor, dyspnea at home  Hgb 7.2 to 7 in ED  Patient endorses dark stools for past week, on warfarin for Afib  BM Bx 4/6/2022 showed early/mild evidence of dysplastic changes although B12/folate deficiency not ruled out  Outpt work up revealed CKD, B12 deficiency (s/p B12 injection 4/20) pending auth for Aranesp injections  On outpt labs low normal ferritin level of 38 with normal Fe and TIBC  Send Iron studies  Send B12, folate  Active T+S  Transfuse Hgb>7  GI c/s  Appreciate heme/onc recs Likely multifactorial i/s/o possible GI bleed, iron deficiency, B12 deficiency, CKD  Patient noted to have pallor, dyspnea at home  Hgb 7.2 to 7 in ED  Patient endorses dark stools for past week, on warfarin for Afib  BM Bx 4/6/2022 showed early/mild evidence of dysplastic changes although B12/folate deficiency not ruled out  Outpt work up revealed CKD, B12 deficiency (s/p B12 injection 4/20) pending auth for Aranesp injections  On outpt labs low normal ferritin level of 38 with normal Fe and TIBC  Send Iron studies  Send B12, folate  Active T+S  Transfuse Hgb>7  GI c/s  C/w Iron 325 mg PO daily  Start Retacrit 10,000 units weekly, plan to d/c on Aranesp  Appreciate heme/onc recs Likely multifactorial i/s/o possible GI bleed, iron deficiency, B12 deficiency, CKD  Patient noted to have pallor, dyspnea at home  Hgb 7.2 to 7 in ED  Patient endorses dark stools for past week, on warfarin for Afib  BM Bx 4/6/2022 showed early/mild evidence of dysplastic changes although B12/folate deficiency not ruled out  Outpt work up revealed CKD, B12 deficiency (s/p B12 injection 4/20) pending auth for Aranesp injections  On outpt labs low normal ferritin level of 38 with normal Fe and TIBC  Send Iron studies  Send B12, folate  Active T+S  Transfuse Hgb>7  GI c/s  C/w Iron 325 mg PO daily  Start Retacrit 10,000 units weekly, plan to d/c on Aranesp  Repeat LDH and haptoglobin  Will give 1/2u pRBC x 2 w/ lasix today  Appreciate heme/onc recs Likely multifactorial i/s/o possible GI bleed, iron deficiency, B12 deficiency, CKD, ?AS causing Macroangiopathic hemolytic anemia  Patient noted to have pallor, dyspnea at home  Hgb 7.2 to 7 in ED  Patient endorses dark stools for past week, on warfarin for Afib  BM Bx 4/6/2022 showed early/mild evidence of dysplastic changes although B12/folate deficiency not ruled out  Outpt work up revealed CKD, B12 deficiency (s/p B12 injection 4/20) pending auth for Aranesp injections  On outpt labs low normal ferritin level of 38 with normal Fe and TIBC  Send Iron studies  Send B12, folate  Active T+S  Transfuse Hgb>7  GI c/s  C/w Iron 325 mg PO daily  Start Retacrit 10,000 units weekly, plan to d/c on Aranesp  Repeat LDH and haptoglobin  Will give 1/2u pRBC x 2 w/ lasix today  Appreciate heme/onc recs

## 2025-08-21 ENCOUNTER — APPOINTMENT (OUTPATIENT)
Dept: ELECTROPHYSIOLOGY | Facility: CLINIC | Age: 80
End: 2025-08-21

## 2025-08-21 PROCEDURE — 93296 REM INTERROG EVL PM/IDS: CPT

## 2025-08-21 PROCEDURE — 93294 REM INTERROG EVL PM/LDLS PM: CPT

## (undated) DEVICE — SUT PLEDGET PRE PUNCH 4.8 X 9.5 X 1.5 MM

## (undated) DEVICE — DRSG OPSITE 2.5 X 2"

## (undated) DEVICE — HOOD FLYTE STRYKER HELMET SHIELD

## (undated) DEVICE — ELCTR BOVIE PENCIL HANDPIECE ROCKER SWITCH 15FT

## (undated) DEVICE — SUT BIOSYN 4-0 18" P-12

## (undated) DEVICE — DRSG OPSITE 13.75 X 4"

## (undated) DEVICE — CATH IV SAFE BC 20G X 1.16" (PINK)

## (undated) DEVICE — SUT POLYSORB 1 36" GS-25

## (undated) DEVICE — TUBING IV SET GRAVITY 3Y 100" MACRO

## (undated) DEVICE — ELCTR PLASMA BLADE X 3.0S WIDE TIP

## (undated) DEVICE — TUBING SUCTION CONN 6FT STERILE

## (undated) DEVICE — SUT POLYSORB 1 36" GS-21 UNDYED

## (undated) DEVICE — ELCTR AQUAMANTYS BIPOLAR SEALER 6.0

## (undated) DEVICE — SENSOR O2 FINGER ADULT

## (undated) DEVICE — TUBING CAP SET ENDO 24HR USE GI

## (undated) DEVICE — ELCTR GROUNDING PAD ADULT COVIDIEN

## (undated) DEVICE — SUT PROLENE 4-0 36" BB

## (undated) DEVICE — GLV 7.5 PROTEXIS (WHITE)

## (undated) DEVICE — SUT PROLENE 5-0 30" RB-2

## (undated) DEVICE — SOL IRR POUR H2O 250ML

## (undated) DEVICE — SOL INJ NS 0.9% 500ML 2 PORT

## (undated) DEVICE — DRAPE 3/4 SHEET W REINFORCEMENT 56X77"

## (undated) DEVICE — WARMING BLANKET UPPER ADULT

## (undated) DEVICE — STEALTH CLAMP INSERT FIBRA/FIBRA 90MM

## (undated) DEVICE — CATH IV SAFE BC 22G X 1" (BLUE)

## (undated) DEVICE — SUT POLYSORB 0 30" GS-21 UNDYED

## (undated) DEVICE — BALLOON US ENDO

## (undated) DEVICE — TUBING SUCTION 20FT

## (undated) DEVICE — SUT ETHIBOND 2-0 36" SH

## (undated) DEVICE — MEDICATION LABELS W MARKER

## (undated) DEVICE — WOUND IRR IRRISEPT W 0.5 CHG

## (undated) DEVICE — BITE BLOCK ADULT 20 X 27MM (GREEN)

## (undated) DEVICE — SYR LUER LOK 20CC

## (undated) DEVICE — PACK IV START WITH CHG

## (undated) DEVICE — VENODYNE/SCD SLEEVE CALF LARGE

## (undated) DEVICE — SAW BLADE STRYKER SAGITTAL 3 HOLE OSCILLATING

## (undated) DEVICE — NDL COUNTER FOAM AND MAGNET 40-70

## (undated) DEVICE — DRAPE C ARM UNIVERSAL

## (undated) DEVICE — DEFIBRILLATOR PAD PRE-CONNECT ADULT/CHILD

## (undated) DEVICE — SOL INJ NS 0.9% 500ML 1-PORT

## (undated) DEVICE — FOLEY HOLDER STATLOCK 2 WAY ADULT

## (undated) DEVICE — VESSEL LOOP EXTRA MAXI-BLUE 0.200" X 22"

## (undated) DEVICE — SUT QUILL PDO 1 30CM T9 DA

## (undated) DEVICE — IRRIGATOR BIO SHIELD

## (undated) DEVICE — DRAPE SHOWER CURTAIN ISOLATION

## (undated) DEVICE — SAW BLADE MICROAIRE STERNUM 1X34X9.4MM

## (undated) DEVICE — GLV 8.5 PROTEXIS (WHITE)

## (undated) DEVICE — DRAPE IOBAN 23" X 23"

## (undated) DEVICE — DRAPE INSTRUMENT POUCH 6.75" X 11"

## (undated) DEVICE — SAW BLADE MICROAIRE STERNUM 1.1X50X42MM

## (undated) DEVICE — POLY TRAP ETRAP

## (undated) DEVICE — Device

## (undated) DEVICE — GOWN XXXL

## (undated) DEVICE — SOL NORMOSOL-R PH7.4 1000ML

## (undated) DEVICE — SUT SOFSILK 0 30" V-20

## (undated) DEVICE — SUT POLYSORB 2-0 30" GS-21 UNDYED

## (undated) DEVICE — SUT PROLENE 4-0 36" SH

## (undated) DEVICE — SUCTION YANKAUER NO CONTROL VENT

## (undated) DEVICE — SUT SOFSILK 2 60" TIES

## (undated) DEVICE — BIOPSY FORCEP RADIAL JAW 4 STANDARD WITH NEEDLE

## (undated) DEVICE — GLV 8 PROTEXIS (WHITE)

## (undated) DEVICE — PACK CARDIAC YELLOW

## (undated) DEVICE — DRSG AQUACEL 3.5 X 6"

## (undated) DEVICE — SPECIMEN CONTAINER 100ML

## (undated) DEVICE — NDL HYPO SAFE 22G X 1.5" (BLACK)

## (undated) DEVICE — GLV 6.5 PROTEXIS (WHITE)

## (undated) DEVICE — SUT PROLENE 5-0 36" RB-1

## (undated) DEVICE — POSITIONER FOAM EGG CRATE ULNAR 2PCS (PINK)

## (undated) DEVICE — DRSG STERISTRIPS 0.5 X 4"

## (undated) DEVICE — SYR ALLIANCE II INFLATION 60ML

## (undated) DEVICE — PACK TOTAL HIP (2 PACKS)

## (undated) DEVICE — SOL IRR POUR NS 0.9% 500ML

## (undated) DEVICE — CHEST DRAIN PLEUR-EVAC WET/WET ADULT-PEDS SINGLE (QUICK)

## (undated) DEVICE — DRSG DERMABOND 0.7ML

## (undated) DEVICE — LINER TRACTION BOOT MIZUHO

## (undated) DEVICE — SOL IRR POUR H2O 1000ML

## (undated) DEVICE — DRAPE TOWEL BLUE 17" X 24"

## (undated) DEVICE — DRSG TEGADERM 6"X8"

## (undated) DEVICE — GLV 7 PROTEXIS (WHITE)

## (undated) DEVICE — PACK UNIVERSAL CARDIAC

## (undated) DEVICE — SYR ASEPTO

## (undated) DEVICE — SUT PROLENE 3-0 36" SH

## (undated) DEVICE — PACING CABLE TEMP MEDTRONIC WITH PAC-LOC

## (undated) DEVICE — DRAPE LIGHT HANDLE COVER (BLUE)

## (undated) DEVICE — WARMING BLANKET FULL UNDERBODY

## (undated) DEVICE — SUT QUILL MONODERM 2-0 3/8 CIRCLE 45CM

## (undated) DEVICE — WARMING BLANKET DUO-THERM HYPER/HYPOTHERM ADULT

## (undated) DEVICE — CLAMP BX HOT RAD JAW 3

## (undated) DEVICE — FOLEY TRAY 16FR 5CC LF LUBRISIL ADVANCE TEMP CLOSED

## (undated) DEVICE — ELCTR REM POLYHESIVE ADULT PT RETURN 15FT

## (undated) DEVICE — CHEST DRAIN PLEUR-EVAC WET/WET PEDS SINGLE